# Patient Record
Sex: FEMALE | Race: WHITE | Employment: OTHER | URBAN - METROPOLITAN AREA
[De-identification: names, ages, dates, MRNs, and addresses within clinical notes are randomized per-mention and may not be internally consistent; named-entity substitution may affect disease eponyms.]

---

## 2017-02-20 ENCOUNTER — TELEPHONE (OUTPATIENT)
Dept: FAMILY MEDICINE | Facility: CLINIC | Age: 62
End: 2017-02-20

## 2017-02-20 DIAGNOSIS — E10.65 TYPE 1 DIABETES MELLITUS WITH HYPERGLYCEMIA (H): Primary | ICD-10-CM

## 2017-02-20 NOTE — TELEPHONE ENCOUNTER
Walter oTrres is no longer on Niharika's insurance formulary, they prefer to pay for Levemir Flexpen, if you are ok with that switch, please send new prescription for the Levemir to [facility], thanks.  If you prefer her to stay on the Lantus it will require a prior authorization.    Insurance info -- [employer] (BIN [id number])  ID -- [id number]   Group -- [id number]  [facility] -- [id number]  Phone # -- [phone]    Kathryn Vincent-Technician  [facility]  [phone]

## 2017-02-21 NOTE — TELEPHONE ENCOUNTER
I am ok with her trying a switch to Levimir flex pen. See orders.  Equal unit switch compared to what she is using now.    I will want follow up with diabetes ed very soon after switching to make sure her dose is correct.  Please schedule follow up within 2 weeks and update us with blood sugars up until then.   Also she is due for a diabetes follow up with me, please schedule.   Leona Farris MD

## 2017-04-21 ENCOUNTER — TELEPHONE (OUTPATIENT)
Dept: FAMILY MEDICINE | Facility: CLINIC | Age: 62
End: 2017-04-21

## 2017-04-22 NOTE — TELEPHONE ENCOUNTER
Call Regarding Preventive Health Screening Mammogram    Attempt 1    Message on voicemail     Comments:       Outreach   Nicki Bejarano

## 2017-05-12 DIAGNOSIS — F41.1 GENERALIZED ANXIETY DISORDER: ICD-10-CM

## 2017-05-12 DIAGNOSIS — E78.5 HYPERLIPIDEMIA LDL GOAL <100: ICD-10-CM

## 2017-05-12 DIAGNOSIS — E10.65 TYPE 1 DIABETES MELLITUS WITH HYPERGLYCEMIA (H): ICD-10-CM

## 2017-05-12 RX ORDER — SIMVASTATIN 10 MG
TABLET ORAL
Qty: 90 TABLET | Refills: 0 | Status: SHIPPED | OUTPATIENT
Start: 2017-05-12 | End: 2017-08-16

## 2017-05-12 RX ORDER — PAROXETINE 20 MG/1
TABLET, FILM COATED ORAL
Qty: 90 TABLET | Refills: 0 | Status: SHIPPED | OUTPATIENT
Start: 2017-05-12 | End: 2017-08-16

## 2017-05-12 NOTE — TELEPHONE ENCOUNTER
Please schedule appt for med follow up in next 3 months, I refilled until then.   Leona Farris MD

## 2017-05-12 NOTE — TELEPHONE ENCOUNTER
simvastatin (ZOCOR) 10 MG tablet   Last Written Prescription Date: 6/17/16  Last Fill Quantity: 90, # refills: 1  [place] [facility] [facility] or [facility] prescribing provider: 6/17/16       Lab Results   Component Value Date    CHOL 129 11/07/2015     Lab Results   Component Value Date    HDL 57 11/07/2015     Lab Results   Component Value Date    LDL 61 11/07/2015     Lab Results   Component Value Date    TRIG 53 11/07/2015     Lab Results   Component Value Date    CHOLHDLRATIO 2.3 11/07/2015      PARoxetine (PAXIL) 20 MG tablet    Last Written Prescription Date: 6/17/16  Last Fill Quantity: 90, # refills: 1  [place] [facility] [place] provider:  6/17/16        Last PHQ-9 score on record=   PHQ-9 SCORE 10/31/2011   Total Score 5

## 2017-05-15 NOTE — TELEPHONE ENCOUNTER
Left message for patient to call back, and speak with any .     Thank you,   Fozia Katz   for [facility]

## 2017-06-05 ENCOUNTER — TELEPHONE (OUTPATIENT)
Dept: FAMILY MEDICINE | Facility: CLINIC | Age: 62
End: 2017-06-05

## 2017-06-05 NOTE — TELEPHONE ENCOUNTER
Panel Management Review      Patient has the following on her problem list:     Diabetes    ASA: Passed    Last A1C  Lab Results   Component Value Date    A1C 7.6 06/17/2016    A1C 8.6 11/07/2015    A1C 8.1 04/21/2015    A1C 8.4 09/19/2014    A1C 7.4 08/09/2013     A1C tested: FAILED-overdue    Last LDL:    Lab Results   Component Value Date    CHOL 129 11/07/2015     Lab Results   Component Value Date    HDL 57 11/07/2015     Lab Results   Component Value Date    LDL 61 11/07/2015     Lab Results   Component Value Date    TRIG 53 11/07/2015     Lab Results   Component Value Date    CHOLHDLRATIO 2.3 11/07/2015     No results found for: NHDL    Is the patient on a Statin? YES             Is the patient on Aspirin? YES    Medications     HMG CoA Reductase Inhibitors    simvastatin (ZOCOR) 10 MG tablet    Salicylates    ASPIRIN 81 MG OR TABS          Last three blood pressure readings:  BP Readings from Last 3 Encounters:   06/17/16 96/58   11/04/15 126/62   04/21/15 108/62       Date of last diabetes office visit: 6/17/2016     Tobacco History:     History   Smoking Status     Current Some Day Smoker   Smokeless Tobacco     Never Used         Hypertension   Last three blood pressure readings:  BP Readings from Last 3 Encounters:   06/17/16 96/58   11/04/15 126/62   04/21/15 108/62     Blood pressure: Passed    HTN Guidelines:  Age 18-59 BP range:  Less than 140/90  Age 60-85 with Diabetes:  Less than 140/90  Age 60-85 without Diabetes:  less than 150/90      Composite cancer screening  Chart review shows that this patient is due/due soon for the following Mammogram and Colonoscopy  Summary:    Patient is due/failing the following:   A1C, COLONOSCOPY, FOLLOW UP, MAMMOGRAM and PHYSICAL    Action needed:   Patient needs office visit for physical, diabetes, mammogram, colonoscopy.    Type of outreach:    Phone, left message for patient to call back. [phone]    Questions for provider review:    None                                                                                                                                     Chasity Limon, [facility]      Chart routed to Care Team .

## 2017-06-14 ENCOUNTER — TELEPHONE (OUTPATIENT)
Dept: FAMILY MEDICINE | Facility: CLINIC | Age: 62
End: 2017-06-14

## 2017-06-14 NOTE — TELEPHONE ENCOUNTER
Reason for Call:  Form, our goal is to have forms completed with 72 hours, however, some forms may require a visit or additional information.    Type of letter, form or note:  medical    Who is the form from?: [place] [employer] (if other please explain)    Where did the form come from: Patient or family brought in       What clinic location was the form placed at?: [facility]    Where the form was placed: [facility]    What number is listed as a contact on the form?: patient, [phone]       Additional comments: please renny when ready to ,  Patient needs this by this Friday 16th.     Call taken on 6/14/2017 at 12:18 PM by China Baker

## 2017-06-16 NOTE — TELEPHONE ENCOUNTER
6/16/2017    Call Regarding Preventive Health Screening Colonoscopy    Attempt 3    Message on voicemail     Comments: Clinic made the 2nd attempt. Patient is scheduled for Mammo after Outreach attempt.       Outreach   CC

## 2017-06-16 NOTE — TELEPHONE ENCOUNTER
Patient called to fu on forms that were brought in on Wed, patient is requesting to  today, please advise.  [phone]  Thank you,  Jennifer Peter  Patient Representative

## 2017-06-16 NOTE — TELEPHONE ENCOUNTER
LM on home# advising patient form is complete and at  to . Gave [phone] for questions  Chasity Limon CMA

## 2017-06-24 DIAGNOSIS — E10.65 TYPE 1 DIABETES MELLITUS WITH HYPERGLYCEMIA (H): ICD-10-CM

## 2017-06-26 RX ORDER — INSULIN DETEMIR 100 [IU]/ML
INJECTION, SOLUTION SUBCUTANEOUS
Qty: 60 ML | Refills: 0 | Status: SHIPPED | OUTPATIENT
Start: 2017-06-26 | End: 2017-08-25

## 2017-06-26 NOTE — TELEPHONE ENCOUNTER
Routing refill request to provider for review/approval because:  Labs not current:  Microalbumin, LDL.   Patient needs to be seen because:  Per protocol, patient needs to be seen every 6 months for refills, LOV 06/17/2016.    Patient has upcoming appointment with PCP on 08/25/2017.    PCP is currently out of office, will route to covering provider.     Joi Sheriff RN

## 2017-06-26 NOTE — TELEPHONE ENCOUNTER
Humalog         Last Written Prescription Date: 6/17/16  Last Fill Quantity: 20ml, # refills: 11  Last Office Visit with [facility], [facility] or  Health prescribing provider:  6/17/16   Next 5 appointments (look out 90 days)     Aug 25, 2017  7:45 AM CDT   [facility] Visit with Leona Farris MD   [facility]    [address] [place] [id number] [phone]                   BP Readings from Last 3 Encounters:   06/17/16 96/58   11/04/15 126/62   04/21/15 108/62     Lab Results   Component Value Date    MICROL <5 11/04/2015     Lab Results   Component Value Date    UMALCR Unable to calculate due to low value 11/04/2015     Creatinine   Date Value Ref Range Status   11/07/2015 0.54 0.52 - 1.04 mg/dL Final   ]  GFR Estimate   Date Value Ref Range Status   11/07/2015 >90  Non  GFR Calc   >60 mL/min/1.7m2 Final   09/19/2014 85 >60 mL/min/1.7m2 Final     Comment:     Non  GFR Calc   08/09/2013 >90 >60 mL/min/1.7m2 Final     GFR Estimate If Black   Date Value Ref Range Status   11/07/2015 >90   GFR Calc   >60 mL/min/1.7m2 Final   09/19/2014 >90   GFR Calc   >60 mL/min/1.7m2 Final   08/09/2013 >90 >60 mL/min/1.7m2 Final     Lab Results   Component Value Date    CHOL 129 11/07/2015     Lab Results   Component Value Date    HDL 57 11/07/2015     Lab Results   Component Value Date    LDL 61 11/07/2015     Lab Results   Component Value Date    TRIG 53 11/07/2015     Lab Results   Component Value Date    CHOLHDLRATIO 2.3 11/07/2015     Lab Results   Component Value Date    AST 19 05/13/2011     Lab Results   Component Value Date    ALT 22 11/07/2015     Lab Results   Component Value Date    A1C 7.6 06/17/2016    A1C 8.6 11/07/2015    A1C 8.1 04/21/2015    A1C 8.4 09/19/2014    A1C 7.4 08/09/2013     Potassium   Date Value Ref Range Status   11/07/2015 4.0 3.4 - 5.3 mmol/L Final     Kathryn  Melisa-Technician  [facility]  [phone]

## 2017-06-26 NOTE — TELEPHONE ENCOUNTER
insulin detemir (LEVEMIR) 100 UNIT/ML injection         Last Written Prescription Date: 2/20/17  Last Fill Quantity: 50, # refills: 0  Last Office Visit with [facility] or [facility] prescribing provider:  6/17/16   Next 5 appointments (look out 90 days)     Aug 25, 2017  7:45 AM CDT   [facility]   [facility]    [address] [place] [id number] [phone]                   BP Readings from Last 3 Encounters:   06/17/16 96/58   11/04/15 126/62   04/21/15 108/62     Lab Results   Component Value Date    MICROL <5 11/04/2015     Lab Results   Component Value Date    UMALCR Unable to calculate due to low value 11/04/2015     Creatinine   Date Value Ref Range Status   11/07/2015 0.54 0.52 - 1.04 mg/dL Final   ]  GFR Estimate   Date Value Ref Range Status   11/07/2015 >90  Non  GFR Calc   >60 mL/min/1.7m2 Final   09/19/2014 85 >60 mL/min/1.7m2 Final     Comment:     Non  GFR Calc   08/09/2013 >90 >60 mL/min/1.7m2 Final     GFR Estimate If Black   Date Value Ref Range Status   11/07/2015 >90   GFR Calc   >60 mL/min/1.7m2 Final   09/19/2014 >90   GFR Calc   >60 mL/min/1.7m2 Final   08/09/2013 >90 >60 mL/min/1.7m2 Final     Lab Results   Component Value Date    CHOL 129 11/07/2015     Lab Results   Component Value Date    HDL 57 11/07/2015     Lab Results   Component Value Date    LDL 61 11/07/2015     Lab Results   Component Value Date    TRIG 53 11/07/2015     Lab Results   Component Value Date    CHOLHDLRATIO 2.3 11/07/2015     Lab Results   Component Value Date    AST 19 05/13/2011     Lab Results   Component Value Date    ALT 22 11/07/2015     Lab Results   Component Value Date    A1C 7.6 06/17/2016    A1C 8.6 11/07/2015    A1C 8.1 04/21/2015    A1C 8.4 09/19/2014    A1C 7.4 08/09/2013     Potassium   Date Value Ref Range Status   11/07/2015 4.0 3.4 - 5.3 mmol/L  Final

## 2017-08-16 DIAGNOSIS — E10.65 TYPE 1 DIABETES MELLITUS WITH HYPERGLYCEMIA (H): ICD-10-CM

## 2017-08-16 DIAGNOSIS — I10 ESSENTIAL HYPERTENSION WITH GOAL BLOOD PRESSURE LESS THAN 130/80: ICD-10-CM

## 2017-08-16 DIAGNOSIS — E78.5 HYPERLIPIDEMIA LDL GOAL <100: ICD-10-CM

## 2017-08-16 DIAGNOSIS — F41.1 GENERALIZED ANXIETY DISORDER: ICD-10-CM

## 2017-08-16 RX ORDER — LISINOPRIL 40 MG/1
TABLET ORAL
Qty: 90 TABLET | Refills: 0 | Status: SHIPPED | OUTPATIENT
Start: 2017-08-16 | End: 2017-11-14

## 2017-08-16 RX ORDER — SIMVASTATIN 10 MG
TABLET ORAL
Qty: 90 TABLET | Refills: 0 | Status: SHIPPED | OUTPATIENT
Start: 2017-08-16 | End: 2017-11-14

## 2017-08-16 RX ORDER — PAROXETINE 20 MG/1
TABLET, FILM COATED ORAL
Qty: 90 TABLET | Refills: 0 | Status: SHIPPED | OUTPATIENT
Start: 2017-08-16 | End: 2017-08-25

## 2017-08-16 RX ORDER — HYDROCHLOROTHIAZIDE 12.5 MG/1
CAPSULE ORAL
Qty: 90 CAPSULE | Refills: 0 | Status: SHIPPED | OUTPATIENT
Start: 2017-08-16 | End: 2017-11-14

## 2017-08-16 RX ORDER — ATENOLOL 25 MG/1
TABLET ORAL
Qty: 90 TABLET | Refills: 0 | Status: SHIPPED | OUTPATIENT
Start: 2017-08-16 | End: 2017-11-14

## 2017-08-16 NOTE — TELEPHONE ENCOUNTER
Paxil 20mg  Last Written Prescription Date: 5/12/17  Last Fill Quantity: 90, # refills: 0  Last Office Visit with [facility] primary care provider:  6/17/16   Next 5 appointments (look out 90 days)     Aug 25, 2017  7:45 AM CDT   Office Visit with Leona Farris MD   [facility]    [address] [place] [id number] [phone]                   Last PHQ-9 score on record=   PHQ-9 SCORE 10/31/2011   Total Score 5     Zocor 10mg     Last Written Prescription Date: 5/12/17  Last Fill Quantity: 90, # refills: 0  Last Office Visit with [facility], [facility] or  Health prescribing provider: 6/17/16  Next 5 appointments (look out 90 days)     Aug 25, 2017  7:45 AM CDT   Office Visit with Leona Farris MD   [facility] ([facility])    [address] [place] [id number] [phone]                   Lab Results   Component Value Date    CHOL 129 11/07/2015     Lab Results   Component Value Date    HDL 57 11/07/2015     Lab Results   Component Value Date    LDL 61 11/07/2015     Lab Results   Component Value Date    TRIG 53 11/07/2015     Lab Results   Component Value Date    CHOLHDLRATIO 2.3 11/07/2015       Atenolol 25mg; Hctz 12.5mg; Lisinopril 40mg      Last Written Prescription Date: 6/17/16  Last Fill Quantity: 90, # refills: 3  Last Office Visit with [facility], [facility] or  Health prescribing provider: 6/17/16  Next 5 appointments (look out 90 days)     Aug 25, 2017  7:45 AM CDT   Office Visit with Leona Farris MD   [facility] ([facility])    [address] [place] [id number] [phone]                   Potassium   Date Value Ref Range Status   11/07/2015 4.0 3.4 - 5.3 mmol/L Final     Creatinine   Date Value Ref Range Status   11/07/2015 0.54 0.52 - 1.04 mg/dL Final     BP Readings from Last 3 Encounters:   06/17/16 96/58   11/04/15 126/62   04/21/15  108/62     Kathryn Vincent-Technician  [facility]  [phone]

## 2017-08-25 ENCOUNTER — OFFICE VISIT (OUTPATIENT)
Dept: FAMILY MEDICINE | Facility: CLINIC | Age: 62
End: 2017-08-25
Payer: COMMERCIAL

## 2017-08-25 ENCOUNTER — HOSPITAL ENCOUNTER (OUTPATIENT)
Dept: MAMMOGRAPHY | Facility: CLINIC | Age: 62
Discharge: HOME | End: 2017-08-25
Attending: FAMILY MEDICINE | Admitting: FAMILY MEDICINE
Payer: COMMERCIAL

## 2017-08-25 VITALS
WEIGHT: 176.12 LBS | HEIGHT: 63 IN | TEMPERATURE: 98.1 F | BODY MASS INDEX: 31.21 KG/M2 | SYSTOLIC BLOOD PRESSURE: 114 MMHG | DIASTOLIC BLOOD PRESSURE: 66 MMHG | OXYGEN SATURATION: 97 % | HEART RATE: 77 BPM

## 2017-08-25 DIAGNOSIS — F41.1 GENERALIZED ANXIETY DISORDER: ICD-10-CM

## 2017-08-25 DIAGNOSIS — E66.09 NON MORBID OBESITY DUE TO EXCESS CALORIES: ICD-10-CM

## 2017-08-25 DIAGNOSIS — F17.200 TOBACCO USE DISORDER: ICD-10-CM

## 2017-08-25 DIAGNOSIS — I10 HYPERTENSION GOAL BP (BLOOD PRESSURE) < 130/80: ICD-10-CM

## 2017-08-25 DIAGNOSIS — E78.5 HYPERLIPIDEMIA LDL GOAL <100: ICD-10-CM

## 2017-08-25 DIAGNOSIS — E10.65 TYPE 1 DIABETES MELLITUS WITH HYPERGLYCEMIA (H): Primary | ICD-10-CM

## 2017-08-25 DIAGNOSIS — Z12.31 VISIT FOR SCREENING MAMMOGRAM: ICD-10-CM

## 2017-08-25 LAB
ALT SERPL W P-5'-P-CCNC: 20 U/L (ref 0–50)
ANION GAP SERPL CALCULATED.3IONS-SCNC: 8 MMOL/L (ref 3–14)
BUN SERPL-MCNC: 18 MG/DL (ref 7–30)
CALCIUM SERPL-MCNC: 9.5 MG/DL (ref 8.5–10.1)
CHLORIDE SERPL-SCNC: 104 MMOL/L (ref 94–109)
CHOLEST SERPL-MCNC: 144 MG/DL
CO2 SERPL-SCNC: 27 MMOL/L (ref 20–32)
CREAT SERPL-MCNC: 0.79 MG/DL (ref 0.52–1.04)
CREAT UR-MCNC: 137 MG/DL
GFR SERPL CREATININE-BSD FRML MDRD: 74 ML/MIN/1.7M2
GLUCOSE SERPL-MCNC: 119 MG/DL (ref 70–99)
HBA1C MFR BLD: 7.7 % (ref 4.3–6)
HDLC SERPL-MCNC: 55 MG/DL
LDLC SERPL CALC-MCNC: 76 MG/DL
MICROALBUMIN UR-MCNC: 23 MG/L
MICROALBUMIN/CREAT UR: 17.08 MG/G CR (ref 0–25)
NONHDLC SERPL-MCNC: 89 MG/DL
POTASSIUM SERPL-SCNC: 4.5 MMOL/L (ref 3.4–5.3)
SODIUM SERPL-SCNC: 139 MMOL/L (ref 133–144)
TRIGL SERPL-MCNC: 66 MG/DL
TSH SERPL DL<=0.005 MIU/L-ACNC: 1.18 MU/L (ref 0.4–4)

## 2017-08-25 PROCEDURE — 99207 C FOOT EXAM  NO CHARGE: CPT | Performed by: FAMILY MEDICINE

## 2017-08-25 PROCEDURE — 99214 OFFICE O/P EST MOD 30 MIN: CPT | Performed by: FAMILY MEDICINE

## 2017-08-25 PROCEDURE — 36415 COLL VENOUS BLD VENIPUNCTURE: CPT | Performed by: FAMILY MEDICINE

## 2017-08-25 PROCEDURE — 84460 ALANINE AMINO (ALT) (SGPT): CPT | Performed by: FAMILY MEDICINE

## 2017-08-25 PROCEDURE — 80048 BASIC METABOLIC PNL TOTAL CA: CPT | Performed by: FAMILY MEDICINE

## 2017-08-25 PROCEDURE — 84443 ASSAY THYROID STIM HORMONE: CPT | Performed by: FAMILY MEDICINE

## 2017-08-25 PROCEDURE — 80061 LIPID PANEL: CPT | Performed by: FAMILY MEDICINE

## 2017-08-25 PROCEDURE — 83036 HEMOGLOBIN GLYCOSYLATED A1C: CPT | Performed by: FAMILY MEDICINE

## 2017-08-25 PROCEDURE — G0202 SCR MAMMO BI INCL CAD: HCPCS

## 2017-08-25 PROCEDURE — 82043 UR ALBUMIN QUANTITATIVE: CPT | Performed by: FAMILY MEDICINE

## 2017-08-25 RX ORDER — PAROXETINE 30 MG/1
30 TABLET, FILM COATED ORAL EVERY MORNING
Qty: 90 TABLET | Refills: 1 | Status: SHIPPED | OUTPATIENT
Start: 2017-08-25 | End: 2018-04-17

## 2017-08-25 ASSESSMENT — PAIN SCALES - GENERAL: PAINLEVEL: NO PAIN (0)

## 2017-08-25 NOTE — PROGRESS NOTES
SUBJECTIVE:   Niharika Cason is a 61 year old female who presents to clinic today for the following health issues:  Her last visit was June of 2016, she is here for yearly recheck of her diabetes and other chronic health conditions:      Type 1 diabetes mellitus with hyperglycemia (H)  Generalized anxiety disorder  Hypertension goal BP (blood pressure) < 130/80  Hyperlipidemia LDL goal <100  Non morbid obesity due to excess calories  Tobacco use disorder    Overall she is not feeling well, having significant anxiety and depression symptoms. She is worried about many things. Please see ROS below for details.      Medical, social, surgical, and family histories reviewed and updated as needed.      Problem list and histories reviewed & adjusted, as indicated.  Additional history: as documented    Past Medical History:   Diagnosis Date     Essential hypertension, benign      Generalized anxiety disorder      Type I (juvenile type) diabetes mellitus without mention of complication, not stated as uncontrolled     diagnosed at age 35     Ulcerative colitis, unspecified     in the 70s.          Patient Active Problem List   Diagnosis     Female stress incontinence     Generalized anxiety disorder     Constipation     Type 1 diabetes, HbA1c goal < 8% (H)     Hypertension goal BP (blood pressure) < 130/80     Hyperlipidemia LDL goal <100     Tobacco abuse     Tobacco use disorder     Type 1 diabetes mellitus with hyperglycemia (HCC)     Non morbid obesity due to excess calories     Past Surgical History:   Procedure Laterality Date     HC COLONOSCOPY THRU STOMA, DIAGNOSTIC  1998    negative     HC CURETTAGE, POSTPARTUM  '91, '93    following miscarriages     HC REMOVAL OF TONSILS,<13 Y/O      Tonsils <14y.o.       Social History   Substance Use Topics     Smoking status: Current Some Day Smoker     Smokeless tobacco: Never Used     Alcohol use 0.0 oz/week     0 Standard drinks or equivalent per week      Comment: 3-4 wine  per wk     Family History   Problem Relation Age of Onset     HEART DISEASE Maternal Grandfather      MI at 52 yrs     EYE* Paternal Grandfather      cataracts     Arthritis Mother      Gynecology Mother      hysterectomy for fibroids     Hypertension Mother      Lipids Mother      GASTROINTESTINAL DISEASE Father      ulcers     HEART DISEASE Father      intermittent rapid heartbeat         Current Outpatient Prescriptions   Medication Sig Dispense Refill     PARoxetine (PAXIL) 30 MG tablet Take 1 tablet (30 mg) by mouth every morning 90 tablet 1     insulin detemir (LEVEMIR FLEXTOUCH) 100 UNIT/ML injection Inject 50 Units Subcutaneous At Bedtime 45 mL 3     insulin lispro (HUMALOG KWIKPEN) 100 UNIT/ML injection Use 3 units before breakfast, 6 units before lunch, and 16 units before dinner, plus correction of 1 unit per every 50 glucose over 125, averaging 3 for breakfast, 6 for lunch, 16 for dinner. 30 mL 3     simvastatin (ZOCOR) 10 MG tablet TAKE ONE TABLET BY MOUTH EVERY NIGHT AT BEDTIME 90 tablet 0     atenolol (TENORMIN) 25 MG tablet TAKE ONE TABLET BY MOUTH EVERY DAY 90 tablet 0     hydrochlorothiazide (MICROZIDE) 12.5 MG capsule TAKE ONE CAPSULE BY MOUTH EVERY DAY 90 capsule 0     lisinopril (PRINIVIL/ZESTRIL) 40 MG tablet TAKE ONE TABLET BY MOUTH EVERY DAY 90 tablet 0     BD ULTRA FINE PEN NEEDLES 1 each by Percutaneous route 4 times daily 150 Device 11     blood glucose monitoring (SURESTEP TEST STRIPS) test strip 1 strip by In Vitro route 3 times daily 200 each 3     ASPIRIN 81 MG OR TABS 1 tab po QD (Once per day) 0 0     [DISCONTINUED] PARoxetine (PAXIL) 20 MG tablet TAKE ONE TABLET BY MOUTH EVERY MORNING 90 tablet 0     [DISCONTINUED] LEVEMIR FLEXTOUCH 100 UNIT/ML injection INJECT 55 UNITS UNDER THE SKIN AT BEDTIME 60 mL 0     [DISCONTINUED] insulin lispro (HUMALOG KWIKPEN) 100 UNIT/ML injection 4 units per carb for each meal plus correction of 1 unit per every 50 glucose over 125, averaging 6 for  "breakfast, 8 for lunch, 16 for dinner. 20 mL 11     [DISCONTINUED] BD ULTRA FINE PEN NEEDLES 1 each by Percutaneous route 4 times daily 150 Device 11     Allergies   Allergen Reactions     No Known Drug Allergies          Reviewed and updated as needed this visit by clinical staffTobacco  Allergies  Meds  Med Hx  Surg Hx  Fam Hx  Soc Hx      Reviewed and updated as needed this visit by Provider         ROS:  C: NEGATIVE for fever, chills  Endocrine:  She is checking her blood sugar 5-6 times per day.  She is using her levimir 55 units daily at bedtime, and using her humalog 6 units before breakfast, 6 units before lunch, and 16 units before dinner.  She is getting low blood sugars often, at times down to 48, 55, 60s.  This is concerning to her.  However, she also knows she is not eating as healthy as she should, this causes her anxiety.  She still gets some high numbers as well, up in the range of 180s.   E: NEGATIVE for vision changes   R: NEGATIVE for significant cough or SOB  CV: NEGATIVE for chest pain, palpitations   GI: NEGATIVE for nausea, abdominal pain, heartburn, or change in bowel habits  : NEGATIVE for frequency, dysuria, or hematuria  M: NEGATIVE for significant arthralgias or myalgia  N: NEGATIVE for weakness, dizziness or paresthesias or headache  Psych: Positive for depression and anxiety. She is just worried, doesn't like being 62.  She currently helps take care of her grandchildren, and worries because they are growing up too fast.  She knows she needs to spend time with them, and enjoys her time with them a lot, but still is sad about her future. Worries about her health, her diabetes, her eating, having a stroke, etc.  GAD7 = 8 today, PHQ9=8 today.  See scans in EPIC.   All other ROS reviewed and are negative or non-contributory except as stated in HPI.      OBJECTIVE:     /66  Pulse 77  Temp 98.1  F (36.7  C) (Temporal)  Ht 1.6 m (5' 3\")  Wt 79.9 kg (176 lb 1.9 oz)  LMP " 10/15/2008  SpO2 97%  BMI 31.2 kg/m2  Body mass index is 31.2 kg/(m^2).       Vitals noted.    GENERAL APPEARANCE: healthy, alert and no distress  EYES: EOMI,  PERRL  Neck:  Supple without lymphadenopathy, JVD or masses. No bruits.   CV:  RRR without murmur.   RESP: lungs clear to auscultation - no rales, rhonchi or wheezes  Extremities warm and dry without cyanosis, clubbing or edema.   Diabetic foot exam is normal to monofilament testing, no calluses or ulcers, just dry skin.     Orders Placed This Encounter   Procedures     FOOT EXAM     Hemoglobin A1c     Lipid panel reflex to direct LDL     ALT     Basic metabolic panel  (Ca, Cl, CO2, Creat, Gluc, K, Na, BUN)     TSH with free T4 reflex     Albumin Random Urine Quantitative with Creat Ratio        ASSESSMENT:       ICD-10-CM    1. Type 1 diabetes mellitus with hyperglycemia (HCC) E10.65 insulin detemir (LEVEMIR FLEXTOUCH) 100 UNIT/ML injection     insulin lispro (HUMALOG KWIKPEN) 100 UNIT/ML injection     Hemoglobin A1c     Lipid panel reflex to direct LDL     ALT     Basic metabolic panel  (Ca, Cl, CO2, Creat, Gluc, K, Na, BUN)     TSH with free T4 reflex     Albumin Random Urine Quantitative with Creat Ratio     FOOT EXAM   2. GENERALIZED ANXIETY DIS F41.1 PARoxetine (PAXIL) 30 MG tablet   3. Hypertension goal BP (blood pressure) < 130/80 I10 Basic metabolic panel  (Ca, Cl, CO2, Creat, Gluc, K, Na, BUN)     TSH with free T4 reflex   4. Hyperlipidemia LDL goal <100 E78.5 Lipid panel reflex to direct LDL     ALT   5. Non morbid obesity due to excess calories E66.09    6. Tobacco use disorder F17.200           PLAN:       (E10.65) Type 1 diabetes mellitus with hyperglycemia (HCC)   Comment:   Physically she is feeling good, but I'm concerned about her fluctuating glucoses, especially the lows. I am going to decrease her levimir to 50 unts to reduce her lows, and decrease her breakfast humalog to 3 units, continue lunch and dinner the same (6 and 16 units,  respectively). Will see what her A1C shows and may need to adjust further. She doesn't have a good idea which glucoses are high and which are low, so I asked her to do a better job keeping track so we can adjust accordingly. Encouraged healthy diabetic diet and increased exercise.     Plan: insulin detemir (LEVEMIR FLEXTOUCH) 100 UNIT/ML      (F41.1) GENERALIZED ANXIETY DIS  Plan: PARoxetine (PAXIL) 30 MG tablet  Patient states that she is smoking more and not taking care of herself.  Patient will watch diet, and get more exercise. I encouraged her to do something proactive about her anxiety, develop a plan for the things she is anxious about. For example, spending time with her grandchildren and taking an interest in their activities as they grow so she doesn't lose contact.  Discussed that growing up and growing old is unavoidable, but that she can adapt with the times and still enjoy life, not stress so much about getting old.  I encouraged her to find emotional support, She will talk with her daughter about her issues to a small degree, so she has some support.  We also discussed getting better control of her diabetes so her fear of complications such as stroke is less.  Worry is non-productive, action is more helpful and can alleviate some anxiety.  We talked about raising her paxil dose to 30 mg, she wants to do this rather than use something short term such as xanax.  Will notify me if not helping.  Again, encouraged more exercise as well.  She declined counseling due to financial reasons. Patient says she wants to change, and wants to get better.  She was happier with our discussion and motivated to work some of these issues out.     (I10) Hypertension goal BP (blood pressure) < 130/80  Plan: Basic metabolic panel  (Ca, Cl, CO2, Creat,         Gluc, K, Na, BUN), TSH with free T4 reflex        Continue with medications. BP is at goal, no change.  Will notify with labs.     (E78.5) Hyperlipidemia LDL goal  <100  Plan: Lipid panel reflex to direct LDL, ALT        Continue with medications as directed. Await labs. Refills given.       Call in one month to discuss how the medication change is working. Follow up sooner with any worsening.     This document serves as a record of services personally performed by Leona Farris MD. It was created on their behalf by Janeth Domínguez, a trained medical scribe. The creation of this record is based on the provider's personal observations and the statements of the patient. This document has been checked and approved by the attending provider.    Leona Farris MD  [facility]

## 2017-08-25 NOTE — MR AVS SNAPSHOT
"              After Visit Summary   2017    Niharika Cason    MRN: [id number]           Patient Information     Date Of Birth          1955        Visit Information        Provider Department      2017 7:45 AM Leona Farris MD [facility]        Today's Diagnoses     Type 1 diabetes mellitus with hyperglycemia (HCC)    -  1    GENERALIZED ANXIETY DIS        Hypertension goal BP (blood pressure) < 130/80        Hyperlipidemia LDL goal <100           Follow-ups after your visit        Who to contact     If you have questions or need follow up information about today's clinic visit or your schedule please contact [facility] directly at [phone].  Normal or non-critical lab and imaging results will be communicated to you by MyChart, letter or phone within 4 business days after the clinic has received the results. If you do not hear from us within 7 days, please contact the clinic through [employer] or phone. If you have a critical or abnormal lab result, we will notify you by phone as soon as possible.  Submit refill requests through [employer] or call your pharmacy and they will forward the refill request to us. Please allow 3 business days for your refill to be completed.          Additional Information About Your Visit        MyChart Information     [employer] lets you send messages to your doctor, view your test results, renew your prescriptions, schedule appointments and more. To sign up, go to [email or web address] . Click on \"Log in\" on the left side of the screen, which will take you to the Welcome page. Then click on \"Sign up Now\" on the right side of the page.     You will be asked to enter the access code listed below, as well as some personal information. Please follow the directions to create your username and password.     Your access code is: [email or web address]  Expires: 2017 10:26 AM     Your access code will  in 90 days. If you need help " "or a new code, please call your [facility] or [phone].        Care EveryWhere ID     This is your Care EveryWhere ID. This could be used by other organizations to access your [facility] medical records  [id number]        Your Vitals Were     Pulse Temperature Height Last Period Pulse Oximetry BMI (Body Mass Index)    77 98.1  F (36.7  C) (Temporal) 5' 3\" (1.6 m) 10/15/2008 97% 31.2 kg/m2       Blood Pressure from Last 3 Encounters:   08/25/17 114/66   06/17/16 96/58   11/04/15 126/62    Weight from Last 3 Encounters:   08/25/17 176 lb 1.9 oz (79.9 kg)   06/17/16 181 lb (82.1 kg)   11/04/15 175 lb 12.8 oz (79.7 kg)              We Performed the Following     Albumin Random Urine Quantitative with Creat Ratio     ALT     Basic metabolic panel  (Ca, Cl, CO2, Creat, Gluc, K, Na, BUN)     Hemoglobin A1c     Lipid panel reflex to direct LDL     TSH with free T4 reflex          Today's Medication Changes          These changes are accurate as of: 8/25/17 10:26 AM.  If you have any questions, ask your nurse or doctor.               These medicines have changed or have updated prescriptions.        Dose/Directions    insulin detemir 100 UNIT/ML injection   Commonly known as:  LEVEMIR FLEXTOUCH   This may have changed:  See the new instructions.   Used for:  Type 1 diabetes mellitus with hyperglycemia (H)   Changed by:  eLona Farris MD        Dose:  50 Units   Inject 50 Units Subcutaneous At Bedtime   Quantity:  45 mL   Refills:  3       insulin lispro 100 UNIT/ML injection   Commonly known as:  HumaLOG KWIKpen   This may have changed:  additional instructions   Used for:  Type 1 diabetes mellitus with hyperglycemia (H)   Changed by:  Leona Farris MD        Use 3 units before breakfast, 6 units before lunch, and 16 units before dinner, plus correction of 1 unit per every 50 glucose over 125, averaging 3 for breakfast, 6 for lunch, 16 for dinner.   Quantity:  30 mL   Refills:  3    "    PARoxetine 30 MG tablet   Commonly known as:  PAXIL   This may have changed:  See the new instructions.   Used for:  Generalized anxiety disorder   Changed by:  Leona Farris MD        Dose:  30 mg   Take 1 tablet (30 mg) by mouth every morning   Quantity:  90 tablet   Refills:  1            Where to get your medicines      These medications were sent to [facility] [place], [place] - [address]  [address] , [facility] [address]     Phone:  [phone]     insulin detemir 100 UNIT/ML injection    PARoxetine 30 MG tablet         Some of these will need a paper prescription and others can be bought over the counter.  Ask your nurse if you have questions.     Bring a paper prescription for each of these medications     insulin lispro 100 UNIT/ML injection                Primary Care Provider Office Phone # Fax #    Leona Farris [phone] [address] [place] [address]        Equal Access to Services     [facility] Hospital[facility]ERT[facility] AH: Hadii aad ku hadasho Soomaali, waaxda luqadaha, qaybta kaalmada adeegyada, waxay surendrain pedro galdamez . So [facility] [phone].    ATENCIÓN: Si habla español, tiene a stokes disposición servicios gratuitos de asistencia lingüística. Llame al [phone].    We comply with applicable federal civil rights laws and [place] laws. We do not discriminate on the basis of race, color, national origin, age, disability sex, sexual orientation or gender identity.            Thank you!     Thank you for choosing [facility]  for your care. Our goal is always to provide you with excellent care. Hearing back from our patients is one way we can continue to improve our services. Please take a few minutes to complete the written survey that you may receive in the mail after your visit with us. Thank you!             Your Updated Medication List - Protect others around you: Learn how to safely use, store and throw away  your medicines at www.disposemymeds.org.          This list is accurate as of: 8/25/17 10:26 AM.  Always use your most recent med list.                   Brand Name Dispense Instructions for use Diagnosis    aspirin 81 MG tablet     0    1 tab po QD (Once per day)    Type I (juvenile type) diabetes mellitus without mention of complication, not stated as uncontrolled, Essential hypertension, benign       atenolol 25 MG tablet    TENORMIN    90 tablet    TAKE ONE TABLET BY MOUTH EVERY DAY    Essential hypertension with goal blood pressure less than 130/80       BD ULTRA FINE PEN NEEDLES     150 Device    1 each by Percutaneous route 4 times daily    Type 1 diabetes mellitus with hyperglycemia (H)       blood glucose monitoring test strip    SURESTEP TEST STRIPS    200 each    1 strip by In Vitro route 3 times daily    Type 1 diabetes mellitus with hyperglycemia (H)       hydrochlorothiazide 12.5 MG capsule    MICROZIDE    90 capsule    TAKE ONE CAPSULE BY MOUTH EVERY DAY    Essential hypertension with goal blood pressure less than 130/80       insulin detemir 100 UNIT/ML injection    LEVEMIR FLEXTOUCH    45 mL    Inject 50 Units Subcutaneous At Bedtime    Type 1 diabetes mellitus with hyperglycemia (H)       insulin lispro 100 UNIT/ML injection    HumaLOG KWIKpen    30 mL    Use 3 units before breakfast, 6 units before lunch, and 16 units before dinner, plus correction of 1 unit per every 50 glucose over 125, averaging 3 for breakfast, 6 for lunch, 16 for dinner.    Type 1 diabetes mellitus with hyperglycemia (H)       lisinopril 40 MG tablet    PRINIVIL/ZESTRIL    90 tablet    TAKE ONE TABLET BY MOUTH EVERY DAY    Essential hypertension with goal blood pressure less than 130/80, Type 1 diabetes mellitus with hyperglycemia (H)       PARoxetine 30 MG tablet    PAXIL    90 tablet    Take 1 tablet (30 mg) by mouth every morning    Generalized anxiety disorder       simvastatin 10 MG tablet    ZOCOR    90 tablet    TAKE  ONE TABLET BY MOUTH EVERY NIGHT AT BEDTIME    Hyperlipidemia LDL goal <100, Type 1 diabetes mellitus with hyperglycemia (H)

## 2017-08-25 NOTE — PROGRESS NOTES
Please notify Niharika that all her labs look good, except her diabetes control is still just a little high. I am afraid if we lower her insulin, it will get worse. What I would like is for her to come back and see our diabetes educator after 1-2 weeks on the new doses, and bring her glucose numbers with her.  We can then come up with the proper insulin doses for her to keep things under good control and prevent her low blood sugars.  Also with better eating and exercise daily, she will likely see this improve even  More.   Leona Farris MD

## 2017-08-28 ENCOUNTER — TELEPHONE (OUTPATIENT)
Dept: FAMILY MEDICINE | Facility: CLINIC | Age: 62
End: 2017-08-28

## 2017-08-28 DIAGNOSIS — E10.65 TYPE 1 DIABETES MELLITUS WITH HYPERGLYCEMIA (H): Primary | ICD-10-CM

## 2017-08-28 ASSESSMENT — PATIENT HEALTH QUESTIONNAIRE - PHQ9
5. POOR APPETITE OR OVEREATING: SEVERAL DAYS
SUM OF ALL RESPONSES TO PHQ QUESTIONS 1-9: 8

## 2017-08-28 ASSESSMENT — ANXIETY QUESTIONNAIRES
6. BECOMING EASILY ANNOYED OR IRRITABLE: SEVERAL DAYS
IF YOU CHECKED OFF ANY PROBLEMS ON THIS QUESTIONNAIRE, HOW DIFFICULT HAVE THESE PROBLEMS MADE IT FOR YOU TO DO YOUR WORK, TAKE CARE OF THINGS AT HOME, OR GET ALONG WITH OTHER PEOPLE: SOMEWHAT DIFFICULT
7. FEELING AFRAID AS IF SOMETHING AWFUL MIGHT HAPPEN: SEVERAL DAYS
5. BEING SO RESTLESS THAT IT IS HARD TO SIT STILL: NOT AT ALL
3. WORRYING TOO MUCH ABOUT DIFFERENT THINGS: MORE THAN HALF THE DAYS
2. NOT BEING ABLE TO STOP OR CONTROL WORRYING: MORE THAN HALF THE DAYS
GAD7 TOTAL SCORE: 8
1. FEELING NERVOUS, ANXIOUS, OR ON EDGE: SEVERAL DAYS

## 2017-08-28 NOTE — TELEPHONE ENCOUNTER
----- Message from Leona Farris MD sent at 8/25/2017  5:38 PM CDT -----  Please notify Niharika that all her labs look good, except her diabetes control is still just a little high. I am afraid if we lower her insulin, it will get worse. What I would like is for her to come back and see our diabetes educator after 1-2 weeks on the new doses, and bring her glucose numbers with her.  We can then come up with the proper insulin doses for her to keep things under good control and prevent her low blood sugars.  Also with better eating and exercise daily, she will likely see this improve even  More.   Leona Farris MD

## 2017-08-29 ASSESSMENT — ANXIETY QUESTIONNAIRES: GAD7 TOTAL SCORE: 8

## 2017-11-14 DIAGNOSIS — I10 ESSENTIAL HYPERTENSION WITH GOAL BLOOD PRESSURE LESS THAN 130/80: ICD-10-CM

## 2017-11-14 DIAGNOSIS — E10.65 TYPE 1 DIABETES MELLITUS WITH HYPERGLYCEMIA (H): ICD-10-CM

## 2017-11-14 DIAGNOSIS — E78.5 HYPERLIPIDEMIA LDL GOAL <100: ICD-10-CM

## 2017-11-17 RX ORDER — SIMVASTATIN 10 MG
TABLET ORAL
Qty: 90 TABLET | Refills: 3 | Status: SHIPPED | OUTPATIENT
Start: 2017-11-17 | End: 2018-07-20

## 2017-11-17 RX ORDER — HYDROCHLOROTHIAZIDE 12.5 MG/1
CAPSULE ORAL
Qty: 90 CAPSULE | Refills: 3 | Status: SHIPPED | OUTPATIENT
Start: 2017-11-17 | End: 2018-07-20

## 2017-11-17 RX ORDER — ATENOLOL 25 MG/1
TABLET ORAL
Qty: 90 TABLET | Refills: 3 | Status: SHIPPED | OUTPATIENT
Start: 2017-11-17 | End: 2018-07-20

## 2017-11-17 RX ORDER — LISINOPRIL 40 MG/1
TABLET ORAL
Qty: 90 TABLET | Refills: 3 | Status: SHIPPED | OUTPATIENT
Start: 2017-11-17 | End: 2018-07-20

## 2017-12-06 ENCOUNTER — TELEPHONE (OUTPATIENT)
Dept: FAMILY MEDICINE | Facility: CLINIC | Age: 62
End: 2017-12-06

## 2017-12-06 NOTE — TELEPHONE ENCOUNTER
Panel Management Review      Patient has the following on her problem list:     Diabetes    ASA: Passed    Last A1C  Lab Results   Component Value Date    A1C 7.7 08/25/2017    A1C 7.6 06/17/2016    A1C 8.6 11/07/2015    A1C 8.1 04/21/2015    A1C 8.4 09/19/2014     A1C tested: Passed    Last LDL:    Lab Results   Component Value Date    CHOL 144 08/25/2017     Lab Results   Component Value Date    HDL 55 08/25/2017     Lab Results   Component Value Date    LDL 76 08/25/2017     Lab Results   Component Value Date    TRIG 66 08/25/2017     Lab Results   Component Value Date    CHOLHDLRATIO 2.3 11/07/2015     Lab Results   Component Value Date    NHDL 89 08/25/2017       Is the patient on a Statin? YES             Is the patient on Aspirin? YES    Medications     HMG CoA Reductase Inhibitors    simvastatin (ZOCOR) 10 MG tablet    Salicylates    ASPIRIN 81 MG OR TABS          Last three blood pressure readings:  BP Readings from Last 3 Encounters:   08/25/17 114/66   06/17/16 96/58   11/04/15 126/62       Date of last diabetes office visit: 8/25/2017     Tobacco History:     History   Smoking Status     Current Some Day Smoker   Smokeless Tobacco     Never Used         Hypertension   Last three blood pressure readings:  BP Readings from Last 3 Encounters:   08/25/17 114/66   06/17/16 96/58   11/04/15 126/62     Blood pressure: Passed    HTN Guidelines:  Age 18-59 BP range:  Less than 140/90  Age 60-85 with Diabetes:  Less than 140/90  Age 60-85 without Diabetes:  less than 150/90          Composite cancer screening  Chart review shows that this patient is due/due soon for the following None  Summary:    Patient is due/failing the following:   Tobacco abuse    Action needed:   Patient needs referral/order: tobacco cessation    Type of outreach:    Sent Payb[employer] message.    Questions for provider review:    None                                                                                                                                     Chasity Limon, [facility]      Chart routed to Care Team .

## 2017-12-20 DIAGNOSIS — E10.65 TYPE 1 DIABETES MELLITUS WITH HYPERGLYCEMIA (H): ICD-10-CM

## 2017-12-20 NOTE — TELEPHONE ENCOUNTER
Requested Prescriptions   Pending Prescriptions Disp Refills     BD ULTRA FINE PEN NEEDLES 150 Device 11     Si each by Percutaneous route 4 times daily    There is no refill protocol information for this order        Last Written Prescription Date:  2016  Last Fill Quantity: 150,  # refills: 11   Last Office Visit with [facility], JUDIEP or [facility] prescribing provider:   2017  Future Office Visit:

## 2017-12-21 NOTE — TELEPHONE ENCOUNTER
Prescription approved per [facility] Refill Protocol.    Joi Sheriff RN

## 2018-04-17 ENCOUNTER — TELEPHONE (OUTPATIENT)
Dept: FAMILY MEDICINE | Facility: CLINIC | Age: 63
End: 2018-04-17

## 2018-04-17 DIAGNOSIS — F41.1 GENERALIZED ANXIETY DISORDER: ICD-10-CM

## 2018-04-17 NOTE — LETTER
[address]   [address]  Tel. [phone] / Fax [phone]    April 19, 2018        Niharika Cason  [address] [address] [place] [id number]          Dear Niharika,    We have been trying to reach you by telephone. You are due for an office visit for a follow up on your medication. A 30 day supply/refill has been sent to your pharmacy to allow for you to make an appointment for follow up. Please call the clinic at [phone] to schedule an appointment.    Sincerely,        Leona Farris M.D./tf

## 2018-04-18 RX ORDER — PAROXETINE 30 MG/1
30 TABLET, FILM COATED ORAL EVERY MORNING
Qty: 30 TABLET | Refills: 0 | Status: SHIPPED | OUTPATIENT
Start: 2018-04-18 | End: 2018-07-20

## 2018-04-18 NOTE — TELEPHONE ENCOUNTER
PHQ-9 SCORE 5/13/2011 10/31/2011 8/28/2017   Total Score 4 5 -   Total Score - - 8     Routing refill request to provider for review/approval because:  Labs out of range:  PHQ-9  Patient needs to be seen because:  > 6 months from LOV   T'd up 1 month for provider review.    Will forward to schedulers to schedule patient for OV.  Zaria Layton RN

## 2018-04-18 NOTE — TELEPHONE ENCOUNTER
"Requested Prescriptions   Pending Prescriptions Disp Refills     PARoxetine (PAXIL) 30 MG tablet [Pharmacy Med Name: PAROXETINE HCL 30MG TABS] 90 tablet 1     Sig: TAKE ONE TABLET BY MOUTH EVERY MORNING    SSRIs Protocol Passed    4/17/2018  6:57 PM       Passed - Recent (12 mo) or future (30 days) visit within the authorizing provider's specialty    Patient had office visit in the last 12 months or has a visit in the next 30 days with authorizing provider or within the authorizing provider's specialty.  See \"Patient Info\" tab in inbasket, or \"Choose Columns\" in Meds & Orders section of the refill encounter.           Passed - Patient is age 18 or older       Passed - No active pregnancy on record       Passed - No positive pregnancy test in last 12 months          Last Written Prescription Date:  8/25/17  Last Fill Quantity: 90,  # refills: 1   Last Office Visit with [facility], P or [facility] prescribing provider:  8/25/17   Future Office Visit:       "

## 2018-04-19 NOTE — TELEPHONE ENCOUNTER
Called patient and left message to return call and speak with any . 2nd attempt to reach patient unsuccessful, routing back to team to send letter.     Thank you  Jesu Jasso  Patient Representative

## 2018-05-17 DIAGNOSIS — F41.1 GENERALIZED ANXIETY DISORDER: ICD-10-CM

## 2018-05-17 NOTE — TELEPHONE ENCOUNTER
"Requested Prescriptions   Pending Prescriptions Disp Refills     PARoxetine (PAXIL) 30 MG tablet [Pharmacy Med Name: PAROXETINE HCL 30MG TABS] 30 tablet 0     Sig: TAKE ONE TABLET BY MOUTH EVERY MORNING. APPOINTMENT NEEDED FOR ADDITIONAL REFILLS    SSRIs Protocol Passed    5/17/2018  9:45 AM       Passed - Recent (12 mo) or future (30 days) visit within the authorizing provider's specialty    Patient had office visit in the last 12 months or has a visit in the next 30 days with authorizing provider or within the authorizing provider's specialty.  See \"Patient Info\" tab in inbasket, or \"Choose Columns\" in Meds & Orders section of the refill encounter.           Passed - Patient is age 18 or older       Passed - No active pregnancy on record       Passed - No positive pregnancy test in last 12 months          Last Written Prescription Date:  4/18/18  Last Fill Quantity: 30,  # refills: 0   Last Office Visit with [facility], [facility] or [facility] prescribing provider:  8/25/17   Future Office Visit:    Next 5 appointments (look out 90 days)     Jul 20, 2018  8:15 AM CDT   [facility]    [address] [place] [id number] [phone]                   "

## 2018-05-18 NOTE — TELEPHONE ENCOUNTER
PHQ-9 SCORE 5/13/2011 10/31/2011 8/28/2017   Total Score 4 5 -   Total Score - - 8     Routing refill request to provider for review/approval because:  Labs out of range:  PHQ-9  SACHI MtzN, RN

## 2018-05-22 RX ORDER — PAROXETINE 30 MG/1
30 TABLET, FILM COATED ORAL EVERY MORNING
Qty: 90 TABLET | Refills: 0 | Status: SHIPPED | OUTPATIENT
Start: 2018-05-22 | End: 2018-07-20

## 2018-07-20 ENCOUNTER — OFFICE VISIT (OUTPATIENT)
Dept: FAMILY MEDICINE | Facility: CLINIC | Age: 63
End: 2018-07-20
Payer: COMMERCIAL

## 2018-07-20 VITALS
OXYGEN SATURATION: 96 % | TEMPERATURE: 97.9 F | SYSTOLIC BLOOD PRESSURE: 112 MMHG | BODY MASS INDEX: 31.93 KG/M2 | HEIGHT: 63 IN | HEART RATE: 80 BPM | WEIGHT: 180.2 LBS | DIASTOLIC BLOOD PRESSURE: 60 MMHG

## 2018-07-20 DIAGNOSIS — Z12.4 SCREENING FOR MALIGNANT NEOPLASM OF CERVIX: ICD-10-CM

## 2018-07-20 DIAGNOSIS — Z12.11 ENCOUNTER FOR SCREENING FOR MALIGNANT NEOPLASM OF COLON: ICD-10-CM

## 2018-07-20 DIAGNOSIS — Z12.11 SPECIAL SCREENING FOR MALIGNANT NEOPLASMS, COLON: ICD-10-CM

## 2018-07-20 DIAGNOSIS — E10.65 TYPE 1 DIABETES MELLITUS WITH HYPERGLYCEMIA (H): ICD-10-CM

## 2018-07-20 DIAGNOSIS — F41.1 GENERALIZED ANXIETY DISORDER: ICD-10-CM

## 2018-07-20 DIAGNOSIS — E78.5 HYPERLIPIDEMIA LDL GOAL <100: ICD-10-CM

## 2018-07-20 DIAGNOSIS — I10 ESSENTIAL HYPERTENSION WITH GOAL BLOOD PRESSURE LESS THAN 130/80: ICD-10-CM

## 2018-07-20 DIAGNOSIS — Z13.820 SCREENING FOR OSTEOPOROSIS: ICD-10-CM

## 2018-07-20 DIAGNOSIS — Z00.00 ENCOUNTER FOR ROUTINE ADULT HEALTH EXAMINATION WITHOUT ABNORMAL FINDINGS: Primary | ICD-10-CM

## 2018-07-20 DIAGNOSIS — Z12.39 SCREENING FOR MALIGNANT NEOPLASM OF BREAST: ICD-10-CM

## 2018-07-20 LAB
ALT SERPL W P-5'-P-CCNC: 26 U/L (ref 0–50)
ANION GAP SERPL CALCULATED.3IONS-SCNC: 7 MMOL/L (ref 3–14)
BUN SERPL-MCNC: 16 MG/DL (ref 7–30)
CALCIUM SERPL-MCNC: 9.2 MG/DL (ref 8.5–10.1)
CHLORIDE SERPL-SCNC: 107 MMOL/L (ref 94–109)
CHOLEST SERPL-MCNC: 130 MG/DL
CO2 SERPL-SCNC: 27 MMOL/L (ref 20–32)
CREAT SERPL-MCNC: 0.68 MG/DL (ref 0.52–1.04)
CREAT UR-MCNC: 93 MG/DL
DEPRECATED CALCIDIOL+CALCIFEROL SERPL-MC: 51 UG/L (ref 20–75)
GFR SERPL CREATININE-BSD FRML MDRD: 87 ML/MIN/1.7M2
GLUCOSE SERPL-MCNC: 106 MG/DL (ref 70–99)
HBA1C MFR BLD: 8.3 % (ref 0–5.6)
HDLC SERPL-MCNC: 56 MG/DL
LDLC SERPL CALC-MCNC: 63 MG/DL
MICROALBUMIN UR-MCNC: 20 MG/L
MICROALBUMIN/CREAT UR: 21.1 MG/G CR (ref 0–25)
NONHDLC SERPL-MCNC: 74 MG/DL
POTASSIUM SERPL-SCNC: 4.2 MMOL/L (ref 3.4–5.3)
SODIUM SERPL-SCNC: 141 MMOL/L (ref 133–144)
TRIGL SERPL-MCNC: 53 MG/DL
TSH SERPL DL<=0.005 MIU/L-ACNC: 0.76 MU/L (ref 0.4–4)

## 2018-07-20 PROCEDURE — 80061 LIPID PANEL: CPT | Performed by: FAMILY MEDICINE

## 2018-07-20 PROCEDURE — 82043 UR ALBUMIN QUANTITATIVE: CPT | Performed by: FAMILY MEDICINE

## 2018-07-20 PROCEDURE — 84443 ASSAY THYROID STIM HORMONE: CPT | Performed by: FAMILY MEDICINE

## 2018-07-20 PROCEDURE — 87624 HPV HI-RISK TYP POOLED RSLT: CPT | Performed by: FAMILY MEDICINE

## 2018-07-20 PROCEDURE — G0145 SCR C/V CYTO,THINLAYER,RESCR: HCPCS | Performed by: FAMILY MEDICINE

## 2018-07-20 PROCEDURE — 36415 COLL VENOUS BLD VENIPUNCTURE: CPT | Performed by: FAMILY MEDICINE

## 2018-07-20 PROCEDURE — 82306 VITAMIN D 25 HYDROXY: CPT | Performed by: FAMILY MEDICINE

## 2018-07-20 PROCEDURE — 80048 BASIC METABOLIC PNL TOTAL CA: CPT | Performed by: FAMILY MEDICINE

## 2018-07-20 PROCEDURE — 84460 ALANINE AMINO (ALT) (SGPT): CPT | Performed by: FAMILY MEDICINE

## 2018-07-20 PROCEDURE — 99396 PREV VISIT EST AGE 40-64: CPT | Performed by: FAMILY MEDICINE

## 2018-07-20 PROCEDURE — 83036 HEMOGLOBIN GLYCOSYLATED A1C: CPT | Performed by: FAMILY MEDICINE

## 2018-07-20 RX ORDER — HYDROCHLOROTHIAZIDE 12.5 MG/1
1 CAPSULE ORAL DAILY
Qty: 93 CAPSULE | Refills: 3 | Status: SHIPPED | OUTPATIENT
Start: 2018-07-20 | End: 2019-07-09

## 2018-07-20 RX ORDER — SIMVASTATIN 10 MG
TABLET ORAL
Qty: 93 TABLET | Refills: 3 | Status: SHIPPED | OUTPATIENT
Start: 2018-07-20 | End: 2019-07-09

## 2018-07-20 RX ORDER — LISINOPRIL 40 MG/1
40 TABLET ORAL DAILY
Qty: 93 TABLET | Refills: 3 | Status: SHIPPED | OUTPATIENT
Start: 2018-07-20 | End: 2019-07-09

## 2018-07-20 RX ORDER — PAROXETINE 30 MG/1
30 TABLET, FILM COATED ORAL EVERY MORNING
Qty: 93 TABLET | Refills: 3 | Status: SHIPPED | OUTPATIENT
Start: 2018-07-20 | End: 2019-07-09

## 2018-07-20 RX ORDER — ATENOLOL 25 MG/1
25 TABLET ORAL DAILY
Qty: 93 TABLET | Refills: 3 | Status: SHIPPED | OUTPATIENT
Start: 2018-07-20 | End: 2019-07-09

## 2018-07-20 ASSESSMENT — PAIN SCALES - GENERAL: PAINLEVEL: NO PAIN (0)

## 2018-07-20 NOTE — LETTER
July 31, 2018    Niharika Cason  [address] [address] [place] [id number]    Dear Niharika,  We are happy to inform you that your PAP smear result from 7/20/18 is normal.  We are now able to do a follow up test on PAP smears. The DNA test is for HPV (Human Papilloma Virus). Cervical cancer is closely linked with certain types of HPV. Your results showed no evidence of high risk HPV.  Therefore we recommend you return in 3 years for your next pap smear.  You will still need to return to the clinic every year for an annual exam and other preventive tests.  Please contact the clinic at [phone] with any questions.  Sincerely,    Leona Farris MD/ranjit

## 2018-07-20 NOTE — PROGRESS NOTES
SUBJECTIVE:   CC: Niharika Cason is an 62 year old woman who presents for preventive health visit.     Physical   Annual:     Getting at least 3 servings of Calcium per day:  Yes    Bi-annual eye exam:  NO    Dental care twice a year:  Yes    Sleep apnea or symptoms of sleep apnea:  None    Diet:  Diabetic    Frequency of exercise:  None (physically active)    Taking medications regularly:  Yes    Medication side effects:  None    Additional concerns today:  YES (lump in vaginal area)        Today's PHQ-2 Score:   PHQ-2 ( 1999 [employer]) 7/20/2018   Q1: Little interest or pleasure in doing things 1   Q2: Feeling down, depressed or hopeless 1   PHQ-2 Score 2   Q1: Little interest or pleasure in doing things Several days   Q2: Feeling down, depressed or hopeless Several days   PHQ-2 Score 2     Answers for HPI/ROS submitted by the patient on 7/20/2018   PHQ-2 Score: 2    Abuse: Current or Past(Physical, Sexual or Emotional)- No  Do you feel safe in your environment - Yes    Social History   Substance Use Topics     Smoking status: Current Some Day Smoker     Smokeless tobacco: Never Used     Alcohol use 0.0 oz/week     0 Standard drinks or equivalent per week      Comment: 3-4 wine per wk     Alcohol Use 7/20/2018   If you drink alcohol do you typically have greater than 3 drinks per day OR greater than 7 drinks per week? No   No flowsheet data found.    Reviewed orders with patient.  Reviewed health maintenance and updated orders accordingly - Yes      Patient over age 50, mutual decision to screen reflected in health maintenance.    Pertinent mammograms are reviewed under the imaging tab.  History of abnormal Pap smear: NO - age 30- 65 PAP every 3 years recommended  PAP / HPV 8/9/2013 12/21/2009 8/30/2006   PAP NIL NIL NIL     Reviewed and updated as needed this visit by clinical staff  Tobacco  Allergies  Meds  Med Hx  Surg Hx  Fam Hx  Soc Hx        Reviewed and updated as needed this visit by  Provider  Tobacco  Med Hx  Surg Hx  Fam Hx  Soc Hx         Past Medical History:   Diagnosis Date     Essential hypertension, benign      Generalized anxiety disorder      Type I (juvenile type) diabetes mellitus without mention of complication, not stated as uncontrolled     diagnosed at age 35     Ulcerative colitis, unspecified     in the 70s.        Past Surgical History:   Procedure Laterality Date     HC COLONOSCOPY THRU STOMA, DIAGNOSTIC  1998    negative     HC CURETTAGE, POSTPARTUM  '91, '93    following miscarriages     HC REMOVAL OF TONSILS,<11 Y/O      Tonsils <14y.o.     Diabetes follow up:   She is checking her glucose between 2-4 times per day, always in the am and then another time or two as needed. Her numbers are running high.  In the am, she ranges from 80s to near 200s.  She is taking her medication as prescribed.  50 units Levimir at bedtime, Humalog at meals as written in med list. She is not usually increasing her insulin for correction factor, more based on the carbs she is eating. She is due for labs today and will need refills on her medications.   She usually eats late at night and thinks this is why her am sugars are running high.  Has not been to diabetes educator in a while.   Last A1C was 7.7 in August 2017.      Review of Systems  CONSTITUTIONAL: NEGATIVE for fever, chills, change in weight  INTEGUMENTARY/SKIN: NEGATIVE for worrisome rashes, moles or lesions  EYES: NEGATIVE for vision changes or irritation  ENT: NEGATIVE for ear, mouth and throat problems  RESP: NEGATIVE for significant cough or SOB  BREAST: NEGATIVE for masses, tenderness or discharge  CV: NEGATIVE for chest pain, palpitations or peripheral edema  GI: NEGATIVE for nausea, abdominal pain, heartburn, or change in bowel habits  : NEGATIVE for unusual urinary or vaginal symptoms but she recently felt a lump (few months ago) in the vaginal area while washing. Just feels like something that hasn't been there before,  "inside the opening.  No pain. No vaginal bleeding. It hasn't grown or gone away, feels the same as when she first noticed it.   MUSCULOSKELETAL: NEGATIVE for significant arthralgias or myalgia  NEURO: NEGATIVE for weakness, dizziness or paresthesias  PSYCHIATRIC: NEGATIVE for changes in mood or affect      OBJECTIVE:   /60  Pulse 80  Temp 97.9  F (36.6  C) (Temporal)  Ht 5' 2.95\" (1.599 m)  Wt 180 lb 3.2 oz (81.7 kg)  LMP 10/15/2008  SpO2 96%  BMI 31.97 kg/m2  Physical Exam  GENERAL: healthy, alert and no distress  EYES: Eyes grossly normal to inspection, PERRL and conjunctivae and sclerae normal  HENT: ear canals and TM's normal, nose and mouth without ulcers or lesions  NECK: no adenopathy, no asymmetry, masses, or scars and thyroid normal to palpation, no bruits.   RESP: lungs clear to auscultation - no rales, rhonchi or wheezes  BREAST: normal without masses, tenderness or nipple discharge and no palpable axillary masses or adenopathy  CV: regular rate and rhythm, normal S1 S2, no S3 or S4, no murmur, click or rub, no peripheral edema and peripheral pulses strong  ABDOMEN: soft, nontender, no hepatosplenomegaly, no masses and bowel sounds normal  MS: no gross musculoskeletal defects noted, no edema  Vaginal exam reveals normal external and internal genitalia. No lump appreciated, just normal vaginal mucosa. Cervix is closed, long and thick.  No lesions or abnormalities seen.  Pap collected.  Bimanual exam reveals a nontender, nongravid uterus with no CMT.  No adnexal masses or tenderness.     SKIN: no suspicious lesions or rashes.  Diabetic foot exam is normal to monofilament testing.   NEURO: Normal strength and tone, mentation intact and speech normal  PSYCH: mentation appears normal, affect normal/bright    Diagnostic Test Results:  Results for orders placed or performed in visit on 07/20/18   HEMOGLOBIN A1C   Result Value Ref Range    Hemoglobin A1C 8.3 (H) 0 - 5.6 %   TSH with free T4 reflex "   Result Value Ref Range    TSH 0.76 0.40 - 4.00 mU/L   Lipid panel reflex to direct LDL Fasting   Result Value Ref Range    Cholesterol 130 <200 mg/dL    Triglycerides 53 <150 mg/dL    HDL Cholesterol 56 >49 mg/dL    LDL Cholesterol Calculated 63 <100 mg/dL    Non HDL Cholesterol 74 <130 mg/dL   ALT   Result Value Ref Range    ALT 26 0 - 50 U/L   Albumin Random Urine Quantitative with Creat Ratio   Result Value Ref Range    Creatinine Urine 93 mg/dL    Albumin Urine mg/L 20 mg/L    Albumin Urine mg/g Cr 21.10 0 - 25 mg/g Cr   Basic metabolic panel   Result Value Ref Range    Sodium 141 133 - 144 mmol/L    Potassium 4.2 3.4 - 5.3 mmol/L    Chloride 107 94 - 109 mmol/L    Carbon Dioxide 27 20 - 32 mmol/L    Anion Gap 7 3 - 14 mmol/L    Glucose 106 (H) 70 - 99 mg/dL    Urea Nitrogen 16 7 - 30 mg/dL    Creatinine 0.68 0.52 - 1.04 mg/dL    GFR Estimate 87 >60 mL/min/1.7m2    GFR Estimate If Black >90 >60 mL/min/1.7m2    Calcium 9.2 8.5 - 10.1 mg/dL   Vitamin D Deficiency   Result Value Ref Range    Vitamin D Deficiency screening 51 20 - 75 ug/L       ASSESSMENT/PLAN:       ICD-10-CM    1. Encounter for routine adult health examination without abnormal findings Z00.00    2. Type 1 diabetes mellitus with hyperglycemia (H) E10.65 HEMOGLOBIN A1C     DIABETES EDUCATOR REFERRAL     TSH with free T4 reflex     Lipid panel reflex to direct LDL Fasting     ALT     Albumin Random Urine Quantitative with Creat Ratio     Basic metabolic panel     insulin detemir (LEVEMIR FLEXTOUCH) 100 UNIT/ML injection     insulin lispro (HUMALOG KWIKPEN) 100 UNIT/ML injection     lisinopril (PRINIVIL/ZESTRIL) 40 MG tablet   3. Essential hypertension with goal blood pressure less than 130/80 I10 TSH with free T4 reflex     Albumin Random Urine Quantitative with Creat Ratio     Basic metabolic panel     atenolol (TENORMIN) 25 MG tablet     hydrochlorothiazide (MICROZIDE) 12.5 MG capsule     lisinopril (PRINIVIL/ZESTRIL) 40 MG tablet   4. Screening  "for malignant neoplasm of cervix Z12.4 Pap imaged thin layer screen with HPV - recommended age 30 - 65 years (select HPV order below)     HPV High Risk Types DNA Cervical   5. Hyperlipidemia LDL goal <100 E78.5 Lipid panel reflex to direct LDL Fasting     ALT     simvastatin (ZOCOR) 10 MG tablet   6. Screening for osteoporosis Z13.820 DX Hip/Pelvis/Spine     Vitamin D Deficiency   7. Screening for malignant neoplasm of breast Z12.31 MA Screen Bilateral w/Zafar   8. Special screening for malignant neoplasms, colon Z12.11    9. GENERALIZED ANXIETY DIS F41.1 PARoxetine (PAXIL) 30 MG tablet       COUNSELING:  Reviewed preventive health counseling, as reflected in patient instructions       Regular exercise       Healthy diet/nutrition       Vision screening       Immunizations    Up to date. Discussed Shingrix, check insurance coverage         Osteoporosis Prevention/Bone Health       Colon cancer screening strongly recommended colonoscopy.  She is willing, see orders.    Eye exam recommended, given info for [facility] physicians to call to schedule.     BP Readings from Last 1 Encounters:   07/20/18 112/60     Estimated body mass index is 31.97 kg/(m^2) as calculated from the following:    Height as of this encounter: 5' 2.95\" (1.599 m).    Weight as of this encounter: 180 lb 3.2 oz (81.7 kg).      Weight management plan: Discussed healthy diet and exercise guidelines and patient will follow up in 6 months in clinic to re-evaluate.     reports that she has been smoking.  She has never used smokeless tobacco.  Tobacco Cessation Action Plan: Information offered: Patient not interested at this time    Counseling Resources:  ATP IV Guidelines  Pooled Cohorts Equation Calculator  Breast Cancer Risk Calculator  FRAX Risk Assessment  ICSI Preventive Guidelines  Dietary Guidelines for Americans, 2010  USDA's MyPlate  ASA Prophylaxis  Lung CA Screening    Leona Farris MD  [facility]  "

## 2018-07-20 NOTE — PATIENT INSTRUCTIONS
I encourage you to schedule a mammogram and a bone density test (DEXA scan) at your convenience.   There is a new shingles vaccine called Shingrix available. I do recommend this but please check with your insurance company about coverage.     I will notify you with your lab results and make sure you have refills on all your prescriptions.     Please work on quitting smoking completely, and increasing your exercise to get more cardiovascular exercise for 30 minutes 3-4 times weekly.    These things will help you delay the onset of any complications from your diabetes, such as heart attacks, stroke, kidney disease and circulation problems.     Please make an appointment with the eye doctor.     Leona Farris MD       Preventive Health Recommendations  Female Ages 50 - 64    Yearly exam: See your health care provider every year in order to  o Review health changes.   o Discuss preventive care.    o Review your medicines if your doctor has prescribed any.      Get a Pap test every three years (unless you have an abnormal result and your provider advises testing more often).    If you get Pap tests with HPV test, you only need to test every 5 years, unless you have an abnormal result.     You do not need a Pap test if your uterus was removed (hysterectomy) and you have not had cancer.    You should be tested each year for STDs (sexually transmitted diseases) if you're at risk.     Have a mammogram every 1 to 2 years.    Have a colonoscopy at age 50, or have a yearly FIT test (stool test). These exams screen for colon cancer.      Have a cholesterol test every 5 years, or more often if advised.    Have a diabetes test (fasting glucose) every three years. If you are at risk for diabetes, you should have this test more often.     If you are at risk for osteoporosis (brittle bone disease), think about having a bone density scan (DEXA).    Shots: Get a flu shot each year. Get a tetanus shot every 10  years.    Nutrition:     Eat at least 5 servings of fruits and vegetables each day.    Eat whole-grain bread, whole-wheat pasta and brown rice instead of white grains and rice.    Get adequate Calcium and Vitamin D.     Lifestyle    Exercise at least 150 minutes a week (30 minutes a day, 5 days a week). This will help you control your weight and prevent disease.    Limit alcohol to one drink per day.    No smoking.     Wear sunscreen to prevent skin cancer.     See your dentist every six months for an exam and cleaning.    See your eye doctor every 1 to 2 years.

## 2018-07-24 ENCOUNTER — TELEPHONE (OUTPATIENT)
Dept: EDUCATION SERVICES | Facility: CLINIC | Age: 63
End: 2018-07-24

## 2018-07-24 LAB
COPATH REPORT: NORMAL
PAP: NORMAL

## 2018-07-24 NOTE — TELEPHONE ENCOUNTER
Diabetes Education Scheduling Outreach #1:    Call to patient to schedule. Left message with phone number to call to schedule.    Plan for 2nd outreach attempt within 1 week.    Christy Longo  [facility]  Diabetes and Nutrition Scheduling

## 2018-07-25 ENCOUNTER — TELEPHONE (OUTPATIENT)
Dept: FAMILY MEDICINE | Facility: CLINIC | Age: 63
End: 2018-07-25

## 2018-07-25 LAB
FINAL DIAGNOSIS: NORMAL
HPV HR 12 DNA CVX QL NAA+PROBE: NEGATIVE
HPV16 DNA SPEC QL NAA+PROBE: NEGATIVE
HPV18 DNA SPEC QL NAA+PROBE: NEGATIVE
SPECIMEN DESCRIPTION: NORMAL
SPECIMEN SOURCE CVX/VAG CYTO: NORMAL

## 2018-07-25 NOTE — LETTER
[address] [address]  [phone]        July 27, 2018    Niharika Cason  [id number] [address] [place] [id number]

## 2018-07-25 NOTE — PROGRESS NOTES
See message to patient. Please and inquire about the metformin, if she's willing I'll order it. Set up diabetes ed visit  Leona Farris MD

## 2018-07-25 NOTE — LETTER
Dear Niharika,    At [facility] we care about your health and are committed to providing quality patient care, which includes staying current on preventive cancer screenings.  You can increase your chances of finding and treating cancers through regular screenings.     Our records indicate you may be due for the following preventive screening(s):    Colonoscopy    Colonoscopy is recommended every ten years for everyone age 50 and older. We strongly urge our patient's to consider having a colonoscopy done, which is the best screening test available and only needs to be done every 10 years if results are normal. If you are unwilling or unable to have a colonoscopy then we recommend the annual stool testing for blood. This test is called a FIT test and it looks for blood in the stool.       To schedule your colonoscopy, you may contact us by phone at [phone]    If you have had any of the screenings listed above at another facility, please call us so that we may update your chart.          Your [facility] Care Team

## 2018-07-25 NOTE — TELEPHONE ENCOUNTER
Left message for patient to return call. Please give message below. Diabetic education will be calling patient. Please find out if patient has ever been on metformin, and if they are interested in taking it. Please verify pharmacy, so we know where to send Rx.     Niharika, everything looks really good except your diabetes. Your A1C is way up again. It was much better last time. We need to change something.  First and most importantly, you need to focus on proper eating and exercise.  Second I think if we add metformin, it should help get you under better control. I don't recall that you've ever been on this, but wanted to check with you first.  I'll prescribe it and then recheck you in 3 months if you're willing. Also, I really think it's important to get you back in to the diabetes educator for a refresher on diet and sugar control like we discussed.  Leona Farris MD

## 2018-07-25 NOTE — TELEPHONE ENCOUNTER
Left message for patient to return call to schedule EGD/colonoscopy. If Sylvia or Judi are not available, please transfer to same day surgery

## 2018-07-25 NOTE — TELEPHONE ENCOUNTER
Patient calling and results read to her. Patient states she does not want to go on Metformin and is going to work on better eating choices and exercise. Patient is going to check with insurance company first before going to diabetic ed. No further questions.

## 2018-07-26 NOTE — TELEPHONE ENCOUNTER
Left message for patient to return call to schedule colonoscopy or EGD. If Judi or Sylvia are unavailable, please transfer to the surgery center.

## 2018-07-27 NOTE — TELEPHONE ENCOUNTER
Left message for patient to return call to schedule EGD/colonoscopy. If Sylvia or Judi are not available, please transfer to same day surgery        X3, letter mailed

## 2018-08-10 NOTE — TELEPHONE ENCOUNTER
Diabetes Education Scheduling Outreach #2:    Call to patient to schedule. Left message with phone number to call to schedule.    Letter sent to patient requesting to call to schedule.    [facility]  Diabetes and Nutrition Scheduling

## 2018-08-24 ENCOUNTER — TRANSFERRED RECORDS (OUTPATIENT)
Dept: HEALTH INFORMATION MANAGEMENT | Facility: CLINIC | Age: 63
End: 2018-08-24

## 2018-09-07 DIAGNOSIS — E10.65 TYPE 1 DIABETES MELLITUS WITH HYPERGLYCEMIA (H): ICD-10-CM

## 2018-09-11 ENCOUNTER — TELEPHONE (OUTPATIENT)
Dept: FAMILY MEDICINE | Facility: CLINIC | Age: 63
End: 2018-09-11

## 2018-09-11 NOTE — TELEPHONE ENCOUNTER
Date of colonoscopy/EGD: 9/17  Surgeon: Dr. Moore  Prep:Miralax  Packet:Colonoscopy/EGD instructions mailed to patient's home address.   Date: 9/11/2018      Surgery Scheduler

## 2018-09-11 NOTE — TELEPHONE ENCOUNTER
Humalog:  Sent 7/20/18 with 8 month supply. Refill not appropriate at this time.     Sabi Boykin, RN, BSN

## 2018-09-12 ENCOUNTER — TELEPHONE (OUTPATIENT)
Dept: FAMILY MEDICINE | Facility: CLINIC | Age: 63
End: 2018-09-12

## 2018-09-12 NOTE — TELEPHONE ENCOUNTER
Per Leona Farris MD patient should take 1/2 of usual evening dose of Levemir and no insulin the morning of procedure. Patient can resume normal dose of insulin when she is eating again.   LM for patient to call [phone]   Chasity Limon CMA

## 2018-09-12 NOTE — TELEPHONE ENCOUNTER
Reason for Call:  Other call back    Detailed comments: Patient calling and states she is having a colonoscopy on Monday 9.17 and was told to check with Dr Farris about taking her insulin the day of prep, please advise.     Phone Number Patient can be reached at: Home number on file [phone] (home) or Cell number on file:    Telephone Information:   Mobile [phone]       Best Time: any    Can we leave a detailed message on this number? YES    Call taken on 9/12/2018 at 9:36 AM by Janeth Song

## 2018-09-17 ENCOUNTER — APPOINTMENT (OUTPATIENT)
Dept: CT IMAGING | Facility: CLINIC | Age: 63
End: 2018-09-17
Attending: FAMILY MEDICINE
Payer: COMMERCIAL

## 2018-09-17 ENCOUNTER — ANESTHESIA (OUTPATIENT)
Dept: GASTROENTEROLOGY | Facility: CLINIC | Age: 63
End: 2018-09-17
Payer: COMMERCIAL

## 2018-09-17 ENCOUNTER — ANESTHESIA EVENT (OUTPATIENT)
Dept: GASTROENTEROLOGY | Facility: CLINIC | Age: 63
End: 2018-09-17
Payer: COMMERCIAL

## 2018-09-17 ENCOUNTER — HOSPITAL ENCOUNTER (OUTPATIENT)
Facility: CLINIC | Age: 63
Discharge: HOME | End: 2018-09-17
Attending: FAMILY MEDICINE | Admitting: FAMILY MEDICINE
Payer: COMMERCIAL

## 2018-09-17 ENCOUNTER — SURGERY (OUTPATIENT)
Age: 63
End: 2018-09-17

## 2018-09-17 VITALS
HEART RATE: 73 BPM | DIASTOLIC BLOOD PRESSURE: 67 MMHG | TEMPERATURE: 98.2 F | RESPIRATION RATE: 16 BRPM | SYSTOLIC BLOOD PRESSURE: 127 MMHG | OXYGEN SATURATION: 98 %

## 2018-09-17 DIAGNOSIS — E10.9 TYPE 1 DIABETES MELLITUS WITHOUT COMPLICATION (H): Primary | ICD-10-CM

## 2018-09-17 DIAGNOSIS — K62.89 RECTAL MASS: ICD-10-CM

## 2018-09-17 LAB
CEA SERPL-MCNC: 10.5 UG/L (ref 0–2.5)
COLONOSCOPY: NORMAL
CREAT BLD-MCNC: 0.6 MG/DL (ref 0.52–1.04)
ERYTHROCYTE [DISTWIDTH] IN BLOOD BY AUTOMATED COUNT: 12.1 % (ref 10–15)
GFR SERPL CREATININE-BSD FRML MDRD: >90 ML/MIN/1.7M2
HCT VFR BLD AUTO: 41.3 % (ref 35–47)
HGB BLD-MCNC: 13.7 G/DL (ref 11.7–15.7)
MCH RBC QN AUTO: 29.5 PG (ref 26.5–33)
MCHC RBC AUTO-ENTMCNC: 33.2 G/DL (ref 31.5–36.5)
MCV RBC AUTO: 89 FL (ref 78–100)
PLATELET # BLD AUTO: 268 10E9/L (ref 150–450)
RBC # BLD AUTO: 4.65 10E12/L (ref 3.8–5.2)
WBC # BLD AUTO: 8.1 10E9/L (ref 4–11)

## 2018-09-17 PROCEDURE — 45380 COLONOSCOPY AND BIOPSY: CPT | Performed by: FAMILY MEDICINE

## 2018-09-17 PROCEDURE — 88341 IMHCHEM/IMCYTCHM EA ADD ANTB: CPT | Mod: 26 | Performed by: FAMILY MEDICINE

## 2018-09-17 PROCEDURE — 74177 CT ABD & PELVIS W/CONTRAST: CPT

## 2018-09-17 PROCEDURE — 45384 COLONOSCOPY W/LESION REMOVAL: CPT | Performed by: FAMILY MEDICINE

## 2018-09-17 PROCEDURE — 45380 COLONOSCOPY AND BIOPSY: CPT | Mod: PT | Performed by: FAMILY MEDICINE

## 2018-09-17 PROCEDURE — 82565 ASSAY OF CREATININE: CPT

## 2018-09-17 PROCEDURE — 25000125 ZZHC RX 250: Performed by: NURSE ANESTHETIST, CERTIFIED REGISTERED

## 2018-09-17 PROCEDURE — 88342 IMHCHEM/IMCYTCHM 1ST ANTB: CPT | Mod: 26 | Performed by: FAMILY MEDICINE

## 2018-09-17 PROCEDURE — 45381 COLONOSCOPY SUBMUCOUS NJX: CPT | Performed by: FAMILY MEDICINE

## 2018-09-17 PROCEDURE — 85027 COMPLETE CBC AUTOMATED: CPT | Performed by: FAMILY MEDICINE

## 2018-09-17 PROCEDURE — 25000128 H RX IP 250 OP 636: Performed by: NURSE ANESTHETIST, CERTIFIED REGISTERED

## 2018-09-17 PROCEDURE — 25000128 H RX IP 250 OP 636: Performed by: RADIOLOGY

## 2018-09-17 PROCEDURE — 88341 IMHCHEM/IMCYTCHM EA ADD ANTB: CPT | Performed by: FAMILY MEDICINE

## 2018-09-17 PROCEDURE — 45385 COLONOSCOPY W/LESION REMOVAL: CPT | Mod: PT | Performed by: FAMILY MEDICINE

## 2018-09-17 PROCEDURE — 88342 IMHCHEM/IMCYTCHM 1ST ANTB: CPT | Performed by: FAMILY MEDICINE

## 2018-09-17 PROCEDURE — 88305 TISSUE EXAM BY PATHOLOGIST: CPT | Mod: 26,59 | Performed by: FAMILY MEDICINE

## 2018-09-17 PROCEDURE — 88305 TISSUE EXAM BY PATHOLOGIST: CPT | Performed by: FAMILY MEDICINE

## 2018-09-17 PROCEDURE — 36415 COLL VENOUS BLD VENIPUNCTURE: CPT | Performed by: FAMILY MEDICINE

## 2018-09-17 PROCEDURE — 82378 CARCINOEMBRYONIC ANTIGEN: CPT | Performed by: FAMILY MEDICINE

## 2018-09-17 PROCEDURE — 74178 CT ABD&PLV WO CNTR FLWD CNTR: CPT

## 2018-09-17 PROCEDURE — 25000125 ZZHC RX 250: Performed by: RADIOLOGY

## 2018-09-17 RX ORDER — ONDANSETRON 4 MG/1
4 TABLET, ORALLY DISINTEGRATING ORAL EVERY 30 MIN PRN
Status: DISCONTINUED | OUTPATIENT
Start: 2018-09-17 | End: 2018-09-17 | Stop reason: HOSPADM

## 2018-09-17 RX ORDER — NALOXONE HYDROCHLORIDE 0.4 MG/ML
.1-.4 INJECTION, SOLUTION INTRAMUSCULAR; INTRAVENOUS; SUBCUTANEOUS
Status: DISCONTINUED | OUTPATIENT
Start: 2018-09-17 | End: 2018-09-17 | Stop reason: HOSPADM

## 2018-09-17 RX ORDER — SODIUM CHLORIDE, SODIUM LACTATE, POTASSIUM CHLORIDE, CALCIUM CHLORIDE 600; 310; 30; 20 MG/100ML; MG/100ML; MG/100ML; MG/100ML
INJECTION, SOLUTION INTRAVENOUS CONTINUOUS
Status: DISCONTINUED | OUTPATIENT
Start: 2018-09-17 | End: 2018-09-17 | Stop reason: HOSPADM

## 2018-09-17 RX ORDER — DIMENHYDRINATE 50 MG/ML
25 INJECTION, SOLUTION INTRAMUSCULAR; INTRAVENOUS
Status: DISCONTINUED | OUTPATIENT
Start: 2018-09-17 | End: 2018-09-17 | Stop reason: HOSPADM

## 2018-09-17 RX ORDER — ONDANSETRON 2 MG/ML
4 INJECTION INTRAMUSCULAR; INTRAVENOUS EVERY 30 MIN PRN
Status: DISCONTINUED | OUTPATIENT
Start: 2018-09-17 | End: 2018-09-17 | Stop reason: HOSPADM

## 2018-09-17 RX ORDER — ONDANSETRON 2 MG/ML
4 INJECTION INTRAMUSCULAR; INTRAVENOUS
Status: DISCONTINUED | OUTPATIENT
Start: 2018-09-17 | End: 2018-09-17 | Stop reason: HOSPADM

## 2018-09-17 RX ORDER — LABETALOL HYDROCHLORIDE 5 MG/ML
10 INJECTION, SOLUTION INTRAVENOUS
Status: DISCONTINUED | OUTPATIENT
Start: 2018-09-17 | End: 2018-09-17 | Stop reason: HOSPADM

## 2018-09-17 RX ORDER — LIDOCAINE HYDROCHLORIDE 20 MG/ML
INJECTION, SOLUTION INFILTRATION; PERINEURAL PRN
Status: DISCONTINUED | OUTPATIENT
Start: 2018-09-17 | End: 2018-09-17

## 2018-09-17 RX ORDER — PROPOFOL 10 MG/ML
INJECTION, EMULSION INTRAVENOUS CONTINUOUS PRN
Status: DISCONTINUED | OUTPATIENT
Start: 2018-09-17 | End: 2018-09-17

## 2018-09-17 RX ORDER — IOPAMIDOL 755 MG/ML
500 INJECTION, SOLUTION INTRAVASCULAR ONCE
Status: COMPLETED | OUTPATIENT
Start: 2018-09-17 | End: 2018-09-17

## 2018-09-17 RX ORDER — LIDOCAINE 40 MG/G
CREAM TOPICAL
Status: DISCONTINUED | OUTPATIENT
Start: 2018-09-17 | End: 2018-09-17 | Stop reason: HOSPADM

## 2018-09-17 RX ORDER — PROPOFOL 10 MG/ML
INJECTION, EMULSION INTRAVENOUS PRN
Status: DISCONTINUED | OUTPATIENT
Start: 2018-09-17 | End: 2018-09-17

## 2018-09-17 RX ORDER — FENTANYL CITRATE 50 UG/ML
25-50 INJECTION, SOLUTION INTRAMUSCULAR; INTRAVENOUS
Status: DISCONTINUED | OUTPATIENT
Start: 2018-09-17 | End: 2018-09-17 | Stop reason: HOSPADM

## 2018-09-17 RX ORDER — EPHEDRINE SULFATE 50 MG/ML
INJECTION, SOLUTION INTRAMUSCULAR; INTRAVENOUS; SUBCUTANEOUS PRN
Status: DISCONTINUED | OUTPATIENT
Start: 2018-09-17 | End: 2018-09-17

## 2018-09-17 RX ORDER — SODIUM CHLORIDE, SODIUM LACTATE, POTASSIUM CHLORIDE, CALCIUM CHLORIDE 600; 310; 30; 20 MG/100ML; MG/100ML; MG/100ML; MG/100ML
INJECTION, SOLUTION INTRAVENOUS CONTINUOUS
Status: DISCONTINUED | OUTPATIENT
Start: 2018-09-17 | End: 2018-09-17

## 2018-09-17 RX ORDER — ALBUTEROL SULFATE 0.83 MG/ML
2.5 SOLUTION RESPIRATORY (INHALATION) EVERY 4 HOURS PRN
Status: DISCONTINUED | OUTPATIENT
Start: 2018-09-17 | End: 2018-09-17 | Stop reason: HOSPADM

## 2018-09-17 RX ADMIN — Medication 10 MG: at 09:43

## 2018-09-17 RX ADMIN — SODIUM CHLORIDE 60 ML: 9 INJECTION, SOLUTION INTRAVENOUS at 12:51

## 2018-09-17 RX ADMIN — PROPOFOL 20 MG: 10 INJECTION, EMULSION INTRAVENOUS at 09:13

## 2018-09-17 RX ADMIN — PROPOFOL 150 MCG/KG/MIN: 10 INJECTION, EMULSION INTRAVENOUS at 09:07

## 2018-09-17 RX ADMIN — LIDOCAINE HYDROCHLORIDE 40 MG: 20 INJECTION, SOLUTION INFILTRATION; PERINEURAL at 09:07

## 2018-09-17 RX ADMIN — PROPOFOL 20 MG: 10 INJECTION, EMULSION INTRAVENOUS at 09:09

## 2018-09-17 RX ADMIN — SODIUM CHLORIDE, POTASSIUM CHLORIDE, SODIUM LACTATE AND CALCIUM CHLORIDE: 600; 310; 30; 20 INJECTION, SOLUTION INTRAVENOUS at 08:17

## 2018-09-17 RX ADMIN — SODIUM CHLORIDE, POTASSIUM CHLORIDE, SODIUM LACTATE AND CALCIUM CHLORIDE: 600; 310; 30; 20 INJECTION, SOLUTION INTRAVENOUS at 08:55

## 2018-09-17 RX ADMIN — LIDOCAINE HYDROCHLORIDE 1 ML: 10 INJECTION, SOLUTION EPIDURAL; INFILTRATION; INTRACAUDAL; PERINEURAL at 08:18

## 2018-09-17 RX ADMIN — PROPOFOL 60 MG: 10 INJECTION, EMULSION INTRAVENOUS at 09:07

## 2018-09-17 RX ADMIN — IOPAMIDOL 86 ML: 755 INJECTION, SOLUTION INTRAVENOUS at 12:52

## 2018-09-17 ASSESSMENT — LIFESTYLE VARIABLES: TOBACCO_USE: 1

## 2018-09-17 NOTE — IP AVS SNAPSHOT
[facility]    [address] [address]    Phone:  [phone]                                       After Visit Summary   9/17/2018    Niharika Cason    MRN: [id number]           After Visit Summary Signature Page     I have received my discharge instructions, and my questions have been answered. I have discussed any challenges I see with this plan with the nurse or doctor.    ..........................................................................................................................................  Patient/Patient Representative Signature      ..........................................................................................................................................  Patient Representative Print Name and Relationship to Patient    ..................................................               ................................................  Date                                   Time    ..........................................................................................................................................  Reviewed by Signature/Title    ...................................................              ..............................................  Date                                               Time          22EPIC Rev 08/18

## 2018-09-17 NOTE — ANESTHESIA CARE TRANSFER NOTE
Patient: Niharika Cason    Procedure(s):  colonoscopy with polypectomies by biopsy forceps and hot snare and submucosal injection with tattoo - Wound Class: II-Clean Contaminated   - Wound Class: II-Clean Contaminated   - Wound Class: II-Clean Contaminated    Diagnosis: screening for malignant neoplasm of colon   Diagnosis Additional Information: No value filed.    Anesthesia Type:   MAC     Note:  Airway :Room Air  Patient transferred to:Phase II  Handoff Report: Identifed the Patient, Identified the Reponsible Provider, Reviewed the pertinent medical history, Discussed the surgical course, Reviewed Intra-OP anesthesia mangement and issues during anesthesia, Set expectations for post-procedure period and Allowed opportunity for questions and acknowledgement of understanding      Vitals: (Last set prior to Anesthesia Care Transfer)    CRNA VITALS  9/17/2018 0942 - 9/17/2018 1016      9/17/2018             Pulse: 82    SpO2: 98 %    Resp Rate (observed): 24                Electronically Signed By: JAIRON Knowles CRNA  September 17, 2018  10:16 AM

## 2018-09-17 NOTE — IP AVS SNAPSHOT
MRN:[id number]                      After Visit Summary   9/17/2018    Niharika Cason    MRN: [id number]           Thank you!     Thank you for choosing [facility] for your care. Our goal is always to provide you with excellent care. Hearing back from our patients is one way we can continue to improve our services. Please take a few minutes to complete the written survey that you may receive in the mail after you visit with us. Thank you!        Patient Information     Date Of Birth          1955        About your hospital stay     You were admitted on:  September 17, 2018 You last received care in the:  [facility]    You were discharged on:  September 17, 2018       Who to Call     For medical emergencies, please call 911.  For non-urgent questions about your medical care, please call your primary care provider or clinic, [phone]  For questions related to your surgery, please call your surgery clinic        Attending Provider     Provider Richard Calhoun MD Family Practice       Primary Care Provider Office Phone # Fax #    Leona Cherri Farris [phone]      Further instructions from your care team         [facility]    Home Care Following Endoscopy          Activity:    You have just undergone an endoscopic procedure usually performed with conscious sedation.  Do not work or operate machinery (including a car) for at least 12 hours.      I encourage you to walk and attempt to pass this air as soon as possible.    Diet:    Return to the diet you were on before your procedure but eat lightly for the first 12-24 hours.    Drink plenty of water.    Resume any regular medications unless otherwise advised by your physician.  Please begin any new medication prescribed as a result of your procedure as directed by your physician.     If you had any biopsy or polyp removed please refrain from aspirin or aspirin products  for 2 days.  If on Coumadin please restart as instructed by your physician.   Pain:    You may take Tylenol as needed for pain.  Expected Recovery:  You can expect some mild abdominal fullness and/or discomfort due to the air used to inflate your intestinal tract.  Call Your Physician if You Have:    After Colonoscopy:  o Worsening persisting abdominal pain which is worse with activity.  o Fevers (>101 degrees F), chills or shakes.  o Passage of continued blood with bowel movements.   Any questions or concerns about your recovery, please call [phone] or after hours [phone] Nurse Advice Line.    Follow-up Care:    You should receive a call or letter with your results within 1 week. Please call if you have not received a notification of your results.    If asked to return to clinic please make an appointment 1 week after your procedure.  Call [phone].           Pending Results     No orders found from 9/15/2018 to 9/18/2018.            Admission Information     Date & Time Provider Department Dept. Phone    9/17/2018 Richard Moore MD [facility] Endoscopy [phone]      Your Vitals Were     Blood Pressure Pulse Temperature Respirations Last Period Pulse Oximetry    95/48 80 98.2  F (36.8  C) (Oral) 16 10/15/2008 98%      MyChart Information     [facility] gives you secure access to your electronic health record. If you see a primary care provider, you can also send messages to your care team and make appointments. If you have questions, please call your primary care clinic.  If you do not have a primary care provider, please call [phone] and they will assist you.        Care EveryWhere ID     This is your Care EveryWhere ID. This could be used by other organizations to access your [facility] medical records  [id number]        Equal Access to Services     RHONDA BARBA AH: Yaneli Silva, yanet quiroz, hever langford  ah. So [facility] [phone].    ATENCIÓN: Si habla enrique, tiene a stokes disposición servicios gratuitos de asistencia lingüística. Spencer al [phone].    We comply with applicable federal civil rights laws and [place] laws. We do not discriminate on the basis of race, color, national origin, age, disability, sex, sexual orientation, or gender identity.               Review of your medicines      UNREVIEWED medicines. Ask your doctor about these medicines        Dose / Directions    aspirin 81 MG tablet   Used for:  Type I (juvenile type) diabetes mellitus without mention of complication, not stated as uncontrolled, Essential hypertension, benign        1 tab po QD (Once per day)   Quantity:  0   Refills:  0       atenolol 25 MG tablet   Commonly known as:  TENORMIN   Used for:  Essential hypertension with goal blood pressure less than 130/80        Dose:  25 mg   Take 1 tablet (25 mg) by mouth daily   Quantity:  93 tablet   Refills:  3       hydrochlorothiazide 12.5 MG capsule   Commonly known as:  MICROZIDE   Used for:  Essential hypertension with goal blood pressure less than 130/80        Dose:  1 capsule   Take 1 capsule (12.5 mg) by mouth daily   Quantity:  93 capsule   Refills:  3       insulin detemir 100 UNIT/ML injection   Commonly known as:  LEVEMIR FLEXTOUCH   Used for:  Type 1 diabetes mellitus with hyperglycemia (H)        Dose:  50 Units   Inject 50 Units Subcutaneous At Bedtime   Quantity:  45 mL   Refills:  3       insulin lispro 100 UNIT/ML injection   Commonly known as:  HumaLOG KWIKpen   Used for:  Type 1 diabetes mellitus with hyperglycemia (H)        Use 3 units before breakfast, 6 units before lunch, and 16 units before dinner, plus correction of 1 unit per every 50 glucose over 125, averaging 3 for breakfast, 6 for lunch, 16 for dinner.   Quantity:  30 mL   Refills:  3       lisinopril 40 MG tablet   Commonly known as:  PRINIVIL/ZESTRIL   Used for:  Essential hypertension with goal blood  pressure less than 130/80, Type 1 diabetes mellitus with hyperglycemia (H)        Dose:  40 mg   Take 1 tablet (40 mg) by mouth daily   Quantity:  93 tablet   Refills:  3       PARoxetine 30 MG tablet   Commonly known as:  PAXIL   Used for:  Generalized anxiety disorder        Dose:  30 mg   Take 1 tablet (30 mg) by mouth every morning   Quantity:  93 tablet   Refills:  3       simvastatin 10 MG tablet   Commonly known as:  ZOCOR   Used for:  Hyperlipidemia LDL goal <100        TAKE ONE TABLET BY MOUTH EVERY NIGHT AT BEDTIME   Quantity:  93 tablet   Refills:  3         CONTINUE these medicines which have NOT CHANGED        Dose / Directions    BD ULTRA FINE PEN NEEDLES   Used for:  Type 1 diabetes mellitus with hyperglycemia (H)        Dose:  1 each   1 each by Percutaneous route 4 times daily   Quantity:  150 Device   Refills:  5       blood glucose monitoring test strip   Commonly known as:  SURESTEP TEST STRIPS   Used for:  Type 1 diabetes mellitus with hyperglycemia (H)        Dose:  1 strip   1 strip by In Vitro route 3 times daily   Quantity:  200 each   Refills:  3                Protect others around you: Learn how to safely use, store and throw away your medicines at www.disposemymeds.org.             Medication List: This is a list of all your medications and when to take them. Check marks below indicate your daily home schedule. Keep this list as a reference.      Medications           Morning Afternoon Evening Bedtime As Needed    aspirin 81 MG tablet   1 tab po QD (Once per day)                                atenolol 25 MG tablet   Commonly known as:  TENORMIN   Take 1 tablet (25 mg) by mouth daily                                BD ULTRA FINE PEN NEEDLES   1 each by Percutaneous route 4 times daily                                blood glucose monitoring test strip   Commonly known as:  SURESTEP TEST STRIPS   1 strip by In Vitro route 3 times daily                                hydrochlorothiazide 12.5  MG capsule   Commonly known as:  MICROZIDE   Take 1 capsule (12.5 mg) by mouth daily                                insulin detemir 100 UNIT/ML injection   Commonly known as:  LEVEMIR FLEXTOUCH   Inject 50 Units Subcutaneous At Bedtime                                insulin lispro 100 UNIT/ML injection   Commonly known as:  HumaLOG KWIKpen   Use 3 units before breakfast, 6 units before lunch, and 16 units before dinner, plus correction of 1 unit per every 50 glucose over 125, averaging 3 for breakfast, 6 for lunch, 16 for dinner.                                lisinopril 40 MG tablet   Commonly known as:  PRINIVIL/ZESTRIL   Take 1 tablet (40 mg) by mouth daily                                PARoxetine 30 MG tablet   Commonly known as:  PAXIL   Take 1 tablet (30 mg) by mouth every morning                                simvastatin 10 MG tablet   Commonly known as:  ZOCOR   TAKE ONE TABLET BY MOUTH EVERY NIGHT AT BEDTIME

## 2018-09-17 NOTE — DISCHARGE INSTRUCTIONS
.  [facility]    Home Care Following Endoscopy          Activity:    You have just undergone an endoscopic procedure usually performed with conscious sedation.  Do not work or operate machinery (including a car) for at least 12 hours.      I encourage you to walk and attempt to pass this air as soon as possible.    Diet:    Return to the diet you were on before your procedure but eat lightly for the first 12-24 hours.    Drink plenty of water.    Resume any regular medications unless otherwise advised by your physician.  Please begin any new medication prescribed as a result of your procedure as directed by your physician.     If you had any biopsy or polyp removed please refrain from aspirin or aspirin products for 2 days.  If on Coumadin please restart as instructed by your physician.   Pain:    You may take Tylenol as needed for pain.  Expected Recovery:  You can expect some mild abdominal fullness and/or discomfort due to the air used to inflate your intestinal tract.  Call Your Physician if You Have:    After Colonoscopy:  o Worsening persisting abdominal pain which is worse with activity.  o Fevers (>101 degrees F), chills or shakes.  o Passage of continued blood with bowel movements.   Any questions or concerns about your recovery, please call [phone] or after hours [phone] Nurse Advice Line.    Follow-up Care:    You should receive a call or letter with your results within 1 week. Please call if you have not received a notification of your results.    If asked to return to clinic please make an appointment 1 week after your procedure.  Call [phone].

## 2018-09-17 NOTE — PROVIDER NOTIFICATION
Dr. Moore spoke with patient regarding colonoscopy results and further testing needed.  Patient had many questions.  Appointments made at lab and radiology for patient.  Patient walked to lab and shown where radiology is located.  Patient and  stated questions were answered at this time.

## 2018-09-17 NOTE — ANESTHESIA PREPROCEDURE EVALUATION
Anesthesia Evaluation     . Pt has had prior anesthetic. Type: General           ROS/MED HX    ENT/Pulmonary:     (+)tobacco use, Current use , . .    Neurologic:  - neg neurologic ROS     Cardiovascular:     (+) Dyslipidemia, hypertension----. : . . . :. . Previous cardiac testing Echodate:11/2/11results:EF 55-60%date: results:ECG reviewed date:10/31/11 results:NSR date: results:          METS/Exercise Tolerance:     Hematologic:  - neg hematologic  ROS       Musculoskeletal:  - neg musculoskeletal ROS       GI/Hepatic:  - neg GI/hepatic ROS       Renal/Genitourinary:  - ROS Renal section negative       Endo:     (+) type I DM, Last HgA1c: 8.3 date: 7/20/18 Using insulin Obesity, .      Psychiatric:     (+) psychiatric history anxiety and depression      Infectious Disease:  - neg infectious disease ROS       Malignancy:      - no malignancy   Other:    - neg other ROS                 Physical Exam  Normal systems: cardiovascular, pulmonary and dental    Airway   Mallampati: II  TM distance: >3 FB  Neck ROM: full    Dental     Cardiovascular   Rhythm and rate: regular and normal      Pulmonary    breath sounds clear to auscultation                    Anesthesia Plan      History & Physical Review  History and physical reviewed and following examination; no interval change.    ASA Status:  3 .    NPO Status:  > 6 hours    Plan for MAC with Propofol induction. Maintenance will be TIVA.  Reason for MAC:  Deep or markedly invasive procedure (G8)         Postoperative Care      Consents  Anesthetic plan, risks, benefits and alternatives discussed with:  Patient.  Use of blood products discussed: No .   .                          .

## 2018-09-17 NOTE — ANESTHESIA POSTPROCEDURE EVALUATION
Patient: Niharika Cason    Procedure(s):  colonoscopy with polypectomies by biopsy forceps and hot snare and submucosal injection with tattoo - Wound Class: II-Clean Contaminated   - Wound Class: II-Clean Contaminated   - Wound Class: II-Clean Contaminated    Diagnosis:screening for malignant neoplasm of colon   Diagnosis Additional Information: No value filed.    Anesthesia Type:  MAC    Note:  Anesthesia Post Evaluation    Patient location during evaluation: Phase 2  Patient participation: Able to fully participate in evaluation  Level of consciousness: awake  Pain management: adequate  Airway patency: patent  Cardiovascular status: acceptable and hemodynamically stable  Respiratory status: acceptable, room air and nonlabored ventilation  Hydration status: stable  PONV: none     Anesthetic complications: None    Comments: Patient was happy with the anesthesia care received and no anesthesia related complications were noted.  I will follow up with the patient again if it is needed.        Last vitals:  Vitals:    09/17/18 0810 09/17/18 1015 09/17/18 1030   BP: 140/71 95/48 101/52   Pulse: 83 80    Resp: 16 16 16   Temp: 98.2  F (36.8  C)     SpO2: 94% 98% 98%         Electronically Signed By: JAIRON Knowles CRNA  September 17, 2018  10:48 AM

## 2018-09-17 NOTE — H&P
Pre-Endoscopy History and Physical     Niharika Cason MRN# [id number]   YOB: 1955 Age: 62 year old     Date of Procedure: 9/17/2018  Primary care provider: Leona Farris  Type of Endoscopy: colonoscopy  Type of Anesthesia Anticipated: MAC    HPI:    Niharika is a 62 year old female who will be undergoing the above procedure.      Niharika is feeling well today. Can get up a single flight of stairs without dyspnea. Estimated METS > 4.     Patient Active Problem List   Diagnosis     Female stress incontinence     Generalized anxiety disorder     Constipation     Type 1 diabetes, HbA1c goal < 8% (H)     Hypertension goal BP (blood pressure) < 130/80     Hyperlipidemia LDL goal <100     Tobacco abuse     Tobacco use disorder     Type 1 diabetes mellitus with hyperglycemia (HCC)     Non morbid obesity due to excess calories        Prescriptions Prior to Admission   Medication Sig Dispense Refill Last Dose     ASPIRIN 81 MG OR TABS 1 tab po QD (Once per day) 0 0 9/16/2018     atenolol (TENORMIN) 25 MG tablet Take 1 tablet (25 mg) by mouth daily 93 tablet 3 9/16/2018     BD ULTRA FINE PEN NEEDLES 1 each by Percutaneous route 4 times daily 150 Device 5 9/17/2018     blood glucose monitoring (SURESTEP TEST STRIPS) test strip 1 strip by In Vitro route 3 times daily 200 each 3 9/17/2018     hydrochlorothiazide (MICROZIDE) 12.5 MG capsule Take 1 capsule (12.5 mg) by mouth daily 93 capsule 3 9/16/2018     insulin detemir (LEVEMIR FLEXTOUCH) 100 UNIT/ML injection Inject 50 Units Subcutaneous At Bedtime 45 mL 3 9/16/2018     insulin lispro (HUMALOG KWIKPEN) 100 UNIT/ML injection Use 3 units before breakfast, 6 units before lunch, and 16 units before dinner, plus correction of 1 unit per every 50 glucose over 125, averaging 3 for breakfast, 6 for lunch, 16 for dinner. 30 mL 3 9/16/2018     lisinopril (PRINIVIL/ZESTRIL) 40 MG tablet Take 1 tablet (40 mg) by mouth daily 93 tablet 3 9/16/2018     PARoxetine  "(PAXIL) 30 MG tablet Take 1 tablet (30 mg) by mouth every morning 93 tablet 3 9/16/2018     simvastatin (ZOCOR) 10 MG tablet TAKE ONE TABLET BY MOUTH EVERY NIGHT AT BEDTIME 93 tablet 3 9/16/2018        REVIEW OF SYSTEMS:     5 point ROS negative except as noted above in HPI, including Gen., Resp., CV, GI &  system review.      PHYSICAL EXAM:   /52  Pulse 80  Temp 98.2  F (36.8  C) (Oral)  Resp 16  LMP 10/15/2008  SpO2 98%   Estimated body mass index is 31.97 kg/(m^2) as calculated from the following:    Height as of 7/20/18: 5' 2.95\" (1.599 m).    Weight as of 7/20/18: 180 lb 3.2 oz (81.7 kg).    GENERAL APPEARANCE: alert and no distress  RESP: lungs clear and unlabored  CV: regular  ABD: soft, nt/nd    DIAGNOSTICS:    Not indicated      IMPRESSION   ASA Class 3 - Severe systemic disease, but not incapacitating        PLAN:     Plan for colonoscopy. No medical contraindications to proceed, or further work up needed. The risks and benefits of the procedure and the sedation options and risks were discussed with the patient. These include infection, bleeding, and small risk of colon perforation (1/1000 to 1/65699 depending on patient characteristics and type of procedure). Niharika was also explained to alternatives for colo-rectal screening. All questions were answered and informed consent was obtained.      The above has been forwarded to the consulting provider.      Signed Electronically by: Richard Moore  September 17, 2018     "

## 2018-09-18 ENCOUNTER — TELEPHONE (OUTPATIENT)
Dept: FAMILY MEDICINE | Facility: CLINIC | Age: 63
End: 2018-09-18

## 2018-09-18 NOTE — TELEPHONE ENCOUNTER
Reason for Call:  Request for results:    Name of test or procedure: Colonoscopy and CT Scan    Date of test of procedure: 09/17    Location of the test or procedure: [facility] to leave the result message on voice mail or with a family member? YES    Phone number Patient can be reached at:  Home number on file [phone] (home)    Additional comments: NA    Call taken on 9/18/2018 at 11:02 AM by Jazmin Bee

## 2018-09-18 NOTE — TELEPHONE ENCOUNTER
is calling back. Would like a call back with results asap. Was told something was found during the procedure and they are very concerned.  Thank you,  Jud Arndt   for [facility]

## 2018-09-20 ENCOUNTER — OFFICE VISIT (OUTPATIENT)
Dept: FAMILY MEDICINE | Facility: CLINIC | Age: 63
End: 2018-09-20
Payer: COMMERCIAL

## 2018-09-20 VITALS
BODY MASS INDEX: 31.33 KG/M2 | HEART RATE: 76 BPM | WEIGHT: 176.6 LBS | SYSTOLIC BLOOD PRESSURE: 139 MMHG | OXYGEN SATURATION: 96 % | TEMPERATURE: 97.4 F | DIASTOLIC BLOOD PRESSURE: 82 MMHG

## 2018-09-20 DIAGNOSIS — K62.89 RECTAL MASS: Primary | ICD-10-CM

## 2018-09-20 PROCEDURE — 99213 OFFICE O/P EST LOW 20 MIN: CPT | Performed by: FAMILY MEDICINE

## 2018-09-20 ASSESSMENT — PAIN SCALES - GENERAL: PAINLEVEL: NO PAIN (0)

## 2018-09-20 NOTE — TELEPHONE ENCOUNTER
Dr. Moore did talk to patient.  No further action is needed as of right now.     Christy Claudio, CMA

## 2018-09-20 NOTE — PROGRESS NOTES
SUBJECTIVE:                                                      Chief Complaint   Patient presents with     RECHECK     results            Niharika is a 62 year old returns clinic to discuss her colonoscopy results.  I found a large rectal mass.  Her subsequent CT scan found a possible mass on her liver as well.  We reviewed those images together.  We reviewed her follow-up which is planned to be of colorectal surgery in the very near future.    ROS:  Constitutional, HEENT, cardiovascular, pulmonary, gi and gu systems are negative, except as otherwise noted.    OBJECTIVE:                                                    /82 (BP Location: Right arm, Patient Position: Chair, Cuff Size: Adult Regular)  Pulse 76  Temp 97.4  F (36.3  C) (Temporal)  Wt 176 lb 9.6 oz (80.1 kg)  LMP 10/15/2008  SpO2 96%  BMI 31.33 kg/m2  Body mass index is 31.33 kg/(m^2).        No exam     ASSESSMENT/PLAN:                                                        ICD-10-CM    1. Rectal mass K62.9        Long discussion about the very real possibility of cancer.  Awaiting pathology results next week.  Follow-up R to set up with colorectal surgery.  Her questions answered and addressed as appropriate.  Reviewed her imaging and lab results today.  See her back as needed    Richard Moore MD  [facility]

## 2018-09-20 NOTE — NURSING NOTE
Health Maintenance Due   Topic Date Due     HIV SCREEN (SYSTEM ASSIGNED)  09/21/1975     ADVANCE DIRECTIVE PLANNING Q5 YRS  09/21/2012     FOOT EXAM Q1 YEAR  08/28/2018     INFLUENZA VACCINE (1) 09/01/2018       Health Maintenance reviewed at today's visit patient asked to schedule/complete:   Patient is aware.    Christy Caludio, CMA

## 2018-09-20 NOTE — MR AVS SNAPSHOT
After Visit Summary   9/20/2018    Niharika Cason    MRN: [id number]           Patient Information     Date Of Birth          1955        Visit Information        Provider Department      9/20/2018 1:00 PM Richard Moore MD [facility]        Today's Diagnoses     Rectal mass    -  1       Follow-ups after your visit        Your next 10 appointments already scheduled     Oct 12, 2018  2:00 PM CDT   CONSULT with Ynes Jimenez MD   Radiation Oncology Clinic ([facility])    [facility]  1st Floor  [address] [id number] [phone]            Oct 15, 2018  3:00 PM CDT   (Arrive by 2:45 PM)   New Patient Visit with Liliana Urbina MD   [facility] ([facility])    [address]  [address]  [place] [address] [phone]              Who to contact     If you have questions or need follow up information about today's clinic visit or your schedule please contact [facility] directly at [phone].  Normal or non-critical lab and imaging results will be communicated to you by [employer], letter or phone within 4 business days after the clinic has received the results. If you do not hear from us within 7 days, please contact the clinic through [employer] or phone. If you have a critical or abnormal lab result, we will notify you by phone as soon as possible.  Submit refill requests through [employer] or call your pharmacy and they will forward the refill request to us. Please allow 3 business days for your refill to be completed.          Additional Information About Your Visit        MyChart Information     [employer] gives you secure access to your electronic health record. If you see a primary care provider, you can also send messages to your care team and make appointments. If you have questions, please call your primary care clinic.  If you do not have  a primary care provider, please call [phone] and they will assist you.        Care EveryWhere ID     This is your Care EveryWhere ID. This could be used by other organizations to access your [place] medical records  [id number]        Your Vitals Were     Pulse Temperature Last Period Pulse Oximetry BMI (Body Mass Index)       76 97.4  F (36.3  C) (Temporal) 10/15/2008 96% 31.33 kg/m2        Blood Pressure from Last 3 Encounters:   10/01/18 133/70   10/01/18 133/70   09/20/18 139/82    Weight from Last 3 Encounters:   10/01/18 179 lb 8 oz (81.4 kg)   10/01/18 179 lb 8 oz (81.4 kg)   09/20/18 176 lb 9.6 oz (80.1 kg)              Today, you had the following     No orders found for display       Primary Care Provider Office Phone # Fax #    Leona Cherri Farris [phone]       [address] [place] [address]        Equal Access to Services     [facility] Hospital[facility]ERT[facility] : Hadii aad ku hadasho Soomaali, waaxda luqadaha, qaybta kaalmada adeegyada, hever galdamez . So [facility] [phone].    ATENCIÓN: Si habla español, tiene a stokes disposición servicios gratuitos de asistencia lingüística. Llame al [phone].    We comply with applicable federal civil rights laws and [place] laws. We do not discriminate on the basis of race, color, national origin, age, disability, sex, sexual orientation, or gender identity.            Thank you!     Thank you for choosing [facility]  for your care. Our goal is always to provide you with excellent care. Hearing back from our patients is one way we can continue to improve our services. Please take a few minutes to complete the written survey that you may receive in the mail after your visit with us. Thank you!             Your Updated Medication List - Protect others around you: Learn how to safely use, store and throw away your medicines at www.disposemymeds.org.          This list is accurate as of 9/20/18 11:59 PM.   Always use your most recent med list.                   Brand Name Dispense Instructions for use Diagnosis    aspirin 81 MG tablet     0    1 tab po QD (Once per day)    Type I (juvenile type) diabetes mellitus without mention of complication, not stated as uncontrolled, Essential hypertension, benign       atenolol 25 MG tablet    TENORMIN    93 tablet    Take 1 tablet (25 mg) by mouth daily    Essential hypertension with goal blood pressure less than 130/80       BD ULTRA FINE PEN NEEDLES     150 Device    1 each by Percutaneous route 4 times daily    Type 1 diabetes mellitus with hyperglycemia (H)       blood glucose monitoring test strip    SURESTEP TEST STRIPS    200 each    1 strip by In Vitro route 3 times daily    Type 1 diabetes mellitus with hyperglycemia (H)       hydrochlorothiazide 12.5 MG capsule    MICROZIDE    93 capsule    Take 1 capsule (12.5 mg) by mouth daily    Essential hypertension with goal blood pressure less than 130/80       insulin detemir 100 UNIT/ML injection    LEVEMIR FLEXTOUCH    45 mL    Inject 50 Units Subcutaneous At Bedtime    Type 1 diabetes mellitus with hyperglycemia (H)       insulin lispro 100 UNIT/ML injection    HumaLOG KWIKpen    30 mL    Use 3 units before breakfast, 6 units before lunch, and 16 units before dinner, plus correction of 1 unit per every 50 glucose over 125, averaging 3 for breakfast, 6 for lunch, 16 for dinner.    Type 1 diabetes mellitus with hyperglycemia (H)       lisinopril 40 MG tablet    PRINIVIL/ZESTRIL    93 tablet    Take 1 tablet (40 mg) by mouth daily    Essential hypertension with goal blood pressure less than 130/80, Type 1 diabetes mellitus with hyperglycemia (H)       PARoxetine 30 MG tablet    PAXIL    93 tablet    Take 1 tablet (30 mg) by mouth every morning    Generalized anxiety disorder       simvastatin 10 MG tablet    ZOCOR    93 tablet    TAKE ONE TABLET BY MOUTH EVERY NIGHT AT BEDTIME    Hyperlipidemia LDL goal <100

## 2018-09-21 ENCOUNTER — TELEPHONE (OUTPATIENT)
Dept: SURGERY | Facility: CLINIC | Age: 63
End: 2018-09-21

## 2018-09-21 NOTE — TELEPHONE ENCOUNTER
Per the request of Dr. Le, patient is scheduled for the following:     MRI abdomen (liver protocol)- 9/24/18 at 11:00 am    MRI pelvis 3T - 9/28/18 at 1:00 pm    Dr. White- 10/1/18 at 2:00 pm    Dr. Le- 10/1/18 at 2:30 pm    Called and spoke with patient.  Patient confirms all upcoming appointments and NPO restrictions.  Appointment letter will be emailed to patient via address listed in oNoise, per patient's request.  Provided my direct number for additional questions or concerns.

## 2018-09-24 ENCOUNTER — HOSPITAL ENCOUNTER (OUTPATIENT)
Dept: MRI IMAGING | Facility: CLINIC | Age: 63
Discharge: HOME | End: 2018-09-24
Attending: COLON & RECTAL SURGERY | Admitting: COLON & RECTAL SURGERY
Payer: COMMERCIAL

## 2018-09-24 DIAGNOSIS — K62.89 RECTAL MASS: ICD-10-CM

## 2018-09-24 LAB — COPATH REPORT: NORMAL

## 2018-09-24 PROCEDURE — 25500064 ZZH RX 255 OP 636: Performed by: STUDENT IN AN ORGANIZED HEALTH CARE EDUCATION/TRAINING PROGRAM

## 2018-09-24 PROCEDURE — 74183 MRI ABD W/O CNTR FLWD CNTR: CPT

## 2018-09-24 PROCEDURE — A9581 GADOXETATE DISODIUM INJ: HCPCS | Performed by: STUDENT IN AN ORGANIZED HEALTH CARE EDUCATION/TRAINING PROGRAM

## 2018-09-24 RX ADMIN — GADOXETATE DISODIUM 8 ML: 181.43 INJECTION, SOLUTION INTRAVENOUS at 11:38

## 2018-09-28 ENCOUNTER — HOSPITAL ENCOUNTER (OUTPATIENT)
Dept: MRI IMAGING | Facility: CLINIC | Age: 63
Discharge: HOME | End: 2018-09-28
Attending: COLON & RECTAL SURGERY | Admitting: COLON & RECTAL SURGERY
Payer: COMMERCIAL

## 2018-09-28 DIAGNOSIS — K62.89 RECTAL MASS: ICD-10-CM

## 2018-09-28 PROCEDURE — A9585 GADOBUTROL INJECTION: HCPCS | Performed by: COLON & RECTAL SURGERY

## 2018-09-28 PROCEDURE — 72197 MRI PELVIS W/O & W/DYE: CPT

## 2018-09-28 PROCEDURE — 25500064 ZZH RX 255 OP 636: Performed by: COLON & RECTAL SURGERY

## 2018-09-28 PROCEDURE — 25000128 H RX IP 250 OP 636: Performed by: COLON & RECTAL SURGERY

## 2018-09-28 RX ORDER — GADOBUTROL 604.72 MG/ML
10 INJECTION INTRAVENOUS ONCE
Status: COMPLETED | OUTPATIENT
Start: 2018-09-28 | End: 2018-09-28

## 2018-09-28 RX ADMIN — GLUCAGON HYDROCHLORIDE 1 MG: KIT at 13:11

## 2018-09-28 RX ADMIN — GADOBUTROL 8 ML: 604.72 INJECTION INTRAVENOUS at 12:54

## 2018-10-01 ENCOUNTER — OFFICE VISIT (OUTPATIENT)
Dept: SURGERY | Facility: CLINIC | Age: 63
End: 2018-10-01
Payer: COMMERCIAL

## 2018-10-01 ENCOUNTER — OFFICE VISIT (OUTPATIENT)
Dept: SURGERY | Facility: CLINIC | Age: 63
End: 2018-10-01
Attending: SURGERY
Payer: COMMERCIAL

## 2018-10-01 ENCOUNTER — PRE VISIT (OUTPATIENT)
Dept: RADIATION ONCOLOGY | Facility: CLINIC | Age: 63
End: 2018-10-01

## 2018-10-01 VITALS
SYSTOLIC BLOOD PRESSURE: 133 MMHG | BODY MASS INDEX: 31.8 KG/M2 | HEIGHT: 63 IN | OXYGEN SATURATION: 95 % | DIASTOLIC BLOOD PRESSURE: 70 MMHG | TEMPERATURE: 98.8 F | WEIGHT: 179.5 LBS | HEART RATE: 75 BPM

## 2018-10-01 VITALS
SYSTOLIC BLOOD PRESSURE: 133 MMHG | TEMPERATURE: 98.8 F | HEART RATE: 75 BPM | OXYGEN SATURATION: 95 % | HEIGHT: 63 IN | DIASTOLIC BLOOD PRESSURE: 70 MMHG | WEIGHT: 179.5 LBS | BODY MASS INDEX: 31.8 KG/M2

## 2018-10-01 DIAGNOSIS — K62.89 RECTAL MASS: ICD-10-CM

## 2018-10-01 DIAGNOSIS — C20 MALIGNANT NEOPLASM OF RECTUM (H): ICD-10-CM

## 2018-10-01 DIAGNOSIS — C20 MALIGNANT NEOPLASM OF RECTUM (H): Primary | ICD-10-CM

## 2018-10-01 DIAGNOSIS — K76.89 FOCAL NODULAR HYPERPLASIA OF LIVER: Primary | ICD-10-CM

## 2018-10-01 LAB
ABO + RH BLD: NORMAL
ABO + RH BLD: NORMAL
ALBUMIN SERPL-MCNC: 4.4 G/DL (ref 3.4–5)
ALP SERPL-CCNC: 79 U/L (ref 40–150)
ALT SERPL W P-5'-P-CCNC: 25 U/L (ref 0–50)
ANION GAP SERPL CALCULATED.3IONS-SCNC: 6 MMOL/L (ref 3–14)
AST SERPL W P-5'-P-CCNC: 16 U/L (ref 0–45)
BASOPHILS # BLD AUTO: 0.1 10E9/L (ref 0–0.2)
BASOPHILS NFR BLD AUTO: 0.6 %
BILIRUB SERPL-MCNC: 0.4 MG/DL (ref 0.2–1.3)
BLD GP AB SCN SERPL QL: NORMAL
BLOOD BANK CMNT PATIENT-IMP: NORMAL
BUN SERPL-MCNC: 16 MG/DL (ref 7–30)
CALCIUM SERPL-MCNC: 9.5 MG/DL (ref 8.5–10.1)
CEA SERPL-MCNC: 8.9 UG/L (ref 0–2.5)
CHLORIDE SERPL-SCNC: 102 MMOL/L (ref 94–109)
CO2 SERPL-SCNC: 26 MMOL/L (ref 20–32)
CREAT SERPL-MCNC: 0.76 MG/DL (ref 0.52–1.04)
DIFFERENTIAL METHOD BLD: NORMAL
EOSINOPHIL # BLD AUTO: 0.1 10E9/L (ref 0–0.7)
EOSINOPHIL NFR BLD AUTO: 0.8 %
ERYTHROCYTE [DISTWIDTH] IN BLOOD BY AUTOMATED COUNT: 11.8 % (ref 10–15)
GFR SERPL CREATININE-BSD FRML MDRD: 77 ML/MIN/1.7M2
GLUCOSE SERPL-MCNC: 240 MG/DL (ref 70–99)
HCT VFR BLD AUTO: 42.1 % (ref 35–47)
HGB BLD-MCNC: 14 G/DL (ref 11.7–15.7)
IMM GRANULOCYTES # BLD: 0 10E9/L (ref 0–0.4)
IMM GRANULOCYTES NFR BLD: 0.2 %
LYMPHOCYTES # BLD AUTO: 2 10E9/L (ref 0.8–5.3)
LYMPHOCYTES NFR BLD AUTO: 23.5 %
MCH RBC QN AUTO: 29.5 PG (ref 26.5–33)
MCHC RBC AUTO-ENTMCNC: 33.3 G/DL (ref 31.5–36.5)
MCV RBC AUTO: 89 FL (ref 78–100)
MONOCYTES # BLD AUTO: 0.6 10E9/L (ref 0–1.3)
MONOCYTES NFR BLD AUTO: 7.4 %
NEUTROPHILS # BLD AUTO: 5.9 10E9/L (ref 1.6–8.3)
NEUTROPHILS NFR BLD AUTO: 67.5 %
NRBC # BLD AUTO: 0 10*3/UL
NRBC BLD AUTO-RTO: 0 /100
PLATELET # BLD AUTO: 261 10E9/L (ref 150–450)
POTASSIUM SERPL-SCNC: 4.6 MMOL/L (ref 3.4–5.3)
PREALB SERPL IA-MCNC: 28 MG/DL (ref 15–45)
PROT SERPL-MCNC: 7.5 G/DL (ref 6.8–8.8)
RBC # BLD AUTO: 4.74 10E12/L (ref 3.8–5.2)
SODIUM SERPL-SCNC: 134 MMOL/L (ref 133–144)
SPECIMEN EXP DATE BLD: NORMAL
WBC # BLD AUTO: 8.7 10E9/L (ref 4–11)

## 2018-10-01 PROCEDURE — G0463 HOSPITAL OUTPT CLINIC VISIT: HCPCS | Mod: ZF

## 2018-10-01 PROCEDURE — 40000114 ZZH STATISTIC NO CHARGE CLINIC VISIT

## 2018-10-01 ASSESSMENT — PAIN SCALES - GENERAL: PAINLEVEL: NO PAIN (0)

## 2018-10-01 NOTE — NURSING NOTE
"No chief complaint on file.      Vitals:    10/01/18 1419   BP: 133/70   Pulse: 75   Temp: 98.8  F (37.1  C)   TempSrc: Tympanic   SpO2: 95%   Weight: 179 lb 8 oz   Height: 5' 2.95\"       Body mass index is 31.85 kg/(m^2).     Vitals pulled from today's previous appointment.      Anitra Kendrick, EMT                      "

## 2018-10-01 NOTE — MR AVS SNAPSHOT
After Visit Summary   10/1/2018    Niharika Cason    MRN: [id number]           Patient Information     Date Of Birth          1955        Visit Information        Provider Department      10/1/2018 2:30 PM Alli Le MD [facility] Rect[facility]        Today's Diagnoses     Malignant neoplasm of rectum (H)    -  1      Care Instructions    Follow up:    Please call with any questions or concerns regarding your clinic visit today.    It is a pleasure being involved in your health care.    Contacts post-consultation depending on your need:    Radiology Appointments                [phone]    Schedule Clinic Appointments       [phone] # 1   M-F 7:30 - 5 pm    Lianet YIP RN Coordinator           [phone]    Clinic Fax Number          [phone]    Janeth           [phone]    My Chart is available 24 hours a day and is a secure way to access your records and communicate with your care team.  I strongly recommend signing up if you haven't already done so, if you are comfortable with computers.  If you would like to inquire about this or are having problems with My Chart access, you may call [phone] or go online at [email or web address].  Please allow at least 24 hours for a response and extra time on weekends and Holidays.          Follow-ups after your visit        Additional Services     ONC/HEME ADULT REFERRAL       Your provider has referred you to: [facility]: Cancer Care/Hematology (All Cancer Related Services) - [place] [phone]   [email or web address]    Please be aware that coverage of these services is subject to the terms and limitations of your health insurance plan.  Call member services at your health plan with any benefit or coverage questions.      Please bring the following with you to your appointment:    (1) Any X-Rays, CTs or MRIs which have been performed.  Contact the  facility where they were done to arrange for  prior to your scheduled appointment.   (2) List of current medications  (3) This referral request   (4) Any documents/labs given to you for this referral            RAD ONCOLOGY REFERRAL       Your provider has referred you to: Medical Oncology    Please be aware that coverage of these services is subject to the terms and limitations of your health insurance plan.  Call member services at your health plan with any benefit or coverage questions.      Please bring the following with you to your appointment:    (1) Any X-Rays, CTs or MRIs which have been performed.  Contact the facility where they were done to arrange for  prior to your scheduled appointment.    (2) List of current medications   (3) This referral request   (4) Any documents/labs given to you for this referral                  Your next 10 appointments already scheduled     Oct 12, 2018  2:00 PM CDT   CONSULT with Ynes Jimenez MD   Radiation Oncology Clinic ([facility])    [facility]  1st Floor  [address]  [place] [id number] [phone]            Oct 15, 2018  3:00 PM CDT   (Arrive by 2:45 PM)   New Patient Visit with Liliana Urbina MD   [facility] ([facility])    [address]  [address]  [place] [address] [phone]              Future tests that were ordered for you today     Open Future Orders        Priority Expected Expires Ordered    Comprehensive metabolic panel Routine  12/30/2018 10/1/2018    Prealbumin Routine  12/30/2018 10/1/2018            Who to contact     Please call your clinic at [phone] to:    Ask questions about your health    Make or cancel appointments    Discuss your medicines    Learn about your test results    Speak to your doctor            Additional Information About Your Visit        Yohart Information     [employer] gives you secure access  "to your electronic health record. If you see a primary care provider, you can also send messages to your care team and make appointments. If you have questions, please call your primary care clinic.  If you do not have a primary care provider, please call [phone] and they will assist you.      [employer] is an electronic gateway that provides easy, online access to your medical records. With [employer], you can request a clinic appointment, read your test results, renew a prescription or communicate with your care team.     To access your existing account, please contact your [facility] or call [phone] for assistance.        Care EveryWhere ID     This is your Care EveryWhere ID. This could be used by other organizations to access your [place] medical records  [id number]        Your Vitals Were     Pulse Temperature Height Last Period Pulse Oximetry BMI (Body Mass Index)    75 98.8  F (37.1  C) (Tympanic) 5' 2.95\" 10/15/2008 95% 31.85 kg/m2       Blood Pressure from Last 3 Encounters:   10/01/18 133/70   10/01/18 133/70   09/20/18 139/82    Weight from Last 3 Encounters:   10/01/18 179 lb 8 oz   10/01/18 179 lb 8 oz   09/20/18 176 lb 9.6 oz              We Performed the Following     ABO/Rh type and screen     FLEX SIGMOIDOSCOPY W/WO ONEIDA SPEC BY BRUSH/WASH     ONC/HEME ADULT REFERRAL     RAD ONCOLOGY REFERRAL        Primary Care Provider Office Phone # Fax #    Leona Cherri Farris [phone]       [address] [place] [address]        Equal Access to Services     RHONDA BARBA AH: Hadii clifford walker hadasho Somarcia, waaxda luqadaha, qaybta kaalmada hever tee. So [facility] [phone].    ATENCIÓN: Si habla español, tiene a stokes disposición servicios gratuitos de asistencia lingüística. Llame al [phone].    We comply with applicable federal civil rights laws and [place] laws. We do not discriminate on the basis " of race, color, national origin, age, disability, sex, sexual orientation, or gender identity.            Thank you!     Thank you for choosing [facility]  for your care. Our goal is always to provide you with excellent care. Hearing back from our patients is one way we can continue to improve our services. Please take a few minutes to complete the written survey that you may receive in the mail after your visit with us. Thank you!             Your Updated Medication List - Protect others around you: Learn how to safely use, store and throw away your medicines at www.disposemymeds.org.          This list is accurate as of 10/1/18  3:57 PM.  Always use your most recent med list.                   Brand Name Dispense Instructions for use Diagnosis    aspirin 81 MG tablet     0    1 tab po QD (Once per day)    Type I (juvenile type) diabetes mellitus without mention of complication, not stated as uncontrolled, Essential hypertension, benign       atenolol 25 MG tablet    TENORMIN    93 tablet    Take 1 tablet (25 mg) by mouth daily    Essential hypertension with goal blood pressure less than 130/80       BD ULTRA FINE PEN NEEDLES     150 Device    1 each by Percutaneous route 4 times daily    Type 1 diabetes mellitus with hyperglycemia (H)       blood glucose monitoring test strip    SURESTEP TEST STRIPS    200 each    1 strip by In Vitro route 3 times daily    Type 1 diabetes mellitus with hyperglycemia (H)       hydrochlorothiazide 12.5 MG capsule    MICROZIDE    93 capsule    Take 1 capsule (12.5 mg) by mouth daily    Essential hypertension with goal blood pressure less than 130/80       insulin detemir 100 UNIT/ML injection    LEVEMIR FLEXTOUCH    45 mL    Inject 50 Units Subcutaneous At Bedtime    Type 1 diabetes mellitus with hyperglycemia (H)       insulin lispro 100 UNIT/ML injection    HumaLOG KWIKpen    30 mL    Use 3 units before breakfast, 6 units before lunch, and 16 units before  dinner, plus correction of 1 unit per every 50 glucose over 125, averaging 3 for breakfast, 6 for lunch, 16 for dinner.    Type 1 diabetes mellitus with hyperglycemia (H)       lisinopril 40 MG tablet    PRINIVIL/ZESTRIL    93 tablet    Take 1 tablet (40 mg) by mouth daily    Essential hypertension with goal blood pressure less than 130/80, Type 1 diabetes mellitus with hyperglycemia (H)       PARoxetine 30 MG tablet    PAXIL    93 tablet    Take 1 tablet (30 mg) by mouth every morning    Generalized anxiety disorder       simvastatin 10 MG tablet    ZOCOR    93 tablet    TAKE ONE TABLET BY MOUTH EVERY NIGHT AT BEDTIME    Hyperlipidemia LDL goal <100

## 2018-10-01 NOTE — LETTER
10/1/2018       RE: Niharika Cason  [address] [facility] [address]     Dear Colleague,    Thank you for referring your patient, Niharika Cason, to the [facility] COLON AND RECTAL SURGERY at [facility]. Please see a copy of my visit note below.    Colon and Rectal Surgery Clinic Note    RE: Niharika Cason.  : 1955.  MARILEE: 10/1/2018.    Reason for visit: Rectal adenocarcinoma.    HPI: 64 y/o F who underwent 1st screening colonoscopy by Dr. Moore on 18 that showed:    Impression:          - Rectal mass 9 to 10 cm from the anal verge.                        - One 4 mm polyp at the hepatic flexure, removed with a                        cold biopsy forceps. Resected and retrieved.                        - Three 3 to 6 mm polyps in the sigmoid colon and in the                        descending colon, removed with a cold biopsy forceps.                        Resected and retrieved.                        - Likely malignant partially obstructing tumor in the                        sigmoid colon. Biopsied. Injected.                        - Diverticulosis in the sigmoid colon.      Pathology showed:    FINAL DIAGNOSIS:   A.  Colon, hepatic flexure, mucosal biopsies:   - Tubular adenoma.   - Negative for high-grade dysplasia and malignancy.     B.  Left colon, mucosal biopsy:   - Tubular adenoma.   - Negative for high-grade dysplasia and malignancy.     C.  Sigmoid colon, mucosal biopsies:   - Tubular adenoma and normal colonic mucosa.   - Negative for high-grade dysplasia and malignancy.     D.  Sigmoid colon, mucosal biopsy:   - Tubular adenoma and normal colonic mucosa.   - Negative for high-grade dysplasia and malignancy.     E. Sigmoid colon mass, biopsies:   - Invasive moderately differentiated adenocarcinoma.   - No angiolymphatic invasion identified.   MLH1               Intact nuclear expression   MSH2               Intact nuclear expression   MSH6                Intact nuclear expression   PMS2               Intact nuclear expression     Pt subsequently underwent CT c/a/p, MR liver and 3T MR pelvis that showed:    IMPRESSION:  1. Patient's reported rectal mass is not definitely seen on this  study.  2. Peripherally enhancing lesion in the superior right lobe of the  liver (segment VII) measures up to 3.0 cm and could represent  metastasis but could also represent a benign lesion such as a  hemangioma. I recommend dynamic MRI of the liver.  3. Multiple tiny nodules in the lungs likely represent granulomata.  These should be followed in one year with CT to ensure stability or  resolution.  4. Calcified uterine fibroids as described above. Largest measures up  to 5.9 cm.  5. Atherosclerosis including coronary arteries and aorta. Degenerative  changes of the spine.    IMPRESSION:  1. Focal nodular hyperplasia in segment 7 measuring 3.8 x 3.4 cm,  corresponding to the abnormality seen on CT 9/17/2018.  2. No suspected metastatic disease in the abdomen.    IMPRESSION:   1. Ulcerated mid to high rectal mass with extension beyond the  muscularis propria, concern for extramural venous invasion, and  involvement of the mesorectal fascia as above. Suspicious presacral  node along the area of mesorectal fascia involvement with additional  smaller indeterminate lymph nodes. Tumor comes in close proximity to,  but does not definitely involve, an exophytic uterine fibroid. Stage  T3cN1.   2.  Additionally, approximately 2 cm superior to the above described  rectal mass there is an additional short segment of intermediate T2  signal intensity, enhancing circumferential wall thickening with  luminal narrowing measuring approximately 1.5 cm in length concerning  for a synchronous tumor.    Current symptoms include mild changes in bowel habits for the past 2 years. These were attributed to her previous IBS. Denies fevers, chills, abdominal pain, diarrhea, constipation, urinary symptoms or  "fecal incontinence. No previous anorectal operations. Denies FH of CRC, polyps, or IBD. CEA was 10.5. Was diagnosed with \"colitis\" 40 years ago and received a 5-ASA derivative for 1 year. Previous colonoscopy in 1997 showed IBS per pt.    Medical history:  Past Medical History:   Diagnosis Date     Essential hypertension, benign      Generalized anxiety disorder      Type I (juvenile type) diabetes mellitus without mention of complication, not stated as uncontrolled     diagnosed at age 35     Ulcerative colitis, unspecified     in the 70s.         Surgical history:  Past Surgical History:   Procedure Laterality Date     COLONOSCOPY N/A 9/17/2018    Procedure: COMBINED COLONOSCOPY, SINGLE OR MULTIPLE BIOPSY/POLYPECTOMY BY BIOPSY;  colonoscopy with polypectomies by biopsy forceps and hot snare and submucosal injection with tattoo;  Surgeon: Richard Moore MD;  Location: [facility]     HC COLONOSCOPY THRU STOMA, DIAGNOSTIC  1998    negative     HC CURETTAGE, POSTPARTUM  '91, '93    following miscarriages     HC REMOVAL OF TONSILS,<11 Y/O      Tonsils <14y.o.       Family history:  Family History   Problem Relation Age of Onset     HEART DISEASE Maternal Grandfather      MI at 52 yrs     EYE* Paternal Grandfather      cataracts     Arthritis Mother      Gynecology Mother      hysterectomy for fibroids     Hypertension Mother      Lipids Mother      GASTROINTESTINAL DISEASE Father      ulcers     HEART DISEASE Father      intermittent rapid heartbeat       Medications:  Current Outpatient Prescriptions   Medication Sig Dispense Refill     ASPIRIN 81 MG OR TABS 1 tab po QD (Once per day) 0 0     atenolol (TENORMIN) 25 MG tablet Take 1 tablet (25 mg) by mouth daily 93 tablet 3     BD ULTRA FINE PEN NEEDLES 1 each by Percutaneous route 4 times daily 150 Device 5     blood glucose monitoring (SURESTEP TEST STRIPS) test strip 1 strip by In Vitro route 3 times daily 200 each 3     hydrochlorothiazide (MICROZIDE) 12.5 MG " "capsule Take 1 capsule (12.5 mg) by mouth daily 93 capsule 3     insulin detemir (LEVEMIR FLEXTOUCH) 100 UNIT/ML injection Inject 50 Units Subcutaneous At Bedtime 45 mL 3     insulin lispro (HUMALOG KWIKPEN) 100 UNIT/ML injection Use 3 units before breakfast, 6 units before lunch, and 16 units before dinner, plus correction of 1 unit per every 50 glucose over 125, averaging 3 for breakfast, 6 for lunch, 16 for dinner. 30 mL 3     lisinopril (PRINIVIL/ZESTRIL) 40 MG tablet Take 1 tablet (40 mg) by mouth daily 93 tablet 3     PARoxetine (PAXIL) 30 MG tablet Take 1 tablet (30 mg) by mouth every morning 93 tablet 3     simvastatin (ZOCOR) 10 MG tablet TAKE ONE TABLET BY MOUTH EVERY NIGHT AT BEDTIME 93 tablet 3     [DISCONTINUED] BD ULTRA FINE PEN NEEDLES 1 each by Percutaneous route 4 times daily 150 Device 11       Allergies:  Allergies   Allergen Reactions     No Known Drug Allergies        Social history:   Social History   Substance Use Topics     Smoking status: Current Some Day Smoker     Smokeless tobacco: Never Used     Alcohol use 0.0 oz/week     0 Standard drinks or equivalent per week      Comment: 3-4 wine per wk     Marital status: .    Physical Examination:  /70  Pulse 75  Temp 98.8  F (37.1  C) (Tympanic)  Ht 5' 2.95\"  Wt 179 lb 8 oz  LMP 10/15/2008  SpO2 95%  BMI 31.85 kg/m2  General: Well hydrated. No acute distress.  Abdomen: Soft, NT. No inguinal adenopathy palpated.  Perianal external examination:  Perianal skin: intact.  Lesions: No.  Eversion of buttocks: There was not evidence of an anal fissure. Details: N/A.  Skin tags or external hemorrhoids: No.  Digital rectal examination: Was performed.   Sphincter tone: Good.  Palpable lesions: Yes - Location: left posterior mobile mass at approx 8-9 cm from the anal verge. The lesion prolapses down with Valsalva.  Other: A small  rectocele was appreciated on bearing down.  Bimanual examination: was not " "performed.    Investigations:  None.    Procedures:  Flexible sigmoidoscopy: after obtaining informed consent and performing a \"time out\", an adult flexible sigmoidoscope was introduced through the anus and passed up to the proximal sigmoid colon. The quality of the prep was fair. Findings: 4x4-cm ulcerated and friable mass located at the left posterior aspect of the mid rectum, covering approx 60-70% of the lumen circumference. Distal edge of the tumor is at the mid rectal valve. Tattoo was identified. No additional abnormalities were seen. Total scope time: 12 minutes. The patient tolerated the procedure well.    ASSESSMENT  64 y/o F with mT3cN1 proximal rectal adenocarcinoma with no evidence of M1 disease. Risks, benefits, and alternatives of operative treatment were thoroughly discussed with the patient, he/she understands these well and agrees to proceed.    PLAN  1. Will discuss at multidisciplinary CRC conference.  2. Refer to Med and Rad Onc. Would recommend RUDDY per protocol below:     Total Neoadjuvant Therapy (RUDDY) protocol for rectal cancer:    - Inclusion criteria: locally advanced, threatened margin, middle and distal third tumor, mesorectal venous invasion, non-regional lymphadenopathy.    1. 4 months of FOLFOX, then:  2. 2 months of CXRT.  3. Surgery after 6-8 weeks.    - Surveillance for response to therapy [id number] with CT c/a/p and Flex Sig [id number].    Time spent: 60 minutes.  >50% spent in discussing, counseling and coordinating care.      Referring Provider:  Manjeet Moore MD  [address] [place] [id number]     Primary Care Provider:  Leona Farris      Again, thank you for allowing me to participate in the care of your patient.      Sincerely,    Alli Le MD      "

## 2018-10-01 NOTE — MR AVS SNAPSHOT
After Visit Summary   10/1/2018    Niharika Cason    MRN: [id number]           Patient Information     Date Of Birth          1955        Visit Information        Provider Department      10/1/2018 2:00 PM Yossi White MD [facility]        Today's Diagnoses     Focal nodular hyperplasia of liver    -  1       Follow-ups after your visit        Your next 10 appointments already scheduled     Oct 12, 2018  2:00 PM CDT   CONSULT with Ynes Jimenez MD   Radiation Oncology Clinic ([facility])    [facility]  1st Floor  [address] [id number] [phone]            Oct 15, 2018  3:00 PM CDT   (Arrive by 2:45 PM)   New Patient Visit with Liliana Urbina MD   [facility])    [address]  [address]  [facility] [address] [phone]              Who to contact     If you have questions or need follow up information about today's clinic visit or your schedule please contact [facility] directly at [phone].  Normal or non-critical lab and imaging results will be communicated to you by [employer], letter or phone within 4 business days after the clinic has received the results. If you do not hear from us within 7 days, please contact the clinic through [employer] or phone. If you have a critical or abnormal lab result, we will notify you by phone as soon as possible.  Submit refill requests through [employer] or call your pharmacy and they will forward the refill request to us. Please allow 3 business days for your refill to be completed.          Additional Information About Your Visit        [employer] Information     [employer] gives you secure access to your electronic health record. If you see a primary care provider, you can also send messages to your care team and make appointments. If you have questions, please call your primary care  "clinic.  If you do not have a primary care provider, please call [phone] and they will assist you.        Care EveryWhere ID     This is your Care EveryWhere ID. This could be used by other organizations to access your [place] medical records  [id number]        Your Vitals Were     Pulse Temperature Height Last Period Pulse Oximetry BMI (Body Mass Index)    75 98.8  F (37.1  C) (Tympanic) 1.599 m (5' 2.95\") 10/15/2008 95% 31.84 kg/m2       Blood Pressure from Last 3 Encounters:   No data found for BP    Weight from Last 3 Encounters:   No data found for Wt              Today, you had the following     No orders found for display       Primary Care Provider Office Phone # Fax #    Leona Cherri Farris [phone]       [address] [place] [address]        Equal Access to Services     [facility]: Hadii aad ku hadasho Soomaali, waaxda luqadaha, qaybta kaalmada adeegyada, waxay surendrain hayaan sanna galdamez . So [facility] [phone].    ATENCIÓN: Si habla español, tiene a stokes disposición servicios gratuitos de asistencia lingüística. Llame al [phone].    We comply with applicable federal civil rights laws and [place] laws. We do not discriminate on the basis of race, color, national origin, age, disability, sex, sexual orientation, or gender identity.            Thank you!     Thank you for choosing [facility]  for your care. Our goal is always to provide you with excellent care. Hearing back from our patients is one way we can continue to improve our services. Please take a few minutes to complete the written survey that you may receive in the mail after your visit with us. Thank you!             Your Updated Medication List - Protect others around you: Learn how to safely use, store and throw away your medicines at www.disposemymeds.org.          This list is accurate as of 10/1/18 11:59 PM.  Always use your most recent med list.                   " Brand Name Dispense Instructions for use Diagnosis    aspirin 81 MG tablet     0    1 tab po QD (Once per day)    Type I (juvenile type) diabetes mellitus without mention of complication, not stated as uncontrolled, Essential hypertension, benign       atenolol 25 MG tablet    TENORMIN    93 tablet    Take 1 tablet (25 mg) by mouth daily    Essential hypertension with goal blood pressure less than 130/80       BD ULTRA FINE PEN NEEDLES     150 Device    1 each by Percutaneous route 4 times daily    Type 1 diabetes mellitus with hyperglycemia (H)       blood glucose monitoring test strip    SURESTEP TEST STRIPS    200 each    1 strip by In Vitro route 3 times daily    Type 1 diabetes mellitus with hyperglycemia (H)       hydrochlorothiazide 12.5 MG capsule    MICROZIDE    93 capsule    Take 1 capsule (12.5 mg) by mouth daily    Essential hypertension with goal blood pressure less than 130/80       insulin detemir 100 UNIT/ML injection    LEVEMIR FLEXTOUCH    45 mL    Inject 50 Units Subcutaneous At Bedtime    Type 1 diabetes mellitus with hyperglycemia (H)       insulin lispro 100 UNIT/ML injection    HumaLOG KWIKpen    30 mL    Use 3 units before breakfast, 6 units before lunch, and 16 units before dinner, plus correction of 1 unit per every 50 glucose over 125, averaging 3 for breakfast, 6 for lunch, 16 for dinner.    Type 1 diabetes mellitus with hyperglycemia (H)       lisinopril 40 MG tablet    PRINIVIL/ZESTRIL    93 tablet    Take 1 tablet (40 mg) by mouth daily    Essential hypertension with goal blood pressure less than 130/80, Type 1 diabetes mellitus with hyperglycemia (H)       PARoxetine 30 MG tablet    PAXIL    93 tablet    Take 1 tablet (30 mg) by mouth every morning    Generalized anxiety disorder       simvastatin 10 MG tablet    ZOCOR    93 tablet    TAKE ONE TABLET BY MOUTH EVERY NIGHT AT BEDTIME    Hyperlipidemia LDL goal <100

## 2018-10-01 NOTE — NURSING NOTE
"Oncology Rooming Note    October 1, 2018 1:48 PM   Niharika Cason is a 63 year old female who presents for:    Chief Complaint   Patient presents with     Oncology Clinic Visit     Initial Vitals: /70 (BP Location: Right arm, Patient Position: Sitting, Cuff Size: Adult Regular)  Pulse 75  Temp 98.8  F (37.1  C) (Tympanic)  Ht 1.599 m (5' 2.95\")  Wt 81.4 kg (179 lb 8 oz)  LMP 10/15/2008  SpO2 95%  BMI 31.84 kg/m2 Estimated body mass index is 31.84 kg/(m^2) as calculated from the following:    Height as of this encounter: 1.599 m (5' 2.95\").    Weight as of this encounter: 81.4 kg (179 lb 8 oz). Body surface area is 1.9 meters squared.  No Pain (0) Comment: Data Unavailable   Patient's last menstrual period was 10/15/2008.  Allergies reviewed: Yes  Medications reviewed: Yes    Medications: Medication refills not needed today.  Pharmacy name entered into [employer]: [facility] [place], [place] - [address]     Clinical concerns: No new concerns. Provider was notified.    10 minutes for nursing intake (face to face time)     Mireya Allan LPN            "

## 2018-10-01 NOTE — TELEPHONE ENCOUNTER
Date of appointment:10/12/18    Diagnosis/reason for appointment:Rectal Cancer  Referring provider/facility: Dr. Le  Who called: Clinic    Recent Studies  Imaging:  Pathology:  Labs:  Previous radiation (if known): No    Records in EPIC    Additional information:

## 2018-10-01 NOTE — PROGRESS NOTES
Colon and Rectal Surgery Clinic Note    RE: Niharika Cason.  : 1955.  MARILEE: 10/1/2018.    Reason for visit: Rectal adenocarcinoma.    HPI: 62 y/o F who underwent 1st screening colonoscopy by Dr. Moore on 18 that showed:    Impression:          - Rectal mass 9 to 10 cm from the anal verge.                        - One 4 mm polyp at the hepatic flexure, removed with a                        cold biopsy forceps. Resected and retrieved.                        - Three 3 to 6 mm polyps in the sigmoid colon and in the                        descending colon, removed with a cold biopsy forceps.                        Resected and retrieved.                        - Likely malignant partially obstructing tumor in the                        sigmoid colon. Biopsied. Injected.                        - Diverticulosis in the sigmoid colon.      Pathology showed:    FINAL DIAGNOSIS:   A.  Colon, hepatic flexure, mucosal biopsies:   - Tubular adenoma.   - Negative for high-grade dysplasia and malignancy.     B.  Left colon, mucosal biopsy:   - Tubular adenoma.   - Negative for high-grade dysplasia and malignancy.     C.  Sigmoid colon, mucosal biopsies:   - Tubular adenoma and normal colonic mucosa.   - Negative for high-grade dysplasia and malignancy.     D.  Sigmoid colon, mucosal biopsy:   - Tubular adenoma and normal colonic mucosa.   - Negative for high-grade dysplasia and malignancy.     E. Sigmoid colon mass, biopsies:   - Invasive moderately differentiated adenocarcinoma.   - No angiolymphatic invasion identified.   MLH1               Intact nuclear expression   MSH2               Intact nuclear expression   MSH6               Intact nuclear expression   PMS2               Intact nuclear expression     Pt subsequently underwent CT c/a/p, MR liver and 3T MR pelvis that showed:    IMPRESSION:  1. Patient's reported rectal mass is not definitely seen on this  study.  2. Peripherally enhancing lesion in the  "superior right lobe of the  liver (segment VII) measures up to 3.0 cm and could represent  metastasis but could also represent a benign lesion such as a  hemangioma. I recommend dynamic MRI of the liver.  3. Multiple tiny nodules in the lungs likely represent granulomata.  These should be followed in one year with CT to ensure stability or  resolution.  4. Calcified uterine fibroids as described above. Largest measures up  to 5.9 cm.  5. Atherosclerosis including coronary arteries and aorta. Degenerative  changes of the spine.    IMPRESSION:  1. Focal nodular hyperplasia in segment 7 measuring 3.8 x 3.4 cm,  corresponding to the abnormality seen on CT 9/17/2018.  2. No suspected metastatic disease in the abdomen.    IMPRESSION:   1. Ulcerated mid to high rectal mass with extension beyond the  muscularis propria, concern for extramural venous invasion, and  involvement of the mesorectal fascia as above. Suspicious presacral  node along the area of mesorectal fascia involvement with additional  smaller indeterminate lymph nodes. Tumor comes in close proximity to,  but does not definitely involve, an exophytic uterine fibroid. Stage  T3cN1.   2.  Additionally, approximately 2 cm superior to the above described  rectal mass there is an additional short segment of intermediate T2  signal intensity, enhancing circumferential wall thickening with  luminal narrowing measuring approximately 1.5 cm in length concerning  for a synchronous tumor.    Current symptoms include mild changes in bowel habits for the past 2 years. These were attributed to her previous IBS. Denies fevers, chills, abdominal pain, diarrhea, constipation, urinary symptoms or fecal incontinence. No previous anorectal operations. Denies FH of CRC, polyps, or IBD. CEA was 10.5. Was diagnosed with \"colitis\" 40 years ago and received a 5-ASA derivative for 1 year. Previous colonoscopy in 1997 showed IBS per pt.    Medical history:  Past Medical History: "   Diagnosis Date     Essential hypertension, benign      Generalized anxiety disorder      Type I (juvenile type) diabetes mellitus without mention of complication, not stated as uncontrolled     diagnosed at age 35     Ulcerative colitis, unspecified     in the 70s.         Surgical history:  Past Surgical History:   Procedure Laterality Date     COLONOSCOPY N/A 9/17/2018    Procedure: COMBINED COLONOSCOPY, SINGLE OR MULTIPLE BIOPSY/POLYPECTOMY BY BIOPSY;  colonoscopy with polypectomies by biopsy forceps and hot snare and submucosal injection with tattoo;  Surgeon: Richard Moore MD;  Location: [facility]     HC COLONOSCOPY THRU STOMA, DIAGNOSTIC  1998    negative     HC CURETTAGE, POSTPARTUM  '91, '93    following miscarriages     HC REMOVAL OF TONSILS,<13 Y/O      Tonsils <14y.o.       Family history:  Family History   Problem Relation Age of Onset     HEART DISEASE Maternal Grandfather      MI at 52 yrs     EYE* Paternal Grandfather      cataracts     Arthritis Mother      Gynecology Mother      hysterectomy for fibroids     Hypertension Mother      Lipids Mother      GASTROINTESTINAL DISEASE Father      ulcers     HEART DISEASE Father      intermittent rapid heartbeat       Medications:  Current Outpatient Prescriptions   Medication Sig Dispense Refill     ASPIRIN 81 MG OR TABS 1 tab po QD (Once per day) 0 0     atenolol (TENORMIN) 25 MG tablet Take 1 tablet (25 mg) by mouth daily 93 tablet 3     BD ULTRA FINE PEN NEEDLES 1 each by Percutaneous route 4 times daily 150 Device 5     blood glucose monitoring (SURESTEP TEST STRIPS) test strip 1 strip by In Vitro route 3 times daily 200 each 3     hydrochlorothiazide (MICROZIDE) 12.5 MG capsule Take 1 capsule (12.5 mg) by mouth daily 93 capsule 3     insulin detemir (LEVEMIR FLEXTOUCH) 100 UNIT/ML injection Inject 50 Units Subcutaneous At Bedtime 45 mL 3     insulin lispro (HUMALOG KWIKPEN) 100 UNIT/ML injection Use 3 units before breakfast, 6 units before lunch,  "and 16 units before dinner, plus correction of 1 unit per every 50 glucose over 125, averaging 3 for breakfast, 6 for lunch, 16 for dinner. 30 mL 3     lisinopril (PRINIVIL/ZESTRIL) 40 MG tablet Take 1 tablet (40 mg) by mouth daily 93 tablet 3     PARoxetine (PAXIL) 30 MG tablet Take 1 tablet (30 mg) by mouth every morning 93 tablet 3     simvastatin (ZOCOR) 10 MG tablet TAKE ONE TABLET BY MOUTH EVERY NIGHT AT BEDTIME 93 tablet 3     [DISCONTINUED] BD ULTRA FINE PEN NEEDLES 1 each by Percutaneous route 4 times daily 150 Device 11       Allergies:  Allergies   Allergen Reactions     No Known Drug Allergies        Social history:   Social History   Substance Use Topics     Smoking status: Current Some Day Smoker     Smokeless tobacco: Never Used     Alcohol use 0.0 oz/week     0 Standard drinks or equivalent per week      Comment: 3-4 wine per wk     Marital status: .    Physical Examination:  /70  Pulse 75  Temp 98.8  F (37.1  C) (Tympanic)  Ht 5' 2.95\"  Wt 179 lb 8 oz  LMP 10/15/2008  SpO2 95%  BMI 31.85 kg/m2  General: Well hydrated. No acute distress.  Abdomen: Soft, NT. No inguinal adenopathy palpated.  Perianal external examination:  Perianal skin: intact.  Lesions: No.  Eversion of buttocks: There was not evidence of an anal fissure. Details: N/A.  Skin tags or external hemorrhoids: No.  Digital rectal examination: Was performed.   Sphincter tone: Good.  Palpable lesions: Yes - Location: left posterior mobile mass at approx 8-9 cm from the anal verge. The lesion prolapses down with Valsalva.  Other: A small  rectocele was appreciated on bearing down.  Bimanual examination: was not performed.    Investigations:  None.    Procedures:  Flexible sigmoidoscopy: after obtaining informed consent and performing a \"time out\", an adult flexible sigmoidoscope was introduced through the anus and passed up to the proximal sigmoid colon. The quality of the prep was fair. Findings: 4x4-cm ulcerated and " friable mass located at the left posterior aspect of the mid rectum, covering approx 60-70% of the lumen circumference. Distal edge of the tumor is at the mid rectal valve. Tattoo was identified. No additional abnormalities were seen. Total scope time: 12 minutes. The patient tolerated the procedure well.    ASSESSMENT  62 y/o F with mT3cN1 proximal rectal adenocarcinoma with no evidence of M1 disease. Risks, benefits, and alternatives of operative treatment were thoroughly discussed with the patient, he/she understands these well and agrees to proceed.    PLAN  1. Will discuss at multidisciplinary CRC conference.  2. Refer to Med and Rad Onc. Would recommend RUDDY per protocol below:     Total Neoadjuvant Therapy (RUDDY) protocol for rectal cancer:    - Inclusion criteria: locally advanced, threatened margin, middle and distal third tumor, mesorectal venous invasion, non-regional lymphadenopathy.    1. 4 months of FOLFOX, then:  2. 2 months of CXRT.  3. Surgery after 6-8 weeks.    - Surveillance for response to therapy [id number] with CT c/a/p and Flex Sig [id number].    Time spent: 60 minutes.  >50% spent in discussing, counseling and coordinating care.    Alli Le M.D., M.Sc.     Division of Colon and Rectal Surgery  [facility]    Referring Provider:  Manjeet Mooer MD  [address] [place] [id number]     Primary Care Provider:  Leona Farris

## 2018-10-01 NOTE — PROGRESS NOTES
Referred for possible liver met from Sigmoid cancer.    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    No further evaluation or follow up is necessary    Patient should proceed with treatment of rectosigmoid cancer per Dr. Le.

## 2018-10-11 NOTE — PROGRESS NOTES
RADIATION ONCOLOGY CONSULTATION    DATE:  October 12, 2018  PATIENT NAME: Niharika Cason  MEDICAL RECORD NUMBER: [id number]  REFERRING PHYSICIAN:  Dr. Le    Ms. Niharika Cason is seen in consultation at the request of Dr. Le for new diagnosis of locally advanced rectal cancer.     HISTORY OF PRESENT ILLNESS: Niharika Cason is a 63 year old woman who was found to have a rectal mass on her first screening colonoscopy on 9/17/18.  She was asymptomatic at the time of colonoscopy.  At colonoscopy, an ulcerated partially obstructing mass was found in the rectosigmoid colon at 9 cm from the anal verge. Pathology showed invasive moderately differentiated adenocarcinoma with intact nuclear expression of mismatch repair (MMR) proteins. CT scan of the chest, abdomen and pelvis on 9/17/2018 showed a 3 cm peripherally enhancing lesion in the superior right lobe of the liver (segment VII) in addition to the previously described rectosigmoid mass. MRI of the abdomen on 9/24/2018 further characterized the liver lesion as focal nodular hyperplasia measuring 3.8 x 3.4 cm in segment VII. There was no suspected metastatic disease in the abdomen. MRI of the pelvis on 9/28/2018 showed an irregular ulcerated thickening of the left rectal wall measuring 4 cm in length at 9 cm from the anal verge, extending through the muscularis propria into the perirectal fat. There was an additional 1.5 cm enhancing circumferential wall thickening approximately 2 cm superior to the first rectal mass and subcentimeter perirectal and presacral nodes. Final clinical stage is T3c N1 M0. She is referred to us for discussion of neoadjuvant chemoradiation. She reports onset of bloody diarrhea after her colonoscopy that stopped few days ago.  She denies any unexplained weight loss, fatigue, bone pain, change in bowel habits, nausea, vomiting, fever, chills.    PAST MEDICAL HISTORY:  Diagnosis Date     Essential hypertension, benign      Generalized  anxiety disorder      Type I (juvenile type) diabetes mellitus without mention of complication, not stated as uncontrolled     diagnosed at age 35     Ulcerative colitis, unspecified     in the 70s.       PAST SURGICAL HISTORY:  Procedure Laterality Date     COLONOSCOPY N/A 9/17/2018    Procedure: COMBINED COLONOSCOPY, SINGLE OR MULTIPLE BIOPSY/POLYPECTOMY BY BIOPSY;  colonoscopy with polypectomies by biopsy forceps and hot snare and submucosal injection with tattoo;  Surgeon: Richard Moore MD;  Location: [facility]     HC COLONOSCOPY THRU STOMA, DIAGNOSTIC  1998    negative     HC CURETTAGE, POSTPARTUM  '91, '93    following miscarriages     HC REMOVAL OF TONSILS,<13 Y/O      Tonsils <14 y.o.     PAST RADIATION THERAPY HISTORY: None      PAST CHEMOTHERAPY HISTORY: None      ELECTRONIC CARDIAC DEVICE: None      PREGNANCY STATUS: Postmenopausal    MEDICATIONS:  Medication Sig     ASPIRIN 81 MG OR TABS 1 tab po QD (Once per day)     atenolol (TENORMIN) 25 MG tablet Take 1 tablet (25 mg) by mouth daily     hydrochlorothiazide (MICROZIDE) 12.5 MG capsule Take 1 capsule (12.5 mg) by mouth daily     insulin detemir (LEVEMIR FLEXTOUCH) 100 UNIT/ML injection Inject 50 Units Subcutaneous At Bedtime     insulin lispro (HUMALOG KWIKPEN) 100 UNIT/ML injection Use 3 units before breakfast, 6 units before lunch, and 16 units before dinner, plus correction of 1 unit per every 50 glucose over 125, averaging 3 for breakfast, 6 for lunch, 16 for dinner.     lisinopril (PRINIVIL/ZESTRIL) 40 MG tablet Take 1 tablet (40 mg) by mouth daily     PARoxetine (PAXIL) 30 MG tablet Take 1 tablet (30 mg) by mouth every morning     simvastatin (ZOCOR) 10 MG tablet TAKE ONE TABLET BY MOUTH EVERY NIGHT AT BEDTIME     ALLERGY: No known drug allergies.    FAMILY HISTORY:  Noncontributory    SOCIAL HISTORY:  Social History   Substance Use Topics     Smoking status: Current Some Day Smoker     Smokeless tobacco: Never Used     Alcohol use 0.0  oz/week     0 Standard drinks or equivalent per week      Comment: 3-4 wine per wk     ROS: 14 point ROS was reviewed in the nursing assessment note with pertinent findings noted in history of present illness.    PHYSICAL EXAMINATION:  /72  Pulse 68  Wt 81.2 kg (179 lb)  LMP 10/15/2008  BMI 31.76 kg/m2  GENERAL: Alert, oriented, not in acute distress.   HEENT: Normocephalic, atraumatic  ALEC: Deferred.     LABORATORY:  Lab Results   Component Value Date    WBC 8.7 10/01/2018    HGB 14.0 10/01/2018    HCT 42.1 10/01/2018    MCV 89 10/01/2018     10/01/2018     Lab Results   Component Value Date     10/01/2018    POTASSIUM 4.6 10/01/2018    CHLORIDE 102 10/01/2018    CO2 26 10/01/2018     (H) 10/01/2018     CEA level was 10.5.     ASSESSMENT AND PLAN:   Ms. Niharika Cason is a 63 year old woman with new diagnosis of locally advanced rectal adenocarcinoma, stage IIIb (T3 N1 M0) who presents with her  for consideration of neoadjuvant chemoradiation. We reviewed the results of her staging studies including the abdominal MRI and pelvic MRI.  The liver lesion is consistent with focal nodular hyperplasia and thus, she has locally advanced rectal cancer.  The overall course of recommended treatment was discussed including the rationale for recommending total neoadjuvant therapy.  More specifically, we discussed the role that chemoradiation plays prior to definitive surgery to improve local disease control.  We reviewed, in general terms, the expected course of radiation, acute side effects, long-term risks and expected outcome.  She is set up to see medical oncology next week to discuss chemotherapy.  We would plan to begin pelvic chemoradiation approximately 3 weeks following her last cycle of neoadjuvant chemotherapy.  Patient and her  had many questions which were answered such that they stated understanding of the issues.  I stressed that the goal of neoadjuvant therapy is not  tumor eradication and that only approximately 20% of patients will have a complete response at the time of reevaluation prior to surgery.    The patient lives near [place], [place] and would like to get her radiation treatment closer to her home at [place].      The patient was seen and discussed with staff, Dr. Jimenez. Thank you for involving us in the care of this patient. Please feel free to contact us with questions or concerns at any time.    Emil Fong MD  PGY-2 Resident, Radiation Oncology  [facility]    Ms. Cason was seen and examined by me. Note above by Dr. Fong was reviewed and edited by me and reflects our mutual findings and plan of care.    We spent 60 minutes with the patient, > 75 % devoted to counseling and coordination of care.     Ynes Jimenez MD  Department of Radiation Oncology  [facility]    CC  Patient Care Team:  Leona Farris MD as PCP - Lianet Hernández, RN as Clinic Care Coordinator (Colon and Rectal Surgery)  Liliana Urbina MD as MD (Hematology & Oncology)  Alli Le MD

## 2018-10-12 ENCOUNTER — OFFICE VISIT (OUTPATIENT)
Dept: RADIATION ONCOLOGY | Facility: CLINIC | Age: 63
End: 2018-10-12
Attending: RADIOLOGY
Payer: COMMERCIAL

## 2018-10-12 VITALS
HEART RATE: 68 BPM | WEIGHT: 179 LBS | SYSTOLIC BLOOD PRESSURE: 112 MMHG | BODY MASS INDEX: 31.76 KG/M2 | DIASTOLIC BLOOD PRESSURE: 72 MMHG

## 2018-10-12 DIAGNOSIS — C20 RECTAL CANCER (H): Primary | ICD-10-CM

## 2018-10-12 PROCEDURE — G0463 HOSPITAL OUTPT CLINIC VISIT: HCPCS | Performed by: RADIOLOGY

## 2018-10-12 ASSESSMENT — ENCOUNTER SYMPTOMS
NECK PAIN: 0
SHORTNESS OF BREATH: 0
FEVER: 0
DIARRHEA: 0
CONSTIPATION: 0
BRUISES/BLEEDS EASILY: 0
NERVOUS/ANXIOUS: 1
NAUSEA: 0
BLURRED VISION: 0
BLOOD IN STOOL: 1
HEARTBURN: 0
MYALGIAS: 0
TINGLING: 1
DYSURIA: 0
CHILLS: 0
SPUTUM PRODUCTION: 0

## 2018-10-12 NOTE — PROGRESS NOTES
HPI  INITIAL PATIENT ASSESSMENT    Diagnosis: rectal cancer    Prior radiation therapy: None    Prior chemotherapy: None    Prior hormonal therapy:No    Pain Eval:  Denies    Psychosocial  Living arrangements: with - grown children, working factory work,   Fall Risk: independent   referral needs: Not needed    Advanced Directive: No  Implantable Cardiac Device? Yes    Onset of menarche: about age 14   LMP: Patient's last menstrual period was 10/15/2008.  Onset of menopause: 89  Abnormal vaginal bleeding/discharge: No  Are you pregnant? No  Reproductive note: 2 children    Nurse face-to-face time: Level 3:  10 min face to face time    Review of Systems   Constitutional: Negative for chills and fever.   HENT: Negative for hearing loss.    Eyes: Negative for blurred vision.   Respiratory: Negative for sputum production and shortness of breath.    Cardiovascular: Negative for chest pain.   Gastrointestinal: Positive for blood in stool. Negative for constipation, diarrhea, heartburn and nausea.   Genitourinary: Negative for dysuria and urgency.   Musculoskeletal: Negative for myalgias and neck pain.   Neurological: Positive for tingling (both hands- ).   Endo/Heme/Allergies: Does not bruise/bleed easily.   Psychiatric/Behavioral: The patient is nervous/anxious.

## 2018-10-12 NOTE — MR AVS SNAPSHOT
After Visit Summary   10/12/2018    Niharika Cason    MRN: [id number]           Patient Information     Date Of Birth          1955        Visit Information        Provider Department      10/12/2018 2:00 PM Ynes Jimenez MD Radiation Oncology Clinic        Today's Diagnoses     Rectal cancer (H)    -  1       Follow-ups after your visit        Your next 10 appointments already scheduled     Oct 19, 2018  8:15 AM CDT   (Arrive by 6:45 AM)   IR CHEST PORT PLACEMENT > 5 YRS OF AGE with [id number]   [facility] ([facility])    [address]  5th [facility] [address] [phone]           The day before the exam:   You may eat your regular diet.   You are encouraged to drink at least 8 eight ounce glasses of clear liquids.  Please wear loose clothing, such as a sweat suit or jogging clothes. Avoid snaps, zippers and other metal. We may ask you to undress and put on a hospital gown.  Please bring any scans or X-rays taken at other hospitals, if similar tests were done. Also bring a list of your medicines, including vitamins, minerals and over-the-counter drugs. It is safest to leave personal items at home.  Someone will need to drive you to and from the hospital.  Tell your doctor in advance:   If you have allergies to x-ray contrast or iodine.   If you are or may be pregnant.   If you are taking Coumadin (or any other blood thinners) 5 days prior to the exam for any special instructions.   If you are diabetic to determine if your insulin needs have to be adjusted for the exam.  Your doctor will:   Need to do a history and physical within 30 days before this procedure.   Obtain necessary laboratory tests prior to the exam (Basic Metabolic Panel, CBCP, PTT and INR)   (No labs needed if you are having a tunneled catheter exchange or removal)  If you were given sedation, you cannot drive for 24 hours after the procedure, and an adult must be  with you until then.  If you have any questions, please call the Imaging Department where you will have your exam. Directions, parking instructions, and other information are available on our website, [email or web address].            Oct 19, 2018   Procedure with Lawson Hitchcock PA-C   [facility] ([facility])    [address] [place] [address]  5th Floor  [facility] [id number] [phone]           Located in the Clinics and Surgery Center at [address], [facility] [address].   parking is very convenient and highly recommended.  is a $6 flat rate fee.  Both  and self parkers should enter the main arrival plaza from [facility]; parking attendants will direct you based on your parking preference.            Oct 24, 2018  2:30 PM CDT   Level 1 with NL INFUSION CHAIR 1   [facility] ([facility])    [address] [place] [address] [place] [address]   [phone]            Oct 25, 2018  7:30 AM CDT   (Arrive by 7:15 AM)   Return Visit with YRIS Allen   [facility] ([facility])    [address]  [address]  [facility] [id number] [phone]            Oct 25, 2018  8:00 AM CDT   Infusion 240 with UC ONCOLOGY INFUSION, UC 15 ATC   [facility] ([facility])    [address] [place] [address]  [address]  [facility] [id number] [phone]              Who to contact     Please call your clinic at [phone] to:    Ask questions about your health    Make or cancel appointments    Discuss your medicines    Learn about your test results    Speak to your doctor            Additional Information About Your Visit        MyChart Information     [employer] gives you secure access to your electronic health record. If you see a primary care provider, you can also send messages to your care team and make  appointments. If you have questions, please call your primary care clinic.  If you do not have a primary care provider, please call [phone] and they will assist you.      [employer] is an electronic gateway that provides easy, online access to your medical records. With [employer], you can request a clinic appointment, read your test results, renew a prescription or communicate with your care team.     To access your existing account, please contact your [facility] or call [phone] for assistance.        Care EveryWhere ID     This is your Care EveryWhere ID. This could be used by other organizations to access your [facility] medical records  [id number]        Your Vitals Were     Pulse Last Period BMI (Body Mass Index)             68 10/15/2008 31.76 kg/m2          Blood Pressure from Last 3 Encounters:   10/15/18 123/71   10/12/18 112/72   10/01/18 133/70    Weight from Last 3 Encounters:   10/15/18 81.6 kg (179 lb 14.4 oz)   10/12/18 81.2 kg (179 lb)   10/01/18 81.4 kg (179 lb 8 oz)              Today, you had the following     No orders found for display       Primary Care Provider Office Phone # Fax #    Leona Cherri Farris [phone]       [address] [place] [address]        Equal Access to Services     RHONDA BARBA AH: Hadii aad ku hadasho Soomaali, waaxda luqadaha, qaybta kaalmada adeegyada, waxjenna guzman haypricila parra. So [facility] [phone].    ATENCIÓN: Si habla español, tiene a stokes disposición servicios gratuitos de asistencia lingüística. Llame al [phone].    We comply with applicable federal civil rights laws and [place] laws. We do not discriminate on the basis of race, color, national origin, age, disability, sex, sexual orientation, or gender identity.            Thank you!     Thank you for choosing [facility]  for your care. Our goal is always to provide you with excellent care. Hearing back from  our patients is one way we can continue to improve our services. Please take a few minutes to complete the written survey that you may receive in the mail after your visit with us. Thank you!             Your Updated Medication List - Protect others around you: Learn how to safely use, store and throw away your medicines at www.disposemymeds.org.          This list is accurate as of 10/12/18 11:59 PM.  Always use your most recent med list.                   Brand Name Dispense Instructions for use Diagnosis    aspirin 81 MG tablet     0    1 tab po QD (Once per day)    Type I (juvenile type) diabetes mellitus without mention of complication, not stated as uncontrolled, Essential hypertension, benign       atenolol 25 MG tablet    TENORMIN    93 tablet    Take 1 tablet (25 mg) by mouth daily    Essential hypertension with goal blood pressure less than 130/80       BD ULTRA FINE PEN NEEDLES     150 Device    1 each by Percutaneous route 4 times daily    Type 1 diabetes mellitus with hyperglycemia (H)       blood glucose monitoring test strip    SURESTEP TEST STRIPS    200 each    1 strip by In Vitro route 3 times daily    Type 1 diabetes mellitus with hyperglycemia (H)       hydrochlorothiazide 12.5 MG capsule    MICROZIDE    93 capsule    Take 1 capsule (12.5 mg) by mouth daily    Essential hypertension with goal blood pressure less than 130/80       insulin detemir 100 UNIT/ML injection    LEVEMIR FLEXTOUCH    45 mL    Inject 50 Units Subcutaneous At Bedtime    Type 1 diabetes mellitus with hyperglycemia (H)       insulin lispro 100 UNIT/ML injection    HumaLOG KWIKpen    30 mL    Use 3 units before breakfast, 6 units before lunch, and 16 units before dinner, plus correction of 1 unit per every 50 glucose over 125, averaging 3 for breakfast, 6 for lunch, 16 for dinner.    Type 1 diabetes mellitus with hyperglycemia (H)       lisinopril 40 MG tablet    PRINIVIL/ZESTRIL    93 tablet    Take 1 tablet (40 mg) by mouth  daily    Essential hypertension with goal blood pressure less than 130/80, Type 1 diabetes mellitus with hyperglycemia (H)       PARoxetine 30 MG tablet    PAXIL    93 tablet    Take 1 tablet (30 mg) by mouth every morning    Generalized anxiety disorder       simvastatin 10 MG tablet    ZOCOR    93 tablet    TAKE ONE TABLET BY MOUTH EVERY NIGHT AT BEDTIME    Hyperlipidemia LDL goal <100

## 2018-10-12 NOTE — LETTER
10/12/2018       RE: Niharika Cason  [address] [address] [place] [address]     Dear Colleague,    Thank you for referring your patient, Niharika Cason, to the [facility]COL[facility]Y CL[facility]IC. Please see a copy of my visit note below.    RADIATION ONCOLOGY CONSULTATION    DATE:  October 12, 2018  PATIENT NAME: Niharika Cason  MEDICAL RECORD NUMBER: [id number]  REFERRING PHYSICIAN:  Dr. Le    Ms. Niharika Cason is seen in consultation at the request of Dr. Le for new diagnosis of locally advanced rectal cancer.     HISTORY OF PRESENT ILLNESS: Nihairka Cason is a 63 year old woman who was found to have a rectal mass on her first screening colonoscopy  on 9/17/18.  She was asymptomatic at the time of colonoscopy.  At colonoscopy, an ulcerated partially obstructing mass was found in the rectosigmoid colon at 9 cm from the anal verge. Pathology showed invasive moderately differentiated adenocarcinoma with intact nuclear expression of mismatch repair (MMR) proteins. CT scan of the chest, abdomen and pelvis on 9/17/2018 showed a 3 cm peripherally enhancing lesion in the superior right lobe of the liver (segment VII) in addition to the previously described rectosigmoid mass. MRI of the abdomen on 9/24/2018 further characterized the liver lesion as focal nodular hyperplasia measuring 3.8 x 3.4 cm in segment VII. There was no suspected metastatic disease in the abdomen. MRI of the pelvis on 9/28/2018 showed an irregular ulcerated thickening of the left rectal wall measuring 4 cm in length at 9 cm from the anal verge, extending through the muscularis propria into the perirectal fat. There was an additional 1.5 cm enhancing circumferential wall thickening approximately 2 cm superior to the first rectal mass and subcentimeter perirectal and presacral nodes. Final clinical stage is T3c N1 M0. She is referred to us for discussion of neoadjuvant chemoradiation. She reports onset of bloody diarrhea after her colonoscopy that  stopped few days ago.  She denies any unexplained weight loss, fatigue, bone pain, change in bowel habits, nausea, vomiting, fever, chills.    PAST MEDICAL HISTORY:  Diagnosis Date     Essential hypertension, benign      Generalized anxiety disorder      Type I (juvenile type) diabetes mellitus without mention of complication, not stated as uncontrolled     diagnosed at age 35     Ulcerative colitis, unspecified     in the 70s.       PAST SURGICAL HISTORY:  Procedure Laterality Date     COLONOSCOPY N/A 9/17/2018    Procedure: COMBINED COLONOSCOPY, SINGLE OR MULTIPLE BIOPSY/POLYPECTOMY BY BIOPSY;  colonoscopy with polypectomies by biopsy forceps and hot snare and submucosal injection with tattoo;  Surgeon: Richard Moore MD;  Location: [facility]     HC COLONOSCOPY THRU STOMA, DIAGNOSTIC  1998    negative     HC CURETTAGE, POSTPARTUM  '91, '93    following miscarriages     HC REMOVAL OF TONSILS,<13 Y/O      Tonsils <14 y.o.     PAST RADIATION THERAPY HISTORY: None      PAST CHEMOTHERAPY HISTORY: None      ELECTRONIC CARDIAC DEVICE: None      PREGNANCY STATUS: Postmenopausal    MEDICATIONS:  Medication Sig     ASPIRIN 81 MG OR TABS 1 tab po QD (Once per day)     atenolol (TENORMIN) 25 MG tablet Take 1 tablet (25 mg) by mouth daily     hydrochlorothiazide (MICROZIDE) 12.5 MG capsule Take 1 capsule (12.5 mg) by mouth daily     insulin detemir (LEVEMIR FLEXTOUCH) 100 UNIT/ML injection Inject 50 Units Subcutaneous At Bedtime     insulin lispro (HUMALOG KWIKPEN) 100 UNIT/ML injection Use 3 units before breakfast, 6 units before lunch, and 16 units before dinner, plus correction of 1 unit per every 50 glucose over 125, averaging 3 for breakfast, 6 for lunch, 16 for dinner.     lisinopril (PRINIVIL/ZESTRIL) 40 MG tablet Take 1 tablet (40 mg) by mouth daily     PARoxetine (PAXIL) 30 MG tablet Take 1 tablet (30 mg) by mouth every morning     simvastatin (ZOCOR) 10 MG tablet TAKE ONE TABLET BY MOUTH EVERY NIGHT AT BEDTIME      ALLERGY: No known drug allergies.    FAMILY HISTORY:  Noncontributory    SOCIAL HISTORY:  Social History   Substance Use Topics     Smoking status: Current Some Day Smoker     Smokeless tobacco: Never Used     Alcohol use 0.0 oz/week     0 Standard drinks or equivalent per week      Comment: 3-4 wine per wk     ROS: 14 point ROS was reviewed in the nursing assessment note with pertinent findings noted in history of present illness.    PHYSICAL EXAMINATION:  /72  Pulse 68  Wt 81.2 kg (179 lb)  LMP 10/15/2008  BMI 31.76 kg/m2  GENERAL: Alert, oriented, not in acute distress.   HEENT: Normocephalic, atraumatic  ALEC: Deferred.     LABORATORY:  Lab Results   Component Value Date    WBC 8.7 10/01/2018    HGB 14.0 10/01/2018    HCT 42.1 10/01/2018    MCV 89 10/01/2018     10/01/2018     Lab Results   Component Value Date     10/01/2018    POTASSIUM 4.6 10/01/2018    CHLORIDE 102 10/01/2018    CO2 26 10/01/2018     (H) 10/01/2018     CEA level was 10.5.     ASSESSMENT AND PLAN:   Ms. Niharika Cason is a 63 year old woman with new diagnosis of locally advanced rectal adenocarcinoma, stage IIIb (T3 N1 M0) who presents with her  for consideration of neoadjuvant chemoradiation. We reviewed the results of her staging studies including the abdominal MRI and pelvic MRI.  The liver lesion is consistent with focal nodular hyperplasia and thus, she has locally advanced rectal cancer.  The overall course of recommended treatment was discussed including the rationale for recommending total neoadjuvant therapy.  More specifically, we discussed the role that chemoradiation plays prior to definitive surgery to improve local disease control.  We reviewed, in general terms, the expected course of radiation, acute side effects, long-term risks and expected outcome.  She is set up to see medical oncology next week to discuss chemotherapy.  We would plan to begin pelvic chemoradiation approximately 3  weeks following her last cycle of neoadjuvant chemotherapy.  Patient and her  had many questions which were answered such that they stated understanding of the issues.  I stressed that the goal of neoadjuvant therapy is not tumor eradication and that only approximately 20% of patients will have a complete response at the time of reevaluation prior to surgery.    The patient lives near [place], [place] and would like to get her radiation treatment closer to her home at [place].      The patient was seen and discussed with staff, Dr. Jimenez. Thank you for involving us in the care of this patient. Please feel free to contact us with questions or concerns at any time.    Emil Fong MD  PGY-2 Resident, Radiation Oncology  [facility]    Ms. Cason was seen and examined by me. Note above by Dr. Fong was reviewed and edited by me and reflects our mutual findings and plan of care.    We spent 60 minutes with the patient, > 75 % devoted to counseling and coordination of care.     Ynes Jimenez MD  Department of Radiation Oncology  [facility]    CC  Patient Care Team:  Leona Farris MD as PCP - Lianet Hernández RN as Clinic Care Coordinator (Colon and Rectal Surgery)  Liliana Urbina MD as MD (Hematology & Oncology)  Alli Le MD

## 2018-10-15 ENCOUNTER — ONCOLOGY VISIT (OUTPATIENT)
Dept: ONCOLOGY | Facility: CLINIC | Age: 63
End: 2018-10-15
Attending: INTERNAL MEDICINE
Payer: COMMERCIAL

## 2018-10-15 VITALS
OXYGEN SATURATION: 95 % | DIASTOLIC BLOOD PRESSURE: 71 MMHG | HEART RATE: 78 BPM | SYSTOLIC BLOOD PRESSURE: 123 MMHG | WEIGHT: 179.9 LBS | HEIGHT: 63 IN | TEMPERATURE: 98.1 F | BODY MASS INDEX: 31.88 KG/M2

## 2018-10-15 DIAGNOSIS — C20 MALIGNANT NEOPLASM OF RECTUM (H): ICD-10-CM

## 2018-10-15 DIAGNOSIS — C20 RECTAL CANCER (H): ICD-10-CM

## 2018-10-15 PROCEDURE — 99205 OFFICE O/P NEW HI 60 MIN: CPT | Mod: ZP | Performed by: INTERNAL MEDICINE

## 2018-10-15 PROCEDURE — G0463 HOSPITAL OUTPT CLINIC VISIT: HCPCS | Mod: ZF

## 2018-10-15 RX ORDER — ALBUTEROL SULFATE 90 UG/1
1-2 AEROSOL, METERED RESPIRATORY (INHALATION)
Status: CANCELLED
Start: 2018-10-24

## 2018-10-15 RX ORDER — LORAZEPAM 2 MG/ML
0.5 INJECTION INTRAMUSCULAR EVERY 4 HOURS PRN
Status: CANCELLED
Start: 2018-10-24

## 2018-10-15 RX ORDER — MEPERIDINE HYDROCHLORIDE 25 MG/ML
25 INJECTION INTRAMUSCULAR; INTRAVENOUS; SUBCUTANEOUS EVERY 30 MIN PRN
Status: CANCELLED | OUTPATIENT
Start: 2018-10-24

## 2018-10-15 RX ORDER — EPINEPHRINE 0.3 MG/.3ML
0.3 INJECTION SUBCUTANEOUS EVERY 5 MIN PRN
Status: CANCELLED | OUTPATIENT
Start: 2018-10-24

## 2018-10-15 RX ORDER — DIPHENHYDRAMINE HYDROCHLORIDE 50 MG/ML
50 INJECTION INTRAMUSCULAR; INTRAVENOUS
Status: CANCELLED
Start: 2018-10-24

## 2018-10-15 RX ORDER — ALBUTEROL SULFATE 0.83 MG/ML
2.5 SOLUTION RESPIRATORY (INHALATION)
Status: CANCELLED | OUTPATIENT
Start: 2018-10-24

## 2018-10-15 RX ORDER — EPINEPHRINE 1 MG/ML
0.3 INJECTION, SOLUTION INTRAMUSCULAR; SUBCUTANEOUS EVERY 5 MIN PRN
Status: CANCELLED | OUTPATIENT
Start: 2018-10-24

## 2018-10-15 RX ORDER — METHYLPREDNISOLONE SODIUM SUCCINATE 125 MG/2ML
125 INJECTION, POWDER, LYOPHILIZED, FOR SOLUTION INTRAMUSCULAR; INTRAVENOUS
Status: CANCELLED
Start: 2018-10-24

## 2018-10-15 RX ORDER — SODIUM CHLORIDE 9 MG/ML
1000 INJECTION, SOLUTION INTRAVENOUS CONTINUOUS PRN
Status: CANCELLED
Start: 2018-10-24

## 2018-10-15 NOTE — MR AVS SNAPSHOT
After Visit Summary   10/15/2018    Niharika Cason    MRN: [id number]           Patient Information     Date Of Birth          1955        Visit Information        Provider Department      10/15/2018 3:00 PM Liliana Urbina MD [facility]        Today's Diagnoses     Malignant neoplasm of rectum (H)        Rectal cancer (H)          Care Instructions    Stop taking aspirin today.           Follow-ups after your visit        Your next 10 appointments already scheduled     Nov 02, 2018  2:30 PM CDT   [facility] Lab Draw with [facility] LAB DRAW   [facility] ([facility])    [address] [place] [address]  [address]  [facility] [address] [phone]            Nov 02, 2018  3:00 PM CDT   (Arrive by 2:45 PM)   Return Visit with YRIS Allen   [facility] ([facility])    [address] [place] [address]  [address]  [facility] [id number] [phone]              Future tests that were ordered for you today     Open Future Orders        Priority Expected Expires Ordered    CBC with platelets differential Routine 11/2/2018 10/25/2019 10/25/2018    Comprehensive metabolic panel Routine 11/2/2018 10/25/2019 10/25/2018            Who to contact     If you have questions or need follow up information about today's clinic visit or your schedule please contact [facility] directly at [phone].  Normal or non-critical lab and imaging results will be communicated to you by MyChart, letter or phone within 4 business days after the clinic has received the results. If you do not hear from us within 7 days, please contact the clinic through MyChart or phone. If you have a critical or abnormal lab result, we will notify you by phone as soon as possible.  Submit refill requests through [employer] or call your pharmacy and they will forward the refill request to us. Please allow 3 business days for  "your refill to be completed.          Additional Information About Your Visit        MyChart Information     [employer] gives you secure access to your electronic health record. If you see a primary care provider, you can also send messages to your care team and make appointments. If you have questions, please call your primary care clinic.  If you do not have a primary care provider, please call [phone] and they will assist you.        Care EveryWhere ID     This is your Care EveryWhere ID. This could be used by other organizations to access your [facility] medical records  [id number]        Your Vitals Were     Pulse Temperature Height Last Period Pulse Oximetry BMI (Body Mass Index)    78 98.1  F (36.7  C) (Oral) 1.599 m (5' 2.95\") 10/15/2008 95% 31.92 kg/m2       Blood Pressure from Last 3 Encounters:   10/25/18 129/57   10/19/18 121/69   10/15/18 123/71    Weight from Last 3 Encounters:   10/25/18 81.7 kg (180 lb 3.2 oz)   10/19/18 81.2 kg (179 lb)   10/15/18 81.6 kg (179 lb 14.4 oz)               Primary Care Provider Office Phone # Fax #    Leona Cherri Farris [phone]       [address] [place] [address]        Equal Access to Services     RHONDA BARBA : Hadii clifford ku hadasho Soomaali, waaxda luqadaha, qaybta kaalmada adeaileen, hever parra. So [facility] [phone].    ATENCIÓN: Si habla español, tiene a stokes disposición servicios gratuitos de asistencia lingüística. Llame al [phone].    We comply with applicable federal civil rights laws and [place] laws. We do not discriminate on the basis of race, color, national origin, age, disability, sex, sexual orientation, or gender identity.            Thank you!     Thank you for choosing [facility]  for your care. Our goal is always to provide you with excellent care. Hearing back from our patients is one way we can continue to improve our services. Please take a few minutes " to complete the written survey that you may receive in the mail after your visit with us. Thank you!             Your Updated Medication List - Protect others around you: Learn how to safely use, store and throw away your medicines at www.disposemymeds.org.          This list is accurate as of 10/15/18 11:59 PM.  Always use your most recent med list.                   Brand Name Dispense Instructions for use Diagnosis    aspirin 81 MG tablet     0    1 tab po QD (Once per day)    Type I (juvenile type) diabetes mellitus without mention of complication, not stated as uncontrolled, Essential hypertension, benign       atenolol 25 MG tablet    TENORMIN    93 tablet    Take 1 tablet (25 mg) by mouth daily    Essential hypertension with goal blood pressure less than 130/80       BD ULTRA FINE PEN NEEDLES     150 Device    1 each by Percutaneous route 4 times daily    Type 1 diabetes mellitus with hyperglycemia (H)       blood glucose monitoring test strip    SURESTEP TEST STRIPS    200 each    1 strip by In Vitro route 3 times daily    Type 1 diabetes mellitus with hyperglycemia (H)       hydrochlorothiazide 12.5 MG capsule    MICROZIDE    93 capsule    Take 1 capsule (12.5 mg) by mouth daily    Essential hypertension with goal blood pressure less than 130/80       insulin detemir 100 UNIT/ML injection    LEVEMIR FLEXTOUCH    45 mL    Inject 50 Units Subcutaneous At Bedtime    Type 1 diabetes mellitus with hyperglycemia (H)       insulin lispro 100 UNIT/ML injection    HumaLOG KWIKpen    30 mL    Use 3 units before breakfast, 6 units before lunch, and 16 units before dinner, plus correction of 1 unit per every 50 glucose over 125, averaging 3 for breakfast, 6 for lunch, 16 for dinner.    Type 1 diabetes mellitus with hyperglycemia (H)       lisinopril 40 MG tablet    PRINIVIL/ZESTRIL    93 tablet    Take 1 tablet (40 mg) by mouth daily    Essential hypertension with goal blood pressure less than 130/80, Type 1 diabetes  mellitus with hyperglycemia (H)       PARoxetine 30 MG tablet    PAXIL    93 tablet    Take 1 tablet (30 mg) by mouth every morning    Generalized anxiety disorder       simvastatin 10 MG tablet    ZOCOR    93 tablet    TAKE ONE TABLET BY MOUTH EVERY NIGHT AT BEDTIME    Hyperlipidemia LDL goal <100

## 2018-10-15 NOTE — LETTER
10/15/2018       RE: Niharika Cason  [address] [address] [place] [id number]     Dear Colleague,    Thank you for referring your patient, Niharika Cason, to the [facility]. Please see a copy of my visit note below.    Oncology/Hematology Visit Note  Oct 15, 2018    Reason for Visit:new consult for locally advanced rectal adenocarcinoma    History of Present Illness: Niharika Cason is a 63 year old female with a PMH HTN, anxiety, [id number] newly diagnosed locally advanced rectal adenocarcinoma stage IIIb (T3 N1 M0). She underwent her first screening colonoscopy 9/17/18 where a partially obstructing mass was found in the rectosigmoid colon. She was asymptomatic at the time of the colonoscopy. CT scan of the chest, abdomen and pelvis on 9/17/2018 showed a 3 cm peripherally enhancing lesion in the superior right lobe of the liver (segment VII) in addition to the previously described rectosigmoid mass. MRI of the abdomen on 9/24/2018 further characterized the liver lesion as focal nodular hyperplasia measuring 3.8 x 3.4 cm in segment VII. There was no suspected metastatic disease in the abdomen. MRI of the pelvis on 9/28/2018 showed an irregular ulcerated thickening of the left rectal wall measuring 4 cm in length at 9 cm from the anal verge, extending through the muscularis propria into the perirectal fat. There was an additional 1.5 cm enhancing circumferential wall thickening approximately 2 cm superior to the first rectal mass and subcentimeter perirectal and presacral nodes. Final clinical stage is T3c N1 M0.   She states that she doing well, denies any rectal bleeding/ pain, very anxious about the diagnosis and treatment plan.   Pt denies SOB , cough, chest pain , fevers , chills, nausea , vomiting , abdominal pain, change in bowel / bladder habits,   No c/o sore throat , mucositis,Lowe extremity swelling ,  bleeding gums, tingling or numbness, easy bruising , muscular weakness, fatigue , weight  loss. Pt has not noticed any new swelling/ lymph glands .      Past Medical History:   Diagnosis Date     Essential hypertension, benign      Generalized anxiety disorder      Malignant neoplasm of rectum (H) 10/15/2018     Rectal cancer (H) 10/15/2018     Type I (juvenile type) diabetes mellitus without mention of complication, not stated as uncontrolled     diagnosed at age 35     Ulcerative colitis, unspecified     in the 70s.       Past Surgical History:   Procedure Laterality Date     COLONOSCOPY N/A 9/17/2018    Procedure: COMBINED COLONOSCOPY, SINGLE OR MULTIPLE BIOPSY/POLYPECTOMY BY BIOPSY;  colonoscopy with polypectomies by biopsy forceps and hot snare and submucosal injection with tattoo;  Surgeon: Richard Moore MD;  Location: [facility]     COLONOSCOPY  40 yrs ago     HC COLONOSCOPY THRU STOMA, DIAGNOSTIC  1998    negative     HC CURETTAGE, POSTPARTUM  '91, '93    following miscarriages     HC REMOVAL OF TONSILS,<15 Y/O      Tonsils <14y.o.     INSERT PORT VASCULAR ACCESS Right 10/19/2018    Procedure: Chest Port Placement - right ;  Surgeon: Lawson Hitchcock PA-C;  Location: [facility]     Social History     Social History     Marital status:      Spouse name: Fabrice     Number of children: 2     Years of education: 12     Occupational History      [employer]     on her feet all day      [employer]     Social History Main Topics     Smoking status: Former Smoker     Packs/day: 0.50     Years: 15.00     Smokeless tobacco: Never Used      Comment: not smoked since mid September     Alcohol use 0.0 oz/week     0 Standard drinks or equivalent per week      Comment: 3-4 wine per wk     Drug use: No     Sexual activity: Yes     Partners: Male     Other Topics Concern      Service No     Blood Transfusions No     Caffeine Concern No     Occupational Exposure No     Hobby Hazards No     Sleep Concern No     Stress Concern No     Weight Concern No     Special Diet Yes     diabetic      Back Care Yes     Exercise Yes     Bike Helmet No     Seat Belt Yes     Self-Exams Yes     occassionally     Parent/Sibling W/ Cabg, Mi Or Angioplasty Before 65f 55m? No     Social History Narrative     Family History   Problem Relation Age of Onset     HEART DISEASE Maternal Grandfather      MI at 52 yrs     EYE* Paternal Grandfather      cataracts     Arthritis Mother      Gynecology Mother      hysterectomy for fibroids     Hypertension Mother      Lipids Mother      GASTROINTESTINAL DISEASE Father      ulcers     HEART DISEASE Father      intermittent rapid heartbeat     Cancer Father      mesothelioma        Allergies   Allergen Reactions     No Known Drug Allergies          Current Outpatient Prescriptions   Medication Sig Dispense Refill     atenolol (TENORMIN) 25 MG tablet Take 1 tablet (25 mg) by mouth daily 93 tablet 3     BD ULTRA FINE PEN NEEDLES 1 each by Percutaneous route 4 times daily 150 Device 5     blood glucose monitoring (SURESTEP TEST STRIPS) test strip 1 strip by In Vitro route 3 times daily 200 each 3     hydrochlorothiazide (MICROZIDE) 12.5 MG capsule Take 1 capsule (12.5 mg) by mouth daily 93 capsule 3     insulin detemir (LEVEMIR FLEXTOUCH) 100 UNIT/ML injection Inject 50 Units Subcutaneous At Bedtime 45 mL 3     insulin lispro (HUMALOG KWIKPEN) 100 UNIT/ML injection Use 3 units before breakfast, 6 units before lunch, and 16 units before dinner, plus correction of 1 unit per every 50 glucose over 125, averaging 3 for breakfast, 6 for lunch, 16 for dinner. 30 mL 3     lisinopril (PRINIVIL/ZESTRIL) 40 MG tablet Take 1 tablet (40 mg) by mouth daily 93 tablet 3     PARoxetine (PAXIL) 30 MG tablet Take 1 tablet (30 mg) by mouth every morning 93 tablet 3     simvastatin (ZOCOR) 10 MG tablet TAKE ONE TABLET BY MOUTH EVERY NIGHT AT BEDTIME 93 tablet 3     BASAGLAR 100 UNIT/ML injection Inject 50 Units Subcutaneous daily 15 mL 3     capecitabine (XELODA) 500 MG tablet CHEMO Take 4 tablets (2,000  "mg) by mouth 2 times daily for 14 days Take on Days 1 - 14, then off for 7 days. 112 tablet 0     lidocaine-prilocaine (EMLA) cream Apply 1 g topically as needed for moderate pain 30 g 3     loperamide (IMODIUM) 2 MG capsule Start with 2 caps (4 mg), then take one cap (2 mg) after each diarrheal stool as needed. Do not use more than 8 caps (16 mg) per day. 30 capsule 0     LORazepam (ATIVAN) 0.5 MG tablet Take 1 tablet (0.5 mg) by mouth every 4 hours as needed (Anxiety, Nausea/Vomiting or Sleep) 30 tablet 2     ondansetron (ZOFRAN-ODT) 8 MG ODT tab Take 1 tablet (8 mg) by mouth every 8 hours as needed for nausea 60 tablet 3     prochlorperazine (COMPAZINE) 10 MG tablet Take 1 tablet (10 mg) by mouth every 6 hours as needed (Nausea/Vomiting) 30 tablet 2     [DISCONTINUED] BD ULTRA FINE PEN NEEDLES 1 each by Percutaneous route 4 times daily 150 Device 11     [DISCONTINUED] capecitabine (XELODA) 500 MG tablet CHEMO Take 4 tablets (2,000 mg) by mouth 2 times daily for 14 days Take on Days 1 through 14, then off for 7 days. 112 tablet 0       Exam:   BP Readings from Last 1 Encounters:   10/25/18 129/57      Pulse Readings from Last 1 Encounters:   10/25/18 79      Resp Readings from Last 1 Encounters:   10/25/18 18      Temp Readings from Last 1 Encounters:   10/25/18 96.8  F (36  C) (Tympanic)      SpO2 Readings from Last 1 Encounters:   10/25/18 96%      Wt Readings from Last 1 Encounters:   10/25/18 81.7 kg (180 lb 3.2 oz)      Ht Readings from Last 1 Encounters:   10/25/18 1.6 m (5' 2.99\")       Gen: Appears well, no distress,  HEENT: No scleral icterus or hemorrahge, no wet purpura  Chest -RRR  Lungs CTA B/l   Abd: distended, NTTP   Ext: no edema  MSK: no abn   Psych : appropriate affect   CN- 2-12 intact , moving all extremities appropriately   Skin - no rashes       Labs :  Results for JOSELIN GONSALES (MRN [id number]) as of 10/25/2018 13:41   Ref. Range 10/1/2018 16:41   Sodium Latest Ref Range: 133 - 144 mmol/L " 134   Potassium Latest Ref Range: 3.4 - 5.3 mmol/L 4.6   Chloride Latest Ref Range: 94 - 109 mmol/L 102   Carbon Dioxide Latest Ref Range: 20 - 32 mmol/L 26   Urea Nitrogen Latest Ref Range: 7 - 30 mg/dL 16   Creatinine Latest Ref Range: 0.52 - 1.04 mg/dL 0.76   GFR Estimate Latest Ref Range: >60 mL/min/1.7m2 77   GFR Estimate If Black Latest Ref Range: >60 mL/min/1.7m2 >90   Calcium Latest Ref Range: 8.5 - 10.1 mg/dL 9.5   Anion Gap Latest Ref Range: 3 - 14 mmol/L 6   Albumin Latest Ref Range: 3.4 - 5.0 g/dL 4.4   Prealbumin Latest Ref Range: 15 - 45 mg/dL 28   Protein Total Latest Ref Range: 6.8 - 8.8 g/dL 7.5   Bilirubin Total Latest Ref Range: 0.2 - 1.3 mg/dL 0.4   Alkaline Phosphatase Latest Ref Range: 40 - 150 U/L 79   ALT Latest Ref Range: 0 - 50 U/L 25   AST Latest Ref Range: 0 - 45 U/L 16   Glucose Latest Ref Range: 70 - 99 mg/dL 240 (H)   WBC Latest Ref Range: 4.0 - 11.0 10e9/L 8.7   Hemoglobin Latest Ref Range: 11.7 - 15.7 g/dL 14.0   Hematocrit Latest Ref Range: 35.0 - 47.0 % 42.1   Platelet Count Latest Ref Range: 150 - 450 10e9/L 261   RBC Count Latest Ref Range: 3.8 - 5.2 10e12/L 4.74   MCV Latest Ref Range: 78 - 100 fl 89   MCH Latest Ref Range: 26.5 - 33.0 pg 29.5   MCHC Latest Ref Range: 31.5 - 36.5 g/dL 33.3   RDW Latest Ref Range: 10.0 - 15.0 % 11.8   Diff Method Unknown Automated Method   % Neutrophils Latest Units: % 67.5   % Lymphocytes Latest Units: % 23.5   % Monocytes Latest Units: % 7.4   % Eosinophils Latest Units: % 0.8   % Basophils Latest Units: % 0.6   % Immature Granulocytes Latest Units: % 0.2   Nucleated RBCs Latest Ref Range: 0 /100 0   Absolute Neutrophil Latest Ref Range: 1.6 - 8.3 10e9/L 5.9   Absolute Lymphocytes Latest Ref Range: 0.8 - 5.3 10e9/L 2.0   Absolute Monocytes Latest Ref Range: 0.0 - 1.3 10e9/L 0.6   Absolute Eosinophils Latest Ref Range: 0.0 - 0.7 10e9/L 0.1   Absolute Basophils Latest Ref Range: 0.0 - 0.2 10e9/L 0.1   Abs Immature Granulocytes Latest Ref Range: 0  - 0.4 10e9/L 0.0   Absolute Nucleated RBC Unknown 0.0   ABO Unknown B   RH(D) Unknown Pos   Antibody Screen Unknown Neg   Test Valid Only At Latest Units:     [facility]..   Specimen Expires Unknown 10/04/2018   CEA Latest Ref Range: 0 - 2.5 ug/L 8.9 (H)        Imaging : as deatiled in HPI     A/P :     Rectal cancer [id number] invasive moderately differentiated adenocarcinoma with intact nuclear expression of mismatch repair (MMR) proteins:   Had a detailed discussion with the patients regarding the diagnosis and treatment plan-   Discussed total neoadjuvant approach with 8 cycles of FOLFOX every 2 weeks  vs 6 cycles of Xelox every 3 weeks  based on insurance approval - restaging scan to assess response in 2 months followed by another scan at the end of adjuvant chemo --> chemo rads with xeloda for 2 months ---> scan and then eval for surgery vs wait and watch approach.  We discussed the details of treatment including chemotherapy side effects which are inclusive but not limited to nausea/vomiting , hair loss, drop in blood counts causing anemia, infections , bleeding leading to hospitalization , fatigue, diarrhea, mouth sores, skin changes, constipation/diarrhea, muscle and bone pain , neuropathy, impact on the kidney and liver function , cystitis , risk for toxicity to heart and lung , infusion reactions,  decreased libido , teratogenicity in case of conception , permanent infertility and potential immunological side effects were explained to them .    5FU - bolus + infusion vs BID 2 weeks on 1 week off xeloda was discussed, Hypersensitivity and neuropathy with Oxaliplatin was especially discussed.   She will get a port.     They asked several intelligent questions all of which were answered to their satisfaction and after processing  all the information they decided to proceed with the proposed treatment albiet they do understand that at any point in time they can decide to stop the current  treatment and  consider other options.     She will see me or ANA LUISA prior to initiation of chemo.   I spent 55 minutes in the care of this patient >50% of which was spent in coordinating and counseling.      Liliana Urbina MD

## 2018-10-15 NOTE — NURSING NOTE
"Oncology Rooming Note    October 15, 2018 2:39 PM   Niharika Cason is a 63 year old female who presents for:    No chief complaint on file.    Initial Vitals: /71  Pulse 78  Temp 98.1  F (36.7  C) (Oral)  Ht 1.599 m (5' 2.95\")  Wt 81.6 kg (179 lb 14.4 oz)  LMP 10/15/2008  SpO2 95%  BMI 31.92 kg/m2 Estimated body mass index is 31.92 kg/(m^2) as calculated from the following:    Height as of this encounter: 1.599 m (5' 2.95\").    Weight as of this encounter: 81.6 kg (179 lb 14.4 oz). Body surface area is 1.9 meters squared.  Data Unavailable Comment: Data Unavailable   Patient's last menstrual period was 10/15/2008.  Allergies reviewed: Yes  Medications reviewed: Yes    Medications: Medication refills not needed today.  Pharmacy name entered into [employer]: [facility] [place], [place] - [address]    Clinical concerns:      8 minutes for nursing intake (face to face time)     Honey Cyr CMA                "

## 2018-10-15 NOTE — LETTER
[facility]  [address]  [address]  [facility] [address]  [phone]    October 25, 2018    Niharika Cason  [address] [address] [place] [id number]      Dear Niharika Cason:    Your health is important to us and we missed you at your recent appointment. Please call us if you need to reschedule your appointment for another date and time.   Any time you are unable to keep your appointment we kindly ask that you call us at least two hours in advance to let us know. This will allow us to offer the time to another patient.  If you did not attend your appointment because of concerns over your ability to pay for care, please contact me so we can discuss payment options.   If you have any questions regarding this notice, please contact me at  ***.   Sincerely,        ***   [facility] ([facility] - (***) Clinic Administrator/Manager

## 2018-10-16 ENCOUNTER — CARE COORDINATION (OUTPATIENT)
Dept: ONCOLOGY | Facility: CLINIC | Age: 63
End: 2018-10-16

## 2018-10-16 NOTE — PROGRESS NOTES
Chemo Teach  re: Xelox, FOLFOX treatment plan for diagnosis: rectal Cancer  -See Pt Education documentation - Chemo Effects (Adult)  -Reviewed treatment schedule including cycle length, lab monitoring and take home medications.    Verbal and written instruction provided using:     [employer] Drug Information   -Reviewed What xelox, and folfox Is Used For, How it is Given, Side Effects, When to Contact Your Doctor of Health Care Provider and Self Care Tips sections.      [facility] literature:   -Reviewed Getting Ready for Chemotherapy: What to Expect Before, During and After Your Treatment   -Reviewed Tips for Increasing Protein and Calories  -Reviewed Vascular Access Port Implantation with Power Port teaching book/model   Patient and family verbalized understanding of the plan of care.  All of their questions were answered and thy will call with others.

## 2018-10-18 ENCOUNTER — ANESTHESIA EVENT (OUTPATIENT)
Dept: SURGERY | Facility: AMBULATORY SURGERY CENTER | Age: 63
End: 2018-10-18

## 2018-10-19 ENCOUNTER — RADIANT APPOINTMENT (OUTPATIENT)
Dept: RADIOLOGY | Facility: AMBULATORY SURGERY CENTER | Age: 63
End: 2018-10-19
Attending: INTERNAL MEDICINE
Payer: COMMERCIAL

## 2018-10-19 ENCOUNTER — ANESTHESIA (OUTPATIENT)
Dept: SURGERY | Facility: AMBULATORY SURGERY CENTER | Age: 63
End: 2018-10-19

## 2018-10-19 ENCOUNTER — HOSPITAL ENCOUNTER (OUTPATIENT)
Facility: AMBULATORY SURGERY CENTER | Age: 63
End: 2018-10-19
Attending: PHYSICIAN ASSISTANT
Payer: COMMERCIAL

## 2018-10-19 ENCOUNTER — SURGERY (OUTPATIENT)
Age: 63
End: 2018-10-19

## 2018-10-19 ENCOUNTER — TELEPHONE (OUTPATIENT)
Dept: FAMILY MEDICINE | Facility: CLINIC | Age: 63
End: 2018-10-19

## 2018-10-19 VITALS
RESPIRATION RATE: 16 BRPM | WEIGHT: 179 LBS | DIASTOLIC BLOOD PRESSURE: 69 MMHG | SYSTOLIC BLOOD PRESSURE: 121 MMHG | BODY MASS INDEX: 31.71 KG/M2 | TEMPERATURE: 98.2 F | OXYGEN SATURATION: 97 % | HEART RATE: 72 BPM | HEIGHT: 63 IN

## 2018-10-19 DIAGNOSIS — E10.65 TYPE 1 DIABETES MELLITUS WITH HYPERGLYCEMIA (H): ICD-10-CM

## 2018-10-19 DIAGNOSIS — C20 RECTAL CANCER (H): ICD-10-CM

## 2018-10-19 LAB
ERYTHROCYTE [DISTWIDTH] IN BLOOD BY AUTOMATED COUNT: 11.7 % (ref 10–15)
GLUCOSE BLDC GLUCOMTR-MCNC: 106 MG/DL (ref 70–99)
GLUCOSE BLDC GLUCOMTR-MCNC: 82 MG/DL (ref 70–99)
HCT VFR BLD AUTO: 39.1 % (ref 35–47)
HGB BLD-MCNC: 13.1 G/DL (ref 11.7–15.7)
INR PPP: 0.87 (ref 0.86–1.14)
MCH RBC QN AUTO: 29.4 PG (ref 26.5–33)
MCHC RBC AUTO-ENTMCNC: 33.5 G/DL (ref 31.5–36.5)
MCV RBC AUTO: 88 FL (ref 78–100)
PLATELET # BLD AUTO: 234 10E9/L (ref 150–450)
RBC # BLD AUTO: 4.45 10E12/L (ref 3.8–5.2)
WBC # BLD AUTO: 5.9 10E9/L (ref 4–11)

## 2018-10-19 DEVICE — CATH PORT POWERPORT CLEARVUE SLIM 6FR 5616000
Type: IMPLANTABLE DEVICE | Site: CHEST  WALL | Status: FUNCTIONAL
Removed: 2020-01-31

## 2018-10-19 RX ORDER — LIDOCAINE HYDROCHLORIDE 20 MG/ML
INJECTION, SOLUTION INFILTRATION; PERINEURAL PRN
Status: DISCONTINUED | OUTPATIENT
Start: 2018-10-19 | End: 2018-10-19

## 2018-10-19 RX ORDER — FENTANYL CITRATE 50 UG/ML
25-50 INJECTION, SOLUTION INTRAMUSCULAR; INTRAVENOUS
Status: DISCONTINUED | OUTPATIENT
Start: 2018-10-19 | End: 2018-10-20 | Stop reason: HOSPADM

## 2018-10-19 RX ORDER — SODIUM CHLORIDE, SODIUM LACTATE, POTASSIUM CHLORIDE, CALCIUM CHLORIDE 600; 310; 30; 20 MG/100ML; MG/100ML; MG/100ML; MG/100ML
INJECTION, SOLUTION INTRAVENOUS CONTINUOUS
Status: DISCONTINUED | OUTPATIENT
Start: 2018-10-19 | End: 2018-10-20 | Stop reason: HOSPADM

## 2018-10-19 RX ORDER — ACETAMINOPHEN 325 MG/1
975 TABLET ORAL ONCE
Status: DISCONTINUED | OUTPATIENT
Start: 2018-10-19 | End: 2018-10-20 | Stop reason: HOSPADM

## 2018-10-19 RX ORDER — HEPARIN SODIUM (PORCINE) LOCK FLUSH IV SOLN 100 UNIT/ML 100 UNIT/ML
5 SOLUTION INTRAVENOUS
Status: DISCONTINUED | OUTPATIENT
Start: 2018-10-19 | End: 2018-10-20 | Stop reason: HOSPADM

## 2018-10-19 RX ORDER — PROPOFOL 10 MG/ML
INJECTION, EMULSION INTRAVENOUS CONTINUOUS PRN
Status: DISCONTINUED | OUTPATIENT
Start: 2018-10-19 | End: 2018-10-19

## 2018-10-19 RX ORDER — LIDOCAINE 40 MG/G
CREAM TOPICAL
Status: DISCONTINUED | OUTPATIENT
Start: 2018-10-19 | End: 2018-10-20 | Stop reason: HOSPADM

## 2018-10-19 RX ORDER — SODIUM CHLORIDE 9 MG/ML
INJECTION, SOLUTION INTRAVENOUS CONTINUOUS
Status: DISCONTINUED | OUTPATIENT
Start: 2018-10-19 | End: 2018-10-20 | Stop reason: HOSPADM

## 2018-10-19 RX ORDER — NALOXONE HYDROCHLORIDE 0.4 MG/ML
.1-.4 INJECTION, SOLUTION INTRAMUSCULAR; INTRAVENOUS; SUBCUTANEOUS
Status: DISCONTINUED | OUTPATIENT
Start: 2018-10-19 | End: 2018-10-20 | Stop reason: HOSPADM

## 2018-10-19 RX ORDER — MEPERIDINE HYDROCHLORIDE 25 MG/ML
12.5 INJECTION INTRAMUSCULAR; INTRAVENOUS; SUBCUTANEOUS
Status: DISCONTINUED | OUTPATIENT
Start: 2018-10-19 | End: 2018-10-20 | Stop reason: HOSPADM

## 2018-10-19 RX ORDER — ONDANSETRON 4 MG/1
4 TABLET, ORALLY DISINTEGRATING ORAL EVERY 30 MIN PRN
Status: DISCONTINUED | OUTPATIENT
Start: 2018-10-19 | End: 2018-10-20 | Stop reason: HOSPADM

## 2018-10-19 RX ORDER — HEPARIN SODIUM,PORCINE 10 UNIT/ML
5 VIAL (ML) INTRAVENOUS EVERY 24 HOURS
Status: DISCONTINUED | OUTPATIENT
Start: 2018-10-19 | End: 2018-10-20 | Stop reason: HOSPADM

## 2018-10-19 RX ORDER — OXYCODONE HYDROCHLORIDE 5 MG/1
5 TABLET ORAL EVERY 4 HOURS PRN
Status: DISCONTINUED | OUTPATIENT
Start: 2018-10-19 | End: 2018-10-20 | Stop reason: HOSPADM

## 2018-10-19 RX ORDER — INSULIN GLARGINE 100 [IU]/ML
50 INJECTION, SOLUTION SUBCUTANEOUS DAILY
Qty: 15 ML | Refills: 3 | Status: SHIPPED | OUTPATIENT
Start: 2018-10-19 | End: 2019-03-04

## 2018-10-19 RX ORDER — ONDANSETRON 2 MG/ML
4 INJECTION INTRAMUSCULAR; INTRAVENOUS EVERY 30 MIN PRN
Status: DISCONTINUED | OUTPATIENT
Start: 2018-10-19 | End: 2018-10-20 | Stop reason: HOSPADM

## 2018-10-19 RX ORDER — CEFAZOLIN SODIUM 2 G/50ML
2 SOLUTION INTRAVENOUS
Status: COMPLETED | OUTPATIENT
Start: 2018-10-19 | End: 2018-10-19

## 2018-10-19 RX ORDER — ONDANSETRON 2 MG/ML
INJECTION INTRAMUSCULAR; INTRAVENOUS PRN
Status: DISCONTINUED | OUTPATIENT
Start: 2018-10-19 | End: 2018-10-19

## 2018-10-19 RX ADMIN — ONDANSETRON 4 MG: 2 INJECTION INTRAMUSCULAR; INTRAVENOUS at 08:20

## 2018-10-19 RX ADMIN — Medication 18 ML: at 08:28

## 2018-10-19 RX ADMIN — CEFAZOLIN SODIUM 2 G: 2 SOLUTION INTRAVENOUS at 08:21

## 2018-10-19 RX ADMIN — LIDOCAINE HYDROCHLORIDE 80 MG: 20 INJECTION, SOLUTION INFILTRATION; PERINEURAL at 08:12

## 2018-10-19 RX ADMIN — PROPOFOL 150 MCG/KG/MIN: 10 INJECTION, EMULSION INTRAVENOUS at 08:13

## 2018-10-19 RX ADMIN — SODIUM CHLORIDE, SODIUM LACTATE, POTASSIUM CHLORIDE, CALCIUM CHLORIDE: 600; 310; 30; 20 INJECTION, SOLUTION INTRAVENOUS at 08:10

## 2018-10-19 NOTE — PROCEDURES
Interventional Radiology Brief Post Procedure Note    Procedure:  IR CHEST PORT PLACEMENT > 5 YRS OF AGE [[id number]]    Proceduralist: CHARLENE Salomon PA-C    Assistant: None    Time Out: Prior to the start of the procedure and with procedural staff participation, I verbally confirmed the patient s identity using two indicators, relevant allergies, that the procedure was appropriate and matched the consent or emergent situation, and that the correct equipment/implants were available. Immediately prior to starting the procedure I conducted the Time Out with the procedural staff and re-confirmed the patient s name, procedure, and site/side. (The Joint Commission universal protocol was followed.)  Yes        Sedation: Monitored Anesthesia Care (MAC) administered and documented by Anesthesia Care Provider    Findings: Completed placement of a 6 English, 25 cm, Bard ClearVUE Slim brand, single lumen venous chest  power-injectable port via RIJV.  Tip lying in the right atrium. PORT is ready for immediate use.  Dx:  Rectal cancer.  Naldo.  MAC  <5    Estimated Blood Loss: Less than 10 ml    Fluoroscopy Time:  minute(s)    SPECIMENS: None    Complications: 1. None     Condition: Stable    Plan:     Comments: See dictated procedure note for full details.    Lawson Hitchcock PA-C

## 2018-10-19 NOTE — ANESTHESIA POSTPROCEDURE EVALUATION
Patient: Niharika Cason    Procedure(s):  Chest Port Placement - right     Diagnosis:Rectal Cancer  Diagnosis Additional Information: No value filed.    Anesthesia Type:  MAC    Note:  Anesthesia Post Evaluation    Patient location during evaluation: bedside  Patient participation: Able to fully participate in evaluation  Level of consciousness: awake  Pain management: adequate  Airway patency: patent  Cardiovascular status: acceptable  Respiratory status: acceptable  Hydration status: acceptable  PONV: controlled     Anesthetic complications: None          Last vitals:  Vitals:    10/19/18 0639 10/19/18 0902   BP: 125/67 109/52   Pulse: 72    Resp: 16 14   Temp: 36.5  C (97.7  F) 36.7  C (98  F)   SpO2: 96% 98%         Electronically Signed By: Zeb Michel MD, MD  October 19, 2018  9:46 AM

## 2018-10-19 NOTE — IP AVS SNAPSHOT
[facility]    [address] [place] [address]    Phone:  [phone]    Fax:  [phone]                                       After Visit Summary   10/19/2018    Niharika Cason    MRN: [id number]           After Visit Summary Signature Page     I have received my discharge instructions, and my questions have been answered. I have discussed any challenges I see with this plan with the nurse or doctor.    ..........................................................................................................................................  Patient/Patient Representative Signature      ..........................................................................................................................................  Patient Representative Print Name and Relationship to Patient    ..................................................               ................................................  Date                                   Time    ..........................................................................................................................................  Reviewed by Signature/Title    ...................................................              ..............................................  Date                                               Time          22EPIC Rev 08/18

## 2018-10-19 NOTE — ANESTHESIA PREPROCEDURE EVALUATION
Anesthesia Evaluation     . Pt has had prior anesthetic. Type: General    No history of anesthetic complications          ROS/MED HX    ENT/Pulmonary:  - neg pulmonary ROS     Neurologic:  - neg neurologic ROS     Cardiovascular:     (+) hypertension----. : . . . :. .       METS/Exercise Tolerance:  4 - Raking leaves, gardening   Hematologic:  - neg hematologic  ROS       Musculoskeletal:  - neg musculoskeletal ROS       GI/Hepatic:  - neg GI/hepatic ROS       Renal/Genitourinary:  - ROS Renal section negative       Endo:     (+) type I DM, .      Psychiatric:     (+) psychiatric history anxiety      Infectious Disease:         Malignancy:   (+) Malignancy           Other:                     Physical Exam  Normal systems: dental    Airway   Mallampati: II  TM distance: >3 FB  Neck ROM: full    Dental     Cardiovascular   Rhythm and rate: regular and normal      Pulmonary    breath sounds clear to auscultation                    Anesthesia Plan      History & Physical Review  History and physical reviewed and following examination; no interval change.    ASA Status:  3 .    NPO Status:  > 6 hours    Plan for MAC with Intravenous induction. Maintenance will be TIVA.  Reason for MAC:  Deep or markedly invasive procedure (G8)  PONV prophylaxis:  Ondansetron (or other 5HT-3) and Dexamethasone or Solumedrol       Postoperative Care  Postoperative pain management:  Oral pain medications and Multi-modal analgesia.      Consents  Anesthetic plan, risks, benefits and alternatives discussed with:  Patient..                          .

## 2018-10-19 NOTE — IP AVS SNAPSHOT
MRN:[id number]                      After Visit Summary   10/19/2018    Niharika Cason    MRN: [id number]           Thank you!     Thank you for choosing [facility] for your care. Our goal is always to provide you with excellent care. Hearing back from our patients is one way we can continue to improve our services. Please take a few minutes to complete the written survey that you may receive in the mail after you visit with us. Thank you!        Patient Information     Date Of Birth          1955        About your hospital stay     You were admitted on:  October 19, 2018 You last received care in the:  [facility]    You were discharged on:  October 19, 2018       Who to Call     For medical emergencies, please call 911.  For non-urgent questions about your medical care, please call your primary care provider or clinic, [phone]  For questions related to your surgery, please call your surgery clinic        Attending Provider     Provider Specialty    Lawson Hitchcock PA-C Radiology       Primary Care Provider Office Phone # Fax #    Leona Cherri Farris [phone]      Your next 10 appointments already scheduled     Oct 24, 2018  2:30 PM CDT   Level 1 with NL INFUSION CHAIR 1   [facility] ([facility])    [address] [place] [address]   [phone]            Oct 25, 2018  7:30 AM CDT   (Arrive by 7:15 AM)   Return Visit with YRIS Allen   [facility] ([facility])    [address] [place] [address] Se  [address]  [facility] [id number] [phone]            Oct 25, 2018  8:00 AM CDT   Infusion 240 with UC ONCOLOGY INFUSION, UC 15 ATC   [facility] ([facility])    [address]  [address]  [facility] [id number] [phone]              Further instructions from your care  team         A collaboration between [facility] and [facility]  Experts in minimally invasive, targeted treatments performed using imaging guidance    Venous Access Device,  Port Catheter or Tunneled or Non-Tunneled Central Line Placement    Today you had a procedure today to install a venous access device; either a tunneled central vein catheter or a subcutaneous port catheter.    One of our Radiology PAs performed this procedure for you today:  ? Jose Lundy PA-C  ? CHARLENE Salomon PA-C  ? Jude Jay PA-C  ? Minnie Hawk PA-C   ? Delfino Chu PA-C    After you go home:  - Drink plenty of fluids.  Generally 6-8 (8 ounce) glasses a day is recommended.  - Resume your regular diet unless otherwise ordered by a medical provider.  - Keep any applied tape/gauze dressings clean and dry.  Change tape/gauze dressings if they get wet or soiled.  - You may shower the following day after procedure, however cover and protect from moisture any tape/gauze dressings.  You may let water hit and run over dried skin glue, but do not scrub.  Pat the area dry after showering.  - Port placement incisions are closed with absorbable suture, meaning they do not need to be removed at a later date, and a topical skin adhesive (skin glue).  This glue will wear off in 7-14 days.  Do not remove before this time.  If 14 days have passed and residual glue is present, you may gently remove it.  - Do not apply gels, lotions, or ointments to the glue site for the first 10 days as this may cause the glue to prematurely soften and fail.  - Do not perform strenuous activities or lift greater than 10 pounds for the next three days.  - If there is bleeding or oozing from the procedure site, apply firm pressure to the area for 5-10 minutes.  If the bleeding continues seek medical advice at the numbers below.  - Mild procedure site discomfort can be treated with an ice pack and  "over-the-counter pain relievers.        For 24 hours after any sedation used:  - Relax and take it easy.  No strenuous activities.  - Do not drive or operate machines at home or at work.  - No alcohol consumption.  - Do not make any important or legal decisions.    Call our Interventional Radiology (IR) service if:  - If you start bleeding from the procedure site.  If you do start to bleed from the site, lie down and hold some pressure on the site.  Our radiology provider can help you decide if you need to return to the hospital.  - If you have new or worsening pain related to the procedure.  - If you have concerning swelling at the procedure site.  - If you develop persistent nausea or vomiting.  - If you develop hives or a rash or any unexplained itching.  - If you have a fever of greater than 100.5  F and chills in the first 5 days after procedure.  - Any other concerns related to your procedure.      [facility]  Interventional Radiology (IR)  [address] [place] [address]    Contact Number:  [phone]  (IR control desk)  - Monday - Friday 8:00 am - 4:30 pm    After hours for urgent concerns:  [phone]  - After 4:30 pm Monday - Friday, Weekends and Holidays.   - Ask for Interventional Radiology on-call.  Someone is available 24 hours a day.  - [facility] toll free number:  [phone]        Pending Results     Date and Time Order Name Status Description    10/19/2018 0743 IR CHEST PORT PLACEMENT > 5 YRS OF AGE In process             Admission Information     Date & Time Provider Department Dept. Phone    10/19/2018 Lawson Hitchcock PA-C [facility] [phone]      Your Vitals Were     Blood Pressure Pulse Temperature Respirations Height Weight    125/67 72 97.7  F (36.5  C) (Oral) 16 1.6 m (5' 3\") 81.2 kg (179 lb)    Last Period Pulse Oximetry BMI (Body Mass Index)             10/15/2008 96% 31.71 kg/m2 "         [facility] Information     [employer] gives you secure access to your electronic health record. If you see a primary care provider, you can also send messages to your care team and make appointments. If you have questions, please call your primary care clinic.  If you do not have a primary care provider, please call [phone] and they will assist you.      [employer] is an electronic gateway that provides easy, online access to your medical records. With [employer], you can request a clinic appointment, read your test results, renew a prescription or communicate with your care team.     To access your existing account, please contact your [facility] or call [phone] for assistance.        Care EveryWhere ID     This is your Care EveryWhere ID. This could be used by other organizations to access your [facility] medical records  [id number]        Equal Access to Services     RHONDA BARBA : Yaneli Silva, yanet quiroz, javier tee, hever parra. So [facility] [phone].    ATENCIÓN: Si habla español, tiene a stokes disposición servicios gratuitos de asistencia lingüística. Llame al [phone].    We comply with applicable federal civil rights laws and [place] laws. We do not discriminate on the basis of race, color, national origin, age, disability, sex, sexual orientation, or gender identity.               Review of your medicines      UNREVIEWED medicines. Ask your doctor about these medicines        Dose / Directions    aspirin 81 MG tablet   Used for:  Type I (juvenile type) diabetes mellitus without mention of complication, not stated as uncontrolled, Essential hypertension, benign        1 tab po QD (Once per day)   Quantity:  0   Refills:  0       atenolol 25 MG tablet   Commonly known as:  TENORMIN   Used for:  Essential hypertension with goal blood pressure less than 130/80        Dose:  25 mg   Take 1 tablet (25 mg)  by mouth daily   Quantity:  93 tablet   Refills:  3       hydrochlorothiazide 12.5 MG capsule   Commonly known as:  MICROZIDE   Used for:  Essential hypertension with goal blood pressure less than 130/80        Dose:  1 capsule   Take 1 capsule (12.5 mg) by mouth daily   Quantity:  93 capsule   Refills:  3       insulin detemir 100 UNIT/ML injection   Commonly known as:  LEVEMIR FLEXTOUCH   Used for:  Type 1 diabetes mellitus with hyperglycemia (H)        Dose:  50 Units   Inject 50 Units Subcutaneous At Bedtime   Quantity:  45 mL   Refills:  3       insulin lispro 100 UNIT/ML injection   Commonly known as:  HumaLOG KWIKpen   Used for:  Type 1 diabetes mellitus with hyperglycemia (H)        Use 3 units before breakfast, 6 units before lunch, and 16 units before dinner, plus correction of 1 unit per every 50 glucose over 125, averaging 3 for breakfast, 6 for lunch, 16 for dinner.   Quantity:  30 mL   Refills:  3       lisinopril 40 MG tablet   Commonly known as:  PRINIVIL/ZESTRIL   Used for:  Essential hypertension with goal blood pressure less than 130/80, Type 1 diabetes mellitus with hyperglycemia (H)        Dose:  40 mg   Take 1 tablet (40 mg) by mouth daily   Quantity:  93 tablet   Refills:  3       PARoxetine 30 MG tablet   Commonly known as:  PAXIL   Used for:  Generalized anxiety disorder        Dose:  30 mg   Take 1 tablet (30 mg) by mouth every morning   Quantity:  93 tablet   Refills:  3       simvastatin 10 MG tablet   Commonly known as:  ZOCOR   Used for:  Hyperlipidemia LDL goal <100        TAKE ONE TABLET BY MOUTH EVERY NIGHT AT BEDTIME   Quantity:  93 tablet   Refills:  3         CONTINUE these medicines which have NOT CHANGED        Dose / Directions    BD ULTRA FINE PEN NEEDLES   Used for:  Type 1 diabetes mellitus with hyperglycemia (H)        Dose:  1 each   1 each by Percutaneous route 4 times daily   Quantity:  150 Device   Refills:  5       blood glucose monitoring test strip   Commonly known  as:  SURESTEP TEST STRIPS   Used for:  Type 1 diabetes mellitus with hyperglycemia (H)        Dose:  1 strip   1 strip by In Vitro route 3 times daily   Quantity:  200 each   Refills:  3                Protect others around you: Learn how to safely use, store and throw away your medicines at www.disposemymeds.org.             Medication List: This is a list of all your medications and when to take them. Check marks below indicate your daily home schedule. Keep this list as a reference.      Medications           Morning Afternoon Evening Bedtime As Needed    aspirin 81 MG tablet   1 tab po QD (Once per day)                                atenolol 25 MG tablet   Commonly known as:  TENORMIN   Take 1 tablet (25 mg) by mouth daily                                BD ULTRA FINE PEN NEEDLES   1 each by Percutaneous route 4 times daily                                blood glucose monitoring test strip   Commonly known as:  SURESTEP TEST STRIPS   1 strip by In Vitro route 3 times daily                                hydrochlorothiazide 12.5 MG capsule   Commonly known as:  MICROZIDE   Take 1 capsule (12.5 mg) by mouth daily                                insulin detemir 100 UNIT/ML injection   Commonly known as:  LEVEMIR FLEXTOUCH   Inject 50 Units Subcutaneous At Bedtime                                insulin lispro 100 UNIT/ML injection   Commonly known as:  HumaLOG KWIKpen   Use 3 units before breakfast, 6 units before lunch, and 16 units before dinner, plus correction of 1 unit per every 50 glucose over 125, averaging 3 for breakfast, 6 for lunch, 16 for dinner.                                lisinopril 40 MG tablet   Commonly known as:  PRINIVIL/ZESTRIL   Take 1 tablet (40 mg) by mouth daily                                PARoxetine 30 MG tablet   Commonly known as:  PAXIL   Take 1 tablet (30 mg) by mouth every morning                                simvastatin 10 MG tablet   Commonly known as:  ZOCOR   TAKE ONE TABLET  BY MOUTH EVERY NIGHT AT BEDTIME

## 2018-10-19 NOTE — TELEPHONE ENCOUNTER
I called pt and informed her of the below msg and she will call back next week with the readings.  Zaria Contreras MA

## 2018-10-19 NOTE — TELEPHONE ENCOUNTER
Niharika's formulary for insurance has changed and they no longer cover Lantus.. Basaglar is the new formulary insulin. Please send a new Rx for Basaglar or let pharmacy know to start a prior authorization.    Thanks,   July Briceño [facility] Float Tech.  [facility]

## 2018-10-19 NOTE — ANESTHESIA CARE TRANSFER NOTE
Patient: Niharika Cason    Procedure(s):  Chest Port Placement - right     Diagnosis: Rectal Cancer  Diagnosis Additional Information: No value filed.    Anesthesia Type:   MAC     Note:  Airway :Room Air  Patient transferred to:Phase II  Comments: VSS and WNL, comfortable, no PONV, report to Breanne RNHandoff Report: Identifed the Patient, Identified the Reponsible Provider, Reviewed the pertinent medical history, Discussed the surgical course, Reviewed Intra-OP anesthesia mangement and issues during anesthesia, Set expectations for post-procedure period and Allowed opportunity for questions and acknowledgement of understanding      Vitals: (Last set prior to Anesthesia Care Transfer)    CRNA VITALS  10/19/2018 0828 - 10/19/2018 0904      10/19/2018             Pulse: 62    SpO2: 98 %    Resp Rate (set): 10                Electronically Signed By: JAIRON Armstrong CRNA  October 19, 2018  9:04 AM

## 2018-10-19 NOTE — TELEPHONE ENCOUNTER
We verified with the pharmacy, Levemir is not covered. I am ordering the Basaglar.  She will use the same dose as she has been for the Levemir, and update with her blood sugar readings in the next week to make sure it is working the same.  Please send glucose log to diabetes ed in the next week after switching.   Leona Farris MD

## 2018-10-19 NOTE — DISCHARGE INSTRUCTIONS
A collaboration between [facility] and [facility]  Experts in minimally invasive, targeted treatments performed using imaging guidance    Venous Access Device,  Port Catheter or Tunneled or Non-Tunneled Central Line Placement    Today you had a procedure today to install a venous access device; either a tunneled central vein catheter or a subcutaneous port catheter.    One of our Radiology PAs performed this procedure for you today:  ? Jose Lundy PA-C  ? CHARLENE Salomon PA-C  ? Jude Jay PA-C  ? Minnie Hawk PA-C   ? Delfino Chu PA-C    After you go home:  - Drink plenty of fluids.  Generally 6-8 (8 ounce) glasses a day is recommended.  - Resume your regular diet unless otherwise ordered by a medical provider.  - Keep any applied tape/gauze dressings clean and dry.  Change tape/gauze dressings if they get wet or soiled.  - You may shower the following day after procedure, however cover and protect from moisture any tape/gauze dressings.  You may let water hit and run over dried skin glue, but do not scrub.  Pat the area dry after showering.  - Port placement incisions are closed with absorbable suture, meaning they do not need to be removed at a later date, and a topical skin adhesive (skin glue).  This glue will wear off in 7-14 days.  Do not remove before this time.  If 14 days have passed and residual glue is present, you may gently remove it.  - Do not apply gels, lotions, or ointments to the glue site for the first 10 days as this may cause the glue to prematurely soften and fail.  - Do not perform strenuous activities or lift greater than 10 pounds for the next three days.  - If there is bleeding or oozing from the procedure site, apply firm pressure to the area for 5-10 minutes.  If the bleeding continues seek medical advice at the numbers below.  - Mild procedure site discomfort can be treated with an ice pack and over-the-counter pain  relievers.        For 24 hours after any sedation used:  - Relax and take it easy.  No strenuous activities.  - Do not drive or operate machines at home or at work.  - No alcohol consumption.  - Do not make any important or legal decisions.    Call our Interventional Radiology (IR) service if:  - If you start bleeding from the procedure site.  If you do start to bleed from the site, lie down and hold some pressure on the site.  Our radiology provider can help you decide if you need to return to the hospital.  - If you have new or worsening pain related to the procedure.  - If you have concerning swelling at the procedure site.  - If you develop persistent nausea or vomiting.  - If you develop hives or a rash or any unexplained itching.  - If you have a fever of greater than 100.5  F and chills in the first 5 days after procedure.  - Any other concerns related to your procedure.      [facility]  Interventional Radiology (IR)  [address] [place] [address]    Contact Number:  [phone]  (IR control desk)  - Monday - Friday 8:00 am - 4:30 pm    After hours for urgent concerns:  [phone]  - After 4:30 pm Monday - Friday, Weekends and Holidays.   - Ask for Interventional Radiology on-call.  Someone is available 24 hours a day.  - [facility] toll free number:  [phone]

## 2018-10-23 ENCOUNTER — CARE COORDINATION (OUTPATIENT)
Dept: ONCOLOGY | Facility: CLINIC | Age: 63
End: 2018-10-23

## 2018-10-23 NOTE — PROGRESS NOTES
Spoke to Fabrice to let him know that the xeloda is approved by the insurance company and that it can be picked up here at the [facility] pharmacy.  The test claim came back with a $0 copay.  He verbalized understanding of the plan of care.

## 2018-10-24 ENCOUNTER — INFUSION THERAPY VISIT (OUTPATIENT)
Dept: INFUSION THERAPY | Facility: CLINIC | Age: 63
End: 2018-10-24
Attending: INTERNAL MEDICINE
Payer: COMMERCIAL

## 2018-10-24 DIAGNOSIS — C20 RECTAL CANCER (H): Primary | ICD-10-CM

## 2018-10-24 DIAGNOSIS — C20 MALIGNANT NEOPLASM OF RECTUM (H): ICD-10-CM

## 2018-10-24 LAB
ALBUMIN SERPL-MCNC: 4 G/DL (ref 3.4–5)
ALP SERPL-CCNC: 80 U/L (ref 40–150)
ALT SERPL W P-5'-P-CCNC: 26 U/L (ref 0–50)
ANION GAP SERPL CALCULATED.3IONS-SCNC: 6 MMOL/L (ref 3–14)
AST SERPL W P-5'-P-CCNC: 17 U/L (ref 0–45)
BASOPHILS # BLD AUTO: 0.1 10E9/L (ref 0–0.2)
BASOPHILS NFR BLD AUTO: 0.8 %
BILIRUB SERPL-MCNC: 0.3 MG/DL (ref 0.2–1.3)
BUN SERPL-MCNC: 22 MG/DL (ref 7–30)
CALCIUM SERPL-MCNC: 9.4 MG/DL (ref 8.5–10.1)
CHLORIDE SERPL-SCNC: 104 MMOL/L (ref 94–109)
CO2 SERPL-SCNC: 31 MMOL/L (ref 20–32)
CREAT SERPL-MCNC: 0.76 MG/DL (ref 0.52–1.04)
DIFFERENTIAL METHOD BLD: NORMAL
EOSINOPHIL NFR BLD AUTO: 1.5 %
ERYTHROCYTE [DISTWIDTH] IN BLOOD BY AUTOMATED COUNT: 11.9 % (ref 10–15)
GFR SERPL CREATININE-BSD FRML MDRD: 77 ML/MIN/1.7M2
GLUCOSE SERPL-MCNC: 114 MG/DL (ref 70–99)
HCT VFR BLD AUTO: 38.7 % (ref 35–47)
HGB BLD-MCNC: 13 G/DL (ref 11.7–15.7)
IMM GRANULOCYTES # BLD: 0 10E9/L (ref 0–0.4)
IMM GRANULOCYTES NFR BLD: 0.4 %
LYMPHOCYTES # BLD AUTO: 2.2 10E9/L (ref 0.8–5.3)
LYMPHOCYTES NFR BLD AUTO: 27.4 %
MCH RBC QN AUTO: 29.8 PG (ref 26.5–33)
MCHC RBC AUTO-ENTMCNC: 33.6 G/DL (ref 31.5–36.5)
MCV RBC AUTO: 89 FL (ref 78–100)
MONOCYTES # BLD AUTO: 0.6 10E9/L (ref 0–1.3)
MONOCYTES NFR BLD AUTO: 8 %
NEUTROPHILS # BLD AUTO: 5 10E9/L (ref 1.6–8.3)
NEUTROPHILS NFR BLD AUTO: 61.9 %
NRBC # BLD AUTO: 0 10*3/UL
NRBC BLD AUTO-RTO: 0 /100
PLATELET # BLD AUTO: 285 10E9/L (ref 150–450)
POTASSIUM SERPL-SCNC: 4.3 MMOL/L (ref 3.4–5.3)
PROT SERPL-MCNC: 7 G/DL (ref 6.8–8.8)
RBC # BLD AUTO: 4.36 10E12/L (ref 3.8–5.2)
SODIUM SERPL-SCNC: 141 MMOL/L (ref 133–144)
WBC # BLD AUTO: 8 10E9/L (ref 4–11)

## 2018-10-24 PROCEDURE — 85025 COMPLETE CBC W/AUTO DIFF WBC: CPT | Performed by: INTERNAL MEDICINE

## 2018-10-24 PROCEDURE — 36591 DRAW BLOOD OFF VENOUS DEVICE: CPT

## 2018-10-24 PROCEDURE — 80053 COMPREHEN METABOLIC PANEL: CPT | Performed by: INTERNAL MEDICINE

## 2018-10-24 PROCEDURE — 25000128 H RX IP 250 OP 636: Performed by: INTERNAL MEDICINE

## 2018-10-24 RX ORDER — PROCHLORPERAZINE MALEATE 10 MG
10 TABLET ORAL EVERY 6 HOURS PRN
Qty: 30 TABLET | Refills: 2 | Status: SHIPPED | OUTPATIENT
Start: 2018-10-24 | End: 2019-02-26

## 2018-10-24 RX ORDER — LORAZEPAM 0.5 MG/1
0.5 TABLET ORAL EVERY 4 HOURS PRN
Qty: 30 TABLET | Refills: 2 | Status: SHIPPED | OUTPATIENT
Start: 2018-10-24 | End: 2019-02-26

## 2018-10-24 RX ORDER — HEPARIN SODIUM (PORCINE) LOCK FLUSH IV SOLN 100 UNIT/ML 100 UNIT/ML
500 SOLUTION INTRAVENOUS EVERY 8 HOURS
Status: DISCONTINUED | OUTPATIENT
Start: 2018-10-24 | End: 2018-10-24 | Stop reason: HOSPADM

## 2018-10-24 RX ORDER — LOPERAMIDE HCL 2 MG
CAPSULE ORAL
Qty: 30 CAPSULE | Refills: 0 | Status: SHIPPED | OUTPATIENT
Start: 2018-10-24 | End: 2018-12-21

## 2018-10-24 RX ADMIN — SODIUM CHLORIDE, PRESERVATIVE FREE 500 UNITS: 5 INJECTION INTRAVENOUS at 15:01

## 2018-10-24 NOTE — PROGRESS NOTES
Infusion Nursing Note:  Niharika Flanaganson presents today for Port labs.    Patient seen by provider today: No   present during visit today: Not Applicable.    Note: Take home nausea meds ordered and labs drawn today  for C1D1 tomorrow.Patient   Anxious for chemo tomorrow. Reassurance given and reviewed process with her.    Intravenous Access:  Implanted Port.    Treatment Conditions:  Lab Results   Component Value Date    HGB 13.0 10/24/2018     Lab Results   Component Value Date    WBC 8.0 10/24/2018      Lab Results   Component Value Date    ANEU 5.0 10/24/2018     Lab Results   Component Value Date     10/24/2018      Lab Results   Component Value Date     10/24/2018                   Lab Results   Component Value Date    POTASSIUM 4.3 10/24/2018           Lab Results   Component Value Date    MAG 1.9 02/27/2004            Lab Results   Component Value Date    CR 0.76 10/24/2018                   Lab Results   Component Value Date    JUNG 9.4 10/24/2018                Lab Results   Component Value Date    BILITOTAL 0.3 10/24/2018           Lab Results   Component Value Date    ALBUMIN 4.0 10/24/2018                    Lab Results   Component Value Date    ALT 26 10/24/2018           Lab Results   Component Value Date    AST 17 10/24/2018       Not Applicable.      Post Infusion Assessment:  Blood return noted pre and post infusion.  Access discontinued per protocol.  Patient did not want to remain accessed for tomorrow.    Discharge Plan:   Discharge instructions reviewed with: Patient.  Patient and/or family verbalized understanding of discharge instructions and all questions answered.  Copy of AVS reviewed with patient and/or family.  Patient will return 10/25 to [facility]  for next appointment.  Patient discharged in stable condition accompanied by: self.  Departure Mode: Ambulatory.  Patient will  take home meds today at her pharmacy.    Marizol Maradiaga RN

## 2018-10-24 NOTE — MR AVS SNAPSHOT
After Visit Summary   10/24/2018    Niharika Cason    MRN: [id number]           Patient Information     Date Of Birth          1955        Visit Information        Provider Department      10/24/2018 2:30 PM NL INFUSION CHAIR 1 [facility]        Today's Diagnoses     Rectal cancer (H)    -  1    Malignant neoplasm of rectum (H)           Follow-ups after your visit        Follow-up notes from your care team     Return in about 1 day (around 10/25/2018).      Your next 10 appointments already scheduled     Oct 25, 2018  7:30 AM CDT   (Arrive by 7:15 AM)   Return Visit with YRIS Allen   [facility] ([facility])    [address] [place] [address]  [address]  [facility] [address] [phone]            Oct 25, 2018  8:00 AM CDT   Infusion 240 with UC ONCOLOGY INFUSION, UC 15 ATC   [facility] ([facility])    [address] [place] [address]  [address]  [facility] [id number] [phone]              Who to contact     If you have questions or need follow up information about today's clinic visit or your schedule please contact [facility] directly at [phone].  Normal or non-critical lab and imaging results will be communicated to you by [employer]hart, letter or phone within 4 business days after the clinic has received the results. If you do not hear from us within 7 days, please contact the clinic through [employer] or phone. If you have a critical or abnormal lab result, we will notify you by phone as soon as possible.  Submit refill requests through [employer] or call your pharmacy and they will forward the refill request to us. Please allow 3 business days for your refill to be completed.          Additional Information About Your Visit        MyChart Information     [employer] gives you secure access to your electronic health record. If you see a primary care  provider, you can also send messages to your care team and make appointments. If you have questions, please call your primary care clinic.  If you do not have a primary care provider, please call [phone] and they will assist you.        Care EveryWhere ID     This is your Care EveryWhere ID. This could be used by other organizations to access your [facility] medical records  [id number]        Your Vitals Were     Last Period                   10/15/2008            Blood Pressure from Last 3 Encounters:   10/19/18 121/69   10/15/18 123/71   10/12/18 112/72    Weight from Last 3 Encounters:   10/19/18 81.2 kg (179 lb)   10/15/18 81.6 kg (179 lb 14.4 oz)   10/12/18 81.2 kg (179 lb)              We Performed the Following     CBC with platelets differential     Comprehensive metabolic panel          Today's Medication Changes          These changes are accurate as of 10/24/18  3:59 PM.  If you have any questions, ask your nurse or doctor.               Start taking these medicines.        Dose/Directions    loperamide 2 MG capsule   Commonly known as:  IMODIUM   Used for:  Rectal cancer (H), Malignant neoplasm of rectum (H)        Start with 2 caps (4 mg), then take one cap (2 mg) after each diarrheal stool as needed. Do not use more than 8 caps (16 mg) per day.   Quantity:  30 capsule   Refills:  0       LORazepam 0.5 MG tablet   Commonly known as:  ATIVAN   Used for:  Rectal cancer (H), Malignant neoplasm of rectum (H)        Dose:  0.5 mg   Take 1 tablet (0.5 mg) by mouth every 4 hours as needed (Anxiety, Nausea/Vomiting or Sleep)   Quantity:  30 tablet   Refills:  2       prochlorperazine 10 MG tablet   Commonly known as:  COMPAZINE   Used for:  Rectal cancer (H), Malignant neoplasm of rectum (H)        Dose:  10 mg   Take 1 tablet (10 mg) by mouth every 6 hours as needed (Nausea/Vomiting)   Quantity:  30 tablet   Refills:  2            Where to get your medicines      These medications were sent to [facility] [place], [place] - [address]   [address] , [facility] [address]     Phone:  [phone]     loperamide 2 MG capsule    prochlorperazine 10 MG tablet         Some of these will need a paper prescription and others can be bought over the counter.  Ask your nurse if you have questions.     Bring a paper prescription for each of these medications     LORazepam 0.5 MG tablet                Primary Care Provider Office Phone # Fax #    Leona Cherri Farris [phone] [address] [facility] [address] [place] [address]        Equal Access to Services     [facility]: Hadii aad ku hadasho Soomaali, waaxda luqadaha, qaybta kaalmada adeegyada, hever galdamez . So [facility] [phone].    ATENCIÓN: Si habla español, tiene a stokes disposición servicios gratuitos de asistencia lingüística. [facility] [phone].    We comply with applicable federal civil rights laws and [place] laws. We do not discriminate on the basis of race, color, national origin, age, disability, sex, sexual orientation, or gender identity.            Thank you!     Thank you for choosing [facility] SERVICES  for your care. Our goal is always to provide you with excellent care. Hearing back from our patients is one way we can continue to improve our services. Please take a few minutes to complete the written survey that you may receive in the mail after your visit with us. Thank you!             Your Updated Medication List - Protect others around you: Learn how to safely use, store and throw away your medicines at www.disposemymeds.org.          This list is accurate as of 10/24/18  3:59 PM.  Always use your most recent med list.                   Brand Name Dispense Instructions for use Diagnosis    aspirin 81 MG tablet     0    1 tab po QD (Once per day)    Type I (juvenile type) diabetes mellitus without mention of complication, not stated as uncontrolled, Essential hypertension,  benign       atenolol 25 MG tablet    TENORMIN    93 tablet    Take 1 tablet (25 mg) by mouth daily    Essential hypertension with goal blood pressure less than 130/80       BASAGLAR 100 UNIT/ML injection     15 mL    Inject 50 Units Subcutaneous daily    Type 1 diabetes mellitus with hyperglycemia (H)       BD ULTRA FINE PEN NEEDLES     150 Device    1 each by Percutaneous route 4 times daily    Type 1 diabetes mellitus with hyperglycemia (H)       blood glucose monitoring test strip    SURESTEP TEST STRIPS    200 each    1 strip by In Vitro route 3 times daily    Type 1 diabetes mellitus with hyperglycemia (H)       hydrochlorothiazide 12.5 MG capsule    MICROZIDE    93 capsule    Take 1 capsule (12.5 mg) by mouth daily    Essential hypertension with goal blood pressure less than 130/80       insulin detemir 100 UNIT/ML injection    LEVEMIR FLEXTOUCH    45 mL    Inject 50 Units Subcutaneous At Bedtime    Type 1 diabetes mellitus with hyperglycemia (H)       insulin lispro 100 UNIT/ML injection    HumaLOG KWIKpen    30 mL    Use 3 units before breakfast, 6 units before lunch, and 16 units before dinner, plus correction of 1 unit per every 50 glucose over 125, averaging 3 for breakfast, 6 for lunch, 16 for dinner.    Type 1 diabetes mellitus with hyperglycemia (H)       lisinopril 40 MG tablet    PRINIVIL/ZESTRIL    93 tablet    Take 1 tablet (40 mg) by mouth daily    Essential hypertension with goal blood pressure less than 130/80, Type 1 diabetes mellitus with hyperglycemia (H)       loperamide 2 MG capsule    IMODIUM    30 capsule    Start with 2 caps (4 mg), then take one cap (2 mg) after each diarrheal stool as needed. Do not use more than 8 caps (16 mg) per day.    Rectal cancer (H), Malignant neoplasm of rectum (H)       LORazepam 0.5 MG tablet    ATIVAN    30 tablet    Take 1 tablet (0.5 mg) by mouth every 4 hours as needed (Anxiety, Nausea/Vomiting or Sleep)    Rectal cancer (H), Malignant neoplasm of  rectum (H)       PARoxetine 30 MG tablet    PAXIL    93 tablet    Take 1 tablet (30 mg) by mouth every morning    Generalized anxiety disorder       prochlorperazine 10 MG tablet    COMPAZINE    30 tablet    Take 1 tablet (10 mg) by mouth every 6 hours as needed (Nausea/Vomiting)    Rectal cancer (H), Malignant neoplasm of rectum (H)       simvastatin 10 MG tablet    ZOCOR    93 tablet    TAKE ONE TABLET BY MOUTH EVERY NIGHT AT BEDTIME    Hyperlipidemia LDL goal <100

## 2018-10-24 NOTE — PROGRESS NOTES
Oncology/Hematology Visit Note  Oct 25, 2018    Reason for Visit: Follow up of locally advanced rectal adenocarcinoma    History of Present Illness: Niharika Cason is a 63 year old female with a PMH HTN, anxiety, AV7hysa newly diagnosed locally advanced rectal adenocarcinoma stage IIIb (T3 N1 M0). She underwent her first screening colonoscopy 9/17/18 where a partially obstructing mass was found in the rectosigmoid colon. She was asymptomatic at the time of the colonoscopy. CT scan of the chest, abdomen and pelvis on 9/17/2018 showed a 3 cm peripherally enhancing lesion in the superior right lobe of the liver (segment VII) in addition to the previously described rectosigmoid mass. MRI of the abdomen on 9/24/2018 further characterized the liver lesion as focal nodular hyperplasia measuring 3.8 x 3.4 cm in segment VII. There was no suspected metastatic disease in the abdomen. MRI of the pelvis on 9/28/2018 showed an irregular ulcerated thickening of the left rectal wall measuring 4 cm in length at 9 cm from the anal verge, extending through the muscularis propria into the perirectal fat. There was an additional 1.5 cm enhancing circumferential wall thickening approximately 2 cm superior to the first rectal mass and subcentimeter perirectal and presacral nodes. Final clinical stage is T3c N1 M0.     She met with Dr. Urbina who recommended adjuvant FOLFOX vs XELOX. She returns today for XELOX treatment.    Interval History:  Ms. Cason returns to clinic today with her . She overall is feeling well and has minimal complaints other than being anxious about starting treatment. She denies fevers, chills, headaches, dizziness, chest pain, SOB, cough, N/V, abdominal pain. Her bowels tend to fluctuate but are regular at the moment. No urinary concerns. No rashes, bleeding issues, or edema. She has carpal tunnel with intermittent neuropathy in her finger tips but no other neuropathy issues. Eating and drinking well. Does  admit that her port is somewhat sore.    Current Outpatient Prescriptions   Medication Sig Dispense Refill     ASPIRIN 81 MG OR TABS 1 tab po QD (Once per day) 0 0     atenolol (TENORMIN) 25 MG tablet Take 1 tablet (25 mg) by mouth daily 93 tablet 3     BASAGLAR 100 UNIT/ML injection Inject 50 Units Subcutaneous daily 15 mL 3     BD ULTRA FINE PEN NEEDLES 1 each by Percutaneous route 4 times daily 150 Device 5     blood glucose monitoring (SURESTEP TEST STRIPS) test strip 1 strip by In Vitro route 3 times daily 200 each 3     hydrochlorothiazide (MICROZIDE) 12.5 MG capsule Take 1 capsule (12.5 mg) by mouth daily 93 capsule 3     insulin detemir (LEVEMIR FLEXTOUCH) 100 UNIT/ML injection Inject 50 Units Subcutaneous At Bedtime 45 mL 3     insulin lispro (HUMALOG KWIKPEN) 100 UNIT/ML injection Use 3 units before breakfast, 6 units before lunch, and 16 units before dinner, plus correction of 1 unit per every 50 glucose over 125, averaging 3 for breakfast, 6 for lunch, 16 for dinner. 30 mL 3     lisinopril (PRINIVIL/ZESTRIL) 40 MG tablet Take 1 tablet (40 mg) by mouth daily 93 tablet 3     loperamide (IMODIUM) 2 MG capsule Start with 2 caps (4 mg), then take one cap (2 mg) after each diarrheal stool as needed. Do not use more than 8 caps (16 mg) per day. 30 capsule 0     LORazepam (ATIVAN) 0.5 MG tablet Take 1 tablet (0.5 mg) by mouth every 4 hours as needed (Anxiety, Nausea/Vomiting or Sleep) 30 tablet 2     PARoxetine (PAXIL) 30 MG tablet Take 1 tablet (30 mg) by mouth every morning 93 tablet 3     prochlorperazine (COMPAZINE) 10 MG tablet Take 1 tablet (10 mg) by mouth every 6 hours as needed (Nausea/Vomiting) 30 tablet 2     simvastatin (ZOCOR) 10 MG tablet TAKE ONE TABLET BY MOUTH EVERY NIGHT AT BEDTIME 93 tablet 3     [DISCONTINUED] BD ULTRA FINE PEN NEEDLES 1 each by Percutaneous route 4 times daily 150 Device 11       Physical Examination:  /57 (BP Location: Left arm, Patient Position: Sitting, Cuff Size:  "Adult Large)  Pulse 79  Temp 96.8  F (36  C) (Tympanic)  Resp 18  Ht 1.6 m (5' 2.99\")  Wt 81.7 kg (180 lb 3.2 oz)  LMP 10/15/2008  SpO2 96%  BMI 31.93 kg/m2  Wt Readings from Last 10 Encounters:   10/19/18 81.2 kg (179 lb)   10/15/18 81.6 kg (179 lb 14.4 oz)   10/12/18 81.2 kg (179 lb)   10/01/18 81.4 kg (179 lb 8 oz)   10/01/18 81.4 kg (179 lb 8 oz)   09/20/18 80.1 kg (176 lb 9.6 oz)   07/20/18 81.7 kg (180 lb 3.2 oz)   08/25/17 79.9 kg (176 lb 1.9 oz)   06/17/16 82.1 kg (181 lb)   11/04/15 79.7 kg (175 lb 12.8 oz)     Constitutional: Well-appearing female in no acute distress.  Eyes: EOMI, PERRL. No scleral icterus.  ENT: Oral mucosa is moist without lesions or thrush.   Lymphatic: Neck is supple without cervical or supraclavicular lymphadenopathy.   Cardiovascular: Regular rate and rhythm. No murmurs, gallops, or rubs. No peripheral edema.  Respiratory: Clear to auscultation bilaterally. No wheezes or crackles.  Gastrointestinal: Bowel sounds present. Abdomen soft, non-tender. No palpable hepatosplenomegaly or masses.   Neurologic: Cranial nerves II through XII are grossly intact.  Skin: No rashes, petechiae, or bruising noted on exposed skin.    Laboratory Data:  Results for JOSELIN GONSALES (MRN [id number]) as of 10/25/2018 07:34   10/24/2018 14:55   Sodium 141   Potassium 4.3   Chloride 104   Carbon Dioxide 31   Urea Nitrogen 22   Creatinine 0.76   GFR Estimate 77   GFR Estimate If Black >90   Calcium 9.4   Anion Gap 6   Albumin 4.0   Protein Total 7.0   Bilirubin Total 0.3   Alkaline Phosphatase 80   ALT 26   AST 17   Glucose 114 (H)   WBC 8.0   Hemoglobin 13.0   Hematocrit 38.7   Platelet Count 285   RBC Count 4.36   MCV 89   MCH 29.8   MCHC 33.6   RDW 11.9   Diff Method Automated Method   % Neutrophils 61.9   % Lymphocytes 27.4   % Monocytes 8.0   % Eosinophils 1.5   % Basophils 0.8   % Immature Granulocytes 0.4   Nucleated RBCs 0   Absolute Neutrophil 5.0   Absolute Lymphocytes 2.2   Absolute " Monocytes 0.6   Absolute Basophils 0.1   Abs Immature Granulocytes 0.0   Absolute Nucleated RBC 0.0       Assessment and Plan:  1. Onc  Stage C0qR4T2 rectal cancer-moderately differentiated adenocarcinoam with intact nuclear expression of mismatch repair proteins. Met with surgery, radiation oncology, and Dr. Urbina in medical oncology. Final plan is as follows:  -6 cycles (appx 4 months) of XELOX (Capcetiabine 1000mg/m2 BID day 1-14 and Oxaliplatin 130mg/m2 day 1 every 21 days) followed by chemorads and then consider surgical resection. Dr. Urbina does want repeat imaging (CT CAP and MRI Pelvis) residential through treatment  -Discussed toxicities of treatment including myelosuppression, neuropathy/cold sensitivity, N/V, diarrhea and sent home with Zofran, Compazine, Ativan, and Imodium  -Port placed 10/19 and is sore with access-sent home with Emla Cream  -Reviewed labs and all within parameters to move forward with treatment today  -Will see her back in one week for toxicity assessment and then she would like to do her care locally ([place]) and she is talking to care coordinator about this    2. DM1  Type 1 DM on Insulin. States her blood sugars are overall well controlled  -Did replace dex with Emend in treatment plan to avoid additional blood sugar issues  -Discussed continuing her routine sugar and insulin monitoring while on treatment and calling her PCP if needed for adjustments.    3. Cards  History of HTN and hyperlipidemia. BP stable today.   -Continue home meds-atenolol, hydrochlorothiazide, and lisinopril. Monitor and adjust as needed during treatment.   -Continue Zocor  -Hold ASA during treatment    4. Psych  History of anxiety/depression on Paxil  -Continue Paxil  -Discussed using Ativan PRN anxiety/sleep    Greater than 25 min was spent with the patient with >50% of the time spent in counseling and coordinating care.     Johnie Menendez PA-C  [facility]  [address] [place]  [address]  [phone]

## 2018-10-25 ENCOUNTER — INFUSION THERAPY VISIT (OUTPATIENT)
Dept: ONCOLOGY | Facility: CLINIC | Age: 63
End: 2018-10-25
Attending: INTERNAL MEDICINE
Payer: COMMERCIAL

## 2018-10-25 ENCOUNTER — DOCUMENTATION ONLY (OUTPATIENT)
Dept: PHARMACY | Facility: CLINIC | Age: 63
End: 2018-10-25

## 2018-10-25 ENCOUNTER — ONCOLOGY VISIT (OUTPATIENT)
Dept: ONCOLOGY | Facility: CLINIC | Age: 63
End: 2018-10-25
Attending: PHYSICIAN ASSISTANT
Payer: COMMERCIAL

## 2018-10-25 VITALS
BODY MASS INDEX: 31.93 KG/M2 | OXYGEN SATURATION: 96 % | DIASTOLIC BLOOD PRESSURE: 57 MMHG | HEIGHT: 63 IN | SYSTOLIC BLOOD PRESSURE: 129 MMHG | RESPIRATION RATE: 18 BRPM | HEART RATE: 79 BPM | TEMPERATURE: 96.8 F | WEIGHT: 180.2 LBS

## 2018-10-25 DIAGNOSIS — C20 MALIGNANT NEOPLASM OF RECTUM (H): ICD-10-CM

## 2018-10-25 DIAGNOSIS — C20 RECTAL CANCER (H): Primary | ICD-10-CM

## 2018-10-25 PROCEDURE — 96413 CHEMO IV INFUSION 1 HR: CPT

## 2018-10-25 PROCEDURE — 25000128 H RX IP 250 OP 636: Mod: ZF | Performed by: INTERNAL MEDICINE

## 2018-10-25 PROCEDURE — 25000128 H RX IP 250 OP 636: Mod: ZF | Performed by: PHYSICIAN ASSISTANT

## 2018-10-25 PROCEDURE — 96415 CHEMO IV INFUSION ADDL HR: CPT

## 2018-10-25 PROCEDURE — 96375 TX/PRO/DX INJ NEW DRUG ADDON: CPT

## 2018-10-25 PROCEDURE — G0463 HOSPITAL OUTPT CLINIC VISIT: HCPCS | Mod: ZF

## 2018-10-25 PROCEDURE — 96367 TX/PROPH/DG ADDL SEQ IV INF: CPT

## 2018-10-25 PROCEDURE — 99214 OFFICE O/P EST MOD 30 MIN: CPT | Mod: ZP | Performed by: PHYSICIAN ASSISTANT

## 2018-10-25 RX ORDER — ONDANSETRON 8 MG/1
8 TABLET, ORALLY DISINTEGRATING ORAL EVERY 8 HOURS PRN
Qty: 60 TABLET | Refills: 3 | Status: SHIPPED | OUTPATIENT
Start: 2018-10-25 | End: 2019-07-09

## 2018-10-25 RX ORDER — CAPECITABINE 500 MG/1
TABLET, FILM COATED ORAL
Qty: 112 TABLET | Refills: 0 | Status: SHIPPED | OUTPATIENT
Start: 2018-10-25 | End: 2018-11-15

## 2018-10-25 RX ORDER — ONDANSETRON 2 MG/ML
8 INJECTION INTRAMUSCULAR; INTRAVENOUS ONCE
Status: COMPLETED | OUTPATIENT
Start: 2018-10-25 | End: 2018-10-25

## 2018-10-25 RX ORDER — CAPECITABINE 500 MG/1
1000 TABLET, FILM COATED ORAL 2 TIMES DAILY
Qty: 112 TABLET | Refills: 0 | Status: SHIPPED | OUTPATIENT
Start: 2018-10-25 | End: 2018-10-25

## 2018-10-25 RX ORDER — ONDANSETRON 2 MG/ML
8 INJECTION INTRAMUSCULAR; INTRAVENOUS ONCE
Status: CANCELLED
Start: 2018-10-25 | End: 2018-10-25

## 2018-10-25 RX ORDER — LIDOCAINE/PRILOCAINE 2.5 %-2.5%
1 CREAM (GRAM) TOPICAL PRN
Qty: 30 G | Refills: 3 | Status: SHIPPED | OUTPATIENT
Start: 2018-10-25 | End: 2019-11-18

## 2018-10-25 RX ORDER — HEPARIN SODIUM (PORCINE) LOCK FLUSH IV SOLN 100 UNIT/ML 100 UNIT/ML
5 SOLUTION INTRAVENOUS ONCE
Status: COMPLETED | OUTPATIENT
Start: 2018-10-25 | End: 2018-10-25

## 2018-10-25 RX ADMIN — DEXTROSE MONOHYDRATE 250 ML: 5 INJECTION, SOLUTION INTRAVENOUS at 09:10

## 2018-10-25 RX ADMIN — OXALIPLATIN 250 MG: 5 INJECTION, SOLUTION, CONCENTRATE INTRAVENOUS at 09:46

## 2018-10-25 RX ADMIN — SODIUM CHLORIDE 150 MG: 9 INJECTION, SOLUTION INTRAVENOUS at 09:14

## 2018-10-25 RX ADMIN — HEPARIN 5 ML: 100 SYRINGE at 11:58

## 2018-10-25 RX ADMIN — ONDANSETRON 8 MG: 2 INJECTION, SOLUTION INTRAMUSCULAR; INTRAVENOUS at 09:10

## 2018-10-25 ASSESSMENT — PAIN SCALES - GENERAL: PAINLEVEL: NO PAIN (0)

## 2018-10-25 NOTE — PATIENT INSTRUCTIONS
Nausea  Take Zofran 8mg every 8 hours  Take Compazine 10mg every 6 hours  Take Ativan 0.5mg before bed if needed for nausea-this medication will make you tired and can help with anxiety as well    Diarrhea  -Take 2 tabs of Imodium with your first loose stool of the day  -Take 1 tab of Imodium with each subsequent loose stool  -You can take a maximum of 8 tabs per day  -Stay hydrated-try to drink 64oz of non-caffinated fluid a day  -Add Propel or Gatorade if you are having frequent diarrhea to help with electrolytes  -If you have >4 episodes of large, watery, diarrhea per day or any blood in the stool, please call the clinic [phone]    Rash  -Avoid laundry detergents or soaps that have any fragrances or dye  -Keep skin well moisturized with hypoallergenic lotion such as Eucerin, VaniCream, or Cerave, Udderly Smooth Cream  -If there are areas that are painful/inflammed, try hydrocortisone cream  -Let us know if the above isn't helpful as we have stronger creams that we can give you    Neuropathy  -The chemotherapy you are receiving can cause numbness/tingling in the hands and feet that is worse when exposed to cold  -Drink room temperature water for the few days following infusion, wear mittens when getting things out of the freezer  -If you start to notice trouble with function (buttoning shirts, zippers, writing, picking up small objects, walking) please let your provider or nurse know    Neutropenia  -When your white count gets too low from chemo, you are at greater risk of having an infection and having more serious issues with the infection  -Wash your hands well and avoid sick people. If you have to be around someone who is sick, they should wear a mask  -Avoid larges crowds of people. If you do have to go into a large crowd, wear a mask  -If you have a fever over 100.4, you need to call the clinic at [phone] or go to the ER as this is a medical emergency

## 2018-10-25 NOTE — MR AVS SNAPSHOT
After Visit Summary   10/25/2018    Niharika Cason    MRN: [id number]           Patient Information     Date Of Birth          1955        Visit Information        Provider Department      10/25/2018 8:00 AM  15 ATC;  ONCOLOGY INFUSION [facility]        Today's Diagnoses     Rectal cancer (H)    -  1    Malignant neoplasm of rectum (H)          Care Instructions    Contact Numbers    [facility] Main Line: [phone]  [facility] Triage and After Hours Nurse Line:  [phone]    Please call the [facility] nurse triage or the after hours nurse line if you experience a temperature greater than or equal to 100.5, shaking chills, have uncontrolled nausea, vomiting and/or diarrhea, dizziness, lightheadedness, shortness of breath, chest pain, bleeding, unexplained bruising, or if you have any other new/concerning symptoms, questions or concerns.     If you are having any concerning symptoms or wish to speak to a provider before your next infusion visit, please call your care coordinator or triage to notify them so we can adequately serve you.     If you need a refill on a narcotic prescription or other medication, please call triage before your infusion appointment.           October 2018 Sunday Monday Tuesday Wednesday Thursday Friday Saturday 1     [facility] NEW    1:45 PM   (60 min.)   Yossi White MD   [facility] NEW CANCER    2:15 PM   (60 min.)   Alli Le MD   [facility] Colon and Rectal Surgery     LAB    4:45 PM   (15 min.)    LAB   [facility] Lab 2     3     4     5     6       7     8     9     10     11     12     [facility] CONSULT    2:00 PM   (90 min.)   Ynes Jimenez MD   [facility] 13       14     15     [facility] NEW    2:45 PM   (60 min.)   Liliana Urbina MD   [facility] 16     17     18     19     Outpatient Visit    6:16 AM   [facility]     IR CHEST PORT PLACEMENT >5  YRS    6:45 AM   (75 min.)   [id number]   [facility] Imaging     INSERT PORT VASCULAR ACCESS    8:15 AM   Lawson Hitchcock PA-C   [facility] OR 20       21     22     23     24     LEVEL 1    2:30 PM   (60 min.)   NL INFUSION CHAIR 1   [facility] 25     [facility] RETURN    7:15 AM   (50 min.)   Johnie Menendez PA   [facility]     [facility] ONC INFUSION 240    8:00 AM   (240 min.)    ONCOLOGY INFUSION   [facility] 26     27       28     29     30     31 November 2018 Sunday Monday Tuesday Wednesday Thursday Friday Saturday                       1     2     [facility] DRAW    2:30 PM   (15 min.)    [facility] DRAW   [facility] Lab Draw     [facility] RETURN    2:45 PM   (50 min.)   Johnie Menendez PA   [facility] 3       4     5     6     7     8     9     10       11     12     13     14     15     16     17       18     19     20     21     22     23     24       25     26     27     28     29     30                       Lab Results:  No results found for this or any previous visit (from the past 12 hour(s)).            Follow-ups after your visit        Your next 10 appointments already scheduled     Nov 02, 2018  2:30 PM CDT   [facility] Draw with  [facility] DRAW   [facility] Lab Draw ([facility])    [address] [place] [address]  [address] [place] [id number] [phone]            Nov 02, 2018  3:00 PM CDT   (Arrive by 2:45 PM)   Return Visit with YRIS Allen   [facility] ([facility]    [address] [place] [address]  [address] [place] [id number] [phone]              Who to contact     If you have questions or need follow up information about today's clinic visit or your schedule please contact [facility] directly at [phone].  Normal or non-critical lab and  imaging results will be communicated to you by SlideMailhart, letter or phone within 4 business days after the clinic has received the results. If you do not hear from us within 7 days, please contact the clinic through [facility] [employer] or phone. If you have a critical or abnormal lab result, we will notify you by phone as soon as possible.  Submit refill requests through [employer] or call your pharmacy and they will forward the refill request to us. Please allow 3 business days for your refill to be completed.          Additional Information About Your Visit        [employer] Information     [employer] gives you secure access to your electronic health record. If you see a primary care provider, you can also send messages to your care team and make appointments. If you have questions, please call your primary care clinic.  If you do not have a primary care provider, please call [phone] and they will assist you.        Care EveryWhere ID     This is your Care EveryWhere ID. This could be used by other organizations to access your [facility] medical records  [id number]        Your Vitals Were     Last Period                   10/15/2008            Blood Pressure from Last 3 Encounters:   10/25/18 129/57   10/19/18 121/69   10/15/18 123/71    Weight from Last 3 Encounters:   10/25/18 81.7 kg (180 lb 3.2 oz)   10/19/18 81.2 kg (179 lb)   10/15/18 81.6 kg (179 lb 14.4 oz)              Today, you had the following     No orders found for display         Today's Medication Changes          These changes are accurate as of 10/25/18 11:46 AM.  If you have any questions, ask your nurse or doctor.               Start taking these medicines.        Dose/Directions    capecitabine 500 MG tablet CHEMO   Commonly known as:  XELODA   Used for:  Rectal cancer (H), Malignant neoplasm of rectum (H)        Take 4 tablets (2,000 mg) by mouth 2 times daily for 14 days Take on Days 1 - 14, then off for 7 days.   Quantity:  112 tablet   Refills:  0        ondansetron 8 MG ODT tab   Commonly known as:  ZOFRAN-ODT   Used for:  Rectal cancer (H)   Started by:  Johnie Menendez PA        Dose:  8 mg   Take 1 tablet (8 mg) by mouth every 8 hours as needed for nausea   Quantity:  60 tablet   Refills:  3         Stop taking these medicines if you haven't already. Please contact your care team if you have questions.     aspirin 81 MG tablet   Stopped by:  Johnie Menendez PA                Where to get your medicines      These medications were sent to [place], [place] - [address]  [address], [facility] [address]    Hours:  TRANSPLANT PHONE NUMBER [phone] Phone:  [phone]     capecitabine 500 MG tablet CHEMO    ondansetron 8 MG ODT tab                Primary Care Provider Office Phone # Fax #    Leona Cherri Farris [phone]       [address] [facility] [address]        Equal Access to Services     [facility] AH: Hadii clifford ku hadasho Soomaali, waaxda luqadaha, qaybta kaalmada adeegyada, hever guzman haypricila galdamez . So [facility] [phone].    ATENCIÓN: Si habla español, tiene a stokes disposición servicios gratuitos de asistencia lingüística. [facility] [phone].    We comply with applicable federal civil rights laws and [place] laws. We do not discriminate on the basis of race, color, national origin, age, disability, sex, sexual orientation, or gender identity.            Thank you!     Thank you for choosing [facility]  for your care. Our goal is always to provide you with excellent care. Hearing back from our patients is one way we can continue to improve our services. Please take a few minutes to complete the written survey that you may receive in the mail after your visit with us. Thank you!             Your Updated Medication List - Protect others around you: Learn how to safely use, store and throw away your medicines  at www.disposemymeds.org.          This list is accurate as of 10/25/18 11:46 AM.  Always use your most recent med list.                   Brand Name Dispense Instructions for use Diagnosis    atenolol 25 MG tablet    TENORMIN    93 tablet    Take 1 tablet (25 mg) by mouth daily    Essential hypertension with goal blood pressure less than 130/80       BASAGLAR 100 UNIT/ML injection     15 mL    Inject 50 Units Subcutaneous daily    Type 1 diabetes mellitus with hyperglycemia (H)       BD ULTRA FINE PEN NEEDLES     150 Device    1 each by Percutaneous route 4 times daily    Type 1 diabetes mellitus with hyperglycemia (H)       blood glucose monitoring test strip    SURESTEP TEST STRIPS    200 each    1 strip by In Vitro route 3 times daily    Type 1 diabetes mellitus with hyperglycemia (H)       capecitabine 500 MG tablet CHEMO    XELODA    112 tablet    Take 4 tablets (2,000 mg) by mouth 2 times daily for 14 days Take on Days 1 - 14, then off for 7 days.    Rectal cancer (H), Malignant neoplasm of rectum (H)       hydrochlorothiazide 12.5 MG capsule    MICROZIDE    93 capsule    Take 1 capsule (12.5 mg) by mouth daily    Essential hypertension with goal blood pressure less than 130/80       insulin detemir 100 UNIT/ML injection    LEVEMIR FLEXTOUCH    45 mL    Inject 50 Units Subcutaneous At Bedtime    Type 1 diabetes mellitus with hyperglycemia (H)       insulin lispro 100 UNIT/ML injection    HumaLOG KWIKpen    30 mL    Use 3 units before breakfast, 6 units before lunch, and 16 units before dinner, plus correction of 1 unit per every 50 glucose over 125, averaging 3 for breakfast, 6 for lunch, 16 for dinner.    Type 1 diabetes mellitus with hyperglycemia (H)       lisinopril 40 MG tablet    PRINIVIL/ZESTRIL    93 tablet    Take 1 tablet (40 mg) by mouth daily    Essential hypertension with goal blood pressure less than 130/80, Type 1 diabetes mellitus with hyperglycemia (H)       loperamide 2 MG capsule    IMODIUM     30 capsule    Start with 2 caps (4 mg), then take one cap (2 mg) after each diarrheal stool as needed. Do not use more than 8 caps (16 mg) per day.    Rectal cancer (H), Malignant neoplasm of rectum (H)       LORazepam 0.5 MG tablet    ATIVAN    30 tablet    Take 1 tablet (0.5 mg) by mouth every 4 hours as needed (Anxiety, Nausea/Vomiting or Sleep)    Rectal cancer (H), Malignant neoplasm of rectum (H)       ondansetron 8 MG ODT tab    ZOFRAN-ODT    60 tablet    Take 1 tablet (8 mg) by mouth every 8 hours as needed for nausea    Rectal cancer (H)       PARoxetine 30 MG tablet    PAXIL    93 tablet    Take 1 tablet (30 mg) by mouth every morning    Generalized anxiety disorder       prochlorperazine 10 MG tablet    COMPAZINE    30 tablet    Take 1 tablet (10 mg) by mouth every 6 hours as needed (Nausea/Vomiting)    Rectal cancer (H), Malignant neoplasm of rectum (H)       simvastatin 10 MG tablet    ZOCOR    93 tablet    TAKE ONE TABLET BY MOUTH EVERY NIGHT AT BEDTIME    Hyperlipidemia LDL goal <100

## 2018-10-25 NOTE — PATIENT INSTRUCTIONS
Contact Numbers    [facility] Main Line: [phone]  [facility] Triage and After Hours Nurse Line:  [phone]    Please call the [facility] nurse triage or the after hours nurse line if you experience a temperature greater than or equal to 100.5, shaking chills, have uncontrolled nausea, vomiting and/or diarrhea, dizziness, lightheadedness, shortness of breath, chest pain, bleeding, unexplained bruising, or if you have any other new/concerning symptoms, questions or concerns.     If you are having any concerning symptoms or wish to speak to a provider before your next infusion visit, please call your care coordinator or triage to notify them so we can adequately serve you.     If you need a refill on a narcotic prescription or other medication, please call triage before your infusion appointment.           October 2018 Sunday Monday Tuesday Wednesday Thursday Friday Saturday 1     [facility] NEW    1:45 PM   (60 min.)   Yossi White MD   [facility] NEW CANCER    2:15 PM   (60 min.)   Alli Le MD   [facility] Colon and Rectal Surgery     LAB    4:45 PM   (15 min.)    LAB   [facility] Lab 2     3     4     5     6       7     8     9     10     11     12     P CONSULT    2:00 PM   (90 min.)   Ynes Jimenez MD   [facility] 13       14     15     [facility] NEW    2:45 PM   (60 min.)   Liliana Urbina MD   [facility] 16     17     18     19     Outpatient Visit    6:16 AM   [facility]     IR CHEST PORT PLACEMENT >5 YRS    6:45 AM   (75 min.)   [email or web address]   [facility] ASC Imaging     INSERT PORT VASCULAR ACCESS    8:15 AM   Lawson Hitchcock PA-C    OR 20       21     22     23     24     LEVEL 1    2:30 PM   (60 min.)   NL INFUSION CHAIR 1   [facility]fusion [facility] 25     [facility] RETURN    7:15 AM   (50 min.)   Johnie Menendez PA   [facility] ONC INFUSION 240    8:00  AM   (240 min.)    ONCOLOGY INFUSION   [facility] 26     27       28     29     30     31 November 2018 Sunday Monday Tuesday Wednesday Thursday Friday Saturday                       1     2     [facility] DRAW    2:30 PM   (15 min.)    [facility] DRAW   [facility] Lab Draw     [facility] RETURN    2:45 PM   (50 min.)   Johnie Menendez PA   [facility] 3       4     5     6     7     8     9     10       11     12     13     14     15     16     17       18     19     20     21     22     23     24       25     26     27     28     29     30                       Lab Results:  No results found for this or any previous visit (from the past 12 hour(s)).

## 2018-10-25 NOTE — PROGRESS NOTES
Oncology/Hematology Visit Note  Oct 15, 2018    Reason for Visit:new consult for locally advanced rectal adenocarcinoma    History of Present Illness: Niharika Cason is a 63 year old female with a PMH HTN, anxiety, [id number] newly diagnosed locally advanced rectal adenocarcinoma stage IIIb (T3 N1 M0). She underwent her first screening colonoscopy 9/17/18 where a partially obstructing mass was found in the rectosigmoid colon. She was asymptomatic at the time of the colonoscopy. CT scan of the chest, abdomen and pelvis on 9/17/2018 showed a 3 cm peripherally enhancing lesion in the superior right lobe of the liver (segment VII) in addition to the previously described rectosigmoid mass. MRI of the abdomen on 9/24/2018 further characterized the liver lesion as focal nodular hyperplasia measuring 3.8 x 3.4 cm in segment VII. There was no suspected metastatic disease in the abdomen. MRI of the pelvis on 9/28/2018 showed an irregular ulcerated thickening of the left rectal wall measuring 4 cm in length at 9 cm from the anal verge, extending through the muscularis propria into the perirectal fat. There was an additional 1.5 cm enhancing circumferential wall thickening approximately 2 cm superior to the first rectal mass and subcentimeter perirectal and presacral nodes. Final clinical stage is T3c N1 M0.   She states that she doing well, denies any rectal bleeding/ pain, very anxious about the diagnosis and treatment plan.   Pt denies SOB , cough, chest pain , fevers , chills, nausea , vomiting , abdominal pain, change in bowel / bladder habits,   No c/o sore throat , mucositis,Lowe extremity swelling ,  bleeding gums, tingling or numbness, easy bruising , muscular weakness, fatigue , weight loss. Pt has not noticed any new swelling/ lymph glands .      Past Medical History:   Diagnosis Date     Essential hypertension, benign      Generalized anxiety disorder      Malignant neoplasm of rectum (H) 10/15/2018     Rectal cancer  (H) 10/15/2018     Type I (juvenile type) diabetes mellitus without mention of complication, not stated as uncontrolled     diagnosed at age 35     Ulcerative colitis, unspecified     in the 70s.       Past Surgical History:   Procedure Laterality Date     COLONOSCOPY N/A 9/17/2018    Procedure: COMBINED COLONOSCOPY, SINGLE OR MULTIPLE BIOPSY/POLYPECTOMY BY BIOPSY;  colonoscopy with polypectomies by biopsy forceps and hot snare and submucosal injection with tattoo;  Surgeon: Richard Moore MD;  Location: [facility]     COLONOSCOPY  40 yrs ago     HC COLONOSCOPY THRU STOMA, DIAGNOSTIC  1998    negative     HC CURETTAGE, POSTPARTUM  '91, '93    following miscarriages     HC REMOVAL OF TONSILS,<13 Y/O      Tonsils <14y.o.     INSERT PORT VASCULAR ACCESS Right 10/19/2018    Procedure: Chest Port Placement - right ;  Surgeon: Lawson Hitchcock PA-C;  Location: [facility]     Social History     Social History     Marital status:      Spouse name: Fabrice     Number of children: 2     Years of education: 12     Occupational History      [employer]     on her feet all day      [employer]     Social History Main Topics     Smoking status: Former Smoker     Packs/day: 0.50     Years: 15.00     Smokeless tobacco: Never Used      Comment: not smoked since mid September     Alcohol use 0.0 oz/week     0 Standard drinks or equivalent per week      Comment: 3-4 wine per wk     Drug use: No     Sexual activity: Yes     Partners: Male     Other Topics Concern      Service No     Blood Transfusions No     Caffeine Concern No     Occupational Exposure No     Hobby Hazards No     Sleep Concern No     Stress Concern No     Weight Concern No     Special Diet Yes     diabetic     Back Care Yes     Exercise Yes     Bike Helmet No     Seat Belt Yes     Self-Exams Yes     occassionally     Parent/Sibling W/ Cabg, Mi Or Angioplasty Before 65f 55m? No     Social History Narrative     Family History   Problem Relation  Age of Onset     HEART DISEASE Maternal Grandfather      MI at 52 yrs     EYE* Paternal Grandfather      cataracts     Arthritis Mother      Gynecology Mother      hysterectomy for fibroids     Hypertension Mother      Lipids Mother      GASTROINTESTINAL DISEASE Father      ulcers     HEART DISEASE Father      intermittent rapid heartbeat     Cancer Father      mesothelioma        Allergies   Allergen Reactions     No Known Drug Allergies          Current Outpatient Prescriptions   Medication Sig Dispense Refill     atenolol (TENORMIN) 25 MG tablet Take 1 tablet (25 mg) by mouth daily 93 tablet 3     BD ULTRA FINE PEN NEEDLES 1 each by Percutaneous route 4 times daily 150 Device 5     blood glucose monitoring (SURESTEP TEST STRIPS) test strip 1 strip by In Vitro route 3 times daily 200 each 3     hydrochlorothiazide (MICROZIDE) 12.5 MG capsule Take 1 capsule (12.5 mg) by mouth daily 93 capsule 3     insulin detemir (LEVEMIR FLEXTOUCH) 100 UNIT/ML injection Inject 50 Units Subcutaneous At Bedtime 45 mL 3     insulin lispro (HUMALOG KWIKPEN) 100 UNIT/ML injection Use 3 units before breakfast, 6 units before lunch, and 16 units before dinner, plus correction of 1 unit per every 50 glucose over 125, averaging 3 for breakfast, 6 for lunch, 16 for dinner. 30 mL 3     lisinopril (PRINIVIL/ZESTRIL) 40 MG tablet Take 1 tablet (40 mg) by mouth daily 93 tablet 3     PARoxetine (PAXIL) 30 MG tablet Take 1 tablet (30 mg) by mouth every morning 93 tablet 3     simvastatin (ZOCOR) 10 MG tablet TAKE ONE TABLET BY MOUTH EVERY NIGHT AT BEDTIME 93 tablet 3     BASAGLAR 100 UNIT/ML injection Inject 50 Units Subcutaneous daily 15 mL 3     capecitabine (XELODA) 500 MG tablet CHEMO Take 4 tablets (2,000 mg) by mouth 2 times daily for 14 days Take on Days 1 - 14, then off for 7 days. 112 tablet 0     lidocaine-prilocaine (EMLA) cream Apply 1 g topically as needed for moderate pain 30 g 3     loperamide (IMODIUM) 2 MG capsule Start with 2  "caps (4 mg), then take one cap (2 mg) after each diarrheal stool as needed. Do not use more than 8 caps (16 mg) per day. 30 capsule 0     LORazepam (ATIVAN) 0.5 MG tablet Take 1 tablet (0.5 mg) by mouth every 4 hours as needed (Anxiety, Nausea/Vomiting or Sleep) 30 tablet 2     ondansetron (ZOFRAN-ODT) 8 MG ODT tab Take 1 tablet (8 mg) by mouth every 8 hours as needed for nausea 60 tablet 3     prochlorperazine (COMPAZINE) 10 MG tablet Take 1 tablet (10 mg) by mouth every 6 hours as needed (Nausea/Vomiting) 30 tablet 2     [DISCONTINUED] BD ULTRA FINE PEN NEEDLES 1 each by Percutaneous route 4 times daily 150 Device 11     [DISCONTINUED] capecitabine (XELODA) 500 MG tablet CHEMO Take 4 tablets (2,000 mg) by mouth 2 times daily for 14 days Take on Days 1 through 14, then off for 7 days. 112 tablet 0       Exam:   BP Readings from Last 1 Encounters:   10/25/18 129/57      Pulse Readings from Last 1 Encounters:   10/25/18 79      Resp Readings from Last 1 Encounters:   10/25/18 18      Temp Readings from Last 1 Encounters:   10/25/18 96.8  F (36  C) (Tympanic)      SpO2 Readings from Last 1 Encounters:   10/25/18 96%      Wt Readings from Last 1 Encounters:   10/25/18 81.7 kg (180 lb 3.2 oz)      Ht Readings from Last 1 Encounters:   10/25/18 1.6 m (5' 2.99\")       Gen: Appears well, no distress,  HEENT: No scleral icterus or hemorrahge, no wet purpura  Chest -RRR  Lungs CTA B/l   Abd: distended, NTTP   Ext: no edema  MSK: no abn   Psych : appropriate affect   CN- 2-12 intact , moving all extremities appropriately   Skin - no rashes       Labs :  Results for JOSELIN GONSALES (MRN [id number]) as of 10/25/2018 13:41   Ref. Range 10/1/2018 16:41   Sodium Latest Ref Range: 133 - 144 mmol/L 134   Potassium Latest Ref Range: 3.4 - 5.3 mmol/L 4.6   Chloride Latest Ref Range: 94 - 109 mmol/L 102   Carbon Dioxide Latest Ref Range: 20 - 32 mmol/L 26   Urea Nitrogen Latest Ref Range: 7 - 30 mg/dL 16   Creatinine Latest Ref Range: " 0.52 - 1.04 mg/dL 0.76   GFR Estimate Latest Ref Range: >60 mL/min/1.7m2 77   GFR Estimate If Black Latest Ref Range: >60 mL/min/1.7m2 >90   Calcium Latest Ref Range: 8.5 - 10.1 mg/dL 9.5   Anion Gap Latest Ref Range: 3 - 14 mmol/L 6   Albumin Latest Ref Range: 3.4 - 5.0 g/dL 4.4   Prealbumin Latest Ref Range: 15 - 45 mg/dL 28   Protein Total Latest Ref Range: 6.8 - 8.8 g/dL 7.5   Bilirubin Total Latest Ref Range: 0.2 - 1.3 mg/dL 0.4   Alkaline Phosphatase Latest Ref Range: 40 - 150 U/L 79   ALT Latest Ref Range: 0 - 50 U/L 25   AST Latest Ref Range: 0 - 45 U/L 16   Glucose Latest Ref Range: 70 - 99 mg/dL 240 (H)   WBC Latest Ref Range: 4.0 - 11.0 10e9/L 8.7   Hemoglobin Latest Ref Range: 11.7 - 15.7 g/dL 14.0   Hematocrit Latest Ref Range: 35.0 - 47.0 % 42.1   Platelet Count Latest Ref Range: 150 - 450 10e9/L 261   RBC Count Latest Ref Range: 3.8 - 5.2 10e12/L 4.74   MCV Latest Ref Range: 78 - 100 fl 89   MCH Latest Ref Range: 26.5 - 33.0 pg 29.5   MCHC Latest Ref Range: 31.5 - 36.5 g/dL 33.3   RDW Latest Ref Range: 10.0 - 15.0 % 11.8   Diff Method Unknown Automated Method   % Neutrophils Latest Units: % 67.5   % Lymphocytes Latest Units: % 23.5   % Monocytes Latest Units: % 7.4   % Eosinophils Latest Units: % 0.8   % Basophils Latest Units: % 0.6   % Immature Granulocytes Latest Units: % 0.2   Nucleated RBCs Latest Ref Range: 0 /100 0   Absolute Neutrophil Latest Ref Range: 1.6 - 8.3 10e9/L 5.9   Absolute Lymphocytes Latest Ref Range: 0.8 - 5.3 10e9/L 2.0   Absolute Monocytes Latest Ref Range: 0.0 - 1.3 10e9/L 0.6   Absolute Eosinophils Latest Ref Range: 0.0 - 0.7 10e9/L 0.1   Absolute Basophils Latest Ref Range: 0.0 - 0.2 10e9/L 0.1   Abs Immature Granulocytes Latest Ref Range: 0 - 0.4 10e9/L 0.0   Absolute Nucleated RBC Unknown 0.0   ABO Unknown B   RH(D) Unknown Pos   Antibody Screen Unknown Neg   Test Valid Only At Latest Units:     [facility]   Specimen Expires Unknown 10/04/2018   CEA Latest Ref  Range: 0 - 2.5 ug/L 8.9 (H)        Imaging : as deatiled in HPI     A/P :     Rectal cancer [id number] invasive moderately differentiated adenocarcinoma with intact nuclear expression of mismatch repair (MMR) proteins:   Had a detailed discussion with the patients regarding the diagnosis and treatment plan-   Discussed total neoadjuvant approach with 8 cycles of FOLFOX every 2 weeks  vs 6 cycles of Xelox every 3 weeks  based on insurance approval - restaging scan to assess response in 2 months followed by another scan at the end of adjuvant chemo --> chemo rads with xeloda for 2 months ---> scan and then eval for surgery vs wait and watch approach.  We discussed the details of treatment including chemotherapy side effects which are inclusive but not limited to nausea/vomiting , hair loss, drop in blood counts causing anemia, infections , bleeding leading to hospitalization , fatigue, diarrhea, mouth sores, skin changes, constipation/diarrhea, muscle and bone pain , neuropathy, impact on the kidney and liver function , cystitis , risk for toxicity to heart and lung , infusion reactions,  decreased libido , teratogenicity in case of conception , permanent infertility and potential immunological side effects were explained to them .    5FU - bolus + infusion vs BID 2 weeks on 1 week off xeloda was discussed, Hypersensitivity and neuropathy with Oxaliplatin was especially discussed.   She will get a port.    They asked several intelligent questions all of which were answered to their satisfaction and after processing  all the information they decided to proceed with the proposed treatment albiet they do understand that at any point in time they can decide to stop the current  treatment and consider other options .     She will see me or ANA LUISA prior to initiation of chemo.   I spent 55 minutes in the care of this patient >50% of which was spent in coordinating and counseling.    Liliana Urbina   of Medicine    Hematology and medical Oncology   [facility]

## 2018-10-25 NOTE — Clinical Note
10/25/2018       RE: Niharika Cason  [id number] [address] [place] [id number]     Dear Colleague,    Thank you for referring your patient, Niharika Cason, to the [facility]. Please see a copy of my visit note below.    Oncology/Hematology Visit Note  Oct 25, 2018    Reason for Visit: Follow up of locally advanced rectal adenocarcinoma    History of Present Illness: Niharika Cason is a 63 year old female with a PMH HTN, anxiety, [id number] newly diagnosed locally advanced rectal adenocarcinoma stage IIIb (T3 N1 M0). She underwent her first screening colonoscopy 9/17/18 where a partially obstructing mass was found in the rectosigmoid colon. She was asymptomatic at the time of the colonoscopy. CT scan of the chest, abdomen and pelvis on 9/17/2018 showed a 3 cm peripherally enhancing lesion in the superior right lobe of the liver (segment VII) in addition to the previously described rectosigmoid mass. MRI of the abdomen on 9/24/2018 further characterized the liver lesion as focal nodular hyperplasia measuring 3.8 x 3.4 cm in segment VII. There was no suspected metastatic disease in the abdomen. MRI of the pelvis on 9/28/2018 showed an irregular ulcerated thickening of the left rectal wall measuring 4 cm in length at 9 cm from the anal verge, extending through the muscularis propria into the perirectal fat. There was an additional 1.5 cm enhancing circumferential wall thickening approximately 2 cm superior to the first rectal mass and subcentimeter perirectal and presacral nodes. Final clinical stage is T3c N1 M0.     She met with Dr. Urbina who recommended adjuvant FOLFOX vs XELOX. She returns today for XELOX treatment.    Interval History:  Ms. Cason returns to clinic today with her . She overall is feeling well and has minimal complaints other than being anxious about starting treatment. She denies fevers, chills, headaches, dizziness, chest pain, SOB, cough, N/V, abdominal pain. Her bowels tend to  fluctuate but are regular at the moment. No urinary concerns. No rashes, bleeding issues, or edema. She has carpal tunnel with intermittent neuropathy in her finger tips but no other neuropathy issues. Eating and drinking well. Does admit that her port is somewhat sore.    Current Outpatient Prescriptions   Medication Sig Dispense Refill     ASPIRIN 81 MG OR TABS 1 tab po QD (Once per day) 0 0     atenolol (TENORMIN) 25 MG tablet Take 1 tablet (25 mg) by mouth daily 93 tablet 3     BASAGLAR 100 UNIT/ML injection Inject 50 Units Subcutaneous daily 15 mL 3     BD ULTRA FINE PEN NEEDLES 1 each by Percutaneous route 4 times daily 150 Device 5     blood glucose monitoring (SURESTEP TEST STRIPS) test strip 1 strip by In Vitro route 3 times daily 200 each 3     hydrochlorothiazide (MICROZIDE) 12.5 MG capsule Take 1 capsule (12.5 mg) by mouth daily 93 capsule 3     insulin detemir (LEVEMIR FLEXTOUCH) 100 UNIT/ML injection Inject 50 Units Subcutaneous At Bedtime 45 mL 3     insulin lispro (HUMALOG KWIKPEN) 100 UNIT/ML injection Use 3 units before breakfast, 6 units before lunch, and 16 units before dinner, plus correction of 1 unit per every 50 glucose over 125, averaging 3 for breakfast, 6 for lunch, 16 for dinner. 30 mL 3     lisinopril (PRINIVIL/ZESTRIL) 40 MG tablet Take 1 tablet (40 mg) by mouth daily 93 tablet 3     loperamide (IMODIUM) 2 MG capsule Start with 2 caps (4 mg), then take one cap (2 mg) after each diarrheal stool as needed. Do not use more than 8 caps (16 mg) per day. 30 capsule 0     LORazepam (ATIVAN) 0.5 MG tablet Take 1 tablet (0.5 mg) by mouth every 4 hours as needed (Anxiety, Nausea/Vomiting or Sleep) 30 tablet 2     PARoxetine (PAXIL) 30 MG tablet Take 1 tablet (30 mg) by mouth every morning 93 tablet 3     prochlorperazine (COMPAZINE) 10 MG tablet Take 1 tablet (10 mg) by mouth every 6 hours as needed (Nausea/Vomiting) 30 tablet 2     simvastatin (ZOCOR) 10 MG tablet TAKE ONE TABLET BY MOUTH EVERY  "NIGHT AT BEDTIME 93 tablet 3     [DISCONTINUED] BD ULTRA FINE PEN NEEDLES 1 each by Percutaneous route 4 times daily 150 Device 11       Physical Examination:  /57 (BP Location: Left arm, Patient Position: Sitting, Cuff Size: Adult Large)  Pulse 79  Temp 96.8  F (36  C) (Tympanic)  Resp 18  Ht 1.6 m (5' 2.99\")  Wt 81.7 kg (180 lb 3.2 oz)  LMP 10/15/2008  SpO2 96%  BMI 31.93 kg/m2  Wt Readings from Last 10 Encounters:   10/19/18 81.2 kg (179 lb)   10/15/18 81.6 kg (179 lb 14.4 oz)   10/12/18 81.2 kg (179 lb)   10/01/18 81.4 kg (179 lb 8 oz)   10/01/18 81.4 kg (179 lb 8 oz)   09/20/18 80.1 kg (176 lb 9.6 oz)   07/20/18 81.7 kg (180 lb 3.2 oz)   08/25/17 79.9 kg (176 lb 1.9 oz)   06/17/16 82.1 kg (181 lb)   11/04/15 79.7 kg (175 lb 12.8 oz)     Constitutional: Well-appearing female in no acute distress.  Eyes: EOMI, PERRL. No scleral icterus.  ENT: Oral mucosa is moist without lesions or thrush.   Lymphatic: Neck is supple without cervical or supraclavicular lymphadenopathy.   Cardiovascular: Regular rate and rhythm. No murmurs, gallops, or rubs. No peripheral edema.  Respiratory: Clear to auscultation bilaterally. No wheezes or crackles.  Gastrointestinal: Bowel sounds present. Abdomen soft, non-tender. No palpable hepatosplenomegaly or masses.   Neurologic: Cranial nerves II through XII are grossly intact.  Skin: No rashes, petechiae, or bruising noted on exposed skin.    Laboratory Data:  Results for JOSELIN GONSALES (MRN [id number]) as of 10/25/2018 07:34   10/24/2018 14:55   Sodium 141   Potassium 4.3   Chloride 104   Carbon Dioxide 31   Urea Nitrogen 22   Creatinine 0.76   GFR Estimate 77   GFR Estimate If Black >90   Calcium 9.4   Anion Gap 6   Albumin 4.0   Protein Total 7.0   Bilirubin Total 0.3   Alkaline Phosphatase 80   ALT 26   AST 17   Glucose 114 (H)   WBC 8.0   Hemoglobin 13.0   Hematocrit 38.7   Platelet Count 285   RBC Count 4.36   MCV 89   MCH 29.8   MCHC 33.6   RDW 11.9   Diff Method " Automated Method   % Neutrophils 61.9   % Lymphocytes 27.4   % Monocytes 8.0   % Eosinophils 1.5   % Basophils 0.8   % Immature Granulocytes 0.4   Nucleated RBCs 0   Absolute Neutrophil 5.0   Absolute Lymphocytes 2.2   Absolute Monocytes 0.6   Absolute Basophils 0.1   Abs Immature Granulocytes 0.0   Absolute Nucleated RBC 0.0       Assessment and Plan:  IN PROGRESS        Johnie Menendez PA-C  [address] [id number] [phone]      Again, thank you for allowing me to participate in the care of your patient.      Sincerely,    YRIS Allen

## 2018-10-25 NOTE — PROGRESS NOTES
Infusion Nursing Note:  Niharika Cason presents today for Cycle 1 Day 1 Xelox.    Patient seen by provider today: Yes: Johnie Menendez, ANA LUISA   present during visit today: Not Applicable.    Note: Asked patient if she received education on her treatment - patient confirmed. Educated patient on cold sensitivity side effect of Oxaliplatin. Educated patient on high risk of reacting to Emend for first-time recipients. Patient understood S/Sx to look for and to use her call light.  E-kit placed at bedside along with NS bag.    Pt tolerated Emend without any complaints/concerns.  Talked in detail with patient about cold sensitivity of Oxaliplatin, and to be aware of cold sensation in throat and to let us know if that happens.    Intravenous Access:  Implanted Port.    Treatment Conditions:  Lab Results   Component Value Date    HGB 13.0 10/24/2018     Lab Results   Component Value Date    WBC 8.0 10/24/2018      Lab Results   Component Value Date    ANEU 5.0 10/24/2018     Lab Results   Component Value Date     10/24/2018      Lab Results   Component Value Date     10/24/2018                   Lab Results   Component Value Date    POTASSIUM 4.3 10/24/2018           Lab Results   Component Value Date    MAG 1.9 02/27/2004            Lab Results   Component Value Date    CR 0.76 10/24/2018                   Lab Results   Component Value Date    JUNG 9.4 10/24/2018                Lab Results   Component Value Date    BILITOTAL 0.3 10/24/2018           Lab Results   Component Value Date    ALBUMIN 4.0 10/24/2018                    Lab Results   Component Value Date    ALT 26 10/24/2018           Lab Results   Component Value Date    AST 17 10/24/2018       Results reviewed, labs MET treatment parameters, ok to proceed with treatment.      Post Infusion Assessment:  Patient tolerated infusion without incident.  Blood return noted pre and post infusion.  Newly placed port - site patent and intact,  slightly ecchymotic, and tender. Instructed patient to let Dermabond come off on its own.  No evidence of extravasations.  Access discontinued per protocol.    Discharge Plan:   Prescription refills given for Zofran and Xeloda. Confirmed that Xeloda was approved and pharmacy will bring up to patient.  Discharge instructions reviewed with: Patient and .  Patient and/or family verbalized understanding of discharge instructions and all questions answered.  Copy of AVS reviewed with patient and/or family.  Patient will return Friday 11/2 for labs and visit with Johnie APP. Johnie wants chemo appts to be set up at [facility] - scheduling will be notified per Johnie.  Patient discharged in stable condition accompanied by: .  Departure Mode: Ambulatory.  Face to Face time: 20 minutes.    Ban Busby RN

## 2018-10-25 NOTE — NURSING NOTE
"Oncology Rooming Note    October 25, 2018 7:36 AM   Niharika Cason is a 63 year old female who presents for:    Chief Complaint   Patient presents with     Oncology Clinic Visit     Return visit related to Malignant Neoplasm of Rectum.     Initial Vitals: /57 (BP Location: Left arm, Patient Position: Sitting, Cuff Size: Adult Large)  Pulse 79  Temp 96.8  F (36  C) (Tympanic)  Resp 18  Ht 1.6 m (5' 2.99\")  Wt 81.7 kg (180 lb 3.2 oz)  LMP 10/15/2008  SpO2 96%  BMI 31.93 kg/m2 Estimated body mass index is 31.93 kg/(m^2) as calculated from the following:    Height as of this encounter: 1.6 m (5' 2.99\").    Weight as of this encounter: 81.7 kg (180 lb 3.2 oz). Body surface area is 1.91 meters squared.  No Pain (0) Comment: Data Unavailable   Patient's last menstrual period was 10/15/2008.  Allergies reviewed: Yes  Medications reviewed: Yes    Medications: Medication refills not needed today.  Pharmacy name entered into [employer]: [facility] [place], [place] - [address]     Clinical concerns: No new concerns. Provider was notified.    10 minutes for nursing intake (face to face time)     Mireya Allan LPN            "

## 2018-10-25 NOTE — MR AVS SNAPSHOT
After Visit Summary   10/25/2018    Niharika Cason    MRN: [id number]           Patient Information     Date Of Birth          1955        Visit Information        Provider Department      10/25/2018 7:30 AM Johnie Menendez PA [facility]        Today's Diagnoses     Rectal cancer (H)    -  1    Malignant neoplasm of rectum (H)          Care Instructions    Nausea  Take Zofran 8mg every 8 hours  Take Compazine 10mg every 6 hours  Take Ativan 0.5mg before bed if needed for nausea-this medication will make you tired and can help with anxiety as well    Diarrhea  -Take 2 tabs of Imodium with your first loose stool of the day  -Take 1 tab of Imodium with each subsequent loose stool  -You can take a maximum of 8 tabs per day  -Stay hydrated-try to drink 64oz of non-caffinated fluid a day  -Add Propel or Gatorade if you are having frequent diarrhea to help with electrolytes  -If you have >4 episodes of large, watery, diarrhea per day or any blood in the stool, please call the clinic [phone]    Rash  -Avoid laundry detergents or soaps that have any fragrances or dye  -Keep skin well moisturized with hypoallergenic lotion such as Eucerin, VaniCream, or Cerave, Udderly Smooth Cream  -If there are areas that are painful/inflammed, try hydrocortisone cream  -Let us know if the above isn't helpful as we have stronger creams that we can give you    Neuropathy  -The chemotherapy you are receiving can cause numbness/tingling in the hands and feet that is worse when exposed to cold  -Drink room temperature water for the few days following infusion, wear mittens when getting things out of the freezer  -If you start to notice trouble with function (buttoning shirts, zippers, writing, picking up small objects, walking) please let your provider or nurse know    Neutropenia  -When your white count gets too low from chemo, you are at greater risk of having an infection and having more  serious issues with the infection  -Wash your hands well and avoid sick people. If you have to be around someone who is sick, they should wear a mask  -Avoid larges crowds of people. If you do have to go into a large crowd, wear a mask  -If you have a fever over 100.4, you need to call the clinic at [phone] or go to the ER as this is a medical emergency                    Follow-ups after your visit        Your next 10 appointments already scheduled     Nov 02, 2018  2:30 PM CDT   [facility] Lab Draw with  [facility] LAB DRAW   [facility] ([facility])    [address] [place] [address]  [address]  [facility] [address] [phone]            Nov 02, 2018  3:00 PM CDT   (Arrive by 2:45 PM)   Return Visit with YRIS Allen   [facility] ([facility])    [address] [place] [address]  [address]  [facility] [address] [phone]              Future tests that were ordered for you today     Open Future Orders        Priority Expected Expires Ordered    CBC with platelets differential Routine 11/2/2018 10/25/2019 10/25/2018    Comprehensive metabolic panel Routine 11/2/2018 10/25/2019 10/25/2018            Who to contact     If you have questions or need follow up information about today's clinic visit or your schedule please contact [facility] directly at [phone].  Normal or non-critical lab and imaging results will be communicated to you by MyChart, letter or phone within 4 business days after the clinic has received the results. If you do not hear from us within 7 days, please contact the clinic through MyChart or phone. If you have a critical or abnormal lab result, we will notify you by phone as soon as possible.  Submit refill requests through [employer] or call your pharmacy and they will forward the refill request to us. Please allow 3 business days for your refill to be completed.          Additional  "Information About Your Visit        MyChart Information     [employer] gives you secure access to your electronic health record. If you see a primary care provider, you can also send messages to your care team and make appointments. If you have questions, please call your primary care clinic.  If you do not have a primary care provider, please call [phone] and they will assist you.        Care EveryWhere ID     This is your Care EveryWhere ID. This could be used by other organizations to access your [facility] medical records  [id number]        Your Vitals Were     Pulse Temperature Respirations Height Last Period Pulse Oximetry    79 96.8  F (36  C) (Tympanic) 18 1.6 m (5' 2.99\") 10/15/2008 96%    BMI (Body Mass Index)                   31.93 kg/m2            Blood Pressure from Last 3 Encounters:   10/25/18 129/57   10/19/18 121/69   10/15/18 123/71    Weight from Last 3 Encounters:   10/25/18 81.7 kg (180 lb 3.2 oz)   10/19/18 81.2 kg (179 lb)   10/15/18 81.6 kg (179 lb 14.4 oz)                 Today's Medication Changes          These changes are accurate as of 10/25/18 11:59 PM.  If you have any questions, ask your nurse or doctor.               Start taking these medicines.        Dose/Directions    capecitabine 500 MG tablet CHEMO   Commonly known as:  XELODA   Used for:  Rectal cancer (H), Malignant neoplasm of rectum (H)        Take 4 tablets (2,000 mg) by mouth 2 times daily for 14 days Take on Days 1 - 14, then off for 7 days.   Quantity:  112 tablet   Refills:  0       lidocaine-prilocaine cream   Commonly known as:  EMLA   Used for:  Rectal cancer (H)   Started by:  Johnie Menendez PA        Dose:  1 g   Apply 1 g topically as needed for moderate pain   Quantity:  30 g   Refills:  3       ondansetron 8 MG ODT tab   Commonly known as:  ZOFRAN-ODT   Used for:  Rectal cancer (H)   Started by:  Johnie Menendez PA        Dose:  8 mg   Take 1 tablet (8 mg) by mouth every 8 hours as needed for " nausea   Quantity:  60 tablet   Refills:  3         Stop taking these medicines if you haven't already. Please contact your care team if you have questions.     aspirin 81 MG tablet   Stopped by:  Johnie Menendez PA                Where to get your medicines      These medications were sent to [place], [place] - [address]  [address], [facility] [address]    Hours:  TRANSPLANT PHONE NUMBER [phone] Phone:  [phone]     capecitabine 500 MG tablet CHEMO    lidocaine-prilocaine cream    ondansetron 8 MG ODT tab                Primary Care Provider Office Phone # Fax #    Leona Cherri Farris [phone]       [address] [place] [address]        Equal Access to Services     RHONDA BARBA : Hadii clifford walker hadasho Soomaali, waaxda luqadaha, qaybta kaalmada adeegyada, hever guzman haypricila galdamez . So [facility] [phone].    ATENCIÓN: Si habla español, tiene a stokes disposición servicios gratuitos de asistencia lingüística. Spencer al [phone].    We comply with applicable federal civil rights laws and [place] laws. We do not discriminate on the basis of race, color, national origin, age, disability, sex, sexual orientation, or gender identity.            Thank you!     Thank you for choosing [facility]  for your care. Our goal is always to provide you with excellent care. Hearing back from our patients is one way we can continue to improve our services. Please take a few minutes to complete the written survey that you may receive in the mail after your visit with us. Thank you!             Your Updated Medication List - Protect others around you: Learn how to safely use, store and throw away your medicines at www.disposemymeds.org.          This list is accurate as of 10/25/18 11:59 PM.  Always use your most recent med list.                   Brand Name Dispense Instructions for  use Diagnosis    atenolol 25 MG tablet    TENORMIN    93 tablet    Take 1 tablet (25 mg) by mouth daily    Essential hypertension with goal blood pressure less than 130/80       BASAGLAR 100 UNIT/ML injection     15 mL    Inject 50 Units Subcutaneous daily    Type 1 diabetes mellitus with hyperglycemia (H)       BD ULTRA FINE PEN NEEDLES     150 Device    1 each by Percutaneous route 4 times daily    Type 1 diabetes mellitus with hyperglycemia (H)       blood glucose monitoring test strip    SURESTEP TEST STRIPS    200 each    1 strip by In Vitro route 3 times daily    Type 1 diabetes mellitus with hyperglycemia (H)       capecitabine 500 MG tablet CHEMO    XELODA    112 tablet    Take 4 tablets (2,000 mg) by mouth 2 times daily for 14 days Take on Days 1 - 14, then off for 7 days.    Rectal cancer (H), Malignant neoplasm of rectum (H)       hydrochlorothiazide 12.5 MG capsule    MICROZIDE    93 capsule    Take 1 capsule (12.5 mg) by mouth daily    Essential hypertension with goal blood pressure less than 130/80       insulin detemir 100 UNIT/ML injection    LEVEMIR FLEXTOUCH    45 mL    Inject 50 Units Subcutaneous At Bedtime    Type 1 diabetes mellitus with hyperglycemia (H)       insulin lispro 100 UNIT/ML injection    HumaLOG KWIKpen    30 mL    Use 3 units before breakfast, 6 units before lunch, and 16 units before dinner, plus correction of 1 unit per every 50 glucose over 125, averaging 3 for breakfast, 6 for lunch, 16 for dinner.    Type 1 diabetes mellitus with hyperglycemia (H)       lidocaine-prilocaine cream    EMLA    30 g    Apply 1 g topically as needed for moderate pain    Rectal cancer (H)       lisinopril 40 MG tablet    PRINIVIL/ZESTRIL    93 tablet    Take 1 tablet (40 mg) by mouth daily    Essential hypertension with goal blood pressure less than 130/80, Type 1 diabetes mellitus with hyperglycemia (H)       loperamide 2 MG capsule    IMODIUM    30 capsule    Start with 2 caps (4 mg), then take  one cap (2 mg) after each diarrheal stool as needed. Do not use more than 8 caps (16 mg) per day.    Rectal cancer (H), Malignant neoplasm of rectum (H)       LORazepam 0.5 MG tablet    ATIVAN    30 tablet    Take 1 tablet (0.5 mg) by mouth every 4 hours as needed (Anxiety, Nausea/Vomiting or Sleep)    Rectal cancer (H), Malignant neoplasm of rectum (H)       ondansetron 8 MG ODT tab    ZOFRAN-ODT    60 tablet    Take 1 tablet (8 mg) by mouth every 8 hours as needed for nausea    Rectal cancer (H)       PARoxetine 30 MG tablet    PAXIL    93 tablet    Take 1 tablet (30 mg) by mouth every morning    Generalized anxiety disorder       prochlorperazine 10 MG tablet    COMPAZINE    30 tablet    Take 1 tablet (10 mg) by mouth every 6 hours as needed (Nausea/Vomiting)    Rectal cancer (H), Malignant neoplasm of rectum (H)       simvastatin 10 MG tablet    ZOCOR    93 tablet    TAKE ONE TABLET BY MOUTH EVERY NIGHT AT BEDTIME    Hyperlipidemia LDL goal <100

## 2018-10-25 NOTE — LETTER
10/25/2018      RE: Niharika Cason  [address] [facility] [address]       Oncology/Hematology Visit Note  Oct 25, 2018    Reason for Visit: Follow up of locally advanced rectal adenocarcinoma    History of Present Illness: Niharika Cason is a 63 year old female with a PMH HTN, anxiety, QN4nhpi newly diagnosed locally advanced rectal adenocarcinoma stage IIIb (T3 N1 M0). She underwent her first screening colonoscopy 9/17/18 where a partially obstructing mass was found in the rectosigmoid colon. She was asymptomatic at the time of the colonoscopy. CT scan of the chest, abdomen and pelvis on 9/17/2018 showed a 3 cm peripherally enhancing lesion in the superior right lobe of the liver (segment VII) in addition to the previously described rectosigmoid mass. MRI of the abdomen on 9/24/2018 further characterized the liver lesion as focal nodular hyperplasia measuring 3.8 x 3.4 cm in segment VII. There was no suspected metastatic disease in the abdomen. MRI of the pelvis on 9/28/2018 showed an irregular ulcerated thickening of the left rectal wall measuring 4 cm in length at 9 cm from the anal verge, extending through the muscularis propria into the perirectal fat. There was an additional 1.5 cm enhancing circumferential wall thickening approximately 2 cm superior to the first rectal mass and subcentimeter perirectal and presacral nodes. Final clinical stage is T3c N1 M0.     She met with Dr. Urbina who recommended adjuvant FOLFOX vs XELOX. She returns today for XELOX treatment.    Interval History:  Ms. Cason returns to clinic today with her . She overall is feeling well and has minimal complaints other than being anxious about starting treatment. She denies fevers, chills, headaches, dizziness, chest pain, SOB, cough, N/V, abdominal pain. Her bowels tend to fluctuate but are regular at the moment. No urinary concerns. No rashes, bleeding issues, or edema. She has carpal tunnel with intermittent neuropathy in  her finger tips but no other neuropathy issues. Eating and drinking well. Does admit that her port is somewhat sore.    Current Outpatient Prescriptions   Medication Sig Dispense Refill     ASPIRIN 81 MG OR TABS 1 tab po QD (Once per day) 0 0     atenolol (TENORMIN) 25 MG tablet Take 1 tablet (25 mg) by mouth daily 93 tablet 3     BASAGLAR 100 UNIT/ML injection Inject 50 Units Subcutaneous daily 15 mL 3     BD ULTRA FINE PEN NEEDLES 1 each by Percutaneous route 4 times daily 150 Device 5     blood glucose monitoring (SURESTEP TEST STRIPS) test strip 1 strip by In Vitro route 3 times daily 200 each 3     hydrochlorothiazide (MICROZIDE) 12.5 MG capsule Take 1 capsule (12.5 mg) by mouth daily 93 capsule 3     insulin detemir (LEVEMIR FLEXTOUCH) 100 UNIT/ML injection Inject 50 Units Subcutaneous At Bedtime 45 mL 3     insulin lispro (HUMALOG KWIKPEN) 100 UNIT/ML injection Use 3 units before breakfast, 6 units before lunch, and 16 units before dinner, plus correction of 1 unit per every 50 glucose over 125, averaging 3 for breakfast, 6 for lunch, 16 for dinner. 30 mL 3     lisinopril (PRINIVIL/ZESTRIL) 40 MG tablet Take 1 tablet (40 mg) by mouth daily 93 tablet 3     loperamide (IMODIUM) 2 MG capsule Start with 2 caps (4 mg), then take one cap (2 mg) after each diarrheal stool as needed. Do not use more than 8 caps (16 mg) per day. 30 capsule 0     LORazepam (ATIVAN) 0.5 MG tablet Take 1 tablet (0.5 mg) by mouth every 4 hours as needed (Anxiety, Nausea/Vomiting or Sleep) 30 tablet 2     PARoxetine (PAXIL) 30 MG tablet Take 1 tablet (30 mg) by mouth every morning 93 tablet 3     prochlorperazine (COMPAZINE) 10 MG tablet Take 1 tablet (10 mg) by mouth every 6 hours as needed (Nausea/Vomiting) 30 tablet 2     simvastatin (ZOCOR) 10 MG tablet TAKE ONE TABLET BY MOUTH EVERY NIGHT AT BEDTIME 93 tablet 3     [DISCONTINUED] BD ULTRA FINE PEN NEEDLES 1 each by Percutaneous route 4 times daily 150 Device 11       Physical  "Examination:  /57 (BP Location: Left arm, Patient Position: Sitting, Cuff Size: Adult Large)  Pulse 79  Temp 96.8  F (36  C) (Tympanic)  Resp 18  Ht 1.6 m (5' 2.99\")  Wt 81.7 kg (180 lb 3.2 oz)  LMP 10/15/2008  SpO2 96%  BMI 31.93 kg/m2  Wt Readings from Last 10 Encounters:   10/19/18 81.2 kg (179 lb)   10/15/18 81.6 kg (179 lb 14.4 oz)   10/12/18 81.2 kg (179 lb)   10/01/18 81.4 kg (179 lb 8 oz)   10/01/18 81.4 kg (179 lb 8 oz)   09/20/18 80.1 kg (176 lb 9.6 oz)   07/20/18 81.7 kg (180 lb 3.2 oz)   08/25/17 79.9 kg (176 lb 1.9 oz)   06/17/16 82.1 kg (181 lb)   11/04/15 79.7 kg (175 lb 12.8 oz)     Constitutional: Well-appearing female in no acute distress.  Eyes: EOMI, PERRL. No scleral icterus.  ENT: Oral mucosa is moist without lesions or thrush.   Lymphatic: Neck is supple without cervical or supraclavicular lymphadenopathy.   Cardiovascular: Regular rate and rhythm. No murmurs, gallops, or rubs. No peripheral edema.  Respiratory: Clear to auscultation bilaterally. No wheezes or crackles.  Gastrointestinal: Bowel sounds present. Abdomen soft, non-tender. No palpable hepatosplenomegaly or masses.   Neurologic: Cranial nerves II through XII are grossly intact.  Skin: No rashes, petechiae, or bruising noted on exposed skin.    Laboratory Data:  Results for JOSELIN GONSALES (MRN [id number]) as of 10/25/2018 07:34   10/24/2018 14:55   Sodium 141   Potassium 4.3   Chloride 104   Carbon Dioxide 31   Urea Nitrogen 22   Creatinine 0.76   GFR Estimate 77   GFR Estimate If Black >90   Calcium 9.4   Anion Gap 6   Albumin 4.0   Protein Total 7.0   Bilirubin Total 0.3   Alkaline Phosphatase 80   ALT 26   AST 17   Glucose 114 (H)   WBC 8.0   Hemoglobin 13.0   Hematocrit 38.7   Platelet Count 285   RBC Count 4.36   MCV 89   MCH 29.8   MCHC 33.6   RDW 11.9   Diff Method Automated Method   % Neutrophils 61.9   % Lymphocytes 27.4   % Monocytes 8.0   % Eosinophils 1.5   % Basophils 0.8   % Immature Granulocytes 0.4 "   Nucleated RBCs 0   Absolute Neutrophil 5.0   Absolute Lymphocytes 2.2   Absolute Monocytes 0.6   Absolute Basophils 0.1   Abs Immature Granulocytes 0.0   Absolute Nucleated RBC 0.0       Assessment and Plan:  1. Onc  Stage [id number] rectal cancer-moderately differentiated adenocarcinoam with intact nuclear expression of mismatch repair proteins. Met with surgery, radiation oncology, and Dr. Urbina in medical oncology. Final plan is as follows:  -6 cycles (appx 4 months) of XELOX (Capcetiabine 1000mg/m2 BID day 1-14 and Oxaliplatin 130mg/m2 day 1 every 21 days) followed by chemorads and then consider surgical resection. Dr. Urbina does want repeat imaging (CT CAP and MRI Pelvis) USP through treatment  -Discussed toxicities of treatment including myelosuppression, neuropathy/cold sensitivity, N/V, diarrhea and sent home with Zofran, Compazine, Ativan, and Imodium  -Port placed 10/19 and is sore with access-sent home with Emla Cream  -Reviewed labs and all within parameters to move forward with treatment today  -Will see her back in one week for toxicity assessment and then she would like to do her care locally ([place]) and she is talking to care coordinator about this    2. DM1  Type 1 DM on Insulin. States her blood sugars are overall well controlled  -Did replace dex with Emend in treatment plan to avoid additional blood sugar issues  -Discussed continuing her routine sugar and insulin monitoring while on treatment and calling her PCP if needed for adjustments.    3. Cards  History of HTN and hyperlipidemia. BP stable today.   -Continue home meds-atenolol, hydrochlorothiazide, and lisinopril. Monitor and adjust as needed during treatment.   -Continue Zocor  -Hold ASA during treatment    4. Psych  History of anxiety/depression on Paxil  -Continue Paxil  -Discussed using Ativan PRN anxiety/sleep    Greater than 25 min was spent with the patient with >50% of the time spent in counseling and coordinating care.      Johnie Menendez PA-C  [facility]  [address] [place] [address] [phone]

## 2018-10-26 ENCOUNTER — TELEPHONE (OUTPATIENT)
Dept: ONCOLOGY | Facility: CLINIC | Age: 63
End: 2018-10-26

## 2018-10-26 NOTE — TELEPHONE ENCOUNTER
Dr. Urbina called back. Saw Epic message but did not ever receive the text page.     Advised to continue to monitor over the weekend, good to update us on Monday 10/29. Thought to be side effect of Oxalipatin infusion.

## 2018-10-26 NOTE — TELEPHONE ENCOUNTER
Called Niharika marquez. Did not hear back from Dr. Urbina. Monitor symptom over the weekend and call back on Monday 10/29. If tingling becomes severely painful or: numbness spreads, weakness develops, difficulty with movement, abnormal muscle movements, or skin changes are noted to affected area should be seen in ED.     She verbalized understanding

## 2018-10-26 NOTE — TELEPHONE ENCOUNTER
[facility] Telephone Triage Note    Assessment: Patient called in to triage reporting the following symptoms: tingling and feeling of sandpaper to skin on buttocks and posterior upper thighs. This began yesterday after Xelox infusion. She denies swelling to affected areas or sores, changes in skin texture or warmth. She denies any weakness to legs, difficulty walking, muscle twitching. Denies using any new personal hygiene products, detergent or new unwashed pants. She does not have any new rectal bleeding, rectal pain or changes in bowel movement. She says she otherwise feels well and is at work today. Denies fever/chills. She also reports increased salivary production with eating. Does not notice increased saliva in the absence of food. Does not have oral pain. . Also started oral Xeloda 2000mg yesterday.     Recommendations: .  Continue to monitor this. Will notify ANGELICA LLANES via page.    Follow-Up: Patient voiced understanding of advice and/or instructions given.

## 2018-10-29 NOTE — TELEPHONE ENCOUNTER
"Pt called back to update on symptoms reported Friday. She state the tingling of buttocks and thighs is minimal now. The only symptoms she is still dealing with is a sensation of \"sharp pains\" to jaws along with grimacing, with the first couple of bites of food she eats. She equates this to the feeling one has when eating \"sour foods\" but denied any additional salivating, swallowing problems or ongoing symptoms after she's had a few bites. She is scheduled to see Johnie ARNDT on Friday but wants to know if she should just monitor or expect.  "

## 2018-10-29 NOTE — PROGRESS NOTES
"Oral Chemotherapy Monitoring Program    Primary Oncologist: Dr. Urbina  Primary Oncology Clinic: [facility]  Cancer Diagnosis: Rectal Cancer    Drug: Capecitabine 2000 mg PO BID, 2 weeks on, 1 week off  Start Date: 10/25/18   Dose is appropriate for patients: BSA and Renal Function   Expected duration of therapy: neoadjuvant 6 cycles of Xelox    Drug Interaction Assessment: no drug-drug interactions    Lab Monitoring Plan: Labs will be drawn on Day 1 of each cycle (CMP and CBC)    Subjective/Objective:  Niharika Cason is a 63 year old female seen in clinic(seen in infusion on 10/25/18) for an initial visit for oral chemotherapy education.     ORAL CHEMOTHERAPY 10/29/2018   Drug Name Xeloda (Capecitabine)   Current Dosage 2000mg   Current Schedule BID   Cycle Details 2 weeks on 1 week off   Start Date of Last Cycle 10/25/2018       Last PHQ-2 Score on record:   PHQ-2 ( 1999 [facility]) 7/20/2018 8/28/2017   Q1: Little interest or pleasure in doing things 1 1   Q2: Feeling down, depressed or hopeless 1 1   PHQ-2 Score 2 2   Q1: Little interest or pleasure in doing things Several days -   Q2: Feeling down, depressed or hopeless Several days -   PHQ-2 Score 2 -       Patient does not report depression symptoms.      Vitals:  BP:   BP Readings from Last 1 Encounters:   10/25/18 129/57     Wt Readings from Last 1 Encounters:   10/25/18 81.7 kg (180 lb 3.2 oz)     Estimated body surface area is 1.91 meters squared as calculated from the following:    Height as of an earlier encounter on 10/25/18: 1.6 m (5' 2.99\").    Weight as of an earlier encounter on 10/25/18: 81.7 kg (180 lb 3.2 oz).      Labs:  _  Result Component Current Result Ref Range   Sodium 141 (10/24/2018) 133 - 144 mmol/L     _  Result Component Current Result Ref Range   Potassium 4.3 (10/24/2018) 3.4 - 5.3 mmol/L     _  Result Component Current Result Ref Range   Calcium 9.4 (10/24/2018) 8.5 - 10.1 mg/dL     No results found for Mag within last 30 days.     No " results found for Phos within last 30 days.     _  Result Component Current Result Ref Range   Albumin 4.0 (10/24/2018) 3.4 - 5.0 g/dL     _  Result Component Current Result Ref Range   Urea Nitrogen 22 (10/24/2018) 7 - 30 mg/dL     _  Result Component Current Result Ref Range   Creatinine 0.76 (10/24/2018) 0.52 - 1.04 mg/dL       _  Result Component Current Result Ref Range   AST 17 (10/24/2018) 0 - 45 U/L     _  Result Component Current Result Ref Range   ALT 26 (10/24/2018) 0 - 50 U/L     _  Result Component Current Result Ref Range   Bilirubin Total 0.3 (10/24/2018) 0.2 - 1.3 mg/dL       _  Result Component Current Result Ref Range   WBC 8.0 (10/24/2018) 4.0 - 11.0 10e9/L     _  Result Component Current Result Ref Range   Hemoglobin 13.0 (10/24/2018) 11.7 - 15.7 g/dL     _  Result Component Current Result Ref Range   Platelet Count 285 (10/24/2018) 150 - 450 10e9/L     _  Result Component Current Result Ref Range   Absolute Neutrophil 5.0 (10/24/2018) 1.6 - 8.3 10e9/L       No concerning lab abnormalities     Assessment:  Patient is appropriate to start therapy.    Plan:  Basic chemotherapy teaching was reviewed with the patient and the patient's family including indication, start date of therapy, dose, administration, adverse effects, missed doses, food and drug interactions, monitoring, side effect management, office contact information, and safe handling. Written materials were provided and all questions answered. Patient does not wish to enroll in [employer] Anywhere at this time but may consider it for future cycles    Follow-Up:  Will review provider appointment 11/2/18     Jose Barlow, PharmD, BCOP

## 2018-10-30 ENCOUNTER — TELEPHONE (OUTPATIENT)
Dept: ONCOLOGY | Facility: CLINIC | Age: 63
End: 2018-10-30

## 2018-10-30 NOTE — TELEPHONE ENCOUNTER
[facility] Telephone Triage Note    Assessment: Patient called in to triage reporting the following symptoms: rectal bleeding noticed today with BM. BM is bright red, noticed when wiping with toilet paper. Also reports stomacheache on/off starting today. No nausea medication needed so far today. Took a zofran on Sunday and another on Monday. Didn't feel like vomiting but stomach felt a little unsettled per patient. She is able to drink fluids and eat food. She has no pain with BMs. Unsure if she has hemorrhoids. .    Recommendations: .  Continue to monitor, if bleeding continues with subsequent BMs call back. If new or worsen symptoms develop call back. Sees Johnie on 11/2.    Follow-Up: Patient voiced understanding of advice and/or instructions given.     Routed to Johnie and Dr. Urbina

## 2018-11-01 NOTE — PROGRESS NOTES
Oncology/Hematology Visit Note  Nov 2, 2018    Reason for Visit: Follow up of locally advanced rectal adenocarcinoma     History of Present Illness: Niharika Cason is a 63 year old female with a PMH HTN, anxiety, [id number] newly diagnosed locally advanced rectal adenocarcinoma stage IIIb (T3 N1 M0). She underwent her first screening colonoscopy 9/17/18 where a partially obstructing mass was found in the rectosigmoid colon. She was asymptomatic at the time of the colonoscopy. CT scan of the chest, abdomen and pelvis on 9/17/2018 showed a 3 cm peripherally enhancing lesion in the superior right lobe of the liver (segment VII) in addition to the previously described rectosigmoid mass. MRI of the abdomen on 9/24/2018 further characterized the liver lesion as focal nodular hyperplasia measuring 3.8 x 3.4 cm in segment VII. There was no suspected metastatic disease in the abdomen. MRI of the pelvis on 9/28/2018 showed an irregular ulcerated thickening of the left rectal wall measuring 4 cm in length at 9 cm from the anal verge, extending through the muscularis propria into the perirectal fat. There was an additional 1.5 cm enhancing circumferential wall thickening approximately 2 cm superior to the first rectal mass and subcentimeter perirectal and presacral nodes. Final clinical stage is T3c N1 M0.      She met with Dr. Urbina who recommended adjuvant XELOX, day 1 10/25/18. She returns today for toxicity assessment.    Interval History:  Ms. Cason returns to clinic today with her . She overall feels like the first infusion went well. She did have nausea days 3-5 after treatment for which she took Zofran daily with good relief and no vomiting. The nausea is now resolved. She admits she wasn't eating or drinking as much the days she felt nauseous but now this is improved. Her blood sugars were running low those days so she has been decreasing her insulin. She has not had any issues with the Xeloda thus far.    She does  "admit to constipation that started after taking Zofran. The bowels are now moving but are hard and she has noted intermittent scant rectal bleeding. No rectal pain or abdominal pain.    She did have cold sensitivity starting the first day of treatment lasting a few days that is now resolved along with sensitivity with first bite that she describes as the sensation when you eat something sour. This did not interfere with eating and is now resolved. She also noted \"tingling\" on her buttock when sitting on the toilet that also resolved. Has noted mild numbness/tingling in the fingertips as well that is resolved. None of the above neuropathy symptoms were too bothersome or interfered with function.    She otherwise feels well. No fevers, chills, headaches, dizziness, chest pain, SOB, cough, abdominal pain, bladder concerns, edema, rashes. Port is less bothersome as well and she has EMLA cream if needed. She is wanting to switch her infusions to [place] to be closer to home.    Current Outpatient Prescriptions   Medication Sig Dispense Refill     atenolol (TENORMIN) 25 MG tablet Take 1 tablet (25 mg) by mouth daily 93 tablet 3     BASAGLAR 100 UNIT/ML injection Inject 50 Units Subcutaneous daily 15 mL 3     BD ULTRA FINE PEN NEEDLES 1 each by Percutaneous route 4 times daily 150 Device 5     blood glucose monitoring (SURESTEP TEST STRIPS) test strip 1 strip by In Vitro route 3 times daily 200 each 3     capecitabine (XELODA) 500 MG tablet CHEMO Take 4 tablets (2,000 mg) by mouth 2 times daily for 14 days Take on Days 1 - 14, then off for 7 days. 112 tablet 0     hydrochlorothiazide (MICROZIDE) 12.5 MG capsule Take 1 capsule (12.5 mg) by mouth daily 93 capsule 3     insulin detemir (LEVEMIR FLEXTOUCH) 100 UNIT/ML injection Inject 50 Units Subcutaneous At Bedtime 45 mL 3     insulin lispro (HUMALOG KWIKPEN) 100 UNIT/ML injection Use 3 units before breakfast, 6 units before lunch, and 16 units before dinner, plus " correction of 1 unit per every 50 glucose over 125, averaging 3 for breakfast, 6 for lunch, 16 for dinner. 30 mL 3     lidocaine-prilocaine (EMLA) cream Apply 1 g topically as needed for moderate pain 30 g 3     lisinopril (PRINIVIL/ZESTRIL) 40 MG tablet Take 1 tablet (40 mg) by mouth daily 93 tablet 3     loperamide (IMODIUM) 2 MG capsule Start with 2 caps (4 mg), then take one cap (2 mg) after each diarrheal stool as needed. Do not use more than 8 caps (16 mg) per day. 30 capsule 0     LORazepam (ATIVAN) 0.5 MG tablet Take 1 tablet (0.5 mg) by mouth every 4 hours as needed (Anxiety, Nausea/Vomiting or Sleep) 30 tablet 2     ondansetron (ZOFRAN-ODT) 8 MG ODT tab Take 1 tablet (8 mg) by mouth every 8 hours as needed for nausea 60 tablet 3     PARoxetine (PAXIL) 30 MG tablet Take 1 tablet (30 mg) by mouth every morning 93 tablet 3     prochlorperazine (COMPAZINE) 10 MG tablet Take 1 tablet (10 mg) by mouth every 6 hours as needed (Nausea/Vomiting) 30 tablet 2     simvastatin (ZOCOR) 10 MG tablet TAKE ONE TABLET BY MOUTH EVERY NIGHT AT BEDTIME 93 tablet 3     [DISCONTINUED] BD ULTRA FINE PEN NEEDLES 1 each by Percutaneous route 4 times daily 150 Device 11       Physical Examination:  /58 (BP Location: Right arm, Patient Position: Sitting, Cuff Size: Adult Large)  Pulse 84  Temp 99.1  F (37.3  C) (Oral)  Resp 16  Wt 81.2 kg (179 lb)  LMP 10/15/2008  SpO2 96%  BMI 31.72 kg/m2  Wt Readings from Last 10 Encounters:   10/25/18 81.7 kg (180 lb 3.2 oz)   10/19/18 81.2 kg (179 lb)   10/15/18 81.6 kg (179 lb 14.4 oz)   10/12/18 81.2 kg (179 lb)   10/01/18 81.4 kg (179 lb 8 oz)   10/01/18 81.4 kg (179 lb 8 oz)   09/20/18 80.1 kg (176 lb 9.6 oz)   07/20/18 81.7 kg (180 lb 3.2 oz)   08/25/17 79.9 kg (176 lb 1.9 oz)   06/17/16 82.1 kg (181 lb)     Constitutional: Well-appearing female in no acute distress.  Eyes: EOMI, PERRL. No scleral icterus.  ENT: Oral mucosa is moist without lesions or thrush. Faint erythema on  neck stable per patient.  Lymphatic: Neck is supple without cervical or supraclavicular lymphadenopathy.   Cardiovascular: Regular rate and rhythm. No murmurs, gallops, or rubs. No peripheral edema.  Respiratory: Clear to auscultation bilaterally. No wheezes or crackles.  Gastrointestinal: Bowel sounds present. Abdomen soft, non-tender. No palpable hepatosplenomegaly or masses.   Neurologic: Cranial nerves II through XII are grossly intact.  Skin: No rashes, petechiae, or bruising noted on exposed skin.    Laboratory Data:  Results for JOSELIN GONSALES (MRN [id number]) as of 11/3/2018 13:38   11/2/2018 14:34   Sodium 136   Potassium 3.5   Chloride 101   Carbon Dioxide 26   Urea Nitrogen 17   Creatinine 0.75   GFR Estimate 79   GFR Estimate If Black >90   Calcium 8.7   Anion Gap 9   Albumin 3.9   Protein Total 7.2   Bilirubin Total 0.3   Alkaline Phosphatase 78   ALT 28   AST 22   Glucose 82   WBC 8.0   Hemoglobin 13.3   Hematocrit 38.4   Platelet Count 270   RBC Count 4.46   MCV 86   MCH 29.8   MCHC 34.6   RDW 11.4   Diff Method Automated Method   % Neutrophils 61.8   % Lymphocytes 28.7   % Monocytes 7.5   % Eosinophils 1.3   % Basophils 0.4   % Immature Granulocytes 0.3   Nucleated RBCs 0   Absolute Neutrophil 4.9   Absolute Lymphocytes 2.3   Absolute Monocytes 0.6   Absolute Eosinophils 0.1   Absolute Basophils 0.0   Abs Immature Granulocytes 0.0   Absolute Nucleated RBC 0.0       Assessment and Plan:  1. Onc  Stage [id number] rectal cancer-moderately differentiated adenocarcinoam with intact nuclear expression of mismatch repair proteins. Met with surgery, radiation oncology, and Dr. Urbina in medical oncology. Final plan is as follows:  -6 cycles (appx 4 months) of XELOX (Capcetiabine 1000mg/m2 BID day 1-14 and Oxaliplatin 130mg/m2 day 1 every 21 days) followed by chemorads and then consider surgical resection. Dr. Urbina does want repeat imaging (CT CAP and MRI Pelvis) FDC through treatment  -Day 1 10/25. Overall  tolerated first Oxaliplatin well with mild nausea, constipation, and neuropathy that is now resolved. Tolerating Xeloda well with no issues.  -Port placed 10/19 and discomfort improving. Continue PRN Emla Cream  -She will be due for cycle 2 11/15. She is interested in switching her care to [place] to be closer to home. Spoke with Dr. Lawton and he is willing to assume care of the patient. Will work on getting this scheduled    2. Neuropathy  -Grade 1 neuropathy including cold sensitivity, first-bite symptoms, and fingertip neuropathy that resolved with first week of treatment. Continue to monitor.     3. GI  -Mild nausea with treatment without vomiting. Try Compazine instead of Zofran in the future to see if less constipation  -For constipation, start Colace or Miralax daily until one soft stool a day. She has scant rectal bleeding, likely from constipation/rectal tumor as she had this previously. Hgb stable. Asked her to monitor for improvement in bleeding with improvement in constipation     4. DM1  -Type 1 DM on Insulin. States her blood sugars are overall well controlled, though have been lower with less intake this last week.  -Did replace dex with Emend in treatment plan to avoid additional blood sugar issues  -With slightly lower blood sugars asked her to continue to monitor closely and adjust her insulin as needed with her PCP.     5. Cards  -History of HTN and hyperlipidemia. BP stable today.   -Continue home meds-atenolol, hydrochlorothiazide, and lisinopril. Monitor and adjust as needed during treatment.   -Continue Zocor  -Hold ASA during treatment     6. Psych  History of anxiety/depression on Paxil  -Continue Paxil  -Discussed using Ativan PRN anxiety/sleep    Greater than 25 min was spent with the patient with >50% of the time spent in counseling and coordinating care.     Johnie Menendez PA-C  [facility]  [address], [place] [address] [phone]

## 2018-11-02 ENCOUNTER — APPOINTMENT (OUTPATIENT)
Dept: LAB | Facility: CLINIC | Age: 63
End: 2018-11-02
Attending: PHYSICIAN ASSISTANT
Payer: COMMERCIAL

## 2018-11-02 ENCOUNTER — ONCOLOGY VISIT (OUTPATIENT)
Dept: ONCOLOGY | Facility: CLINIC | Age: 63
End: 2018-11-02
Attending: PHYSICIAN ASSISTANT
Payer: COMMERCIAL

## 2018-11-02 VITALS
WEIGHT: 179 LBS | RESPIRATION RATE: 16 BRPM | SYSTOLIC BLOOD PRESSURE: 114 MMHG | OXYGEN SATURATION: 96 % | TEMPERATURE: 99.1 F | HEART RATE: 84 BPM | DIASTOLIC BLOOD PRESSURE: 58 MMHG | BODY MASS INDEX: 31.72 KG/M2

## 2018-11-02 DIAGNOSIS — C20 RECTAL CANCER (H): ICD-10-CM

## 2018-11-02 LAB
ALBUMIN SERPL-MCNC: 3.9 G/DL (ref 3.4–5)
ALP SERPL-CCNC: 78 U/L (ref 40–150)
ALT SERPL W P-5'-P-CCNC: 28 U/L (ref 0–50)
ANION GAP SERPL CALCULATED.3IONS-SCNC: 9 MMOL/L (ref 3–14)
AST SERPL W P-5'-P-CCNC: 22 U/L (ref 0–45)
BASOPHILS # BLD AUTO: 0 10E9/L (ref 0–0.2)
BASOPHILS NFR BLD AUTO: 0.4 %
BILIRUB SERPL-MCNC: 0.3 MG/DL (ref 0.2–1.3)
BUN SERPL-MCNC: 17 MG/DL (ref 7–30)
CALCIUM SERPL-MCNC: 8.7 MG/DL (ref 8.5–10.1)
CHLORIDE SERPL-SCNC: 101 MMOL/L (ref 94–109)
CO2 SERPL-SCNC: 26 MMOL/L (ref 20–32)
CREAT SERPL-MCNC: 0.75 MG/DL (ref 0.52–1.04)
DIFFERENTIAL METHOD BLD: NORMAL
EOSINOPHIL # BLD AUTO: 0.1 10E9/L (ref 0–0.7)
EOSINOPHIL NFR BLD AUTO: 1.3 %
ERYTHROCYTE [DISTWIDTH] IN BLOOD BY AUTOMATED COUNT: 11.4 % (ref 10–15)
GFR SERPL CREATININE-BSD FRML MDRD: 79 ML/MIN/1.7M2
GLUCOSE SERPL-MCNC: 82 MG/DL (ref 70–99)
HCT VFR BLD AUTO: 38.4 % (ref 35–47)
HGB BLD-MCNC: 13.3 G/DL (ref 11.7–15.7)
IMM GRANULOCYTES # BLD: 0 10E9/L (ref 0–0.4)
IMM GRANULOCYTES NFR BLD: 0.3 %
LYMPHOCYTES # BLD AUTO: 2.3 10E9/L (ref 0.8–5.3)
LYMPHOCYTES NFR BLD AUTO: 28.7 %
MCH RBC QN AUTO: 29.8 PG (ref 26.5–33)
MCHC RBC AUTO-ENTMCNC: 34.6 G/DL (ref 31.5–36.5)
MCV RBC AUTO: 86 FL (ref 78–100)
MONOCYTES # BLD AUTO: 0.6 10E9/L (ref 0–1.3)
MONOCYTES NFR BLD AUTO: 7.5 %
NEUTROPHILS # BLD AUTO: 4.9 10E9/L (ref 1.6–8.3)
NEUTROPHILS NFR BLD AUTO: 61.8 %
NRBC # BLD AUTO: 0 10*3/UL
NRBC BLD AUTO-RTO: 0 /100
PLATELET # BLD AUTO: 270 10E9/L (ref 150–450)
POTASSIUM SERPL-SCNC: 3.5 MMOL/L (ref 3.4–5.3)
PROT SERPL-MCNC: 7.2 G/DL (ref 6.8–8.8)
RBC # BLD AUTO: 4.46 10E12/L (ref 3.8–5.2)
SODIUM SERPL-SCNC: 136 MMOL/L (ref 133–144)
WBC # BLD AUTO: 8 10E9/L (ref 4–11)

## 2018-11-02 PROCEDURE — 36591 DRAW BLOOD OFF VENOUS DEVICE: CPT

## 2018-11-02 PROCEDURE — 80053 COMPREHEN METABOLIC PANEL: CPT | Performed by: PHYSICIAN ASSISTANT

## 2018-11-02 PROCEDURE — 85025 COMPLETE CBC W/AUTO DIFF WBC: CPT | Performed by: PHYSICIAN ASSISTANT

## 2018-11-02 PROCEDURE — 99214 OFFICE O/P EST MOD 30 MIN: CPT | Mod: ZP | Performed by: PHYSICIAN ASSISTANT

## 2018-11-02 PROCEDURE — 25000128 H RX IP 250 OP 636: Mod: ZF | Performed by: PHYSICIAN ASSISTANT

## 2018-11-02 PROCEDURE — G0463 HOSPITAL OUTPT CLINIC VISIT: HCPCS

## 2018-11-02 RX ORDER — HEPARIN SODIUM (PORCINE) LOCK FLUSH IV SOLN 100 UNIT/ML 100 UNIT/ML
5 SOLUTION INTRAVENOUS
Status: COMPLETED | OUTPATIENT
Start: 2018-11-02 | End: 2018-11-02

## 2018-11-02 RX ADMIN — HEPARIN 5 ML: 100 SYRINGE at 14:27

## 2018-11-02 ASSESSMENT — PAIN SCALES - GENERAL: PAINLEVEL: NO PAIN (0)

## 2018-11-02 NOTE — Clinical Note
11/2/2018       RE: Niharika Cason  [id number] [facility] [id number]     Dear Colleague,    Thank you for referring your patient, Niharika Cason, to the [facility]. Please see a copy of my visit note below.    Oncology/Hematology Visit Note  Nov 2, 2018    Reason for Visit: Follow up of locally advanced rectal adenocarcinoma     History of Present Illness: Niharika Cason is a 63 year old female with a PMH HTN, anxiety, [id number] newly diagnosed locally advanced rectal adenocarcinoma stage IIIb (T3 N1 M0). She underwent her first screening colonoscopy 9/17/18 where a partially obstructing mass was found in the rectosigmoid colon. She was asymptomatic at the time of the colonoscopy. CT scan of the chest, abdomen and pelvis on 9/17/2018 showed a 3 cm peripherally enhancing lesion in the superior right lobe of the liver (segment VII) in addition to the previously described rectosigmoid mass. MRI of the abdomen on 9/24/2018 further characterized the liver lesion as focal nodular hyperplasia measuring 3.8 x 3.4 cm in segment VII. There was no suspected metastatic disease in the abdomen. MRI of the pelvis on 9/28/2018 showed an irregular ulcerated thickening of the left rectal wall measuring 4 cm in length at 9 cm from the anal verge, extending through the muscularis propria into the perirectal fat. There was an additional 1.5 cm enhancing circumferential wall thickening approximately 2 cm superior to the first rectal mass and subcentimeter perirectal and presacral nodes. Final clinical stage is T3c N1 M0.      She met with Dr. Urbina who recommended adjuvant XELOX, day 1 10/25/18. She returns today for toxicity assessment.    Interval History:      Review of Systems:  Patient denies fevers, chills, night sweats, unexplained weight changes, headaches, dizziness, vision or hearing changes, new lumps or bumps, chest pain, shortness of breath, cough, abdominal pain, nausea, vomiting, changes to bowel or  bladder, swelling of extremities, bleeding issues, or rash.    Current Outpatient Prescriptions   Medication Sig Dispense Refill     atenolol (TENORMIN) 25 MG tablet Take 1 tablet (25 mg) by mouth daily 93 tablet 3     BASAGLAR 100 UNIT/ML injection Inject 50 Units Subcutaneous daily 15 mL 3     BD ULTRA FINE PEN NEEDLES 1 each by Percutaneous route 4 times daily 150 Device 5     blood glucose monitoring (SURESTEP TEST STRIPS) test strip 1 strip by In Vitro route 3 times daily 200 each 3     capecitabine (XELODA) 500 MG tablet CHEMO Take 4 tablets (2,000 mg) by mouth 2 times daily for 14 days Take on Days 1 - 14, then off for 7 days. 112 tablet 0     hydrochlorothiazide (MICROZIDE) 12.5 MG capsule Take 1 capsule (12.5 mg) by mouth daily 93 capsule 3     insulin detemir (LEVEMIR FLEXTOUCH) 100 UNIT/ML injection Inject 50 Units Subcutaneous At Bedtime 45 mL 3     insulin lispro (HUMALOG KWIKPEN) 100 UNIT/ML injection Use 3 units before breakfast, 6 units before lunch, and 16 units before dinner, plus correction of 1 unit per every 50 glucose over 125, averaging 3 for breakfast, 6 for lunch, 16 for dinner. 30 mL 3     lidocaine-prilocaine (EMLA) cream Apply 1 g topically as needed for moderate pain 30 g 3     lisinopril (PRINIVIL/ZESTRIL) 40 MG tablet Take 1 tablet (40 mg) by mouth daily 93 tablet 3     loperamide (IMODIUM) 2 MG capsule Start with 2 caps (4 mg), then take one cap (2 mg) after each diarrheal stool as needed. Do not use more than 8 caps (16 mg) per day. 30 capsule 0     LORazepam (ATIVAN) 0.5 MG tablet Take 1 tablet (0.5 mg) by mouth every 4 hours as needed (Anxiety, Nausea/Vomiting or Sleep) 30 tablet 2     ondansetron (ZOFRAN-ODT) 8 MG ODT tab Take 1 tablet (8 mg) by mouth every 8 hours as needed for nausea 60 tablet 3     PARoxetine (PAXIL) 30 MG tablet Take 1 tablet (30 mg) by mouth every morning 93 tablet 3     prochlorperazine (COMPAZINE) 10 MG tablet Take 1 tablet (10 mg) by mouth every 6 hours as  needed (Nausea/Vomiting) 30 tablet 2     simvastatin (ZOCOR) 10 MG tablet TAKE ONE TABLET BY MOUTH EVERY NIGHT AT BEDTIME 93 tablet 3     [DISCONTINUED] BD ULTRA FINE PEN NEEDLES 1 each by Percutaneous route 4 times daily 150 Device 11       Physical Examination:  LMP 10/15/2008  Wt Readings from Last 10 Encounters:   10/25/18 81.7 kg (180 lb 3.2 oz)   10/19/18 81.2 kg (179 lb)   10/15/18 81.6 kg (179 lb 14.4 oz)   10/12/18 81.2 kg (179 lb)   10/01/18 81.4 kg (179 lb 8 oz)   10/01/18 81.4 kg (179 lb 8 oz)   09/20/18 80.1 kg (176 lb 9.6 oz)   07/20/18 81.7 kg (180 lb 3.2 oz)   08/25/17 79.9 kg (176 lb 1.9 oz)   06/17/16 82.1 kg (181 lb)     Constitutional: Well-appearing female in no acute distress.  Eyes: EOMI, PERRL. No scleral icterus.  ENT: Oral mucosa is moist without lesions or thrush.   Lymphatic: Neck is supple without cervical or supraclavicular lymphadenopathy. No axillary lymphadenopathy.  Cardiovascular: Regular rate and rhythm. No murmurs, gallops, or rubs. No peripheral edema.  Respiratory: Clear to auscultation bilaterally. No wheezes or crackles.  Gastrointestinal: Bowel sounds present. Abdomen soft, non-tender. No palpable hepatosplenomegaly or masses.   Neurologic: Cranial nerves II through XII are grossly intact.  Skin: No rashes, petechiae, or bruising noted on exposed skin.    Laboratory Data:        Assessment and Plan:          Johnie Menendez PA-C  [facility] [place] [id number] [place] [address]  [phone]      Again, thank you for allowing me to participate in the care of your patient.      Sincerely,    YRIS Allen

## 2018-11-02 NOTE — PATIENT INSTRUCTIONS
Bowels  Miralax-start with half a capful as needed for constipation  OR   Colace-take one pill daily as needed for constipation. Can take 2 if still having hard stools  Monitor the bleeding- it should improve with the bowels being softer

## 2018-11-02 NOTE — NURSING NOTE
"Oncology Rooming Note    November 2, 2018 3:03 PM   Niharika Cason is a 63 year old female who presents for:    Chief Complaint   Patient presents with     Port Draw     labs drawn from port by rn.  vs taken     Oncology Clinic Visit     Neoplasm of Rectum      Initial Vitals: /58 (BP Location: Right arm, Patient Position: Sitting, Cuff Size: Adult Large)  Pulse 84  Temp 99.1  F (37.3  C) (Oral)  Resp 16  Wt 81.2 kg (179 lb)  LMP 10/15/2008  SpO2 96%  BMI 31.72 kg/m2 Estimated body mass index is 31.72 kg/(m^2) as calculated from the following:    Height as of 10/25/18: 1.6 m (5' 2.99\").    Weight as of this encounter: 81.2 kg (179 lb). Body surface area is 1.9 meters squared.  No Pain (0) Comment: Data Unavailable   Patient's last menstrual period was 10/15/2008.  Allergies reviewed: Yes  Medications reviewed: Yes    Medications: Medication refills not needed today.  Pharmacy name entered into [facility]: [facility] [place], [place] - [address]     Clinical concerns: labs  Johnie  was notified.    5  minutes for nursing intake (face to face time)     Lashell Briones MA              "

## 2018-11-02 NOTE — NURSING NOTE
"Chief Complaint   Patient presents with     Port Draw     labs drawn from port by rn.  vs taken     Port accessed with 20 gauge 3/4\" gripper needle and labs drawn by rn.  Port flushed with NS and heparin then de-accessed.  Pt tolerated well.  VS taken.  Pt checked in for next appt.      "

## 2018-11-02 NOTE — LETTER
11/2/2018      RE: Niharika Cason  [address] [facility] [address]       Oncology/Hematology Visit Note  Nov 2, 2018    Reason for Visit: Follow up of locally advanced rectal adenocarcinoma     History of Present Illness: Niharika Cason is a 63 year old female with a PMH HTN, anxiety, [id number] newly diagnosed locally advanced rectal adenocarcinoma stage IIIb (T3 N1 M0). She underwent her first screening colonoscopy 9/17/18 where a partially obstructing mass was found in the rectosigmoid colon. She was asymptomatic at the time of the colonoscopy. CT scan of the chest, abdomen and pelvis on 9/17/2018 showed a 3 cm peripherally enhancing lesion in the superior right lobe of the liver (segment VII) in addition to the previously described rectosigmoid mass. MRI of the abdomen on 9/24/2018 further characterized the liver lesion as focal nodular hyperplasia measuring 3.8 x 3.4 cm in segment VII. There was no suspected metastatic disease in the abdomen. MRI of the pelvis on 9/28/2018 showed an irregular ulcerated thickening of the left rectal wall measuring 4 cm in length at 9 cm from the anal verge, extending through the muscularis propria into the perirectal fat. There was an additional 1.5 cm enhancing circumferential wall thickening approximately 2 cm superior to the first rectal mass and subcentimeter perirectal and presacral nodes. Final clinical stage is T3c N1 M0.      She met with Dr. Urbina who recommended adjuvant XELOX, day 1 10/25/18. She returns today for toxicity assessment.    Interval History:  Ms. Cason returns to clinic today with her . She overall feels like the first infusion went well. She did have nausea days 3-5 after treatment for which she took Zofran daily with good relief and no vomiting. The nausea is now resolved. She admits she wasn't eating or drinking as much the days she felt nauseous but now this is improved. Her blood sugars were running low those days so she has been  "decreasing her insulin. She has not had any issues with the Xeloda thus far.    She does admit to constipation that started after taking Zofran. The bowels are now moving but are hard and she has noted intermittent scant rectal bleeding. No rectal pain or abdominal pain.    She did have cold sensitivity starting the first day of treatment lasting a few days that is now resolved along with sensitivity with first bite that she describes as the sensation when you eat something sour. This did not interfere with eating and is now resolved. She also noted \"tingling\" on her buttock when sitting on the toilet that also resolved. Has noted mild numbness/tingling in the fingertips as well that is resolved. None of the above neuropathy symptoms were too bothersome or interfered with function.    She otherwise feels well. No fevers, chills, headaches, dizziness, chest pain, SOB, cough, abdominal pain, bladder concerns, edema, rashes. Port is less bothersome as well and she has EMLA cream if needed. She is wanting to switch her infusions to [place] to be closer to home.    Current Outpatient Prescriptions   Medication Sig Dispense Refill     atenolol (TENORMIN) 25 MG tablet Take 1 tablet (25 mg) by mouth daily 93 tablet 3     BASAGLAR 100 UNIT/ML injection Inject 50 Units Subcutaneous daily 15 mL 3     BD ULTRA FINE PEN NEEDLES 1 each by Percutaneous route 4 times daily 150 Device 5     blood glucose monitoring (SURESTEP TEST STRIPS) test strip 1 strip by In Vitro route 3 times daily 200 each 3     capecitabine (XELODA) 500 MG tablet CHEMO Take 4 tablets (2,000 mg) by mouth 2 times daily for 14 days Take on Days 1 - 14, then off for 7 days. 112 tablet 0     hydrochlorothiazide (MICROZIDE) 12.5 MG capsule Take 1 capsule (12.5 mg) by mouth daily 93 capsule 3     insulin detemir (LEVEMIR FLEXTOUCH) 100 UNIT/ML injection Inject 50 Units Subcutaneous At Bedtime 45 mL 3     insulin lispro (HUMALOG KWIKPEN) 100 UNIT/ML injection Use " 3 units before breakfast, 6 units before lunch, and 16 units before dinner, plus correction of 1 unit per every 50 glucose over 125, averaging 3 for breakfast, 6 for lunch, 16 for dinner. 30 mL 3     lidocaine-prilocaine (EMLA) cream Apply 1 g topically as needed for moderate pain 30 g 3     lisinopril (PRINIVIL/ZESTRIL) 40 MG tablet Take 1 tablet (40 mg) by mouth daily 93 tablet 3     loperamide (IMODIUM) 2 MG capsule Start with 2 caps (4 mg), then take one cap (2 mg) after each diarrheal stool as needed. Do not use more than 8 caps (16 mg) per day. 30 capsule 0     LORazepam (ATIVAN) 0.5 MG tablet Take 1 tablet (0.5 mg) by mouth every 4 hours as needed (Anxiety, Nausea/Vomiting or Sleep) 30 tablet 2     ondansetron (ZOFRAN-ODT) 8 MG ODT tab Take 1 tablet (8 mg) by mouth every 8 hours as needed for nausea 60 tablet 3     PARoxetine (PAXIL) 30 MG tablet Take 1 tablet (30 mg) by mouth every morning 93 tablet 3     prochlorperazine (COMPAZINE) 10 MG tablet Take 1 tablet (10 mg) by mouth every 6 hours as needed (Nausea/Vomiting) 30 tablet 2     simvastatin (ZOCOR) 10 MG tablet TAKE ONE TABLET BY MOUTH EVERY NIGHT AT BEDTIME 93 tablet 3     [DISCONTINUED] BD ULTRA FINE PEN NEEDLES 1 each by Percutaneous route 4 times daily 150 Device 11       Physical Examination:  /58 (BP Location: Right arm, Patient Position: Sitting, Cuff Size: Adult Large)  Pulse 84  Temp 99.1  F (37.3  C) (Oral)  Resp 16  Wt 81.2 kg (179 lb)  LMP 10/15/2008  SpO2 96%  BMI 31.72 kg/m2  Wt Readings from Last 10 Encounters:   10/25/18 81.7 kg (180 lb 3.2 oz)   10/19/18 81.2 kg (179 lb)   10/15/18 81.6 kg (179 lb 14.4 oz)   10/12/18 81.2 kg (179 lb)   10/01/18 81.4 kg (179 lb 8 oz)   10/01/18 81.4 kg (179 lb 8 oz)   09/20/18 80.1 kg (176 lb 9.6 oz)   07/20/18 81.7 kg (180 lb 3.2 oz)   08/25/17 79.9 kg (176 lb 1.9 oz)   06/17/16 82.1 kg (181 lb)     Constitutional: Well-appearing female in no acute distress.  Eyes: EOMI, PERRL. No scleral  icterus.  ENT: Oral mucosa is moist without lesions or thrush. Faint erythema on neck stable per patient.  Lymphatic: Neck is supple without cervical or supraclavicular lymphadenopathy.   Cardiovascular: Regular rate and rhythm. No murmurs, gallops, or rubs. No peripheral edema.  Respiratory: Clear to auscultation bilaterally. No wheezes or crackles.  Gastrointestinal: Bowel sounds present. Abdomen soft, non-tender. No palpable hepatosplenomegaly or masses.   Neurologic: Cranial nerves II through XII are grossly intact.  Skin: No rashes, petechiae, or bruising noted on exposed skin.    Laboratory Data:  Results for JOSELIN GONSALES (MRN [id number]) as of 11/3/2018 13:38   11/2/2018 14:34   Sodium 136   Potassium 3.5   Chloride 101   Carbon Dioxide 26   Urea Nitrogen 17   Creatinine 0.75   GFR Estimate 79   GFR Estimate If Black >90   Calcium 8.7   Anion Gap 9   Albumin 3.9   Protein Total 7.2   Bilirubin Total 0.3   Alkaline Phosphatase 78   ALT 28   AST 22   Glucose 82   WBC 8.0   Hemoglobin 13.3   Hematocrit 38.4   Platelet Count 270   RBC Count 4.46   MCV 86   MCH 29.8   MCHC 34.6   RDW 11.4   Diff Method Automated Method   % Neutrophils 61.8   % Lymphocytes 28.7   % Monocytes 7.5   % Eosinophils 1.3   % Basophils 0.4   % Immature Granulocytes 0.3   Nucleated RBCs 0   Absolute Neutrophil 4.9   Absolute Lymphocytes 2.3   Absolute Monocytes 0.6   Absolute Eosinophils 0.1   Absolute Basophils 0.0   Abs Immature Granulocytes 0.0   Absolute Nucleated RBC 0.0       Assessment and Plan:  1. Onc  Stage [id number] rectal cancer-moderately differentiated adenocarcinoam with intact nuclear expression of mismatch repair proteins. Met with surgery, radiation oncology, and Dr. Urbina in medical oncology. Final plan is as follows:  -6 cycles (appx 4 months) of XELOX (Capcetiabine 1000mg/m2 BID day 1-14 and Oxaliplatin 130mg/m2 day 1 every 21 days) followed by chemorads and then consider surgical resection. Dr. Urbina does want repeat  imaging (CT CAP and MRI Pelvis) long-term through treatment  -Day 1 10/25. Overall tolerated first Oxaliplatin well with mild nausea, constipation, and neuropathy that is now resolved. Tolerating Xeloda well with no issues.  -Port placed 10/19 and discomfort improving. Continue PRN Emla Cream  -She will be due for cycle 2 11/15. She is interested in switching her care to [place] to be closer to home. Spoke with Dr. Lawton and he is willing to assume care of the patient. Will work on getting this scheduled    2. Neuropathy  -Grade 1 neuropathy including cold sensitivity, first-bite symptoms, and fingertip neuropathy that resolved with first week of treatment. Continue to monitor.     3. GI  -Mild nausea with treatment without vomiting. Try Compazine instead of Zofran in the future to see if less constipation  -For constipation, start Colace or Miralax daily until one soft stool a day. She has scant rectal bleeding, likely from constipation/rectal tumor as she had this previously. Hgb stable. Asked her to monitor for improvement in bleeding with improvement in constipation     4. DM1  -Type 1 DM on Insulin. States her blood sugars are overall well controlled, though have been lower with less intake this last week.  -Did replace dex with Emend in treatment plan to avoid additional blood sugar issues  -With slightly lower blood sugars asked her to continue to monitor closely and adjust her insulin as needed with her PCP.     5. Cards  -History of HTN and hyperlipidemia. BP stable today.   -Continue home meds-atenolol, hydrochlorothiazide, and lisinopril. Monitor and adjust as needed during treatment.   -Continue Zocor  -Hold ASA during treatment     6. Psych  History of anxiety/depression on Paxil  -Continue Paxil  -Discussed using Ativan PRN anxiety/sleep    Greater than 25 min was spent with the patient with >50% of the time spent in counseling and coordinating care.     Johnie Menendez PA-C  [facility]  [address] [address]  [phone]      YRIS Allen

## 2018-11-02 NOTE — MR AVS SNAPSHOT
After Visit Summary   11/2/2018    Niharika Cason    MRN: [id number]           Patient Information     Date Of Birth          1955        Visit Information        Provider Department      11/2/2018 3:00 PM Johnie Menendez PA [facility]        Today's Diagnoses     Rectal cancer (H)          Care Instructions    Bowels  Miralax-start with half a capful as needed for constipation  OR   Colace-take one pill daily as needed for constipation. Can take 2 if still having hard stools  Monitor the bleeding- it should improve with the bowels being softer              Follow-ups after your visit        Your next 10 appointments already scheduled     Nov 12, 2018 10:00 AM CST   Diabetes Education with NL DIABETIC ED RESOURCE   [facility])    [address] [place] [address] [address] [place] [address]   [phone]            Nov 15, 2018 11:00 AM CST   [facility] Visit with [facility]   [facility]    [address] [place] [address]   [phone]            Nov 15, 2018 11:30 AM CST   Level 4 with NL INFUSION CHAIR 4   [facility] ([facility])    [address] [place] [address] [address] [place] [address]   [phone]            Dec 06, 2018 11:00 AM CST   Return Visit with Dayron Lawton MD   [facility]    [address] [place] [address]   [phone]            Dec 06, 2018 11:30 AM CST   Level 4 with NL INFUSION CHAIR 4   [facility] ([facility])    [address] [place] [address] [address] [place] [address]   [phone]              Who to contact     If you have questions or need follow up information about today's clinic visit or your schedule please contact [facility] directly at [phone].  Normal or non-critical lab and imaging results will be  communicated to you by [employer], letter or phone within 4 business days after the clinic has received the results. If you do not hear from us within 7 days, please contact the clinic through [employer] or phone. If you have a critical or abnormal lab result, we will notify you by phone as soon as possible.  Submit refill requests through [employer] or call your pharmacy and they will forward the refill request to us. Please allow 3 business days for your refill to be completed.          Additional Information About Your Visit        Albumatic Information     [employer] gives you secure access to your electronic health record. If you see a primary care provider, you can also send messages to your care team and make appointments. If you have questions, please call your primary care clinic.  If you do not have a primary care provider, please call [phone] and they will assist you.        Care EveryWhere ID     This is your Care EveryWhere ID. This could be used by other organizations to access your [facility] medical records  [id number]        Your Vitals Were     Pulse Temperature Respirations Last Period Pulse Oximetry BMI (Body Mass Index)    84 99.1  F (37.3  C) (Oral) 16 10/15/2008 96% 31.72 kg/m2       Blood Pressure from Last 3 Encounters:   11/02/18 114/58   10/25/18 129/57   10/19/18 121/69    Weight from Last 3 Encounters:   11/02/18 81.2 kg (179 lb)   10/25/18 81.7 kg (180 lb 3.2 oz)   10/19/18 81.2 kg (179 lb)              We Performed the Following     CBC with platelets differential     Comprehensive metabolic panel        Primary Care Provider Office Phone # Fax #    Leona Cherri Farris [phone] [address] [place] [address]        Equal Access to Services     [facility] : Hadmehreen Silva, yanet quiroz, hever langford. So [facility] [phone].    ATENCIÓN: Si habla español, tiene a stokes disposición  servicios gratuitos de asistencia lingüística. Spencer alejandro [phone].    We comply with applicable federal civil rights laws and [place] laws. We do not discriminate on the basis of race, color, national origin, age, disability, sex, sexual orientation, or gender identity.            Thank you!     Thank you for choosing [facility]  for your care. Our goal is always to provide you with excellent care. Hearing back from our patients is one way we can continue to improve our services. Please take a few minutes to complete the written survey that you may receive in the mail after your visit with us. Thank you!             Your Updated Medication List - Protect others around you: Learn how to safely use, store and throw away your medicines at www.disposemymeds.org.          This list is accurate as of 11/2/18 11:59 PM.  Always use your most recent med list.                   Brand Name Dispense Instructions for use Diagnosis    atenolol 25 MG tablet    TENORMIN    93 tablet    Take 1 tablet (25 mg) by mouth daily    Essential hypertension with goal blood pressure less than 130/80       BASAGLAR 100 UNIT/ML injection     15 mL    Inject 50 Units Subcutaneous daily    Type 1 diabetes mellitus with hyperglycemia (H)       BD ULTRA FINE PEN NEEDLES     150 Device    1 each by Percutaneous route 4 times daily    Type 1 diabetes mellitus with hyperglycemia (H)       blood glucose monitoring test strip    SURESTEP TEST STRIPS    200 each    1 strip by In Vitro route 3 times daily    Type 1 diabetes mellitus with hyperglycemia (H)       capecitabine 500 MG tablet CHEMO    XELODA    112 tablet    Take 4 tablets (2,000 mg) by mouth 2 times daily for 14 days Take on Days 1 - 14, then off for 7 days.    Rectal cancer (H), Malignant neoplasm of rectum (H)       hydrochlorothiazide 12.5 MG capsule    MICROZIDE    93 capsule    Take 1 capsule (12.5 mg) by mouth daily    Essential hypertension with goal blood  pressure less than 130/80       insulin detemir 100 UNIT/ML injection    LEVEMIR FLEXTOUCH    45 mL    Inject 50 Units Subcutaneous At Bedtime    Type 1 diabetes mellitus with hyperglycemia (H)       insulin lispro 100 UNIT/ML injection    HumaLOG KWIKpen    30 mL    Use 3 units before breakfast, 6 units before lunch, and 16 units before dinner, plus correction of 1 unit per every 50 glucose over 125, averaging 3 for breakfast, 6 for lunch, 16 for dinner.    Type 1 diabetes mellitus with hyperglycemia (H)       lidocaine-prilocaine cream    EMLA    30 g    Apply 1 g topically as needed for moderate pain    Rectal cancer (H)       lisinopril 40 MG tablet    PRINIVIL/ZESTRIL    93 tablet    Take 1 tablet (40 mg) by mouth daily    Essential hypertension with goal blood pressure less than 130/80, Type 1 diabetes mellitus with hyperglycemia (H)       loperamide 2 MG capsule    IMODIUM    30 capsule    Start with 2 caps (4 mg), then take one cap (2 mg) after each diarrheal stool as needed. Do not use more than 8 caps (16 mg) per day.    Rectal cancer (H), Malignant neoplasm of rectum (H)       LORazepam 0.5 MG tablet    ATIVAN    30 tablet    Take 1 tablet (0.5 mg) by mouth every 4 hours as needed (Anxiety, Nausea/Vomiting or Sleep)    Rectal cancer (H), Malignant neoplasm of rectum (H)       ondansetron 8 MG ODT tab    ZOFRAN-ODT    60 tablet    Take 1 tablet (8 mg) by mouth every 8 hours as needed for nausea    Rectal cancer (H)       PARoxetine 30 MG tablet    PAXIL    93 tablet    Take 1 tablet (30 mg) by mouth every morning    Generalized anxiety disorder       prochlorperazine 10 MG tablet    COMPAZINE    30 tablet    Take 1 tablet (10 mg) by mouth every 6 hours as needed (Nausea/Vomiting)    Rectal cancer (H), Malignant neoplasm of rectum (H)       simvastatin 10 MG tablet    ZOCOR    93 tablet    TAKE ONE TABLET BY MOUTH EVERY NIGHT AT BEDTIME    Hyperlipidemia LDL goal <100

## 2018-11-09 ENCOUNTER — TELEPHONE (OUTPATIENT)
Dept: PHARMACY | Facility: CLINIC | Age: 63
End: 2018-11-09

## 2018-11-12 ENCOUNTER — ALLIED HEALTH/NURSE VISIT (OUTPATIENT)
Dept: EDUCATION SERVICES | Facility: CLINIC | Age: 63
End: 2018-11-12
Payer: COMMERCIAL

## 2018-11-12 DIAGNOSIS — E10.65 TYPE 1 DIABETES MELLITUS WITH HYPERGLYCEMIA (H): Primary | ICD-10-CM

## 2018-11-12 PROCEDURE — G0108 DIAB MANAGE TRN  PER INDIV: HCPCS

## 2018-11-12 NOTE — PROGRESS NOTES
"Diabetes Self-Management Education & Support    Diabetes Education Self Management & Training    SUBJECTIVE/OBJECTIVE:  Presents for: Individual review  Accompanied by: Self  Focus of Visit: Monitoring, Taking Medication, Problem Solving  Diabetes type: Type 1  Date of diagnosis: 1991  Disease course: Getting harder to manage  Cultural Influences/Ethnic Background:  American  PATIENT IS HERE FOR DIABETES REVIEW, AND HAVING TROUBLE WITH SOME LOWS AFTER STARTING COLON CANCER TREATMENT AND APPETITE NOT GREAT    Diabetes Symptoms & Complications      Patient Problem List and Family Medical History reviewed for relevant medical history, current medical status, and diabetes risk factors.    Vitals:  [facility] 10/15/2008  Estimated body mass index is 31.72 kg/(m^2) as calculated from the following:    Height as of 10/25/18: 1.6 m (5' 2.99\").    Weight as of 11/2/18: 81.2 kg (179 lb).   Last 3 BP:   BP Readings from Last 3 Encounters:   11/02/18 114/58   10/25/18 129/57   10/19/18 121/69       History   Smoking Status     Former Smoker     Packs/day: 0.50     Years: 15.00   Smokeless Tobacco     Never Used     Comment: not smoked since mid September       Labs:  Lab Results   Component Value Date    A1C 8.3 07/20/2018     Lab Results   Component Value Date    GLC 82 11/02/2018     Lab Results   Component Value Date    LDL 63 07/20/2018     HDL Cholesterol   Date Value Ref Range Status   07/20/2018 56 >49 mg/dL Final   ]  GFR Estimate   Date Value Ref Range Status   11/02/2018 79 >60 mL/min/1.7m2 Final     Comment:     Non  GFR Calc     GFR Estimate If Black   Date Value Ref Range Status   11/02/2018 >90 >60 mL/min/1.7m2 Final     Comment:      GFR Calc     Lab Results   Component Value Date    CR 0.75 11/02/2018     No results found for: MICROALBUMIN    Healthy Eating  Healthy Eating Assessed Today: Yes  Patient on a regular basis: Counts carbohydrates  Meal planning: Carbohydrate counting  Meals " include: Breakfast, Lunch, Dinner, Snacks  Beverages: Water, Coffee, Milk, Diet soda    Being Active  Being Active Assessed Today: Yes  Exercise:: Yes  Days per week of moderate to strenuous exercise (like a brisk walk): 5  On average, minutes per day of exercise at this level: 150 or greater  How intense was your typical exercise? : Light (like stretching or slow walking)  Exercise Minutes per Week: 750    Monitoring  Monitoring Assessed Today: Yes  Did patient bring glucose meter to appointment? : Yes  Blood Glucose Meter: Reli-On  Home Glucose (Sugar) Monitoring: 3-4 times per day  Blood glucose trend: Decreasing steadily  Low Glucose Range (mg/dL): <70  High Glucose Range (mg/dL): >200  Overall Range (mg/dL):     Has had some lows several times a week during night. Last night ate supper 7pm. , took 16 units. BG at  no snack. AT 4am BG 54.       Taking Medications  Diabetes Medication(s)     Insulin Sig    BASAGLAR 100 UNIT/ML injection Inject 50 Units Subcutaneous daily    insulin detemir (LEVEMIR FLEXTOUCH) 100 UNIT/ML injection Inject 50 Units Subcutaneous At Bedtime    insulin lispro (HUMALOG KWIKPEN) 100 UNIT/ML injection Use 3 units before breakfast, 6 units before lunch, and 16 units before dinner, plus correction of 1 unit per every 50 glucose over 125, averaging 3 for breakfast, 6 for lunch, 16 for dinner.          Taking Medication Assessed Today: Yes  Current Treatments: Insulin Injections  Dose schedule: pre-breakfast, pre-lunch, pre-dinner, at bedtime  Given by: Patient  Injection/Infusion sites: Abdomen  Problems taking diabetes medications regularly?: No  Diabetes medication side effects?: Yes  Treatment Compliance: All of the time    Problem Solving  Problem Solving Assessed Today: Yes  Hypoglycemia Frequency: Weekly  Hypoglycemia Treatment: Juice, Glucose (tablets or gel)  Patient carries a carbohydrate source: Yes  Medical alert: No    Hypoglycemia symptoms  Confusion:  Yes  Sweats: Yes         Reducing Risks  Reducing Risks Assessed Today: No    Healthy Coping  Healthy Coping Assessed Today: Yes  Informal Support system:: Spouse  Stage of change: PREPARATION (Decided to change - considering how)  Patient Activation Measure Survey Score:  BELLA Score (Last Two) 5/13/2011   BELLA Raw Score 46   Activation Score 75.3   BELLA Level 4       ASSESSMENT:  Needs to decrease Basaglar dose to reduce hypoglycemia during night. May need to reduce Humalog as well. Needs shorter pen needles and recommend she change needle with each injection.   Discussed option of personal CGM but she would like to stick with her meter for now.       Patient's most recent   Lab Results   Component Value Date    A1C 8.3 07/20/2018    is not meeting goal of <7.0    INTERVENTION:   Diabetes knowledge and skills assessment:     Patient is knowledgeable in diabetes management concepts related to: Healthy Eating, Being Active, Monitoring, Taking Medication and Healthy Coping    Patient needs further education on the following diabetes management concepts: Taking Medication and Problem Solving    Based on learning assessment above, most appropriate setting for further diabetes education would be: Individual setting.    Education provided today on:  AADE Self-Care Behaviors:  Taking Medication: drawing up, administering and storing injectable diabetes medications and dose change  Problem Solving: high blood glucose - causes, signs/symptoms, treatment and prevention, low blood glucose - causes, signs/symptoms, treatment and prevention and when to call health care provider    Opportunities for ongoing education and support in diabetes-self management were discussed.    Pt verbalized understanding of concepts discussed and recommendations provided today.       Education Materials Provided:  Carbohydrate Counting and Planning Healthy Meals    PLAN:  See Patient Instructions for co-developed, patient-stated behavior change  goals.  See Follow-Up section for recommended follow-up.    Gifty Miranda RDN, SANTANA, CDE    Time Spent: 60 minutes  Encounter Type: Individual    Any diabetes medication dose changes were made via the CDE Protocol and Collaborative Practice Agreement with the patient's primary care provider. A copy of this encounter was shared with the provider.

## 2018-11-12 NOTE — MR AVS SNAPSHOT
After Visit Summary   11/12/2018    Niharika Cason    MRN: [id number]           Patient Information     Date Of Birth          1955        Visit Information        Provider Department      11/12/2018 10:00 AM NL DIABETIC ED RESOURCE [facility]        Today's Diagnoses     Type 1 diabetes mellitus with hyperglycemia (HCC)    -  1      Care Instructions    Decrease Basaglar to 43 units.   If needed decrease Humalog to 2 or 2.5 units per carb choice.             Follow-ups after your visit        Follow-up notes from your care team     Return in about 2 weeks (around 11/26/2018) for Mychart follow up.      Your next 10 appointments already scheduled     Nov 15, 2018 11:00 AM CST   New Visit with Dayron Lawton MD   [facility]    [address] [place] [id number] [phone]            Nov 15, 2018 11:30 AM CST   Level 4 with NL INFUSION CHAIR 4   [facility] ([facility])    [address] [place] [address] [address] [place] [address] [phone]            Dec 06, 2018 11:00 AM CST   Return Visit with Dayron Lawton MD   [facility]    [address] [place] [id number] [phone]            Dec 06, 2018 11:30 AM CST   Level 4 with NL INFUSION CHAIR 4   [facility] ([facility])    [address] [place] [address] [address] [place] [address]   [phone]              Future tests that were ordered for you today     Open Future Orders        Priority Expected Expires Ordered    **A1C FUTURE anytime Routine 11/15/2018 11/12/2019 11/12/2018            Who to contact     If you have questions or need follow up information about today's clinic visit or your schedule please contact [facility] directly at [phone].  Normal or non-critical lab and imaging results will be communicated to  you by MyChart, letter or phone within 4 business days after the clinic has received the results. If you do not hear from us within 7 days, please contact the clinic through youcalc or phone. If you have a critical or abnormal lab result, we will notify you by phone as soon as possible.  Submit refill requests through youcalc or call your pharmacy and they will forward the refill request to us. Please allow 3 business days for your refill to be completed.          Additional Information About Your Visit        [employer] Information     youcalc gives you secure access to your electronic health record. If you see a primary care provider, you can also send messages to your care team and make appointments. If you have questions, please call your primary care clinic.  If you do not have a primary care provider, please call [phone] and they will assist you.        Care EveryWhere ID     This is your Care EveryWhere ID. This could be used by other organizations to access your [facility] medical records  [id number]        Your Vitals Were     Last Period                   10/15/2008            Blood Pressure from Last 3 Encounters:   11/02/18 114/58   10/25/18 129/57   10/19/18 121/69    Weight from Last 3 Encounters:   11/02/18 81.2 kg (179 lb)   10/25/18 81.7 kg (180 lb 3.2 oz)   10/19/18 81.2 kg (179 lb)              We Performed the Following     DIABETES EDUCATION - Individual  []          Today's Medication Changes          These changes are accurate as of 11/12/18  1:33 PM.  If you have any questions, ask your nurse or doctor.               Start taking these medicines.        Dose/Directions    Insulin Pen Needle 33G X 4 MM Misc   Used for:  Type 1 diabetes mellitus with hyperglycemia (H)        Dose:  1 each   1 each daily Use 4 needles per day   Quantity:  200 each   Refills:  prn         Stop taking these medicines if you haven't already. Please contact your care team if you have questions.     BD ULTRA  FINE PEN NEEDLES                Where to get your medicines      These medications were sent to [facility], [place] - [address]  [address] , [facility] [address]     Phone:  [phone]     Insulin Pen Needle 33G X 4 MM Misc                Primary Care Provider Office Phone # Fax #    Leona Cherri Farris [phone] [address] [place] [address]        Equal Access to Services     EDEN BARBA AH: Hadii aad ku hadasho Soomaali, waaxda luqadaha, qaybta kaalmada adeegyada, waxay idiin hayaan adeeg kharash la'aan ah. So [facility] [phone].    ATENCIÓN: Si habla español, tiene a stokes disposición servicios gratuitos de asistencia lingüística. [facility] [phone].    We comply with applicable federal civil rights laws and [place] laws. We do not discriminate on the basis of race, color, national origin, age, disability, sex, sexual orientation, or gender identity.            Thank you!     Thank you for choosing [facility]  for your care. Our goal is always to provide you with excellent care. Hearing back from our patients is one way we can continue to improve our services. Please take a few minutes to complete the written survey that you may receive in the mail after your visit with us. Thank you!             Your Updated Medication List - Protect others around you: Learn how to safely use, store and throw away your medicines at www.disposemymeds.org.          This list is accurate as of 11/12/18  1:33 PM.  Always use your most recent med list.                   Brand Name Dispense Instructions for use Diagnosis    atenolol 25 MG tablet    TENORMIN    93 tablet    Take 1 tablet (25 mg) by mouth daily    Essential hypertension with goal blood pressure less than 130/80       BASAGLAR 100 UNIT/ML injection     15 mL    Inject 50 Units Subcutaneous daily    Type 1 diabetes mellitus with hyperglycemia (H)       blood glucose monitoring test  strip    SURESTEP TEST STRIPS    200 each    1 strip by In Vitro route 3 times daily    Type 1 diabetes mellitus with hyperglycemia (H)       capecitabine 500 MG tablet CHEMO    XELODA    112 tablet    Take 4 tablets (2,000 mg) by mouth 2 times daily for 14 days Take on Days 1 - 14, then off for 7 days.    Rectal cancer (H), Malignant neoplasm of rectum (H)       hydrochlorothiazide 12.5 MG capsule    MICROZIDE    93 capsule    Take 1 capsule (12.5 mg) by mouth daily    Essential hypertension with goal blood pressure less than 130/80       insulin detemir 100 UNIT/ML injection    LEVEMIR FLEXTOUCH    45 mL    Inject 50 Units Subcutaneous At Bedtime    Type 1 diabetes mellitus with hyperglycemia (H)       insulin lispro 100 UNIT/ML injection    HumaLOG KWIKpen    30 mL    Use 3 units before breakfast, 6 units before lunch, and 16 units before dinner, plus correction of 1 unit per every 50 glucose over 125, averaging 3 for breakfast, 6 for lunch, 16 for dinner.    Type 1 diabetes mellitus with hyperglycemia (H)       Insulin Pen Needle 33G X 4 MM Misc     200 each    1 each daily Use 4 needles per day    Type 1 diabetes mellitus with hyperglycemia (H)       lidocaine-prilocaine cream    EMLA    30 g    Apply 1 g topically as needed for moderate pain    Rectal cancer (H)       lisinopril 40 MG tablet    PRINIVIL/ZESTRIL    93 tablet    Take 1 tablet (40 mg) by mouth daily    Essential hypertension with goal blood pressure less than 130/80, Type 1 diabetes mellitus with hyperglycemia (H)       loperamide 2 MG capsule    IMODIUM    30 capsule    Start with 2 caps (4 mg), then take one cap (2 mg) after each diarrheal stool as needed. Do not use more than 8 caps (16 mg) per day.    Rectal cancer (H), Malignant neoplasm of rectum (H)       LORazepam 0.5 MG tablet    ATIVAN    30 tablet    Take 1 tablet (0.5 mg) by mouth every 4 hours as needed (Anxiety, Nausea/Vomiting or Sleep)    Rectal cancer (H), Malignant neoplasm of  rectum (H)       ondansetron 8 MG ODT tab    ZOFRAN-ODT    60 tablet    Take 1 tablet (8 mg) by mouth every 8 hours as needed for nausea    Rectal cancer (H)       PARoxetine 30 MG tablet    PAXIL    93 tablet    Take 1 tablet (30 mg) by mouth every morning    Generalized anxiety disorder       prochlorperazine 10 MG tablet    COMPAZINE    30 tablet    Take 1 tablet (10 mg) by mouth every 6 hours as needed (Nausea/Vomiting)    Rectal cancer (H), Malignant neoplasm of rectum (H)       simvastatin 10 MG tablet    ZOCOR    93 tablet    TAKE ONE TABLET BY MOUTH EVERY NIGHT AT BEDTIME    Hyperlipidemia LDL goal <100

## 2018-11-14 DIAGNOSIS — C20 RECTAL CANCER (H): Primary | ICD-10-CM

## 2018-11-14 DIAGNOSIS — C20 MALIGNANT NEOPLASM OF RECTUM (H): ICD-10-CM

## 2018-11-14 RX ORDER — CAPECITABINE 500 MG/1
1000 TABLET, FILM COATED ORAL 2 TIMES DAILY
Qty: 112 TABLET | Refills: 0 | Status: SHIPPED | OUTPATIENT
Start: 2018-11-14 | End: 2018-12-27

## 2018-11-14 NOTE — ORAL ONC MGMT
Oral Chemotherapy Monitoring Program     Placed call to patient in follow up of capecitabine therapy.     Left message to please call back otherwise we will attempt a call in the next few days. No patient or drug names were mentioned.     Panchito Mandel, PharmD.  Oral Chemotherapy Monitoring Program  [phone]      
Thank you for the opportunity to be a part in the care of this patient's oral chemotherapy. The [facility] oral chemotherapy monitoring program will no longer be following this patient for oral chemotherapy as she is transferring care to [facility]. If there are any questions or the plan changes, feel free to contact us.    Have notified pharmacy liaison and dispensing pharmacy and pharmacist of this change.     Maru Levy      
normal...

## 2018-11-15 ENCOUNTER — ONCOLOGY VISIT (OUTPATIENT)
Dept: ONCOLOGY | Facility: CLINIC | Age: 63
End: 2018-11-15
Payer: COMMERCIAL

## 2018-11-15 ENCOUNTER — INFUSION THERAPY VISIT (OUTPATIENT)
Dept: INFUSION THERAPY | Facility: CLINIC | Age: 63
End: 2018-11-15
Attending: INTERNAL MEDICINE
Payer: COMMERCIAL

## 2018-11-15 VITALS
WEIGHT: 179.2 LBS | RESPIRATION RATE: 16 BRPM | TEMPERATURE: 97.5 F | HEIGHT: 64 IN | HEART RATE: 64 BPM | BODY MASS INDEX: 30.59 KG/M2 | SYSTOLIC BLOOD PRESSURE: 113 MMHG | OXYGEN SATURATION: 95 % | DIASTOLIC BLOOD PRESSURE: 53 MMHG

## 2018-11-15 VITALS
DIASTOLIC BLOOD PRESSURE: 62 MMHG | BODY MASS INDEX: 31.75 KG/M2 | TEMPERATURE: 97.4 F | WEIGHT: 179.2 LBS | OXYGEN SATURATION: 96 % | HEIGHT: 63 IN | SYSTOLIC BLOOD PRESSURE: 108 MMHG | HEART RATE: 64 BPM | RESPIRATION RATE: 16 BRPM

## 2018-11-15 DIAGNOSIS — C20 RECTAL ADENOCARCINOMA (H): ICD-10-CM

## 2018-11-15 DIAGNOSIS — C20 RECTAL ADENOCARCINOMA (H): Primary | ICD-10-CM

## 2018-11-15 DIAGNOSIS — C20 RECTAL CANCER (H): Primary | ICD-10-CM

## 2018-11-15 DIAGNOSIS — E10.65 TYPE 1 DIABETES MELLITUS WITH HYPERGLYCEMIA (H): ICD-10-CM

## 2018-11-15 DIAGNOSIS — C20 MALIGNANT NEOPLASM OF RECTUM (H): ICD-10-CM

## 2018-11-15 LAB
ALBUMIN SERPL-MCNC: 3.9 G/DL (ref 3.4–5)
ALP SERPL-CCNC: 72 U/L (ref 40–150)
ALT SERPL W P-5'-P-CCNC: 25 U/L (ref 0–50)
ANION GAP SERPL CALCULATED.3IONS-SCNC: 8 MMOL/L (ref 3–14)
AST SERPL W P-5'-P-CCNC: 19 U/L (ref 0–45)
BASOPHILS # BLD AUTO: 0 10E9/L (ref 0–0.2)
BASOPHILS NFR BLD AUTO: 0.7 %
BILIRUB SERPL-MCNC: 0.5 MG/DL (ref 0.2–1.3)
BUN SERPL-MCNC: 16 MG/DL (ref 7–30)
CALCIUM SERPL-MCNC: 8.5 MG/DL (ref 8.5–10.1)
CEA SERPL-MCNC: 5 UG/L (ref 0–2.5)
CHLORIDE SERPL-SCNC: 103 MMOL/L (ref 94–109)
CO2 SERPL-SCNC: 28 MMOL/L (ref 20–32)
CREAT SERPL-MCNC: 0.68 MG/DL (ref 0.52–1.04)
DIFFERENTIAL METHOD BLD: NORMAL
EOSINOPHIL NFR BLD AUTO: 2 %
ERYTHROCYTE [DISTWIDTH] IN BLOOD BY AUTOMATED COUNT: 13.1 % (ref 10–15)
GFR SERPL CREATININE-BSD FRML MDRD: 87 ML/MIN/1.7M2
GLUCOSE SERPL-MCNC: 74 MG/DL (ref 70–99)
HBA1C MFR BLD: 7.1 % (ref 0–5.6)
HCT VFR BLD AUTO: 35.4 % (ref 35–47)
HGB BLD-MCNC: 12.1 G/DL (ref 11.7–15.7)
IMM GRANULOCYTES # BLD: 0 10E9/L (ref 0–0.4)
IMM GRANULOCYTES NFR BLD: 0.2 %
LYMPHOCYTES # BLD AUTO: 2 10E9/L (ref 0.8–5.3)
LYMPHOCYTES NFR BLD AUTO: 32.8 %
MCH RBC QN AUTO: 30.2 PG (ref 26.5–33)
MCHC RBC AUTO-ENTMCNC: 34.2 G/DL (ref 31.5–36.5)
MCV RBC AUTO: 88 FL (ref 78–100)
MONOCYTES # BLD AUTO: 0.7 10E9/L (ref 0–1.3)
MONOCYTES NFR BLD AUTO: 11.9 %
NEUTROPHILS # BLD AUTO: 3.2 10E9/L (ref 1.6–8.3)
NEUTROPHILS NFR BLD AUTO: 52.4 %
NRBC # BLD AUTO: 0 10*3/UL
NRBC BLD AUTO-RTO: 0 /100
PLATELET # BLD AUTO: 257 10E9/L (ref 150–450)
POTASSIUM SERPL-SCNC: 4 MMOL/L (ref 3.4–5.3)
PROT SERPL-MCNC: 6.7 G/DL (ref 6.8–8.8)
RBC # BLD AUTO: 4.01 10E12/L (ref 3.8–5.2)
SODIUM SERPL-SCNC: 139 MMOL/L (ref 133–144)
WBC # BLD AUTO: 6 10E9/L (ref 4–11)

## 2018-11-15 PROCEDURE — 80053 COMPREHEN METABOLIC PANEL: CPT | Performed by: PHYSICIAN ASSISTANT

## 2018-11-15 PROCEDURE — 99215 OFFICE O/P EST HI 40 MIN: CPT | Performed by: INTERNAL MEDICINE

## 2018-11-15 PROCEDURE — 96413 CHEMO IV INFUSION 1 HR: CPT

## 2018-11-15 PROCEDURE — 96367 TX/PROPH/DG ADDL SEQ IV INF: CPT

## 2018-11-15 PROCEDURE — 96415 CHEMO IV INFUSION ADDL HR: CPT

## 2018-11-15 PROCEDURE — 25000128 H RX IP 250 OP 636: Performed by: INTERNAL MEDICINE

## 2018-11-15 PROCEDURE — 82378 CARCINOEMBRYONIC ANTIGEN: CPT | Performed by: PHYSICIAN ASSISTANT

## 2018-11-15 PROCEDURE — 96375 TX/PRO/DX INJ NEW DRUG ADDON: CPT

## 2018-11-15 PROCEDURE — 83036 HEMOGLOBIN GLYCOSYLATED A1C: CPT | Performed by: FAMILY MEDICINE

## 2018-11-15 PROCEDURE — 85025 COMPLETE CBC W/AUTO DIFF WBC: CPT | Performed by: PHYSICIAN ASSISTANT

## 2018-11-15 RX ORDER — EPINEPHRINE 1 MG/ML
0.3 INJECTION, SOLUTION, CONCENTRATE INTRAVENOUS EVERY 5 MIN PRN
Status: CANCELLED | OUTPATIENT
Start: 2018-11-15

## 2018-11-15 RX ORDER — HEPARIN SODIUM (PORCINE) LOCK FLUSH IV SOLN 100 UNIT/ML 100 UNIT/ML
500 SOLUTION INTRAVENOUS EVERY 8 HOURS
Status: DISCONTINUED | OUTPATIENT
Start: 2018-11-15 | End: 2018-11-15 | Stop reason: HOSPADM

## 2018-11-15 RX ORDER — MEPERIDINE HYDROCHLORIDE 25 MG/ML
25 INJECTION INTRAMUSCULAR; INTRAVENOUS; SUBCUTANEOUS EVERY 30 MIN PRN
Status: CANCELLED | OUTPATIENT
Start: 2018-11-15

## 2018-11-15 RX ORDER — ONDANSETRON 2 MG/ML
8 INJECTION INTRAMUSCULAR; INTRAVENOUS ONCE
Status: CANCELLED
Start: 2018-11-15 | End: 2018-11-15

## 2018-11-15 RX ORDER — ONDANSETRON 2 MG/ML
8 INJECTION INTRAMUSCULAR; INTRAVENOUS ONCE
Status: COMPLETED | OUTPATIENT
Start: 2018-11-15 | End: 2018-11-15

## 2018-11-15 RX ORDER — DIPHENHYDRAMINE HYDROCHLORIDE 50 MG/ML
50 INJECTION INTRAMUSCULAR; INTRAVENOUS
Status: CANCELLED
Start: 2018-11-15

## 2018-11-15 RX ORDER — HEPARIN SODIUM (PORCINE) LOCK FLUSH IV SOLN 100 UNIT/ML 100 UNIT/ML
500 SOLUTION INTRAVENOUS EVERY 8 HOURS
Status: CANCELLED
Start: 2018-11-15

## 2018-11-15 RX ORDER — LORAZEPAM 2 MG/ML
0.5 INJECTION INTRAMUSCULAR EVERY 4 HOURS PRN
Status: CANCELLED
Start: 2018-11-15

## 2018-11-15 RX ORDER — EPINEPHRINE 0.3 MG/.3ML
0.3 INJECTION SUBCUTANEOUS EVERY 5 MIN PRN
Status: CANCELLED | OUTPATIENT
Start: 2018-11-15

## 2018-11-15 RX ORDER — ALBUTEROL SULFATE 0.83 MG/ML
2.5 SOLUTION RESPIRATORY (INHALATION)
Status: CANCELLED | OUTPATIENT
Start: 2018-11-15

## 2018-11-15 RX ORDER — METHYLPREDNISOLONE SODIUM SUCCINATE 125 MG/2ML
125 INJECTION, POWDER, LYOPHILIZED, FOR SOLUTION INTRAMUSCULAR; INTRAVENOUS
Status: CANCELLED
Start: 2018-11-15

## 2018-11-15 RX ORDER — SODIUM CHLORIDE 9 MG/ML
1000 INJECTION, SOLUTION INTRAVENOUS CONTINUOUS PRN
Status: CANCELLED
Start: 2018-11-15

## 2018-11-15 RX ORDER — ALBUTEROL SULFATE 90 UG/1
1-2 AEROSOL, METERED RESPIRATORY (INHALATION)
Status: CANCELLED
Start: 2018-11-15

## 2018-11-15 RX ADMIN — OXALIPLATIN 250 MG: 5 INJECTION, SOLUTION, CONCENTRATE INTRAVENOUS at 13:35

## 2018-11-15 RX ADMIN — DEXTROSE MONOHYDRATE 250 ML: 50 INJECTION, SOLUTION INTRAVENOUS at 12:33

## 2018-11-15 RX ADMIN — SODIUM CHLORIDE, PRESERVATIVE FREE 500 UNITS: 5 INJECTION INTRAVENOUS at 15:47

## 2018-11-15 RX ADMIN — SODIUM CHLORIDE 150 MG: 9 INJECTION, SOLUTION INTRAVENOUS at 12:47

## 2018-11-15 RX ADMIN — ONDANSETRON HYDROCHLORIDE 8 MG: 2 INJECTION, SOLUTION INTRAMUSCULAR; INTRAVENOUS at 12:35

## 2018-11-15 ASSESSMENT — PAIN SCALES - GENERAL
PAINLEVEL: NO PAIN (0)
PAINLEVEL: NO PAIN (0)

## 2018-11-15 NOTE — Clinical Note
11/15/2018         RE: Niharika Cason  [address] [facility] [address]        Dear Colleague,    Thank you for referring your patient, Niharika Cason, to the [facility]. Please see a copy of my visit note below.    DATE OF VISIT: Nov 15, 2018    REASON FOR REFERRAL:   Rectal adenocarcinoma    HISTORY OF PRESENT ILLNESS:     Niharika Cason is a 63-year-old female with past medical history significant for hypertension, diabetes mellitus type 1, anxiety underwent her first screening colonoscopy on 09/17/18 which revealed a partially obstructing mass in the rectosigmoid colon  Which was biopsied and came back as invasive moderately differentiated adenocarcinoma but intact mismatch repair proteins. Staging CT scans showed a 3 cm enhancing lesion in the right lobe of liver he subsequently had an MRI done which characterized a liver lesion as well as VOCAL lobular hyperplasia with no suspected metastatic disease. MRI of pelvis showed an irregular ulcerated left rectal wall mass at 9 cm from the anal verge extending to the muscularis propria into the perirectal fat. Also noted were suspicious perirectal and presacral lymph nodes -clinical stage T3cN1 M0.   She met with medical and surgical oncology at [facility] and the recommendation was to proceed with total neoadjuvant therapy.     10/25/18  received cycle 1 XELOX chemotherapy    Presents to the medical oncology clinic at [place] today for transfer of care and proceed with next cycle of chemotherapy. Tolerated last chemotherapy well with no active complaints. Denies mucositis, diarrhea, hand-foot syndrome, nausea/vomiting, fevers chills, neuropathy, blood in the stools. Did develop symptoms of cold sensitivity which lasted for a few days. Stable appetite and energy levels      REVIEW OF SYSTEMS:      ROS: 14 point ROS neg other than the symptoms noted above in the HPI.    PAST MEDICAL HISTORY:   Past Medical History:   Diagnosis Date      Essential hypertension, benign      Generalized anxiety disorder      Malignant neoplasm of rectum (H) 10/15/2018     Rectal cancer (H) 10/15/2018     Type I (juvenile type) diabetes mellitus without mention of complication, not stated as uncontrolled     diagnosed at age 35     Ulcerative colitis, unspecified     in the 70s.         PAST SURGICAL HISTORY:   Past Surgical History:   Procedure Laterality Date     COLONOSCOPY N/A 9/17/2018    Procedure: COMBINED COLONOSCOPY, SINGLE OR MULTIPLE BIOPSY/POLYPECTOMY BY BIOPSY;  colonoscopy with polypectomies by biopsy forceps and hot snare and submucosal injection with tattoo;  Surgeon: Richard Moore MD;  Location: [facility]     COLONOSCOPY  40 yrs ago     HC COLONOSCOPY THRU STOMA, DIAGNOSTIC  1998    negative     HC CURETTAGE, POSTPARTUM  '91, '93    following miscarriages     HC REMOVAL OF TONSILS,<15 Y/O      Tonsils <14y.o.     INSERT PORT VASCULAR ACCESS Right 10/19/2018    Procedure: Chest Port Placement - right ;  Surgeon: Lawson Hitchcock PA-C;  Location: [facility]       ALLERGIES:   Allergies as of 11/15/2018 - Joe as Reviewed 11/15/2018   Allergen Reaction Noted     No known drug allergies  08/27/2001       MEDICATIONS:   Current Outpatient Prescriptions   Medication Sig Dispense Refill     atenolol (TENORMIN) 25 MG tablet Take 1 tablet (25 mg) by mouth daily 93 tablet 3     BASAGLAR 100 UNIT/ML injection Inject 50 Units Subcutaneous daily 15 mL 3     blood glucose monitoring (SURESTEP TEST STRIPS) test strip 1 strip by In Vitro route 3 times daily 200 each 3     capecitabine (XELODA) 500 MG tablet CHEMO Take 4 tablets (2,000 mg) by mouth 2 times daily for 14 days Take on Days 1 through 14, then off for 7 days. 112 tablet 0     hydrochlorothiazide (MICROZIDE) 12.5 MG capsule Take 1 capsule (12.5 mg) by mouth daily 93 capsule 3     insulin lispro (HUMALOG KWIKPEN) 100 UNIT/ML injection Use 3 units before breakfast, 6 units before lunch, and 16  units before dinner, plus correction of 1 unit per every 50 glucose over 125, averaging 3 for breakfast, 6 for lunch, 16 for dinner. 30 mL 3     Insulin Pen Needle 33G X 4 MM MISC 1 each daily Use 4 needles per day 200 each prn     lidocaine-prilocaine (EMLA) cream Apply 1 g topically as needed for moderate pain 30 g 3     lisinopril (PRINIVIL/ZESTRIL) 40 MG tablet Take 1 tablet (40 mg) by mouth daily 93 tablet 3     loperamide (IMODIUM) 2 MG capsule Start with 2 caps (4 mg), then take one cap (2 mg) after each diarrheal stool as needed. Do not use more than 8 caps (16 mg) per day. 30 capsule 0     LORazepam (ATIVAN) 0.5 MG tablet Take 1 tablet (0.5 mg) by mouth every 4 hours as needed (Anxiety, Nausea/Vomiting or Sleep) 30 tablet 2     ondansetron (ZOFRAN-ODT) 8 MG ODT tab Take 1 tablet (8 mg) by mouth every 8 hours as needed for nausea 60 tablet 3     PARoxetine (PAXIL) 30 MG tablet Take 1 tablet (30 mg) by mouth every morning 93 tablet 3     prochlorperazine (COMPAZINE) 10 MG tablet Take 1 tablet (10 mg) by mouth every 6 hours as needed (Nausea/Vomiting) 30 tablet 2     simvastatin (ZOCOR) 10 MG tablet TAKE ONE TABLET BY MOUTH EVERY NIGHT AT BEDTIME 93 tablet 3     [DISCONTINUED] BD ULTRA FINE PEN NEEDLES 1 each by Percutaneous route 4 times daily 150 Device 11     [DISCONTINUED] capecitabine (XELODA) 500 MG tablet CHEMO Take 4 tablets (2,000 mg) by mouth 2 times daily for 14 days Take on Days 1 - 14, then off for 7 days. 112 tablet 0        FAMILY HISTORY:   Family History   Problem Relation Age of Onset     HEART DISEASE Maternal Grandfather      MI at 52 yrs     EYE* Paternal Grandfather      cataracts     Arthritis Mother      Gynecology Mother      hysterectomy for fibroids     Hypertension Mother      Lipids Mother      GASTROINTESTINAL DISEASE Father      ulcers     HEART DISEASE Father      intermittent rapid heartbeat     Cancer Father      mesothelioma       SOCIAL HISTORY:   Social History     Social  "History     Marital status:      Spouse name: Fabrice     Number of children: 2     Years of education: 12     Occupational History      [employer]     on her feet all day      [employer]     Social History Main Topics     Smoking status: Former Smoker     Packs/day: 0.50     Years: 15.00     Smokeless tobacco: Never Used      Comment: not smoked since mid September     Alcohol use 0.0 oz/week     0 Standard drinks or equivalent per week      Comment: 3-4 wine per wk     Drug use: No     Sexual activity: Yes     Partners: Male     Other Topics Concern      Service No     Blood Transfusions No     Caffeine Concern No     Occupational Exposure No     Hobby Hazards No     Sleep Concern No     Stress Concern No     Weight Concern No     Special Diet Yes     diabetic     Back Care Yes     Exercise Yes     Bike Helmet No     Seat Belt Yes     Self-Exams Yes     occassionally     Parent/Sibling W/ Cabg, Mi Or Angioplasty Before 65f 55m? No     Social History Narrative       PHYSICAL EXAMINATION:   /62 (BP Location: Right arm, Patient Position: Chair, Cuff Size: Adult Regular)  Pulse 64  Temp 97.4  F (36.3  C) (Temporal)  Resp 16  Ht 1.6 m (5' 2.99\")  Wt 81.3 kg (179 lb 3.2 oz)  LMP 10/15/2008  SpO2 96%  BMI 31.75 kg/m2  Wt Readings from Last 10 Encounters:   11/15/18 81.3 kg (179 lb 3.2 oz)   11/02/18 81.2 kg (179 lb)   10/25/18 81.7 kg (180 lb 3.2 oz)   10/19/18 81.2 kg (179 lb)   10/15/18 81.6 kg (179 lb 14.4 oz)   10/12/18 81.2 kg (179 lb)   10/01/18 81.4 kg (179 lb 8 oz)   10/01/18 81.4 kg (179 lb 8 oz)   09/20/18 80.1 kg (176 lb 9.6 oz)   07/20/18 81.7 kg (180 lb 3.2 oz)      ECOG performance status: 0  Exam:  Constitutional: healthy, alert and no distress  Head: Normocephalic.   Neck: Neck supple. No adenopathy.  ENT: ENT exam normal, no neck nodes or sinus tenderness  Cardiovascular: RRR. No murmurs  Respiratory: Lungs clear  Gastrointestinal: Abdomen soft, non-tender. BS normal. "   Musculoskeletal: extremities normal  Skin: no suspicious lesions or rashes  Neurologic: Gait normal. Sensation grossly WNL.  Psychiatric: mentation appears normal and affect normal/bright  Hematologic/Lymphatic/Immunologic: Normal cervical lymph nodes      LABORATORY RESULTS:    Recent Labs   Lab Test  11/15/18   1035  11/02/18   1434   NA  139  136   POTASSIUM  4.0  3.5   CHLORIDE  103  101   BUN  16  17   CR  0.68  0.75   GLC  74  82   JUNG  8.5  8.7     Recent Labs   Lab Test  11/15/18   1035  11/02/18   1434  10/24/18   1455   WBC  6.0  8.0  8.0   HGB  12.1  13.3  13.0   PLT  257  270  285   MCV  88  86  89   NEUTROPHIL  52.4  61.8  61.9     Recent Labs   Lab Test  11/15/18   1035  11/02/18   1434  10/24/18   1455   BILITOTAL  0.5  0.3  0.3   ALKPHOS  72  78  80   ALT  25  28  26   AST  19  22  17   ALBUMIN  3.9  3.9  4.0     TSH   Date Value Ref Range Status   07/20/2018 0.76 0.40 - 4.00 mU/L Final   ]    IMAGING RESULTS:     09/17/18  CT C/A/P    IMPRESSION:  1. Patient's reported rectal mass is not definitely seen on this  study.  2. Peripherally enhancing lesion in the superior right lobe of the  liver (segment VII) measures up to 3.0 cm and could represent  metastasis but could also represent a benign lesion such as a  hemangioma. I recommend dynamic MRI of the liver.  3. Multiple tiny nodules in the lungs likely represent granulomata.  These should be followed in one year with CT to ensure stability or  resolution.  4. Calcified uterine fibroids as described above. Largest measures up  to 5.9 cm.  5. Atherosclerosis including coronary arteries and aorta. Degenerative  changes of the spine.     09/24/18 MRI abdomen   IMPRESSION:  1. Focal nodular hyperplasia in segment 7 measuring 3.8 x 3.4 cm,  corresponding to the abnormality seen on CT 9/17/2018.  2. No suspected metastatic disease in the abdomen.     09/24/18 MRI pelvis    IMPRESSION:   1. Ulcerated mid to high rectal mass with extension beyond  the  muscularis propria, concern for extramural venous invasion, and  involvement of the mesorectal fascia as above. Suspicious presacral  node along the area of mesorectal fascia involvement with additional  smaller indeterminate lymph nodes. Tumor comes in close proximity to,  but does not definitely involve, an exophytic uterine fibroid. Stage  T3cN1.   2.  Additionally, approximately 2 cm superior to the above described  rectal mass there is an additional short segment of intermediate T2  signal intensity, enhancing circumferential wall thickening with  luminal narrowing measuring approximately 1.5 cm in length concerning  for a synchronous tumor.      PATHOLOGY 09/17/18     SPECIMEN(S):   A: Hepatic flexure polyp   B: Colon polyp, descending   C: Sigmoid polyp biopsy   D: Sigmoid colon polyp   E: Sigmoid mass biopsy     FINAL DIAGNOSIS:   A.  Colon, hepatic flexure, mucosal biopsies:   - Tubular adenoma.   - Negative for high-grade dysplasia and malignancy.     B.  Left colon, mucosal biopsy:   - Tubular adenoma.   - Negative for high-grade dysplasia and malignancy.     C.  Sigmoid colon, mucosal biopsies:   - Tubular adenoma and normal colonic mucosa.   - Negative for high-grade dysplasia and malignancy.     D.  Sigmoid colon, mucosal biopsy:   - Tubular adenoma and normal colonic mucosa.   - Negative for high-grade dysplasia and malignancy.     E. Sigmoid colon mass, biopsies:   - Invasive moderately differentiated adenocarcinoma.   - No angiolymphatic invasion identified.   - Immunohistochemical antibody assays for mismatch repair proteins   pending.     FINAL INTERPRETATION:   - No loss of nuclear expression of mismatch repair (MMR) proteins:  low probability of microsatelliteinstability-high. (MSI-H).       ASSESSMENT AND PLAN:    Niharika Cason is a 63-year-old female with past medical history significant for hypertension, diabetes mellitus type 1, anxiety on a screening colonoscopy was diagnosed with  rectal adenocarcinoma    - Rectal adenocarcinoma- T3cN1 M0 intact mismatch repair protein  She met with medical and surgical oncology at [facility] and the recommendation was to proceed with total neoadjuvant therapy.  1. 6 cycles of XELOX, then:   2. 2 months of CXRT.   3. Surgery after 6-8 weeks.     - Surveillance for response to therapy [id number] CT c/a/p and Flex Sig at completion of chemotherapy and then prior to the surgery..     10/25/18 Received cycle 1 of chemotherapy-Tolerated it well with no active complaints currently.    Discussed in detail about the prognosis associated with locally recurrent rectal adenocarcinoma wasn't the standard of care involving neoadjuvant therapy in an attempt to downstage the tumor and address concerns about systemic dissemination upfront. Will continue the plan of total neoadjuvant therapy and proceed with cycle 2 of XELOX today      - Cold sensitivity  Secondary to oxaliplatin use    - Diabetes mellitus  On an insulin regimen and managed by primary care provider    - Hypertension  Continue with home dose atenolol, hydrochlorothiazide and lisinopril    - Anxiety  Continue with paxil  Ativan prn       RTC in  3 weeks for office visit, labs,next course of chemotherapy    The patient is ready to learn, no apparent learning barriers were identified, Diagnosis and treatment plans were explained to the patient. The patient expressed understanding of the content. The patient questions were answered to her satisfaction.    Chart documentation with Dragon Voice recognition Software. Although reviewed after completion, some words and grammatical errors may remain.    Dayron Latwon MD  Attending Physician   Hematology/Medical Oncology        Again, thank you for allowing me to participate in the care of your patient.        Sincerely,        Dayron Lawton MD

## 2018-11-15 NOTE — MR AVS SNAPSHOT
After Visit Summary   11/15/2018    Niharika Cason    MRN: [id number]           Patient Information     Date Of Birth          1955        Visit Information        Provider Department      11/15/2018 11:30 AM NL INFUSION CHAIR 3 [facility]        Today's Diagnoses     Rectal cancer (H)    -  1    Malignant neoplasm of rectum (H)        Type 1 diabetes mellitus with hyperglycemia (HCC)        Rectal adenocarcinoma (H)          Care Instructions    Pt to return on 12/06/18 for C3 D1 Oxaliplatin. Copies of medication list and upcoming appointments given prior to discharge.             Follow-ups after your visit        Your next 10 appointments already scheduled     Dec 06, 2018 11:00 AM CST   Return Visit with Dayron Lawton MD   [facility] ([facility])    [address] [place] [id number] [phone]            Dec 06, 2018 11:30 AM CST   Level 4 with NL INFUSION CHAIR 4   [facility] ([facility])    [address] [place] [address] [phone]              Who to contact     If you have questions or need follow up information about today's clinic visit or your schedule please contact [facility] directly at [phone].  Normal or non-critical lab and imaging results will be communicated to you by Fairphonehart, letter or phone within 4 business days after the clinic has received the results. If you do not hear from us within 7 days, please contact the clinic through Fairphonehart or phone. If you have a critical or abnormal lab result, we will notify you by phone as soon as possible.  Submit refill requests through Oxtox or call your pharmacy and they will forward the refill request to us. Please allow 3 business days for your refill to be completed.          Additional Information About Your Visit        [email or web address] Information     Oxtox gives you secure  "access to your electronic health record. If you see a primary care provider, you can also send messages to your care team and make appointments. If you have questions, please call your primary care clinic.  If you do not have a primary care provider, please call [phone] and they will assist you.        Care EveryWhere ID     This is your Care EveryWhere ID. This could be used by other organizations to access your [place] medical records  [id number]        Your Vitals Were     Pulse Temperature Respirations Height Last Period Pulse Oximetry    64 97.5  F (36.4  C) (Temporal) 16 1.626 m (5' 4\") 10/15/2008 95%    BMI (Body Mass Index)                   30.76 kg/m2            Blood Pressure from Last 3 Encounters:   11/15/18 113/53   11/15/18 108/62   11/02/18 114/58    Weight from Last 3 Encounters:   11/15/18 81.3 kg (179 lb 3.2 oz)   11/15/18 81.3 kg (179 lb 3.2 oz)   11/02/18 81.2 kg (179 lb)              We Performed the Following     **A1C FUTURE anytime     CBC with platelets differential     CEA     Comprehensive metabolic panel          Today's Medication Changes          These changes are accurate as of 11/15/18  4:41 PM.  If you have any questions, ask your nurse or doctor.               These medicines have changed or have updated prescriptions.        Dose/Directions    capecitabine 500 MG tablet CHEMO   Commonly known as:  XELODA   This may have changed:  Another medication with the same name was removed. Continue taking this medication, and follow the directions you see here.   Used for:  Rectal cancer (H), Malignant neoplasm of rectum (H)   Changed by:  Lashawn Rivera RP        Dose:  1000 mg/m2   Take 4 tablets (2,000 mg) by mouth 2 times daily for 14 days Take on Days 1 through 14, then off for 7 days.   Quantity:  112 tablet   Refills:  0         Stop taking these medicines if you haven't already. Please contact your care team if you have questions.     insulin detemir 100 UNIT/ML injection "   Commonly known as:  LEVEMIR FLEXTOUCH   Stopped by:  Lashawn Rivera, [facility]                    Primary Care Provider Office Phone # Fax #    Leona Cherri Farris [phone] [address] [place] [address]        Equal Access to Services     EDEN FLETCHERNOLBERTO : Hadii aad ku hadasho Soomaali, waaxda luqadaha, qaybta kaalmada adeegyada, waxay idiin hayaan adeeg kharash la'aan ah. So [facility] [phone].    ATENCIÓN: Si habla español, tiene a stokes disposición servicios gratuitos de asistencia lingüística. Llame al [phone].    We comply with applicable federal civil rights laws and [place] laws. We do not discriminate on the basis of race, color, national origin, age, disability, sex, sexual orientation, or gender identity.            Thank you!     Thank you for choosing [facility]  for your care. Our goal is always to provide you with excellent care. Hearing back from our patients is one way we can continue to improve our services. Please take a few minutes to complete the written survey that you may receive in the mail after your visit with us. Thank you!             Your Updated Medication List - Protect others around you: Learn how to safely use, store and throw away your medicines at www.disposemymeds.org.          This list is accurate as of 11/15/18  4:41 PM.  Always use your most recent med list.                   Brand Name Dispense Instructions for use Diagnosis    atenolol 25 MG tablet    TENORMIN    93 tablet    Take 1 tablet (25 mg) by mouth daily    Essential hypertension with goal blood pressure less than 130/80       BASAGLAR 100 UNIT/ML injection     15 mL    Inject 50 Units Subcutaneous daily    Type 1 diabetes mellitus with hyperglycemia (H)       blood glucose monitoring test strip    SURESTEP TEST STRIPS    200 each    1 strip by In Vitro route 3 times daily    Type 1 diabetes mellitus with hyperglycemia (H)       capecitabine 500 MG tablet  CHEMO    XELODA    112 tablet    Take 4 tablets (2,000 mg) by mouth 2 times daily for 14 days Take on Days 1 through 14, then off for 7 days.    Rectal cancer (H), Malignant neoplasm of rectum (H)       hydrochlorothiazide 12.5 MG capsule    MICROZIDE    93 capsule    Take 1 capsule (12.5 mg) by mouth daily    Essential hypertension with goal blood pressure less than 130/80       insulin lispro 100 UNIT/ML injection    HumaLOG KWIKpen    30 mL    Use 3 units before breakfast, 6 units before lunch, and 16 units before dinner, plus correction of 1 unit per every 50 glucose over 125, averaging 3 for breakfast, 6 for lunch, 16 for dinner.    Type 1 diabetes mellitus with hyperglycemia (H)       Insulin Pen Needle 33G X 4 MM Misc     200 each    1 each daily Use 4 needles per day    Type 1 diabetes mellitus with hyperglycemia (H)       lidocaine-prilocaine cream    EMLA    30 g    Apply 1 g topically as needed for moderate pain    Rectal cancer (H)       lisinopril 40 MG tablet    PRINIVIL/ZESTRIL    93 tablet    Take 1 tablet (40 mg) by mouth daily    Essential hypertension with goal blood pressure less than 130/80, Type 1 diabetes mellitus with hyperglycemia (H)       loperamide 2 MG capsule    IMODIUM    30 capsule    Start with 2 caps (4 mg), then take one cap (2 mg) after each diarrheal stool as needed. Do not use more than 8 caps (16 mg) per day.    Rectal cancer (H), Malignant neoplasm of rectum (H)       LORazepam 0.5 MG tablet    ATIVAN    30 tablet    Take 1 tablet (0.5 mg) by mouth every 4 hours as needed (Anxiety, Nausea/Vomiting or Sleep)    Rectal cancer (H), Malignant neoplasm of rectum (H)       ondansetron 8 MG ODT tab    ZOFRAN-ODT    60 tablet    Take 1 tablet (8 mg) by mouth every 8 hours as needed for nausea    Rectal cancer (H)       PARoxetine 30 MG tablet    PAXIL    93 tablet    Take 1 tablet (30 mg) by mouth every morning    Generalized anxiety disorder       prochlorperazine 10 MG tablet     COMPAZINE    30 tablet    Take 1 tablet (10 mg) by mouth every 6 hours as needed (Nausea/Vomiting)    Rectal cancer (H), Malignant neoplasm of rectum (H)       simvastatin 10 MG tablet    ZOCOR    93 tablet    TAKE ONE TABLET BY MOUTH EVERY NIGHT AT BEDTIME    Hyperlipidemia LDL goal <100

## 2018-11-15 NOTE — NURSING NOTE
"Oncology Rooming Note    November 15, 2018 11:04 AM   Niharika Cason is a 63 year old female who presents for:    Chief Complaint   Patient presents with     Oncology Clinic Visit     2 week follow up-Stage [id number] rectal cancer     Consult     Transfer of care from: Johnie Menendez PA-C; [facility]     Chemotherapy     D1C2 Eloxatin     Oral Chemotherapy Management     capecitabine (XELODA) 500 MG tablet      Results     labs completed today     Initial Vitals: /62 (BP Location: Right arm, Patient Position: Chair, Cuff Size: Adult Regular)  Pulse 64  Temp 97.4  F (36.3  C) (Temporal)  Resp 16  Ht 1.6 m (5' 2.99\")  Wt 81.3 kg (179 lb 3.2 oz)  LMP 10/15/2008  SpO2 96%  BMI 31.75 kg/m2 Estimated body mass index is 31.75 kg/(m^2) as calculated from the following:    Height as of this encounter: 1.6 m (5' 2.99\").    Weight as of this encounter: 81.3 kg (179 lb 3.2 oz). Body surface area is 1.9 meters squared.  No Pain (0) Comment: Data Unavailable   Patient's last menstrual period was 10/15/2008.  Allergies reviewed: Yes  Medications reviewed: Yes    Medications: Medication refills not needed today.  Pharmacy name entered into [facility]:    [facility] [place], [place] - [address] [place] [address] [facility] [place], [place] - [address]    Oncology Symptom Checklist    Unusual Bruising/Bleeding: No  Skin issues or swelling: No Concerns  Fever/Chills:  No  Nausea/Vomiting: Yes (Please explain): after treatment  Hard or Loose stools: No  SOB/Respiratory(Cough): No Concerns  Mouth Sores: No  Able to drink 6 to 8 glasses of fluid in a day: No Concerns  Weight loss:  No Concerns  Weakness: No  Fatigue: No  Light Headed or Dizzy: No  Cognitive Disturbance-Memory: No  Neuropathy/Numbness&Tingling-Hands/Feet: Yes (Please explain):   Night Sweats:  No  Sleep Concerns: No Concerns    Clinical concerns: See above. Concerns reviewed with Dr. Lawton     5 minutes for nursing " intake (face to face time)     Roxie FLOOD CMA

## 2018-11-15 NOTE — PROGRESS NOTES
Infusion Nursing Note:  Niharika Cason presents today for C2 D1 Oxaliplatin and Xeloda.    Patient seen by provider today: Yes: Dr. Lawton   present during visit today: Not Applicable.    Note: Patient arrived early for Port Labs.    Intravenous Access:  Labs drawn without difficulty.  Implanted Port.    Treatment Conditions:  Lab Results   Component Value Date    HGB 12.1 11/15/2018     Lab Results   Component Value Date    WBC 6.0 11/15/2018      Lab Results   Component Value Date    ANEU 3.2 11/15/2018     Lab Results   Component Value Date     11/15/2018      Lab Results   Component Value Date     11/15/2018                   Lab Results   Component Value Date    POTASSIUM 4.0 11/15/2018           Lab Results   Component Value Date    MAG 1.9 02/27/2004            Lab Results   Component Value Date    CR 0.68 11/15/2018                   Lab Results   Component Value Date    JUNG 8.5 11/15/2018                Lab Results   Component Value Date    BILITOTAL 0.5 11/15/2018           Lab Results   Component Value Date    ALBUMIN 3.9 11/15/2018                    Lab Results   Component Value Date    ALT 25 11/15/2018           Lab Results   Component Value Date    AST 19 11/15/2018       Results reviewed, labs MET treatment parameters, ok to proceed with treatment.      Post Infusion Assessment:  Patient tolerated infusion without incident.  Patient observed for 15 minutes post infusion per protocol.  Blood return noted pre and post infusion.  Site patent and intact, free from redness, edema or discomfort.  No evidence of extravasations.  Access discontinued per protocol.    Discharge Plan:   Discharge instructions reviewed with: Patient and Family.  Patient and/or family verbalized understanding of discharge instructions and all questions answered.  Copy of AVS reviewed with patient and/or family.  Patient will return 12/06/18 for next appointment.  Patient discharged in stable condition  accompanied by: self and .  Departure Mode: Ambulatory.    Lynnette Tejeda RN

## 2018-11-15 NOTE — PATIENT INSTRUCTIONS
Today:  Treatment today.  Start Xeloda as soon as you get it.    Please follow up with Dr. Lawton in 3 weeks with port labs and treatment.      Port Lab Date/Time:    Office visit follow up with Dr. Lawton Date/Time:     Infusion to follow at:    If you have any questions or concerns please feel free to call.    If you need to reschedule please call:  Clinic or Lab Appointment - [phone]  Infusion - [phone]  Imaging - [phone]    Twin Ch, RN, BSN, OCN  [facility]   Oncology/Hematology Care Coordinator RN  [facility]  [phone]

## 2018-11-15 NOTE — MR AVS SNAPSHOT
After Visit Summary   11/15/2018    Niharika Cason    MRN: [id number]           Patient Information     Date Of Birth          1955        Visit Information        Provider Department      11/15/2018 11:00 AM Dayron Lawton MD [facility]        Care Instructions      Today:  Treatment today.  Start Xeloda as soon as you get it.    Please follow up with Dr. Lawton in 3 weeks with port labs and treatment.      Port Lab Date/Time:    Office visit follow up with Dr. Lawton Date/Time:     Infusion to follow at:    If you have any questions or concerns please feel free to call.    If you need to reschedule please call:  Clinic or Lab Appointment - [phone]  Infusion - [phone]  Imaging - [phone]    Twin Ch RN, BSN, N  [facility]   Oncology/Hematology Care Coordinator RN  [facility]  [phone]                    Follow-ups after your visit        Your next 10 appointments already scheduled     Nov 15, 2018  1:00 PM CST   TELEMEDICINE with Lashawn Rivera [facility] Medication Therapy Management ([facility])    [address] [place] [address]  [address] [place] [address] [phone]           Note: this is not an onsite visit; there is no need to come to the facility.            Dec 06, 2018 11:00 AM CST   Return Visit with Dayron Lawton MD   [facility] ([address] [id number] [phone]            Dec 06, 2018 11:30 AM CST   Level 4 with NL INFUSION CHAIR 4   [facility] ([facility])    [address] [place], [place] [address] [place] [id number] [phone]              Future tests that were ordered for you today     Open Future Orders        Priority Expected Expires Ordered    CEA Routine  11/15/2019 11/15/2018            Who to contact     If you have questions or need follow up information about today's clinic visit  "or your schedule please contact [facility] directly at [phone].  Normal or non-critical lab and imaging results will be communicated to you by MyChart, letter or phone within 4 business days after the clinic has received the results. If you do not hear from us within 7 days, please contact the clinic through Sol[employer] Medicalhart or phone. If you have a critical or abnormal lab result, we will notify you by phone as soon as possible.  Submit refill requests through [employer] or call your pharmacy and they will forward the refill request to us. Please allow 3 business days for your refill to be completed.          Additional Information About Your Visit        [employer] Information     [employer] gives you secure access to your electronic health record. If you see a primary care provider, you can also send messages to your care team and make appointments. If you have questions, please call your primary care clinic.  If you do not have a primary care provider, please call [phone] and they will assist you.        Care EveryWhere ID     This is your Care EveryWhere ID. This could be used by other organizations to access your [place] medical records  [id number]        Your Vitals Were     Pulse Temperature Respirations Height Last Period Pulse Oximetry    64 97.4  F (36.3  C) (Temporal) 16 1.6 m (5' 2.99\") 10/15/2008 96%    BMI (Body Mass Index)                   31.75 kg/m2            Blood Pressure from Last 3 Encounters:   11/15/18 108/62   11/02/18 114/58   10/25/18 129/57    Weight from Last 3 Encounters:   11/15/18 81.3 kg (179 lb 3.2 oz)   11/02/18 81.2 kg (179 lb)   10/25/18 81.7 kg (180 lb 3.2 oz)              Today, you had the following     No orders found for display         Today's Medication Changes          These changes are accurate as of 11/15/18 11:32 AM.  If you have any questions, ask your nurse or doctor.               These medicines have changed or have updated prescriptions.        " Dose/Directions    capecitabine 500 MG tablet CHEMO   Commonly known as:  XELODA   This may have changed:  Another medication with the same name was removed. Continue taking this medication, and follow the directions you see here.   Used for:  Rectal cancer (H), Malignant neoplasm of rectum (H)   Changed by:  Lashawn Rivera RPH        Dose:  1000 mg/m2   Take 4 tablets (2,000 mg) by mouth 2 times daily for 14 days Take on Days 1 through 14, then off for 7 days.   Quantity:  112 tablet   Refills:  0         Stop taking these medicines if you haven't already. Please contact your care team if you have questions.     insulin detemir 100 UNIT/ML injection   Commonly known as:  LEVEMIR FLEXTOUCH   Stopped by:  Lashawn Rivera RPH                    Primary Care Provider Office Phone # Fax #    Leona Cherri Farris [phone] [address] [place] [address]        Equal Access to Services     [facility] HospitalNOLBERTO AH: Hadii clifford irizarryo Somarcia, waaxda luqadaha, qaybta kaalmada adeegyada, hever galdamez . So [facility] [phone].    ATENCIÓN: Si habla español, tiene a stokes disposición servicios gratuitos de asistencia lingüística. Llame al [phone].    We comply with applicable federal civil rights laws and [place] laws. We do not discriminate on the basis of race, color, national origin, age, disability, sex, sexual orientation, or gender identity.            Thank you!     Thank you for choosing [facility]  for your care. Our goal is always to provide you with excellent care. Hearing back from our patients is one way we can continue to improve our services. Please take a few minutes to complete the written survey that you may receive in the mail after your visit with us. Thank you!             Your Updated Medication List - Protect others around you: Learn how to safely use, store and throw away your medicines at www.disposemymeds.org.          This  list is accurate as of 11/15/18 11:32 AM.  Always use your most recent med list.                   Brand Name Dispense Instructions for use Diagnosis    atenolol 25 MG tablet    TENORMIN    93 tablet    Take 1 tablet (25 mg) by mouth daily    Essential hypertension with goal blood pressure less than 130/80       BASAGLAR 100 UNIT/ML injection     15 mL    Inject 50 Units Subcutaneous daily    Type 1 diabetes mellitus with hyperglycemia (H)       blood glucose monitoring test strip    SURESTEP TEST STRIPS    200 each    1 strip by In Vitro route 3 times daily    Type 1 diabetes mellitus with hyperglycemia (H)       capecitabine 500 MG tablet CHEMO    XELODA    112 tablet    Take 4 tablets (2,000 mg) by mouth 2 times daily for 14 days Take on Days 1 through 14, then off for 7 days.    Rectal cancer (H), Malignant neoplasm of rectum (H)       hydrochlorothiazide 12.5 MG capsule    MICROZIDE    93 capsule    Take 1 capsule (12.5 mg) by mouth daily    Essential hypertension with goal blood pressure less than 130/80       insulin lispro 100 UNIT/ML injection    HumaLOG KWIKpen    30 mL    Use 3 units before breakfast, 6 units before lunch, and 16 units before dinner, plus correction of 1 unit per every 50 glucose over 125, averaging 3 for breakfast, 6 for lunch, 16 for dinner.    Type 1 diabetes mellitus with hyperglycemia (H)       Insulin Pen Needle 33G X 4 MM Misc     200 each    1 each daily Use 4 needles per day    Type 1 diabetes mellitus with hyperglycemia (H)       lidocaine-prilocaine cream    EMLA    30 g    Apply 1 g topically as needed for moderate pain    Rectal cancer (H)       lisinopril 40 MG tablet    PRINIVIL/ZESTRIL    93 tablet    Take 1 tablet (40 mg) by mouth daily    Essential hypertension with goal blood pressure less than 130/80, Type 1 diabetes mellitus with hyperglycemia (H)       loperamide 2 MG capsule    IMODIUM    30 capsule    Start with 2 caps (4 mg), then take one cap (2 mg) after each  diarrheal stool as needed. Do not use more than 8 caps (16 mg) per day.    Rectal cancer (H), Malignant neoplasm of rectum (H)       LORazepam 0.5 MG tablet    ATIVAN    30 tablet    Take 1 tablet (0.5 mg) by mouth every 4 hours as needed (Anxiety, Nausea/Vomiting or Sleep)    Rectal cancer (H), Malignant neoplasm of rectum (H)       ondansetron 8 MG ODT tab    ZOFRAN-ODT    60 tablet    Take 1 tablet (8 mg) by mouth every 8 hours as needed for nausea    Rectal cancer (H)       PARoxetine 30 MG tablet    PAXIL    93 tablet    Take 1 tablet (30 mg) by mouth every morning    Generalized anxiety disorder       prochlorperazine 10 MG tablet    COMPAZINE    30 tablet    Take 1 tablet (10 mg) by mouth every 6 hours as needed (Nausea/Vomiting)    Rectal cancer (H), Malignant neoplasm of rectum (H)       simvastatin 10 MG tablet    ZOCOR    93 tablet    TAKE ONE TABLET BY MOUTH EVERY NIGHT AT BEDTIME    Hyperlipidemia LDL goal <100

## 2018-11-15 NOTE — PROGRESS NOTES
DATE OF VISIT: Nov 15, 2018    REASON FOR REFERRAL:   Rectal adenocarcinoma    HISTORY OF PRESENT ILLNESS:     Niharika Cason is a 63-year-old female with past medical history significant for hypertension, diabetes mellitus type 1, anxiety underwent her first screening colonoscopy on 09/17/18 which revealed a partially obstructing mass in the rectosigmoid colon  Which was biopsied and came back as invasive moderately differentiated adenocarcinoma but intact mismatch repair proteins. Staging CT scans showed a 3 cm enhancing lesion in the right lobe of liver he subsequently had an MRI done which characterized a liver lesion as well as VOCAL lobular hyperplasia with no suspected metastatic disease. MRI of pelvis showed an irregular ulcerated left rectal wall mass at 9 cm from the anal verge extending to the muscularis propria into the perirectal fat. Also noted were suspicious perirectal and presacral lymph nodes -clinical stage T3cN1 M0.   She met with medical and surgical oncology at [facility] and the recommendation was to proceed with total neoadjuvant therapy.     10/25/18  received cycle 1 XELOX chemotherapy    Presents to the medical oncology clinic at [facility] for transfer of care and proceed with next cycle of chemotherapy. Tolerated last chemotherapy well with no active complaints. Denies mucositis, diarrhea, hand-foot syndrome, nausea/vomiting, fevers chills, neuropathy, blood in the stools. Did develop symptoms of cold sensitivity which lasted for a few days. Stable appetite and energy levels      REVIEW OF SYSTEMS:      ROS: 14 point ROS neg other than the symptoms noted above in the HPI.    PAST MEDICAL HISTORY:   Past Medical History:   Diagnosis Date     Essential hypertension, benign      Generalized anxiety disorder      Malignant neoplasm of rectum (H) 10/15/2018     Rectal cancer (H) 10/15/2018     Type I (juvenile type) diabetes mellitus without mention of complication, not stated  as uncontrolled     diagnosed at age 35     Ulcerative colitis, unspecified     in the 70s.         PAST SURGICAL HISTORY:   Past Surgical History:   Procedure Laterality Date     COLONOSCOPY N/A 9/17/2018    Procedure: COMBINED COLONOSCOPY, SINGLE OR MULTIPLE BIOPSY/POLYPECTOMY BY BIOPSY;  colonoscopy with polypectomies by biopsy forceps and hot snare and submucosal injection with tattoo;  Surgeon: Richard Moore MD;  Location: [facility]     COLONOSCOPY  40 yrs ago     HC COLONOSCOPY THRU STOMA, DIAGNOSTIC  1998    negative     HC CURETTAGE, POSTPARTUM  '91, '93    following miscarriages     HC REMOVAL OF TONSILS,<13 Y/O      Tonsils <14y.o.     INSERT PORT VASCULAR ACCESS Right 10/19/2018    Procedure: Chest Port Placement - right ;  Surgeon: Lawson Hitchcock PA-C;  Location: [facility]       ALLERGIES:   Allergies as of 11/15/2018 - Joe as Reviewed 11/15/2018   Allergen Reaction Noted     No known drug allergies  08/27/2001       MEDICATIONS:   Current Outpatient Prescriptions   Medication Sig Dispense Refill     atenolol (TENORMIN) 25 MG tablet Take 1 tablet (25 mg) by mouth daily 93 tablet 3     BASAGLAR 100 UNIT/ML injection Inject 50 Units Subcutaneous daily 15 mL 3     blood glucose monitoring (SURESTEP TEST STRIPS) test strip 1 strip by In Vitro route 3 times daily 200 each 3     capecitabine (XELODA) 500 MG tablet CHEMO Take 4 tablets (2,000 mg) by mouth 2 times daily for 14 days Take on Days 1 through 14, then off for 7 days. 112 tablet 0     hydrochlorothiazide (MICROZIDE) 12.5 MG capsule Take 1 capsule (12.5 mg) by mouth daily 93 capsule 3     insulin lispro (HUMALOG KWIKPEN) 100 UNIT/ML injection Use 3 units before breakfast, 6 units before lunch, and 16 units before dinner, plus correction of 1 unit per every 50 glucose over 125, averaging 3 for breakfast, 6 for lunch, 16 for dinner. 30 mL 3     Insulin Pen Needle 33G X 4 MM MISC 1 each daily Use 4 needles per day 200 each prn      lidocaine-prilocaine (EMLA) cream Apply 1 g topically as needed for moderate pain 30 g 3     lisinopril (PRINIVIL/ZESTRIL) 40 MG tablet Take 1 tablet (40 mg) by mouth daily 93 tablet 3     loperamide (IMODIUM) 2 MG capsule Start with 2 caps (4 mg), then take one cap (2 mg) after each diarrheal stool as needed. Do not use more than 8 caps (16 mg) per day. 30 capsule 0     LORazepam (ATIVAN) 0.5 MG tablet Take 1 tablet (0.5 mg) by mouth every 4 hours as needed (Anxiety, Nausea/Vomiting or Sleep) 30 tablet 2     ondansetron (ZOFRAN-ODT) 8 MG ODT tab Take 1 tablet (8 mg) by mouth every 8 hours as needed for nausea 60 tablet 3     PARoxetine (PAXIL) 30 MG tablet Take 1 tablet (30 mg) by mouth every morning 93 tablet 3     prochlorperazine (COMPAZINE) 10 MG tablet Take 1 tablet (10 mg) by mouth every 6 hours as needed (Nausea/Vomiting) 30 tablet 2     simvastatin (ZOCOR) 10 MG tablet TAKE ONE TABLET BY MOUTH EVERY NIGHT AT BEDTIME 93 tablet 3     [DISCONTINUED] BD ULTRA FINE PEN NEEDLES 1 each by Percutaneous route 4 times daily 150 Device 11     [DISCONTINUED] capecitabine (XELODA) 500 MG tablet CHEMO Take 4 tablets (2,000 mg) by mouth 2 times daily for 14 days Take on Days 1 - 14, then off for 7 days. 112 tablet 0        FAMILY HISTORY:   Family History   Problem Relation Age of Onset     HEART DISEASE Maternal Grandfather      MI at 52 yrs     EYE* Paternal Grandfather      cataracts     Arthritis Mother      Gynecology Mother      hysterectomy for fibroids     Hypertension Mother      Lipids Mother      GASTROINTESTINAL DISEASE Father      ulcers     HEART DISEASE Father      intermittent rapid heartbeat     Cancer Father      mesothelioma       SOCIAL HISTORY:   Social History     Social History     Marital status:      Spouse name: Fabrice     Number of children: 2     Years of education: 12     Occupational History      [employer]     on her feet all day      [employer]     Social History Main Topics      "Smoking status: Former Smoker     Packs/day: 0.50     Years: 15.00     Smokeless tobacco: Never Used      Comment: not smoked since mid September     Alcohol use 0.0 oz/week     0 Standard drinks or equivalent per week      Comment: 3-4 wine per wk     Drug use: No     Sexual activity: Yes     Partners: Male     Other Topics Concern      Service No     Blood Transfusions No     Caffeine Concern No     Occupational Exposure No     Hobby Hazards No     Sleep Concern No     Stress Concern No     Weight Concern No     Special Diet Yes     diabetic     Back Care Yes     Exercise Yes     Bike Helmet No     Seat Belt Yes     Self-Exams Yes     occassionally     Parent/Sibling W/ Cabg, Mi Or Angioplasty Before 65f 55m? No     Social History Narrative       PHYSICAL EXAMINATION:   /62 (BP Location: Right arm, Patient Position: Chair, Cuff Size: Adult Regular)  Pulse 64  Temp 97.4  F (36.3  C) (Temporal)  Resp 16  Ht 1.6 m (5' 2.99\")  Wt 81.3 kg (179 lb 3.2 oz)  LMP 10/15/2008  SpO2 96%  BMI 31.75 kg/m2  Wt Readings from Last 10 Encounters:   11/15/18 81.3 kg (179 lb 3.2 oz)   11/02/18 81.2 kg (179 lb)   10/25/18 81.7 kg (180 lb 3.2 oz)   10/19/18 81.2 kg (179 lb)   10/15/18 81.6 kg (179 lb 14.4 oz)   10/12/18 81.2 kg (179 lb)   10/01/18 81.4 kg (179 lb 8 oz)   10/01/18 81.4 kg (179 lb 8 oz)   09/20/18 80.1 kg (176 lb 9.6 oz)   07/20/18 81.7 kg (180 lb 3.2 oz)      ECOG performance status: 0  Exam:  Constitutional: healthy, alert and no distress  Head: Normocephalic.   Neck: Neck supple. No adenopathy.  ENT: ENT exam normal, no neck nodes or sinus tenderness  Cardiovascular: RRR. No murmurs  Respiratory: Lungs clear  Gastrointestinal: Abdomen soft, non-tender. BS normal.   Musculoskeletal: extremities normal  Skin: no suspicious lesions or rashes  Neurologic: Gait normal. Sensation grossly WNL.  Psychiatric: mentation appears normal and affect normal/bright  Hematologic/Lymphatic/Immunologic: Normal " cervical lymph nodes      LABORATORY RESULTS:    Recent Labs   Lab Test  11/15/18   1035  11/02/18   1434   NA  139  136   POTASSIUM  4.0  3.5   CHLORIDE  103  101   BUN  16  17   CR  0.68  0.75   GLC  74  82   JUNG  8.5  8.7     Recent Labs   Lab Test  11/15/18   1035  11/02/18   1434  10/24/18   1455   WBC  6.0  8.0  8.0   HGB  12.1  13.3  13.0   PLT  257  270  285   MCV  88  86  89   NEUTROPHIL  52.4  61.8  61.9     Recent Labs   Lab Test  11/15/18   1035  11/02/18   1434  10/24/18   1455   BILITOTAL  0.5  0.3  0.3   ALKPHOS  72  78  80   ALT  25  28  26   AST  19  22  17   ALBUMIN  3.9  3.9  4.0     TSH   Date Value Ref Range Status   07/20/2018 0.76 0.40 - 4.00 mU/L Final   ]    IMAGING RESULTS:     09/17/18  CT C/A/P    IMPRESSION:  1. Patient's reported rectal mass is not definitely seen on this  study.  2. Peripherally enhancing lesion in the superior right lobe of the  liver (segment VII) measures up to 3.0 cm and could represent  metastasis but could also represent a benign lesion such as a  hemangioma. I recommend dynamic MRI of the liver.  3. Multiple tiny nodules in the lungs likely represent granulomata.  These should be followed in one year with CT to ensure stability or  resolution.  4. Calcified uterine fibroids as described above. Largest measures up  to 5.9 cm.  5. Atherosclerosis including coronary arteries and aorta. Degenerative  changes of the spine.     09/24/18 MRI abdomen   IMPRESSION:  1. Focal nodular hyperplasia in segment 7 measuring 3.8 x 3.4 cm,  corresponding to the abnormality seen on CT 9/17/2018.  2. No suspected metastatic disease in the abdomen.     09/24/18 MRI pelvis    IMPRESSION:   1. Ulcerated mid to high rectal mass with extension beyond the  muscularis propria, concern for extramural venous invasion, and  involvement of the mesorectal fascia as above. Suspicious presacral  node along the area of mesorectal fascia involvement with additional  smaller indeterminate lymph  nodes. Tumor comes in close proximity to,  but does not definitely involve, an exophytic uterine fibroid. Stage  T3cN1.   2.  Additionally, approximately 2 cm superior to the above described  rectal mass there is an additional short segment of intermediate T2  signal intensity, enhancing circumferential wall thickening with  luminal narrowing measuring approximately 1.5 cm in length concerning  for a synchronous tumor.      PATHOLOGY 09/17/18     SPECIMEN(S):   A: Hepatic flexure polyp   B: Colon polyp, descending   C: Sigmoid polyp biopsy   D: Sigmoid colon polyp   E: Sigmoid mass biopsy     FINAL DIAGNOSIS:   A.  Colon, hepatic flexure, mucosal biopsies:   - Tubular adenoma.   - Negative for high-grade dysplasia and malignancy.     B.  Left colon, mucosal biopsy:   - Tubular adenoma.   - Negative for high-grade dysplasia and malignancy.     C.  Sigmoid colon, mucosal biopsies:   - Tubular adenoma and normal colonic mucosa.   - Negative for high-grade dysplasia and malignancy.     D.  Sigmoid colon, mucosal biopsy:   - Tubular adenoma and normal colonic mucosa.   - Negative for high-grade dysplasia and malignancy.     E. Sigmoid colon mass, biopsies:   - Invasive moderately differentiated adenocarcinoma.   - No angiolymphatic invasion identified.   - Immunohistochemical antibody assays for mismatch repair proteins   pending.     FINAL INTERPRETATION:   - No loss of nuclear expression of mismatch repair (MMR) proteins:  low probability of microsatelliteinstability-high. (MSI-H).       ASSESSMENT AND PLAN:    Niharika Cason is a 63-year-old female with past medical history significant for hypertension, diabetes mellitus type 1, anxiety on a screening colonoscopy was diagnosed with rectal adenocarcinoma    - Rectal adenocarcinoma- T3cN1 M0 intact mismatch repair protein  She met with medical and surgical oncology at [facility] and the recommendation was to proceed with total neoadjuvant therapy.  1. 6 cycles  of XELOX, then:   2. 2 months of CXRT.   3. Surgery after 6-8 weeks.     - Surveillance for response to therapy [id number] CT c/a/p and Flex Sig at completion of chemotherapy and then prior to the surgery..     10/25/18 Received cycle 1 of chemotherapy-Tolerated it well with no active complaints currently.    Discussed in detail about the prognosis associated with locally recurrent rectal adenocarcinoma wasn't the standard of care involving neoadjuvant therapy in an attempt to downstage the tumor and address concerns about systemic dissemination upfront. Will continue the plan of total neoadjuvant therapy and proceed with cycle 2 of XELOX today      - Cold sensitivity  Secondary to oxaliplatin use    - Diabetes mellitus  On an insulin regimen and managed by primary care provider    - Hypertension  Continue with home dose atenolol, hydrochlorothiazide and lisinopril    - Anxiety  Continue with paxil  Ativan prn       RTC in  3 weeks for office visit, labs,next course of chemotherapy    The patient is ready to learn, no apparent learning barriers were identified, Diagnosis and treatment plans were explained to the patient. The patient expressed understanding of the content. The patient questions were answered to her satisfaction.    Chart documentation with Dragon Voice recognition Software. Although reviewed after completion, some words and grammatical errors may remain.    Dayron Lawton MD  Attending Physician   Hematology/Medical Oncology

## 2018-11-15 NOTE — PATIENT INSTRUCTIONS
Pt to return on 12/06/18 for C3 D1 Oxaliplatin. Copies of medication list and upcoming appointments given prior to discharge.

## 2018-11-16 NOTE — PROGRESS NOTES
Niharika, your diabetes control is much better.  For now I don't recommend any changes to your diabetes routine. Please call me if you need anything. I don't need to see you right now as I know you are going through a lot of visits with your oncologist. I'd like to see you in the spring.   Leona Farris MD

## 2018-11-28 ENCOUNTER — PATIENT OUTREACH (OUTPATIENT)
Dept: EDUCATION SERVICES | Facility: CLINIC | Age: 63
End: 2018-11-28

## 2018-11-29 ENCOUNTER — TELEPHONE (OUTPATIENT)
Dept: ONCOLOGY | Facility: CLINIC | Age: 63
End: 2018-11-29

## 2018-11-29 DIAGNOSIS — C20 RECTAL ADENOCARCINOMA (H): ICD-10-CM

## 2018-11-29 DIAGNOSIS — C20 RECTAL CANCER (H): Primary | ICD-10-CM

## 2018-11-29 RX ORDER — CAPECITABINE 500 MG/1
1000 TABLET, FILM COATED ORAL 2 TIMES DAILY
Qty: 112 TABLET | Refills: 0 | Status: SHIPPED | OUTPATIENT
Start: 2018-11-29 | End: 2018-12-27

## 2018-11-29 RX ORDER — CAPECITABINE 500 MG/1
1000 TABLET, FILM COATED ORAL 2 TIMES DAILY
Qty: 112 TABLET | Refills: 0 | Status: CANCELLED | OUTPATIENT
Start: 2018-12-05 | End: 2018-12-19

## 2018-12-04 ENCOUNTER — TELEPHONE (OUTPATIENT)
Dept: ONCOLOGY | Facility: CLINIC | Age: 63
End: 2018-12-04

## 2018-12-04 DIAGNOSIS — C20 RECTAL ADENOCARCINOMA (H): Primary | ICD-10-CM

## 2018-12-04 PROCEDURE — 87493 C DIFF AMPLIFIED PROBE: CPT | Performed by: INTERNAL MEDICINE

## 2018-12-04 NOTE — TELEPHONE ENCOUNTER
Patient calling to report that she has been having diarrhea for the past 3 days.  Some are watery and the odor is stronger.  She has been taking the Imodium as prescribed and that provides some relief but doesn't last and she has been taking them frequently.  Patient on Xeloda but is on her off week.  She is due to restart this Thursday with Oxaliplatin.  Will route to Dr. Lawton.    Twin Ch RN, BSN, OCN  12/4/2018, 10:51 AM

## 2018-12-04 NOTE — TELEPHONE ENCOUNTER
Patient called and spoke with infusion and they let her know that the stool for C-diff was ordered and to stop by lab to  collection supplies and instructions.  Patient still wanted to speak with writing nurse so call was transferred.  Patient reported having a lot of gas and the last stool was a yellow grey color.  Again encouraged patient to get stool sample done.  She is going to try OTC gas relief and heating pad on abdomen.  Let patient know that if it is C-diff that the treatment would be an ABX and they sooner she gets started the better so getting sample in sooner would be better.  Patient agrees and will stop at [place] lab.  No other questions or concerns.    Twin Ch, RN, BSN, OCN  12/4/2018, 1:18 PM

## 2018-12-05 ENCOUNTER — TELEPHONE (OUTPATIENT)
Dept: ONCOLOGY | Facility: CLINIC | Age: 63
End: 2018-12-05

## 2018-12-05 DIAGNOSIS — C20 RECTAL ADENOCARCINOMA (H): ICD-10-CM

## 2018-12-05 LAB
C DIFF TOX B STL QL: NEGATIVE
SPECIMEN SOURCE: NORMAL

## 2018-12-05 NOTE — TELEPHONE ENCOUNTER
Patient calling for results of C-diff.  Results were negative.  Reviewed with patient with recommendations to take imodium PRN.  Patient will continue with follow up and treatment tomorrow.  She will wait to start Xeloda until after labs and seeing the Provider.  No questions or concerns.    Twin Ch RN, BSN, OCN  12/5/2018, 4:28 PM

## 2018-12-06 ENCOUNTER — INFUSION THERAPY VISIT (OUTPATIENT)
Dept: INFUSION THERAPY | Facility: CLINIC | Age: 63
End: 2018-12-06
Attending: INTERNAL MEDICINE
Payer: COMMERCIAL

## 2018-12-06 ENCOUNTER — ONCOLOGY VISIT (OUTPATIENT)
Dept: ONCOLOGY | Facility: CLINIC | Age: 63
End: 2018-12-06
Payer: COMMERCIAL

## 2018-12-06 VITALS
HEIGHT: 64 IN | BODY MASS INDEX: 29.81 KG/M2 | DIASTOLIC BLOOD PRESSURE: 60 MMHG | WEIGHT: 174.6 LBS | SYSTOLIC BLOOD PRESSURE: 104 MMHG | TEMPERATURE: 97.5 F | HEART RATE: 71 BPM | OXYGEN SATURATION: 96 % | RESPIRATION RATE: 18 BRPM

## 2018-12-06 VITALS — HEART RATE: 84 BPM | SYSTOLIC BLOOD PRESSURE: 109 MMHG | OXYGEN SATURATION: 96 % | DIASTOLIC BLOOD PRESSURE: 38 MMHG

## 2018-12-06 DIAGNOSIS — C20 RECTAL CANCER (H): Primary | ICD-10-CM

## 2018-12-06 DIAGNOSIS — C20 RECTAL ADENOCARCINOMA (H): Primary | ICD-10-CM

## 2018-12-06 DIAGNOSIS — C20 RECTAL ADENOCARCINOMA (H): ICD-10-CM

## 2018-12-06 LAB
ALBUMIN SERPL-MCNC: 3.1 G/DL (ref 3.4–5)
ALP SERPL-CCNC: 62 U/L (ref 40–150)
ALT SERPL W P-5'-P-CCNC: 47 U/L (ref 0–50)
ANION GAP SERPL CALCULATED.3IONS-SCNC: 7 MMOL/L (ref 3–14)
AST SERPL W P-5'-P-CCNC: 29 U/L (ref 0–45)
BASOPHILS # BLD AUTO: 0 10E9/L (ref 0–0.2)
BASOPHILS NFR BLD AUTO: 0.6 %
BILIRUB SERPL-MCNC: 0.5 MG/DL (ref 0.2–1.3)
BUN SERPL-MCNC: 14 MG/DL (ref 7–30)
CALCIUM SERPL-MCNC: 7.9 MG/DL (ref 8.5–10.1)
CHLORIDE SERPL-SCNC: 99 MMOL/L (ref 94–109)
CO2 SERPL-SCNC: 31 MMOL/L (ref 20–32)
CREAT SERPL-MCNC: 0.6 MG/DL (ref 0.52–1.04)
DIFFERENTIAL METHOD BLD: ABNORMAL
EOSINOPHIL NFR BLD AUTO: 1 %
ERYTHROCYTE [DISTWIDTH] IN BLOOD BY AUTOMATED COUNT: 15.8 % (ref 10–15)
GFR SERPL CREATININE-BSD FRML MDRD: >90 ML/MIN/1.7M2
GLUCOSE SERPL-MCNC: 143 MG/DL (ref 70–99)
HCT VFR BLD AUTO: 31.6 % (ref 35–47)
HGB BLD-MCNC: 11 G/DL (ref 11.7–15.7)
IMM GRANULOCYTES # BLD: 0 10E9/L (ref 0–0.4)
IMM GRANULOCYTES NFR BLD: 0.2 %
LYMPHOCYTES # BLD AUTO: 1.6 10E9/L (ref 0.8–5.3)
LYMPHOCYTES NFR BLD AUTO: 31.4 %
MCH RBC QN AUTO: 30.6 PG (ref 26.5–33)
MCHC RBC AUTO-ENTMCNC: 34.8 G/DL (ref 31.5–36.5)
MCV RBC AUTO: 88 FL (ref 78–100)
MONOCYTES # BLD AUTO: 0.9 10E9/L (ref 0–1.3)
MONOCYTES NFR BLD AUTO: 18 %
NEUTROPHILS # BLD AUTO: 2.5 10E9/L (ref 1.6–8.3)
NEUTROPHILS NFR BLD AUTO: 48.8 %
NRBC # BLD AUTO: 0 10*3/UL
NRBC BLD AUTO-RTO: 0 /100
PLATELET # BLD AUTO: 200 10E9/L (ref 150–450)
POTASSIUM SERPL-SCNC: 2.9 MMOL/L (ref 3.4–5.3)
PROT SERPL-MCNC: 5.6 G/DL (ref 6.8–8.8)
RBC # BLD AUTO: 3.59 10E12/L (ref 3.8–5.2)
SODIUM SERPL-SCNC: 137 MMOL/L (ref 133–144)
WBC # BLD AUTO: 5.2 10E9/L (ref 4–11)

## 2018-12-06 PROCEDURE — 96367 TX/PROPH/DG ADDL SEQ IV INF: CPT

## 2018-12-06 PROCEDURE — 96366 THER/PROPH/DIAG IV INF ADDON: CPT

## 2018-12-06 PROCEDURE — 80053 COMPREHEN METABOLIC PANEL: CPT | Performed by: INTERNAL MEDICINE

## 2018-12-06 PROCEDURE — 99215 OFFICE O/P EST HI 40 MIN: CPT | Performed by: INTERNAL MEDICINE

## 2018-12-06 PROCEDURE — 96413 CHEMO IV INFUSION 1 HR: CPT

## 2018-12-06 PROCEDURE — 96415 CHEMO IV INFUSION ADDL HR: CPT

## 2018-12-06 PROCEDURE — 85025 COMPLETE CBC W/AUTO DIFF WBC: CPT | Performed by: INTERNAL MEDICINE

## 2018-12-06 PROCEDURE — 25000128 H RX IP 250 OP 636: Performed by: INTERNAL MEDICINE

## 2018-12-06 PROCEDURE — 96375 TX/PRO/DX INJ NEW DRUG ADDON: CPT

## 2018-12-06 PROCEDURE — 25000132 ZZH RX MED GY IP 250 OP 250 PS 637: Performed by: INTERNAL MEDICINE

## 2018-12-06 RX ORDER — EPINEPHRINE 0.3 MG/.3ML
0.3 INJECTION SUBCUTANEOUS EVERY 5 MIN PRN
Status: CANCELLED | OUTPATIENT
Start: 2018-12-06

## 2018-12-06 RX ORDER — LORAZEPAM 2 MG/ML
0.5 INJECTION INTRAMUSCULAR EVERY 4 HOURS PRN
Status: CANCELLED
Start: 2018-12-06

## 2018-12-06 RX ORDER — METHYLPREDNISOLONE SODIUM SUCCINATE 125 MG/2ML
125 INJECTION, POWDER, LYOPHILIZED, FOR SOLUTION INTRAMUSCULAR; INTRAVENOUS
Status: DISCONTINUED | OUTPATIENT
Start: 2018-12-06 | End: 2018-12-06 | Stop reason: HOSPADM

## 2018-12-06 RX ORDER — DIPHENHYDRAMINE HYDROCHLORIDE 50 MG/ML
50 INJECTION INTRAMUSCULAR; INTRAVENOUS
Status: CANCELLED
Start: 2018-12-06

## 2018-12-06 RX ORDER — POTASSIUM CHLORIDE 29.8 MG/ML
20 INJECTION INTRAVENOUS ONCE
Status: COMPLETED | OUTPATIENT
Start: 2018-12-06 | End: 2018-12-06

## 2018-12-06 RX ORDER — ONDANSETRON 2 MG/ML
8 INJECTION INTRAMUSCULAR; INTRAVENOUS ONCE
Status: CANCELLED
Start: 2018-12-06 | End: 2018-12-06

## 2018-12-06 RX ORDER — EPINEPHRINE 1 MG/ML
0.3 INJECTION, SOLUTION, CONCENTRATE INTRAVENOUS EVERY 5 MIN PRN
Status: CANCELLED | OUTPATIENT
Start: 2018-12-06

## 2018-12-06 RX ORDER — POTASSIUM CHLORIDE 7.45 MG/ML
10 INJECTION INTRAVENOUS
Status: DISCONTINUED | OUTPATIENT
Start: 2018-12-06 | End: 2018-12-06

## 2018-12-06 RX ORDER — METHYLPREDNISOLONE SODIUM SUCCINATE 125 MG/2ML
125 INJECTION, POWDER, LYOPHILIZED, FOR SOLUTION INTRAMUSCULAR; INTRAVENOUS
Status: CANCELLED
Start: 2018-12-06

## 2018-12-06 RX ORDER — SODIUM CHLORIDE 9 MG/ML
1000 INJECTION, SOLUTION INTRAVENOUS CONTINUOUS PRN
Status: CANCELLED
Start: 2018-12-06

## 2018-12-06 RX ORDER — HEPARIN SODIUM (PORCINE) LOCK FLUSH IV SOLN 100 UNIT/ML 100 UNIT/ML
500 SOLUTION INTRAVENOUS EVERY 8 HOURS
Status: CANCELLED
Start: 2018-12-06

## 2018-12-06 RX ORDER — ALBUTEROL SULFATE 90 UG/1
1-2 AEROSOL, METERED RESPIRATORY (INHALATION)
Status: CANCELLED
Start: 2018-12-06

## 2018-12-06 RX ORDER — HEPARIN SODIUM (PORCINE) LOCK FLUSH IV SOLN 100 UNIT/ML 100 UNIT/ML
500 SOLUTION INTRAVENOUS EVERY 8 HOURS
Status: DISCONTINUED | OUTPATIENT
Start: 2018-12-06 | End: 2018-12-06 | Stop reason: HOSPADM

## 2018-12-06 RX ORDER — ONDANSETRON 2 MG/ML
8 INJECTION INTRAMUSCULAR; INTRAVENOUS ONCE
Status: COMPLETED | OUTPATIENT
Start: 2018-12-06 | End: 2018-12-06

## 2018-12-06 RX ORDER — MEPERIDINE HYDROCHLORIDE 25 MG/ML
25 INJECTION INTRAMUSCULAR; INTRAVENOUS; SUBCUTANEOUS EVERY 30 MIN PRN
Status: CANCELLED | OUTPATIENT
Start: 2018-12-06

## 2018-12-06 RX ORDER — POTASSIUM CHLORIDE 1500 MG/1
60 TABLET, EXTENDED RELEASE ORAL
Status: DISCONTINUED | OUTPATIENT
Start: 2018-12-06 | End: 2018-12-06 | Stop reason: HOSPADM

## 2018-12-06 RX ORDER — ALBUTEROL SULFATE 0.83 MG/ML
2.5 SOLUTION RESPIRATORY (INHALATION)
Status: CANCELLED | OUTPATIENT
Start: 2018-12-06

## 2018-12-06 RX ADMIN — ONDANSETRON HYDROCHLORIDE 8 MG: 2 INJECTION, SOLUTION INTRAMUSCULAR; INTRAVENOUS at 12:29

## 2018-12-06 RX ADMIN — POTASSIUM CHLORIDE 20 MEQ: 400 INJECTION, SOLUTION INTRAVENOUS at 16:06

## 2018-12-06 RX ADMIN — DEXTROSE MONOHYDRATE 250 ML: 50 INJECTION, SOLUTION INTRAVENOUS at 12:20

## 2018-12-06 RX ADMIN — POTASSIUM CHLORIDE 20 MEQ: 1500 TABLET, EXTENDED RELEASE ORAL at 14:35

## 2018-12-06 RX ADMIN — POTASSIUM CHLORIDE 40 MEQ: 1500 TABLET, EXTENDED RELEASE ORAL at 12:28

## 2018-12-06 RX ADMIN — OXALIPLATIN 250 MG: 5 INJECTION, SOLUTION, CONCENTRATE INTRAVENOUS at 13:37

## 2018-12-06 RX ADMIN — SODIUM CHLORIDE, PRESERVATIVE FREE 500 UNITS: 5 INJECTION INTRAVENOUS at 18:07

## 2018-12-06 RX ADMIN — METHYLPREDNISOLONE SODIUM SUCCINATE 125 MG: 125 INJECTION, POWDER, FOR SOLUTION INTRAMUSCULAR; INTRAVENOUS at 16:02

## 2018-12-06 RX ADMIN — SODIUM CHLORIDE 150 MG: 9 INJECTION, SOLUTION INTRAVENOUS at 12:53

## 2018-12-06 ASSESSMENT — PAIN SCALES - GENERAL: PAINLEVEL: NO PAIN (0)

## 2018-12-06 NOTE — NURSING NOTE
"Oncology Rooming Note    December 6, 2018 11:36 AM   Niharika Cason is a 63 year old female who presents for:    Chief Complaint   Patient presents with     Oncology Clinic Visit     3 week follow up- Rectal adenocarcinoma- T3cN1 M0 intact mismatch repair protein     Chemotherapy     D1C3 Eloxatin     Oral Chemotherapy Management     capecitabine (XELODA) 500 MG tablet CHEMO     Results     Initial Vitals: /60 (BP Location: Right arm, Patient Position: Chair, Cuff Size: Adult Regular)  Pulse 71  Temp 97.5  F (36.4  C) (Temporal)  Resp 18  Ht 1.626 m (5' 4\")  Wt 79.2 kg (174 lb 9.6 oz)  LMP 10/15/2008  SpO2 96%  BMI 29.97 kg/m2 Estimated body mass index is 29.97 kg/(m^2) as calculated from the following:    Height as of this encounter: 1.626 m (5' 4\").    Weight as of this encounter: 79.2 kg (174 lb 9.6 oz). Body surface area is 1.89 meters squared.  No Pain (0) Comment: Data Unavailable   Patient's last menstrual period was 10/15/2008.  Allergies reviewed: Yes  Medications reviewed: Yes    Medications: Medication refills not needed today.  Pharmacy name entered into SHIMAU[employer] Print System:    [facility] - [place], [place] - [address] [facility] - [place], [place] - [address]      6 minutes for nursing intake (face to face time)     Roxie FLOOD CMA              "

## 2018-12-06 NOTE — NURSING NOTE
DISCHARGE PLAN:  Next appointments: See patient instruction section  Departure Mode: Ambulatory  Accompanied by: daughter  3 minutes for nursing discharge (face to face time)     Niharika MAX FlanaganCason is here today for 3 week follow up.  Writing nurse seen patient after Medical Oncology appointment to address questions/concerns/coordinate care. Patient to infusion to schedule port labs, follow up, and infusion.  Imaging scheduled if ordered. Patient to have IV fluids and port labs in 1 week. RTC in 3 weeks with port labs, ov and treatment and with CT scan prior.  See patient instructions and Oncologist's Progress note for further details. Questions and concerns addressed to patient's satisfaction. Patient verbalized and demonstrated understanding of plan.  Contact information provided and patient is encouraged to call with any that arise in the interim of care.    Roxie FLOOD [facility]    Oncology/Hematology at [facility]  [phone]  12/6/2018, 3:09 PM

## 2018-12-06 NOTE — PROGRESS NOTES
FOLLOW-UP VISIT NOTE    PATIENT NAME: Niharika Cason MRN # [id number]  DATE OF VISIT: Dec 6, 2018 YOB: 1955    REFERRING PROVIDER: Liliana Urbina MD  [address] [place] [id number]    CANCER TYPE:Recatl adenocarcinoma  STAGE: cT3cN1 M0  ECOG PS: 1    ONCOLOGY HISTORY:    Niharika Cason is a 63-year-old female with past medical history significant for hypertension, diabetes mellitus type 1, anxiety underwent her first screening colonoscopy on 09/17/18 which revealed a partially obstructing mass in the rectosigmoid colon  Which was biopsied and came back as invasive moderately differentiated adenocarcinoma but intact mismatch repair proteins. Staging CT scans showed a 3 cm enhancing lesion in the right lobe of liver he subsequently had an MRI done which characterized a liver lesion as well as VOCAL lobular hyperplasia with no suspected metastatic disease. MRI of pelvis showed an irregular ulcerated left rectal wall mass at 9 cm from the anal verge extending to the muscularis propria into the perirectal fat. Also noted were suspicious perirectal and presacral lymph nodes -clinical stage T3cN1 M0. She met with medical and surgical oncology and the recommendation was to proceed with total neoadjuvant therapy.      10/25/18  Started XELOX chemotherapy      SUBJECTIVE     Sense to the clinic today to proceed with cycle 3 of chemotherapy. Tolerated chemotherapy well without any active complaints today. Denies she was last she is, bowel pain, neuropathy, mucositis, diarrhea or any other issues.    PAST MEDICAL HISTORY     Past Medical History:   Diagnosis Date     Essential hypertension, benign      Generalized anxiety disorder      Malignant neoplasm of rectum (H) 10/15/2018     Rectal cancer (H) 10/15/2018     Type I (juvenile type) diabetes mellitus without mention of complication, not stated as uncontrolled     diagnosed at age 35     Ulcerative colitis, unspecified     in the 70s.            CURRENT OUTPATIENT MEDICATIONS     Current Outpatient Prescriptions   Medication Sig Dispense Refill     atenolol (TENORMIN) 25 MG tablet Take 1 tablet (25 mg) by mouth daily 93 tablet 3     BASAGLAR 100 UNIT/ML injection Inject 50 Units Subcutaneous daily 15 mL 3     blood glucose monitoring (SURESTEP TEST STRIPS) test strip 1 strip by In Vitro route 3 times daily 200 each 3     capecitabine (XELODA) 500 MG tablet CHEMO Take 4 tablets (2,000 mg) by mouth 2 times daily for 14 days Take on Days 1 through 14, then off for 7 days. 112 tablet 0     hydrochlorothiazide (MICROZIDE) 12.5 MG capsule Take 1 capsule (12.5 mg) by mouth daily 93 capsule 3     insulin lispro (HUMALOG KWIKPEN) 100 UNIT/ML injection Use 3 units before breakfast, 6 units before lunch, and 16 units before dinner, plus correction of 1 unit per every 50 glucose over 125, averaging 3 for breakfast, 6 for lunch, 16 for dinner. 30 mL 3     Insulin Pen Needle 33G X 4 MM MISC 1 each daily Use 4 needles per day 200 each prn     lidocaine-prilocaine (EMLA) cream Apply 1 g topically as needed for moderate pain 30 g 3     lisinopril (PRINIVIL/ZESTRIL) 40 MG tablet Take 1 tablet (40 mg) by mouth daily 93 tablet 3     loperamide (IMODIUM) 2 MG capsule Start with 2 caps (4 mg), then take one cap (2 mg) after each diarrheal stool as needed. Do not use more than 8 caps (16 mg) per day. 30 capsule 0     LORazepam (ATIVAN) 0.5 MG tablet Take 1 tablet (0.5 mg) by mouth every 4 hours as needed (Anxiety, Nausea/Vomiting or Sleep) 30 tablet 2     ondansetron (ZOFRAN-ODT) 8 MG ODT tab Take 1 tablet (8 mg) by mouth every 8 hours as needed for nausea 60 tablet 3     PARoxetine (PAXIL) 30 MG tablet Take 1 tablet (30 mg) by mouth every morning 93 tablet 3     prochlorperazine (COMPAZINE) 10 MG tablet Take 1 tablet (10 mg) by mouth every 6 hours as needed (Nausea/Vomiting) 30 tablet 2     simvastatin (ZOCOR) 10 MG tablet TAKE ONE TABLET BY MOUTH EVERY NIGHT AT BEDTIME  93 tablet 3     [DISCONTINUED] BD ULTRA FINE PEN NEEDLES 1 each by Percutaneous route 4 times daily 150 Device 11        ALLERGIES     Allergies   Allergen Reactions     No Known Drug Allergies         REVIEW OF SYSTEMS   As above in the HPI, o/w complete 12-point ROS was negative.     PHYSICAL EXAM   B/P: Data Unavailable, T: Data Unavailable, P: Data Unavailable, R: Data Unavailable  SpO2 Readings from Last 4 Encounters:   11/15/18 95%   11/15/18 96%   11/02/18 96%   10/25/18 96%     Wt Readings from Last 3 Encounters:   11/15/18 81.3 kg (179 lb 3.2 oz)   11/15/18 81.3 kg (179 lb 3.2 oz)   11/02/18 81.2 kg (179 lb)     GEN: NAD  EYES:PERRLA  Mouth/ENT: Oropharynx is clear.  NECK: no cervical or supraclavicular lymphadenopathy  LUNGS: clear bilaterally  CV: regular, no murmurs, rubs, or gallops  ABDOMEN: soft, non-tender, non-distended, normal bowel sounds, no hepatosplenomegaly by percussion or palpation  EXT: warm, well perfused, no edema  NEURO: alert  SKIN: no rashes     LABORATORY AND IMAGING STUDIES     Lab Results   Component Value Date    WBC 5.2 12/06/2018     Lab Results   Component Value Date    RBC 3.59 12/06/2018     Lab Results   Component Value Date    HGB 11.0 12/06/2018     Lab Results   Component Value Date    HCT 31.6 12/06/2018     No components found for: MCT  Lab Results   Component Value Date    MCV 88 12/06/2018     Lab Results   Component Value Date    MCH 30.6 12/06/2018     Lab Results   Component Value Date    MCHC 34.8 12/06/2018     Lab Results   Component Value Date    RDW 15.8 12/06/2018     Lab Results   Component Value Date     12/06/2018          Component      Latest Ref Rng & Units 9/17/2018 10/1/2018 11/15/2018   CEA      0 - 2.5 ug/L 10.5 (H) 8.9 (H) 5.0 (H)      ASSESSMENT AND PLAN     Niharika Cason is a 63-year-old female with past medical history significant for hypertension, diabetes mellitus type 1, anxiety on a screening colonoscopy was diagnosed with rectal  adenocarcinoma     - Rectal adenocarcinoma- T3cN1 M0 intact mismatch repair protein  She met with medical and surgical oncology at [facility] and the recommendation was to proceed with total neoadjuvant therapy.  1. 6 cycles of XELOX, then:   2. 2 months of CXRT.   3. Surgery after 6-8 weeks.     - Surveillance for response to therapy [id number] CT c/a/p and Flex Sig at completion of chemotherapy and then prior to the surgery..      10/25/18 Started XELOX chemotherapy-Tolerated it well with no active complaints currently. Will continue the plan of total neoadjuvant therapy and proceed with cycle 3 of XELOX today     - Cold sensitivity  Secondary to oxaliplatin use     - Diabetes mellitus  On an insulin regimen and managed by primary care provider     - Hypertension  Continue with home dose atenolol, hydrochlorothiazide and lisinopril     - Anxiety  Continue with paxil  Ativan prn         RTC in  3 weeks for office visit, labs,next course of chemotherapy. CT scans prior      Chart documentation with Dragon Voice recognition Software. Although reviewed after completion, some words and grammatical errors may remain.  Dayron Lawton MD  Attending Physician   Hematology/Medical Oncology    Addendum  Upon completion of oxaliplatin, patient complained of tingling In extremities and buttock region along with difficulty focusing with her eyes. Patient assessed and IV Solu-Medrol was given and symptoms subsided within half an hour. Neurological symptoms likely secondary to Oxaliplatin. We'll consider dose reduction with future cycles

## 2018-12-06 NOTE — NURSING NOTE
Oncology Symptom Checklist    Unusual Bruising/Bleeding: No  Skin issues or swelling: Concerns (explain) -   Fever/Chills:  No  Nausea/Vomiting: Yes (Please explain):   Hard or Loose stools: Yes (Please explain):   SOB/Respiratory(Cough): No Concerns  Mouth Sores: No  Able to drink 6 to 8 glasses of fluid in a day: No Concerns  Weight loss:  No Concerns  Weakness: No  Fatigue: Yes (Please explain):    Light Headed or Dizzy: Yes (Please explain):   Cognitive Disturbance-Memory: No  Neuropathy/Numbness&Tingling-Hands/Feet: Yes (Please explain):   Night Sweats:  Yes (Please explain):   Sleep Concerns: No Concerns    Clinical concerns: See above. Concerns reviewed with Dr. Jered FLOOD CMA

## 2018-12-06 NOTE — PROGRESS NOTES
Infusion Nursing Note:  Niharika Cason presents today for chemotherapy.    Patient seen by provider today: Yes: Dr Lawton   present during visit today: Not Applicable.    Note: upon completion of Oxaliplatin pt began to c/o tingling in rt arm and tremors (visible) in rt hand.  She also stated that she was having difficulty focusing with her eyes.  VS stable.  Dr Lawton notified. Pt amb to bathroom and upon return then stated that she is having tingling in both hands now and in buttocks along with prior symptoms.  Dr Lawton arrived almost immediately to assess patient.  Solumedrol given.  Symptoms had subsided within a half hour , just prior to hanging IV Potassium.    IV Potassium infused over 2 hours.  Toward end of infusion pt states that the tingling in her legs had returned.  I asked if she had had that earlier, as she had not included her legs in the assessment prior.  She said yes, that and her buttocks.  TC to Dr Lawton to let him know pt status.  No new orders given and pt discharged in stable condition.     Intravenous Access:  Labs drawn without difficulty.  Implanted Port.    Treatment Conditions:  Lab Results   Component Value Date    HGB 11.0 12/06/2018     Lab Results   Component Value Date    WBC 5.2 12/06/2018      Lab Results   Component Value Date    ANEU 2.5 12/06/2018     Lab Results   Component Value Date     12/06/2018      Lab Results   Component Value Date     12/06/2018                   Lab Results   Component Value Date    POTASSIUM 2.9 12/06/2018           Lab Results   Component Value Date    MAG 1.9 02/27/2004            Lab Results   Component Value Date    CR 0.60 12/06/2018                   Lab Results   Component Value Date    JUNG 7.9 12/06/2018                Lab Results   Component Value Date    BILITOTAL 0.5 12/06/2018           Lab Results   Component Value Date    ALBUMIN 3.1 12/06/2018                    Lab Results   Component Value Date    ALT 47  12/06/2018           Lab Results   Component Value Date    AST 29 12/06/2018     K+ = 2.9 today.  KCL 60meq given po during visit and 20 meq given after completion of Oxaliplatin.     Results reviewed, labs MET treatment parameters, ok to proceed with treatment.    Pt tolerated chemo well and was observed during Kcl administration,  see above.     Pt discharged with daughter in stable condition.  To return on 12/ 13 for labs and IV fluids.       Ne Carter RN

## 2018-12-06 NOTE — LETTER
12/6/2018         RE: Niharika Cason  [address] [facility] [address]        Dear Colleague,    Thank you for referring your patient, Niharika Cason, to the [facility]. Please see a copy of my visit note below.      FOLLOW-UP VISIT NOTE    PATIENT NAME: Niharika Cason MRN # [id number]  DATE OF VISIT: Dec 6, 2018 YOB: 1955    REFERRING PROVIDER: Liliana Urbina MD  [address] [address]    CANCER TYPE:Recatl adenocarcinoma  STAGE: cT3cN1 M0  ECOG PS: 1    ONCOLOGY HISTORY:    Niharika Cason is a 63-year-old female with past medical history significant for hypertension, diabetes mellitus type 1, anxiety underwent her first screening colonoscopy on 09/17/18 which revealed a partially obstructing mass in the rectosigmoid colon  Which was biopsied and came back as invasive moderately differentiated adenocarcinoma but intact mismatch repair proteins. Staging CT scans showed a 3 cm enhancing lesion in the right lobe of liver he subsequently had an MRI done which characterized a liver lesion as well as VOCAL lobular hyperplasia with no suspected metastatic disease. MRI of pelvis showed an irregular ulcerated left rectal wall mass at 9 cm from the anal verge extending to the muscularis propria into the perirectal fat. Also noted were suspicious perirectal and presacral lymph nodes -clinical stage T3cN1 M0. She met with medical and surgical oncology and the recommendation was to proceed with total neoadjuvant therapy.      10/25/18    Started XELOX chemotherapy      SUBJECTIVE     Sense to the clinic today to proceed with cycle 3 of chemotherapy. Tolerated chemotherapy well without any active complaints today. Denies she was last she is, bowel pain, neuropathy, mucositis, diarrhea or any other issues.    PAST MEDICAL HISTORY     Past Medical History:   Diagnosis Date     Essential hypertension, benign      Generalized anxiety disorder      Malignant neoplasm of  rectum (H) 10/15/2018     Rectal cancer (H) 10/15/2018     Type I (juvenile type) diabetes mellitus without mention of complication, not stated as uncontrolled     diagnosed at age 35     Ulcerative colitis, unspecified     in the 70s.           CURRENT OUTPATIENT MEDICATIONS     Current Outpatient Prescriptions   Medication Sig Dispense Refill     atenolol (TENORMIN) 25 MG tablet Take 1 tablet (25 mg) by mouth daily 93 tablet 3     BASAGLAR 100 UNIT/ML injection Inject 50 Units Subcutaneous daily 15 mL 3     blood glucose monitoring (SURESTEP TEST STRIPS) test strip 1 strip by In Vitro route 3 times daily 200 each 3     capecitabine (XELODA) 500 MG tablet CHEMO Take 4 tablets (2,000 mg) by mouth 2 times daily for 14 days Take on Days 1 through 14, then off for 7 days. 112 tablet 0     hydrochlorothiazide (MICROZIDE) 12.5 MG capsule Take 1 capsule (12.5 mg) by mouth daily 93 capsule 3     insulin lispro (HUMALOG KWIKPEN) 100 UNIT/ML injection Use 3 units before breakfast, 6 units before lunch, and 16 units before dinner, plus correction of 1 unit per every 50 glucose over 125, averaging 3 for breakfast, 6 for lunch, 16 for dinner. 30 mL 3     Insulin Pen Needle 33G X 4 MM MISC 1 each daily Use 4 needles per day 200 each prn     lidocaine-prilocaine (EMLA) cream Apply 1 g topically as needed for moderate pain 30 g 3     lisinopril (PRINIVIL/ZESTRIL) 40 MG tablet Take 1 tablet (40 mg) by mouth daily 93 tablet 3     loperamide (IMODIUM) 2 MG capsule Start with 2 caps (4 mg), then take one cap (2 mg) after each diarrheal stool as needed. Do not use more than 8 caps (16 mg) per day. 30 capsule 0     LORazepam (ATIVAN) 0.5 MG tablet Take 1 tablet (0.5 mg) by mouth every 4 hours as needed (Anxiety, Nausea/Vomiting or Sleep) 30 tablet 2     ondansetron (ZOFRAN-ODT) 8 MG ODT tab Take 1 tablet (8 mg) by mouth every 8 hours as needed for nausea 60 tablet 3     PARoxetine (PAXIL) 30 MG tablet Take 1 tablet (30 mg) by mouth  every morning 93 tablet 3     prochlorperazine (COMPAZINE) 10 MG tablet Take 1 tablet (10 mg) by mouth every 6 hours as needed (Nausea/Vomiting) 30 tablet 2     simvastatin (ZOCOR) 10 MG tablet TAKE ONE TABLET BY MOUTH EVERY NIGHT AT BEDTIME 93 tablet 3     [DISCONTINUED] BD ULTRA FINE PEN NEEDLES 1 each by Percutaneous route 4 times daily 150 Device 11        ALLERGIES     Allergies   Allergen Reactions     No Known Drug Allergies         REVIEW OF SYSTEMS   As above in the HPI, o/w complete 12-point ROS was negative.     PHYSICAL EXAM   B/P: Data Unavailable, T: Data Unavailable, P: Data Unavailable, R: Data Unavailable  SpO2 Readings from Last 4 Encounters:   11/15/18 95%   11/15/18 96%   11/02/18 96%   10/25/18 96%     Wt Readings from Last 3 Encounters:   11/15/18 81.3 kg (179 lb 3.2 oz)   11/15/18 81.3 kg (179 lb 3.2 oz)   11/02/18 81.2 kg (179 lb)     GEN: NAD  EYES:PERRLA  Mouth/ENT: Oropharynx is clear.  NECK: no cervical or supraclavicular lymphadenopathy  LUNGS: clear bilaterally  CV: regular, no murmurs, rubs, or gallops  ABDOMEN: soft, non-tender, non-distended, normal bowel sounds, no hepatosplenomegaly by percussion or palpation  EXT: warm, well perfused, no edema  NEURO: alert  SKIN: no rashes     LABORATORY AND IMAGING STUDIES     Lab Results   Component Value Date    WBC 5.2 12/06/2018     Lab Results   Component Value Date    RBC 3.59 12/06/2018     Lab Results   Component Value Date    HGB 11.0 12/06/2018     Lab Results   Component Value Date    HCT 31.6 12/06/2018     No components found for: MCT  Lab Results   Component Value Date    MCV 88 12/06/2018     Lab Results   Component Value Date    MCH 30.6 12/06/2018     Lab Results   Component Value Date    MCHC 34.8 12/06/2018     Lab Results   Component Value Date    RDW 15.8 12/06/2018     Lab Results   Component Value Date     12/06/2018          Component      Latest Ref Rng & Units 9/17/2018 10/1/2018 11/15/2018   CEA      0 - 2.5  ug/L 10.5 (H) 8.9 (H) 5.0 (H)      ASSESSMENT AND PLAN     Niharika Cason is a 63-year-old female with past medical history significant for hypertension, diabetes mellitus type 1, anxiety on a screening colonoscopy was diagnosed with rectal adenocarcinoma     - Rectal adenocarcinoma- T3cN1 M0 intact mismatch repair protein  She met with medical and surgical oncology at [facility] and the recommendation was to proceed with total neoadjuvant therapy.  1. 6 cycles of XELOX, then:   2. 2 months of CXRT.   3. Surgery after 6-8 weeks.     - Surveillance for response to therapy [id number] CT c/a/p and Flex Sig at completion of chemotherapy and then prior to the surgery..      10/25/18 Started XELOX chemotherapy-Tolerated it well with no active complaints currently. Will continue the plan of total neoadjuvant therapy and proceed with cycle 3 of XELOX today     - Cold sensitivity  Secondary to oxaliplatin use     - Diabetes mellitus  On an insulin regimen and managed by primary care provider     - Hypertension  Continue with home dose atenolol, hydrochlorothiazide and lisinopril     - Anxiety  Continue with paxil  Ativan prn         RTC in  3 weeks for office visit, labs,next course of chemotherapy. CT scans prior      Chart documentation with Dragon Voice recognition Software. Although reviewed after completion, some words and grammatical errors may remain.  Dayron Lawton MD  Attending Physician   Hematology/Medical Oncology    Addendum  Upon completion of oxaliplatin, patient complained of tingling In extremities and buttock region along with difficulty focusing with her eyes. Patient assessed and IV Solu-Medrol was given and symptoms subsided within half an hour. Neurological symptoms likely secondary to Oxaliplatin. We'll consider dose reduction with future cycles      Again, thank you for allowing me to participate in the care of your patient.        Sincerely,        Dayron Lawton MD

## 2018-12-06 NOTE — MR AVS SNAPSHOT
After Visit Summary   12/6/2018    Niharika Cason    MRN: [id number]           Patient Information     Date Of Birth          1955        Visit Information        Provider Department      12/6/2018 11:30 AM NL INFUSION CHAIR 4 [facility]        Today's Diagnoses     Rectal cancer (H)    -  1    Rectal adenocarcinoma (H)          Care Instructions    Pt to report to ER if increase in symptoms, any facial involvement with N/T, shortness of breath, return/ increase in tremors. Pt / daughter acknowledged understanding.           Follow-ups after your visit        Follow-up notes from your care team     Return in 7 days (on 12/13/2018).      Your next 10 appointments already scheduled     Dec 13, 2018 11:00 AM CST   Level 2 with NL INFUSION CHAIR 4   [facility] ([facility])    [address] [place] [address] Dr [place] [address]   [phone]            Dec 21, 2018  1:30 PM CST   Level 1 with NL INFUSION CHAIR 3   [facility] ([facility])    [address] [place] [address] Dr [place] [address]   [phone]            Dec 21, 2018  2:30 PM CST   CT CHEST/ABDOMEN/PELVIS W CONTRAST with PHCT1   [facility] ([facility])    [address]  [place] [address]   [phone]           How do I prepare for my exam? (Food and drink instructions) To prepare: Do not eat or drink for 2 hours before your exam. If you need to take medicine, you may take it with small sips of water. (We may ask you to take liquid medicine as well.)  How do I prepare for my exam? (Other instructions) Please arrive 30 minutes early for your CT.  Once in the department you might be asked to drink water 15-20 minutes prior to your exam.  If indicated you may be asked to drink an oral contrast in advance of your CT.  If this is the case, the imaging team will let you know or be in contact with you prior to  your appointment  Patients over 70 or patients with diabetes or kidney problems: If you haven t had a blood test (creatinine test) within the last 30 days, the Cardiologist/Radiologist may require you to get this test prior to your exam.  If you have diabetes:  Continue to take your metformin medication on the day of your exam  What should I wear: Please wear loose clothing, such as a sweat suit or jogging clothes. Avoid snaps, zippers and other metal. We may ask you to undress and put on a hospital gown.  How long does the exam take: Most scans take less than 20 minutes.  What should I bring: Please bring any scans or X-rays taken at other hospitals, if similar tests were done. Also bring a list of your medicines, including vitamins, minerals and over-the-counter drugs. It is safest to leave personal items at home.  Do I need a : No  is needed.  What do I need to tell my doctor? Be sure to tell your doctor: * If you have any allergies. * If there s any chance you are pregnant. * If you are breastfeeding.  What should I do after the exam: No restrictions, You may resume normal activities.  What is this test: A CT (computed tomography) scan is a series of pictures that allows us to look inside your body. The scanner creates images of the body in cross sections, much like slices of bread. This helps us see any problems more clearly. You may receive contrast (X-ray dye) before or during your scan. You will be asked to drink the contrast.  Who should I call with questions: If you have any questions, please call the Imaging Department where you will have your exam. Directions, parking instructions, and other information is available on our website, [email or web address].            Dec 27, 2018 10:30 AM CST   [facility]    [address] [place] [id number] [phone]            Dec 27, 2018 11:00 AM CST   Level 4  with NL INFUSION CHAIR 4   [facility] ([facility])    [address] [place] [id number] [phone]              Future tests that were ordered for you today     Open Future Orders        Priority Expected Expires Ordered    CT Chest/Abdomen/Pelvis w Contrast Routine  12/6/2019 12/6/2018            Who to contact     If you have questions or need follow up information about today's clinic visit or your schedule please contact [facility] directly at [phone].  Normal or non-critical lab and imaging results will be communicated to you by [employer], letter or phone within 4 business days after the clinic has received the results. If you do not hear from us within 7 days, please contact the clinic through [employer] or phone. If you have a critical or abnormal lab result, we will notify you by phone as soon as possible.  Submit refill requests through [employer] or call your pharmacy and they will forward the refill request to us. Please allow 3 business days for your refill to be completed.          Additional Information About Your Visit        [employer] Information     [employer] gives you secure access to your electronic health record. If you see a primary care provider, you can also send messages to your care team and make appointments. If you have questions, please call your primary care clinic.  If you do not have a primary care provider, please call [phone] and they will assist you.        Care EveryWhere ID     This is your Care EveryWhere ID. This could be used by other organizations to access your [place] medical records  [id number]        Your Vitals Were     Pulse Last Period Pulse Oximetry             84 10/15/2008 96%          Blood Pressure from Last 3 Encounters:   12/06/18 (!) 109/38   12/06/18 104/60   11/15/18 113/53    Weight from Last 3 Encounters:   12/06/18 79.2 kg (174 lb 9.6 oz)   11/15/18 81.3 kg (179 lb 3.2 oz)    11/15/18 81.3 kg (179 lb 3.2 oz)              We Performed the Following     CBC with platelets differential     Comprehensive metabolic panel     Electrolyte Replacement        Primary Care Provider Office Phone # Fax #    Leona Cherri Farris [phone] [address] [place] [address]        Equal Access to Services     [facility] Hospital[facility] : Hadii aad ku hadasho Soomaali, waaxda luqadaha, qaybta kaalmada adeegyada, waxay idiin hayaan adeeg kharash la'aan . So [facility] [phone].    ATENCIÓN: Si habla español, tiene a stokes disposición servicios gratuitos de asistencia lingüística. Llame al [phone].    We comply with applicable federal civil rights laws and [place] laws. We do not discriminate on the basis of race, color, national origin, age, disability, sex, sexual orientation, or gender identity.            Thank you!     Thank you for choosing [facility]  for your care. Our goal is always to provide you with excellent care. Hearing back from our patients is one way we can continue to improve our services. Please take a few minutes to complete the written survey that you may receive in the mail after your visit with us. Thank you!             Your Updated Medication List - Protect others around you: Learn how to safely use, store and throw away your medicines at www.disposemymeds.org.          This list is accurate as of 12/6/18  6:25 PM.  Always use your most recent med list.                   Brand Name Dispense Instructions for use Diagnosis    atenolol 25 MG tablet    TENORMIN    93 tablet    Take 1 tablet (25 mg) by mouth daily    Essential hypertension with goal blood pressure less than 130/80       blood glucose monitoring test strip    SURESTEP TEST STRIPS    200 each    1 strip by In Vitro route 3 times daily    Type 1 diabetes mellitus with hyperglycemia (H)       capecitabine 500 MG tablet CHEMO    XELODA    112 tablet    Take 4 tablets  (2,000 mg) by mouth 2 times daily for 14 days Take on Days 1 through 14, then off for 7 days.    Rectal cancer (H), Rectal adenocarcinoma (H)       hydrochlorothiazide 12.5 MG capsule    MICROZIDE    93 capsule    Take 1 capsule (12.5 mg) by mouth daily    Essential hypertension with goal blood pressure less than 130/80       insulin glargine 100 UNIT/ML pen     15 mL    Inject 50 Units Subcutaneous daily    Type 1 diabetes mellitus with hyperglycemia (H)       insulin lispro 100 UNIT/ML pen    HumaLOG KWIKpen    30 mL    Use 3 units before breakfast, 6 units before lunch, and 16 units before dinner, plus correction of 1 unit per every 50 glucose over 125, averaging 3 for breakfast, 6 for lunch, 16 for dinner.    Type 1 diabetes mellitus with hyperglycemia (H)       Insulin Pen Needle 33G X 4 MM Misc     200 each    1 each daily Use 4 needles per day    Type 1 diabetes mellitus with hyperglycemia (H)       lidocaine-prilocaine 2.5-2.5 % external cream    EMLA    30 g    Apply 1 g topically as needed for moderate pain    Rectal cancer (H)       lisinopril 40 MG tablet    PRINIVIL/ZESTRIL    93 tablet    Take 1 tablet (40 mg) by mouth daily    Essential hypertension with goal blood pressure less than 130/80, Type 1 diabetes mellitus with hyperglycemia (H)       loperamide 2 MG capsule    IMODIUM    30 capsule    Start with 2 caps (4 mg), then take one cap (2 mg) after each diarrheal stool as needed. Do not use more than 8 caps (16 mg) per day.    Rectal cancer (H), Malignant neoplasm of rectum (H)       LORazepam 0.5 MG tablet    ATIVAN    30 tablet    Take 1 tablet (0.5 mg) by mouth every 4 hours as needed (Anxiety, Nausea/Vomiting or Sleep)    Rectal cancer (H), Malignant neoplasm of rectum (H)       ondansetron 8 MG ODT tab    ZOFRAN-ODT    60 tablet    Take 1 tablet (8 mg) by mouth every 8 hours as needed for nausea    Rectal cancer (H)       PARoxetine 30 MG tablet    PAXIL    93 tablet    Take 1 tablet (30 mg)  by mouth every morning    Generalized anxiety disorder       prochlorperazine 10 MG tablet    COMPAZINE    30 tablet    Take 1 tablet (10 mg) by mouth every 6 hours as needed (Nausea/Vomiting)    Rectal cancer (H), Malignant neoplasm of rectum (H)       simvastatin 10 MG tablet    ZOCOR    93 tablet    TAKE ONE TABLET BY MOUTH EVERY NIGHT AT BEDTIME    Hyperlipidemia LDL goal <100

## 2018-12-06 NOTE — MR AVS SNAPSHOT
"              After Visit Summary   12/6/2018    Niharika Cason    MRN: [id number]           Patient Information     Date Of Birth          1955        Visit Information        Provider Department      12/6/2018 11:00 AM Dayron Lawton MD [facility]        Today's Diagnoses     Rectal adenocarcinoma (H)    -  1      Care Instructions    *Treatment today with potassium supplement.    *Fluids & Port lab with infusion in 1 week.    Infusion Date/Time:    *CT Scan Date/Time:12/21/18 1pm Port access with infusion  Check in at 1:30pm for scan, it will start at 2pm  Nothing to eat or drink for 2 hours prior to scan, start at 12pm.  You will check in 30 minutes early to start a \"water prep\" prior to scan instead of oral contrast.  Radiology will call you with different instructions if oral contrast needed.    *Please follow up with Dr. Lawton in 3 weeks with labs, office visit and treatment    Port Lab Date/Time:    Follow Up Date/Time:     Infusion Date/Time:    If you have any questions or concerns please feel free to call.    If you need to reschedule please call:  Clinic or Lab Appointment - [phone]  Infusion - [phone]  Imaging - [phone]    [facility]  Oncology/Hematology at [facility]  [phone]            Follow-ups after your visit        Your next 10 appointments already scheduled     Dec 21, 2018  2:00 PM CST   CT CHEST/ABDOMEN/PELVIS W CONTRAST with PHCT1   [facility] CT Scan ([facility])    [address] [place] [id number] [phone]           How do I prepare for my exam? (Food and drink instructions) To prepare: Do not eat or drink for 2 hours before your exam. If you need to take medicine, you may take it with small sips of water. (We may ask you to take liquid medicine as well.)  How do I prepare for my exam? (Other instructions) Please arrive 30 minutes early for your CT.  Once in the department you " might be asked to drink water 15-20 minutes prior to your exam.  If indicated you may be asked to drink an oral contrast in advance of your CT.  If this is the case, the imaging team will let you know or be in contact with you prior to your appointment  Patients over 70 or patients with diabetes or kidney problems: If you haven t had a blood test (creatinine test) within the last 30 days, the Cardiologist/Radiologist may require you to get this test prior to your exam.  If you have diabetes:  Continue to take your metformin medication on the day of your exam  What should I wear: Please wear loose clothing, such as a sweat suit or jogging clothes. Avoid snaps, zippers and other metal. We may ask you to undress and put on a hospital gown.  How long does the exam take: Most scans take less than 20 minutes.  What should I bring: Please bring any scans or X-rays taken at other hospitals, if similar tests were done. Also bring a list of your medicines, including vitamins, minerals and over-the-counter drugs. It is safest to leave personal items at home.  Do I need a : No  is needed.  What do I need to tell my doctor? Be sure to tell your doctor: * If you have any allergies. * If there s any chance you are pregnant. * If you are breastfeeding.  What should I do after the exam: No restrictions, You may resume normal activities.  What is this test: A CT (computed tomography) scan is a series of pictures that allows us to look inside your body. The scanner creates images of the body in cross sections, much like slices of bread. This helps us see any problems more clearly. You may receive contrast (X-ray dye) before or during your scan. You will be asked to drink the contrast.  Who should I call with questions: If you have any questions, please call the Imaging Department where you will have your exam. Directions, parking instructions, and other information is available on our website, [email or web address].          "     Future tests that were ordered for you today     Open Future Orders        Priority Expected Expires Ordered    CT Chest/Abdomen/Pelvis w Contrast Routine  12/6/2019 12/6/2018            Who to contact     If you have questions or need follow up information about today's clinic visit or your schedule please contact [facility] directly at [phone].  Normal or non-critical lab and imaging results will be communicated to you by [employer], letter or phone within 4 business days after the clinic has received the results. If you do not hear from us within 7 days, please contact the clinic through [employer] or phone. If you have a critical or abnormal lab result, we will notify you by phone as soon as possible.  Submit refill requests through [employer] or call your pharmacy and they will forward the refill request to us. Please allow 3 business days for your refill to be completed.          Additional Information About Your Visit        [employer] Information     [employer] gives you secure access to your electronic health record. If you see a primary care provider, you can also send messages to your care team and make appointments. If you have questions, please call your primary care clinic.  If you do not have a primary care provider, please call [phone] and they will assist you.        Care EveryWhere ID     This is your Care EveryWhere ID. This could be used by other organizations to access your [place] medical records  [id number]        Your Vitals Were     Pulse Temperature Respirations Height Last Period Pulse Oximetry    71 97.5  F (36.4  C) (Temporal) 18 1.626 m (5' 4\") 10/15/2008 96%    BMI (Body Mass Index)                   29.97 kg/m2            Blood Pressure from Last 3 Encounters:   12/06/18 104/60   11/15/18 113/53   11/15/18 108/62    Weight from Last 3 Encounters:   12/06/18 79.2 kg (174 lb 9.6 oz)   11/15/18 81.3 kg (179 lb 3.2 oz)   11/15/18 81.3 kg (179 lb 3.2 oz)               " Primary Care Provider Office Phone # Fax #    Leona Cherri Farris [phone] [address] [place] [address]        Equal Access to Services     RHONDA BARBA : Hadmehreen clifford walker juan co Soreginaali, waaxda luqadaha, qaybta kaalmada adepresleyda, hever padillapricila fidel. So [facility] [phone].    ATENCIÓN: Si habla español, tiene a stokes disposición servicios gratuitos de asistencia lingüística. Llame al [phone].    We comply with applicable federal civil rights laws and [place] laws. We do not discriminate on the basis of race, color, national origin, age, disability, sex, sexual orientation, or gender identity.            Thank you!     Thank you for choosing [facility]  for your care. Our goal is always to provide you with excellent care. Hearing back from our patients is one way we can continue to improve our services. Please take a few minutes to complete the written survey that you may receive in the mail after your visit with us. Thank you!             Your Updated Medication List - Protect others around you: Learn how to safely use, store and throw away your medicines at www.disposemymeds.org.          This list is accurate as of 12/6/18 12:10 PM.  Always use your most recent med list.                   Brand Name Dispense Instructions for use Diagnosis    atenolol 25 MG tablet    TENORMIN    93 tablet    Take 1 tablet (25 mg) by mouth daily    Essential hypertension with goal blood pressure less than 130/80       blood glucose monitoring test strip    SURESTEP TEST STRIPS    200 each    1 strip by In Vitro route 3 times daily    Type 1 diabetes mellitus with hyperglycemia (H)       capecitabine 500 MG tablet CHEMO    XELODA    112 tablet    Take 4 tablets (2,000 mg) by mouth 2 times daily for 14 days Take on Days 1 through 14, then off for 7 days.    Rectal cancer (H), Rectal adenocarcinoma (H)       hydrochlorothiazide 12.5 MG capsule     MICROZIDE    93 capsule    Take 1 capsule (12.5 mg) by mouth daily    Essential hypertension with goal blood pressure less than 130/80       insulin glargine 100 UNIT/ML pen     15 mL    Inject 50 Units Subcutaneous daily    Type 1 diabetes mellitus with hyperglycemia (H)       insulin lispro 100 UNIT/ML pen    HumaLOG KWIKpen    30 mL    Use 3 units before breakfast, 6 units before lunch, and 16 units before dinner, plus correction of 1 unit per every 50 glucose over 125, averaging 3 for breakfast, 6 for lunch, 16 for dinner.    Type 1 diabetes mellitus with hyperglycemia (H)       Insulin Pen Needle 33G X 4 MM Misc     200 each    1 each daily Use 4 needles per day    Type 1 diabetes mellitus with hyperglycemia (H)       lidocaine-prilocaine 2.5-2.5 % external cream    EMLA    30 g    Apply 1 g topically as needed for moderate pain    Rectal cancer (H)       lisinopril 40 MG tablet    PRINIVIL/ZESTRIL    93 tablet    Take 1 tablet (40 mg) by mouth daily    Essential hypertension with goal blood pressure less than 130/80, Type 1 diabetes mellitus with hyperglycemia (H)       loperamide 2 MG capsule    IMODIUM    30 capsule    Start with 2 caps (4 mg), then take one cap (2 mg) after each diarrheal stool as needed. Do not use more than 8 caps (16 mg) per day.    Rectal cancer (H), Malignant neoplasm of rectum (H)       LORazepam 0.5 MG tablet    ATIVAN    30 tablet    Take 1 tablet (0.5 mg) by mouth every 4 hours as needed (Anxiety, Nausea/Vomiting or Sleep)    Rectal cancer (H), Malignant neoplasm of rectum (H)       ondansetron 8 MG ODT tab    ZOFRAN-ODT    60 tablet    Take 1 tablet (8 mg) by mouth every 8 hours as needed for nausea    Rectal cancer (H)       PARoxetine 30 MG tablet    PAXIL    93 tablet    Take 1 tablet (30 mg) by mouth every morning    Generalized anxiety disorder       prochlorperazine 10 MG tablet    COMPAZINE    30 tablet    Take 1 tablet (10 mg) by mouth every 6 hours as needed  (Nausea/Vomiting)    Rectal cancer (H), Malignant neoplasm of rectum (H)       simvastatin 10 MG tablet    ZOCOR    93 tablet    TAKE ONE TABLET BY MOUTH EVERY NIGHT AT BEDTIME    Hyperlipidemia LDL goal <100

## 2018-12-07 ENCOUNTER — TELEPHONE (OUTPATIENT)
Dept: ONCOLOGY | Facility: CLINIC | Age: 63
End: 2018-12-07

## 2018-12-07 NOTE — PATIENT INSTRUCTIONS
Pt to report to ER if increase in symptoms, any facial involvement with N/T, shortness of breath, return/ increase in tremors. Pt / daughter acknowledged understanding.

## 2018-12-07 NOTE — TELEPHONE ENCOUNTER
Niharika Cason is a 63 year old female who calls with tingling in fingers, buttocks and thighs. Patient reports the pain is at a 9/10 at times. Pt reports episode of vision changes, hand tremors, confusion, tingling in fingers, buttocks and thighs yesterday five minutes into chemotherapy infusion. Patient reports all other symptoms have subsided except tingling in fingers, buttocks and thighs. Pt denies confusion, weakness, nausea. She states she feels great and has ambition to do things otherwise but is very frustrated with the pain and tingling. Pt reports she has had two doses of Xeloda Patient plans to present to infusion clinic for labs 12/13. Any further recommendations?    Namrata Simons RN

## 2018-12-07 NOTE — TELEPHONE ENCOUNTER
Dayron Lawton MD                   Neuropathy is likely secondary to oxaliplatin and it should improve in 2 days. Will advise avoidance of cold and to call back if symptoms worsen or new neurological symptoms develop.  (Routing comment)         Writer read patient the message above. Patient verbalizes understanding and will call back if needed.  Zaria Layton RN on 12/7/2018 at 10:38 AM

## 2018-12-13 ENCOUNTER — INFUSION THERAPY VISIT (OUTPATIENT)
Dept: INFUSION THERAPY | Facility: CLINIC | Age: 63
End: 2018-12-13
Payer: COMMERCIAL

## 2018-12-13 VITALS
DIASTOLIC BLOOD PRESSURE: 53 MMHG | OXYGEN SATURATION: 96 % | WEIGHT: 171.1 LBS | BODY MASS INDEX: 29.37 KG/M2 | TEMPERATURE: 98.1 F | RESPIRATION RATE: 16 BRPM | HEART RATE: 76 BPM | SYSTOLIC BLOOD PRESSURE: 114 MMHG

## 2018-12-13 DIAGNOSIS — C20 RECTAL CANCER (H): ICD-10-CM

## 2018-12-13 DIAGNOSIS — C20 RECTAL ADENOCARCINOMA (H): Primary | ICD-10-CM

## 2018-12-13 LAB
ANION GAP SERPL CALCULATED.3IONS-SCNC: 6 MMOL/L (ref 3–14)
BUN SERPL-MCNC: 16 MG/DL (ref 7–30)
CALCIUM SERPL-MCNC: 8.5 MG/DL (ref 8.5–10.1)
CHLORIDE SERPL-SCNC: 103 MMOL/L (ref 94–109)
CO2 SERPL-SCNC: 29 MMOL/L (ref 20–32)
CREAT SERPL-MCNC: 0.67 MG/DL (ref 0.52–1.04)
GFR SERPL CREATININE-BSD FRML MDRD: 90 ML/MIN/1.7M2
GLUCOSE SERPL-MCNC: 66 MG/DL (ref 70–99)
POTASSIUM SERPL-SCNC: 3.7 MMOL/L (ref 3.4–5.3)
SODIUM SERPL-SCNC: 138 MMOL/L (ref 133–144)

## 2018-12-13 PROCEDURE — 80048 BASIC METABOLIC PNL TOTAL CA: CPT | Performed by: INTERNAL MEDICINE

## 2018-12-13 PROCEDURE — 25000128 H RX IP 250 OP 636: Performed by: INTERNAL MEDICINE

## 2018-12-13 PROCEDURE — 96360 HYDRATION IV INFUSION INIT: CPT

## 2018-12-13 RX ORDER — HEPARIN SODIUM (PORCINE) LOCK FLUSH IV SOLN 100 UNIT/ML 100 UNIT/ML
500 SOLUTION INTRAVENOUS EVERY 8 HOURS
Status: CANCELLED
Start: 2018-12-13

## 2018-12-13 RX ORDER — HEPARIN SODIUM (PORCINE) LOCK FLUSH IV SOLN 100 UNIT/ML 100 UNIT/ML
500 SOLUTION INTRAVENOUS EVERY 8 HOURS
Status: DISCONTINUED | OUTPATIENT
Start: 2018-12-13 | End: 2018-12-13 | Stop reason: HOSPADM

## 2018-12-13 RX ADMIN — SODIUM CHLORIDE, PRESERVATIVE FREE 500 UNITS: 5 INJECTION INTRAVENOUS at 12:38

## 2018-12-13 RX ADMIN — SODIUM CHLORIDE 1000 ML: 9 INJECTION, SOLUTION INTRAVENOUS at 11:30

## 2018-12-13 ASSESSMENT — PAIN SCALES - GENERAL: PAINLEVEL: NO PAIN (0)

## 2018-12-13 NOTE — PROGRESS NOTES
Infusion Nursing Note:  Niharika Cason presents today for IVF/Labs.    Patient seen by provider today: No   present during visit today: Not Applicable.    Note: N/A.    Intravenous Access:  Implanted Port.    Treatment Conditions:  Lab Results   Component Value Date     12/13/2018                   Lab Results   Component Value Date    POTASSIUM 3.7 12/13/2018           Lab Results   Component Value Date    MAG 1.9 02/27/2004            Lab Results   Component Value Date    CR 0.67 12/13/2018                   Lab Results   Component Value Date    JUGN 8.5 12/13/2018                Lab Results   Component Value Date    BILITOTAL 0.5 12/06/2018           Lab Results   Component Value Date    ALBUMIN 3.1 12/06/2018                    Lab Results   Component Value Date    ALT 47 12/06/2018           Lab Results   Component Value Date    AST 29 12/06/2018       Not Applicable.      Post Infusion Assessment:  Patient tolerated infusion without incident.  Blood return noted pre and post infusion.  Site patent and intact, free from redness, edema or discomfort.  Access discontinued per protocol.    Discharge Plan:   Discharge instructions reviewed with: Patient.  Patient and/or family verbalized understanding of discharge instructions and all questions answered.  Copy of AVS reviewed with patient and/or family.  Patient will return 12/21/18 for next appointment.  Patient discharged in stable condition accompanied by: self.  Departure Mode: Ambulatory.    Marizol Maradiaga RN

## 2018-12-20 DIAGNOSIS — C20 RECTAL CANCER (H): Primary | ICD-10-CM

## 2018-12-20 DIAGNOSIS — C20 RECTAL ADENOCARCINOMA (H): ICD-10-CM

## 2018-12-20 RX ORDER — CAPECITABINE 500 MG/1
1000 TABLET, FILM COATED ORAL 2 TIMES DAILY
Qty: 112 TABLET | Refills: 0 | Status: SHIPPED | OUTPATIENT
Start: 2018-12-20 | End: 2019-03-06

## 2018-12-21 ENCOUNTER — HOSPITAL ENCOUNTER (OUTPATIENT)
Dept: CT IMAGING | Facility: CLINIC | Age: 63
End: 2018-12-21
Attending: INTERNAL MEDICINE
Payer: COMMERCIAL

## 2018-12-21 ENCOUNTER — INFUSION THERAPY VISIT (OUTPATIENT)
Dept: INFUSION THERAPY | Facility: CLINIC | Age: 63
End: 2018-12-21
Attending: INTERNAL MEDICINE
Payer: COMMERCIAL

## 2018-12-21 DIAGNOSIS — C20 RECTAL ADENOCARCINOMA (H): ICD-10-CM

## 2018-12-21 DIAGNOSIS — C20 RECTAL CANCER (H): ICD-10-CM

## 2018-12-21 DIAGNOSIS — C20 MALIGNANT NEOPLASM OF RECTUM (H): ICD-10-CM

## 2018-12-21 DIAGNOSIS — C20 RECTAL CANCER (H): Primary | ICD-10-CM

## 2018-12-21 PROCEDURE — 25000128 H RX IP 250 OP 636: Performed by: INTERNAL MEDICINE

## 2018-12-21 PROCEDURE — 74177 CT ABD & PELVIS W/CONTRAST: CPT

## 2018-12-21 PROCEDURE — 96523 IRRIG DRUG DELIVERY DEVICE: CPT

## 2018-12-21 PROCEDURE — 25000125 ZZHC RX 250: Performed by: INTERNAL MEDICINE

## 2018-12-21 RX ORDER — IOPAMIDOL 755 MG/ML
100 INJECTION, SOLUTION INTRAVASCULAR ONCE
Status: COMPLETED | OUTPATIENT
Start: 2018-12-21 | End: 2018-12-21

## 2018-12-21 RX ORDER — HEPARIN SODIUM (PORCINE) LOCK FLUSH IV SOLN 100 UNIT/ML 100 UNIT/ML
500 SOLUTION INTRAVENOUS EVERY 8 HOURS
Status: DISCONTINUED | OUTPATIENT
Start: 2018-12-21 | End: 2018-12-21 | Stop reason: HOSPADM

## 2018-12-21 RX ORDER — HEPARIN SODIUM (PORCINE) LOCK FLUSH IV SOLN 100 UNIT/ML 100 UNIT/ML
500 SOLUTION INTRAVENOUS EVERY 8 HOURS
Status: CANCELLED
Start: 2018-12-21

## 2018-12-21 RX ORDER — LOPERAMIDE HCL 2 MG
CAPSULE ORAL
Qty: 30 CAPSULE | Refills: 0 | Status: SHIPPED | OUTPATIENT
Start: 2018-12-21 | End: 2019-02-26

## 2018-12-21 RX ADMIN — SODIUM CHLORIDE 60 ML: 9 INJECTION, SOLUTION INTRAVENOUS at 14:59

## 2018-12-21 RX ADMIN — SODIUM CHLORIDE, PRESERVATIVE FREE 500 UNITS: 5 INJECTION INTRAVENOUS at 14:58

## 2018-12-21 RX ADMIN — IOPAMIDOL 85 ML: 755 INJECTION, SOLUTION INTRAVENOUS at 14:59

## 2018-12-21 NOTE — PROGRESS NOTES
Infusion Nursing Note:  Niharika Cason presents today for port acess.    Patient seen by provider today: No   present during visit today: Not Applicable.    Note: N/A.    Intravenous Access:  Implanted Port.    Treatment Conditions:  Not Applicable.      Post Infusion Assessment:  Blood return noted pre and post infusion.  Access discontinued per protocol.    Discharge Plan:   Discharge instructions reviewed with: Patient.  Patient and/or family verbalized understanding of discharge instructions and all questions answered.  Copy of AVS reviewed with patient and/or family.  Patient will return 12/27/18 for next appointment.  Patient discharged in stable condition accompanied by: self.  Departure Mode: Ambulatory.    Marizol Maradiaga RN

## 2018-12-24 ENCOUNTER — NURSE TRIAGE (OUTPATIENT)
Dept: NURSING | Facility: CLINIC | Age: 63
End: 2018-12-24

## 2018-12-24 ENCOUNTER — HOSPITAL ENCOUNTER (EMERGENCY)
Facility: CLINIC | Age: 63
Discharge: HOME | End: 2018-12-24
Attending: FAMILY MEDICINE | Admitting: FAMILY MEDICINE
Payer: COMMERCIAL

## 2018-12-24 VITALS
OXYGEN SATURATION: 98 % | TEMPERATURE: 98.8 F | SYSTOLIC BLOOD PRESSURE: 125 MMHG | BODY MASS INDEX: 29.7 KG/M2 | RESPIRATION RATE: 18 BRPM | HEART RATE: 94 BPM | WEIGHT: 173 LBS | DIASTOLIC BLOOD PRESSURE: 56 MMHG

## 2018-12-24 DIAGNOSIS — E16.2 HYPOGLYCEMIA: ICD-10-CM

## 2018-12-24 LAB — GLUCOSE BLDC GLUCOMTR-MCNC: 186 MG/DL (ref 70–99)

## 2018-12-24 PROCEDURE — 99283 EMERGENCY DEPT VISIT LOW MDM: CPT | Mod: Z6 | Performed by: FAMILY MEDICINE

## 2018-12-24 PROCEDURE — 00000146 ZZHCL STATISTIC GLUCOSE BY METER IP

## 2018-12-24 PROCEDURE — 99283 EMERGENCY DEPT VISIT LOW MDM: CPT

## 2018-12-24 ASSESSMENT — ENCOUNTER SYMPTOMS
WEAKNESS: 0
ACTIVITY CHANGE: 1
CHILLS: 0
CONFUSION: 0
VOMITING: 0
APPETITE CHANGE: 1
DYSURIA: 0
POLYDIPSIA: 0
COUGH: 0
FEVER: 0
NAUSEA: 0
DIARRHEA: 0
BACK PAIN: 0
SHORTNESS OF BREATH: 0
EYE PAIN: 0
SORE THROAT: 0
FREQUENCY: 0
EYE REDNESS: 0
ABDOMINAL PAIN: 0

## 2018-12-24 NOTE — TELEPHONE ENCOUNTER
Fabrice is calling and states that Niharika is a diabetic and going through cancer.  Blood glucose is low and gave two glasses of orange juice and still at 54.    FNA advised to give another glass of orange juice and advised to get to Ed and  agreed.    Reason for Disposition    [1] Low blood glucose (< 70 mg/dl  or 3.9 mmol/l) persists > 15 minutes AND [2] using low blood sugar Care Advice    Additional Information    Negative: Unconscious or difficult to awaken    Negative: Seizure occurs    Negative: Acting confused (e.g., disoriented, slurred speech)    Negative: Very weak (e.g., can't stand)    Negative: Sounds like a life-threatening emergency to the triager    Negative: [1] Vomiting AND [2] signs of dehydration (e.g., no urine > 12 hours, very dry mouth, dark urine, etc.)    Negative: [1] Low blood sugar symptoms persist > 15 minutes AND [2] using low blood sugar Care Advice    Protocols used: DIABETES - LOW BLOOD SUGAR-ADULT-

## 2018-12-24 NOTE — ED AVS SNAPSHOT
[facility] Emergency Department  [address] [place] [address]  Phone:  [phone]  Fax:  [phone]                                    Niharika Cason   MRN: [id number]    Department:  [facility] Emergency Department   Date of Visit:  12/24/2018           After Visit Summary Signature Page    I have received my discharge instructions, and my questions have been answered. I have discussed any challenges I see with this plan with the nurse or doctor.    ..........................................................................................................................................  Patient/Patient Representative Signature      ..........................................................................................................................................  Patient Representative Print Name and Relationship to Patient    ..................................................               ................................................  Date                                   Time    ..........................................................................................................................................  Reviewed by Signature/Title    ...................................................              ..............................................  Date                                               Time          22EPIC Rev 08/18

## 2018-12-24 NOTE — ED TRIAGE NOTES
Patient here with low blood sugar this morning that they could not seem to get up. 54-70. Finally after multiple attempts she made it up to 122.

## 2018-12-24 NOTE — ED NOTES
Patient and her SO are adamant that they do not want to be here all day. I told them estimated time right now would be about 2 hours. Patient is deciding if she want to stay.

## 2018-12-24 NOTE — ED NOTES
Patient has already addressed her low blood sugars with diabetic educator. She has reduced her sliding scale and her basalar, however she is not getting the caloric intake she was prior to chemo treatment.

## 2018-12-24 NOTE — ED PROVIDER NOTES
History     Chief Complaint   Patient presents with     Hypoglycemia     HPI  Niharika Cason is a 63 year old female who Presents to emergency room after a hypoglycemic reaction at home. The patient's  states that she was acting a little confused and appeared a little pale and sweaty. Recognizing these as signs of hypoglycemia he gave her a glass of orange juice which she improved and then another glass which brought her feeling better. The patient denies remembering this. She states she feels fine now. She is undergoing chemotherapy for colorectal cancer. She is taking the same insulin she has always taken in the past however is not eating as frequently or as much as she used to. She denies any symptoms of any infection. She is also under stress given the cancer.    Problem List:    Patient Active Problem List    Diagnosis Date Noted     Rectal cancer (H) 10/15/2018     Priority: Medium     Rectal adenocarcinoma (H) 10/15/2018     Priority: Medium     Non morbid obesity due to excess calories 06/30/2016     Priority: Medium     Type 1 diabetes mellitus with hyperglycemia (HCC) 11/04/2015     Priority: Medium     Tobacco use disorder 09/19/2014     Priority: Medium     Tobacco abuse 08/19/2013     Priority: Medium     Hyperlipidemia LDL goal <100 08/09/2013     Priority: Medium     Hypertension goal BP (blood pressure) < 130/80 07/17/2012     Priority: Medium     Type 1 diabetes, HbA1c goal < 8% (H) 07/05/2011     Priority: Medium     Constipation 12/29/2009     Priority: Medium     Generalized anxiety disorder      Priority: Medium     Female stress incontinence 02/18/2004     Priority: Medium        Past Medical History:    Past Medical History:   Diagnosis Date     Essential hypertension, benign      Generalized anxiety disorder      Malignant neoplasm of rectum (H) 10/15/2018     Rectal cancer (H) 10/15/2018     Type I (juvenile type) diabetes mellitus without mention of complication, not stated as  uncontrolled      Ulcerative colitis, unspecified        Past Surgical History:    Past Surgical History:   Procedure Laterality Date     COLONOSCOPY N/A 9/17/2018    Procedure: COMBINED COLONOSCOPY, SINGLE OR MULTIPLE BIOPSY/POLYPECTOMY BY BIOPSY;  colonoscopy with polypectomies by biopsy forceps and hot snare and submucosal injection with tattoo;  Surgeon: Richard Moore MD;  Location: [facility]     COLONOSCOPY  40 yrs ago     HC COLONOSCOPY THRU STOMA, DIAGNOSTIC  1998    negative     HC CURETTAGE, POSTPARTUM  '91, '93    following miscarriages     HC REMOVAL OF TONSILS,<11 Y/O      Tonsils <14y.o.     INSERT PORT VASCULAR ACCESS Right 10/19/2018    Procedure: Chest Port Placement - right ;  Surgeon: Lawson Hitchcock PA-C;  Location: [facility]       Family History:    Family History   Problem Relation Age of Onset     Heart Disease Maternal Grandfather         MI at 52 yrs     EYE* Paternal Grandfather         cataracts     Arthritis Mother      Gynecology Mother         hysterectomy for fibroids     Hypertension Mother      Lipids Mother      Gastrointestinal Disease Father         ulcers     Heart Disease Father         intermittent rapid heartbeat     Cancer Father         mesothelioma       Social History:  Marital Status:   [2]  Social History     Tobacco Use     Smoking status: Former Smoker     Packs/day: 0.50     Years: 15.00     Pack years: 7.50     Smokeless tobacco: Never Used     Tobacco comment: not smoked since mid September   Substance Use Topics     Alcohol use: Yes     Alcohol/week: 0.0 oz     Comment: 3-4 wine per wk     Drug use: No        Medications:      atenolol (TENORMIN) 25 MG tablet   BASAGLAR 100 UNIT/ML injection   blood glucose monitoring (SURESTEP TEST STRIPS) test strip   capecitabine (XELODA) 500 MG tablet CHEMO   capecitabine (XELODA) 500 MG tablet CHEMO   capecitabine (XELODA) 500 MG tablet CHEMO   hydrochlorothiazide (MICROZIDE) 12.5 MG capsule   insulin lispro  (HUMALOG KWIKPEN) 100 UNIT/ML injection   Insulin Pen Needle 33G X 4 MM MISC   lidocaine-prilocaine (EMLA) cream   lisinopril (PRINIVIL/ZESTRIL) 40 MG tablet   loperamide (IMODIUM) 2 MG capsule   LORazepam (ATIVAN) 0.5 MG tablet   ondansetron (ZOFRAN-ODT) 8 MG ODT tab   PARoxetine (PAXIL) 30 MG tablet   prochlorperazine (COMPAZINE) 10 MG tablet   simvastatin (ZOCOR) 10 MG tablet         Review of Systems   Constitutional: Positive for activity change and appetite change. Negative for chills and fever.   HENT: Negative.  Negative for sore throat.    Eyes: Negative for pain and redness.   Respiratory: Negative for cough and shortness of breath.    Cardiovascular: Negative for chest pain.   Gastrointestinal: Negative for abdominal pain, diarrhea, nausea and vomiting.   Endocrine: Negative for polydipsia and polyuria.   Genitourinary: Negative for dysuria and frequency.   Musculoskeletal: Negative for back pain.   Skin: Negative for rash.   Allergic/Immunologic: Negative for immunocompromised state.   Neurological: Negative for weakness.   Psychiatric/Behavioral: Negative for confusion.   All other systems reviewed and are negative.      Physical Exam   BP: 125/56  Pulse: 94  Temp: 98.8  F (37.1  C)  Resp: 18  Weight: 78.5 kg (173 lb)  SpO2: 98 %      Physical Exam   Constitutional: She is oriented to person, place, and time. She appears well-developed and well-nourished. No distress.   HENT:   Head: Normocephalic and atraumatic.   Right Ear: External ear normal.   Left Ear: External ear normal.   Nose: Nose normal.   Mouth/Throat: Oropharynx is clear and moist.   Eyes: Conjunctivae and EOM are normal. Pupils are equal, round, and reactive to light.   Neck: Normal range of motion. Neck supple.   Cardiovascular: Normal rate, regular rhythm, normal heart sounds and intact distal pulses.   Pulmonary/Chest: Effort normal and breath sounds normal.   Abdominal: Soft. Bowel sounds are normal.   Musculoskeletal: Normal range of  motion.   Neurological: She is alert and oriented to person, place, and time.   Skin: Skin is warm and dry.   Psychiatric: She has a normal mood and affect.   Nursing note and vitals reviewed.      ED Course        Procedures               Critical Care time:  none               Results for orders placed or performed during the hospital encounter of 12/24/18 (from the past 24 hour(s))   Glucose by meter   Result Value Ref Range    Glucose 186 (H) 70 - 99 mg/dL       Medications - No data to display    Assessments & Plan (with Medical Decision Making)   MDM---63-year-old female with a long history of diabetes. She had a hypoglycemic reaction at home with a blood sugar of 50. She was appropriately treated and recognized by her . I discussed hypoglycemic reactions and her insulin with she and her . She will either have to reduce her insulin specifically her NPH in the evening or increase her intake. She should do this in concert with her diabetic nurse and primary care physician. She is in agreement with this and the patient was discharged in good condition. I discussed further workup but did not feel it was necessary and the patient and her  wanted to get going. She is asymptomatic feels well and her blood sugar now is 186.      I have reviewed the nursing notes.    I have reviewed the findings, diagnosis, plan and need for follow up with the patient.          Medication List      There are no discharge medications for this visit.         Final diagnoses:   Hypoglycemia       12/24/2018   [facility] EMERGENCY DEPARTMENT     Mateusz, Joe PAULA MD  12/24/18 [id number]

## 2018-12-27 ENCOUNTER — ONCOLOGY VISIT (OUTPATIENT)
Dept: ONCOLOGY | Facility: CLINIC | Age: 63
End: 2018-12-27
Payer: COMMERCIAL

## 2018-12-27 ENCOUNTER — INFUSION THERAPY VISIT (OUTPATIENT)
Dept: INFUSION THERAPY | Facility: CLINIC | Age: 63
End: 2018-12-27
Attending: INTERNAL MEDICINE
Payer: COMMERCIAL

## 2018-12-27 VITALS
RESPIRATION RATE: 16 BRPM | TEMPERATURE: 98.1 F | HEART RATE: 68 BPM | SYSTOLIC BLOOD PRESSURE: 93 MMHG | DIASTOLIC BLOOD PRESSURE: 46 MMHG | OXYGEN SATURATION: 94 %

## 2018-12-27 VITALS
OXYGEN SATURATION: 96 % | TEMPERATURE: 97.7 F | RESPIRATION RATE: 18 BRPM | BODY MASS INDEX: 29.65 KG/M2 | SYSTOLIC BLOOD PRESSURE: 94 MMHG | HEIGHT: 64 IN | WEIGHT: 173.7 LBS | DIASTOLIC BLOOD PRESSURE: 50 MMHG | HEART RATE: 68 BPM

## 2018-12-27 DIAGNOSIS — E10.9 TYPE 1 DIABETES MELLITUS WITHOUT COMPLICATION (H): Primary | ICD-10-CM

## 2018-12-27 DIAGNOSIS — C20 RECTAL ADENOCARCINOMA (H): Primary | ICD-10-CM

## 2018-12-27 DIAGNOSIS — C20 RECTAL ADENOCARCINOMA (H): ICD-10-CM

## 2018-12-27 DIAGNOSIS — C20 RECTAL CANCER (H): Primary | ICD-10-CM

## 2018-12-27 LAB
ALBUMIN SERPL-MCNC: 3 G/DL (ref 3.4–5)
ALP SERPL-CCNC: 78 U/L (ref 40–150)
ALT SERPL W P-5'-P-CCNC: 39 U/L (ref 0–50)
ANION GAP SERPL CALCULATED.3IONS-SCNC: 6 MMOL/L (ref 3–14)
AST SERPL W P-5'-P-CCNC: 43 U/L (ref 0–45)
BASOPHILS # BLD AUTO: 0 10E9/L (ref 0–0.2)
BASOPHILS NFR BLD AUTO: 0.4 %
BILIRUB SERPL-MCNC: 0.4 MG/DL (ref 0.2–1.3)
BUN SERPL-MCNC: 15 MG/DL (ref 7–30)
CALCIUM SERPL-MCNC: 8.2 MG/DL (ref 8.5–10.1)
CEA SERPL-MCNC: 4.2 UG/L (ref 0–2.5)
CHLORIDE SERPL-SCNC: 105 MMOL/L (ref 94–109)
CO2 SERPL-SCNC: 31 MMOL/L (ref 20–32)
CREAT SERPL-MCNC: 0.62 MG/DL (ref 0.52–1.04)
DIFFERENTIAL METHOD BLD: ABNORMAL
EOSINOPHIL NFR BLD AUTO: 1.3 %
ERYTHROCYTE [DISTWIDTH] IN BLOOD BY AUTOMATED COUNT: 19.7 % (ref 10–15)
GFR SERPL CREATININE-BSD FRML MDRD: >90 ML/MIN/{1.73_M2}
GLUCOSE SERPL-MCNC: 56 MG/DL (ref 70–99)
HCT VFR BLD AUTO: 30.5 % (ref 35–47)
HGB BLD-MCNC: 10.1 G/DL (ref 11.7–15.7)
IMM GRANULOCYTES # BLD: 0 10E9/L (ref 0–0.4)
IMM GRANULOCYTES NFR BLD: 0.2 %
LYMPHOCYTES # BLD AUTO: 1.7 10E9/L (ref 0.8–5.3)
LYMPHOCYTES NFR BLD AUTO: 35.5 %
MCH RBC QN AUTO: 31.2 PG (ref 26.5–33)
MCHC RBC AUTO-ENTMCNC: 33.1 G/DL (ref 31.5–36.5)
MCV RBC AUTO: 94 FL (ref 78–100)
MONOCYTES # BLD AUTO: 0.7 10E9/L (ref 0–1.3)
MONOCYTES NFR BLD AUTO: 15.3 %
NEUTROPHILS # BLD AUTO: 2.2 10E9/L (ref 1.6–8.3)
NEUTROPHILS NFR BLD AUTO: 47.3 %
NRBC # BLD AUTO: 0 10*3/UL
NRBC BLD AUTO-RTO: 0 /100
PLATELET # BLD AUTO: 186 10E9/L (ref 150–450)
POTASSIUM SERPL-SCNC: 3.3 MMOL/L (ref 3.4–5.3)
PROT SERPL-MCNC: 5.3 G/DL (ref 6.8–8.8)
RBC # BLD AUTO: 3.24 10E12/L (ref 3.8–5.2)
SODIUM SERPL-SCNC: 142 MMOL/L (ref 133–144)
WBC # BLD AUTO: 4.7 10E9/L (ref 4–11)

## 2018-12-27 PROCEDURE — 96361 HYDRATE IV INFUSION ADD-ON: CPT

## 2018-12-27 PROCEDURE — 99215 OFFICE O/P EST HI 40 MIN: CPT | Performed by: INTERNAL MEDICINE

## 2018-12-27 PROCEDURE — 96367 TX/PROPH/DG ADDL SEQ IV INF: CPT

## 2018-12-27 PROCEDURE — 25000128 H RX IP 250 OP 636: Performed by: INTERNAL MEDICINE

## 2018-12-27 PROCEDURE — 80053 COMPREHEN METABOLIC PANEL: CPT | Performed by: INTERNAL MEDICINE

## 2018-12-27 PROCEDURE — 96413 CHEMO IV INFUSION 1 HR: CPT

## 2018-12-27 PROCEDURE — 85025 COMPLETE CBC W/AUTO DIFF WBC: CPT | Performed by: INTERNAL MEDICINE

## 2018-12-27 PROCEDURE — 96415 CHEMO IV INFUSION ADDL HR: CPT

## 2018-12-27 PROCEDURE — 96375 TX/PRO/DX INJ NEW DRUG ADDON: CPT

## 2018-12-27 PROCEDURE — 25000132 ZZH RX MED GY IP 250 OP 250 PS 637: Performed by: INTERNAL MEDICINE

## 2018-12-27 PROCEDURE — 82378 CARCINOEMBRYONIC ANTIGEN: CPT | Performed by: INTERNAL MEDICINE

## 2018-12-27 RX ORDER — HEPARIN SODIUM (PORCINE) LOCK FLUSH IV SOLN 100 UNIT/ML 100 UNIT/ML
500 SOLUTION INTRAVENOUS EVERY 8 HOURS
Status: DISCONTINUED | OUTPATIENT
Start: 2018-12-27 | End: 2018-12-27 | Stop reason: HOSPADM

## 2018-12-27 RX ORDER — MEPERIDINE HYDROCHLORIDE 25 MG/ML
25 INJECTION INTRAMUSCULAR; INTRAVENOUS; SUBCUTANEOUS EVERY 30 MIN PRN
Status: CANCELLED | OUTPATIENT
Start: 2018-12-27

## 2018-12-27 RX ORDER — HEPARIN SODIUM (PORCINE) LOCK FLUSH IV SOLN 100 UNIT/ML 100 UNIT/ML
500 SOLUTION INTRAVENOUS EVERY 8 HOURS
Status: CANCELLED
Start: 2018-12-27

## 2018-12-27 RX ORDER — EPINEPHRINE 0.3 MG/.3ML
0.3 INJECTION SUBCUTANEOUS EVERY 5 MIN PRN
Status: CANCELLED | OUTPATIENT
Start: 2018-12-27

## 2018-12-27 RX ORDER — POTASSIUM CHLORIDE 1500 MG/1
20-40 TABLET, EXTENDED RELEASE ORAL
Status: DISCONTINUED | OUTPATIENT
Start: 2018-12-27 | End: 2018-12-27 | Stop reason: HOSPADM

## 2018-12-27 RX ORDER — METHYLPREDNISOLONE SODIUM SUCCINATE 125 MG/2ML
125 INJECTION, POWDER, LYOPHILIZED, FOR SOLUTION INTRAMUSCULAR; INTRAVENOUS
Status: CANCELLED
Start: 2018-12-27

## 2018-12-27 RX ORDER — ONDANSETRON 2 MG/ML
8 INJECTION INTRAMUSCULAR; INTRAVENOUS ONCE
Status: COMPLETED | OUTPATIENT
Start: 2018-12-27 | End: 2018-12-27

## 2018-12-27 RX ORDER — ALBUTEROL SULFATE 90 UG/1
1-2 AEROSOL, METERED RESPIRATORY (INHALATION)
Status: CANCELLED
Start: 2018-12-27

## 2018-12-27 RX ORDER — ONDANSETRON 2 MG/ML
8 INJECTION INTRAMUSCULAR; INTRAVENOUS ONCE
Status: CANCELLED
Start: 2018-12-27 | End: 2018-12-27

## 2018-12-27 RX ORDER — SODIUM CHLORIDE 9 MG/ML
1000 INJECTION, SOLUTION INTRAVENOUS CONTINUOUS PRN
Status: CANCELLED
Start: 2018-12-27

## 2018-12-27 RX ORDER — EPINEPHRINE 1 MG/ML
0.3 INJECTION, SOLUTION, CONCENTRATE INTRAVENOUS EVERY 5 MIN PRN
Status: CANCELLED | OUTPATIENT
Start: 2018-12-27

## 2018-12-27 RX ORDER — ALBUTEROL SULFATE 0.83 MG/ML
2.5 SOLUTION RESPIRATORY (INHALATION)
Status: CANCELLED | OUTPATIENT
Start: 2018-12-27

## 2018-12-27 RX ORDER — LORAZEPAM 2 MG/ML
0.5 INJECTION INTRAMUSCULAR EVERY 4 HOURS PRN
Status: CANCELLED
Start: 2018-12-27

## 2018-12-27 RX ORDER — DIPHENHYDRAMINE HYDROCHLORIDE 50 MG/ML
50 INJECTION INTRAMUSCULAR; INTRAVENOUS
Status: CANCELLED
Start: 2018-12-27

## 2018-12-27 RX ADMIN — SODIUM CHLORIDE 1000 ML: 9 INJECTION, SOLUTION INTRAVENOUS at 15:14

## 2018-12-27 RX ADMIN — POTASSIUM CHLORIDE 40 MEQ: 1500 TABLET, EXTENDED RELEASE ORAL at 11:49

## 2018-12-27 RX ADMIN — OXALIPLATIN 200 MG: 5 INJECTION, SOLUTION, CONCENTRATE INTRAVENOUS at 12:53

## 2018-12-27 RX ADMIN — ONDANSETRON 8 MG: 2 INJECTION INTRAMUSCULAR; INTRAVENOUS at 11:50

## 2018-12-27 RX ADMIN — POTASSIUM CHLORIDE 20 MEQ: 1500 TABLET, EXTENDED RELEASE ORAL at 15:13

## 2018-12-27 RX ADMIN — SODIUM CHLORIDE 150 MG: 9 INJECTION, SOLUTION INTRAVENOUS at 12:06

## 2018-12-27 RX ADMIN — DEXTROSE MONOHYDRATE 250 ML: 50 INJECTION, SOLUTION INTRAVENOUS at 11:43

## 2018-12-27 RX ADMIN — Medication 500 UNITS: at 16:09

## 2018-12-27 ASSESSMENT — PAIN SCALES - GENERAL: PAINLEVEL: NO PAIN (0)

## 2018-12-27 ASSESSMENT — MIFFLIN-ST. JEOR: SCORE: 1337.9

## 2018-12-27 NOTE — TELEPHONE ENCOUNTER
Patient requesting new blood glucose testing supplies.    Entered orders for your approval.    Angel Luis Varner [facility], [facility] [phone]

## 2018-12-27 NOTE — Clinical Note
12/27/2018         RE: Niharika Cason  [address] [place] [address]        Dear Colleague,    Thank you for referring your patient, Niharika Cason, to the [facility]. Please see a copy of my visit note below.      FOLLOW-UP VISIT NOTE    PATIENT NAME: Niharika Cason MRN # [id number]  DATE OF VISIT: Dec 27, 2018 YOB: 1955    REFERRING PROVIDER: Liliana Urbina MD  [address] [address]    CANCER TYPE:Recatl adenocarcinoma  STAGE: cT3cN1 M0  ECOG PS: 1    ONCOLOGY HISTORY:    Niharika Cason is a 63-year-old female with past medical history significant for hypertension, diabetes mellitus type 1, anxiety underwent her first screening colonoscopy on 09/17/18 which revealed a partially obstructing mass in the rectosigmoid colon  Which was biopsied and came back as invasive moderately differentiated adenocarcinoma but intact mismatch repair proteins. Staging CT scans showed a 3 cm enhancing lesion in the right lobe of liver he subsequently had an MRI done which characterized a liver lesion as well as VOCAL lobular hyperplasia with no suspected metastatic disease. MRI of pelvis showed an irregular ulcerated left rectal wall mass at 9 cm from the anal verge extending to the muscularis propria into the perirectal fat. Also noted were suspicious perirectal and presacral lymph nodes -clinical stage T3cN1 M0. She met with medical and surgical oncology and the recommendation was to proceed with total neoadjuvant therapy.      10/25/18  Started XELOX chemotherapy    12/06//18 Upon completion of oxaliplatin dose, patient complained of tingling in extremities and buttock region along with Blurred vision. IV Solu-Medrol was given and symptoms improved within half an hour.       SUBJECTIVE     Sense to the clinic today to proceed with cycle 4 of chemotherapy.  State that neurological symptoms gradually improved over the first 2 weeks after her last chemotherapy.  Currently has mild tingling/numbness in extremities - worse on exposure to cold.    PAST MEDICAL HISTORY     Past Medical History:   Diagnosis Date     Essential hypertension, benign      Generalized anxiety disorder      Malignant neoplasm of rectum (H) 10/15/2018     Rectal cancer (H) 10/15/2018     Type I (juvenile type) diabetes mellitus without mention of complication, not stated as uncontrolled     diagnosed at age 35     Ulcerative colitis, unspecified     in the 70s.           CURRENT OUTPATIENT MEDICATIONS     Current Outpatient Medications   Medication Sig Dispense Refill     atenolol (TENORMIN) 25 MG tablet Take 1 tablet (25 mg) by mouth daily 93 tablet 3     BASAGLAR 100 UNIT/ML injection Inject 50 Units Subcutaneous daily 15 mL 3     blood glucose monitoring (SURESTEP TEST STRIPS) test strip 1 strip by In Vitro route 3 times daily 200 each 3     capecitabine (XELODA) 500 MG tablet CHEMO Take 4 tablets (2,000 mg) by mouth 2 times daily for 14 days Take on Days 1 through 14, then off for 7 days. 112 tablet 0     hydrochlorothiazide (MICROZIDE) 12.5 MG capsule Take 1 capsule (12.5 mg) by mouth daily 93 capsule 3     insulin lispro (HUMALOG KWIKPEN) 100 UNIT/ML injection Use 3 units before breakfast, 6 units before lunch, and 16 units before dinner, plus correction of 1 unit per every 50 glucose over 125, averaging 3 for breakfast, 6 for lunch, 16 for dinner. 30 mL 3     Insulin Pen Needle 33G X 4 MM MISC 1 each daily Use 4 needles per day 200 each prn     lidocaine-prilocaine (EMLA) cream Apply 1 g topically as needed for moderate pain 30 g 3     lisinopril (PRINIVIL/ZESTRIL) 40 MG tablet Take 1 tablet (40 mg) by mouth daily 93 tablet 3     loperamide (IMODIUM) 2 MG capsule Start with 2 caps (4 mg), then take one cap (2 mg) after each diarrheal stool as needed. Do not use more than 8 caps (16 mg) per day. 30 capsule 0     LORazepam (ATIVAN) 0.5 MG tablet Take 1 tablet (0.5 mg) by mouth every 4 hours as  needed (Anxiety, Nausea/Vomiting or Sleep) 30 tablet 2     ondansetron (ZOFRAN-ODT) 8 MG ODT tab Take 1 tablet (8 mg) by mouth every 8 hours as needed for nausea 60 tablet 3     PARoxetine (PAXIL) 30 MG tablet Take 1 tablet (30 mg) by mouth every morning 93 tablet 3     prochlorperazine (COMPAZINE) 10 MG tablet Take 1 tablet (10 mg) by mouth every 6 hours as needed (Nausea/Vomiting) 30 tablet 2     simvastatin (ZOCOR) 10 MG tablet TAKE ONE TABLET BY MOUTH EVERY NIGHT AT BEDTIME 93 tablet 3        ALLERGIES     Allergies   Allergen Reactions     No Known Drug Allergies         REVIEW OF SYSTEMS   As above in the HPI, o/w complete 12-point ROS was negative.     PHYSICAL EXAM   B/P: Data Unavailable, T: Data Unavailable, P: Data Unavailable, R: Data Unavailable  SpO2 Readings from Last 4 Encounters:   11/15/18 95%   11/15/18 96%   11/02/18 96%   10/25/18 96%     Wt Readings from Last 3 Encounters:   12/27/18 78.8 kg (173 lb 11.2 oz)   12/24/18 78.5 kg (173 lb)   12/13/18 77.6 kg (171 lb 1.6 oz)     GEN: NAD  Mouth/ENT: Oropharynx is clear.  NECK: no  lymphadenopathy  LUNGS: clear bilaterally  CV: regular, no murmurs, rubs, or gallops  ABDOMEN: soft, non-tender, non-distended,  EXT: warm, well perfused, no edema  NEURO: alert  SKIN: no rashes     LABORATORY AND IMAGING STUDIES     Lab Results   Component Value Date    WBC 5.2 12/06/2018     Lab Results   Component Value Date    RBC 3.59 12/06/2018     Lab Results   Component Value Date    HGB 11.0 12/06/2018     Lab Results   Component Value Date    HCT 31.6 12/06/2018     No components found for: MCT  Lab Results   Component Value Date    MCV 88 12/06/2018     Lab Results   Component Value Date    MCH 30.6 12/06/2018     Lab Results   Component Value Date    MCHC 34.8 12/06/2018     Lab Results   Component Value Date    RDW 15.8 12/06/2018     Lab Results   Component Value Date     12/06/2018          Component      Latest Ref Rng & Units 9/17/2018 10/1/2018  11/15/2018   CEA      0 - 2.5 ug/L 10.5 (H) 8.9 (H) 5.0 (H)      ASSESSMENT AND PLAN     63-year-old female with past medical history significant for hypertension, diabetes mellitus type 1, anxiety on a screening colonoscopy was diagnosed with rectal adenocarcinoma     - Rectal adenocarcinoma- T3cN1 M0 intact mismatch repair protein  She met with medical and surgical oncology at [facility] and the recommendation was to proceed with total neoadjuvant therapy.  1. 6 cycles of XELOX, then:   2. 2 months of CXRT.   3. Surgery after 6-8 weeks.     - Surveillance for response to therapy [id number] CT c/a/p and Flex Sig at completion of chemotherapy and then prior to the surgery.      10/25/18 Started XELOX chemotherapy. Upon completion of last oxaliplatin dose, patient complained of tingling in extremities and buttock region along with difficulty focusing with her eyes. IV Solu-Medrol was given and symptoms subsided within half an hour. Neurological symptoms likely secondary to Oxaliplatin.   CT scans were reviewed with patient and  presented in the clinic today and shows stable findings with no concerns for progression/new metastasis  Will continue the plan of total neoadjuvant therapy and proceed with cycle 4 of XELOX today. Given significant neurological symptoms, will  dose reduce oxaliplatin to 100 mg/m2 with future cycles.      - Cold sensitivity  Secondary to oxaliplatin use     - Diabetes mellitus  On an insulin regimen and managed by primary care provider     - Hypotension  Possibly due to increased fluid loss from loose stools.  IV fluids today  Currently atenolol, hydrochlorothiazide and lisinopril per PCP. If numbers stay low, advised to contact PCP to consider further dose reduction of medications.      - Anxiety  Continue with paxil  Ativan prn         RTC in  3 weeks for office visit, labs,next course of chemotherapy.       Chart documentation with Dragon Voice recognition Software. Although  reviewed after completion, some words and grammatical errors may remain.  Dayron Lawton MD  Attending Physician   Hematology/Medical Oncology          Again, thank you for allowing me to participate in the care of your patient.        Sincerely,        Dayrno Lawton MD

## 2018-12-27 NOTE — PATIENT INSTRUCTIONS
Please schedule fluids in 1 week.  Infusion Date/Time:    Please follow up with Dr. Lawton in 3 weeks for port labs, office visit and treatment.      Port Lab Date/Time:    Follow Up (15min) Date/Time:     Infusion Date/Time:    If you have any questions or concerns please feel free to call.    If you need to reschedule please call:  Clinic or Lab Appointment - [phone]  Infusion - [phone]  Imaging - [phone]    Roxie FLOOD CMA  [facility]  Oncology/Hematology at [facility]  [phone]

## 2018-12-27 NOTE — NURSING NOTE
"Oncology Rooming Note    December 27, 2018 10:37 AM   Niharika Cason is a 63 year old female who presents for:    Chief Complaint   Patient presents with     Oncology Clinic Visit     3 week follow up- Rectal adenocarcinoma- T3cN1 M0 intact mismatch repair protein     Chemotherapy     D1C4 Eloxatin     Oral Chemotherapy Management     capecitabine (XELODA) 500 MG tablet CHEMO     Results     Labs today and CT scan completed on 12/21/18     Initial Vitals: BP 94/50 (BP Location: Right arm, Patient Position: Chair, Cuff Size: Adult Regular)   Pulse 68   Temp 97.7  F (36.5  C) (Temporal)   Resp 18   Ht 1.626 m (5' 4\")   Wt 78.8 kg (173 lb 11.2 oz)   LMP 10/15/2008   SpO2 96%   BMI 29.82 kg/m   Estimated body mass index is 29.82 kg/m  as calculated from the following:    Height as of this encounter: 1.626 m (5' 4\").    Weight as of this encounter: 78.8 kg (173 lb 11.2 oz). Body surface area is 1.89 meters squared.  Data Unavailable Comment: Data Unavailable   Patient's last menstrual period was 10/15/2008.  Allergies reviewed: Yes  Medications reviewed: Yes    Medications: Medication refills not needed today.  Pharmacy name entered into [facility]:    [facility] - [place], [place] - [address]  [facility] - [place], [place] - [address]      6 minutes for nursing intake (face to face time)     Roxie FLOOD CMA              "

## 2018-12-27 NOTE — PROGRESS NOTES
Infusion Nursing Note:  Niharika Cason presents today for C4 D1 Oxilaplatin/Xeloda.    Patient seen by provider today: Yes: Dr. Lawton   present during visit today: Not Applicable.    Note: Patients BP low while up in infusion, denies any symptoms of dizziness or lightheadedness. 1L NS given after chemo and informed patient to speak to primary MD regarding BP meds and possibly adjusting dose per Dr. Lawton. Patient and  verbalized understanding.    Intravenous Access:  Labs drawn without difficulty.  Implanted Port.    Treatment Conditions:  Lab Results   Component Value Date    HGB 10.1 12/27/2018     Lab Results   Component Value Date    WBC 4.7 12/27/2018      Lab Results   Component Value Date    ANEU 2.2 12/27/2018     Lab Results   Component Value Date     12/27/2018      Lab Results   Component Value Date     12/27/2018                   Lab Results   Component Value Date    POTASSIUM 3.3 12/27/2018           Lab Results   Component Value Date    MAG 1.9 02/27/2004            Lab Results   Component Value Date    CR 0.62 12/27/2018                   Lab Results   Component Value Date    JUNG 8.2 12/27/2018                Lab Results   Component Value Date    BILITOTAL 0.4 12/27/2018           Lab Results   Component Value Date    ALBUMIN 3.0 12/27/2018                    Lab Results   Component Value Date    ALT 39 12/27/2018           Lab Results   Component Value Date    AST 43 12/27/2018       Results reviewed, labs MET treatment parameters, ok to proceed with treatment.      Post Infusion Assessment:  Patient tolerated infusion without incident.  Patient observed for 15 minutes post infusion per protocol.  Blood return noted pre and post infusion.  Site patent and intact, free from redness, edema or discomfort.  No evidence of extravasations.  Access discontinued per protocol.    Discharge Plan:   Discharge instructions reviewed with: Patient and Family.  Patient and/or family  verbalized understanding of discharge instructions and all questions answered.  Patient discharged in stable condition accompanied by: self and .  Departure Mode: Ambulatory.    Lynnette Tejeda RN

## 2018-12-27 NOTE — PATIENT INSTRUCTIONS
Pt to return on 01/04/19 for IVF. Copies of medication list and upcoming appointments given prior to discharge.

## 2018-12-27 NOTE — NURSING NOTE
DISCHARGE PLAN:  Next appointments: See patient instruction section  Departure Mode: Ambulatory  Accompanied by:   4 minutes for nursing discharge (face to face time)     Niharika Cason is here today for 3 week follow up.  Writing nurse seen patient after Medical Oncology appointment to address questions/concerns/coordinate care.Fluids in 1 week per Jered if patient needs. Patient to infusion to schedule port labs, follow up, and infusion in 3 weeks. See patient instructions and Oncologist's Progress note for further details. Questions and concerns addressed to patient's satisfaction. Patient verbalized and demonstrated understanding of plan.  Contact information provided and patient is encouraged to call with any that arise in the interim of care.    Roxie FLOOD [facility]    Oncology/Hematology at [facility]  [phone]  12/27/2018, 11:12 AM

## 2018-12-27 NOTE — PROGRESS NOTES
FOLLOW-UP VISIT NOTE    PATIENT NAME: Niharika Cason MRN # [id number]  DATE OF VISIT: Dec 27, 2018 YOB: 1955    REFERRING PROVIDER: iLliana Urbina MD  [address] [place] [id number]    CANCER TYPE:Recatl adenocarcinoma  STAGE: cT3cN1 M0  ECOG PS: 1    ONCOLOGY HISTORY:    Niharika Cason is a 63-year-old female with past medical history significant for hypertension, diabetes mellitus type 1, anxiety underwent her first screening colonoscopy on 09/17/18 which revealed a partially obstructing mass in the rectosigmoid colon  Which was biopsied and came back as invasive moderately differentiated adenocarcinoma but intact mismatch repair proteins. Staging CT scans showed a 3 cm enhancing lesion in the right lobe of liver he subsequently had an MRI done which characterized a liver lesion as well as VOCAL lobular hyperplasia with no suspected metastatic disease. MRI of pelvis showed an irregular ulcerated left rectal wall mass at 9 cm from the anal verge extending to the muscularis propria into the perirectal fat. Also noted were suspicious perirectal and presacral lymph nodes -clinical stage T3cN1 M0. She met with medical and surgical oncology and the recommendation was to proceed with total neoadjuvant therapy.      10/25/18  Started XELOX chemotherapy    12/06//18 Upon completion of oxaliplatin dose, patient complained of tingling in extremities and buttock region along with Blurred vision. IV Solu-Medrol was given and symptoms improved within half an hour.       SUBJECTIVE     Sense to the clinic today to proceed with cycle 4 of chemotherapy.  State that neurological symptoms gradually improved over the first 2 weeks after her last chemotherapy. Currently has mild tingling/numbness in extremities - worse on exposure to cold.    PAST MEDICAL HISTORY     Past Medical History:   Diagnosis Date     Essential hypertension, benign      Generalized anxiety disorder      Malignant neoplasm of  rectum (H) 10/15/2018     Rectal cancer (H) 10/15/2018     Type I (juvenile type) diabetes mellitus without mention of complication, not stated as uncontrolled     diagnosed at age 35     Ulcerative colitis, unspecified     in the 70s.           CURRENT OUTPATIENT MEDICATIONS     Current Outpatient Medications   Medication Sig Dispense Refill     atenolol (TENORMIN) 25 MG tablet Take 1 tablet (25 mg) by mouth daily 93 tablet 3     BASAGLAR 100 UNIT/ML injection Inject 50 Units Subcutaneous daily 15 mL 3     blood glucose monitoring (SURESTEP TEST STRIPS) test strip 1 strip by In Vitro route 3 times daily 200 each 3     capecitabine (XELODA) 500 MG tablet CHEMO Take 4 tablets (2,000 mg) by mouth 2 times daily for 14 days Take on Days 1 through 14, then off for 7 days. 112 tablet 0     hydrochlorothiazide (MICROZIDE) 12.5 MG capsule Take 1 capsule (12.5 mg) by mouth daily 93 capsule 3     insulin lispro (HUMALOG KWIKPEN) 100 UNIT/ML injection Use 3 units before breakfast, 6 units before lunch, and 16 units before dinner, plus correction of 1 unit per every 50 glucose over 125, averaging 3 for breakfast, 6 for lunch, 16 for dinner. 30 mL 3     Insulin Pen Needle 33G X 4 MM MISC 1 each daily Use 4 needles per day 200 each prn     lidocaine-prilocaine (EMLA) cream Apply 1 g topically as needed for moderate pain 30 g 3     lisinopril (PRINIVIL/ZESTRIL) 40 MG tablet Take 1 tablet (40 mg) by mouth daily 93 tablet 3     loperamide (IMODIUM) 2 MG capsule Start with 2 caps (4 mg), then take one cap (2 mg) after each diarrheal stool as needed. Do not use more than 8 caps (16 mg) per day. 30 capsule 0     LORazepam (ATIVAN) 0.5 MG tablet Take 1 tablet (0.5 mg) by mouth every 4 hours as needed (Anxiety, Nausea/Vomiting or Sleep) 30 tablet 2     ondansetron (ZOFRAN-ODT) 8 MG ODT tab Take 1 tablet (8 mg) by mouth every 8 hours as needed for nausea 60 tablet 3     PARoxetine (PAXIL) 30 MG tablet Take 1 tablet (30 mg) by mouth every  morning 93 tablet 3     prochlorperazine (COMPAZINE) 10 MG tablet Take 1 tablet (10 mg) by mouth every 6 hours as needed (Nausea/Vomiting) 30 tablet 2     simvastatin (ZOCOR) 10 MG tablet TAKE ONE TABLET BY MOUTH EVERY NIGHT AT BEDTIME 93 tablet 3        ALLERGIES     Allergies   Allergen Reactions     No Known Drug Allergies         REVIEW OF SYSTEMS   As above in the HPI, o/w complete 12-point ROS was negative.     PHYSICAL EXAM   B/P: Data Unavailable, T: Data Unavailable, P: Data Unavailable, R: Data Unavailable  SpO2 Readings from Last 4 Encounters:   11/15/18 95%   11/15/18 96%   11/02/18 96%   10/25/18 96%     Wt Readings from Last 3 Encounters:   12/27/18 78.8 kg (173 lb 11.2 oz)   12/24/18 78.5 kg (173 lb)   12/13/18 77.6 kg (171 lb 1.6 oz)     GEN: NAD  Mouth/ENT: Oropharynx is clear.  NECK: no  lymphadenopathy  LUNGS: clear bilaterally  CV: regular, no murmurs, rubs, or gallops  ABDOMEN: soft, non-tender, non-distended,  EXT: warm, well perfused, no edema  NEURO: alert  SKIN: no rashes     LABORATORY AND IMAGING STUDIES     Lab Results   Component Value Date    WBC 5.2 12/06/2018     Lab Results   Component Value Date    RBC 3.59 12/06/2018     Lab Results   Component Value Date    HGB 11.0 12/06/2018     Lab Results   Component Value Date    HCT 31.6 12/06/2018     No components found for: MCT  Lab Results   Component Value Date    MCV 88 12/06/2018     Lab Results   Component Value Date    MCH 30.6 12/06/2018     Lab Results   Component Value Date    MCHC 34.8 12/06/2018     Lab Results   Component Value Date    RDW 15.8 12/06/2018     Lab Results   Component Value Date     12/06/2018          Component      Latest Ref Rng & Units 9/17/2018 10/1/2018 11/15/2018   CEA      0 - 2.5 ug/L 10.5 (H) 8.9 (H) 5.0 (H)      ASSESSMENT AND PLAN     63-year-old female with past medical history significant for hypertension, diabetes mellitus type 1, anxiety on a screening colonoscopy was diagnosed with rectal  adenocarcinoma     - Rectal adenocarcinoma- T3cN1 M0 intact mismatch repair protein  She met with medical and surgical oncology at [facility] and the recommendation was to proceed with total neoadjuvant therapy.  1. 6 cycles of XELOX, then:   2. 2 months of CXRT.   3. Surgery after 6-8 weeks.     - Surveillance for response to therapy [id number] CT c/a/p and Flex Sig at completion of chemotherapy and then prior to the surgery.      10/25/18 Started XELOX chemotherapy. Upon completion of last oxaliplatin dose, patient complained of tingling in extremities and buttock region along with difficulty focusing with her eyes. IV Solu-Medrol was given and symptoms subsided within half an hour. Neurological symptoms likely secondary to Oxaliplatin.   CT scans were reviewed with patient and  presented in the clinic today and shows stable findings with no concerns for progression/new metastasis  Will continue the plan of total neoadjuvant therapy and proceed with cycle 4 of XELOX today. Given significant neurological symptoms, will  dose reduce oxaliplatin to 100 mg/m2 with future cycles.      - Cold sensitivity  Secondary to oxaliplatin use     - Diabetes mellitus  On an insulin regimen and managed by primary care provider     - Hypotension  Possibly due to increased fluid loss from loose stools.  IV fluids today  Currently atenolol, hydrochlorothiazide and lisinopril per PCP. If numbers stay low, advised to contact PCP to consider further dose reduction of medications.      - Anxiety  Continue with paxil  Ativan prn         RTC in  3 weeks for office visit, labs,next course of chemotherapy.       Chart documentation with Dragon Voice recognition Software. Although reviewed after completion, some words and grammatical errors may remain.  Dayron Lawton MD  Attending Physician   Hematology/Medical Oncology

## 2018-12-31 RX ORDER — BLOOD-GLUCOSE CONTROL, NORMAL
EACH MISCELLANEOUS
Qty: 1 EACH | Refills: 11 | Status: SHIPPED | OUTPATIENT
Start: 2018-12-31 | End: 2019-06-06

## 2018-12-31 RX ORDER — BLOOD-GLUCOSE METER
EACH MISCELLANEOUS
Qty: 1 KIT | Refills: 0 | Status: SHIPPED | OUTPATIENT
Start: 2018-12-31 | End: 2019-06-06

## 2019-01-02 ENCOUNTER — TELEPHONE (OUTPATIENT)
Dept: FAMILY MEDICINE | Facility: CLINIC | Age: 64
End: 2019-01-02

## 2019-01-02 NOTE — TELEPHONE ENCOUNTER
Reason for Call:  Other medication question    Detailed comments: Niharika is wondering if you could lower her blood pressure medication, her blood pressure has been running low lately, she didn't have any readings available, she is a cancer patient having infusion. Niharika said she has stopped smoking and drinking caffeine. Please advise.    Phone Number Patient can be reached at: Home number on file [phone] (home)    Best Time:     Can we leave a detailed message on this number? YES    Call taken on 1/2/2019 at 9:35 AM by Marisol Latham

## 2019-01-04 ENCOUNTER — INFUSION THERAPY VISIT (OUTPATIENT)
Dept: INFUSION THERAPY | Facility: CLINIC | Age: 64
End: 2019-01-04
Attending: INTERNAL MEDICINE
Payer: COMMERCIAL

## 2019-01-04 VITALS — HEART RATE: 63 BPM | SYSTOLIC BLOOD PRESSURE: 110 MMHG | DIASTOLIC BLOOD PRESSURE: 56 MMHG

## 2019-01-04 DIAGNOSIS — C20 RECTAL CANCER (H): Primary | ICD-10-CM

## 2019-01-04 DIAGNOSIS — C20 RECTAL ADENOCARCINOMA (H): ICD-10-CM

## 2019-01-04 PROCEDURE — 96360 HYDRATION IV INFUSION INIT: CPT

## 2019-01-04 PROCEDURE — 25000128 H RX IP 250 OP 636: Performed by: INTERNAL MEDICINE

## 2019-01-04 RX ORDER — HEPARIN SODIUM (PORCINE) LOCK FLUSH IV SOLN 100 UNIT/ML 100 UNIT/ML
500 SOLUTION INTRAVENOUS EVERY 8 HOURS
Status: DISCONTINUED | OUTPATIENT
Start: 2019-01-04 | End: 2019-01-04 | Stop reason: HOSPADM

## 2019-01-04 RX ORDER — HEPARIN SODIUM (PORCINE) LOCK FLUSH IV SOLN 100 UNIT/ML 100 UNIT/ML
500 SOLUTION INTRAVENOUS EVERY 8 HOURS
Status: CANCELLED
Start: 2019-01-04

## 2019-01-04 RX ADMIN — Medication 500 UNITS: at 12:10

## 2019-01-04 RX ADMIN — SODIUM CHLORIDE 1000 ML: 9 INJECTION, SOLUTION INTRAVENOUS at 10:53

## 2019-01-04 ASSESSMENT — PAIN SCALES - GENERAL: PAINLEVEL: NO PAIN (0)

## 2019-01-04 NOTE — PROGRESS NOTES
Infusion Nursing Note:  Niharika Cason presents today for IVF.    Patient seen by provider today: No   present during visit today: Not Applicable.    Note: Patient will start vitamin B6 100mg daily for her neuropathy in hands.     Intravenous Access:  Implanted Port.    Treatment Conditions:  Not Applicable.      Post Infusion Assessment:  Patient tolerated infusion without incident.  Blood return noted pre and post infusion.  Site patent and intact, free from redness, edema or discomfort.  Access discontinued per protocol.    Discharge Plan:   Discharge instructions reviewed with: Patient.  Patient and/or family verbalized understanding of discharge instructions and all questions answered.  Copy of AVS reviewed with patient and/or family.  Patient will return 1/17/19 for next appointment.  Patient discharged in stable condition accompanied by: self.  Departure Mode: Ambulatory.    Marizol Maradiaga RN

## 2019-01-07 DIAGNOSIS — C20 RECTAL CANCER (H): Primary | ICD-10-CM

## 2019-01-07 DIAGNOSIS — C20 RECTAL ADENOCARCINOMA (H): ICD-10-CM

## 2019-01-08 RX ORDER — CAPECITABINE 500 MG/1
1000 TABLET, FILM COATED ORAL 2 TIMES DAILY
Qty: 112 TABLET | Refills: 0 | Status: SHIPPED | OUTPATIENT
Start: 2019-01-08 | End: 2019-03-06

## 2019-01-15 ENCOUNTER — TELEPHONE (OUTPATIENT)
Dept: FAMILY MEDICINE | Facility: CLINIC | Age: 64
End: 2019-01-15

## 2019-01-17 ENCOUNTER — INFUSION THERAPY VISIT (OUTPATIENT)
Dept: INFUSION THERAPY | Facility: CLINIC | Age: 64
End: 2019-01-17
Attending: INTERNAL MEDICINE
Payer: COMMERCIAL

## 2019-01-17 ENCOUNTER — ONCOLOGY VISIT (OUTPATIENT)
Dept: ONCOLOGY | Facility: CLINIC | Age: 64
End: 2019-01-17
Payer: COMMERCIAL

## 2019-01-17 VITALS
TEMPERATURE: 97.3 F | WEIGHT: 171.5 LBS | HEART RATE: 77 BPM | DIASTOLIC BLOOD PRESSURE: 54 MMHG | SYSTOLIC BLOOD PRESSURE: 112 MMHG | RESPIRATION RATE: 16 BRPM | OXYGEN SATURATION: 96 % | BODY MASS INDEX: 29.44 KG/M2

## 2019-01-17 VITALS — SYSTOLIC BLOOD PRESSURE: 120 MMHG | DIASTOLIC BLOOD PRESSURE: 59 MMHG | HEART RATE: 70 BPM

## 2019-01-17 DIAGNOSIS — C20 RECTAL CANCER (H): Primary | ICD-10-CM

## 2019-01-17 DIAGNOSIS — C20 RECTAL ADENOCARCINOMA (H): Primary | ICD-10-CM

## 2019-01-17 DIAGNOSIS — C20 RECTAL CANCER (H): ICD-10-CM

## 2019-01-17 LAB
ALBUMIN SERPL-MCNC: 3.9 G/DL (ref 3.4–5)
ALP SERPL-CCNC: 84 U/L (ref 40–150)
ALT SERPL W P-5'-P-CCNC: 33 U/L (ref 0–50)
ANION GAP SERPL CALCULATED.3IONS-SCNC: 5 MMOL/L (ref 3–14)
AST SERPL W P-5'-P-CCNC: 34 U/L (ref 0–45)
BASOPHILS # BLD AUTO: 0 10E9/L (ref 0–0.2)
BASOPHILS NFR BLD AUTO: 0.7 %
BILIRUB SERPL-MCNC: 0.7 MG/DL (ref 0.2–1.3)
BUN SERPL-MCNC: 14 MG/DL (ref 7–30)
CALCIUM SERPL-MCNC: 8.5 MG/DL (ref 8.5–10.1)
CEA SERPL-MCNC: 4.8 UG/L (ref 0–2.5)
CHLORIDE SERPL-SCNC: 106 MMOL/L (ref 94–109)
CO2 SERPL-SCNC: 29 MMOL/L (ref 20–32)
CREAT SERPL-MCNC: 0.58 MG/DL (ref 0.52–1.04)
DIFFERENTIAL METHOD BLD: ABNORMAL
EOSINOPHIL NFR BLD AUTO: 0.9 %
ERYTHROCYTE [DISTWIDTH] IN BLOOD BY AUTOMATED COUNT: 20.3 % (ref 10–15)
GFR SERPL CREATININE-BSD FRML MDRD: >90 ML/MIN/{1.73_M2}
GLUCOSE SERPL-MCNC: 49 MG/DL (ref 70–99)
HCT VFR BLD AUTO: 32.4 % (ref 35–47)
HGB BLD-MCNC: 10.9 G/DL (ref 11.7–15.7)
IMM GRANULOCYTES # BLD: 0 10E9/L (ref 0–0.4)
IMM GRANULOCYTES NFR BLD: 0.2 %
LYMPHOCYTES # BLD AUTO: 2 10E9/L (ref 0.8–5.3)
LYMPHOCYTES NFR BLD AUTO: 44 %
MCH RBC QN AUTO: 33.1 PG (ref 26.5–33)
MCHC RBC AUTO-ENTMCNC: 33.6 G/DL (ref 31.5–36.5)
MCV RBC AUTO: 99 FL (ref 78–100)
MONOCYTES # BLD AUTO: 0.7 10E9/L (ref 0–1.3)
MONOCYTES NFR BLD AUTO: 14.3 %
NEUTROPHILS # BLD AUTO: 1.8 10E9/L (ref 1.6–8.3)
NEUTROPHILS NFR BLD AUTO: 39.9 %
NRBC # BLD AUTO: 0 10*3/UL
NRBC BLD AUTO-RTO: 0 /100
PLATELET # BLD AUTO: 192 10E9/L (ref 150–450)
POTASSIUM SERPL-SCNC: 3.8 MMOL/L (ref 3.4–5.3)
PROT SERPL-MCNC: 6.6 G/DL (ref 6.8–8.8)
RBC # BLD AUTO: 3.29 10E12/L (ref 3.8–5.2)
SODIUM SERPL-SCNC: 140 MMOL/L (ref 133–144)
WBC # BLD AUTO: 4.6 10E9/L (ref 4–11)

## 2019-01-17 PROCEDURE — 25000128 H RX IP 250 OP 636: Performed by: INTERNAL MEDICINE

## 2019-01-17 PROCEDURE — 96415 CHEMO IV INFUSION ADDL HR: CPT

## 2019-01-17 PROCEDURE — 96367 TX/PROPH/DG ADDL SEQ IV INF: CPT

## 2019-01-17 PROCEDURE — 80053 COMPREHEN METABOLIC PANEL: CPT | Performed by: INTERNAL MEDICINE

## 2019-01-17 PROCEDURE — 99214 OFFICE O/P EST MOD 30 MIN: CPT | Performed by: INTERNAL MEDICINE

## 2019-01-17 PROCEDURE — 85025 COMPLETE CBC W/AUTO DIFF WBC: CPT | Performed by: INTERNAL MEDICINE

## 2019-01-17 PROCEDURE — 82378 CARCINOEMBRYONIC ANTIGEN: CPT | Performed by: INTERNAL MEDICINE

## 2019-01-17 PROCEDURE — 96413 CHEMO IV INFUSION 1 HR: CPT

## 2019-01-17 PROCEDURE — 96375 TX/PRO/DX INJ NEW DRUG ADDON: CPT

## 2019-01-17 RX ORDER — ALBUTEROL SULFATE 0.83 MG/ML
2.5 SOLUTION RESPIRATORY (INHALATION)
Status: CANCELLED | OUTPATIENT
Start: 2019-01-17

## 2019-01-17 RX ORDER — HEPARIN SODIUM (PORCINE) LOCK FLUSH IV SOLN 100 UNIT/ML 100 UNIT/ML
500 SOLUTION INTRAVENOUS EVERY 8 HOURS
Status: CANCELLED
Start: 2019-01-17

## 2019-01-17 RX ORDER — SODIUM CHLORIDE 9 MG/ML
1000 INJECTION, SOLUTION INTRAVENOUS CONTINUOUS PRN
Status: CANCELLED
Start: 2019-01-17

## 2019-01-17 RX ORDER — EPINEPHRINE 1 MG/ML
0.3 INJECTION, SOLUTION, CONCENTRATE INTRAVENOUS EVERY 5 MIN PRN
Status: CANCELLED | OUTPATIENT
Start: 2019-01-17

## 2019-01-17 RX ORDER — METHYLPREDNISOLONE SODIUM SUCCINATE 125 MG/2ML
125 INJECTION, POWDER, LYOPHILIZED, FOR SOLUTION INTRAMUSCULAR; INTRAVENOUS
Status: CANCELLED
Start: 2019-01-17

## 2019-01-17 RX ORDER — HEPARIN SODIUM (PORCINE) LOCK FLUSH IV SOLN 100 UNIT/ML 100 UNIT/ML
500 SOLUTION INTRAVENOUS EVERY 8 HOURS
Status: DISCONTINUED | OUTPATIENT
Start: 2019-01-17 | End: 2019-01-17 | Stop reason: HOSPADM

## 2019-01-17 RX ORDER — DIPHENHYDRAMINE HYDROCHLORIDE 50 MG/ML
50 INJECTION INTRAMUSCULAR; INTRAVENOUS
Status: CANCELLED
Start: 2019-01-17

## 2019-01-17 RX ORDER — EPINEPHRINE 0.3 MG/.3ML
0.3 INJECTION SUBCUTANEOUS EVERY 5 MIN PRN
Status: CANCELLED | OUTPATIENT
Start: 2019-01-17

## 2019-01-17 RX ORDER — MEPERIDINE HYDROCHLORIDE 25 MG/ML
25 INJECTION INTRAMUSCULAR; INTRAVENOUS; SUBCUTANEOUS EVERY 30 MIN PRN
Status: CANCELLED | OUTPATIENT
Start: 2019-01-17

## 2019-01-17 RX ORDER — ONDANSETRON 2 MG/ML
8 INJECTION INTRAMUSCULAR; INTRAVENOUS ONCE
Status: CANCELLED
Start: 2019-01-17 | End: 2019-01-17

## 2019-01-17 RX ORDER — ONDANSETRON 2 MG/ML
8 INJECTION INTRAMUSCULAR; INTRAVENOUS ONCE
Status: COMPLETED | OUTPATIENT
Start: 2019-01-17 | End: 2019-01-17

## 2019-01-17 RX ORDER — LORAZEPAM 2 MG/ML
0.5 INJECTION INTRAMUSCULAR EVERY 4 HOURS PRN
Status: CANCELLED
Start: 2019-01-17

## 2019-01-17 RX ORDER — ALBUTEROL SULFATE 90 UG/1
1-2 AEROSOL, METERED RESPIRATORY (INHALATION)
Status: CANCELLED
Start: 2019-01-17

## 2019-01-17 RX ADMIN — Medication 500 UNITS: at 15:40

## 2019-01-17 RX ADMIN — ONDANSETRON 8 MG: 2 INJECTION INTRAMUSCULAR; INTRAVENOUS at 12:29

## 2019-01-17 RX ADMIN — OXALIPLATIN 200 MG: 5 INJECTION, SOLUTION, CONCENTRATE INTRAVENOUS at 12:52

## 2019-01-17 RX ADMIN — SODIUM CHLORIDE 150 MG: 9 INJECTION, SOLUTION INTRAVENOUS at 11:57

## 2019-01-17 RX ADMIN — DEXTROSE MONOHYDRATE 250 ML: 50 INJECTION, SOLUTION INTRAVENOUS at 11:53

## 2019-01-17 ASSESSMENT — PAIN SCALES - GENERAL: PAINLEVEL: NO PAIN (0)

## 2019-01-17 NOTE — PROGRESS NOTES
FOLLOW-UP VISIT NOTE    PATIENT NAME: Niharika Cason MRN # [id number]  DATE OF VISIT: Jan 17, 2019 YOB: 1955    REFERRING PROVIDER: Liliana Urbina MD  [address] [place] [id number]    CANCER TYPE:Recatl adenocarcinoma  STAGE: cT3cN1 M0  ECOG PS: 1    ONCOLOGY HISTORY:    Niharika Cason is a 63-year-old female with past medical history significant for hypertension, diabetes mellitus type 1, anxiety underwent her first screening colonoscopy on 09/17/18 which revealed a partially obstructing mass in the rectosigmoid colon  Which was biopsied and came back as invasive moderately differentiated adenocarcinoma but intact mismatch repair proteins. Staging CT scans showed a 3 cm enhancing lesion in the right lobe of liver he subsequently had an MRI done which characterized a liver lesion as well as VOCAL lobular hyperplasia with no suspected metastatic disease. MRI of pelvis showed an irregular ulcerated left rectal wall mass at 9 cm from the anal verge extending to the muscularis propria into the perirectal fat. Also noted were suspicious perirectal and presacral lymph nodes -clinical stage T3cN1 M0. She met with medical and surgical oncology and the recommendation was to proceed with total neoadjuvant therapy.      10/25/18  Started XELOX chemotherapy    12/06//18 Upon completion of oxaliplatin dose, patient complained of tingling in extremities and buttock region along with Blurred vision. IV Solu-Medrol was given and symptoms improved within half an hour. Oxaliplatin dsoe reduced with subsequent cycles    12/21/18 CT scans after 4 cycles shows stable findings with no concerns for progression/new metastasis      SUBJECTIVE     Sense to the clinic today to proceed with cycle 5 of chemotherapy. Tolerated the last chemotherapy well with no active complaints today. Denies visual symptoms, nausea/vomiting, diarrhea, mouth sores, fever/chills.. Cold Induced neuropathy resolves within few days     PAST MEDICAL HISTORY     Past Medical History:   Diagnosis Date     Essential hypertension, benign      Generalized anxiety disorder      Malignant neoplasm of rectum (H) 10/15/2018     Rectal cancer (H) 10/15/2018     Type I (juvenile type) diabetes mellitus without mention of complication, not stated as uncontrolled     diagnosed at age 35     Ulcerative colitis, unspecified     in the 70s.           CURRENT OUTPATIENT MEDICATIONS     Current Outpatient Medications   Medication Sig Dispense Refill     atenolol (TENORMIN) 25 MG tablet Take 1 tablet (25 mg) by mouth daily 93 tablet 3     BASAGLAR 100 UNIT/ML injection Inject 50 Units Subcutaneous daily 15 mL 3     blood glucose (CONTOUR NEXT TEST) test strip Use to test blood sugar 4 times daily or as directed. 400 each 3     blood glucose calibration (CONTOUR NEXT CONTROL NORMAL VI SOLN) Normal solution Use to calibrate blood glucose monitor as needed as directed. 1 each 11     blood glucose monitoring ([employer] MICROLET) lancets Use to test blood sugar 4 times daily or as directed. 400 each 3     blood glucose monitoring (CONTOUR NEXT MONITOR W/DEVICE KIT) meter device kit Use to test blood sugar 4 times daily or as directed. 1 kit 0     capecitabine (XELODA) 500 MG tablet CHEMO Take 4 tablets (2,000 mg) by mouth 2 times daily for 14 days Take on Days 1 through 14, then off for 7 days. 112 tablet 0     hydrochlorothiazide (MICROZIDE) 12.5 MG capsule Take 1 capsule (12.5 mg) by mouth daily 93 capsule 3     insulin lispro (HUMALOG KWIKPEN) 100 UNIT/ML injection Use 3 units before breakfast, 6 units before lunch, and 16 units before dinner, plus correction of 1 unit per every 50 glucose over 125, averaging 3 for breakfast, 6 for lunch, 16 for dinner. 30 mL 3     Insulin Pen Needle 33G X 4 MM MISC 1 each daily Use 4 needles per day 200 each prn     lidocaine-prilocaine (EMLA) cream Apply 1 g topically as needed for moderate pain 30 g 3     lisinopril (PRINIVIL/ZESTRIL)  40 MG tablet Take 1 tablet (40 mg) by mouth daily 93 tablet 3     loperamide (IMODIUM) 2 MG capsule Start with 2 caps (4 mg), then take one cap (2 mg) after each diarrheal stool as needed. Do not use more than 8 caps (16 mg) per day. 30 capsule 0     LORazepam (ATIVAN) 0.5 MG tablet Take 1 tablet (0.5 mg) by mouth every 4 hours as needed (Anxiety, Nausea/Vomiting or Sleep) 30 tablet 2     ondansetron (ZOFRAN-ODT) 8 MG ODT tab Take 1 tablet (8 mg) by mouth every 8 hours as needed for nausea 60 tablet 3     PARoxetine (PAXIL) 30 MG tablet Take 1 tablet (30 mg) by mouth every morning 93 tablet 3     prochlorperazine (COMPAZINE) 10 MG tablet Take 1 tablet (10 mg) by mouth every 6 hours as needed (Nausea/Vomiting) 30 tablet 2     simvastatin (ZOCOR) 10 MG tablet TAKE ONE TABLET BY MOUTH EVERY NIGHT AT BEDTIME 93 tablet 3     capecitabine (XELODA) 500 MG tablet CHEMO Take 4 tablets (2,000 mg) by mouth 2 times daily for 14 days Take on Days 1 through 14, then off for 7 days. 112 tablet 0        ALLERGIES     Allergies   Allergen Reactions     No Known Drug Allergies         REVIEW OF SYSTEMS   As above in the HPI, o/w complete 12-point ROS was negative.     PHYSICAL EXAM   B/P: Data Unavailable, T: Data Unavailable, P: Data Unavailable, R: Data Unavailable  SpO2 Readings from Last 4 Encounters:   11/15/18 95%   11/15/18 96%   11/02/18 96%   10/25/18 96%     Wt Readings from Last 3 Encounters:   01/17/19 77.8 kg (171 lb 8 oz)   12/27/18 78.8 kg (173 lb 11.2 oz)   12/24/18 78.5 kg (173 lb)     GEN: NAD  Mouth/ENT: Oropharynx is clear.  NECK: no  lymphadenopathy  LUNGS: clear bilaterally  CV: regular, no murmurs, rubs, or gallops  ABDOMEN: soft, non-tender, non-distended,  EXT: warm, well perfused, no edema  NEURO: alert  SKIN: no rashes     LABORATORY AND IMAGING STUDIES     Lab Results   Component Value Date    WBC 4.6 01/17/2019     Lab Results   Component Value Date    RBC 3.29 01/17/2019     Lab Results   Component Value  Date    HGB 10.9 01/17/2019     Lab Results   Component Value Date    HCT 32.4 01/17/2019     No components found for: MCT  Lab Results   Component Value Date    MCV 99 01/17/2019     Lab Results   Component Value Date    MCH 33.1 01/17/2019     Lab Results   Component Value Date    MCHC 33.6 01/17/2019     Lab Results   Component Value Date    RDW 20.3 01/17/2019     Lab Results   Component Value Date     01/17/2019     Recent Labs   Lab Test 01/17/19  1041 12/27/18  1015    142   POTASSIUM 3.8 3.3*   CHLORIDE 106 105   CO2 29 31   ANIONGAP 5 6   GLC 49* 56*   BUN 14 15   CR 0.58 0.62   JUNG 8.5 8.2*     Component      Latest Ref Rng & Units 12/27/2018   CEA      0 - 2.5 ug/L 4.2 (H)      ASSESSMENT AND PLAN     63-year-old female with past medical history significant for hypertension, diabetes mellitus type 1, anxiety on a screening colonoscopy was diagnosed with rectal adenocarcinoma     - Rectal adenocarcinoma- T3cN1 M0 intact mismatch repair protein  She met with medical and surgical oncology at [facility] and the recommendation was to proceed with total neoadjuvant therapy.  1. 6 cycles of XELOX, then:   2. 2 months of CXRT.   3. Surgery after 6-8 weeks.     - Surveillance for response to therapy [id number] CT c/a/p and Flex Sig at completion of chemotherapy and then prior to the surgery.      10/25/18 Started XELOX chemotherapy.    CT scans after 4 cycles shows stable findings with no concerns for progression/new metastasis  Will continue the plan of total neoadjuvant therapy and proceed with cycle 5 of XELOX today with dose reduced oxaliplatin at 100 mg/m2 given neurological toxicity with higher dose     - Cold sensitivity  Secondary to oxaliplatin use     - Diabetes mellitus  On an insulin regimen and managed by primary care provider      - Anxiety  Continue with paxil  Ativan prn         RTC in  3 weeks for office visit, labs,next course of chemotherapy.       Chart documentation with  [employer] Voice recognition Software. Although reviewed after completion, some words and grammatical errors may remain.  Dayron Lawton MD  Attending Physician   Hematology/Medical Oncology

## 2019-01-17 NOTE — PATIENT INSTRUCTIONS
Today:  Continue with treatment     Please follow up with Dr. Lawton in 3 weeks with port lab and treatment.      Port ab Date/Time:    Office visit follow up with Dr. Lawton Date/Time:     Infusion to follow at:    If you have any questions or concerns please feel free to call.    If you need to reschedule please call:  Clinic or Lab Appointment - [phone]  Infusion - [phone]  Imaging - [phone]    Twin Ch, RN, BSN, OCN  [facility]   Oncology/Hematology Care Coordinator RN  [facility]  [phone]

## 2019-01-17 NOTE — PROGRESS NOTES
Infusion Nursing Note:  Niharika Cason presents today for C5D1 Oxaliplatin.    Patient seen by provider today: Yes: DR. Lawton   present during visit today: Not Applicable.    Note: Arrived with daughter to IVO.    Intravenous Access:  Labs drawn without difficulty.  Implanted Port.    Treatment Conditions:  Lab Results   Component Value Date    HGB 10.9 01/17/2019     Lab Results   Component Value Date    WBC 4.6 01/17/2019      Lab Results   Component Value Date    ANEU 1.8 01/17/2019     Lab Results   Component Value Date     01/17/2019      Lab Results   Component Value Date     01/17/2019                   Lab Results   Component Value Date    POTASSIUM 3.8 01/17/2019           Lab Results   Component Value Date    MAG 1.9 02/27/2004            Lab Results   Component Value Date    CR 0.58 01/17/2019                   Lab Results   Component Value Date    JUNG 8.5 01/17/2019                Lab Results   Component Value Date    BILITOTAL 0.7 01/17/2019           Lab Results   Component Value Date    ALBUMIN 3.9 01/17/2019                    Lab Results   Component Value Date    ALT 33 01/17/2019           Lab Results   Component Value Date    AST 34 01/17/2019       Results reviewed, labs MET treatment parameters, ok to proceed with treatment.      Post Infusion Assessment:  Patient tolerated infusion without incident.  Patient observed for 15 minutes post CHEMO per protocol.  Blood return noted pre and post infusion.  Site patent and intact, free from redness, edema or discomfort.  No evidence of extravasations.  Access discontinued per protocol.    Discharge Plan:   Discharge instructions reviewed with: Patient and Family.  Patient and/or family verbalized understanding of discharge instructions and all questions answered.  Copy of AVS reviewed with patient and/or family.  Patient will return 1 week for IVF  for next appointment.  Patient discharged in stable condition accompanied by:  self and daughter.  Departure Mode: Ambulatory.    Marizol Maradiaga RN

## 2019-01-17 NOTE — NURSING NOTE
"Oncology Rooming Note    January 17, 2019 10:55 AM   Niharika Cason is a 63 year old female who presents for:    Chief Complaint   Patient presents with     Oncology Clinic Visit     3 week follow up- Rectal adenocarcinoma- T3cN1 M0 intact mismatch repair protein     Chemotherapy     D1C5 Eloxatin     Oral Chemotherapy Management     capecitabine (XELODA) 500 MG tablet CHEMO     Results     Lab work today     Initial Vitals: /54   Pulse 77   Temp 97.3  F (36.3  C) (Temporal)   Resp 16   Wt 77.8 kg (171 lb 8 oz)   LMP 10/15/2008   SpO2 96%   BMI 29.44 kg/m   Estimated body mass index is 29.44 kg/m  as calculated from the following:    Height as of 12/27/18: 1.626 m (5' 4\").    Weight as of this encounter: 77.8 kg (171 lb 8 oz). Body surface area is 1.87 meters squared.  No Pain (0) Comment: Data Unavailable   Patient's last menstrual period was 10/15/2008.  Allergies reviewed: Yes  Medications reviewed: Yes    Medications: Medication refills not needed today.  Pharmacy name entered into [facility]:    [facility] - [place], [place] - [address] [facility] - [place], [place] - [address]     No concerns.    4 minutes for nursing intake (face to face time)     Roxie FLOOD CMA              "

## 2019-01-17 NOTE — Clinical Note
1/17/2019         RE: Niharika Cason  [address] [facility] [address]        Dear Colleague,    Thank you for referring your patient, Niharika Cason, to the [facility]. Please see a copy of my visit note below.      FOLLOW-UP VISIT NOTE    PATIENT NAME: Niharika Cason MRN # [id number]  DATE OF VISIT: Jan 17, 2019 YOB: 1955    REFERRING PROVIDER: Liliana Urbina MD  [address] [facility] [address] [place] [id number]    CANCER TYPE:Recatl adenocarcinoma  STAGE: cT3cN1 M0  ECOG PS: 1    ONCOLOGY HISTORY:    Niharika Cason is a 63-year-old female with past medical history significant for hypertension, diabetes mellitus type 1, anxiety underwent her first screening colonoscopy on 09/17/18 which revealed a partially obstructing mass in the rectosigmoid colon  Which was biopsied and came back as invasive moderately differentiated adenocarcinoma but intact mismatch repair proteins. Staging CT scans showed a 3 cm enhancing lesion in the right lobe of liver he subsequently had an MRI done which characterized a liver lesion as well as VOCAL lobular hyperplasia with no suspected metastatic disease. MRI of pelvis showed an irregular ulcerated left rectal wall mass at 9 cm from the anal verge extending to the muscularis propria into the perirectal fat. Also noted were suspicious perirectal and presacral lymph nodes -clinical stage T3cN1 M0. She met with medical and surgical oncology and the recommendation was to proceed with total neoadjuvant therapy.      10/25/18  Started XELOX chemotherapy    12/06//18 Upon completion of oxaliplatin dose, patient complained of tingling in extremities and buttock region along with Blurred vision. IV Solu-Medrol was given and symptoms improved within half an hour. Oxaliplatin dsoe reduced with subsequent cycles    12/21/18 CT scans after 4 cycles shows stable findings with no concerns for progression/new metastasis      SUBJECTIVE     Sense to the clinic today  to proceed with cycle 5 of chemotherapy. Tolerated the last chemotherapy well with no active complaints today. Denies visual symptoms, nausea/vomiting, diarrhea, mouth sores, fever/chills.. Cold Induced neuropathy resolves within few days    PAST MEDICAL HISTORY     Past Medical History:   Diagnosis Date     Essential hypertension, benign      Generalized anxiety disorder      Malignant neoplasm of rectum (H) 10/15/2018     Rectal cancer (H) 10/15/2018     Type I (juvenile type) diabetes mellitus without mention of complication, not stated as uncontrolled     diagnosed at age 35     Ulcerative colitis, unspecified     in the 70s.           CURRENT OUTPATIENT MEDICATIONS     Current Outpatient Medications   Medication Sig Dispense Refill     atenolol (TENORMIN) 25 MG tablet Take 1 tablet (25 mg) by mouth daily 93 tablet 3     BASAGLAR 100 UNIT/ML injection Inject 50 Units Subcutaneous daily 15 mL 3     blood glucose (CONTOUR NEXT TEST) test strip Use to test blood sugar 4 times daily or as directed. 400 each 3     blood glucose calibration (CONTOUR NEXT CONTROL NORMAL VI SOLN) Normal solution Use to calibrate blood glucose monitor as needed as directed. 1 each 11     blood glucose monitoring (MARICEL MICROLET) lancets Use to test blood sugar 4 times daily or as directed. 400 each 3     blood glucose monitoring (CONTOUR NEXT MONITOR W/DEVICE KIT) meter device kit Use to test blood sugar 4 times daily or as directed. 1 kit 0     capecitabine (XELODA) 500 MG tablet CHEMO Take 4 tablets (2,000 mg) by mouth 2 times daily for 14 days Take on Days 1 through 14, then off for 7 days. 112 tablet 0     hydrochlorothiazide (MICROZIDE) 12.5 MG capsule Take 1 capsule (12.5 mg) by mouth daily 93 capsule 3     insulin lispro (HUMALOG KWIKPEN) 100 UNIT/ML injection Use 3 units before breakfast, 6 units before lunch, and 16 units before dinner, plus correction of 1 unit per every 50 glucose over 125, averaging 3 for breakfast, 6 for  lunch, 16 for dinner. 30 mL 3     Insulin Pen Needle 33G X 4 MM MISC 1 each daily Use 4 needles per day 200 each prn     lidocaine-prilocaine (EMLA) cream Apply 1 g topically as needed for moderate pain 30 g 3     lisinopril (PRINIVIL/ZESTRIL) 40 MG tablet Take 1 tablet (40 mg) by mouth daily 93 tablet 3     loperamide (IMODIUM) 2 MG capsule Start with 2 caps (4 mg), then take one cap (2 mg) after each diarrheal stool as needed. Do not use more than 8 caps (16 mg) per day. 30 capsule 0     LORazepam (ATIVAN) 0.5 MG tablet Take 1 tablet (0.5 mg) by mouth every 4 hours as needed (Anxiety, Nausea/Vomiting or Sleep) 30 tablet 2     ondansetron (ZOFRAN-ODT) 8 MG ODT tab Take 1 tablet (8 mg) by mouth every 8 hours as needed for nausea 60 tablet 3     PARoxetine (PAXIL) 30 MG tablet Take 1 tablet (30 mg) by mouth every morning 93 tablet 3     prochlorperazine (COMPAZINE) 10 MG tablet Take 1 tablet (10 mg) by mouth every 6 hours as needed (Nausea/Vomiting) 30 tablet 2     simvastatin (ZOCOR) 10 MG tablet TAKE ONE TABLET BY MOUTH EVERY NIGHT AT BEDTIME 93 tablet 3     capecitabine (XELODA) 500 MG tablet CHEMO Take 4 tablets (2,000 mg) by mouth 2 times daily for 14 days Take on Days 1 through 14, then off for 7 days. 112 tablet 0        ALLERGIES     Allergies   Allergen Reactions     No Known Drug Allergies         REVIEW OF SYSTEMS   As above in the HPI, o/w complete 12-point ROS was negative.     PHYSICAL EXAM   B/P: Data Unavailable, T: Data Unavailable, P: Data Unavailable, R: Data Unavailable  SpO2 Readings from Last 4 Encounters:   11/15/18 95%   11/15/18 96%   11/02/18 96%   10/25/18 96%     Wt Readings from Last 3 Encounters:   01/17/19 77.8 kg (171 lb 8 oz)   12/27/18 78.8 kg (173 lb 11.2 oz)   12/24/18 78.5 kg (173 lb)     GEN: NAD  Mouth/ENT: Oropharynx is clear.  NECK: no  lymphadenopathy  LUNGS: clear bilaterally  CV: regular, no murmurs, rubs, or gallops  ABDOMEN: soft, non-tender, non-distended,  EXT: warm,  well perfused, no edema  NEURO: alert  SKIN: no rashes     LABORATORY AND IMAGING STUDIES     Lab Results   Component Value Date    WBC 4.6 01/17/2019     Lab Results   Component Value Date    RBC 3.29 01/17/2019     Lab Results   Component Value Date    HGB 10.9 01/17/2019     Lab Results   Component Value Date    HCT 32.4 01/17/2019     No components found for: MCT  Lab Results   Component Value Date    MCV 99 01/17/2019     Lab Results   Component Value Date    MCH 33.1 01/17/2019     Lab Results   Component Value Date    MCHC 33.6 01/17/2019     Lab Results   Component Value Date    RDW 20.3 01/17/2019     Lab Results   Component Value Date     01/17/2019     Recent Labs   Lab Test 01/17/19  1041 12/27/18  1015    142   POTASSIUM 3.8 3.3*   CHLORIDE 106 105   CO2 29 31   ANIONGAP 5 6   GLC 49* 56*   BUN 14 15   CR 0.58 0.62   JUNG 8.5 8.2*     Component      Latest Ref Rng & Units 12/27/2018   CEA      0 - 2.5 ug/L 4.2 (H)      ASSESSMENT AND PLAN     63-year-old female with past medical history significant for hypertension, diabetes mellitus type 1, anxiety on a screening colonoscopy was diagnosed with rectal adenocarcinoma     - Rectal adenocarcinoma- T3cN1 M0 intact mismatch repair protein  She met with medical and surgical oncology at [facility] and the recommendation was to proceed with total neoadjuvant therapy.  1. 6 cycles of XELOX, then:   2. 2 months of CXRT.   3. Surgery after 6-8 weeks.     - Surveillance for response to therapy [id number] CT c/a/p and Flex Sig at completion of chemotherapy and then prior to the surgery.      10/25/18 Started XELOX chemotherapy.    CT scans after 4 cycles shows stable findings with no concerns for progression/new metastasis  Will continue the plan of total neoadjuvant therapy and proceed with cycle 5 of XELOX today with dose reduced oxaliplatin at 100 mg/m2 given neurological toxicity with higher dose     - Cold sensitivity  Secondary to  oxaliplatin use     - Diabetes mellitus  On an insulin regimen and managed by primary care provider      - Anxiety  Continue with paxil  Ativan prn         RTC in  3 weeks for office visit, labs,next course of chemotherapy.       Chart documentation with Dragon Voice recognition Software. Although reviewed after completion, some words and grammatical errors may remain.  Dayron Lawton MD  Attending Physician   Hematology/Medical Oncology          Again, thank you for allowing me to participate in the care of your patient.        Sincerely,        Dayron Lawton MD

## 2019-01-27 DIAGNOSIS — C20 RECTAL CANCER (H): Primary | ICD-10-CM

## 2019-01-27 DIAGNOSIS — C20 RECTAL ADENOCARCINOMA (H): ICD-10-CM

## 2019-01-28 RX ORDER — CAPECITABINE 500 MG/1
1000 TABLET, FILM COATED ORAL 2 TIMES DAILY
Qty: 112 TABLET | Refills: 0 | Status: SHIPPED | OUTPATIENT
Start: 2019-01-28 | End: 2019-03-06

## 2019-01-28 NOTE — TELEPHONE ENCOUNTER
Calling patient to see how she is doing and how her blood pressures have been. Does she still want to discuss lowering your dose? Can she schedule a follow up appointment with Leona Farris MD?  LM for patient to call [phone]   Chasity Limon CMA

## 2019-01-29 NOTE — TELEPHONE ENCOUNTER
Spoke with pt and she states she is feeling good and her BP has since returned to normal range. Pt states they were thinking it may have been from the infusions and not drinking enough. Pt will keep us updated and let us know if she has any more concern. Ninoska Alba, CMA

## 2019-02-07 ENCOUNTER — INFUSION THERAPY VISIT (OUTPATIENT)
Dept: INFUSION THERAPY | Facility: CLINIC | Age: 64
End: 2019-02-07
Attending: INTERNAL MEDICINE
Payer: COMMERCIAL

## 2019-02-07 ENCOUNTER — PRE VISIT (OUTPATIENT)
Dept: RADIATION ONCOLOGY | Facility: CLINIC | Age: 64
End: 2019-02-07

## 2019-02-07 ENCOUNTER — TELEPHONE (OUTPATIENT)
Dept: SURGERY | Facility: CLINIC | Age: 64
End: 2019-02-07

## 2019-02-07 ENCOUNTER — HOSPITAL ENCOUNTER (EMERGENCY)
Facility: CLINIC | Age: 64
Discharge: HOME | End: 2019-02-07
Attending: EMERGENCY MEDICINE | Admitting: EMERGENCY MEDICINE
Payer: COMMERCIAL

## 2019-02-07 ENCOUNTER — ONCOLOGY VISIT (OUTPATIENT)
Dept: ONCOLOGY | Facility: CLINIC | Age: 64
End: 2019-02-07
Payer: COMMERCIAL

## 2019-02-07 ENCOUNTER — TELEPHONE (OUTPATIENT)
Dept: ONCOLOGY | Facility: CLINIC | Age: 64
End: 2019-02-07

## 2019-02-07 VITALS
TEMPERATURE: 98.5 F | RESPIRATION RATE: 16 BRPM | HEART RATE: 70 BPM | OXYGEN SATURATION: 97 % | BODY MASS INDEX: 29.77 KG/M2 | HEIGHT: 63 IN | WEIGHT: 168 LBS | SYSTOLIC BLOOD PRESSURE: 123 MMHG | DIASTOLIC BLOOD PRESSURE: 56 MMHG

## 2019-02-07 VITALS — HEART RATE: 62 BPM | SYSTOLIC BLOOD PRESSURE: 108 MMHG | DIASTOLIC BLOOD PRESSURE: 42 MMHG

## 2019-02-07 VITALS
WEIGHT: 168.5 LBS | OXYGEN SATURATION: 96 % | DIASTOLIC BLOOD PRESSURE: 62 MMHG | RESPIRATION RATE: 18 BRPM | TEMPERATURE: 97.5 F | HEIGHT: 64 IN | BODY MASS INDEX: 28.77 KG/M2 | SYSTOLIC BLOOD PRESSURE: 108 MMHG | HEART RATE: 80 BPM

## 2019-02-07 DIAGNOSIS — C20 RECTAL ADENOCARCINOMA (H): ICD-10-CM

## 2019-02-07 DIAGNOSIS — C20 RECTAL CANCER (H): Primary | ICD-10-CM

## 2019-02-07 DIAGNOSIS — J38.5 LARYNGEAL SPASM: ICD-10-CM

## 2019-02-07 LAB
ALBUMIN SERPL-MCNC: 3.6 G/DL (ref 3.4–5)
ALP SERPL-CCNC: 93 U/L (ref 40–150)
ALT SERPL W P-5'-P-CCNC: 34 U/L (ref 0–50)
ANION GAP SERPL CALCULATED.3IONS-SCNC: 6 MMOL/L (ref 3–14)
AST SERPL W P-5'-P-CCNC: 31 U/L (ref 0–45)
BASOPHILS # BLD AUTO: 0 10E9/L (ref 0–0.2)
BASOPHILS NFR BLD AUTO: 0.4 %
BILIRUB SERPL-MCNC: 0.5 MG/DL (ref 0.2–1.3)
BUN SERPL-MCNC: 13 MG/DL (ref 7–30)
CALCIUM SERPL-MCNC: 8.5 MG/DL (ref 8.5–10.1)
CEA SERPL-MCNC: 3.7 UG/L (ref 0–2.5)
CHLORIDE SERPL-SCNC: 105 MMOL/L (ref 94–109)
CO2 SERPL-SCNC: 29 MMOL/L (ref 20–32)
CREAT SERPL-MCNC: 0.64 MG/DL (ref 0.52–1.04)
DIFFERENTIAL METHOD BLD: ABNORMAL
EOSINOPHIL NFR BLD AUTO: 2.4 %
ERYTHROCYTE [DISTWIDTH] IN BLOOD BY AUTOMATED COUNT: 18.8 % (ref 10–15)
GFR SERPL CREATININE-BSD FRML MDRD: >90 ML/MIN/{1.73_M2}
GLUCOSE BLDC GLUCOMTR-MCNC: 230 MG/DL (ref 70–99)
GLUCOSE BLDC GLUCOMTR-MCNC: 239 MG/DL (ref 70–99)
GLUCOSE SERPL-MCNC: 242 MG/DL (ref 70–99)
HCT VFR BLD AUTO: 31.6 % (ref 35–47)
HGB BLD-MCNC: 10.8 G/DL (ref 11.7–15.7)
IMM GRANULOCYTES # BLD: 0 10E9/L (ref 0–0.4)
IMM GRANULOCYTES NFR BLD: 0.4 %
LYMPHOCYTES # BLD AUTO: 1.5 10E9/L (ref 0.8–5.3)
LYMPHOCYTES NFR BLD AUTO: 32.8 %
MCH RBC QN AUTO: 34.3 PG (ref 26.5–33)
MCHC RBC AUTO-ENTMCNC: 34.2 G/DL (ref 31.5–36.5)
MCV RBC AUTO: 100 FL (ref 78–100)
MONOCYTES # BLD AUTO: 0.6 10E9/L (ref 0–1.3)
MONOCYTES NFR BLD AUTO: 12.2 %
NEUTROPHILS # BLD AUTO: 2.3 10E9/L (ref 1.6–8.3)
NEUTROPHILS NFR BLD AUTO: 51.8 %
NRBC # BLD AUTO: 0 10*3/UL
NRBC BLD AUTO-RTO: 0 /100
PLATELET # BLD AUTO: 158 10E9/L (ref 150–450)
POTASSIUM SERPL-SCNC: 3.9 MMOL/L (ref 3.4–5.3)
PROT SERPL-MCNC: 6.4 G/DL (ref 6.8–8.8)
RBC # BLD AUTO: 3.15 10E12/L (ref 3.8–5.2)
SODIUM SERPL-SCNC: 140 MMOL/L (ref 133–144)
WBC # BLD AUTO: 4.5 10E9/L (ref 4–11)

## 2019-02-07 PROCEDURE — 25000128 H RX IP 250 OP 636: Performed by: INTERNAL MEDICINE

## 2019-02-07 PROCEDURE — 25000128 H RX IP 250 OP 636

## 2019-02-07 PROCEDURE — 85025 COMPLETE CBC W/AUTO DIFF WBC: CPT | Performed by: INTERNAL MEDICINE

## 2019-02-07 PROCEDURE — 99283 EMERGENCY DEPT VISIT LOW MDM: CPT | Performed by: EMERGENCY MEDICINE

## 2019-02-07 PROCEDURE — 00000146 ZZHCL STATISTIC GLUCOSE BY METER IP

## 2019-02-07 PROCEDURE — 96375 TX/PRO/DX INJ NEW DRUG ADDON: CPT

## 2019-02-07 PROCEDURE — 96413 CHEMO IV INFUSION 1 HR: CPT

## 2019-02-07 PROCEDURE — 25000125 ZZHC RX 250

## 2019-02-07 PROCEDURE — 99283 EMERGENCY DEPT VISIT LOW MDM: CPT | Mod: Z6 | Performed by: EMERGENCY MEDICINE

## 2019-02-07 PROCEDURE — 96367 TX/PROPH/DG ADDL SEQ IV INF: CPT

## 2019-02-07 PROCEDURE — 80053 COMPREHEN METABOLIC PANEL: CPT | Performed by: INTERNAL MEDICINE

## 2019-02-07 PROCEDURE — 99215 OFFICE O/P EST HI 40 MIN: CPT | Performed by: INTERNAL MEDICINE

## 2019-02-07 PROCEDURE — 96415 CHEMO IV INFUSION ADDL HR: CPT

## 2019-02-07 PROCEDURE — 82378 CARCINOEMBRYONIC ANTIGEN: CPT | Performed by: INTERNAL MEDICINE

## 2019-02-07 RX ORDER — DIPHENHYDRAMINE HYDROCHLORIDE 50 MG/ML
50 INJECTION INTRAMUSCULAR; INTRAVENOUS
Status: CANCELLED
Start: 2019-02-07

## 2019-02-07 RX ORDER — EPINEPHRINE 1 MG/ML
0.3 INJECTION, SOLUTION, CONCENTRATE INTRAVENOUS EVERY 5 MIN PRN
Status: CANCELLED | OUTPATIENT
Start: 2019-02-07

## 2019-02-07 RX ORDER — HEPARIN SODIUM (PORCINE) LOCK FLUSH IV SOLN 100 UNIT/ML 100 UNIT/ML
500 SOLUTION INTRAVENOUS EVERY 8 HOURS
Status: CANCELLED
Start: 2019-02-07

## 2019-02-07 RX ORDER — ONDANSETRON 2 MG/ML
8 INJECTION INTRAMUSCULAR; INTRAVENOUS ONCE
Status: DISCONTINUED | OUTPATIENT
Start: 2019-02-07 | End: 2019-02-07 | Stop reason: CLARIF

## 2019-02-07 RX ORDER — HEPARIN SODIUM (PORCINE) LOCK FLUSH IV SOLN 100 UNIT/ML 100 UNIT/ML
500 SOLUTION INTRAVENOUS EVERY 8 HOURS
Status: DISCONTINUED | OUTPATIENT
Start: 2019-02-07 | End: 2019-02-07 | Stop reason: HOSPADM

## 2019-02-07 RX ORDER — MEPERIDINE HYDROCHLORIDE 25 MG/ML
25 INJECTION INTRAMUSCULAR; INTRAVENOUS; SUBCUTANEOUS EVERY 30 MIN PRN
Status: CANCELLED | OUTPATIENT
Start: 2019-02-07

## 2019-02-07 RX ORDER — ALBUTEROL SULFATE 90 UG/1
1-2 AEROSOL, METERED RESPIRATORY (INHALATION)
Status: CANCELLED
Start: 2019-02-07

## 2019-02-07 RX ORDER — EPINEPHRINE 0.3 MG/.3ML
0.3 INJECTION SUBCUTANEOUS EVERY 5 MIN PRN
Status: CANCELLED | OUTPATIENT
Start: 2019-02-07

## 2019-02-07 RX ORDER — ALBUTEROL SULFATE 0.83 MG/ML
2.5 SOLUTION RESPIRATORY (INHALATION)
Status: CANCELLED | OUTPATIENT
Start: 2019-02-07

## 2019-02-07 RX ORDER — LORAZEPAM 2 MG/ML
0.5 INJECTION INTRAMUSCULAR EVERY 4 HOURS PRN
Status: CANCELLED
Start: 2019-02-07

## 2019-02-07 RX ORDER — METHYLPREDNISOLONE SODIUM SUCCINATE 125 MG/2ML
125 INJECTION, POWDER, LYOPHILIZED, FOR SOLUTION INTRAMUSCULAR; INTRAVENOUS
Status: CANCELLED
Start: 2019-02-07

## 2019-02-07 RX ORDER — SODIUM CHLORIDE 9 MG/ML
1000 INJECTION, SOLUTION INTRAVENOUS CONTINUOUS PRN
Status: CANCELLED
Start: 2019-02-07

## 2019-02-07 RX ADMIN — FAMOTIDINE 20 MG: 10 INJECTION, SOLUTION INTRAVENOUS at 09:34

## 2019-02-07 RX ADMIN — ONDANSETRON 8 MG: 2 INJECTION INTRAMUSCULAR; INTRAVENOUS at 10:12

## 2019-02-07 RX ADMIN — DEXTROSE MONOHYDRATE 250 ML: 50 INJECTION, SOLUTION INTRAVENOUS at 09:21

## 2019-02-07 RX ADMIN — Medication 500 UNITS: at 13:10

## 2019-02-07 RX ADMIN — OXALIPLATIN 200 MG: 5 INJECTION, SOLUTION, CONCENTRATE INTRAVENOUS at 10:45

## 2019-02-07 RX ADMIN — SODIUM CHLORIDE 150 MG: 9 INJECTION, SOLUTION INTRAVENOUS at 09:44

## 2019-02-07 ASSESSMENT — ENCOUNTER SYMPTOMS
SHORTNESS OF BREATH: 1
APPETITE CHANGE: 0
STRIDOR: 1
ACTIVITY CHANGE: 1
FATIGUE: 1
CHOKING: 0
COUGH: 0
WHEEZING: 0
FEVER: 0

## 2019-02-07 ASSESSMENT — MIFFLIN-ST. JEOR
SCORE: 1314.31
SCORE: 1296.17

## 2019-02-07 ASSESSMENT — PAIN SCALES - GENERAL: PAINLEVEL: NO PAIN (0)

## 2019-02-07 NOTE — PATIENT INSTRUCTIONS
"Today:  Continue with last chemotherapy.  Plan to start Radiation and Xeloda in 3-4 weeks.  Will have consult with [facility].  Follow up with Surgery after scans.  Scans in a couple weeks.    CT Scan Date/Time:  2/21/19 at 11:30 - Check in for port access at 10:30  Please see attached instructions for details.  Nothing to eat or drink for 2 hours prior to scan.  You will check in 30 minutes early to start a \"water prep\" prior to scan instead of oral contrast.  Radiology will call you with different instructions if oral contrast needed.    Radiation Oncology Consult Date/Time:  2/22/19 at 9:00 in [place] with Dr. Morris    Surgical Follow up Date/Time:  Will call you with date and time    Please follow up with Dr. Lawton in [place] with start or Radiation and Xeloda.      Office visit follow up with Dr. Lawton Date/Time:  3/4/19 at 11:15 - Arrival for labs at 10:30     If you have any questions or concerns please feel free to call.    If you need to reschedule please call:  Clinic or Lab Appointment - [phone]  Infusion - [phone]  Imaging - [phone]    Twin Ch RN, BSN, OCN  [facility]   Oncology/Hematology Care Coordinator RN  [facility]  [phone]            "

## 2019-02-07 NOTE — NURSING NOTE
Received call back from [facility] Colorectal surgery and they can see the patient 2/11/19 at 3:00.  Patient and  agree with time.  Patient will have a sig flex scope that day and no need to have CT prior.  Patient and  aware of plan.      Writing nurse did see patient while in ER.  Patient went to ER after infusion with lightheadedness.  ER was working with her BS at this time.  Admit BS was 239.  Will continue to follow up.      No questions or concerns at this time.    Twin Ch RN, BSN, OCN  2/7/2019, 2:41 PM

## 2019-02-07 NOTE — PROGRESS NOTES
FOLLOW-UP VISIT NOTE    PATIENT NAME: Niharika Cason MRN # [id number]  DATE OF VISIT: Feb 7, 2019 YOB: 1955    REFERRING PROVIDER: Liliana Urbina MD  [address] [place] [id number]    CANCER TYPE:Recatl adenocarcinoma  STAGE: cT3cN1 M0  ECOG PS: 1    ONCOLOGY HISTORY:    Niharika Cason is a 63-year-old female with past medical history significant for hypertension, diabetes mellitus type 1, anxiety underwent her first screening colonoscopy on 09/17/18 which revealed a partially obstructing mass in the rectosigmoid colon  Which was biopsied and came back as invasive moderately differentiated adenocarcinoma but intact mismatch repair proteins. Staging CT scans showed a 3 cm enhancing lesion in the right lobe of liver he subsequently had an MRI done which characterized a liver lesion as well as VOCAL lobular hyperplasia with no suspected metastatic disease. MRI of pelvis showed an irregular ulcerated left rectal wall mass at 9 cm from the anal verge extending to the muscularis propria into the perirectal fat. Also noted were suspicious perirectal and presacral lymph nodes -clinical stage T3cN1 M0. She met with medical and surgical oncology and the recommendation was to proceed with total neoadjuvant therapy.      10/25/18  Started XELOX chemotherapy    12/06//18 Upon completion of oxaliplatin dose, patient complained of tingling in extremities and buttock region along with Blurred vision. IV Solu-Medrol was given and symptoms improved within half an hour. Oxaliplatin dsoe reduced with subsequent cycles    12/21/18 CT scans after 4 cycles shows stable findings with no concerns for progression/new metastasis      SUBJECTIVE     Sense to the clinic today to proceed with cycle 6 of chemotherapy. Tolerated the last chemotherapy well with no active complaints today.  Denies visual symptoms, nausea/vomiting, diarrhea, mouth sores, fever/chills.. Cold Induced neuropathy resolves within few  days.      PAST MEDICAL HISTORY     Past Medical History:   Diagnosis Date     Essential hypertension, benign      Generalized anxiety disorder      Malignant neoplasm of rectum (H) 10/15/2018     Rectal cancer (H) 10/15/2018     Type I (juvenile type) diabetes mellitus without mention of complication, not stated as uncontrolled     diagnosed at age 35     Ulcerative colitis, unspecified     in the 70s.           CURRENT OUTPATIENT MEDICATIONS     Current Outpatient Medications   Medication Sig Dispense Refill     atenolol (TENORMIN) 25 MG tablet Take 1 tablet (25 mg) by mouth daily 93 tablet 3     BASAGLAR 100 UNIT/ML injection Inject 50 Units Subcutaneous daily 15 mL 3     blood glucose (CONTOUR NEXT TEST) test strip Use to test blood sugar 4 times daily or as directed. 400 each 3     blood glucose calibration (CONTOUR NEXT CONTROL NORMAL VI SOLN) Normal solution Use to calibrate blood glucose monitor as needed as directed. 1 each 11     blood glucose monitoring (ItsPlatonic MICROLET) lancets Use to test blood sugar 4 times daily or as directed. 400 each 3     blood glucose monitoring (CONTOUR NEXT MONITOR W/DEVICE KIT) meter device kit Use to test blood sugar 4 times daily or as directed. 1 kit 0     capecitabine (XELODA) 500 MG tablet CHEMO Take 4 tablets (2,000 mg) by mouth 2 times daily for 14 days Take on Days 1 through 14, then off for 7 days. 112 tablet 0     hydrochlorothiazide (MICROZIDE) 12.5 MG capsule Take 1 capsule (12.5 mg) by mouth daily 93 capsule 3     insulin lispro (HUMALOG KWIKPEN) 100 UNIT/ML injection Use 3 units before breakfast, 6 units before lunch, and 16 units before dinner, plus correction of 1 unit per every 50 glucose over 125, averaging 3 for breakfast, 6 for lunch, 16 for dinner. 30 mL 3     Insulin Pen Needle 33G X 4 MM MISC 1 each daily Use 4 needles per day 200 each prn     lidocaine-prilocaine (EMLA) cream Apply 1 g topically as needed for moderate pain 30 g 3     lisinopril  (PRINIVIL/ZESTRIL) 40 MG tablet Take 1 tablet (40 mg) by mouth daily 93 tablet 3     loperamide (IMODIUM) 2 MG capsule Start with 2 caps (4 mg), then take one cap (2 mg) after each diarrheal stool as needed. Do not use more than 8 caps (16 mg) per day. 30 capsule 0     LORazepam (ATIVAN) 0.5 MG tablet Take 1 tablet (0.5 mg) by mouth every 4 hours as needed (Anxiety, Nausea/Vomiting or Sleep) 30 tablet 2     ondansetron (ZOFRAN-ODT) 8 MG ODT tab Take 1 tablet (8 mg) by mouth every 8 hours as needed for nausea 60 tablet 3     PARoxetine (PAXIL) 30 MG tablet Take 1 tablet (30 mg) by mouth every morning 93 tablet 3     prochlorperazine (COMPAZINE) 10 MG tablet Take 1 tablet (10 mg) by mouth every 6 hours as needed (Nausea/Vomiting) 30 tablet 2     simvastatin (ZOCOR) 10 MG tablet TAKE ONE TABLET BY MOUTH EVERY NIGHT AT BEDTIME 93 tablet 3     capecitabine (XELODA) 500 MG tablet CHEMO Take 4 tablets (2,000 mg) by mouth 2 times daily for 14 days Take on Days 1 through 14, then off for 7 days. 112 tablet 0     capecitabine (XELODA) 500 MG tablet CHEMO Take 4 tablets (2,000 mg) by mouth 2 times daily for 14 days Take on Days 1 through 14, then off for 7 days. 112 tablet 0        ALLERGIES     Allergies   Allergen Reactions     No Known Drug Allergies         REVIEW OF SYSTEMS   As above in the HPI, o/w complete 12-point ROS was negative.     PHYSICAL EXAM   B/P: Data Unavailable, T: Data Unavailable, P: Data Unavailable, R: Data Unavailable  SpO2 Readings from Last 4 Encounters:   11/15/18 95%   11/15/18 96%   11/02/18 96%   10/25/18 96%     Wt Readings from Last 3 Encounters:   02/07/19 76.4 kg (168 lb 8 oz)   01/17/19 77.8 kg (171 lb 8 oz)   12/27/18 78.8 kg (173 lb 11.2 oz)     GEN: NAD  Mouth/ENT: Oropharynx is clear.  NECK: no  lymphadenopathy  LUNGS: clear bilaterally  CV: regular, no murmurs, rubs, or gallops  ABDOMEN: soft, non-tender, non-distended,  EXT: warm, well perfused, no edema  NEURO: alert  SKIN: no  shun     LABORATORY AND IMAGING STUDIES     Lab Results   Component Value Date    WBC 4.5 02/07/2019     Lab Results   Component Value Date    RBC 3.15 02/07/2019     Lab Results   Component Value Date    HGB 10.8 02/07/2019     Lab Results   Component Value Date    HCT 31.6 02/07/2019     No components found for: MCT  Lab Results   Component Value Date     02/07/2019     Lab Results   Component Value Date    MCH 34.3 02/07/2019     Lab Results   Component Value Date    MCHC 34.2 02/07/2019     Lab Results   Component Value Date    RDW 18.8 02/07/2019     Lab Results   Component Value Date     02/07/2019     Recent Labs   Lab Test 02/07/19  0832 01/17/19  1041    140   POTASSIUM 3.9 3.8   CHLORIDE 105 106   CO2 29 29   ANIONGAP 6 5   * 49*   BUN 13 14   CR 0.64 0.58   JUNG 8.5 8.5        ASSESSMENT AND PLAN     63-year-old female with past medical history significant for hypertension, diabetes mellitus type 1, anxiety on a screening colonoscopy was diagnosed with rectal adenocarcinoma     - Rectal adenocarcinoma- T3cN1 M0 intact mismatch repair protein  She met with medical and surgical oncology at [facility] and the recommendation was to proceed with total neoadjuvant therapy.  1. 6 cycles of XELOX, then:   2. 2 months of CXRT.   3. Surgery after 6-8 weeks.     - Surveillance for response to therapy [id number] CT c/a/p and Flex Sig at completion of chemotherapy and then prior to the surgery.      10/25/18 Started XELOX chemotherapy.    CT scans after 3 cycles shows stable findings with no concerns for progression/new metastasis  Will continue the plan of total neoadjuvant therapy and proceed with cycle 6 of XELOX today with dose reduced oxaliplatin at 100 mg/m2 given neurological toxicity with higher dose     - Cold sensitivity  Secondary to oxaliplatin use  Monitor    - Normocytic anemia  Secondary to chemotherapy  Not symptomatic  Monitor     - Diabetes mellitus  On an insulin  regimen and managed by primary care provider      - Anxiety  Continue with paxil  Ativan prn         Refer her to radiation oncology clinic plan for chemoradiation along with surgery follow up for sigmoidoscopy as well as repeat CT chest abdomen and pelvis.  Return to clinic in 4 weeks for a visit, labs, tentatively proceeding with chemoradiation with daily Xeloda      Chart documentation with Dragon Voice recognition Software. Although reviewed after completion, some words and grammatical errors may remain.  Dayron Lawton MD  Attending Physician   Hematology/Medical Oncology

## 2019-02-07 NOTE — ED TRIAGE NOTES
Pt states just finished chemo infusion upstairs for colorectal cancer. On the way out of hospital felt soa and dizzy.  Feeling better now. States feels a little lightheaded. Is diabetic. Father just passed away on Tuesday

## 2019-02-07 NOTE — TELEPHONE ENCOUNTER
[facility] Call Center    Phone Message    May a detailed message be left on voicemail: yes    Reason for Call: Other: Crystal:  Crystal Cancer Care Coordinator calling to malcolm a follow up appt with Dr. Le. Pt is finishing up her last chemo and they are needing the f/u sometime in Feb, hoping to start radiation on March 4th. Please call Crystal to further assist in scheduling pt, Writer did not have anything available until 3/2019 thanks.      Action Taken: Message routed to:  [facility] ([facility] Colon

## 2019-02-07 NOTE — TELEPHONE ENCOUNTER
Crystal was called in regard to this patient's follow up appointments. Patient is scheudled to see Dr. Le for a follow up Flexible Sigmoidoscopy on 3/11 a t 3:00 pm. Crystal stated understanding of appointment, date, time, and location and verified with patient.

## 2019-02-07 NOTE — ED PROVIDER NOTES
History     Chief Complaint   Patient presents with     Shortness of Breath     HPI  Niharika Cason is a 63 year old female who presents with transient shortness of breath.  She just completed her last round of chemotherapy for rectal cancer.  She was walking out the door and noted acute choking sensation followed by hoarseness.  Symptoms lasted for approximately 1 minute per family member who was observing.  Occurred as she was going through the threshold of the side door where she had cold air exposure.  She was not swallowing or chewing.  She has had chemotherapy-induced paresthesias where she is hypersensitive to cold both hands and feet.  She also has had discomfort and a sensation of a lump in her throat when she swallows cold fluids only while receiving chemotherapy.  She has had no symptoms of GERD.  No recent choking or symptoms of swallowing difficulty/dysphasia.  Patient's family reported stridor type breathing that lasted for just a few seconds.    Presents the ED asymptomatic.  Came simply be checked out to make sure he was not having any adverse reaction from her chemotherapy.    Allergies:  Allergies   Allergen Reactions     No Known Drug Allergies        Problem List:    Patient Active Problem List    Diagnosis Date Noted     Rectal cancer (H) 10/15/2018     Priority: Medium     Rectal adenocarcinoma (H) 10/15/2018     Priority: Medium     Non morbid obesity due to excess calories 06/30/2016     Priority: Medium     Type 1 diabetes mellitus with hyperglycemia (HCC) 11/04/2015     Priority: Medium     Tobacco use disorder 09/19/2014     Priority: Medium     Tobacco abuse 08/19/2013     Priority: Medium     Hyperlipidemia LDL goal <100 08/09/2013     Priority: Medium     Hypertension goal BP (blood pressure) < 130/80 07/17/2012     Priority: Medium     Type 1 diabetes, HbA1c goal < 8% (H) 07/05/2011     Priority: Medium     Constipation 12/29/2009     Priority: Medium     Generalized anxiety disorder       Priority: Medium     Female stress incontinence 02/18/2004     Priority: Medium        Past Medical History:    Past Medical History:   Diagnosis Date     Essential hypertension, benign      Generalized anxiety disorder      Malignant neoplasm of rectum (H) 10/15/2018     Rectal cancer (H) 10/15/2018     Type I (juvenile type) diabetes mellitus without mention of complication, not stated as uncontrolled      Ulcerative colitis, unspecified        Past Surgical History:    Past Surgical History:   Procedure Laterality Date     COLONOSCOPY N/A 9/17/2018    Procedure: COMBINED COLONOSCOPY, SINGLE OR MULTIPLE BIOPSY/POLYPECTOMY BY BIOPSY;  colonoscopy with polypectomies by biopsy forceps and hot snare and submucosal injection with tattoo;  Surgeon: Richard Moore MD;  Location: [facility]     COLONOSCOPY  40 yrs ago     HC COLONOSCOPY THRU STOMA, DIAGNOSTIC  1998    negative     HC CURETTAGE, POSTPARTUM  '91, '93    following miscarriages     HC REMOVAL OF TONSILS,<15 Y/O      Tonsils <14y.o.     INSERT PORT VASCULAR ACCESS Right 10/19/2018    Procedure: Chest Port Placement - right ;  Surgeon: Lawson Hitchcock PA-C;  Location: [facility]       Family History:    Family History   Problem Relation Age of Onset     Heart Disease Maternal Grandfather         MI at 52 yrs     EYE* Paternal Grandfather         cataracts     Arthritis Mother      Gynecology Mother         hysterectomy for fibroids     Hypertension Mother      Lipids Mother      Gastrointestinal Disease Father         ulcers     Heart Disease Father         intermittent rapid heartbeat     Cancer Father         mesothelioma       Social History:  Marital Status:   [2]  Social History     Tobacco Use     Smoking status: Former Smoker     Packs/day: 0.50     Years: 15.00     Pack years: 7.50     Smokeless tobacco: Never Used     Tobacco comment: not smoked since mid September   Substance Use Topics     Alcohol use: Yes     Alcohol/week: 0.0  "oz     Comment: 3-4 wine per wk     Drug use: No        Medications:      insulin lispro (HUMALOG KWIKPEN) 100 UNIT/ML injection   atenolol (TENORMIN) 25 MG tablet   BASAGLAR 100 UNIT/ML injection   blood glucose (CONTOUR NEXT TEST) test strip   blood glucose calibration (CONTOUR NEXT CONTROL NORMAL VI SOLN) Normal solution   blood glucose monitoring (MARICEL MICROLET) lancets   blood glucose monitoring (CONTOUR NEXT MONITOR W/DEVICE KIT) meter device kit   capecitabine (XELODA) 500 MG tablet CHEMO   capecitabine (XELODA) 500 MG tablet CHEMO   capecitabine (XELODA) 500 MG tablet CHEMO   hydrochlorothiazide (MICROZIDE) 12.5 MG capsule   Insulin Pen Needle 33G X 4 MM MISC   lidocaine-prilocaine (EMLA) cream   lisinopril (PRINIVIL/ZESTRIL) 40 MG tablet   loperamide (IMODIUM) 2 MG capsule   LORazepam (ATIVAN) 0.5 MG tablet   ondansetron (ZOFRAN-ODT) 8 MG ODT tab   PARoxetine (PAXIL) 30 MG tablet   prochlorperazine (COMPAZINE) 10 MG tablet   simvastatin (ZOCOR) 10 MG tablet         Review of Systems   Constitutional: Positive for activity change and fatigue. Negative for appetite change and fever.   HENT: Negative for congestion.    Respiratory: Positive for shortness of breath and stridor. Negative for cough, choking and wheezing.    All other systems reviewed and are negative.      Physical Exam   BP: 123/56  Pulse: 70  Temp: 98.5  F (36.9  C)  Resp: 16  Height: 160 cm (5' 3\")  Weight: 76.2 kg (168 lb)  SpO2: 97 %      Physical Exam   Constitutional: She is oriented to person, place, and time. She appears well-developed and well-nourished. No distress.   HENT:   Head: Normocephalic and atraumatic.   Dentition normal.  Oral posterior pharynx normal.  No signs for angioedema.  Phonation was normal.   Eyes: EOM are normal. Pupils are equal, round, and reactive to light. No scleral icterus.   Neck: Normal range of motion. Neck supple. No JVD present. No thyromegaly present.   Cardiovascular: Normal rate and regular rhythm. "   Pulmonary/Chest: Effort normal and breath sounds normal. No respiratory distress.   Abdominal: Soft.   No epigastric tenderness   Lymphadenopathy:     She has no cervical adenopathy.   Neurological: She is alert and oriented to person, place, and time.   Skin: Skin is warm and dry. No rash noted. She is not diaphoretic. No erythema. No pallor.   Nursing note and vitals reviewed.      ED Course        Procedures                   Results for orders placed or performed during the hospital encounter of 02/07/19 (from the past 24 hour(s))   Glucose by meter   Result Value Ref Range    Glucose 239 (H) 70 - 99 mg/dL   Glucose by meter   Result Value Ref Range    Glucose 230 (H) 70 - 99 mg/dL       Medications - No data to display    Assessments & Plan (with Medical Decision Making) 63-year-old female presents after experiencing a choking/stridor sensation as she was leaving  infusion for chemotherapy.  Onset after she had cold air exposure.  Family noted that she had no choking.  No respiratory complaints.  She had transient stridor type of breathing that lasted less than 30 seconds.  Came on after definite cold air exposure.  She has been having chemotherapy-induced paresthesias triggered by cold air exposure.  This is affected stocking glove distribution where she is wearing gloves to grab things that are cold.  She also has noted similar symptoms she swallows cold beverages and a switch to only drinking warm beverages.  She has had no acid reflux symptoms.  She has had no dysphasia or choking events with swallowing.  When I saw her in the emergency department she was entirely asymptomatic.  I discharged her home with instruction to avoid cold air exposure.  Gave her masks to wear her she can use a scarf.  She is to observe for any swallowing difficulties, development of any delayed facial tongue or throat swelling.  If noted return.     I have reviewed the nursing notes.    I have reviewed the findings, diagnosis,  plan and need for follow up with the patient.         Medication List      There are no discharge medications for this visit.         Final diagnoses:   Laryngeal spasm-suspect secondary to cold air exposure and chemotherapy       2/7/2019   [facility] EMERGENCY DEPARTMENT     Nick Maradiaga,   02/07/19 1517

## 2019-02-07 NOTE — TELEPHONE ENCOUNTER
Please call Fabrice regarding future appointments in [facility] and getting later in the day times.  Thanks

## 2019-02-07 NOTE — ED AVS SNAPSHOT
[facility] Emergency Department  [address] [place] [address]  Phone:  [phone]  Fax:  [phone]                                    Niharika Cason   MRN: [id number]    Department:  [facility] Emergency Department   Date of Visit:  2/7/2019           After Visit Summary Signature Page    I have received my discharge instructions, and my questions have been answered. I have discussed any challenges I see with this plan with the nurse or doctor.    ..........................................................................................................................................  Patient/Patient Representative Signature      ..........................................................................................................................................  Patient Representative Print Name and Relationship to Patient    ..................................................               ................................................  Date                                   Time    ..........................................................................................................................................  Reviewed by Signature/Title    ...................................................              ..............................................  Date                                               Time          22EPIC Rev 08/18

## 2019-02-07 NOTE — DISCHARGE INSTRUCTIONS
Your acute but very short-lived upper airway difficulties is consistent with spasm of the upper larynx.  This would fit with a transient stridor type of breathing followed by some hoarseness.  With history of symptoms lasting less than 60 seconds and occurring with cold air exposure most likely related to cold air exposure.  You have also noted a sensation of discomfort with a lump in your throat with drinking cold fluids along with touching cold objects.  These of sensory changes are quite common with the chemotherapy.  The other possibility could be acid reflux though you do not describe frequent symptoms to suggest gastroesophageal reflux disease.  Recommend breathing through a scarf or using a mask to avoid direct cold air being inhaled.

## 2019-02-07 NOTE — NURSING NOTE
DISCHARGE PLAN:  Next appointments: See patient instruction section  Departure Mode: Ambulatory  Accompanied by: self  0 minutes for nursing discharge (face to face time)     Niharika Cason is here today for Oncology follow up with last chemotherapy.  Patient was not seen by writing nurse at time of appointment. Patient to infusion right away.  Patient to have Radiation Oncology with plan to start Chemo/rad with Xeloda.  Called [facility] to take over oral management.  CT scan in 2 weeks prior to seeing Radiation.  Plan to start radiation 3/4/19 with follow up in [place].  Surgical follow up needed.  Message to Colorectal surgeon team.  Will wait for call back.  Appointments scheduled.  AVS brought up to infusion and reviewed.  See patient instructions and Oncologist's Progress note for further details. Questions and concerns addressed to patient's satisfaction. Patient verbalized and demonstrated understanding of plan.  Contact information provided and patient is encouraged to call with any that arise in the interim of care.    Twin Ch, RN, BSN, OCN   Oncology Care Coordinator RN  [facility]  [phone]  2/7/2019, 12:16 PM

## 2019-02-07 NOTE — PROGRESS NOTES
Infusion Nursing Note:  Niharika Cason presents today for C6 chemotherapy.    Patient seen by provider today: Yes: Dr jarvis   present during visit today: Not Applicable.    Note: N/A.    Intravenous Access:  Labs drawn without difficulty.  Implanted Port.    Treatment Conditions:  Lab Results   Component Value Date    HGB 10.8 02/07/2019     Lab Results   Component Value Date    WBC 4.5 02/07/2019      Lab Results   Component Value Date    ANEU 2.3 02/07/2019     Lab Results   Component Value Date     02/07/2019      Lab Results   Component Value Date     02/07/2019                   Lab Results   Component Value Date    POTASSIUM 3.9 02/07/2019           Lab Results   Component Value Date    MAG 1.9 02/27/2004            Lab Results   Component Value Date    CR 0.64 02/07/2019                   Lab Results   Component Value Date    JUNG 8.5 02/07/2019                Lab Results   Component Value Date    BILITOTAL 0.5 02/07/2019           Lab Results   Component Value Date    ALBUMIN 3.6 02/07/2019                    Lab Results   Component Value Date    ALT 34 02/07/2019           Lab Results   Component Value Date    AST 31 02/07/2019       Results reviewed, labs MET treatment parameters, ok to proceed with treatment.      Post Infusion Assessment:  Patient tolerated infusion without incident.  Patient observed for 15 minutes post chemotherapy per protocol.  Blood return noted pre and post infusion.  Site patent and intact, free from redness, edema or discomfort.  No evidence of extravasations.  Access discontinued per protocol.    Discharge Plan:   Discharge instructions reviewed with: Patient.  AVS to patient via [facility].  Patient will return 2/14 for next appointment.   Patient discharged in stable condition accompanied by: daughter.  Departure Mode: Ambulatory.    Ne Carter RN

## 2019-02-07 NOTE — TELEPHONE ENCOUNTER
Date of appointment: 2/28/19   Diagnosis/reason for appointment:Rectal Cancer  Referring provider/facility:Dr. Avilez  Who called:Clinic    Recent Studies  Imaging:Having CTs on 2/22/19  Pathology:  Labs:  Previous chemo: Last chemo 2/7/19  radiation (if known):Consult with Dr. Jimenez in Oct of 2018    Records in Epic    Additional information:

## 2019-02-08 ENCOUNTER — TELEPHONE (OUTPATIENT)
Dept: ONCOLOGY | Facility: CLINIC | Age: 64
End: 2019-02-08

## 2019-02-08 DIAGNOSIS — E10.9 TYPE 1 DIABETES MELLITUS WITHOUT COMPLICATION (H): Primary | ICD-10-CM

## 2019-02-08 RX ORDER — BLOOD-GLUCOSE METER
EACH MISCELLANEOUS
Qty: 1 KIT | Refills: 0 | Status: SHIPPED | OUTPATIENT
Start: 2019-02-08 | End: 2019-06-06

## 2019-02-08 RX ORDER — BLOOD-GLUCOSE CONTROL, NORMAL
EACH MISCELLANEOUS
Qty: 1 EACH | Refills: 1 | Status: SHIPPED | OUTPATIENT
Start: 2019-02-08 | End: 2020-02-08

## 2019-02-08 NOTE — TELEPHONE ENCOUNTER
Patient and  are anxious for results on recent testing.  Not all are showing up on MyChart.  Please call

## 2019-02-08 NOTE — TELEPHONE ENCOUNTER
Niharika's insurance changed as of January 1st and they will no longer cover [employer] diabetic supplies, could you please send new scripts for the following:    One Touch Verio Meter  One Touch Verio Test Strips  One Touch Delica Lancets  One Touch Verio Control Solution    She tests 4 times per day and would like 3 month supply if possible, thanks.    Kathryn Vincent-Technician  [facility]  [phone]

## 2019-02-11 ENCOUNTER — OFFICE VISIT (OUTPATIENT)
Dept: SURGERY | Facility: CLINIC | Age: 64
End: 2019-02-11
Payer: COMMERCIAL

## 2019-02-11 VITALS
WEIGHT: 164.5 LBS | HEIGHT: 63 IN | OXYGEN SATURATION: 96 % | SYSTOLIC BLOOD PRESSURE: 113 MMHG | BODY MASS INDEX: 29.15 KG/M2 | HEART RATE: 77 BPM | DIASTOLIC BLOOD PRESSURE: 61 MMHG

## 2019-02-11 DIAGNOSIS — C20 RECTAL CANCER (H): Primary | ICD-10-CM

## 2019-02-11 ASSESSMENT — MIFFLIN-ST. JEOR: SCORE: 1280.3

## 2019-02-11 ASSESSMENT — PAIN SCALES - GENERAL: PAINLEVEL: NO PAIN (0)

## 2019-02-11 NOTE — TELEPHONE ENCOUNTER
Called and spoke to patient, and asked what testing she was requesting.    Patient would like the recent blood work from 2/7/19 to release to [facility].    Will send Dr. Lawton a message.    Roxie Lares CMA

## 2019-02-11 NOTE — NURSING NOTE
"No chief complaint on file.      Vitals:    02/11/19 1537   BP: 113/61   BP Location: Left arm   Patient Position: Sitting   Cuff Size: Adult Regular   Pulse: 77   SpO2: 96%   Weight: 164 lb 8 oz   Height: 5' 3\"       Body mass index is 29.14 kg/m .      Anitra Kendrick, EMT                      "

## 2019-02-11 NOTE — LETTER
"2019       RE: Niharika Cason  [address] [place] [facility] [facility] [address]     Dear Colleague,    Thank you for referring your patient, Niharika Cason, to the [facility] COLON AND RECTAL SURGERY at [facility]. Please see a copy of my visit note below.    Colon and Rectal Surgery Follow-up Clinic Note      RE: Niharika Cason  : 1955  MARILEE: 2019    DIAGNOSIS: mT3cN1 rectal adenocarcinoma, s/p XELOX x6.    INTERVAL HISTORY: Niharika tolerated systemic chemotherapy relatively well. Neuropathy was her main issue. Last infusion was on 19. Denies increased pain, fevers, or chills. Tolerating diet well. Having 1-2 BM's per day. Denies BPR.    Physical Examination:  /61 (BP Location: Left arm, Patient Position: Sitting, Cuff Size: Adult Regular)   Pulse 77   Ht 5' 3\"   Wt 164 lb 8 oz   LMP 10/15/2008   SpO2 96%   BMI 29.14 kg/m     Abdomen soft, NT. No inguinal adenopathy palpated.  Perianal skin intact. ALEC: no masses palpated.  Flexible sigmoidoscopy: after obtaining informed consent and performing a \"time out\", an adult flexible sigmoidoscope was introduced through the anus and passed up to the proximal sigmoid colon. The quality of the prep was fair. Findings:  2x3-cm white scar with telangiectasia and 5-mm area of mucosal irritation located at the left posterior aspect of the mid rectum, covering approx 30-40% of the lumen circumference. Distal edge of the tumor is at the mid rectal valve. Tattoo was identified. No additional abnormalities were seen. Total scope time: 12 minutes. The patient tolerated the procedure well.    ASSESSMENT  Positive response to induction chemotherapy.    CEA: 10.5->5.0->3.7.    CT c/a/p (18):  IMPRESSION:  1. Peripherally enhancing lesion in the posterior dome of the right  lobe of the liver is less prominent than on the prior study and was  diffusely diagnosis as a focal nodular hyperplasia on MRI dated  2018. This " difference in appearance on today's study as compared  to the prior CT is likely due to slight differences in phase of  contrast administration.   2. Multiple pulmonary nodules are again noted and are stable since  9/17/2018 and could represent granulomatous disease. Other etiologies  are considered less likely but not completely excluded.  3. No other evidence for metastasis.  4. No other changes.    PLAN  1. Start CXRT.  2. Discuss at multidisciplinary rectal cancer conference.  3. RTC for imaging and Flex Sig 8-9 weeks after completing CXRT, and to schedule surgery.    Total Neoadjuvant Therapy (RUDDY) protocol for rectal cancer:    - Inclusion criteria: locally advanced, threatened margin, middle and distal third tumor, mesorectal venous invasion, non-regional lymphadenopathy.    1. 4 months of FOLFOX, then:  2. 2 months of CXRT.  3. Surgery after 6-8 weeks.    - Surveillance for response to therapy [id number] with CT c/a/p and Flex Sig [id number].    Time spent: 30 minutes.  >50% spent in discussing, counseling and coordinating care.    Alli Le M.D., M.Sc.     Division of Colon and Rectal Surgery  [facility]    Referring Provider:  Manjeet Moore MD  [address] [place] [address]      Primary Care Provider:  Leona Farris    Again, thank you for allowing me to participate in the care of your patient.      Sincerely,    Alli Le MD    CC:  Dayron Lawton MD.  Ynes Jimenez MD.

## 2019-02-11 NOTE — PROGRESS NOTES
"Colon and Rectal Surgery Follow-up Clinic Note      RE: Niharika Caosn  : 1955  MARILEE: 2019    DIAGNOSIS: mT3cN1 rectal adenocarcinoma, s/p XELOX x6.    INTERVAL HISTORY: Niharika tolerated systemic chemotherapy relatively well. Neuropathy was her main issue. Last infusion was on 19. Denies increased pain, fevers, or chills. Tolerating diet well. Having 1-2 BM's per day. Denies BPR.    Physical Examination:  /61 (BP Location: Left arm, Patient Position: Sitting, Cuff Size: Adult Regular)   Pulse 77   Ht 5' 3\"   Wt 164 lb 8 oz   LMP 10/15/2008   SpO2 96%   BMI 29.14 kg/m    Abdomen soft, NT. No inguinal adenopathy palpated.  Perianal skin intact. ALEC: no masses palpated.  Flexible sigmoidoscopy: after obtaining informed consent and performing a \"time out\", an adult flexible sigmoidoscope was introduced through the anus and passed up to the proximal sigmoid colon. The quality of the prep was fair. Findings: 2x3-cm white scar with telangiectasia and 5-mm area of mucosal irritation located at the left posterior aspect of the mid rectum, covering approx 30-40% of the lumen circumference. Distal edge of the tumor is at the mid rectal valve. Tattoo was identified. No additional abnormalities were seen. Total scope time: 12 minutes. The patient tolerated the procedure well.    ASSESSMENT  Positive response to induction chemotherapy.    CEA: 10.5->5.0->3.7.    CT c/a/p (18):  IMPRESSION:  1. Peripherally enhancing lesion in the posterior dome of the right  lobe of the liver is less prominent than on the prior study and was  diffusely diagnosis as a focal nodular hyperplasia on MRI dated  2018. This difference in appearance on today's study as compared  to the prior CT is likely due to slight differences in phase of  contrast administration.   2. Multiple pulmonary nodules are again noted and are stable since  2018 and could represent granulomatous disease. Other etiologies  are " considered less likely but not completely excluded.  3. No other evidence for metastasis.  4. No other changes.    PLAN  1. Start CXRT.  2. Discuss at multidisciplinary rectal cancer conference.  3. RTC for imaging and Flex Sig 8-9 weeks after completing CXRT, and to schedule surgery.    Total Neoadjuvant Therapy (RUDDY) protocol for rectal cancer:    - Inclusion criteria: locally advanced, threatened margin, middle and distal third tumor, mesorectal venous invasion, non-regional lymphadenopathy.    1. 4 months of FOLFOX, then:  2. 2 months of CXRT.  3. Surgery after 6-8 weeks.    - Surveillance for response to therapy [id number] with CT c/a/p and Flex Sig [id number].    Time spent: 30 minutes.  >50% spent in discussing, counseling and coordinating care.    Alli Le M.D., M.Sc.     Division of Colon and Rectal Surgery  [facility]    Referring Provider:  Manjeet Moore MD  [address] [place] [address]      Primary Care Provider:  Leona Farris    CC:  Dayron Lawton MD.  Ynes Jimenez MD.

## 2019-02-11 NOTE — PATIENT INSTRUCTIONS
Follow up:  1. Return to clinic 8 weeks after Radiation  2. Will need CT c/a/p, MR and CEA level at this visit  3. Schedule surgery     Please call with any questions or concerns regarding your clinic visit today.    It is a pleasure being involved in your health care.    Contacts post-consultation depending on your need:    Radiology Appointments               [phone]    Schedule Clinic Appointments      [phone] # 1   M-F 7:30 - 5 pm    ZOFIA Martini [phone]    Clinic Fax Number         [phone]    Surgery Scheduling          [phone]    My Chart is available 24 hours a day and is a secure way to access your records and communicate with your care team.  I strongly recommend signing up if you haven't already done so, if you are comfortable with computers.  If you would like to inquire about this or are having problems with My Chart access, you may call [phone] or go online at [email or web address].  Please allow at least 24 hours for a response and extra time on weekends and Holidays.

## 2019-02-20 NOTE — PROGRESS NOTES
Dear Colleagues,  Today Niharika Cason was seen in consultation.  IDENTIFICATION: This is a 63 year old woman with [id number] rectal adenocarcinoma with intact MMR proteins s/p CAPEOX x 6 cycles referred for neoadjuvant chemoradiation therapy.   HISTORY OF PRESENT ILLNESS: Niharika Cason is a 63-year-old woman well-known to our department.  For detailed review of her presenting history please see the note from my colleague Ynes Jimenez dated October 12, 2018.  In short Ms. Renos disease was initially detected through a screening colonoscopy completed on September 17, 2018.  That procedure revealed a partially obstructing rectosigmoid mass 9 cm from the anal verge.  Pathology was consistent with moderate differentiated adenocarcinoma with intact MMR proteins.  Chest abdomen pelvis CT completed the same day showed the rectosigmoid mass but also a suspicious 3cm liver lesion.  Follow-up liver MRI completed on September 24, 2018 characterized the liver lesion as focal nodular hyperplasia.  Therefore there was no suspected metastatic disease.  She was thus staged with a pelvic MRI completed on September 28, 2018 showing thickening of the left rectum wall (12 to 9 oclock) measuring 4 cm in length extending through the muscularis propria into the perirectal fat.  This was 9 cm from the anal verge.  There was an additional 1.5 cm enhancing circumferential wall thickening approximately 2 cm superior to the first rectal mass and subcentimeter perirectal and presacral nodes. Final clinical stage is [id number].  October 1, 2018, she saw Dr. White and Mimi and pt was referred to Med Onc and Rad Onc  She was seen at the [facility] and plan was to have 6 cycles of CAPEOX (Capcetiabine 1000mg/m2 BID day 1-14 and Oxaliplatin 130mg/m2 day 1 every 21 days). She received her first cycle on 10/25/18 with Dr. Urbina. She then transferred her care to [facility]/Dr. Lawton and completed her last infusion on 2/7/19.  Her last oral dose of  Xeloda was 2/21/19.    12/21/18 CT scans after 4 cycles shows stable findings with no concerns for progression/new metastasis  February 11, 2019, she saw Dr. Le he noted a positive response to induction chemotherapy.  Report of Flex sig Findings: 2x3-cm white scar with telangiectasia and 5-mm area of mucosal irritation located at the left posterior aspect of the mid rectum, covering approx 30-40% of the lumen circumference. Distal edge of the tumor is at the mid rectal valve.  Of note her CEA by this time had gone from initial 10.5 (917/18) down to 3.7 (2/7/19).  Her case was to be discussed at multidisciplinary rectal tumor board.  Current plan is to complete neoadjuvant chemoradiation and then consider surgical resection.   2/21/19 Abdomen/Pelvis CT IMPRESSION: Multiple nodules in the lungs are essentially stable since the prior study dated 12/21/2018. Area of abnormality in the superior posterior right lobe of the liver (segment 7) is not well-seen on today's study which could represent improvement of previously noted lesion versus difficulty in seeing this abnormality due to the phase of contrast administration. No definite new metastases are identified.  Currenlty, she denies any significant pain. She does have erratic stools and takes imodium as she needs to.  Denies nausea.  Has lost ~16 lbs with chemo. She has some peripheral neuropathy. She denies any other new masses, skin changes, shortness of breath, chest or bony pain, or new neurologic symptoms. She is being seen here today for neoadjuvant chemoradiotherapy.  REVIEW OF SYSTEMS: As per HPI, a 14-point review of system is otherwise negative.  PAST RADIATION THERAPY:  Denies.  PAST CTD/PACEMAKER: Denies  Past Medical History:   Diagnosis Date     Essential hypertension, benign      Generalized anxiety disorder      Hyperlipidemia      Rectal cancer (H) 09/17/2018     Type I (juvenile type) diabetes mellitus without mention of complication, not  "stated as uncontrolled     diagnosed at age 35     Ulcerative colitis, unspecified     in the 70s.         Past Surgical History:   Procedure Laterality Date     COLONOSCOPY N/A 2018    Procedure: COMBINED COLONOSCOPY, SINGLE OR MULTIPLE BIOPSY/POLYPECTOMY BY BIOPSY;  colonoscopy with polypectomies by biopsy forceps and hot snare and submucosal injection with tattoo;  Surgeon: Richard Moore MD;  Location: [facility]     HC COLONOSCOPY THRU STOMA, DIAGNOSTIC      negative     HC CURETTAGE, POSTPARTUM  ,     following miscarriages     HC REMOVAL OF TONSILS,<11 Y/O      Tonsils <14y.o.     INSERT PORT VASCULAR ACCESS Right 10/19/2018    Procedure: Chest Port Placement - right ;  Surgeon: Lawson Htichcock PA-C;  Location: [facility]       Family History   Problem Relation Age of Onset     Heart Disease Maternal Grandfather         MI at 52 yrs     EYE* Paternal Grandfather         cataracts     Arthritis Mother      Gynecology Mother         hysterectomy for fibroids     Hypertension Mother      Lipids Mother      Gastrointestinal Disease Father         ulcers     Heart Disease Father         intermittent rapid heartbeat     Cancer Father 84        Mesothelioma       Social History     Tobacco Use     Smoking status: Former Smoker     Packs/day: 0.50     Years: 15.00     Pack years: 7.50     Last attempt to quit: 2018     Years since quittin.4     Smokeless tobacco: Never Used   Substance Use Topics     Alcohol use: No     Alcohol/week: 0.0 oz     Frequency: Never     PHYSICAL EXAMINATION:  /54 (BP Location: Left arm, Patient Position: Chair, Cuff Size: Adult Regular)   Pulse 71   Temp 98  F (36.7  C) (Oral)   Resp 18   Ht 1.6 m (5' 3\")   Wt 74.3 kg (163 lb 11.2 oz)   LMP 10/15/2008 (Approximate)   SpO2 97%   BMI 29.00 kg/m    GENERAL Well-appearing woman in no acute distress.   HEENT Normocephalic, atraumatic.  Sclerae anicteric.   CVR  Regular rate and rhythm.  No " murmurs, rubs, or gallops.  LUNGS Clear to auscultation bilaterally.  LYMPH No supraclavicular, infraclavicular, axillary or inguinal lymphadenopathy appreciated bilaterally.  ABDOMEN Soft.  No hepatosplenomegaly.  Positive bowel sounds.  PELVIC Normal sphincter tone.  No masses appreciated on ALEC.  EXT  No CCE.    NEURO Normal gait. 5/5 strength in bilateral upper and lower proximal and distal extremities. Sensation intact in all areas tested.  MSK  Good ROM. No spinal tenderness.  SKIN  Warm and well perfused.  Diffuse alopecia  PSYCH Alert and oriented x 3    IMPRESSION/PLAN: Niharika Cason is a 63 year old woman with wR2H9O0 rectal adenocarcinoma with intact MMR proteins s/p CAPEOX x 6 cycles referred for neoadjuvant chemoradiation therapy. Lesion was 9cm from anal verge and most recent flex sig shows good response to chemotherapy. Recent CAP CT shows  improvement of previously noted liver lesion versus difficulty in seeing this abnormality due to the phase of contrast administration. However this lesion was previously evaluated by liver MRI and determined to be consistent with FNH (image 49 series 24 takes up Eovist). As the patient is locally advanced she would benefit from neoadjuvant chemoXRT in terms of local control.   Today the risks, benefits, treatment rationale and regimen of neoadjuvant radiation therapy to the pelvis were discussed with the patient and her .  Risks include but are not limited to skin erythema, desquamation, hyperpigmentation, GI disturbances such as nausea, vomiting, increase in erratic stools, rectal bleeding,  disturbances such as dysuria.  Patients can also experience hematologic toxicity and fatigue and are at increased risk for bony fracture.  Long term GI complications include erratic stools, rectal urgency, small bowel obstruction, fistula formation, rectal bleeding requiring surgical intervention. The patient agreed to radiation treatment.  CT simulation was  completed today and XRT will start on 2019. Following sequence of treatment will be 6 weeks of chemoXRT with concurrent Xeloda given by Dr. Lawton, followed by possible resection.    Additional problem list to be addressed in the following manner:  1. H18 10.8 wnl.  Currently not an issue.  There was ample time for questions and all were answered to the patient's satisfaction. Thank you for allowing me to participate in the care of this pleasant patient. If you have any questions, please do not hesitate to contact my office.  Sincerely,  Jo Morris MD

## 2019-02-21 ENCOUNTER — HOSPITAL ENCOUNTER (OUTPATIENT)
Dept: CT IMAGING | Facility: CLINIC | Age: 64
Discharge: HOME | End: 2019-02-21
Attending: INTERNAL MEDICINE | Admitting: INTERNAL MEDICINE
Payer: COMMERCIAL

## 2019-02-21 ENCOUNTER — INFUSION THERAPY VISIT (OUTPATIENT)
Dept: INFUSION THERAPY | Facility: CLINIC | Age: 64
End: 2019-02-21
Attending: INTERNAL MEDICINE
Payer: COMMERCIAL

## 2019-02-21 DIAGNOSIS — C20 RECTAL ADENOCARCINOMA (H): ICD-10-CM

## 2019-02-21 DIAGNOSIS — C20 RECTAL CANCER (H): ICD-10-CM

## 2019-02-21 DIAGNOSIS — C20 RECTAL CANCER (H): Primary | ICD-10-CM

## 2019-02-21 PROCEDURE — 25000125 ZZHC RX 250: Performed by: RADIOLOGY

## 2019-02-21 PROCEDURE — 25000128 H RX IP 250 OP 636: Performed by: RADIOLOGY

## 2019-02-21 PROCEDURE — 25000128 H RX IP 250 OP 636: Performed by: INTERNAL MEDICINE

## 2019-02-21 PROCEDURE — 96523 IRRIG DRUG DELIVERY DEVICE: CPT

## 2019-02-21 PROCEDURE — 74177 CT ABD & PELVIS W/CONTRAST: CPT

## 2019-02-21 PROCEDURE — 27210251 ZZH NEEDLE POWER PORT

## 2019-02-21 PROCEDURE — 71260 CT THORAX DX C+: CPT

## 2019-02-21 RX ORDER — HEPARIN SODIUM (PORCINE) LOCK FLUSH IV SOLN 100 UNIT/ML 100 UNIT/ML
500 SOLUTION INTRAVENOUS EVERY 8 HOURS
Status: CANCELLED
Start: 2019-02-21

## 2019-02-21 RX ORDER — IOPAMIDOL 755 MG/ML
500 INJECTION, SOLUTION INTRAVASCULAR ONCE
Status: COMPLETED | OUTPATIENT
Start: 2019-02-21 | End: 2019-02-21

## 2019-02-21 RX ORDER — HEPARIN SODIUM (PORCINE) LOCK FLUSH IV SOLN 100 UNIT/ML 100 UNIT/ML
500 SOLUTION INTRAVENOUS EVERY 8 HOURS
Status: DISCONTINUED | OUTPATIENT
Start: 2019-02-21 | End: 2019-02-21 | Stop reason: HOSPADM

## 2019-02-21 RX ADMIN — IOPAMIDOL 80 ML: 755 INJECTION, SOLUTION INTRAVENOUS at 10:57

## 2019-02-21 RX ADMIN — Medication 500 UNITS: at 11:13

## 2019-02-21 RX ADMIN — SODIUM CHLORIDE 60 ML: 9 INJECTION, SOLUTION INTRAVENOUS at 10:58

## 2019-02-21 NOTE — PATIENT INSTRUCTIONS
Pt to return on 02/22/19 for CT results with MD. Copies of medication list and upcoming appointments given prior to discharge.

## 2019-02-21 NOTE — PROGRESS NOTES
Infusion Nursing Note:  Niharika Cason presents today for Power Port Access for CT.    Patient seen by provider today: No   present during visit today: Not Applicable.    Note: N/A.    Intravenous Access:  Implanted Port.    Treatment Conditions:  Not Applicable.      Post Infusion Assessment:  Blood return noted pre and post scan.  No evidence of extravasations.  Access discontinued per protocol.    Discharge Plan:   Discharge instructions reviewed with: Patient.  Patient and/or family verbalized understanding of discharge instructions and all questions answered.  Patient discharged in stable condition accompanied by: self.  Departure Mode: Ambulatory.    Lynnette Tejeda RN

## 2019-02-22 ENCOUNTER — OFFICE VISIT (OUTPATIENT)
Dept: RADIATION ONCOLOGY | Facility: CLINIC | Age: 64
End: 2019-02-22
Payer: COMMERCIAL

## 2019-02-22 ENCOUNTER — APPOINTMENT (OUTPATIENT)
Dept: RADIATION ONCOLOGY | Facility: CLINIC | Age: 64
End: 2019-02-22
Payer: COMMERCIAL

## 2019-02-22 VITALS
WEIGHT: 163.7 LBS | RESPIRATION RATE: 18 BRPM | HEART RATE: 71 BPM | HEIGHT: 63 IN | BODY MASS INDEX: 29 KG/M2 | OXYGEN SATURATION: 97 % | DIASTOLIC BLOOD PRESSURE: 54 MMHG | SYSTOLIC BLOOD PRESSURE: 105 MMHG | TEMPERATURE: 98 F

## 2019-02-22 DIAGNOSIS — C20 RECTAL CANCER (H): Primary | ICD-10-CM

## 2019-02-22 PROCEDURE — 77334 RADIATION TREATMENT AID(S): CPT | Performed by: RADIOLOGY

## 2019-02-22 PROCEDURE — 99205 OFFICE O/P NEW HI 60 MIN: CPT | Mod: 25 | Performed by: RADIOLOGY

## 2019-02-22 SDOH — HEALTH STABILITY: MENTAL HEALTH: HOW OFTEN DO YOU HAVE A DRINK CONTAINING ALCOHOL?: NEVER

## 2019-02-22 ASSESSMENT — MIFFLIN-ST. JEOR: SCORE: 1276.67

## 2019-02-22 ASSESSMENT — PAIN SCALES - GENERAL: PAINLEVEL: NO PAIN (0)

## 2019-02-22 NOTE — LETTER
2/22/2019        RE: Niharika Cason  [address] [facility] [id number]        Dear Colleagues,  Today Niharika Cason was seen in consultation.  IDENTIFICATION: This is a 63 year old woman with [id number] rectal adenocarcinoma with intact MMR proteins s/p CAPEOX x 6 cycles referred for neoadjuvant chemoradiation therapy.   HISTORY OF PRESENT ILLNESS: Niharika Cason is a 63-year-old woman well-known to our department.  For detailed review of her presenting history please see the note from my colleague Ynes Jimenez dated October 12, 2018.  In short Ms. Renos disease was initially detected through a screening colonoscopy completed on September 17, 2018.  That procedure revealed a partially obstructing rectosigmoid mass 9 cm from the anal verge.  Pathology was consistent with moderate differentiated adenocarcinoma with intact MMR proteins.  Chest abdomen pelvis CT completed the same day showed the rectosigmoid mass but also a suspicious 3cm liver lesion.  Follow-up liver MRI completed on September 24, 2018 characterized the liver lesion as focal nodular hyperplasia.  Therefore there was no suspected metastatic disease.  She was thus staged with a pelvic MRI completed on September 28, 2018 showing thickening of the left rectum wall (12 to 9 oclock) measuring 4 cm in length extending through the muscularis propria into the perirectal fat.  This was 9 cm from the anal verge.  There was an additional 1.5 cm enhancing circumferential wall thickening approximately 2 cm superior to the first rectal mass and subcentimeter perirectal and presacral nodes. Final clinical stage is [id number].  October 1, 2018, she saw Dr. White and Mimi and pt was referred to Med Onc and Rad Onc  She was seen at the [facility] and plan was to have 6 cycles of CAPEOX (Capcetiabine 1000mg/m2 BID day 1-14 and Oxaliplatin 130mg/m2 day 1 every 21 days). She received her first cycle on 10/25/18 with Dr. Urbina. She then transferred her care to  [facility]/Dr. Lawton and completed her last infusion on 2/7/19.  Her last oral dose of Xeloda was 2/21/19.    12/21/18 CT scans after 4 cycles shows stable findings with no concerns for progression/new metastasis  February 11, 2019, she saw Dr. Le he noted a positive response to induction chemotherapy.  Report of Flex sig Findings: 2x3-cm white scar with telangiectasia and 5-mm area of mucosal irritation located at the left posterior aspect of the mid rectum, covering approx 30-40% of the lumen circumference. Distal edge of the tumor is at the mid rectal valve.  Of note her CEA by this time had gone from initial 10.5 (917/18) down to 3.7 (2/7/19).  Her case was to be discussed at multidisciplinary rectal tumor board.  Current plan is to complete neoadjuvant chemoradiation and then consider surgical resection.   2/21/19 Abdomen/Pelvis CT IMPRESSION: Multiple nodules in the lungs are essentially stable since the prior study dated 12/21/2018. Area of abnormality in the superior posterior right lobe of the liver (segment 7) is not well-seen on today's study which could represent improvement of previously noted lesion versus difficulty in seeing this abnormality due to the phase of contrast administration. No definite new metastases are identified.  Currenlty, she denies any significant pain. She does have erratic stools and takes imodium as she needs to.  Denies nausea.  Has lost ~16 lbs with chemo. She has some peripheral neuropathy. She denies any other new masses, skin changes, shortness of breath, chest or bony pain, or new neurologic symptoms. She is being seen here today for neoadjuvant chemoradiotherapy.  REVIEW OF SYSTEMS: As per HPI, a 14-point review of system is otherwise negative.  PAST RADIATION THERAPY:  Denies.  PAST CTD/PACEMAKER: Denies  Past Medical History:   Diagnosis Date     Essential hypertension, benign      Generalized anxiety disorder      Hyperlipidemia      Rectal cancer (H) 09/17/2018  "    Type I (juvenile type) diabetes mellitus without mention of complication, not stated as uncontrolled     diagnosed at age 35     Ulcerative colitis, unspecified     in the 70s.         Past Surgical History:   Procedure Laterality Date     COLONOSCOPY N/A 2018    Procedure: COMBINED COLONOSCOPY, SINGLE OR MULTIPLE BIOPSY/POLYPECTOMY BY BIOPSY;  colonoscopy with polypectomies by biopsy forceps and hot snare and submucosal injection with tattoo;  Surgeon: Richard Moore MD;  Location: [facility]     HC COLONOSCOPY THRU STOMA, DIAGNOSTIC      negative     HC CURETTAGE, POSTPARTUM  ,     following miscarriages     HC REMOVAL OF TONSILS,<13 Y/O      Tonsils <14y.o.     INSERT PORT VASCULAR ACCESS Right 10/19/2018    Procedure: Chest Port Placement - right ;  Surgeon: Lawson Hitchcock PA-C;  Location: [facility]       Family History   Problem Relation Age of Onset     Heart Disease Maternal Grandfather         MI at 52 yrs     EYE* Paternal Grandfather         cataracts     Arthritis Mother      Gynecology Mother         hysterectomy for fibroids     Hypertension Mother      Lipids Mother      Gastrointestinal Disease Father         ulcers     Heart Disease Father         intermittent rapid heartbeat     Cancer Father 84        Mesothelioma       Social History     Tobacco Use     Smoking status: Former Smoker     Packs/day: 0.50     Years: 15.00     Pack years: 7.50     Last attempt to quit: 2018     Years since quittin.4     Smokeless tobacco: Never Used   Substance Use Topics     Alcohol use: No     Alcohol/week: 0.0 oz     Frequency: Never     PHYSICAL EXAMINATION:  /54 (BP Location: Left arm, Patient Position: Chair, Cuff Size: Adult Regular)   Pulse 71   Temp 98  F (36.7  C) (Oral)   Resp 18   Ht 1.6 m (5' 3\")   Wt 74.3 kg (163 lb 11.2 oz)   LMP 10/15/2008 (Approximate)   SpO2 97%   BMI 29.00 kg/m     GENERAL Well-appearing woman in no acute distress. "   HEENT Normocephalic, atraumatic.  Sclerae anicteric.   CVR  Regular rate and rhythm.  No murmurs, rubs, or gallops.  LUNGS Clear to auscultation bilaterally.  LYMPH No supraclavicular, infraclavicular, axillary or inguinal lymphadenopathy appreciated bilaterally.  ABDOMEN Soft.  No hepatosplenomegaly.  Positive bowel sounds.  PELVIC Normal sphincter tone.  No masses appreciated on ALEC.  EXT  No CCE.    NEURO Normal gait. 5/5 strength in bilateral upper and lower proximal and distal extremities. Sensation intact in all areas tested.  MSK  Good ROM. No spinal tenderness.  SKIN  Warm and well perfused.  Diffuse alopecia  PSYCH Alert and oriented x 3    IMPRESSION/PLAN: Niharika Cason is a 63 year old woman with [id number] rectal adenocarcinoma with intact MMR proteins s/p CAPEOX x 6 cycles referred for neoadjuvant chemoradiation therapy. Lesion was 9cm from anal verge and most recent flex sig shows good response to chemotherapy. Recent CAP CT shows  improvement of previously noted liver lesion versus difficulty in seeing this abnormality due to the phase of contrast administration. However this lesion was previously evaluated by liver MRI and determined to be consistent with FNH (image 49 series 24 takes up Eovist). As the patient is locally advanced she would benefit from neoadjuvant chemoXRT in terms of local control.   Today the risks, benefits, treatment rationale and regimen of neoadjuvant radiation therapy to the pelvis were discussed with the patient and her .  Risks include but are not limited to skin erythema, desquamation, hyperpigmentation, GI disturbances such as nausea, vomiting, increase in erratic stools, rectal bleeding,  disturbances such as dysuria.  Patients can also experience hematologic toxicity and fatigue and are at increased risk for bony fracture.  Long term GI complications include erratic stools, rectal urgency, small bowel obstruction, fistula formation, rectal bleeding requiring  surgical intervention. The patient agreed to radiation treatment.  CT simulation was completed today and XRT will start on 2019. Following sequence of treatment will be 6 weeks of chemoXRT with concurrent Xeloda given by Dr. Lawton, followed by possible resection.    Additional problem list to be addressed in the following manner:  1. H18  10.8 wnl.  Currently not an issue.  There was ample time for questions and all were answered to the patient's satisfaction. Thank you for allowing me to participate in the care of this pleasant patient. If you have any questions, please do not hesitate to contact my office.  Sincerely,  Jo Morris MD

## 2019-02-22 NOTE — NURSING NOTE
INITIAL PATIENT ASSESSMENT      Diagnosis: Rectal cancer    Prior radiation therapy: None    Prior chemotherapy:   Protocol: XELOX  Facility: [facility] - Started chemotherapy with Dr. Urbina, transferred care to Dr. Lawton  Dates: 1st Cycle: 10/25/2018 and 6th Cycle began with infusion on 2/7/2019 and completed oral chemotherapy on 2/21/2019.      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  Implantable Cardiac Device: No  Authorization To Share Protected Health Information: YES - Date: form signed and scanned into medical chart on 9/20/2018      Onset of menopause: Patient reports menopause at age 54.  Are you pregnant? No  Reproductive note: Patient reports she and her  have two children and four grandchildren.      Review of Systems     Constitutional: Positive for weight loss. Negative for chills, diaphoresis, fever and malaise/fatigue.        Patient reports approximately 16 pound weight loss with chemotherapy, decreased appetite and loss of taste.   HENT: Negative.    Eyes: Negative.    Respiratory: Negative.    Cardiovascular: Negative.    Gastrointestinal: Positive for abdominal pain, constipation and diarrhea. Negative for blood in stool, heartburn, melena, nausea and vomiting.        Patient reports intermittent diarrhea and constipation, taking Imodium as needed, reports intermittent abdominal pains.  Denies rectal pressure or bleeding.   Genitourinary: Negative.    Musculoskeletal: Negative.    Skin: Negative.    Neurological: Positive for tingling. Negative for dizziness, tremors, sensory change, speech change, focal weakness, seizures,  loss of consciousness, weakness and headaches.        Patient reports neuropathy of bilateral hands and feet related to chemotherapy.   Endo/Heme/Allergies: Negative.    Psychiatric/Behavioral: Positive for depression. Negative for hallucinations, memory loss, substance abuse and suicidal ideas. The patient is not nervous/anxious and does not have insomnia.         Patient reports intermittent moments of feeling down due to the recent death of her father.  She reports she has been in good spirits related to her cancer diagnosis and treatment.  Support offered, reviewed cancer center psychologist Dr. Buenrostro available here at [place], patient declines need at this time.     Nurse face-to-face time: Level 5:  over 15 min face to face time

## 2019-02-22 NOTE — PATIENT INSTRUCTIONS
What to expect at your Simulation visit:    You will meet with a Radiation Therapist and other team members who will be doing a planning session called a  simulation  with you. This process will determine your daily treatment.    ~ You will lie on a flat table and have a treatment planning CT scan.  It is important during the scan to hold very still and breathe normally.    ~ Your therapist may construct a body mold to help you hold still for your treatments.    ~ If you are having treatment to the head or neck area you will be fitted with a plastic mesh mask that fits very snugly over your face and neck.     ~ Your therapist will be taking some digital photos that will go in your treatment chart.      ~Your therapist will make marks on your skin and take measurements. Your therapist may ask you about making small tattoos (a permanent small dot) over these marks.  These marks are used to position you daily for your radiation therapy treatments. Please do not wash off any marks until all of your radiation therapy treatments are complete unless you are instructed to do so by your therapist.    ~ Once the simulation is completed it can take from 3 to 10 business days before you start radiation therapy treatments.    ~ You may meet with a nurse who will go over management of treatment side effects and self care during your treatments. The nurse will help to plan care with other departments and physicians if needed.    Additional information for simulation of the pelvis area   ~ It is important to have the same amount of urine in your bladder for each treatment.  In order to prepare for your simulation and for daily treatments, please:  Empty your bladder two hours prior to your scheduled appointment time.  Then drink about 2 to 4 cups of liquid to fill your bladder again.  It is important to drink the same amount of liquid each day.  Don t drink too much- you should be comfortable during your simulation and  treatment.  You should not empty your bladder again until the simulation or treatment appointment is complete.  ~You may have a small tube placed in your vagina for a few minutes during the CT scan to help your doctor see your internal organs.   ~Please speak with your physician, nurse or therapist if you have any questions regarding the above information.  If you feel you may have difficulties with this preparation, please let us know.  Please contact [facility] RN with questions or concerns following today's appointment: [phone].    Thank you!

## 2019-02-25 ENCOUNTER — TELEPHONE (OUTPATIENT)
Dept: RADIATION ONCOLOGY | Facility: CLINIC | Age: 64
End: 2019-02-25

## 2019-02-25 NOTE — TELEPHONE ENCOUNTER
Contacted patient's  to notify of scheduled daily radiation treatments.  Informed patient of radiation treatment start date of 03/06/2019 at 1450.   Reviewed with patient first day of treatment will be 30 minutes and all remaining daily radiation treatments with be 20 minutes.  Informed patient that weekly visits with Dr. Morris will be on Wednesdays during the course of radiation treatment.  Patient verbalized understanding of all information, confirmed appointment dates and times and appointment schedule mailed to patient s home.    Kelly Renteria MA

## 2019-02-26 ENCOUNTER — TELEPHONE (OUTPATIENT)
Dept: ONCOLOGY | Facility: CLINIC | Age: 64
End: 2019-02-26

## 2019-02-26 DIAGNOSIS — C20 RECTAL ADENOCARCINOMA (H): ICD-10-CM

## 2019-02-26 DIAGNOSIS — C20 RECTAL CANCER (H): Primary | ICD-10-CM

## 2019-02-26 NOTE — TELEPHONE ENCOUNTER
Please call pt's  Fabrice, Does Niharika need the 3/4 appt.? Her radiation has been moved to 3/6. Should she keep the matheus appt and if so can she see him in [place] instead? Please call to discuss.[phone] Thank You Waleska

## 2019-02-26 NOTE — TELEPHONE ENCOUNTER
Patient would like to see Dr. Lawton in [place] in the morning with start of Radiation.  Patient rescheduled for follow up.  Will have arm draw for labs.  Will route to Dr. Lawton for updated plan in order to release Xeloda in a timely manner.    Twin Ch RN, BSN, OCN  2/26/2019, 3:46 PM

## 2019-02-27 RX ORDER — PROCHLORPERAZINE MALEATE 10 MG
10 TABLET ORAL EVERY 6 HOURS PRN
Qty: 30 TABLET | Refills: 2 | Status: SHIPPED | OUTPATIENT
Start: 2019-02-27 | End: 2019-06-19

## 2019-02-27 RX ORDER — LORAZEPAM 0.5 MG/1
0.5 TABLET ORAL EVERY 4 HOURS PRN
Qty: 30 TABLET | Refills: 2 | Status: SHIPPED | OUTPATIENT
Start: 2019-02-27 | End: 2019-06-19

## 2019-02-28 RX ORDER — CAPECITABINE 500 MG/1
825 TABLET, FILM COATED ORAL 2 TIMES DAILY
Qty: 168 TABLET | Refills: 0 | Status: SHIPPED | OUTPATIENT
Start: 2019-02-28 | End: 2019-06-19

## 2019-02-28 RX ORDER — CAPECITABINE 500 MG/1
825 TABLET, FILM COATED ORAL 2 TIMES DAILY
Qty: 168 TABLET | Refills: 0 | Status: CANCELLED | OUTPATIENT
Start: 2019-03-06 | End: 2019-04-03

## 2019-03-01 ENCOUNTER — APPOINTMENT (OUTPATIENT)
Dept: RADIATION ONCOLOGY | Facility: CLINIC | Age: 64
End: 2019-03-01
Payer: COMMERCIAL

## 2019-03-01 PROCEDURE — 77470 SPECIAL RADIATION TREATMENT: CPT | Performed by: RADIOLOGY

## 2019-03-04 ENCOUNTER — APPOINTMENT (OUTPATIENT)
Dept: RADIATION ONCOLOGY | Facility: CLINIC | Age: 64
End: 2019-03-04
Payer: COMMERCIAL

## 2019-03-04 DIAGNOSIS — E10.65 TYPE 1 DIABETES MELLITUS WITH HYPERGLYCEMIA (H): ICD-10-CM

## 2019-03-04 PROCEDURE — 77301 RADIOTHERAPY DOSE PLAN IMRT: CPT | Performed by: RADIOLOGY

## 2019-03-04 PROCEDURE — 77338 DESIGN MLC DEVICE FOR IMRT: CPT | Performed by: RADIOLOGY

## 2019-03-04 PROCEDURE — 77300 RADIATION THERAPY DOSE PLAN: CPT | Performed by: RADIOLOGY

## 2019-03-05 RX ORDER — INSULIN GLARGINE 100 [IU]/ML
50 INJECTION, SOLUTION SUBCUTANEOUS DAILY
Qty: 15 ML | Refills: 0 | Status: SHIPPED | OUTPATIENT
Start: 2019-03-05 | End: 2019-04-11

## 2019-03-05 NOTE — TELEPHONE ENCOUNTER
"Requested Prescriptions   Pending Prescriptions Disp Refills     insulin glargine (BASAGLAR KWIKPEN) 100 UNIT/ML pen [Pharmacy Med Name: BASAGLAR KWIKPEN 100UNIT/ML SOPN] 15 mL 3    Last Written Prescription Date:  10/19/18  Last Fill Quantity: 15 mL,  # refills: 3   Last office visit: 9/20/2018 with prescribing provider:  7/20/18   Future Office Visit:   Next 5 appointments (look out 90 days)    Mar 06, 2019 10:15 AM CST  Return Visit with Dayron Lawton MD  [facility] [address] [place] [id number]  [phone]          Sig: INJECT 50 UNITS UNDER THE SKIN ONCE DAILY    Long Acting Insulin Protocol Passed - 3/4/2019  9:44 AM       Passed - Blood pressure less than 140/90 in past 6 months    BP Readings from Last 3 Encounters:   02/22/19 105/54   02/11/19 113/61   02/07/19 123/56                Passed - LDL on file in past 12 months    Recent Labs   Lab Test 07/20/18  0922   LDL 63            Passed - Microalbumin on file in past 12 months    Recent Labs   Lab Test 07/20/18  0929   MICROL 20   UMALCR 21.10            Passed - Serum creatinine on file in past 12 months    Recent Labs   Lab Test 02/07/19  0832  09/17/18  1251   CR 0.64   < >  --    CREAT  --   --  0.6    < > = values in this interval not displayed.            Passed - HgbA1C in past 3 or 6 months    If HgbA1C is 8 or greater, it needs to be on file within the past 3 months.  If less than 8, must be on file within the past 6 months.     Recent Labs   Lab Test 11/15/18  1035   A1C 7.1*            Passed - Medication is active on med list       Passed - Patient is age 18 or older       Passed - Recent (6 mo) or future (30 days) visit within the authorizing provider's specialty    Patient had office visit in the last 6 months or has a visit in the next 30 days with authorizing provider or within the authorizing provider's specialty.  See \"Patient Info\" tab in inbasket, or \"Choose Columns\" in Meds " & Orders section of the refill encounter.            Rx refilled per RN protocol.  1 month    Will forward to schedulers to schedule patient for OV - diabetes.  Zaria Layton RN

## 2019-03-05 NOTE — TELEPHONE ENCOUNTER
Called patient and she will call back and schedule an appt with Dr Farris at a later date. Patient states she is going through radiation right now and has a lot going on.

## 2019-03-06 ENCOUNTER — OFFICE VISIT (OUTPATIENT)
Dept: RADIATION ONCOLOGY | Facility: CLINIC | Age: 64
End: 2019-03-06
Payer: COMMERCIAL

## 2019-03-06 ENCOUNTER — APPOINTMENT (OUTPATIENT)
Dept: RADIATION ONCOLOGY | Facility: CLINIC | Age: 64
End: 2019-03-06
Payer: COMMERCIAL

## 2019-03-06 ENCOUNTER — ONCOLOGY VISIT (OUTPATIENT)
Dept: ONCOLOGY | Facility: CLINIC | Age: 64
End: 2019-03-06
Payer: COMMERCIAL

## 2019-03-06 VITALS
OXYGEN SATURATION: 95 % | HEART RATE: 65 BPM | DIASTOLIC BLOOD PRESSURE: 54 MMHG | SYSTOLIC BLOOD PRESSURE: 104 MMHG | WEIGHT: 165 LBS | TEMPERATURE: 98 F | BODY MASS INDEX: 29.23 KG/M2 | RESPIRATION RATE: 18 BRPM

## 2019-03-06 VITALS
RESPIRATION RATE: 16 BRPM | OXYGEN SATURATION: 97 % | BODY MASS INDEX: 29.36 KG/M2 | HEART RATE: 70 BPM | WEIGHT: 165.7 LBS | HEIGHT: 63 IN | SYSTOLIC BLOOD PRESSURE: 100 MMHG | TEMPERATURE: 94.7 F | DIASTOLIC BLOOD PRESSURE: 62 MMHG

## 2019-03-06 DIAGNOSIS — C20 RECTAL CANCER (H): ICD-10-CM

## 2019-03-06 DIAGNOSIS — C20 RECTAL CANCER (H): Primary | ICD-10-CM

## 2019-03-06 DIAGNOSIS — D64.9 NORMOCYTIC ANEMIA: Primary | ICD-10-CM

## 2019-03-06 DIAGNOSIS — C20 RECTAL ADENOCARCINOMA (H): ICD-10-CM

## 2019-03-06 LAB
ALBUMIN SERPL-MCNC: 3.9 G/DL (ref 3.4–5)
ALP SERPL-CCNC: 93 U/L (ref 40–150)
ALT SERPL W P-5'-P-CCNC: 30 U/L (ref 0–50)
ANION GAP SERPL CALCULATED.3IONS-SCNC: 7 MMOL/L (ref 3–14)
AST SERPL W P-5'-P-CCNC: 34 U/L (ref 0–45)
BASOPHILS # BLD AUTO: 0 10E9/L (ref 0–0.2)
BASOPHILS NFR BLD AUTO: 0.6 %
BILIRUB SERPL-MCNC: 0.4 MG/DL (ref 0.2–1.3)
BUN SERPL-MCNC: 16 MG/DL (ref 7–30)
CALCIUM SERPL-MCNC: 8.6 MG/DL (ref 8.5–10.1)
CEA SERPL-MCNC: 3.7 UG/L (ref 0–2.5)
CHLORIDE SERPL-SCNC: 106 MMOL/L (ref 94–109)
CO2 SERPL-SCNC: 29 MMOL/L (ref 20–32)
CREAT SERPL-MCNC: 0.6 MG/DL (ref 0.52–1.04)
DIFFERENTIAL METHOD BLD: ABNORMAL
EOSINOPHIL NFR BLD AUTO: 1.3 %
ERYTHROCYTE [DISTWIDTH] IN BLOOD BY AUTOMATED COUNT: 15.8 % (ref 10–15)
GFR SERPL CREATININE-BSD FRML MDRD: >90 ML/MIN/{1.73_M2}
GLUCOSE SERPL-MCNC: 56 MG/DL (ref 70–99)
HCT VFR BLD AUTO: 35.2 % (ref 35–47)
HGB BLD-MCNC: 11.8 G/DL (ref 11.7–15.7)
IMM GRANULOCYTES # BLD: 0 10E9/L (ref 0–0.4)
IMM GRANULOCYTES NFR BLD: 0.2 %
LYMPHOCYTES # BLD AUTO: 1.7 10E9/L (ref 0.8–5.3)
LYMPHOCYTES NFR BLD AUTO: 37.2 %
MCH RBC QN AUTO: 34.4 PG (ref 26.5–33)
MCHC RBC AUTO-ENTMCNC: 33.5 G/DL (ref 31.5–36.5)
MCV RBC AUTO: 103 FL (ref 78–100)
MONOCYTES # BLD AUTO: 0.5 10E9/L (ref 0–1.3)
MONOCYTES NFR BLD AUTO: 11 %
NEUTROPHILS # BLD AUTO: 2.3 10E9/L (ref 1.6–8.3)
NEUTROPHILS NFR BLD AUTO: 49.7 %
NRBC # BLD AUTO: 0 10*3/UL
NRBC BLD AUTO-RTO: 0 /100
PLATELET # BLD AUTO: 119 10E9/L (ref 150–450)
POTASSIUM SERPL-SCNC: 4.1 MMOL/L (ref 3.4–5.3)
PROT SERPL-MCNC: 6.8 G/DL (ref 6.8–8.8)
RBC # BLD AUTO: 3.43 10E12/L (ref 3.8–5.2)
SODIUM SERPL-SCNC: 142 MMOL/L (ref 133–144)
WBC # BLD AUTO: 4.7 10E9/L (ref 4–11)

## 2019-03-06 PROCEDURE — 99207 ZZC DROP WITH A PROCEDURE: CPT | Performed by: RADIOLOGY

## 2019-03-06 PROCEDURE — 77386 ZZC IMRT TREATMENT DELIVERY, COMPLEX: CPT | Performed by: RADIOLOGY

## 2019-03-06 PROCEDURE — 36415 COLL VENOUS BLD VENIPUNCTURE: CPT | Performed by: INTERNAL MEDICINE

## 2019-03-06 PROCEDURE — 77014 ZZHC CT GUIDE FOR PLACEMENT RADIATION THERAPY FIELDS: CPT | Performed by: RADIOLOGY

## 2019-03-06 PROCEDURE — 85025 COMPLETE CBC W/AUTO DIFF WBC: CPT | Performed by: INTERNAL MEDICINE

## 2019-03-06 PROCEDURE — 80053 COMPREHEN METABOLIC PANEL: CPT | Performed by: INTERNAL MEDICINE

## 2019-03-06 PROCEDURE — 99215 OFFICE O/P EST HI 40 MIN: CPT | Performed by: INTERNAL MEDICINE

## 2019-03-06 PROCEDURE — 82378 CARCINOEMBRYONIC ANTIGEN: CPT | Performed by: INTERNAL MEDICINE

## 2019-03-06 ASSESSMENT — MIFFLIN-ST. JEOR: SCORE: 1285.74

## 2019-03-06 ASSESSMENT — PAIN SCALES - GENERAL
PAINLEVEL: NO PAIN (0)
PAINLEVEL: NO PAIN (0)

## 2019-03-06 NOTE — PATIENT INSTRUCTIONS
Please contact [facility] RN with questions or concerns following today's appointment: [phone].    Thank you!

## 2019-03-06 NOTE — PROGRESS NOTES
FOLLOW-UP VISIT NOTE    PATIENT NAME: Niharika Cason MRN # [id number]  DATE OF VISIT: Mar 6, 2019 YOB: 1955    REFERRING PROVIDER: Liliana Urbina MD  [address] [place] [id number]    CANCER TYPE:Recatl adenocarcinoma  STAGE: cT3cN1 M0  ECOG PS: 1    ONCOLOGY HISTORY:    Niharika Cason is a 63-year-old female with past medical history significant for hypertension, diabetes mellitus type 1, anxiety underwent her first screening colonoscopy on 09/17/18 which revealed a partially obstructing mass in the rectosigmoid colon  Which was biopsied and came back as invasive moderately differentiated adenocarcinoma but intact mismatch repair proteins. Staging CT scans showed a 3 cm enhancing lesion in the right lobe of liver he subsequently had an MRI done which characterized a liver lesion as well as VOCAL lobular hyperplasia with no suspected metastatic disease. MRI of pelvis showed an irregular ulcerated left rectal wall mass at 9 cm from the anal verge extending to the muscularis propria into the perirectal fat. Also noted were suspicious perirectal and presacral lymph nodes -clinical stage T3cN1 M0. She met with medical and surgical oncology and the recommendation was to proceed with total neoadjuvant therapy.      10/25/18  Started XELOX chemotherapy    12/06//18 Upon completion of oxaliplatin dose, patient complained of tingling in extremities and buttock region along with Blurred vision. IV Solu-Medrol was given and symptoms improved within half an hour. Oxaliplatin dsoe reduced with subsequent cycles and symptoms did not recur.    12/21/18 CT scans after 3 cycles shows stable findings with no concerns for progression/new metastasis      SUBJECTIVE     In clinic today to proceed with concurrent chemoradiation. No active complaints. Denies abdominal pain, fatigue, neuropathy, blood in the stools, weight loss, nausea/vomiting or any other issues. Stable appetite and energy levels.       PAST MEDICAL HISTORY     Past Medical History:   Diagnosis Date     Essential hypertension, benign      Generalized anxiety disorder      Hyperlipidemia      Rectal cancer (H) 09/17/2018     Type I (juvenile type) diabetes mellitus without mention of complication, not stated as uncontrolled     diagnosed at age 35     Ulcerative colitis, unspecified     in the 70s.           CURRENT OUTPATIENT MEDICATIONS     Current Outpatient Medications   Medication Sig Dispense Refill     atenolol (TENORMIN) 25 MG tablet Take 1 tablet (25 mg) by mouth daily 93 tablet 3     blood glucose (CONTOUR NEXT TEST) test strip Use to test blood sugar 4 times daily or as directed. 400 each 3     blood glucose (NO BRAND SPECIFIED) test strip Use to test blood sugar 4 times daily or as directed. 300 strip 3     blood glucose calibration (CONTOUR NEXT CONTROL NORMAL VI SOLN) Normal solution Use to calibrate blood glucose monitor as needed as directed. 1 each 11     blood glucose calibration (NO BRAND SPECIFIED) solution Use to calibrate blood glucose monitor as needed as directed. 1 each 1     blood glucose monitoring (MARICEL MICROLET) lancets Use to test blood sugar 4 times daily or as directed. 400 each 3     blood glucose monitoring (CONTOUR NEXT MONITOR W/DEVICE KIT) meter device kit Use to test blood sugar 4 times daily or as directed. 1 kit 0     blood glucose monitoring (ONE TOUCH DELICA) lancets Use to test blood sugar 4 times daily or as directed. 300 each 3     blood glucose monitoring (ONETOUCH VERIO) meter device kit Use to test blood sugar 4 times daily or as directed. 1 kit 0     hydrochlorothiazide (MICROZIDE) 12.5 MG capsule Take 1 capsule (12.5 mg) by mouth daily 93 capsule 3     insulin glargine (BASAGLAR KWIKPEN) 100 UNIT/ML pen Inject 50 Units Subcutaneous daily Appointment needed for additional refills. 15 mL 0     insulin lispro (HUMALOG KWIKPEN) 100 UNIT/ML injection Use 3 units before breakfast, 6 units before lunch,  and 16 units before dinner, plus correction of 1 unit per every 50 glucose over 125, averaging 3 for breakfast, 6 for lunch, 16 for dinner. 30 mL 3     Insulin Pen Needle 33G X 4 MM MISC 1 each daily Use 4 needles per day 200 each prn     lidocaine-prilocaine (EMLA) cream Apply 1 g topically as needed for moderate pain 30 g 3     lisinopril (PRINIVIL/ZESTRIL) 40 MG tablet Take 1 tablet (40 mg) by mouth daily 93 tablet 3     LORazepam (ATIVAN) 0.5 MG tablet Take 1 tablet (0.5 mg) by mouth every 4 hours as needed (Anxiety, Nausea/Vomiting or Sleep) 30 tablet 2     Multiple Vitamins-Minerals (MULTIVITAMIN PO) Take by mouth daily       ondansetron (ZOFRAN-ODT) 8 MG ODT tab Take 1 tablet (8 mg) by mouth every 8 hours as needed for nausea 60 tablet 3     PARoxetine (PAXIL) 30 MG tablet Take 1 tablet (30 mg) by mouth every morning 93 tablet 3     prochlorperazine (COMPAZINE) 10 MG tablet Take 1 tablet (10 mg) by mouth every 6 hours as needed (Nausea/Vomiting) 30 tablet 2     Pyridoxine HCl (VITAMIN B-6 PO) Take by mouth daily       simvastatin (ZOCOR) 10 MG tablet TAKE ONE TABLET BY MOUTH EVERY NIGHT AT BEDTIME 93 tablet 3     capecitabine (XELODA) 500 MG tablet CHEMO Take 3 tablets (1,500 mg) by mouth 2 times daily for 56 doses Take Mon-Fri. Do NOT take on Sat-Sun. Take with water within 30 min after meal (Patient not taking: Reported on 3/6/2019) 168 tablet 0        ALLERGIES     Allergies   Allergen Reactions     No Known Drug Allergies         REVIEW OF SYSTEMS   As above in the HPI, o/w complete 12-point ROS was negative.     PHYSICAL EXAM   B/P: Data Unavailable, T: Data Unavailable, P: Data Unavailable, R: Data Unavailable  SpO2 Readings from Last 4 Encounters:   11/15/18 95%   11/15/18 96%   11/02/18 96%   10/25/18 96%     Wt Readings from Last 3 Encounters:   03/06/19 75.2 kg (165 lb 11.2 oz)   02/22/19 74.3 kg (163 lb 11.2 oz)   02/11/19 74.6 kg (164 lb 8 oz)     GEN: NAD  Mouth/ENT: Oropharynx is clear.  NECK:  no  lymphadenopathy  LUNGS: clear bilaterally  CV: regular, no murmurs, rubs, or gallops  ABDOMEN: soft, non-tender, non-distended,  EXT: warm, well perfused, no edema  NEURO: alert  SKIN: no rashes     LABORATORY AND IMAGING STUDIES     Lab Results   Component Value Date    WBC 4.7 03/06/2019     Lab Results   Component Value Date    RBC 3.43 03/06/2019     Lab Results   Component Value Date    HGB 11.8 03/06/2019     Lab Results   Component Value Date    HCT 35.2 03/06/2019     No components found for: MCT  Lab Results   Component Value Date     03/06/2019     Lab Results   Component Value Date    MCH 34.4 03/06/2019     Lab Results   Component Value Date    MCHC 33.5 03/06/2019     Lab Results   Component Value Date    RDW 15.8 03/06/2019     Lab Results   Component Value Date     03/06/2019     Recent Labs   Lab Test 02/07/19  0832 01/17/19  1041    140   POTASSIUM 3.9 3.8   CHLORIDE 105 106   CO2 29 29   ANIONGAP 6 5   * 49*   BUN 13 14   CR 0.64 0.58   JUNG 8.5 8.5        ASSESSMENT AND PLAN     63-year-old female with past medical history significant for hypertension, diabetes mellitus type 1, anxiety on a screening colonoscopy was diagnosed with rectal adenocarcinoma     - Rectal adenocarcinoma- T3cN1 M0 intact mismatch repair protein  She met with medical and surgical oncology at [facility] and the recommendation was to proceed with total neoadjuvant therapy.  1. 6 cycles of XELOX, then:   2. 2 months of CXRT.   3. Surgery after 6-8 weeks.     - Surveillance for response to therapy [id number] CT c/a/p and Flex Sig at completion of chemotherapy and then prior to the surgery.      10/25/18 Started XELOX chemotherapy with oxaliplatin dose reduced with cycle 4 to 100 mg/m2 given neurological toxicity with higher dose  CT scans were reviewed with no findings concerning for progression. Sigmoidoscopy after completion of neoadjuvant chemotherapy showed positive response to  treatment with shrinkage of the primary tumor. Patient has met with radiation oncology with plans to start neoadjuvant radiation today. Discussed with her today about the role of concurrent chemotherapy with Xeloda at 850 mg/m  Monday to Friday while on radiation. Side effect profile medication plan including but not limited to lowering of blood counts, infections, fatigue, mucositis, diarrhea, hand-foot syndrome etc. Patient understands the risk willing to proceed plan of care. We will recommend starting Xeloda today along with radiation.      - Normocytic anemia/thrombocytopenia  Secondary to chemotherapy  Not symptomatic  Monitor     - Diabetes mellitus  On an insulin regimen and managed by primary care provider      - Anxiety  Continue with paxil  Ativan prn      Return to clinic in 3 weeks for office visit, labs.      Chart documentation with Dragon Voice recognition Software. Although reviewed after completion, some words and grammatical errors may remain.  Dayron Lawton MD  Attending Physician   Hematology/Medical Oncology

## 2019-03-06 NOTE — Clinical Note
3/6/2019         RE: Niharika Cason  [address] [facility] [address]        Dear Colleague,    Thank you for referring your patient, Niharika Cason, to the [facility]. Please see a copy of my visit note below.      FOLLOW-UP VISIT NOTE    PATIENT NAME: Niharika Cason MRN # [id number]  DATE OF VISIT: Mar 6, 2019 YOB: 1955    REFERRING PROVIDER: Liliana Urbina MD  [address] [address]    CANCER TYPE:Recatl adenocarcinoma  STAGE: cT3cN1 M0  ECOG PS: 1    ONCOLOGY HISTORY:    Niharika Cason is a 63-year-old female with past medical history significant for hypertension, diabetes mellitus type 1, anxiety underwent her first screening colonoscopy on 09/17/18 which revealed a partially obstructing mass in the rectosigmoid colon  Which was biopsied and came back as invasive moderately differentiated adenocarcinoma but intact mismatch repair proteins. Staging CT scans showed a 3 cm enhancing lesion in the right lobe of liver he subsequently had an MRI done which characterized a liver lesion as well as VOCAL lobular hyperplasia with no suspected metastatic disease. MRI of pelvis showed an irregular ulcerated left rectal wall mass at 9 cm from the anal verge extending to the muscularis propria into the perirectal fat. Also noted were suspicious perirectal and presacral lymph nodes -clinical stage T3cN1 M0. She met with medical and surgical oncology and the recommendation was to proceed with total neoadjuvant therapy.      10/25/18  Started XELOX chemotherapy    12/06//18 Upon completion of oxaliplatin dose, patient complained of tingling in extremities and buttock region along with Blurred vision. IV Solu-Medrol was given and symptoms improved within half an hour. Oxaliplatin dsoe reduced with subsequent cycles and symptoms did not recur.    12/21/18 CT scans after 3 cycles shows stable findings with no concerns for progression/new metastasis      SUBJECTIVE      In clinic today to proceed with concurrent chemoradiation. No active complaints. Denies abdominal pain, fatigue, neuropathy, blood in the stools, weight loss, nausea/vomiting or any other issues. Stable appetite and energy levels.      PAST MEDICAL HISTORY     Past Medical History:   Diagnosis Date     Essential hypertension, benign      Generalized anxiety disorder      Hyperlipidemia      Rectal cancer (H) 09/17/2018     Type I (juvenile type) diabetes mellitus without mention of complication, not stated as uncontrolled     diagnosed at age 35     Ulcerative colitis, unspecified     in the 70s.           CURRENT OUTPATIENT MEDICATIONS     Current Outpatient Medications   Medication Sig Dispense Refill     atenolol (TENORMIN) 25 MG tablet Take 1 tablet (25 mg) by mouth daily 93 tablet 3     blood glucose (CONTOUR NEXT TEST) test strip Use to test blood sugar 4 times daily or as directed. 400 each 3     blood glucose (NO BRAND SPECIFIED) test strip Use to test blood sugar 4 times daily or as directed. 300 strip 3     blood glucose calibration (CONTOUR NEXT CONTROL NORMAL VI SOLN) Normal solution Use to calibrate blood glucose monitor as needed as directed. 1 each 11     blood glucose calibration (NO BRAND SPECIFIED) solution Use to calibrate blood glucose monitor as needed as directed. 1 each 1     blood glucose monitoring (MARICEL MICROLET) lancets Use to test blood sugar 4 times daily or as directed. 400 each 3     blood glucose monitoring (CONTOUR NEXT MONITOR W/DEVICE KIT) meter device kit Use to test blood sugar 4 times daily or as directed. 1 kit 0     blood glucose monitoring (ONE TOUCH DELICA) lancets Use to test blood sugar 4 times daily or as directed. 300 each 3     blood glucose monitoring (ONETOUCH VERIO) meter device kit Use to test blood sugar 4 times daily or as directed. 1 kit 0     hydrochlorothiazide (MICROZIDE) 12.5 MG capsule Take 1 capsule (12.5 mg) by mouth daily 93 capsule 3     insulin  glargine (BASAGLAR KWIKPEN) 100 UNIT/ML pen Inject 50 Units Subcutaneous daily Appointment needed for additional refills. 15 mL 0     insulin lispro (HUMALOG KWIKPEN) 100 UNIT/ML injection Use 3 units before breakfast, 6 units before lunch, and 16 units before dinner, plus correction of 1 unit per every 50 glucose over 125, averaging 3 for breakfast, 6 for lunch, 16 for dinner. 30 mL 3     Insulin Pen Needle 33G X 4 MM MISC 1 each daily Use 4 needles per day 200 each prn     lidocaine-prilocaine (EMLA) cream Apply 1 g topically as needed for moderate pain 30 g 3     lisinopril (PRINIVIL/ZESTRIL) 40 MG tablet Take 1 tablet (40 mg) by mouth daily 93 tablet 3     LORazepam (ATIVAN) 0.5 MG tablet Take 1 tablet (0.5 mg) by mouth every 4 hours as needed (Anxiety, Nausea/Vomiting or Sleep) 30 tablet 2     Multiple Vitamins-Minerals (MULTIVITAMIN PO) Take by mouth daily       ondansetron (ZOFRAN-ODT) 8 MG ODT tab Take 1 tablet (8 mg) by mouth every 8 hours as needed for nausea 60 tablet 3     PARoxetine (PAXIL) 30 MG tablet Take 1 tablet (30 mg) by mouth every morning 93 tablet 3     prochlorperazine (COMPAZINE) 10 MG tablet Take 1 tablet (10 mg) by mouth every 6 hours as needed (Nausea/Vomiting) 30 tablet 2     Pyridoxine HCl (VITAMIN B-6 PO) Take by mouth daily       simvastatin (ZOCOR) 10 MG tablet TAKE ONE TABLET BY MOUTH EVERY NIGHT AT BEDTIME 93 tablet 3     capecitabine (XELODA) 500 MG tablet CHEMO Take 3 tablets (1,500 mg) by mouth 2 times daily for 56 doses Take Mon-Fri. Do NOT take on Sat-Sun. Take with water within 30 min after meal (Patient not taking: Reported on 3/6/2019) 168 tablet 0        ALLERGIES     Allergies   Allergen Reactions     No Known Drug Allergies         REVIEW OF SYSTEMS   As above in the HPI, o/w complete 12-point ROS was negative.     PHYSICAL EXAM   B/P: Data Unavailable, T: Data Unavailable, P: Data Unavailable, R: Data Unavailable  SpO2 Readings from Last 4 Encounters:   11/15/18 95%    11/15/18 96%   11/02/18 96%   10/25/18 96%     Wt Readings from Last 3 Encounters:   03/06/19 75.2 kg (165 lb 11.2 oz)   02/22/19 74.3 kg (163 lb 11.2 oz)   02/11/19 74.6 kg (164 lb 8 oz)     GEN: NAD  Mouth/ENT: Oropharynx is clear.  NECK: no  lymphadenopathy  LUNGS: clear bilaterally  CV: regular, no murmurs, rubs, or gallops  ABDOMEN: soft, non-tender, non-distended,  EXT: warm, well perfused, no edema  NEURO: alert  SKIN: no rashes     LABORATORY AND IMAGING STUDIES     Lab Results   Component Value Date    WBC 4.7 03/06/2019     Lab Results   Component Value Date    RBC 3.43 03/06/2019     Lab Results   Component Value Date    HGB 11.8 03/06/2019     Lab Results   Component Value Date    HCT 35.2 03/06/2019     No components found for: MCT  Lab Results   Component Value Date     03/06/2019     Lab Results   Component Value Date    MCH 34.4 03/06/2019     Lab Results   Component Value Date    MCHC 33.5 03/06/2019     Lab Results   Component Value Date    RDW 15.8 03/06/2019     Lab Results   Component Value Date     03/06/2019     Recent Labs   Lab Test 02/07/19  0832 01/17/19  1041    140   POTASSIUM 3.9 3.8   CHLORIDE 105 106   CO2 29 29   ANIONGAP 6 5   * 49*   BUN 13 14   CR 0.64 0.58   JUNG 8.5 8.5        ASSESSMENT AND PLAN     63-year-old female with past medical history significant for hypertension, diabetes mellitus type 1, anxiety on a screening colonoscopy was diagnosed with rectal adenocarcinoma     - Rectal adenocarcinoma- T3cN1 M0 intact mismatch repair protein  She met with medical and surgical oncology at [facility] and the recommendation was to proceed with total neoadjuvant therapy.  1. 6 cycles of XELOX, then:   2. 2 months of CXRT.   3. Surgery after 6-8 weeks.     - Surveillance for response to therapy [id number] CT c/a/p and Flex Sig at completion of chemotherapy and then prior to the surgery.      10/25/18 Started XELOX chemotherapy with oxaliplatin  dose reduced with cycle 4 to 100 mg/m2 given neurological toxicity with higher dose  CT scans were reviewed with no findings concerning for progression. Sigmoidoscopy after completion of neoadjuvant chemotherapy showed positive response to treatment with shrinkage of the primary tumor. Patient has met with radiation oncology with plans to start neoadjuvant radiation today. Discussed with her today about the role of concurrent chemotherapy with Xeloda at 850 mg/m  Monday to Friday while on radiation. Side effect profile medication plan including but not limited to lowering of blood counts, infections, fatigue, mucositis, diarrhea, hand-foot syndrome etc. Patient understands the risk willing to proceed plan of care. We will recommend starting Xeloda today along with radiation.      - Normocytic anemia/thrombocytopenia  Secondary to chemotherapy  Not symptomatic  Monitor     - Diabetes mellitus  On an insulin regimen and managed by primary care provider      - Anxiety  Continue with paxil  Ativan prn      Return to clinic in 3 weeks for office visit, labs.      Chart documentation with Dragon Voice recognition Software. Although reviewed after completion, some words and grammatical errors may remain.  Dayron Lawton MD  Attending Physician   Hematology/Medical Oncology          Again, thank you for allowing me to participate in the care of your patient.        Sincerely,        Dayron Lawton MD

## 2019-03-06 NOTE — PATIENT INSTRUCTIONS
Today:  Continue treatment with Xeloda.  Please start today.  Continue with start of Radiation.    Please follow up with Dr. Lawton in [place] on 3/25/19 with Port labs.      Port Lab Date/Time:   3/25/19 at     Office visit follow up with Dr. Lawton Date/Time:   3/25/19 at      If you have any questions or concerns please feel free to call.    If you need to reschedule please call:  Clinic or Lab Appointment - [phone]  Infusion - [phone]  Imaging - [phone]    Twin Ch, RN, BSN, OCN  [facility]   Oncology/Hematology Care Coordinator RN  [facility]  [phone]

## 2019-03-06 NOTE — NURSING NOTE
Teaching Flowsheet   Relevant Diagnosis: rectal cancer  Teaching Topic: radiation therapy     Person(s) involved in teaching:   Patient and      Motivation Level:  Asks Questions: Yes  Eager to Learn: Yes  Cooperative: Yes  Receptive (willing/able to accept information): Yes  Any cultural factors/Jain beliefs that may influence understanding or compliance? No       Patient and Family demonstrates understanding of the following:  Reason for the appointment, diagnosis and treatment plan: Yes  Knowledge of proper use of medications and conditions for which they are ordered (with special attention to potential side effects or drug interactions): Yes  Which situations necessitate calling provider and whom to contact: Yes       Teaching Concerns Addressed:   Comments: Radiation therapy side effects: fatigue, skin changes and skin cares, nausea/vomiting, diarrhea, urinary and bladder changes     Proper use and care of  (medical equip, care aids, etc.): NA  Nutritional needs and diet plan: Yes  Pain management techniques: Yes  Wound Care: Yes  How and/when to access community resources: Yes     Instructional Materials Used/Given: Radiation therapy educational handouts: fatigue, skin changes and skin cares, nausea/vomiting, diarrhea     Time spent with patient: 15 minutes.

## 2019-03-07 ENCOUNTER — APPOINTMENT (OUTPATIENT)
Dept: RADIATION ONCOLOGY | Facility: CLINIC | Age: 64
End: 2019-03-07
Payer: COMMERCIAL

## 2019-03-07 PROCEDURE — 77014 ZZHC CT GUIDE FOR PLACEMENT RADIATION THERAPY FIELDS: CPT | Performed by: RADIOLOGY

## 2019-03-07 PROCEDURE — 77386 ZZC IMRT TREATMENT DELIVERY, COMPLEX: CPT | Performed by: RADIOLOGY

## 2019-03-07 NOTE — PROGRESS NOTES
[facility] PHYSICIANS  SPECIALIZING IN BREAKTHROUGHS  Radiation Oncology    On Treatment Visit Note      Niharika Cason      Date: 3/7/2019   MRN: [id number]   : 1955  Diagnosis: rectal cancer      Reason for Visit:  On Radiation Treatment Visit     Treatment Summary to Date  Treatment Site: pelvis + rectal boost Current Dose: 180/5040 cGy Fractions:       Chemotherapy  Chemo concurrent with radx?: Yes  Oncologist: Dr. Lawton  Drug Name/Frequency 1: Xeloda  Drug Name/Frequency 1: patient completed XELOX on 2019    Subjective:     First day of treatment with no complaints    Nursing ROS:   Nutrition Alteration  Diet Type: Patient's Preference  Nutrition Note: reviewed high-calorie/high-protein diet  Skin  Skin Reaction: 0 - No changes  Skin Intervention: skin changes and skin cares reviewed, Aquaphor samples provided        Cardiovascular  Respiratory effort: 1 - Normal - without distress  Gastrointestinal  Nausea: 0 - None  Diarrhea: 0 - None  Genitourinary  Urinary Status: 0 - Normal  Psychosocial  Mood - Anxiety: 0 - Normal  Mood - Depression: 0 - Normal  Pyschosocial Note: energy level at baseline  Pain Assessment  0-10 Pain Scale: 0      Objective:   /54 (BP Location: Left arm, Patient Position: Chair, Cuff Size: Adult Regular)   Pulse 65   Temp 98  F (36.7  C) (Oral)   Resp 18   Wt 74.8 kg (165 lb)   LMP 10/15/2008 (Approximate)   SpO2 95%   BMI 29.23 kg/m    Gen: Appears well, in no acute distress  Skin: No erythema    Labs:  CBC RESULTS:   Recent Labs   Lab Test 19  1014   WBC 4.7   RBC 3.43*   HGB 11.8   HCT 35.2   *   MCH 34.4*   MCHC 33.5   RDW 15.8*   *     ELECTROLYTES:  Recent Labs   Lab Test 19  1014      POTASSIUM 4.1   CHLORIDE 106   JUNG 8.6   CO2 29   BUN 16   CR 0.60   GLC 56*       Assessment:    Tolerating radiation therapy well.  All questions and concerns addressed.    Toxicities:  Fatigue: Grade 0: No toxicity    Plan:    1. Continue current therapy.        Mosaiq chart and setup information reviewed  MVCT/IGRT images checked    Medication Review  Med list reviewed with patient?: Yes  Med list printed and given: Offered and declined  Med Note: patient reports did not take Xeloda this morning as received prescription this afternoon, will take first dose this evening    Educational Topic Discussed  Education Instructions: radiation therapy side effects: fatigue, skin changes and skin cares, nausea/vomiting, diarrhea, urinary and bladder changes        Jo Morris MD    Please do not send letter to referring physician.

## 2019-03-08 ENCOUNTER — APPOINTMENT (OUTPATIENT)
Dept: RADIATION ONCOLOGY | Facility: CLINIC | Age: 64
End: 2019-03-08
Payer: COMMERCIAL

## 2019-03-08 PROCEDURE — 77386 ZZC IMRT TREATMENT DELIVERY, COMPLEX: CPT | Performed by: RADIOLOGY

## 2019-03-08 PROCEDURE — 77014 ZZHC CT GUIDE FOR PLACEMENT RADIATION THERAPY FIELDS: CPT | Performed by: RADIOLOGY

## 2019-03-11 ENCOUNTER — APPOINTMENT (OUTPATIENT)
Dept: RADIATION ONCOLOGY | Facility: CLINIC | Age: 64
End: 2019-03-11
Payer: COMMERCIAL

## 2019-03-11 PROCEDURE — 77386 ZZC IMRT TREATMENT DELIVERY, COMPLEX: CPT | Performed by: RADIOLOGY

## 2019-03-11 PROCEDURE — 77014 ZZHC CT GUIDE FOR PLACEMENT RADIATION THERAPY FIELDS: CPT | Performed by: RADIOLOGY

## 2019-03-12 ENCOUNTER — APPOINTMENT (OUTPATIENT)
Dept: RADIATION ONCOLOGY | Facility: CLINIC | Age: 64
End: 2019-03-12
Payer: COMMERCIAL

## 2019-03-12 PROCEDURE — 77386 ZZC IMRT TREATMENT DELIVERY, COMPLEX: CPT | Performed by: RADIOLOGY

## 2019-03-12 PROCEDURE — 77336 RADIATION PHYSICS CONSULT: CPT | Performed by: RADIOLOGY

## 2019-03-12 PROCEDURE — 77427 RADIATION TX MANAGEMENT X5: CPT | Performed by: RADIOLOGY

## 2019-03-12 PROCEDURE — 77014 ZZHC CT GUIDE FOR PLACEMENT RADIATION THERAPY FIELDS: CPT | Performed by: RADIOLOGY

## 2019-03-13 ENCOUNTER — OFFICE VISIT (OUTPATIENT)
Dept: RADIATION ONCOLOGY | Facility: CLINIC | Age: 64
End: 2019-03-13
Payer: COMMERCIAL

## 2019-03-13 ENCOUNTER — APPOINTMENT (OUTPATIENT)
Dept: RADIATION ONCOLOGY | Facility: CLINIC | Age: 64
End: 2019-03-13
Payer: COMMERCIAL

## 2019-03-13 VITALS
DIASTOLIC BLOOD PRESSURE: 65 MMHG | BODY MASS INDEX: 29.05 KG/M2 | WEIGHT: 164 LBS | OXYGEN SATURATION: 98 % | RESPIRATION RATE: 18 BRPM | TEMPERATURE: 98.4 F | HEART RATE: 68 BPM | SYSTOLIC BLOOD PRESSURE: 107 MMHG

## 2019-03-13 DIAGNOSIS — C20 RECTAL CANCER (H): Primary | ICD-10-CM

## 2019-03-13 PROCEDURE — 77014 ZZHC CT GUIDE FOR PLACEMENT RADIATION THERAPY FIELDS: CPT | Performed by: RADIOLOGY

## 2019-03-13 PROCEDURE — 99207 ZZC DROP WITH A PROCEDURE: CPT | Performed by: RADIOLOGY

## 2019-03-13 PROCEDURE — 77386 ZZC IMRT TREATMENT DELIVERY, COMPLEX: CPT | Performed by: RADIOLOGY

## 2019-03-13 ASSESSMENT — PAIN SCALES - GENERAL
PAINLEVEL: NO PAIN (0)
PAINLEVEL: NO PAIN (0)

## 2019-03-13 NOTE — PATIENT INSTRUCTIONS
Please contact [facility] RN with questions or concerns following today's appointment: [phone].    Thank you!

## 2019-03-13 NOTE — PROGRESS NOTES
"[facility] PHYSICIANS  SPECIALIZING IN BREAKTHROUGHS  Radiation Oncology    On Treatment Visit Note      Niharika Cason      Date: 3/13/2019   MRN: [id number]   : 1955  Diagnosis: rectal cancer      Reason for Visit:  On Radiation Treatment Visit     Treatment Summary to Date  Treatment Site: pelvis + rectal boost Current Dose: 1080/5040 cGy Fractions:       Chemotherapy  Chemo concurrent with radx?: Yes  Oncologist: Dr. Lwaton  Drug Name/Frequency 1: Xeloda  Drug Name/Frequency 1: patient completed XELOX on 2019    Subjective:     Denies n/v. However has lots of gas and has passed some mucus.  Has loose stools but denies diarrhea    Nursing ROS:   Nutrition Alteration  Diet Type: Patient's Preference  Nutrition Note: reviewed high-calorie/high-protein diet  Skin  Skin Reaction: 0 - No changes  Skin Intervention: patient using Aquaphor daily        Cardiovascular  Respiratory effort: 1 - Normal - without distress  Gastrointestinal  Nausea: 0 - None  Diarrhea: 1 - Abdominal cramping, two or less soft or liquid bowel movements  GI Note: patient reports rectal urgency, \"it feels like I have to go, but I sit down to push and nothing comes out\", patient reports intermittent leaking of mucous, wearing pantyliner, reports when she is able to have a bowel movement her stool is soft/formed, patient reports intermittent blood on toilet paper when wiping  Genitourinary  Urinary Status: 0 - Normal  Psychosocial  Mood - Anxiety: 0 - Normal  Mood - Depression: 0 - Normal  Pyschosocial Note: energy level at baseline  Pain Assessment  0-10 Pain Scale: 0      Objective:   /65 (BP Location: Right arm, Patient Position: Sitting, Cuff Size: Adult Regular)   Pulse 68   Temp 98.4  F (36.9  C) (Oral)   Resp 18   Wt 74.4 kg (164 lb)   LMP 10/15/2008 (Approximate)   SpO2 98%   BMI 29.05 kg/m    Gen: Appears well, in no acute distress  Skin: No erythema    Labs:  CBC RESULTS:   Recent Labs   Lab Test " 03/06/19  1014   WBC 4.7   RBC 3.43*   HGB 11.8   HCT 35.2   *   MCH 34.4*   MCHC 33.5   RDW 15.8*   *     ELECTROLYTES:  Recent Labs   Lab Test 03/06/19  1014      POTASSIUM 4.1   CHLORIDE 106   JUNG 8.6   CO2 29   BUN 16   CR 0.60   GLC 56*       Assessment:    Tolerating radiation therapy well.  All questions and concerns addressed. Ate popcorn and high fiber diet over the weekend.  Discussed diet modifications today.  She will try this over the next few days.  If no improvement recommend GasX    Toxicities:  Fatigue: Grade 1: Fatigue relieved by rest  Nausea: Grade 0: No toxicity  Pain: Grade 0: No toxicity  Diarrhea: Grade 0: No toxicity  Urinary urgency: Grade 0: No toxicity    Plan:   1. Continue current therapy.    2. Diet modifications (low fiber/no popcorn)      Mosaiq chart and setup information reviewed  MVCT/IGRT images checked    Medication Review  Med list reviewed with patient?: Yes  Med list printed and given: Offered and declined  Med Note: patient confirms has been taking Xeloda as prescribed    Educational Topic Discussed  Education Instructions: reviewed hydration/diet        Jo Morris MD    Please do not send letter to referring physician.

## 2019-03-14 ENCOUNTER — APPOINTMENT (OUTPATIENT)
Dept: RADIATION ONCOLOGY | Facility: CLINIC | Age: 64
End: 2019-03-14
Payer: COMMERCIAL

## 2019-03-14 PROCEDURE — 77386 ZZC IMRT TREATMENT DELIVERY, COMPLEX: CPT | Performed by: RADIOLOGY

## 2019-03-14 PROCEDURE — 77014 ZZHC CT GUIDE FOR PLACEMENT RADIATION THERAPY FIELDS: CPT | Performed by: RADIOLOGY

## 2019-03-15 ENCOUNTER — APPOINTMENT (OUTPATIENT)
Dept: RADIATION ONCOLOGY | Facility: CLINIC | Age: 64
End: 2019-03-15
Payer: COMMERCIAL

## 2019-03-15 PROCEDURE — 77386 ZZC IMRT TREATMENT DELIVERY, COMPLEX: CPT | Performed by: RADIOLOGY

## 2019-03-15 PROCEDURE — 77014 ZZHC CT GUIDE FOR PLACEMENT RADIATION THERAPY FIELDS: CPT | Performed by: RADIOLOGY

## 2019-03-18 ENCOUNTER — APPOINTMENT (OUTPATIENT)
Dept: RADIATION ONCOLOGY | Facility: CLINIC | Age: 64
End: 2019-03-18
Payer: COMMERCIAL

## 2019-03-18 PROCEDURE — 77300 RADIATION THERAPY DOSE PLAN: CPT | Performed by: RADIOLOGY

## 2019-03-18 PROCEDURE — 77386 ZZC IMRT TREATMENT DELIVERY, COMPLEX: CPT | Performed by: RADIOLOGY

## 2019-03-18 PROCEDURE — 77338 DESIGN MLC DEVICE FOR IMRT: CPT | Performed by: RADIOLOGY

## 2019-03-18 PROCEDURE — 77014 ZZHC CT GUIDE FOR PLACEMENT RADIATION THERAPY FIELDS: CPT | Performed by: RADIOLOGY

## 2019-03-19 ENCOUNTER — APPOINTMENT (OUTPATIENT)
Dept: RADIATION ONCOLOGY | Facility: CLINIC | Age: 64
End: 2019-03-19
Payer: COMMERCIAL

## 2019-03-19 PROCEDURE — 77386 ZZC IMRT TREATMENT DELIVERY, COMPLEX: CPT | Performed by: RADIOLOGY

## 2019-03-19 PROCEDURE — 77336 RADIATION PHYSICS CONSULT: CPT | Performed by: RADIOLOGY

## 2019-03-19 PROCEDURE — 77427 RADIATION TX MANAGEMENT X5: CPT | Performed by: RADIOLOGY

## 2019-03-19 PROCEDURE — 77014 ZZHC CT GUIDE FOR PLACEMENT RADIATION THERAPY FIELDS: CPT | Performed by: RADIOLOGY

## 2019-03-20 ENCOUNTER — OFFICE VISIT (OUTPATIENT)
Dept: RADIATION ONCOLOGY | Facility: CLINIC | Age: 64
End: 2019-03-20
Payer: COMMERCIAL

## 2019-03-20 ENCOUNTER — APPOINTMENT (OUTPATIENT)
Dept: RADIATION ONCOLOGY | Facility: CLINIC | Age: 64
End: 2019-03-20
Payer: COMMERCIAL

## 2019-03-20 VITALS
SYSTOLIC BLOOD PRESSURE: 105 MMHG | DIASTOLIC BLOOD PRESSURE: 62 MMHG | RESPIRATION RATE: 18 BRPM | BODY MASS INDEX: 29.41 KG/M2 | WEIGHT: 166 LBS | TEMPERATURE: 98.2 F | OXYGEN SATURATION: 96 % | HEART RATE: 74 BPM

## 2019-03-20 DIAGNOSIS — C20 RECTAL CANCER (H): Primary | ICD-10-CM

## 2019-03-20 DIAGNOSIS — E16.2 HYPOGLYCEMIA: ICD-10-CM

## 2019-03-20 PROCEDURE — 77386 ZZC IMRT TREATMENT DELIVERY, COMPLEX: CPT | Performed by: RADIOLOGY

## 2019-03-20 PROCEDURE — 77014 ZZHC CT GUIDE FOR PLACEMENT RADIATION THERAPY FIELDS: CPT | Performed by: RADIOLOGY

## 2019-03-20 PROCEDURE — 99214 OFFICE O/P EST MOD 30 MIN: CPT | Mod: 24 | Performed by: RADIOLOGY

## 2019-03-20 PROCEDURE — 99207 ZZC DROP WITH A PROCEDURE: CPT | Performed by: RADIOLOGY

## 2019-03-20 RX ORDER — LOPERAMIDE HCL 2 MG
2 CAPSULE ORAL 4 TIMES DAILY PRN
COMMUNITY
End: 2021-03-22

## 2019-03-20 RX ADMIN — Medication 15 G: at 15:52

## 2019-03-20 ASSESSMENT — PAIN SCALES - GENERAL: PAINLEVEL: NO PAIN (0)

## 2019-03-20 NOTE — PATIENT INSTRUCTIONS
Please contact [facility] RN with questions or concerns following today's appointment: [phone].    Thank you!

## 2019-03-20 NOTE — NURSING NOTE
This RN was called to clinic exam room by Dr. Morris, patient evaluated, vitals assessed /65 HR93.  Patient with eyes open, unresponsive, unable to follow commands or answer questions, diaphoretic.  Rapid Response Team called by this RN.  Rapid response team arrived and evaluated.  Patient's  was called from cancer center lobby and was present during evaluation.  Blood sugar checked and result of 23.  Patient able to drink two 4 ounce apple juices, slowly responding to questions and remained diaphoretic.  Rapid response team administered oral glucose (Glutose 15) 37.5 grams at 1552.  Blood sugar on recheck was 61.  Patient and  verbalized understanding of recommendation for evaluation at emergency department.  While waiting for ambulance transport patient consumed an additional two 4 ounce apple juices and a [employer] granola bar.  Patient transferred to [facility] Emergency Department for evaluation by [facility] ambulance via cart transport.  Patient's  to follow by car.    Informed patient and  that this RN would review emergency evaluation on Thursday, 3/21/2019 and contact if any changes in daily radiation treatment for 3/20/2019.  Patient and  verbalized understanding of information.      Fallon Herrera, RN BSN OCN

## 2019-03-20 NOTE — PROGRESS NOTES
[facility] PHYSICIANS  SPECIALIZING IN BREAKTHROUGHS  Radiation Oncology    On Treatment Visit Note      Niharika Cason      Date: 3/20/2019   MRN: [id number]   : 1955  Diagnosis: rectal cancer      Reason for Visit:  On Radiation Treatment Visit     Treatment Summary to Date  Treatment Site: pelvis + rectal boost Current Dose: /5040 cGy Fractions:            Subjective:     Patient was initially seen by our MA where the vitals below were obtained.  Patient was likely in the room by herself for 3-4 minutes.  When I entered the examination room patient was sitting on the examination chair unable to respond to questions/commands and diaphoretic. Her eyes were open but she had a blank stair and could only communicate 1-2 words. Rapid response was called immediately, vitals were obtained again and within normal limits.  However glucose was found to be 23.  Symptoms initially responded favorably to juice and oral glucose gel however after short period of time patient once again became unresponsive while sitting in her chair.  Denied chest pain or shortness of breath.    In speaking with her  patient has had ongoing diarrhea over the past week and unclear whether or not Imodium has completely relieved her symptoms.  In addition she has not been drinking a lot of fluids. Per the , yesterday she drank approximately 3 small glasses of water.      Nursing ROS:   Nutrition Alteration  Diet Type: Patient's Preference  Skin  Skin Note: Patient reports no changes to skin in treatment area.  Patient reports using aquaphor twice daily     Gastrointestinal  GI Note: Patient reports diarrhea 3 out of 7 days this week using imodum to relieve symptoms.  Patient reports no nausea, vomiting or constipation.  Genitourinary   Note: Patient reports no urinary concerns  Psychosocial  Pyschosocial Note: Patient reports energy level at baseline  Pain Assessment  0-10 Pain Scale: 0  Pain Note:  Patient reports no pain      Objective:   /62 (BP Location: Left arm, Patient Position: Sitting, Cuff Size: Adult Regular)   Pulse 74   Temp 98.2  F (36.8  C) (Oral)   Resp 18   Wt 75.3 kg (166 lb)   LMP 10/15/2008 (Approximate)   SpO2 96%   BMI 29.41 kg/m    Gen: Alert but not oriented. Unresponsive to commands    Labs:  CBC RESULTS:   Recent Labs   Lab Test 03/06/19  1014   WBC 4.7   RBC 3.43*   HGB 11.8   HCT 35.2   *   MCH 34.4*   MCHC 33.5   RDW 15.8*   *     ELECTROLYTES:  Recent Labs   Lab Test 03/06/19  1014      POTASSIUM 4.1   CHLORIDE 106   JUNG 8.6   CO2 29   BUN 16   CR 0.60   GLC 56*       Assessment:    Hypoglycemia in a diabetic pt in the setting of dehydration secondary to diarrhea.  Diarrhea is caused by radiation therapy for rectal cancer.  Patient currently lives ~ 1hour from our clinic.  Decision was made to have patient transported to a local emergency department given fluctuation in response to sugars, history of questionable excessive diarrhea secondary to radiation therapy, and dehydration in the setting of diabetes.  All questions and concerns addressed. Patient and  are in agreement with plan. Last measured glucose at the time of her leaving was 61.    Plan:   1. Transport pt to nearest ED via ambulance for eval and monitoring        Mosaiq chart and setup information reviewed  MVCT/IGRT images checked    Medication Review  Med list reviewed with patient?: Yes  Med list printed and given: Offered and declined             Jo Mroris MD    Please do not send letter to referring physician.

## 2019-03-21 ENCOUNTER — APPOINTMENT (OUTPATIENT)
Dept: RADIATION ONCOLOGY | Facility: CLINIC | Age: 64
End: 2019-03-21
Payer: COMMERCIAL

## 2019-03-21 ENCOUNTER — PATIENT OUTREACH (OUTPATIENT)
Dept: RADIATION ONCOLOGY | Facility: CLINIC | Age: 64
End: 2019-03-21

## 2019-03-21 LAB
GLUCOSE BLDC GLUCOMTR-MCNC: 23 MG/DL (ref 70–99)
GLUCOSE BLDC GLUCOMTR-MCNC: 61 MG/DL (ref 70–99)

## 2019-03-21 PROCEDURE — 77014 ZZHC CT GUIDE FOR PLACEMENT RADIATION THERAPY FIELDS: CPT | Performed by: RADIOLOGY

## 2019-03-21 PROCEDURE — 77386 ZZC IMRT TREATMENT DELIVERY, COMPLEX: CPT | Performed by: RADIOLOGY

## 2019-03-21 RX ORDER — NICOTINE POLACRILEX 4 MG
15 LOZENGE BUCCAL ONCE
Status: COMPLETED | OUTPATIENT
Start: 2019-03-20 | End: 2019-03-20

## 2019-03-21 NOTE — PROGRESS NOTES
This RN attempted contact with patient at 1000 today, no answer, voice message left requesting return call to follow-up on how patient is feeling.  No return call received.  Contacted patient's  at 1330 today, Fabrice reports that patient is doing well, she is at work today.  He reports that she was released from the emergency department last evening, Care Everywhere updated by this RN.   He reports that she has been drinking fluids today and has thus far drank 74 ounces of water today.  He reports her blood sugar this morning was 107 and at 0900 was 150, he is not sure what her 1200 reading was.  He reports that she has been eating well today and denies concerns.  He reports that he will be leaving work shortly to pick her up for her radiation treatment today.  Fabrice had no questions at this time, confirmed radiation treatment today at 1500 and was encouraged for patient or  to contact this RN with future questions or concerns.    Fallon Herrera, RN BSN OCN

## 2019-03-22 ENCOUNTER — APPOINTMENT (OUTPATIENT)
Dept: RADIATION ONCOLOGY | Facility: CLINIC | Age: 64
End: 2019-03-22
Payer: COMMERCIAL

## 2019-03-22 PROCEDURE — 77014 ZZHC CT GUIDE FOR PLACEMENT RADIATION THERAPY FIELDS: CPT | Performed by: RADIOLOGY

## 2019-03-22 PROCEDURE — 77386 ZZC IMRT TREATMENT DELIVERY, COMPLEX: CPT | Performed by: RADIOLOGY

## 2019-03-25 ENCOUNTER — APPOINTMENT (OUTPATIENT)
Dept: RADIATION ONCOLOGY | Facility: CLINIC | Age: 64
End: 2019-03-25
Payer: COMMERCIAL

## 2019-03-25 ENCOUNTER — ONCOLOGY VISIT (OUTPATIENT)
Dept: ONCOLOGY | Facility: CLINIC | Age: 64
End: 2019-03-25
Payer: COMMERCIAL

## 2019-03-25 VITALS
OXYGEN SATURATION: 95 % | DIASTOLIC BLOOD PRESSURE: 65 MMHG | RESPIRATION RATE: 16 BRPM | WEIGHT: 165.5 LBS | BODY MASS INDEX: 29.32 KG/M2 | HEART RATE: 73 BPM | SYSTOLIC BLOOD PRESSURE: 99 MMHG | TEMPERATURE: 98.2 F | HEIGHT: 63 IN

## 2019-03-25 DIAGNOSIS — C20 RECTAL ADENOCARCINOMA (H): Primary | ICD-10-CM

## 2019-03-25 DIAGNOSIS — C20 RECTAL ADENOCARCINOMA (H): ICD-10-CM

## 2019-03-25 DIAGNOSIS — G62.9 NEUROPATHY: ICD-10-CM

## 2019-03-25 DIAGNOSIS — C20 RECTAL CANCER (H): ICD-10-CM

## 2019-03-25 LAB
ALBUMIN SERPL-MCNC: 4 G/DL (ref 3.4–5)
ALP SERPL-CCNC: 86 U/L (ref 40–150)
ALT SERPL W P-5'-P-CCNC: 26 U/L (ref 0–50)
ANION GAP SERPL CALCULATED.3IONS-SCNC: 7 MMOL/L (ref 3–14)
AST SERPL W P-5'-P-CCNC: 24 U/L (ref 0–45)
BASOPHILS # BLD AUTO: 0 10E9/L (ref 0–0.2)
BASOPHILS NFR BLD AUTO: 0.5 %
BILIRUB SERPL-MCNC: 0.5 MG/DL (ref 0.2–1.3)
BUN SERPL-MCNC: 20 MG/DL (ref 7–30)
CALCIUM SERPL-MCNC: 9.2 MG/DL (ref 8.5–10.1)
CHLORIDE SERPL-SCNC: 102 MMOL/L (ref 94–109)
CO2 SERPL-SCNC: 28 MMOL/L (ref 20–32)
CREAT SERPL-MCNC: 0.54 MG/DL (ref 0.52–1.04)
DIFFERENTIAL METHOD BLD: ABNORMAL
EOSINOPHIL # BLD AUTO: 0.1 10E9/L (ref 0–0.7)
EOSINOPHIL NFR BLD AUTO: 1.4 %
ERYTHROCYTE [DISTWIDTH] IN BLOOD BY AUTOMATED COUNT: 14.9 % (ref 10–15)
GFR SERPL CREATININE-BSD FRML MDRD: >90 ML/MIN/{1.73_M2}
GLUCOSE SERPL-MCNC: 210 MG/DL (ref 70–99)
HCT VFR BLD AUTO: 32.7 % (ref 35–47)
HGB BLD-MCNC: 11.3 G/DL (ref 11.7–15.7)
IMM GRANULOCYTES # BLD: 0 10E9/L (ref 0–0.4)
IMM GRANULOCYTES NFR BLD: 0.5 %
LYMPHOCYTES # BLD AUTO: 0.7 10E9/L (ref 0.8–5.3)
LYMPHOCYTES NFR BLD AUTO: 16.9 %
MCH RBC QN AUTO: 34.6 PG (ref 26.5–33)
MCHC RBC AUTO-ENTMCNC: 34.6 G/DL (ref 31.5–36.5)
MCV RBC AUTO: 100 FL (ref 78–100)
MONOCYTES # BLD AUTO: 0.5 10E9/L (ref 0–1.3)
MONOCYTES NFR BLD AUTO: 10.7 %
NEUTROPHILS # BLD AUTO: 2.9 10E9/L (ref 1.6–8.3)
NEUTROPHILS NFR BLD AUTO: 70 %
PLATELET # BLD AUTO: 166 10E9/L (ref 150–450)
POTASSIUM SERPL-SCNC: 3.8 MMOL/L (ref 3.4–5.3)
PROT SERPL-MCNC: 6.7 G/DL (ref 6.8–8.8)
RBC # BLD AUTO: 3.27 10E12/L (ref 3.8–5.2)
SODIUM SERPL-SCNC: 137 MMOL/L (ref 133–144)
WBC # BLD AUTO: 4.2 10E9/L (ref 4–11)

## 2019-03-25 PROCEDURE — 99215 OFFICE O/P EST HI 40 MIN: CPT | Performed by: INTERNAL MEDICINE

## 2019-03-25 PROCEDURE — 80053 COMPREHEN METABOLIC PANEL: CPT | Performed by: INTERNAL MEDICINE

## 2019-03-25 PROCEDURE — 77386 ZZC IMRT TREATMENT DELIVERY, COMPLEX: CPT | Performed by: RADIOLOGY

## 2019-03-25 PROCEDURE — 99207 ZZC NO CHARGE LOS: CPT

## 2019-03-25 PROCEDURE — 85025 COMPLETE CBC W/AUTO DIFF WBC: CPT | Performed by: INTERNAL MEDICINE

## 2019-03-25 PROCEDURE — 77014 ZZHC CT GUIDE FOR PLACEMENT RADIATION THERAPY FIELDS: CPT | Performed by: RADIOLOGY

## 2019-03-25 RX ORDER — GABAPENTIN 300 MG/1
CAPSULE ORAL
Qty: 60 CAPSULE | Refills: 1 | Status: SHIPPED | OUTPATIENT
Start: 2019-03-25 | End: 2019-04-18

## 2019-03-25 RX ORDER — HEPARIN SODIUM (PORCINE) LOCK FLUSH IV SOLN 100 UNIT/ML 100 UNIT/ML
5 SOLUTION INTRAVENOUS
Status: DISCONTINUED | OUTPATIENT
Start: 2019-03-25 | End: 2019-03-25 | Stop reason: HOSPADM

## 2019-03-25 RX ADMIN — HEPARIN SODIUM (PORCINE) LOCK FLUSH IV SOLN 100 UNIT/ML 5 ML: 100 SOLUTION at 14:54

## 2019-03-25 ASSESSMENT — MIFFLIN-ST. JEOR: SCORE: 1284.7

## 2019-03-25 ASSESSMENT — PAIN SCALES - GENERAL: PAINLEVEL: NO PAIN (0)

## 2019-03-25 NOTE — PROGRESS NOTES
FOLLOW-UP VISIT NOTE    PATIENT NAME: Niharika Cason MRN # [id number]  DATE OF VISIT: Mar 25, 2019 YOB: 1955    REFERRING PROVIDER: Liliana Urbina MD  [address] [place] [id number]    CANCER TYPE:Recatl adenocarcinoma  STAGE: cT3cN1 M0  ECOG PS: 1    ONCOLOGY HISTORY:    Niharika Cason is a 63-year-old female with past medical history significant for hypertension, diabetes mellitus type 1, anxiety underwent her first screening colonoscopy on 09/17/18 which revealed a partially obstructing mass in the rectosigmoid colon  Which was biopsied and came back as invasive moderately differentiated adenocarcinoma but intact mismatch repair proteins. Staging CT scans showed a 3 cm enhancing lesion in the right lobe of liver he subsequently had an MRI done which characterized a liver lesion as well as VOCAL lobular hyperplasia with no suspected metastatic disease. MRI of pelvis showed an irregular ulcerated left rectal wall mass at 9 cm from the anal verge extending to the muscularis propria into the perirectal fat. Also noted were suspicious perirectal and presacral lymph nodes -clinical stage T3cN1 M0. She met with medical and surgical oncology and the recommendation was to proceed with total neoadjuvant therapy.      10/25/18  Started XELOX chemotherapy    12/06//18 Upon completion of oxaliplatin dose, patient complained of tingling in extremities and buttock region along with Blurred vision. IV Solu-Medrol was given and symptoms improved within half an hour. Oxaliplatin dsoe reduced with subsequent cycles and symptoms did not recur.    12/21/18 CT scans after 3 cycles shows stable findings with no concerns for progression/new metastasis    03/06/18 CT scans were reviewed with no findings concerning for progression. Sigmoidoscopy after completion of neoadjuvant chemotherapy showed positive response to treatment with shrinkage of the primary tumor.   Started on neoadjuvant chemoRT with  Xeloda.      SUBJECTIVE     In clinic today to proceed with concurrent chemoradiation. Complains of neuropathy involving bilateral hands and feet which has gradually gotten worse. Complains of mild urinary urgency. Denies nausea/vomiting, mucositis, fever/chills,dysuria, hematuria, diarrhea, constipation, blood in the stools, loss of appetite, weight loss or any other complaints      PAST MEDICAL HISTORY     Past Medical History:   Diagnosis Date     Essential hypertension, benign      Generalized anxiety disorder      Hyperlipidemia      Rectal cancer (H) 09/17/2018     Type I (juvenile type) diabetes mellitus without mention of complication, not stated as uncontrolled     diagnosed at age 35     Ulcerative colitis, unspecified     in the 70s.           CURRENT OUTPATIENT MEDICATIONS     Current Outpatient Medications   Medication Sig Dispense Refill     atenolol (TENORMIN) 25 MG tablet Take 1 tablet (25 mg) by mouth daily 93 tablet 3     blood glucose (CONTOUR NEXT TEST) test strip Use to test blood sugar 4 times daily or as directed. 400 each 3     blood glucose (NO BRAND SPECIFIED) test strip Use to test blood sugar 4 times daily or as directed. 300 strip 3     blood glucose calibration (CONTOUR NEXT CONTROL NORMAL VI SOLN) Normal solution Use to calibrate blood glucose monitor as needed as directed. 1 each 11     blood glucose calibration (NO BRAND SPECIFIED) solution Use to calibrate blood glucose monitor as needed as directed. 1 each 1     blood glucose monitoring (MARICEL MICROLET) lancets Use to test blood sugar 4 times daily or as directed. 400 each 3     blood glucose monitoring (CONTOUR NEXT MONITOR W/DEVICE KIT) meter device kit Use to test blood sugar 4 times daily or as directed. 1 kit 0     blood glucose monitoring (ONE TOUCH DELICA) lancets Use to test blood sugar 4 times daily or as directed. 300 each 3     blood glucose monitoring (ONETOUCH VERIO) meter device kit Use to test blood sugar 4 times  daily or as directed. 1 kit 0     capecitabine (XELODA) 500 MG tablet CHEMO Take 3 tablets (1,500 mg) by mouth 2 times daily for 56 doses Take Mon-Fri. Do NOT take on Sat-Sun. Take with water within 30 min after meal 168 tablet 0     gabapentin (NEURONTIN) 300 MG capsule Take 1 capsule (300 mg) by mouth daily for 5 days, THEN 1 capsule (300 mg) 2 times daily. 60 capsule 1     hydrochlorothiazide (MICROZIDE) 12.5 MG capsule Take 1 capsule (12.5 mg) by mouth daily 93 capsule 3     insulin glargine (BASAGLAR KWIKPEN) 100 UNIT/ML pen Inject 50 Units Subcutaneous daily Appointment needed for additional refills. 15 mL 0     insulin lispro (HUMALOG KWIKPEN) 100 UNIT/ML injection Use 3 units before breakfast, 6 units before lunch, and 16 units before dinner, plus correction of 1 unit per every 50 glucose over 125, averaging 3 for breakfast, 6 for lunch, 16 for dinner. 30 mL 3     Insulin Pen Needle 33G X 4 MM MISC 1 each daily Use 4 needles per day 200 each prn     lidocaine-prilocaine (EMLA) cream Apply 1 g topically as needed for moderate pain 30 g 3     lisinopril (PRINIVIL/ZESTRIL) 40 MG tablet Take 1 tablet (40 mg) by mouth daily 93 tablet 3     loperamide (IMODIUM) 2 MG capsule Take 2 mg by mouth 4 times daily as needed for diarrhea       LORazepam (ATIVAN) 0.5 MG tablet Take 1 tablet (0.5 mg) by mouth every 4 hours as needed (Anxiety, Nausea/Vomiting or Sleep) 30 tablet 2     Multiple Vitamins-Minerals (MULTIVITAMIN PO) Take by mouth daily       ondansetron (ZOFRAN-ODT) 8 MG ODT tab Take 1 tablet (8 mg) by mouth every 8 hours as needed for nausea 60 tablet 3     PARoxetine (PAXIL) 30 MG tablet Take 1 tablet (30 mg) by mouth every morning 93 tablet 3     prochlorperazine (COMPAZINE) 10 MG tablet Take 1 tablet (10 mg) by mouth every 6 hours as needed (Nausea/Vomiting) 30 tablet 2     Pyridoxine HCl (VITAMIN B-6 PO) Take by mouth daily       simvastatin (ZOCOR) 10 MG tablet TAKE ONE TABLET BY MOUTH EVERY NIGHT AT  BEDTIME 93 tablet 3        ALLERGIES     Allergies   Allergen Reactions     No Known Drug Allergies         REVIEW OF SYSTEMS   As above in the HPI, o/w complete 12-point ROS was negative.     PHYSICAL EXAM   B/P: Data Unavailable, T: Data Unavailable, P: Data Unavailable, R: Data Unavailable  SpO2 Readings from Last 4 Encounters:   11/15/18 95%   11/15/18 96%   11/02/18 96%   10/25/18 96%     Wt Readings from Last 3 Encounters:   03/25/19 75.1 kg (165 lb 8 oz)   03/20/19 75.3 kg (166 lb)   03/13/19 74.4 kg (164 lb)     GEN: NAD  Mouth/ENT: Oropharynx is clear.  NECK: no  lymphadenopathy  LUNGS: clear bilaterally  CV: regular, no murmurs, rubs, or gallops  ABDOMEN: soft, non-tender, non-distended,  EXT: warm, well perfused, no edema  NEURO: alert  SKIN: no rashes     LABORATORY AND IMAGING STUDIES     Lab Results   Component Value Date    WBC 4.2 03/25/2019     Lab Results   Component Value Date    RBC 3.27 03/25/2019     Lab Results   Component Value Date    HGB 11.3 03/25/2019     Lab Results   Component Value Date    HCT 32.7 03/25/2019     No components found for: MCT  Lab Results   Component Value Date     03/25/2019     Lab Results   Component Value Date    MCH 34.6 03/25/2019     Lab Results   Component Value Date    MCHC 34.6 03/25/2019     Lab Results   Component Value Date    RDW 14.9 03/25/2019     Lab Results   Component Value Date     03/25/2019     Recent Labs   Lab Test 03/25/19  1455 03/06/19  1014    142   POTASSIUM 3.8 4.1   CHLORIDE 102 106   CO2 28 29   ANIONGAP 7 7   * 56*   BUN 20 16   CR 0.54 0.60   JUNG 9.2 8.6          ASSESSMENT AND PLAN     63-year-old female with past medical history significant for hypertension, diabetes mellitus type 1, anxiety on a screening colonoscopy was diagnosed with rectal adenocarcinoma     - Rectal adenocarcinoma- T3cN1 M0 intact mismatch repair protein  She met with medical and surgical oncology at [facility] and the  recommendation was to proceed with total neoadjuvant therapy.  1. 6 cycles of XELOX, then:   2. 2 months of CXRT.   3. Surgery after 6-8 weeks.     10/25/18 Started XELOX chemotherapy with oxaliplatin dose reduced with cycle 4 to 100 mg/m2 given neurological toxicity with higher dose     03/06/18 CT scans After completion of neoadjuvant chemotherapy had no findings concerning for progression. Sigmoidoscopy  showed positive response to treatment with shrinkage of the primary tumor.   Started on neoadjuvant chemoRT with Xeloda.  Tolerating the treatment well overall. 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      Chart documentation with Dragon Voice recognition Software. Although reviewed after completion, some words and grammatical errors may remain.  Dayron Lawton MD  Attending Physician   Hematology/Medical Oncology

## 2019-03-25 NOTE — Clinical Note
3/25/2019         RE: Niharika Cason  [address] [facility] [id number]        Dear Colleague,    Thank you for referring your patient, Niharika Cason, to the [facility]. Please see a copy of my visit note below.      FOLLOW-UP VISIT NOTE    PATIENT NAME: Niharika Cason MRN # [id number]  DATE OF VISIT: Mar 25, 2019 YOB: 1955    REFERRING PROVIDER: Liliana Urbina MD  [address] [facility] [address]  [place] [address]    CANCER TYPE:Recatl adenocarcinoma  STAGE: cT3cN1 M0  ECOG PS: 1    ONCOLOGY HISTORY:    Niharika Cason is a 63-year-old female with past medical history significant for hypertension, diabetes mellitus type 1, anxiety underwent her first screening colonoscopy on 09/17/18 which revealed a partially obstructing mass in the rectosigmoid colon  Which was biopsied and came back as invasive moderately differentiated adenocarcinoma but intact mismatch repair proteins. Staging CT scans showed a 3 cm enhancing lesion in the right lobe of liver he subsequently had an MRI done which characterized a liver lesion as well as VOCAL lobular hyperplasia with no suspected metastatic disease. MRI of pelvis showed an irregular ulcerated left rectal wall mass at 9 cm from the anal verge extending to the muscularis propria into the perirectal fat. Also noted were suspicious perirectal and presacral lymph nodes -clinical stage T3cN1 M0. She met with medical and surgical oncology and the recommendation was to proceed with total neoadjuvant therapy.      10/25/18  Started XELOX chemotherapy    12/06//18 Upon completion of oxaliplatin dose, patient complained of tingling in extremities and buttock region along with Blurred vision. IV Solu-Medrol was given and symptoms improved within half an hour. Oxaliplatin dsoe reduced with subsequent cycles and symptoms did not recur.    12/21/18 CT scans after 3 cycles shows stable findings with no concerns for progression/new metastasis    03/06/18 CT  scans were reviewed with no findings concerning for progression. Sigmoidoscopy after completion of neoadjuvant chemotherapy showed positive response to treatment with shrinkage of the primary tumor.   Started on neoadjuvant chemoRT with Xeloda.      SUBJECTIVE     In clinic today to proceed with concurrent chemoradiation. Complains of neuropathy involving bilateral hands and feet which has gradually gotten worse. Complains of mild urinary urgency. Denies nausea/vomiting, mucositis, fever/chills,dysuria, hematuria, diarrhea, constipation, blood in the stools, loss of appetite, weight loss or any other complaints      PAST MEDICAL HISTORY     Past Medical History:   Diagnosis Date     Essential hypertension, benign      Generalized anxiety disorder      Hyperlipidemia      Rectal cancer (H) 09/17/2018     Type I (juvenile type) diabetes mellitus without mention of complication, not stated as uncontrolled     diagnosed at age 35     Ulcerative colitis, unspecified     in the 70s.           CURRENT OUTPATIENT MEDICATIONS     Current Outpatient Medications   Medication Sig Dispense Refill     atenolol (TENORMIN) 25 MG tablet Take 1 tablet (25 mg) by mouth daily 93 tablet 3     blood glucose (CONTOUR NEXT TEST) test strip Use to test blood sugar 4 times daily or as directed. 400 each 3     blood glucose (NO BRAND SPECIFIED) test strip Use to test blood sugar 4 times daily or as directed. 300 strip 3     blood glucose calibration (CONTOUR NEXT CONTROL NORMAL VI SOLN) Normal solution Use to calibrate blood glucose monitor as needed as directed. 1 each 11     blood glucose calibration (NO BRAND SPECIFIED) solution Use to calibrate blood glucose monitor as needed as directed. 1 each 1     blood glucose monitoring (MARICEL MICROLET) lancets Use to test blood sugar 4 times daily or as directed. 400 each 3     blood glucose monitoring (CONTOUR NEXT MONITOR W/DEVICE KIT) meter device kit Use to test blood sugar 4 times daily or as  directed. 1 kit 0     blood glucose monitoring (ONE TOUCH DELICA) lancets Use to test blood sugar 4 times daily or as directed. 300 each 3     blood glucose monitoring (ONETOUCH VERIO) meter device kit Use to test blood sugar 4 times daily or as directed. 1 kit 0     capecitabine (XELODA) 500 MG tablet CHEMO Take 3 tablets (1,500 mg) by mouth 2 times daily for 56 doses Take Mon-Fri. Do NOT take on Sat-Sun. Take with water within 30 min after meal 168 tablet 0     gabapentin (NEURONTIN) 300 MG capsule Take 1 capsule (300 mg) by mouth daily for 5 days, THEN 1 capsule (300 mg) 2 times daily. 60 capsule 1     hydrochlorothiazide (MICROZIDE) 12.5 MG capsule Take 1 capsule (12.5 mg) by mouth daily 93 capsule 3     insulin glargine (BASAGLAR KWIKPEN) 100 UNIT/ML pen Inject 50 Units Subcutaneous daily Appointment needed for additional refills. 15 mL 0     insulin lispro (HUMALOG KWIKPEN) 100 UNIT/ML injection Use 3 units before breakfast, 6 units before lunch, and 16 units before dinner, plus correction of 1 unit per every 50 glucose over 125, averaging 3 for breakfast, 6 for lunch, 16 for dinner. 30 mL 3     Insulin Pen Needle 33G X 4 MM MISC 1 each daily Use 4 needles per day 200 each prn     lidocaine-prilocaine (EMLA) cream Apply 1 g topically as needed for moderate pain 30 g 3     lisinopril (PRINIVIL/ZESTRIL) 40 MG tablet Take 1 tablet (40 mg) by mouth daily 93 tablet 3     loperamide (IMODIUM) 2 MG capsule Take 2 mg by mouth 4 times daily as needed for diarrhea       LORazepam (ATIVAN) 0.5 MG tablet Take 1 tablet (0.5 mg) by mouth every 4 hours as needed (Anxiety, Nausea/Vomiting or Sleep) 30 tablet 2     Multiple Vitamins-Minerals (MULTIVITAMIN PO) Take by mouth daily       ondansetron (ZOFRAN-ODT) 8 MG ODT tab Take 1 tablet (8 mg) by mouth every 8 hours as needed for nausea 60 tablet 3     PARoxetine (PAXIL) 30 MG tablet Take 1 tablet (30 mg) by mouth every morning 93 tablet 3     prochlorperazine (COMPAZINE) 10 MG  tablet Take 1 tablet (10 mg) by mouth every 6 hours as needed (Nausea/Vomiting) 30 tablet 2     Pyridoxine HCl (VITAMIN B-6 PO) Take by mouth daily       simvastatin (ZOCOR) 10 MG tablet TAKE ONE TABLET BY MOUTH EVERY NIGHT AT BEDTIME 93 tablet 3        ALLERGIES     Allergies   Allergen Reactions     No Known Drug Allergies         REVIEW OF SYSTEMS   As above in the HPI, o/w complete 12-point ROS was negative.     PHYSICAL EXAM   B/P: Data Unavailable, T: Data Unavailable, P: Data Unavailable, R: Data Unavailable  SpO2 Readings from Last 4 Encounters:   11/15/18 95%   11/15/18 96%   11/02/18 96%   10/25/18 96%     Wt Readings from Last 3 Encounters:   03/25/19 75.1 kg (165 lb 8 oz)   03/20/19 75.3 kg (166 lb)   03/13/19 74.4 kg (164 lb)     GEN: NAD  Mouth/ENT: Oropharynx is clear.  NECK: no  lymphadenopathy  LUNGS: clear bilaterally  CV: regular, no murmurs, rubs, or gallops  ABDOMEN: soft, non-tender, non-distended,  EXT: warm, well perfused, no edema  NEURO: alert  SKIN: no rashes     LABORATORY AND IMAGING STUDIES     Lab Results   Component Value Date    WBC 4.2 03/25/2019     Lab Results   Component Value Date    RBC 3.27 03/25/2019     Lab Results   Component Value Date    HGB 11.3 03/25/2019     Lab Results   Component Value Date    HCT 32.7 03/25/2019     No components found for: MCT  Lab Results   Component Value Date     03/25/2019     Lab Results   Component Value Date    MCH 34.6 03/25/2019     Lab Results   Component Value Date    MCHC 34.6 03/25/2019     Lab Results   Component Value Date    RDW 14.9 03/25/2019     Lab Results   Component Value Date     03/25/2019     Recent Labs   Lab Test 03/25/19  1455 03/06/19  1014    142   POTASSIUM 3.8 4.1   CHLORIDE 102 106   CO2 28 29   ANIONGAP 7 7   * 56*   BUN 20 16   CR 0.54 0.60   JUNG 9.2 8.6          ASSESSMENT AND PLAN     63-year-old female with past medical history significant for hypertension, diabetes mellitus type 1,  anxiety on a screening colonoscopy was diagnosed with rectal adenocarcinoma     - Rectal adenocarcinoma- T3cN1 M0 intact mismatch repair protein  She met with medical and surgical oncology at [facility] and the recommendation was to proceed with total neoadjuvant therapy.  1. 6 cycles of XELOX, then:   2. 2 months of CXRT.   3. Surgery after 6-8 weeks.     10/25/18 Started XELOX chemotherapy with oxaliplatin dose reduced with cycle 4 to 100 mg/m2 given neurological toxicity with higher dose     03/06/18 CT scans After completion of neoadjuvant chemotherapy had no findings concerning for progression. Sigmoidoscopy  showed positive response to treatment with shrinkage of the primary tumor.   Started on neoadjuvant chemoRT with Xeloda.  Tolerating the treatment well overall. 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      Chart documentation with Dragon Voice recognition Software. Although reviewed after completion, some words and grammatical errors may remain.  Dayron Lawton MD  Attending Physician   Hematology/Medical Oncology          Again, thank you for allowing me to participate in the care of your patient.        Sincerely,        Dayron Lawton MD

## 2019-03-25 NOTE — PROGRESS NOTES
Port accessed with no incidient. Blood return noted. Labs drawn, tubes labeled and double signed. Port flushed with saline and heparin. Patient tolerated port draw well.   Reena Rossi RN

## 2019-03-26 ENCOUNTER — APPOINTMENT (OUTPATIENT)
Dept: RADIATION ONCOLOGY | Facility: CLINIC | Age: 64
End: 2019-03-26
Payer: COMMERCIAL

## 2019-03-26 PROCEDURE — 77336 RADIATION PHYSICS CONSULT: CPT | Performed by: RADIOLOGY

## 2019-03-26 PROCEDURE — 77386 ZZC IMRT TREATMENT DELIVERY, COMPLEX: CPT | Performed by: RADIOLOGY

## 2019-03-26 PROCEDURE — 77427 RADIATION TX MANAGEMENT X5: CPT | Performed by: RADIOLOGY

## 2019-03-26 PROCEDURE — 77014 ZZHC CT GUIDE FOR PLACEMENT RADIATION THERAPY FIELDS: CPT | Performed by: RADIOLOGY

## 2019-03-27 ENCOUNTER — APPOINTMENT (OUTPATIENT)
Dept: RADIATION ONCOLOGY | Facility: CLINIC | Age: 64
End: 2019-03-27
Payer: COMMERCIAL

## 2019-03-27 ENCOUNTER — OFFICE VISIT (OUTPATIENT)
Dept: RADIATION ONCOLOGY | Facility: CLINIC | Age: 64
End: 2019-03-27
Payer: COMMERCIAL

## 2019-03-27 ENCOUNTER — TELEPHONE (OUTPATIENT)
Dept: RADIATION ONCOLOGY | Facility: CLINIC | Age: 64
End: 2019-03-27

## 2019-03-27 VITALS
HEART RATE: 65 BPM | OXYGEN SATURATION: 94 % | BODY MASS INDEX: 29.7 KG/M2 | SYSTOLIC BLOOD PRESSURE: 107 MMHG | TEMPERATURE: 97.7 F | WEIGHT: 167.6 LBS | RESPIRATION RATE: 18 BRPM | DIASTOLIC BLOOD PRESSURE: 61 MMHG

## 2019-03-27 DIAGNOSIS — C20 RECTAL CANCER (H): Primary | ICD-10-CM

## 2019-03-27 PROCEDURE — 77386 ZZC IMRT TREATMENT DELIVERY, COMPLEX: CPT | Performed by: RADIOLOGY

## 2019-03-27 PROCEDURE — 99207 ZZC DROP WITH A PROCEDURE: CPT | Performed by: RADIOLOGY

## 2019-03-27 PROCEDURE — 77014 ZZHC CT GUIDE FOR PLACEMENT RADIATION THERAPY FIELDS: CPT | Performed by: RADIOLOGY

## 2019-03-27 ASSESSMENT — PAIN SCALES - GENERAL: PAINLEVEL: NO PAIN (0)

## 2019-03-27 NOTE — TELEPHONE ENCOUNTER
I left a voicemail for patient asking if she could come in early for her appointment at 1445 today 03/27/2019.      Kelly Renteria MA

## 2019-03-27 NOTE — PATIENT INSTRUCTIONS
Please contact [facility] RN with questions or concerns following today's appointment: [phone].    Thank you!

## 2019-03-27 NOTE — NURSING NOTE
This RN met with patient while in clinic for weekly on-treatment visit with Dr. Morris as this RN was notified by Kelly Renteria MA of patient's BP readings.  Upon assessment, patient denies headaches, vision changes, feeling dizzy or light-headed, denies nausea.  Sitting BP 88/51 Sitting HR 70 Standing BP 93/57 Standing HR 70.  Patient reports she has been drinking fluids throughout the day.  Patient reports she does take Atenolol and Lisinopril.  Dr. Morris updated regarding vitals, patient provided with 8 oz cup of water, will proceed with radiation treatment and BP to be assessed following completion of treatment today by MA.    Fallon Herrera RN BSN OCN

## 2019-03-28 ENCOUNTER — APPOINTMENT (OUTPATIENT)
Dept: RADIATION ONCOLOGY | Facility: CLINIC | Age: 64
End: 2019-03-28
Payer: COMMERCIAL

## 2019-03-28 PROCEDURE — 77386 ZZC IMRT TREATMENT DELIVERY, COMPLEX: CPT | Performed by: RADIOLOGY

## 2019-03-28 PROCEDURE — 77014 ZZHC CT GUIDE FOR PLACEMENT RADIATION THERAPY FIELDS: CPT | Performed by: RADIOLOGY

## 2019-03-28 NOTE — PROGRESS NOTES
[facility] PHYSICIANS  SPECIALIZING IN BREAKTHROUGHS  Radiation Oncology    On Treatment Visit Note      Niharika Cason      Date: 3/28/2019   MRN: [id number]   : 1955  Diagnosis: rectal cancer      Reason for Visit:  On Radiation Treatment Visit     Treatment Summary to Date  Treatment Site: pelvis + rectal boost Current Dose: 2880/5040 cGy Fractions:            Subjective:    Has noted urine urgency this week however is otherwise doing well.  Denies any nausea vomiting or diarrhea.  States that sugars have been well controlled.  She feels well and has no other complaints.    Nursing ROS:   Nutrition Alteration  Diet Type: Patient's Preference  Skin  Skin Note: Patient reports no changes to skin in treatment area.  Patient reports using aquaphor twice daily  CNS Alteration  CNS note: Patient's blood pressure was low, after given fluids it rised         Gastrointestinal  GI Note: Patient reports no diarrhea, nausea, vomiting or constipation.  Genitourinary   Note: Patient reports urgency with need to urinate  Psychosocial  Pyschosocial Note: Patient reports energy level at baseline  Pain Assessment  0-10 Pain Scale: 0  Pain Note: Patient reports no pain      Objective:   /61 (BP Location: Right arm, Patient Position: Sitting, Cuff Size: Adult Regular)   Pulse 65   Temp 97.7  F (36.5  C) (Oral)   Resp 18   Wt 76 kg (167 lb 9.6 oz)   LMP 10/15/2008 (Approximate)   SpO2 94%   BMI 29.70 kg/m    Gen: Appears well, in no acute distress  Skin: No erythema    Labs:  CBC RESULTS:   Recent Labs   Lab Test 19  1455   WBC 4.2   RBC 3.27*   HGB 11.3*   HCT 32.7*      MCH 34.6*   MCHC 34.6   RDW 14.9        ELECTROLYTES:  Recent Labs   Lab Test 19  1455      POTASSIUM 3.8   CHLORIDE 102   JUNG 9.2   CO2 28   BUN 20   CR 0.54   *       Assessment:    Tolerating radiation therapy well.  All questions and concerns addressed.  Blood pressure was initially  low at today's visit however responded appropriately to a few cups of water.  Currently no need for medication to help with urinary urgency as it is not significant/severe at this time.    Toxicities:  Fatigue: Grade 1: Fatigue relieved by rest  Nausea: Grade 0: No toxicity  Pain: Grade 0: No toxicity  Diarrhea: Grade 0: No toxicity  Urinary urgency: Grade 1: Present  Dermatitis: Grade 0: No toxicity    Plan:   1. Continue current therapy.   2. Will continue to follow urine urgency   3. Imodium as needed for diarrhea      Mosaiq chart and setup information reviewed  MVCT/IGRT images checked    Medication Review  Med list reviewed with patient?: Yes  Med list printed and given: Offered and declined             Jo Morris MD    Please do not send letter to referring physician.

## 2019-03-29 ENCOUNTER — APPOINTMENT (OUTPATIENT)
Dept: RADIATION ONCOLOGY | Facility: CLINIC | Age: 64
End: 2019-03-29
Payer: COMMERCIAL

## 2019-03-29 PROCEDURE — 77386 ZZC IMRT TREATMENT DELIVERY, COMPLEX: CPT | Performed by: RADIOLOGY

## 2019-03-29 PROCEDURE — 77014 ZZHC CT GUIDE FOR PLACEMENT RADIATION THERAPY FIELDS: CPT | Performed by: RADIOLOGY

## 2019-04-01 ENCOUNTER — APPOINTMENT (OUTPATIENT)
Dept: RADIATION ONCOLOGY | Facility: CLINIC | Age: 64
End: 2019-04-01
Payer: COMMERCIAL

## 2019-04-01 PROCEDURE — 77014 ZZHC CT GUIDE FOR PLACEMENT RADIATION THERAPY FIELDS: CPT | Performed by: RADIOLOGY

## 2019-04-01 PROCEDURE — 77386 ZZC IMRT TREATMENT DELIVERY, COMPLEX: CPT | Performed by: RADIOLOGY

## 2019-04-02 ENCOUNTER — APPOINTMENT (OUTPATIENT)
Dept: RADIATION ONCOLOGY | Facility: CLINIC | Age: 64
End: 2019-04-02
Payer: COMMERCIAL

## 2019-04-02 PROCEDURE — 77336 RADIATION PHYSICS CONSULT: CPT | Performed by: RADIOLOGY

## 2019-04-02 PROCEDURE — 77014 ZZHC CT GUIDE FOR PLACEMENT RADIATION THERAPY FIELDS: CPT | Performed by: RADIOLOGY

## 2019-04-02 PROCEDURE — 77427 RADIATION TX MANAGEMENT X5: CPT | Performed by: RADIOLOGY

## 2019-04-02 PROCEDURE — 77386 ZZC IMRT TREATMENT DELIVERY, COMPLEX: CPT | Performed by: RADIOLOGY

## 2019-04-03 ENCOUNTER — OFFICE VISIT (OUTPATIENT)
Dept: RADIATION ONCOLOGY | Facility: CLINIC | Age: 64
End: 2019-04-03
Payer: COMMERCIAL

## 2019-04-03 ENCOUNTER — APPOINTMENT (OUTPATIENT)
Dept: RADIATION ONCOLOGY | Facility: CLINIC | Age: 64
End: 2019-04-03
Payer: COMMERCIAL

## 2019-04-03 VITALS
DIASTOLIC BLOOD PRESSURE: 54 MMHG | TEMPERATURE: 98 F | RESPIRATION RATE: 18 BRPM | OXYGEN SATURATION: 94 % | SYSTOLIC BLOOD PRESSURE: 112 MMHG | BODY MASS INDEX: 29.41 KG/M2 | WEIGHT: 166 LBS | HEART RATE: 73 BPM

## 2019-04-03 DIAGNOSIS — T66.XXXD ADVERSE EFFECT OF RADIATION, SUBSEQUENT ENCOUNTER: Primary | ICD-10-CM

## 2019-04-03 PROCEDURE — 77014 ZZHC CT GUIDE FOR PLACEMENT RADIATION THERAPY FIELDS: CPT | Performed by: RADIOLOGY

## 2019-04-03 PROCEDURE — 99207 ZZC DROP WITH A PROCEDURE: CPT | Performed by: RADIOLOGY

## 2019-04-03 PROCEDURE — 77386 ZZC IMRT TREATMENT DELIVERY, COMPLEX: CPT | Performed by: RADIOLOGY

## 2019-04-03 RX ORDER — HYDROCORTISONE ACETATE 25 MG/1
25 SUPPOSITORY RECTAL 2 TIMES DAILY
Qty: 42 SUPPOSITORY | Refills: 0 | Status: SHIPPED | OUTPATIENT
Start: 2019-04-03 | End: 2019-06-19

## 2019-04-03 ASSESSMENT — PAIN SCALES - GENERAL: PAINLEVEL: NO PAIN (0)

## 2019-04-03 NOTE — PATIENT INSTRUCTIONS
Please contact [facility] RN with questions or concerns following today's appointment: [phone].    Thank you!

## 2019-04-03 NOTE — PROGRESS NOTES
[facility] PHYSICIANS  SPECIALIZING IN BREAKTHROUGHS  Radiation Oncology    On Treatment Visit Note      Niharika Cason      Date: 4/3/2019   MRN: [id number]   : 1955  Diagnosis: rectal cancer      Reason for Visit:  On Radiation Treatment Visit     Treatment Summary to Date  Treatment Site: pelvis + rectal boost Current Dose: 3780/5040 cGy Fractions:            Subjective:     Has no complaints of rectal discomfort with bowel movements.  Denies any nausea vomiting fevers chills or other GI or  symptoms.    Nursing ROS:   Nutrition Alteration  Diet Type: Patient's Preference  Skin  Skin Note: Patient reports no changes to skin in treatment area.  Patient reports using aquaphor twice daily.  Patient reports raw skin feeling on anus area when having a bowel movement.              Gastrointestinal  GI Note: Patient reports no diarrhea, nausea, vomiting or constipation.  Patient reports raw pain during bowel movement in anus area  Genitourinary   Note: Patient reports no urine symptoms   Psychosocial  Pyschosocial Note: Patient reports energy level at baseline  Pain Assessment  0-10 Pain Scale: 0  Pain Note: Patient reports no pain.  Patient reports pain in anus area when having  a bowel movement      Objective:   /54 (BP Location: Left arm, Patient Position: Sitting, Cuff Size: Adult Regular)   Pulse 73   Temp 98  F (36.7  C) (Oral)   Resp 18   Wt 75.3 kg (166 lb)   LMP 10/15/2008 (Approximate)   SpO2 94%   BMI 29.41 kg/m    Gen: Appears well, in no acute distress  Skin: No erythema    Labs:  CBC RESULTS:   Recent Labs   Lab Test 19  1455   WBC 4.2   RBC 3.27*   HGB 11.3*   HCT 32.7*      MCH 34.6*   MCHC 34.6   RDW 14.9        ELECTROLYTES:  Recent Labs   Lab Test 19  1455      POTASSIUM 3.8   CHLORIDE 102   JUNG 9.2   CO2 28   BUN 20   CR 0.54   *       Assessment:    Tolerating radiation therapy well.  All questions and concerns  addressed. Pt has rectal inflammation 2/2 xrt. No perianal skin breakdown noted on exam today.     Toxicities:  Fatigue: Grade 0: No toxicity  Pain: Grade 1: Mild pain  Diarrhea: Grade 0: No toxicity  Urinary frequency: Grade 0: No toxicity  Urinary tract pain: Grade 0: No toxicity  Urinary urgency: Grade 0: No toxicity  Dermatitis: Grade 0: No toxicity    Plan:   1. Continue current therapy.    2. anusol hc bid called into pts pharmacy today      Mosaiq chart and setup information reviewed  MVCT/IGRT images checked    Medication Review  Med list reviewed with patient?: Yes  Med list printed and given: Offered and declined             Jo Morris MD    Please do not send letter to referring physician.

## 2019-04-04 ENCOUNTER — APPOINTMENT (OUTPATIENT)
Dept: RADIATION ONCOLOGY | Facility: CLINIC | Age: 64
End: 2019-04-04
Payer: COMMERCIAL

## 2019-04-04 PROCEDURE — 77386 ZZC IMRT TREATMENT DELIVERY, COMPLEX: CPT | Performed by: RADIOLOGY

## 2019-04-04 PROCEDURE — 77014 ZZHC CT GUIDE FOR PLACEMENT RADIATION THERAPY FIELDS: CPT | Performed by: RADIOLOGY

## 2019-04-05 ENCOUNTER — APPOINTMENT (OUTPATIENT)
Dept: RADIATION ONCOLOGY | Facility: CLINIC | Age: 64
End: 2019-04-05
Payer: COMMERCIAL

## 2019-04-05 PROCEDURE — 77014 ZZHC CT GUIDE FOR PLACEMENT RADIATION THERAPY FIELDS: CPT | Performed by: RADIOLOGY

## 2019-04-05 PROCEDURE — 77386 ZZC IMRT TREATMENT DELIVERY, COMPLEX: CPT | Performed by: RADIOLOGY

## 2019-04-08 ENCOUNTER — APPOINTMENT (OUTPATIENT)
Dept: RADIATION ONCOLOGY | Facility: CLINIC | Age: 64
End: 2019-04-08
Payer: COMMERCIAL

## 2019-04-08 PROCEDURE — 77014 ZZHC CT GUIDE FOR PLACEMENT RADIATION THERAPY FIELDS: CPT | Performed by: RADIOLOGY

## 2019-04-08 PROCEDURE — 77386 ZZC IMRT TREATMENT DELIVERY, COMPLEX: CPT | Performed by: RADIOLOGY

## 2019-04-09 ENCOUNTER — APPOINTMENT (OUTPATIENT)
Dept: RADIATION ONCOLOGY | Facility: CLINIC | Age: 64
End: 2019-04-09
Payer: COMMERCIAL

## 2019-04-09 PROCEDURE — 77014 ZZHC CT GUIDE FOR PLACEMENT RADIATION THERAPY FIELDS: CPT | Performed by: RADIOLOGY

## 2019-04-09 PROCEDURE — 77427 RADIATION TX MANAGEMENT X5: CPT | Performed by: RADIOLOGY

## 2019-04-09 PROCEDURE — 77386 ZZC IMRT TREATMENT DELIVERY, COMPLEX: CPT | Performed by: RADIOLOGY

## 2019-04-09 PROCEDURE — 77336 RADIATION PHYSICS CONSULT: CPT | Performed by: RADIOLOGY

## 2019-04-10 ENCOUNTER — OFFICE VISIT (OUTPATIENT)
Dept: RADIATION ONCOLOGY | Facility: CLINIC | Age: 64
End: 2019-04-10
Payer: COMMERCIAL

## 2019-04-10 ENCOUNTER — APPOINTMENT (OUTPATIENT)
Dept: RADIATION ONCOLOGY | Facility: CLINIC | Age: 64
End: 2019-04-10
Payer: COMMERCIAL

## 2019-04-10 VITALS
RESPIRATION RATE: 18 BRPM | SYSTOLIC BLOOD PRESSURE: 104 MMHG | OXYGEN SATURATION: 95 % | TEMPERATURE: 98.2 F | WEIGHT: 167 LBS | DIASTOLIC BLOOD PRESSURE: 56 MMHG | HEART RATE: 68 BPM | BODY MASS INDEX: 29.59 KG/M2

## 2019-04-10 DIAGNOSIS — C20 RECTAL CANCER (H): Primary | ICD-10-CM

## 2019-04-10 PROCEDURE — 99207 ZZC DROP WITH A PROCEDURE: CPT | Performed by: RADIOLOGY

## 2019-04-10 PROCEDURE — 77014 ZZHC CT GUIDE FOR PLACEMENT RADIATION THERAPY FIELDS: CPT | Performed by: RADIOLOGY

## 2019-04-10 PROCEDURE — 77386 ZZC IMRT TREATMENT DELIVERY, COMPLEX: CPT | Performed by: RADIOLOGY

## 2019-04-10 ASSESSMENT — PAIN SCALES - GENERAL: PAINLEVEL: NO PAIN (0)

## 2019-04-10 NOTE — PATIENT INSTRUCTIONS
Please contact [facility] RN with questions or concerns following today's appointment: [phone].    Thank you!

## 2019-04-10 NOTE — PROGRESS NOTES
[facility] PHYSICIANS  SPECIALIZING IN BREAKTHROUGHS  Radiation Oncology    On Treatment Visit Note      Niharika Cason      Date: 4/10/2019   MRN: [id number]   : 1955  Diagnosis: rectal cancer      Reason for Visit:  On Radiation Treatment Visit     Treatment Summary to Date  Treatment Site: pelvis + rectal boost Current Dose: 4680/5040 cGy Fractions:            Subjective:       Nursing ROS:   Nutrition Alteration  Diet Type: Patient's Preference  Skin  Skin Note: Patient reports no changes to skin in treatment area.  Patient reports using aquaphor twice daily.  Patient reports raw skin feeling on anus area when having a bowel movement and small amounts of blood when wiping.               Gastrointestinal  GI Note: Patient reports urgency with bowel movements.  Patient reports diarrhea and constipation.  Patient reports no nausea or vomiting  Genitourinary   Note: Patient reports urgency with urination.  Patient reports no pain with urination  Psychosocial  Pyschosocial Note: Patient reports energy level at baseline  Pain Assessment  0-10 Pain Scale: 0  Pain Note: Patient reports no pain at today's visit.  Patient reports pain in anus area when having a bowel movement      Objective:   /56 (BP Location: Right arm, Patient Position: Sitting, Cuff Size: Adult Regular)   Pulse 68   Temp 98.2  F (36.8  C) (Oral)   Resp 18   Wt 75.8 kg (167 lb)   LMP 10/15/2008 (Approximate)   SpO2 95%   BMI 29.59 kg/m    Gen: Appears well, in no acute distress  Skin: No erythema    Labs:  CBC RESULTS:   Recent Labs   Lab Test 19  1455   WBC 4.2   RBC 3.27*   HGB 11.3*   HCT 32.7*      MCH 34.6*   MCHC 34.6   RDW 14.9        ELECTROLYTES:  Recent Labs   Lab Test 19  1455      POTASSIUM 3.8   CHLORIDE 102   JUNG 9.2   CO2 28   BUN 20   CR 0.54   *       Assessment:    Tolerating radiation therapy well.  All questions and concerns  addressed.    Toxicities:  Fatigue: Grade 1: Fatigue relieved by rest  Pain: Grade 1: Mild pain  Diarrhea: Grade 1: Increase of <4 stools per day over baseline; mild increase in ostomy output compared to baseline  Urinary urgency: Grade 1: Present  Dermatitis: Grade 0: No toxicity    Plan:   1. Continue current therapy.    2. Increased anusol suppository to 3 pills TID  3. Urgency is tolerable.  Will continue to follow clinically.  4. Diet modifications discussed for erratic stools.  Imodium prn.  5. Increase hydration and sugar monitoring      Mosaiq chart and setup information reviewed  MVCT/IGRT images checked    Medication Review  Med list reviewed with patient?: Yes  Med list printed and given: Offered and declined             Jo Morris MD    Please do not send letter to referring physician.

## 2019-04-11 ENCOUNTER — TELEPHONE (OUTPATIENT)
Dept: FAMILY MEDICINE | Facility: CLINIC | Age: 64
End: 2019-04-11

## 2019-04-11 ENCOUNTER — APPOINTMENT (OUTPATIENT)
Dept: RADIATION ONCOLOGY | Facility: CLINIC | Age: 64
End: 2019-04-11
Payer: COMMERCIAL

## 2019-04-11 DIAGNOSIS — E10.65 TYPE 1 DIABETES MELLITUS WITH HYPERGLYCEMIA (H): ICD-10-CM

## 2019-04-11 PROCEDURE — 77386 ZZC IMRT TREATMENT DELIVERY, COMPLEX: CPT | Performed by: RADIOLOGY

## 2019-04-11 PROCEDURE — 77014 ZZHC CT GUIDE FOR PLACEMENT RADIATION THERAPY FIELDS: CPT | Performed by: RADIOLOGY

## 2019-04-12 ENCOUNTER — APPOINTMENT (OUTPATIENT)
Dept: RADIATION ONCOLOGY | Facility: CLINIC | Age: 64
End: 2019-04-12
Payer: COMMERCIAL

## 2019-04-12 PROCEDURE — 77427 RADIATION TX MANAGEMENT X5: CPT | Performed by: RADIOLOGY

## 2019-04-12 PROCEDURE — 77014 ZZHC CT GUIDE FOR PLACEMENT RADIATION THERAPY FIELDS: CPT | Performed by: RADIOLOGY

## 2019-04-12 PROCEDURE — 77336 RADIATION PHYSICS CONSULT: CPT | Performed by: RADIOLOGY

## 2019-04-12 PROCEDURE — 77386 ZZC IMRT TREATMENT DELIVERY, COMPLEX: CPT | Performed by: RADIOLOGY

## 2019-04-15 RX ORDER — INSULIN GLARGINE 100 [IU]/ML
INJECTION, SOLUTION SUBCUTANEOUS
Qty: 15 ML | Refills: 0 | Status: SHIPPED | OUTPATIENT
Start: 2019-04-15 | End: 2019-05-08

## 2019-04-15 NOTE — TELEPHONE ENCOUNTER
"Routing refill request to provider for review/approval because:  Germania given x1 and patient did not follow up, please advise  Pt states she was told she could follow up after her CA treatments. I spoke with pt and this would be another 8 weeks.   Germania refill given for 1 month.  Most labs updated through July. Please advise if ok to give additional refills and if ok for an appt in July.     Insulin glargine  Last Written Prescription Date:  3/5/2019  Last Fill Quantity: 15 ml,  # refills: 0   Last office visit: 9/20/2018 with prescribing provider:  Oscar   Future Office Visit:   Next 5 appointments (look out 90 days)    Apr 17, 2019 11:15 AM CDT  Return Visit with Dayron Lawton MD  [facility] ([address] [id number] [phone]   May 13, 2019  3:15 PM CDT  [facility] [address] [place] [id number] [phone]           Requested Prescriptions   Pending Prescriptions Disp Refills     insulin glargine (BASAGLAR KWIKPEN) 100 UNIT/ML pen [Pharmacy Med Name: BASAGLAR KWIKPEN 100UNIT/ML SOPN] 15 mL 0     Sig: INJECT 50 UNITS UNDER THE SKIN ONCE DAILY APPT NEEDED FOR ADDITIONAL REFILLS       Long Acting Insulin Protocol Failed - 4/15/2019 11:48 AM        Failed - Recent (6 mo) or future (30 days) visit within the authorizing provider's specialty     Patient had office visit in the last 6 months or has a visit in the next 30 days with authorizing provider or within the authorizing provider's specialty.  See \"Patient Info\" tab in inbasket, or \"Choose Columns\" in Meds & Orders section of the refill encounter.            Passed - Blood pressure less than 140/90 in past 6 months     BP Readings from Last 3 Encounters:   04/10/19 104/56   04/03/19 112/54   03/27/19 107/61                 Passed - LDL on file in past 12 months     Recent Labs   Lab Test " 07/20/18  0922   LDL 63             Passed - Microalbumin on file in past 12 months     Recent Labs   Lab Test 07/20/18  0929   MICROL 20   UMALCR 21.10             Passed - Serum creatinine on file in past 12 months     Recent Labs   Lab Test 03/25/19  1455  09/17/18  1251   CR 0.54   < >  --    CREAT  --   --  0.6    < > = values in this interval not displayed.             Passed - HgbA1C in past 3 or 6 months     If HgbA1C is 8 or greater, it needs to be on file within the past 3 months.  If less than 8, must be on file within the past 6 months.     Recent Labs   Lab Test 11/15/18  1035   A1C 7.1*             Passed - Medication is active on med list        Passed - Patient is age 18 or older          Angelika Davenport RN on 4/15/2019 at 12:14 PM

## 2019-04-15 NOTE — TELEPHONE ENCOUNTER
Patient was told that she would not have to have diabetic recheck until after cancer treatment was over, she just finished and will be making appointment but in the meantime is out of her insulin, thanks    Kathryn Vincent-Technician  [facility]  [phone]

## 2019-04-16 ENCOUNTER — PATIENT OUTREACH (OUTPATIENT)
Dept: SURGERY | Facility: CLINIC | Age: 64
End: 2019-04-16

## 2019-04-16 DIAGNOSIS — C20 RECTAL ADENOCARCINOMA (H): Primary | ICD-10-CM

## 2019-04-16 NOTE — PROGRESS NOTES
Patient's  was called back after he called letting us know that Niharika has completed Radiation. Patient is scheduled for her return visit to see Dr. Le on June 12, 8 weeks post radiation with her MRI prior and her CT scan prior. Patient's  will receive a call to schedule her MRI. Patient's  stated understanding of these instructions and is in agreement with this plan of care.

## 2019-04-17 ENCOUNTER — INFUSION THERAPY VISIT (OUTPATIENT)
Dept: INFUSION THERAPY | Facility: CLINIC | Age: 64
End: 2019-04-17
Attending: INTERNAL MEDICINE
Payer: COMMERCIAL

## 2019-04-17 ENCOUNTER — ONCOLOGY VISIT (OUTPATIENT)
Dept: ONCOLOGY | Facility: CLINIC | Age: 64
End: 2019-04-17
Payer: COMMERCIAL

## 2019-04-17 VITALS
HEIGHT: 63 IN | TEMPERATURE: 98 F | HEART RATE: 67 BPM | WEIGHT: 165.5 LBS | SYSTOLIC BLOOD PRESSURE: 90 MMHG | OXYGEN SATURATION: 96 % | DIASTOLIC BLOOD PRESSURE: 58 MMHG | RESPIRATION RATE: 20 BRPM | BODY MASS INDEX: 29.32 KG/M2

## 2019-04-17 DIAGNOSIS — C20 RECTAL ADENOCARCINOMA (H): ICD-10-CM

## 2019-04-17 DIAGNOSIS — G62.9 NEUROPATHY: ICD-10-CM

## 2019-04-17 DIAGNOSIS — C20 RECTAL CANCER (H): Primary | ICD-10-CM

## 2019-04-17 DIAGNOSIS — C20 RECTAL ADENOCARCINOMA (H): Primary | ICD-10-CM

## 2019-04-17 LAB
ALBUMIN SERPL-MCNC: 3.9 G/DL (ref 3.4–5)
ALP SERPL-CCNC: 93 U/L (ref 40–150)
ALT SERPL W P-5'-P-CCNC: 25 U/L (ref 0–50)
ANION GAP SERPL CALCULATED.3IONS-SCNC: 6 MMOL/L (ref 3–14)
AST SERPL W P-5'-P-CCNC: 18 U/L (ref 0–45)
BASOPHILS # BLD AUTO: 0 10E9/L (ref 0–0.2)
BASOPHILS NFR BLD AUTO: 0.3 %
BILIRUB SERPL-MCNC: 0.5 MG/DL (ref 0.2–1.3)
BUN SERPL-MCNC: 23 MG/DL (ref 7–30)
CALCIUM SERPL-MCNC: 8.9 MG/DL (ref 8.5–10.1)
CEA SERPL-MCNC: 4.7 UG/L (ref 0–2.5)
CHLORIDE SERPL-SCNC: 103 MMOL/L (ref 94–109)
CO2 SERPL-SCNC: 28 MMOL/L (ref 20–32)
CREAT SERPL-MCNC: 0.62 MG/DL (ref 0.52–1.04)
DIFFERENTIAL METHOD BLD: ABNORMAL
EOSINOPHIL NFR BLD AUTO: 1.8 %
ERYTHROCYTE [DISTWIDTH] IN BLOOD BY AUTOMATED COUNT: 15.6 % (ref 10–15)
GFR SERPL CREATININE-BSD FRML MDRD: >90 ML/MIN/{1.73_M2}
GLUCOSE SERPL-MCNC: 77 MG/DL (ref 70–99)
HCT VFR BLD AUTO: 31.9 % (ref 35–47)
HGB BLD-MCNC: 10.8 G/DL (ref 11.7–15.7)
IMM GRANULOCYTES # BLD: 0 10E9/L (ref 0–0.4)
IMM GRANULOCYTES NFR BLD: 0.6 %
LYMPHOCYTES # BLD AUTO: 0.5 10E9/L (ref 0.8–5.3)
LYMPHOCYTES NFR BLD AUTO: 14.5 %
MCH RBC QN AUTO: 34.2 PG (ref 26.5–33)
MCHC RBC AUTO-ENTMCNC: 33.9 G/DL (ref 31.5–36.5)
MCV RBC AUTO: 101 FL (ref 78–100)
MONOCYTES # BLD AUTO: 0.5 10E9/L (ref 0–1.3)
MONOCYTES NFR BLD AUTO: 14.8 %
NEUTROPHILS # BLD AUTO: 2.3 10E9/L (ref 1.6–8.3)
NEUTROPHILS NFR BLD AUTO: 68 %
NRBC # BLD AUTO: 0 10*3/UL
NRBC BLD AUTO-RTO: 0 /100
PLATELET # BLD AUTO: 170 10E9/L (ref 150–450)
POTASSIUM SERPL-SCNC: 4.2 MMOL/L (ref 3.4–5.3)
PROT SERPL-MCNC: 6.8 G/DL (ref 6.8–8.8)
RBC # BLD AUTO: 3.16 10E12/L (ref 3.8–5.2)
SODIUM SERPL-SCNC: 137 MMOL/L (ref 133–144)
WBC # BLD AUTO: 3.4 10E9/L (ref 4–11)

## 2019-04-17 PROCEDURE — 82378 CARCINOEMBRYONIC ANTIGEN: CPT | Performed by: INTERNAL MEDICINE

## 2019-04-17 PROCEDURE — 80053 COMPREHEN METABOLIC PANEL: CPT | Performed by: INTERNAL MEDICINE

## 2019-04-17 PROCEDURE — 85025 COMPLETE CBC W/AUTO DIFF WBC: CPT | Performed by: INTERNAL MEDICINE

## 2019-04-17 PROCEDURE — 25000128 H RX IP 250 OP 636: Performed by: INTERNAL MEDICINE

## 2019-04-17 PROCEDURE — 36591 DRAW BLOOD OFF VENOUS DEVICE: CPT

## 2019-04-17 PROCEDURE — 99214 OFFICE O/P EST MOD 30 MIN: CPT | Performed by: INTERNAL MEDICINE

## 2019-04-17 RX ORDER — HEPARIN SODIUM (PORCINE) LOCK FLUSH IV SOLN 100 UNIT/ML 100 UNIT/ML
500 SOLUTION INTRAVENOUS EVERY 8 HOURS
Status: CANCELLED
Start: 2019-04-17

## 2019-04-17 RX ORDER — HEPARIN SODIUM (PORCINE) LOCK FLUSH IV SOLN 100 UNIT/ML 100 UNIT/ML
500 SOLUTION INTRAVENOUS EVERY 8 HOURS
Status: DISCONTINUED | OUTPATIENT
Start: 2019-04-17 | End: 2019-04-17 | Stop reason: HOSPADM

## 2019-04-17 RX ADMIN — Medication 500 UNITS: at 11:13

## 2019-04-17 ASSESSMENT — PAIN SCALES - GENERAL: PAINLEVEL: NO PAIN (0)

## 2019-04-17 ASSESSMENT — MIFFLIN-ST. JEOR: SCORE: 1284.83

## 2019-04-17 NOTE — PROGRESS NOTES
FOLLOW-UP VISIT NOTE    PATIENT NAME: Niharika Cason MRN # [id number]  DATE OF VISIT: Apr 17, 2019 YOB: 1955    REFERRING PROVIDER: Liliana Urbina MD  [address] [address]    CANCER TYPE:Recatl adenocarcinoma  STAGE: cT3cN1 M0  ECOG PS: 1    ONCOLOGY HISTORY:    Niharika Cason is a 63-year-old female with past medical history significant for hypertension, diabetes mellitus type 1, anxiety underwent her     09/17/18  Screening colonoscopy revealed a partially obstructing mass in the rectosigmoid colon which was biopsied and came back as invasive moderately differentiated adenocarcinoma - intact mismatch repair proteins. Staging CT scans showed a 3 cm enhancing lesion in the right lobe of liver he subsequently had an MRI done which characterized a liver lesion as well as VOCAL lobular hyperplasia with no suspected metastatic disease. MRI of pelvis showed an irregular ulcerated left rectal wall mass at 9 cm from the anal verge extending to the muscularis propria into the perirectal fat. Also noted were suspicious perirectal and presacral lymph nodes -clinical stage T3cN1 M0. She met with medical and surgical oncology and the recommendation was to proceed with total neoadjuvant therapy.      10/25/18  Started XELOX chemotherapy    12/06//18 Upon completion of oxaliplatin dose, patient complained of tingling in extremities and buttock region along with Blurred vision. IV Solu-Medrol was given and symptoms improved within half an hour. Oxaliplatin dsoe reduced with subsequent cycles and symptoms did not recur.    12/21/18 CT scans after 3 cycles shows stable findings with no concerns for progression/new metastasis    03/06/18 CT scans were reviewed with no findings concerning for progression. Sigmoidoscopy after completion of neoadjuvant chemotherapy showed positive response to treatment with shrinkage of the primary tumor.   Started on neoadjuvant chemoRT with Xeloda.    04/12/19  Completed chemo-RT      SUBJECTIVE     In clinic today for follow up. Complains of neuropathy involving bilateral hands and feet which has gradually gotten worse.Gabapentin has helped some.      Denies nausea/vomiting, mucositis, fever/chills,dysuria, hematuria, diarrhea, constipation, blood in the stools, loss of appetite, weight loss or any other complaints      PAST MEDICAL HISTORY     Past Medical History:   Diagnosis Date     Essential hypertension, benign      Generalized anxiety disorder      Hyperlipidemia      Rectal cancer (H) 09/17/2018     Type I (juvenile type) diabetes mellitus without mention of complication, not stated as uncontrolled     diagnosed at age 35     Ulcerative colitis, unspecified     in the 70s.            ALLERGIES     Allergies   Allergen Reactions     No Known Drug Allergies         REVIEW OF SYSTEMS   As above in the HPI, o/w complete 12-point ROS was negative.     PHYSICAL EXAM   B/P: Data Unavailable, T: Data Unavailable, P: Data Unavailable, R: Data Unavailable  SpO2 Readings from Last 4 Encounters:   11/15/18 95%   11/15/18 96%   11/02/18 96%   10/25/18 96%     Wt Readings from Last 3 Encounters:   04/17/19 75.1 kg (165 lb 8 oz)   04/10/19 75.8 kg (167 lb)   04/03/19 75.3 kg (166 lb)     GEN: NAD  Mouth/ENT: Oropharynx is clear.  NECK: no  lymphadenopathy  LUNGS: clear   CV: regular  ABDOMEN: soft, non-tender, non-distended,  EXT: warm, well perfused, no edema  NEURO: alert  SKIN: no rashes     LABORATORY AND IMAGING STUDIES     Lab Results   Component Value Date    WBC 3.4 04/17/2019     Lab Results   Component Value Date    RBC 3.16 04/17/2019     Lab Results   Component Value Date    HGB 10.8 04/17/2019     Lab Results   Component Value Date    HCT 31.9 04/17/2019     No components found for: MCT  Lab Results   Component Value Date     04/17/2019     Lab Results   Component Value Date    MCH 34.2 04/17/2019     Lab Results   Component Value Date    MCHC 33.9 04/17/2019      Lab Results   Component Value Date    RDW 15.6 04/17/2019     Lab Results   Component Value Date     04/17/2019     Recent Labs   Lab Test 04/17/19  1120 03/25/19  1455    137   POTASSIUM 4.2 3.8   CHLORIDE 103 102   CO2 28 28   ANIONGAP 6 7   GLC 77 210*   BUN 23 20   CR 0.62 0.54   JUNG 8.9 9.2          ASSESSMENT AND PLAN     63-year-old female with past medical history significant for hypertension, diabetes mellitus type 1, anxiety on a screening colonoscopy was diagnosed with rectal adenocarcinoma     - Rectal adenocarcinoma- T3cN1 M0 intact mismatch repair protein  She met with medical and surgical oncology at [facility] and the recommendation was to proceed with total neoadjuvant therapy.  1. 6 cycles of XELOX, then:   2. 2 months of CXRT.   3. Surgery after 6-8 weeks.     10/25/18 Started XELOX chemotherapy with oxaliplatin dose reduced with cycle 4 to 100 mg/m2 given neurological toxicity with higher dose  03/06/18 - 04/12/19  neoadjuvant chemoRT with Xeloda.  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  We'll plan on repeating CT scans, MRI pelvis and flex sigmoidoscopy in 2 months to assess response followed by  surgical evaluation to discuss resection versus watch and wait approach.  Following that ,we'll discuss Surveillance strategies based off on NCCN/ASCO guidelines      - Neuropathy  Grade 2  Secondary to prior oxaliplatin use  Symptoms well controlled currently and we will  increase gabapentin to 600 mg b.i.d.      - Normocytic anemia/Leucopenia  Secondary to chemotherapy  Not symptomatic  Monitor     - Diabetes mellitus  On an insulin regimen and managed by primary care provider      - Anxiety  Continue with paxil  Ativan prn        Return to clinic in 2 months  for office visit, labs.      Chart documentation with Dragon Voice recognition Software. Although reviewed after completion, some words and grammatical errors may  remain.  Dayron Lawton MD  Attending Physician   Hematology/Medical Oncology

## 2019-04-17 NOTE — PATIENT INSTRUCTIONS
Today: Done with Xeloda    Port needs to be flushed every 4-6 weeks.    Infusion Date/Time: May 15, 2019 at 8 am    Please follow up with Dr. Lawton in 2 months with lab work .     Port Lab Date/Time: 6/19/19 at 10:30 am    Follow Up Date/Time: 6/19/19 at 11am    If you have any questions or concerns please feel free to call.    If you need to reschedule please call:  Clinic or Lab Appointment - [phone]  Infusion - [phone]  Imaging - [phone]    Roxie FLOOD CMA  [facility]  Oncology/Hematology at [facility]  [phone]

## 2019-04-17 NOTE — LETTER
4/17/2019         RE: Niharika Cason  [address] [facility] [address]        Dear Colleague,    Thank you for referring your patient, Niharika Cason, to the [facility]. Please see a copy of my visit note below.      FOLLOW-UP VISIT NOTE    PATIENT NAME: Niharika Cason MRN # [id number]  DATE OF VISIT: Apr 17, 2019 YOB: 1955    REFERRING PROVIDER: Liliana Urbina MD  [address] [facility] [address]  [place] [address]    CANCER TYPE:Recatl adenocarcinoma  STAGE: cT3cN1 M0  ECOG PS: 1    ONCOLOGY HISTORY:    Niharika Cason is a 63-year-old female with past medical history significant for hypertension, diabetes mellitus type 1, anxiety underwent her     09/17/18  Screening colonoscopy revealed a partially obstructing mass in the rectosigmoid colon which was biopsied and came back as invasive moderately differentiated adenocarcinoma - intact mismatch repair proteins. Staging CT scans showed a 3 cm enhancing lesion in the right lobe of liver he subsequently had an MRI done which characterized a liver lesion as well as VOCAL lobular hyperplasia with no suspected metastatic disease. MRI of pelvis showed an irregular ulcerated left rectal wall mass at 9 cm from the anal verge extending to the muscularis propria into the perirectal fat. Also noted were suspicious perirectal and presacral lymph nodes -clinical stage T3cN1 M0. She met with medical and surgical oncology and the recommendation was to proceed with total neoadjuvant therapy.      10/25/18  Started XELOX chemotherapy    12/06//18 Upon completion of oxaliplatin dose, patient complained of tingling in extremities and buttock region along with Blurred vision. IV Solu-Medrol was given and symptoms improved within half an hour. Oxaliplatin dsoe reduced with subsequent cycles and symptoms did not recur.    12/21/18 CT scans after 3 cycles shows stable findings with no concerns for progression/new metastasis    03/06/18 CT scans were  reviewed with no findings concerning for progression. Sigmoidoscopy after completion of neoadjuvant chemotherapy showed positive response to treatment with shrinkage of the primary tumor.   Started on neoadjuvant chemoRT with Xeloda.    04/12/19 Completed chemo-RT      SUBJECTIVE     In clinic today for follow up. Complains of neuropathy involving bilateral hands and feet which has gradually gotten worse.Gabapentin has helped some.      Denies nausea/vomiting, mucositis, fever/chills,dysuria, hematuria, diarrhea, constipation, blood in the stools, loss of appetite, weight loss or any other complaints      PAST MEDICAL HISTORY     Past Medical History:   Diagnosis Date     Essential hypertension, benign      Generalized anxiety disorder      Hyperlipidemia      Rectal cancer (H) 09/17/2018     Type I (juvenile type) diabetes mellitus without mention of complication, not stated as uncontrolled     diagnosed at age 35     Ulcerative colitis, unspecified     in the 70s.            ALLERGIES     Allergies   Allergen Reactions     No Known Drug Allergies         REVIEW OF SYSTEMS   As above in the HPI, o/w complete 12-point ROS was negative.     PHYSICAL EXAM   B/P: Data Unavailable, T: Data Unavailable, P: Data Unavailable, R: Data Unavailable  SpO2 Readings from Last 4 Encounters:   11/15/18 95%   11/15/18 96%   11/02/18 96%   10/25/18 96%     Wt Readings from Last 3 Encounters:   04/17/19 75.1 kg (165 lb 8 oz)   04/10/19 75.8 kg (167 lb)   04/03/19 75.3 kg (166 lb)     GEN: NAD  Mouth/ENT: Oropharynx is clear.  NECK: no  lymphadenopathy  LUNGS: clear   CV: regular  ABDOMEN: soft, non-tender, non-distended,  EXT: warm, well perfused, no edema  NEURO: alert  SKIN: no rashes     LABORATORY AND IMAGING STUDIES     Lab Results   Component Value Date    WBC 3.4 04/17/2019     Lab Results   Component Value Date    RBC 3.16 04/17/2019     Lab Results   Component Value Date    HGB 10.8 04/17/2019     Lab Results   Component  Value Date    HCT 31.9 04/17/2019     No components found for: MCT  Lab Results   Component Value Date     04/17/2019     Lab Results   Component Value Date    MCH 34.2 04/17/2019     Lab Results   Component Value Date    MCHC 33.9 04/17/2019     Lab Results   Component Value Date    RDW 15.6 04/17/2019     Lab Results   Component Value Date     04/17/2019     Recent Labs   Lab Test 04/17/19  1120 03/25/19  1455    137   POTASSIUM 4.2 3.8   CHLORIDE 103 102   CO2 28 28   ANIONGAP 6 7   GLC 77 210*   BUN 23 20   CR 0.62 0.54   JUNG 8.9 9.2          ASSESSMENT AND PLAN     63-year-old female with past medical history significant for hypertension, diabetes mellitus type 1, anxiety on a screening colonoscopy was diagnosed with rectal adenocarcinoma     - Rectal adenocarcinoma- T3cN1 M0 intact mismatch repair protein  She met with medical and surgical oncology at [facility] and the recommendation was to proceed with total neoadjuvant therapy.  1. 6 cycles of XELOX, then:   2. 2 months of CXRT.   3. Surgery after 6-8 weeks.     10/25/18 Started XELOX chemotherapy with oxaliplatin dose reduced with cycle 4 to 100 mg/m2 given neurological toxicity with higher dose  03/06/18 - 04/12/19  neoadjuvant chemoRT with Xeloda.  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  We'll plan on repeating CT scans, MRI pelvis and flex sigmoidoscopy in 2 months to assess response followed by  surgical evaluation to discuss resection versus watch and wait approach.  Following that ,we'll discuss Surveillance strategies based off on NCCN/ASCO guidelines      - Neuropathy  Grade 2  Secondary to prior oxaliplatin use  Symptoms well controlled currently and we will  increase gabapentin to 600 mg b.i.d.      - Normocytic anemia/Leucopenia  Secondary to chemotherapy  Not symptomatic  Monitor     - Diabetes mellitus  On an insulin regimen and managed by primary care  provider      - Anxiety  Continue with paxil  Ativan prn        Return to clinic in 2 months  for office visit, labs.      Chart documentation with Dragon Voice recognition Software. Although reviewed after completion, some words and grammatical errors may remain.  Dayron Lawton MD  Attending Physician   Hematology/Medical Oncology          Again, thank you for allowing me to participate in the care of your patient.        Sincerely,        Dayron Lawton MD

## 2019-04-17 NOTE — PROGRESS NOTES
Infusion Nursing Note:  Niharika Cason presents today for Port labs.    Patient seen by provider today: Yes: DR. Lawton   present during visit today: Not Applicable.    Note: Port labs drawn prior to clinic visit.    Intravenous Access:  Labs drawn without difficulty.  Implanted Port.    Treatment Conditions:  Lab Results   Component Value Date    HGB 10.8 04/17/2019     Lab Results   Component Value Date    WBC 3.4 04/17/2019      Lab Results   Component Value Date    ANEU 2.3 04/17/2019     Lab Results   Component Value Date     04/17/2019      Lab Results   Component Value Date     04/17/2019                   Lab Results   Component Value Date    POTASSIUM 4.2 04/17/2019           Lab Results   Component Value Date    MAG 1.9 02/27/2004            Lab Results   Component Value Date    CR 0.62 04/17/2019                   Lab Results   Component Value Date    JUNG 8.9 04/17/2019                Lab Results   Component Value Date    BILITOTAL 0.5 04/17/2019           Lab Results   Component Value Date    ALBUMIN 3.9 04/17/2019                    Lab Results   Component Value Date    ALT 25 04/17/2019           Lab Results   Component Value Date    AST 18 04/17/2019           Post Infusion Assessment:  Blood return noted pre and post infusion.  Site patent and intact, free from redness, edema or discomfort.  No evidence of extravasations.  Access discontinued per protocol.       Discharge Plan:   Discharge instructions reviewed with: Patient.  Patient and/or family verbalized understanding of discharge instructions and all questions answered.  Patient discharged in stable condition accompanied by: self.  Departure Mode: Ambulatory.    Marizol Maradiaga RN

## 2019-04-17 NOTE — NURSING NOTE
DISCHARGE PLAN:  Next appointments: See patient instruction section  Departure Mode: Ambulatory  Accompanied by: self  8 minutes for nursing discharge (face to face time)     Niharika Cason is here today for oncology follow up.  Writing nurse seen patient after Medical Oncology appointment to address questions/concerns/coordinate care.  Appointments scheduled. Patient RTC in 2 months with labs. Patient was informed that port needs to flushed every 4 - 6 week. See patient instructions and Oncologist's Progress note for further details. Questions and concerns addressed to patient's satisfaction. Patient verbalized and demonstrated understanding of plan.  Contact information provided and patient is encouraged to call with any that arise in the interim of care.    Roxie FLOOD [facility]    Oncology/Hematology at [facility]  [phone]  4/17/2019, 1:19 PM

## 2019-04-17 NOTE — NURSING NOTE
"Oncology Rooming Note    April 17, 2019 11:36 AM   Niharika Cason is a 63 year old female who presents for:    Chief Complaint   Patient presents with     Oncology Clinic Visit     rectal adenocarcinoma      Chemotherapy     D1C1 xeloda     Results     labs completed today      Initial Vitals: BP 90/58   Pulse 67   Temp 98  F (36.7  C) (Temporal)   Resp 20   Ht 1.6 m (5' 3\")   Wt 75.1 kg (165 lb 8 oz)   LMP 10/15/2008 (Approximate)   SpO2 96%   BMI 29.32 kg/m   Estimated body mass index is 29.32 kg/m  as calculated from the following:    Height as of this encounter: 1.6 m (5' 3\").    Weight as of this encounter: 75.1 kg (165 lb 8 oz). Body surface area is 1.83 meters squared.  No Pain (0) Comment: Data Unavailable   Patient's last menstrual period was 10/15/2008 (approximate).  Allergies reviewed: Yes  Medications reviewed: Yes    Medications: Medication refills not needed today.  Pharmacy name entered into [employer]:    [facility] [place], [place] - [address] [facility] - [place], [place] - [address]    Clinical concerns: . Concerns reviewed with Dr. Lawton     5 minutes for nursing intake (face to face time)     Jesu ZIMMERMAN CMA                "

## 2019-04-18 ENCOUNTER — TELEPHONE (OUTPATIENT)
Dept: ONCOLOGY | Facility: CLINIC | Age: 64
End: 2019-04-18

## 2019-04-18 DIAGNOSIS — G62.9 NEUROPATHY: ICD-10-CM

## 2019-04-18 RX ORDER — GABAPENTIN 300 MG/1
600 CAPSULE ORAL 2 TIMES DAILY
Qty: 60 CAPSULE | Refills: 1 | Status: SHIPPED | OUTPATIENT
Start: 2019-04-18 | End: 2019-05-17

## 2019-04-18 NOTE — TELEPHONE ENCOUNTER
Patient states at her last office visit with you, you told her to double her dose of Gabapentin so she would be up to 600mg twice daily.  Could you please send a new RX to [facility] reflecting this increase in dose as patient is almost out and we will need correct RX to bill insurance, thanks.    Kathryn Vincent-Technician  [facility]  [phone]

## 2019-04-18 NOTE — TELEPHONE ENCOUNTER
"Niharika's  was working and refilling the water tanks and he stopped by nursing drop down station and demanded Niharika's results because they we're not release to [facility] yet & her visit was 4/17/19.    Consent to communicate on file to speak to Fabrice.     Writer did tell Fabrice that I could speak to Dr. Lawton & Twin to release lab results to [facility] so he can go back to work & he refused & stated \" why can't you just give them to me\" and writer explained she can not release results without a provider approval.     Spoke to Dr. Lawton and he gave me approval to release lab work from 4/17/19 and did state the CEA is up but no concern & to relay that.    Did give Fabrice paper results and he questioned the CEA and started to say that this shows radiation did not work and does she need to be seen sooner due to CEA being up, writer paused him told him I will leave the room and speak to Dr. Lawton. Spoke to Dr. Lawton and per Dr. Lawton no concern follow up in 2 months like discussed in office visit and it is to soon to tell what radiation did with the CEA number. Relayed what Dr. Lawton said to Fabrice, he was ok with the answer but concerned what his wife will say about the lab work. Writer did let him know if Niharika has concerns to please call the clinic & keep upcoming appointments. Fabrice left the clinic after.    Roxie Lares [facility]     "

## 2019-04-18 NOTE — TELEPHONE ENCOUNTER
Spoke with pt to see about scheduling for July. Pt would like to wait until she has her follow up in June with Dr. Lawton to see if she will need surgery, she will call back to schedule at that time. Pt is wondering if there should be more concern or a different treatment option for her neuropathy. Pt states Dr. Lawton told her is was from the chemo, but pt is concerned maybe it may also be her diabetes. Dr. Lawton is prescribing gabapentin to treat the neuropathy at this time. Pt will need further refills of insulin sent for her since only 1 month was given.

## 2019-04-22 ENCOUNTER — TELEPHONE (OUTPATIENT)
Dept: ONCOLOGY | Facility: CLINIC | Age: 64
End: 2019-04-22

## 2019-04-22 NOTE — TELEPHONE ENCOUNTER
----- Message from Juanis Evans sent at 4/22/2019  9:42 AM CDT -----  Could you please call (home number) Niharika RAMIREZ, she is going to get dental work done today 4/22 and needs the OK from her oncologist.  Thank you!

## 2019-04-22 NOTE — TELEPHONE ENCOUNTER
Patient called again and spoke with writing nurse.  Reviewed that Dental work should wait until 30 days after the last chemotherapy treatment.  Patient was on Xeloda up until last week.  Patient verbalized understanding.  Will have dentist call if questions or concerns for rescheduling.    Twin Ch RN, BSN, OCN  4/22/2019, 11:10 AM

## 2019-05-08 DIAGNOSIS — E10.65 TYPE 1 DIABETES MELLITUS WITH HYPERGLYCEMIA (H): ICD-10-CM

## 2019-05-08 NOTE — TELEPHONE ENCOUNTER
"Requested Prescriptions   Pending Prescriptions Disp Refills     insulin glargine (BASAGLAR KWIKPEN) 100 UNIT/ML pen [Pharmacy Med Name: BASAGLAR KWIKPEN 100UNIT/ML SOPN] 15 mL 0     Sig: INJECT 50 UNITS UNDER THE SKIN ONCE DAILY -APPT NEEDED FOR ADDITIONAL REFILLS-   Last Written Prescription Date:  4/15/19  Last Fill Quantity: 15ml,  # refills: 0   Last office visit: 9/20/2018 with prescribing provider:     Future Office Visit:   Next 5 appointments (look out 90 days)    May 13, 2019  3:15 PM CDT  Return Visit with Yohana Morris MD  [facility] [address] [place] [address] [id number] [phone]   Jun 19, 2019 11:00 AM CDT  Return Visit with Dayron Lawton MD  [facility] [address] [facility] [id number] [phone]             Long Acting Insulin Protocol Failed - 5/8/2019  1:59 PM        Failed - Recent (6 mo) or future (30 days) visit within the authorizing provider's specialty     Patient had office visit in the last 6 months or has a visit in the next 30 days with authorizing provider or within the authorizing provider's specialty.  See \"Patient Info\" tab in inbasket, or \"Choose Columns\" in Meds & Orders section of the refill encounter.            Passed - Blood pressure less than 140/90 in past 6 months     BP Readings from Last 3 Encounters:   04/17/19 90/58   04/10/19 104/56   04/03/19 112/54           Passed - LDL on file in past 12 months     Recent Labs   Lab Test 07/20/18  0922   LDL 63           Passed - Microalbumin on file in past 12 months     Recent Labs   Lab Test 07/20/18  0929   MICROL 20   UMALCR 21.10             Passed - Serum creatinine on file in past 12 months     Recent Labs   Lab Test 04/17/19  1120  09/17/18  1251   CR 0.62   < >  --    CREAT  --   --  0.6    < > = values in this interval not displayed.           Passed - HgbA1C in past 3 or 6 months     If HgbA1C is 8 " or greater, it needs to be on file within the past 3 months.  If less than 8, must be on file within the past 6 months.     Recent Labs   Lab Test 11/15/18  1035   A1C 7.1*           Passed - Medication is active on med list        Passed - Patient is age 18 or older      Routing refill request to provider for review/approval because:  Germania given x1 and patient did not follow up, please advise    Flores Watson RN

## 2019-05-09 RX ORDER — INSULIN GLARGINE 100 [IU]/ML
INJECTION, SOLUTION SUBCUTANEOUS
Qty: 15 ML | Refills: 0 | Status: SHIPPED | OUTPATIENT
Start: 2019-05-09 | End: 2019-05-16

## 2019-05-10 ENCOUNTER — TELEPHONE (OUTPATIENT)
Dept: RADIATION ONCOLOGY | Facility: CLINIC | Age: 64
End: 2019-05-10

## 2019-05-10 NOTE — TELEPHONE ENCOUNTER
Attempted to contact patient for a reminder call regarding their appointment with Dr. Morris on 05/13/2019 at 1315 at [facility].  Voicemail was left with appointment time, date and contact information for rescheduling if needed.          Kelly Renteria MA

## 2019-05-13 ENCOUNTER — OFFICE VISIT (OUTPATIENT)
Dept: RADIATION ONCOLOGY | Facility: CLINIC | Age: 64
End: 2019-05-13
Payer: COMMERCIAL

## 2019-05-13 VITALS
SYSTOLIC BLOOD PRESSURE: 99 MMHG | WEIGHT: 170.6 LBS | OXYGEN SATURATION: 93 % | BODY MASS INDEX: 30.22 KG/M2 | RESPIRATION RATE: 18 BRPM | DIASTOLIC BLOOD PRESSURE: 54 MMHG | TEMPERATURE: 98.1 F | HEART RATE: 68 BPM

## 2019-05-13 DIAGNOSIS — E10.8 TYPE 1 DIABETES MELLITUS WITH COMPLICATION (H): Primary | ICD-10-CM

## 2019-05-13 DIAGNOSIS — C20 RECTAL CANCER (H): Primary | ICD-10-CM

## 2019-05-13 PROCEDURE — 99024 POSTOP FOLLOW-UP VISIT: CPT | Performed by: RADIOLOGY

## 2019-05-13 ASSESSMENT — PAIN SCALES - GENERAL: PAINLEVEL: NO PAIN (0)

## 2019-05-13 NOTE — TELEPHONE ENCOUNTER
Insurance now prefers to pay for Lantus instead of the Basaglar, could you please send new RX over for the Lantus?  Thanks.    Kathryn Vincent-Technician  [facility]  [phone]

## 2019-05-13 NOTE — PATIENT INSTRUCTIONS
Please contact [facility] RN with questions or concerns following today's appointment: [phone].    Thank you!

## 2019-05-13 NOTE — NURSING NOTE
FOLLOW-UP VISIT    Patient Name: Niharika Cason      : 1955     Age: 63 year old        ______________________________________________________________________________     Chief Complaint   Patient presents with     Cancer     Radiation Oncology return visit with Dr. Morris     BP 99/54 (BP Location: Left arm, Patient Position: Sitting, Cuff Size: Adult Regular)   Pulse 68   Temp 98.1  F (36.7  C) (Oral)   Resp 18   Wt 77.4 kg (170 lb 9.6 oz)   LMP 10/15/2008 (Approximate)   SpO2 93%   BMI 30.22 kg/m       Date Radiation Completed: 5,040 to Pelvis completed on 2019    Pain  Denies    Labs  Other Labs: Yes: 2019    Imaging: No        Other Appointments:     MD Name: Dr. Le Appointment Date: 2019   MD Name: Dr. Lawton Appointment Date: 2019   MD Name: Dr. Morris Appointment Date: 2019   Other Appointment Notes: CT Chest 2019, MR Pelvis 2019            Nurse face-to-face time: Level 2:  5 min face to face time

## 2019-05-13 NOTE — NURSING NOTE
Teaching Flowsheet   Relevant Diagnosis: rectal cancer  Teaching Topic: Vaginal Dilator Use     Person(s) involved in teaching:   Patient and      Motivation Level:  Asks Questions: Yes  Eager to Learn: Yes  Cooperative: Yes  Receptive (willing/able to accept information): Yes  Any cultural factors/Taoism beliefs that may influence understanding or compliance? No       Patient and  demonstrates understanding of the following:  Reason for the appointment, diagnosis and treatment plan: Yes  Knowledge of proper use of medications and conditions for which they are ordered (with special attention to potential side effects or drug interactions): Yes  Which situations necessitate calling provider and whom to contact: Yes       Teaching Concerns Addressed:   Comments: Vaginal dilator use     Proper use and care of vaginal dilator (medical equip, care aids, etc.): Yes  Nutritional needs and diet plan: Yes  Pain management techniques: Yes  Wound Care: NA  How and/when to access community resources: Yes     Instructional Materials Used/Given: Radiation Therapy Gynecological Cancer pamphlet - specifically reviewed vaginal dilator use and care with patient and .    Patient and  verbalized understanding of all information and had no questions or concerns.     Time spent with patient: 15 minutes.    Fallon Herrera RN BSN OCN

## 2019-05-14 NOTE — PROGRESS NOTES
Dear Colleagues,  Today Niharika Cason was seen in follow up      IDENTIFICATION: Niharika Cason is a 63 year old woman with [id number] rectal adenocarcinoma with intact MMR proteins s/p CAPEOX x 6 cycles.  She completed neoadjuvant chemoradiation therapy on April 14, 2019.  INTERVAL HISTORY:  Niharika Cason was last seen in our clinic during her last week of treatment. While on treatment she experienced the expected side effects of grade 1 fatigue which was relieved by rest.  She also had grade 1 diarrhea which was managed with dietary changes and occasional Imodium and rectal pain with bowel movements.  The latter was managed with Anusol HC twice daily.  She also experienced urinary urgency which resolved on its own. Unfortunately, during her ontreatment visit of her 11th fraction patient was noted to be diaphoretic and unable to respond to commands while sitting in the examination chair.  Rapid response was called immediately and her glucose was measured at 23.  Her symptoms initially responded favorably to juice and oral glucose gel however after short period of time patient once again became unable to speak appropriately.  She was then transferred to the local emergency room by ambulance where they were able to stabilize her glucose as well as improve her hydration.  Last recorded glucose at the time of her leaving our clinic was 61.  When she returned for treatment we subsequently counseled the patient again on maintaining hydration and regular glucose checks in light of her receiving pelvic radiation in the setting of chronic diabetes mellitus. She went on to complete the remainder of her treatment course without any unexpected side effects.  Post treatment she states that all of her radiation induced symptoms have resolved. She returns today in follow up and has no complaints. Specifically denies n/v/ha/sob/cp.    REVIEW OF SYSTEMS: As per HPI, a 14-point review of systems is otherwise negative.      PHYSICAL  EXAMINATION:  BP 99/54 (BP Location: Left arm, Patient Position: Sitting, Cuff Size: Adult Regular)   Pulse 68   Temp 98.1  F (36.7  C) (Oral)   Resp 18   Wt 77.4 kg (170 lb 9.6 oz)   LMP 10/15/2008 (Approximate)   SpO2 93%   BMI 30.22 kg/m    GENERAL Well-appearing woman in no acute distress.  HEENT Normocephalic, atraumatic.  Sclerae anicteric.  CVR  Regular rate and rhythm.  No murmurs, rubs, or gallops.  LUNGS Clear to auscultation bilaterally.  LYMPH deffered.  EXT  No clubbing, cyanosis or edema.    NEURO Gait within normal limits.  SKIN  Warm and well perfused.  See breast exam  PSYCH:         Orientation: Alert, attentive, and oriented to time, place, and person                         Affect: Affect was appropriate to the situation and showed normal range and stability.                         Speech: Speech was clear with normal fluency, rate, tone, and volume.                         Memory: Although not formally assessed, immediate, recent, and remote memory appeared to be intact.                          Insight and Judgement:  demonstrates adequate awareness of the issues discussed.                         Thought: Thought patterns were coherent and logical.      IMAGING: No new imaging.        IMPRESSION/PLAN:  Niharika Cason is a 63 year old woman with [id number] rectal adenocarcinoma with intact MMR proteins s/p CAPEOX x 6 cycles.  She completed neoadjuvant chemoradiation therapy on April 14, 2019. She is being seen here in follow up. She has had resolution of her acute radiation induced side effects and has no complaints today. No new GI/ complaints. Her physical exam reveals well-healed pelvic skin.  She should continue to follow up with CRS and Med Onc as previously recommended.  Plan is for repeat CT scans, pelvic MRI and flex sigmoidoscopy in ~1 month to assess response to neoadjuvant treatment.  At that time there will be a discussion with surgery regarding proceeding with surgery versus  a watch and wait approach. Otherwise I've asked her to follow up in Rad Onc clinic in 3 months.  There was ample time for questions and all were answered to the patient's satisfaction. Thank you for allowing me to participate in the care of this pleasant patient. If you have any questions, please do not hesitate to contact my office.      Sincerely,  Jo Morris MD  Adjunct   Dept of Radiation Oncology  [facility]

## 2019-05-15 ENCOUNTER — INFUSION THERAPY VISIT (OUTPATIENT)
Dept: INFUSION THERAPY | Facility: CLINIC | Age: 64
End: 2019-05-15
Attending: FAMILY MEDICINE
Payer: COMMERCIAL

## 2019-05-15 DIAGNOSIS — C20 RECTAL ADENOCARCINOMA (H): ICD-10-CM

## 2019-05-15 DIAGNOSIS — C20 RECTAL CANCER (H): Primary | ICD-10-CM

## 2019-05-15 PROCEDURE — 96523 IRRIG DRUG DELIVERY DEVICE: CPT

## 2019-05-15 PROCEDURE — 25000128 H RX IP 250 OP 636: Performed by: INTERNAL MEDICINE

## 2019-05-15 RX ORDER — HEPARIN SODIUM (PORCINE) LOCK FLUSH IV SOLN 100 UNIT/ML 100 UNIT/ML
500 SOLUTION INTRAVENOUS EVERY 8 HOURS
Status: DISCONTINUED | OUTPATIENT
Start: 2019-05-15 | End: 2019-05-15 | Stop reason: HOSPADM

## 2019-05-15 RX ORDER — HEPARIN SODIUM (PORCINE) LOCK FLUSH IV SOLN 100 UNIT/ML 100 UNIT/ML
500 SOLUTION INTRAVENOUS EVERY 8 HOURS
Status: CANCELLED
Start: 2019-05-15

## 2019-05-15 RX ADMIN — Medication 500 UNITS: at 08:22

## 2019-05-15 NOTE — PROCEDURES
Radiotherapy Treatment Summary          Date of Report: 2019     PATIENT: JOSELIN GONSALES  MEDICAL RECORD NO: [id number]  : 1955     DIAGNOSIS: C20 Malignant neoplasm of rectum  INTENT OF RADIOTHERAPY: Cure  PATHOLOGY/STAGE:  63 year old woman with [id number] rectal adenocarcinoma with intact MMR   proteins s/p CAPEOX x 6 cycles.  This is the treatment summary for the radiation portion of her neoadjuvant   chemoradiation therapy.                                   Details of the treatments summarized below are found in records kept in the Department of Radiation Oncology   at [facility].     Treatment Summary:  Radiation Oncology - Course: 1 Protocol:   Treatment Site    Current Dose   Modality From  To Elapsed Days     Fx.  1 pelvis     4,500 cGy     10 X   3/06/2019  2019  34    25  1 rectal boost     540 cGy     10 X   4/10/2019  2019   2     3          Dose per Fraction:        Total Dose:        180cGy    4500cGy to the pelvis  180cGy    5040cGy to the mesorectum         COMMENTS:                      While on treatment patient experienced expected 5 side effects of grade 1 fatigue which was relieved by rest.    She also had grade 1 diarrhea which was managed with dietary changes and occasional Imodium as well as   occasional rectal pain with bowel movement.  The latter was managed with Anusol HC twice daily.  She also   had occasional urinary urgency which resolved on its own.     During her OT visit of her 11th fraction patient was noted to be diaphoretic and unable to respond to commands   while sitting in the examination chair.  Rapid response was called immediately and her glucose was measured at   23.  Her symptoms initially responded favorably to juice and oral glucose gel however after short period of time   patient once again became unable to speak appropriately.  She was then transferred to the local emergency room   by ambulance where they were able to stabilize her glucose as  well as improve her hydration.  Last recorded   glucose at the time of her leaving our clinic was 61.  When she returned for treatment we subsequently   counseled the patient again on maintaining hydration and regular glucose checks in light of her receiving pelvic   radiation in the setting of chronic diabetes mellitus.     PAIN MANAGEMENT:                           Anusol HC BID for occasional rectal pain     FOLLOW UP PLAN:                         Follow up in 1 month with Rad Onc  Follow up with CRS and Med Onc as previously directed     Staff Physician: Jo Morris M.D.  Physicist: Chrai Maradiaga MS  CC: Dayron Lawton MD              Radiation Oncology [id number] [place] [id number] Phone: [phone]

## 2019-05-15 NOTE — PROGRESS NOTES
Infusion Nursing Note:  Niharika Cason presents today for Port flush.    Patient seen by provider today: No   present during visit today: Not Applicable.    Note: N/A.    Intravenous Access:  Implanted Port.    Treatment Conditions:  Not Applicable.      Post Infusion Assessment:  Blood return noted, port flushes easily.  Site patent and intact, free from redness, edema or discomfort.  No evidence of extravasations.  Access discontinued per protocol.       Discharge Plan:   Copy of AVS reviewed with patient and/or family.  Patient will return 6/5 for next appointment.  Patient discharged in stable condition accompanied by: self.  Departure Mode: Ambulatory.    Cheryl Smalls RN

## 2019-05-16 ENCOUNTER — TELEPHONE (OUTPATIENT)
Dept: FAMILY MEDICINE | Facility: CLINIC | Age: 64
End: 2019-05-16

## 2019-05-16 ENCOUNTER — ALLIED HEALTH/NURSE VISIT (OUTPATIENT)
Dept: EDUCATION SERVICES | Facility: CLINIC | Age: 64
End: 2019-05-16
Payer: COMMERCIAL

## 2019-05-16 DIAGNOSIS — E10.8 TYPE 1 DIABETES MELLITUS WITH COMPLICATION (H): ICD-10-CM

## 2019-05-16 DIAGNOSIS — E10.65 TYPE 1 DIABETES MELLITUS WITH HYPERGLYCEMIA (H): ICD-10-CM

## 2019-05-16 PROCEDURE — G0108 DIAB MANAGE TRN  PER INDIV: HCPCS

## 2019-05-16 RX ORDER — INSULIN GLARGINE 100 [IU]/ML
30 INJECTION, SOLUTION SUBCUTANEOUS AT BEDTIME
Qty: 15 ML | Refills: 0
Start: 2019-05-16 | End: 2019-06-06

## 2019-05-16 RX ORDER — PROCHLORPERAZINE 25 MG/1
1 SUPPOSITORY RECTAL
Qty: 3 EACH | Refills: 11 | Status: SHIPPED | OUTPATIENT
Start: 2019-05-16 | End: 2020-03-09

## 2019-05-16 RX ORDER — PROCHLORPERAZINE 25 MG/1
1 SUPPOSITORY RECTAL CONTINUOUS
Qty: 1 DEVICE | Refills: 0 | Status: SHIPPED | OUTPATIENT
Start: 2019-05-16

## 2019-05-16 RX ORDER — PROCHLORPERAZINE 25 MG/1
1 SUPPOSITORY RECTAL CONTINUOUS
Qty: 1 EACH | Refills: 3 | Status: SHIPPED | OUTPATIENT
Start: 2019-05-16 | End: 2020-04-30

## 2019-05-16 NOTE — TELEPHONE ENCOUNTER
[facility] Prior Authorization Team Request    Medication: Dexcom G6   Dosing: Use as directed  NDC (required for Medicaid members): [address]-35     Insurance   BIN: [id number]  PCN: [id number]  Grp: [id number]  ID: [id number]    CoverMyMeds Key (if applicable):     Additional documentation: Insurance will only pay for 1 meter per year but she is having uncontrolled blood sugars and needs this meter now    Filling Pharmacy: [facility]  Phone Number: [phone]  Contact:  PHARM CATINA PA ([id number]) please send all responses to this pool.  Pharmacy NPI (required for Medicaid members): [id number]    Kathryn Vincent-Technician  [facility]  [phone]

## 2019-05-16 NOTE — Clinical Note
Just an FYI. I am following up to adjust insulin for severe lows she has been having. Gifty Miranda RDN, LD, CDE

## 2019-05-16 NOTE — TELEPHONE ENCOUNTER
Reason for Call:  Same Day Appointment, Requested Provider:  Leona Farris M.D.    PCP: Leona Farris    Reason for visit: Blood sugars are all over - highs and lows    Duration of symptoms: seems to be getting worse since she stopped chemo and radiation    Have you been treated for this in the past? No    Additional comments: , Fabrice called stating he would like to get Niharika into Diabetic Ed today in [place] because he's wondering if her insulin needs to be adjusted? Nihairka's pcp is out of office til next Tuesday. Was told by oncology she should see her pcp. Juan Enciso (RN) send the message to diabetic ed and see if you can see her today? Fabrice would really like her to see Diabetic Ed today and figure this out. Please call Fabrice to discuss.     Can we leave a detailed message on this number? YES    Phone number patient can be reached at: [phone]    Best Time: anytime    Call taken on 5/16/2019 at 8:42 AM by Ellyn Blanco
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
## 2019-05-16 NOTE — PATIENT INSTRUCTIONS
1. Decrease Lantus/Basaglar to 30 units each evening.   2. Decrease Humalog to 1.5 units per carb choice + 1unit for every 50 points >150 blood sugar.   3. Check blood sugar before each meal and bedtime and before driving.   4. If you continue to have lows send Gifty a [facility] message or call.

## 2019-05-17 DIAGNOSIS — G62.9 NEUROPATHY: ICD-10-CM

## 2019-05-17 RX ORDER — GABAPENTIN 300 MG/1
600 CAPSULE ORAL 2 TIMES DAILY
Qty: 60 CAPSULE | Refills: 1 | Status: SHIPPED | OUTPATIENT
Start: 2019-05-17 | End: 2019-06-19

## 2019-05-17 NOTE — TELEPHONE ENCOUNTER
Central Prior Authorization Team   Phone: [phone]    PA Initiation    Medication: Dexcom G6   Insurance Company: [employer] - Phone [phone] Fax [phone]  Pharmacy Filling the Rx: [facility] - [place], [place] - [address]  Filling Pharmacy Phone: [phone]  Filling Pharmacy Fax: [phone]  Start Date: 5/17/2019

## 2019-05-29 ENCOUNTER — MYC MEDICAL ADVICE (OUTPATIENT)
Dept: EDUCATION SERVICES | Facility: CLINIC | Age: 64
End: 2019-05-29

## 2019-05-30 ENCOUNTER — TELEPHONE (OUTPATIENT)
Dept: ONCOLOGY | Facility: CLINIC | Age: 64
End: 2019-05-30

## 2019-05-30 NOTE — TELEPHONE ENCOUNTER
Reason for Call:  Other call back    Detailed comments: Pt Stated that she is still waiting on a letter for her dentist. She had an apt 5/10 but had to cancel it because she still have not heard anything from us for on a letter. Please call her and get this letter done so she can go to the dentist.     Phone Number Patient can be reached at: Home number on file [phone] (home)    Best Time: NA     Can we leave a detailed message on this number? YES    Call taken on 5/30/2019 at 12:15 PM by Cindy Heller

## 2019-05-30 NOTE — LETTER
[facility]  [address] [place] [id number]  Phone: [phone]    May 30, 2019        Niharika Cason  [address] [address] [place] [id number]          To whom it may concern:    RE: Niharika Cason    Patient is cleared to receive dental work, has been off capecitabine (XELODA) 500 MG tablet for at least 30 days.    Please contact me for questions or concerns.      Sincerely,        Dayron Lawton MD

## 2019-06-05 ENCOUNTER — HOSPITAL ENCOUNTER (OUTPATIENT)
Dept: CT IMAGING | Facility: CLINIC | Age: 64
Discharge: HOME | End: 2019-06-05
Attending: COLON & RECTAL SURGERY | Admitting: COLON & RECTAL SURGERY
Payer: COMMERCIAL

## 2019-06-05 ENCOUNTER — INFUSION THERAPY VISIT (OUTPATIENT)
Dept: INFUSION THERAPY | Facility: CLINIC | Age: 64
End: 2019-06-05
Attending: INTERNAL MEDICINE
Payer: COMMERCIAL

## 2019-06-05 DIAGNOSIS — C20 RECTAL ADENOCARCINOMA (H): ICD-10-CM

## 2019-06-05 DIAGNOSIS — C20 RECTAL CANCER (H): Primary | ICD-10-CM

## 2019-06-05 LAB
CREAT SERPL-MCNC: 0.76 MG/DL (ref 0.52–1.04)
GFR SERPL CREATININE-BSD FRML MDRD: 84 ML/MIN/{1.73_M2}

## 2019-06-05 PROCEDURE — 36591 DRAW BLOOD OFF VENOUS DEVICE: CPT

## 2019-06-05 PROCEDURE — 25000128 H RX IP 250 OP 636: Performed by: INTERNAL MEDICINE

## 2019-06-05 PROCEDURE — 82565 ASSAY OF CREATININE: CPT | Performed by: COLON & RECTAL SURGERY

## 2019-06-05 PROCEDURE — 25000125 ZZHC RX 250: Performed by: COLON & RECTAL SURGERY

## 2019-06-05 PROCEDURE — 71260 CT THORAX DX C+: CPT

## 2019-06-05 PROCEDURE — 25000128 H RX IP 250 OP 636: Performed by: COLON & RECTAL SURGERY

## 2019-06-05 PROCEDURE — 74177 CT ABD & PELVIS W/CONTRAST: CPT

## 2019-06-05 RX ORDER — HEPARIN SODIUM (PORCINE) LOCK FLUSH IV SOLN 100 UNIT/ML 100 UNIT/ML
500 SOLUTION INTRAVENOUS EVERY 8 HOURS
Status: CANCELLED
Start: 2019-06-05

## 2019-06-05 RX ORDER — HEPARIN SODIUM (PORCINE) LOCK FLUSH IV SOLN 100 UNIT/ML 100 UNIT/ML
500 SOLUTION INTRAVENOUS EVERY 8 HOURS
Status: DISCONTINUED | OUTPATIENT
Start: 2019-06-05 | End: 2019-06-05 | Stop reason: HOSPADM

## 2019-06-05 RX ORDER — IOPAMIDOL 755 MG/ML
500 INJECTION, SOLUTION INTRAVASCULAR ONCE
Status: COMPLETED | OUTPATIENT
Start: 2019-06-05 | End: 2019-06-05

## 2019-06-05 RX ADMIN — SODIUM CHLORIDE 60 ML: 9 INJECTION, SOLUTION INTRAVENOUS at 09:15

## 2019-06-05 RX ADMIN — IOPAMIDOL 100 ML: 755 INJECTION, SOLUTION INTRAVENOUS at 09:14

## 2019-06-05 RX ADMIN — Medication 500 UNITS: at 09:38

## 2019-06-05 NOTE — PROGRESS NOTES
Infusion Nursing Note:  Niharika Cason presents today for Power port access/Labs.    Patient seen by provider today: No   present during visit today: Not Applicable.    Note: N/A.    Intravenous Access:  Labs drawn without difficulty.  Implanted Port.    Treatment Conditions:  Creat-0.76      Post Infusion Assessment:  Blood return noted pre and post infusion.  Site patent and intact, free from redness, edema or discomfort.  No evidence of extravasations.  Access discontinued per protocol.       Discharge Plan:   Discharge instructions reviewed with: Patient.  Patient and/or family verbalized understanding of discharge instructions and all questions answered.  Copy of AVS reviewed with patient and/or family.  Patient will return 6/19/19 for next appointment.  Patient discharged in stable condition accompanied by: self.  Departure Mode: Ambulatory.    Marizol Maradiaga RN

## 2019-06-06 ENCOUNTER — ALLIED HEALTH/NURSE VISIT (OUTPATIENT)
Dept: EDUCATION SERVICES | Facility: CLINIC | Age: 64
End: 2019-06-06
Payer: COMMERCIAL

## 2019-06-06 DIAGNOSIS — E10.65 TYPE 1 DIABETES MELLITUS WITH HYPERGLYCEMIA (H): Primary | ICD-10-CM

## 2019-06-06 DIAGNOSIS — C20 RECTAL ADENOCARCINOMA (H): Primary | ICD-10-CM

## 2019-06-06 PROCEDURE — G0108 DIAB MANAGE TRN  PER INDIV: HCPCS

## 2019-06-06 NOTE — PATIENT INSTRUCTIONS
Download Dexcom Clarity juliann and then generate a code so Gifty can connect to your data.     Decrease Lantus to 20 units at bedtime.   Decrease Humalog to 1 unit per 15 grams.

## 2019-06-06 NOTE — PROGRESS NOTES
Diabetes Self-Management Education & Support      Diabetes Education Self-Management & Training: Follow-up and Continuous Glucose Monitor Download    Niharika Cason presents today for download and report review of Personal continuous glucose monitor device      Current Diabetes Management per Patient:  Diabetes Medication(s)     Diabetic Other       glucagon (GLUCAGON EMERGENCY) 1 MG kit    Inject 1 mg into the muscle once for 1 dose    Insulin       insulin glargine (LANTUS SOLOSTAR PEN) 100 UNIT/ML pen    Inject 30 Units Subcutaneous At Bedtime     insulin lispro (HUMALOG KWIKPEN) 100 UNIT/ML pen    1.5 units per carb before each meal plus correction of 1 unit per every 50 points over 150          Taking Medication Assessed Today: Yes  Current Treatments: Insulin Injections  Dose schedule: pre-breakfast, pre-lunch, pre-dinner, at bedtime  Given by: Patient  Injection/Infusion sites: Abdomen    Most Recent A1c Result:    Lab Results   Component Value Date    A1C 7.1 11/15/2018       Continuous Glucose Monitor Interpretation     Reports:  Unavailable - she needs to download phone juliann and then send me sharing code.        Healthy Eating  Healthy Eating Assessed Today: No  Cultural/Yazdanism diet restrictions?: No  Meal planning: Carbohydrate counting  Meals include: Breakfast, Lunch, Dinner, Snacks  Breakfast: CIB  Am snack: yogurt  Lunch: 3 carbs  Having 2 afternoon snacks to prevent low. Having granola bars.   Supper lately has been 8-9 pm. 5 carbs.   HS 10-11 pm. Usually an HS snack.       Beverages: Water, Tea, Milk, Diet soda  Has patient met with a dietitian in the past?: No    Being Active  Barrier to exercise: Time  At work walking all day 8-4:30 pm 5 days a week. In late afternoon usually at baseball games.     Monitoring  Monitoring Assessed Today: Yes  Did patient bring glucose meter to appointment? : Yes  Blood Glucose Meter: Accu-check  Home Glucose (Sugar) Monitorin-2 times per day  Blood glucose  trend: Fluctuating dramtically  Low Glucose Range (mg/dL): <70  Overall Range (mg/dL): >200    Taking Medications  Diabetes Medication(s)     Diabetic Other       glucagon (GLUCAGON EMERGENCY) 1 MG kit    Inject 1 mg into the muscle once for 1 dose    Insulin       insulin glargine (LANTUS SOLOSTAR PEN) 100 UNIT/ML pen    Inject 30 Units Subcutaneous At Bedtime     insulin lispro (HUMALOG KWIKPEN) 100 UNIT/ML pen    1.5 units per carb before each meal plus correction of 1 unit per every 50 points over 150          Taking Medication Assessed Today: Yes  Current Treatments: Insulin Injections  Dose schedule: pre-breakfast, pre-lunch, pre-dinner, at bedtime  Given by: Patient  Injection/Infusion sites: Abdomen    Problem Solving  Problem Solving Assessed Today: Yes  Hypoglycemia Frequency: Daily  Hypoglycemia Treatment: Glucose (tablets or gel), Juice, Candy  Patient carries a carbohydrate source: Yes  Medical alert: No  Severe weather/disaster plan for diabetes management?: No  DKA prevention plan?: No    Hypoglycemia symptoms  Confusion: Yes  Mood changes: Yes  Nervousness/Anxiety: Yes  Sleepiness: No  Speech difficulty: Yes  Sweats: Yes  Tremors: No    Hypoglycemia Complications  Blackouts: Yes  Hospitalization: No  Nocturnal hypoglycemia: Yes  Required assistance: Yes  Required glucagon injection: No  Seizures: No    Reducing Risks  CAD Risks: Diabetes Mellitus, Family history, Hypertension, Obesity  Has dilated eye exam at least once a year?: Yes  Sees dentist every 6 months?: No  Sees podiatrist (foot doctor)?: No    Healthy Coping  Emotional response to diabetes: Ready to learn  Informal Support system:: Children, Ibeth based, Family, Friends, Spouse  Stage of change: ACTION (Actively working towards change)  Difficulty affording diabetes management supplies?: No  Patient Activation Measure Survey Score:  BELLA Score (Last Two) 5/13/2011   BELLA Raw Score 46   Activation Score 75.3   BELLA Level 4          Assessment:  She is still having lows so recommend we reduce insulin further.         INTERVENTION:   Diabetes knowledge and skills assessment:     Patient is knowledgeable in diabetes management concepts related to: Healthy Eating, Being Active, Monitoring, Taking Medication, Problem Solving, Reducing Risks and Healthy Coping    Patient needs further education on the following diabetes management concepts: Taking Medication and Problem Solving    Based on learning assessment above, most appropriate setting for further diabetes education would be: Individual setting.    Education provided today on:  AADE Self-Care Behaviors:  Taking Medication: dose adjust  Problem Solving: low blood glucose - causes, signs/symptoms, treatment and prevention    CGM-specific education:   Dexcom : setting alerts/alarms and Dexcom Mobile juliann    Pt verbalized understanding of concepts discussed and recommendations provided today.       Education Materials Provided:  No new materials provided today    PLAN:  mychart follow up in 2 weeks.   See Patient Instructions for co-developed, patient-stated behavior change goals.  AVS printed and provided to patient today. See Follow-Up section for recommended follow-up.    Gifty Miranda RDN, SANTANA, CDE    Time Spent: 60 minutes  Encounter Type: Individual    Any diabetes medication dose changes were made via the CDE Protocol and Collaborative Practice Agreement with the patient's primary care provider. A copy of this encounter was shared with the provider.

## 2019-06-10 NOTE — PROGRESS NOTES
"Colon and Rectal Surgery Follow-up Clinic Note      RE: Niharika Cason  : 1955  MARILEE: 2019    DIAGNOSIS:     10/2018: mT3cN1 proximal rectal adenocarcinoma (intact IHC) found on 1st screening colonoscopy. 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    10/2018-2019: XELOX x6.    3/-19: CXRT (5040 cGy).    INTERVAL HISTORY: Niharika tolerated CXRT quite well. Denies increased pain, fevers, or chills. Tolerating diet well. Having 1-2 BM's per day with mild fecal urgency and seepage. Denies BPR.    Physical Examination:  /59   Pulse 63   Temp 98.6  F (37  C)   Ht 5' 3\"   Wt 167 lb   LMP 10/15/2008 (Approximate)   SpO2 97%   BMI 29.58 kg/m    Abdomen soft, NT. No inguinal adenopathy palpated.  Perianal skin intact. ALEC: no masses palpated.  Flexible sigmoidoscopy: after obtaining informed consent and performing a \"time out\", an adult flexible sigmoidoscope was introduced through the anus and passed up to the proximal sigmoid colon. The quality of the prep was fair. Findings: 2x2-cm white scar with telangiectasia located at the left posterior aspect of the mid rectum, covering approx 30% of the lumen circumference. Distal edge of the tumor is at the mid rectal valve. Tattoo was identified. Biopsies were taken of the rectal scar with a cold biopsy forceps. No additional abnormalities were seen. Total scope time: 12 minutes. The patient tolerated the procedure well.    ASSESSMENT  Positive response to RUDDY. She appears to have a complete clinical response based on MR showing TRG-3 with no clear evidence of persistent neoplasia, and no residual mass, nodularity or ulceration on Flex Sig. With these findings, he/she would be a candidate for our watch and wait protocol (see below). We further discussed the possibilities of tumor regrowth, local recurrence, and metastatic disease; but these rates have been comparable to those after radical resection as long as there is adherence to a " surveillance watch and wait protocol. We will discuss her case at our multidisciplinary CRC conference. Risks, benefits, and alternatives of operative treatment were thoroughly discussed with the patient, he/she understands these well and agrees to proceed.    CEA: 10.5->5.0->3.7->3.7->4.7.    CT c/a/p (6/5/19):  IMPRESSION:  1. Mild thickening of the wall of the anus/distal rectum with  questionable loss of the fat plane between the rectum and the adjacent  uterus. Recurrent malignancy in this region is difficult to exclude.  This would be better evaluated clinically with sigmoidoscopy and/or  digital exam.  2. No other evidence for metastasis or recurrent malignancy is seen.  3. Stable multiple tiny nodules in the lungs likely represent  granulomatous disease. These have not changed since 9/17/2018. The  largest measures up to 0.5 cm.  4. Calcified fibroids are again noted.  5. Hypoattenuating lesion in segment 7 of the liver was previously  diagnosed as a focal nodular hyperplasia on MRI dated 9/24/2018.    3T MR pelvis (6/12/19):  IMPRESSION:   1. There has been a positive response to treatment, with a  corresponding tumor regression grade of 3. However, there is a  moderate degree of suspicion for invasion into the large adjacent  fibroid.  2. Resolved mesorectal nodes.     PLAN  1. Will discuss at multidisciplinary rectal cancer conference.  2. Await pathology.  3. Schedule 3T MR pelvis, Flex Sig, and CEA in 2 months.    Time spent: 30 minutes.  >50% spent in discussing, counseling and coordinating care.    lAli Le M.D., M.Sc.     Division of Colon and Rectal Surgery  [facility]    Referring Provider:  Manjeet Moore MD  [address] [place] [id number]      Primary Care Provider:  Leona Farris    CC:  Dayron Lawton MD.  Ynes Jimenez MD.  Yohana Morris MD.

## 2019-06-10 NOTE — TELEPHONE ENCOUNTER
Working on getting patient's data to share with our Dexcom clinic account.     Gifty Miranda RDN, SANTANA, CDE

## 2019-06-12 ENCOUNTER — OFFICE VISIT (OUTPATIENT)
Dept: SURGERY | Facility: CLINIC | Age: 64
End: 2019-06-12
Payer: COMMERCIAL

## 2019-06-12 ENCOUNTER — ANCILLARY PROCEDURE (OUTPATIENT)
Dept: MRI IMAGING | Facility: CLINIC | Age: 64
End: 2019-06-12
Attending: COLON & RECTAL SURGERY
Payer: COMMERCIAL

## 2019-06-12 VITALS
OXYGEN SATURATION: 97 % | TEMPERATURE: 98.6 F | HEIGHT: 63 IN | BODY MASS INDEX: 29.59 KG/M2 | WEIGHT: 167 LBS | HEART RATE: 63 BPM | SYSTOLIC BLOOD PRESSURE: 105 MMHG | DIASTOLIC BLOOD PRESSURE: 59 MMHG

## 2019-06-12 DIAGNOSIS — C20 RECTAL CANCER (H): ICD-10-CM

## 2019-06-12 DIAGNOSIS — C20 RECTAL ADENOCARCINOMA (H): ICD-10-CM

## 2019-06-12 DIAGNOSIS — K62.89 RECTAL MASS: ICD-10-CM

## 2019-06-12 DIAGNOSIS — C20 RECTAL CANCER (H): Primary | ICD-10-CM

## 2019-06-12 LAB
ABO + RH BLD: NORMAL
ABO + RH BLD: NORMAL
ALBUMIN SERPL-MCNC: 4 G/DL (ref 3.4–5)
ALP SERPL-CCNC: 104 U/L (ref 40–150)
ALT SERPL W P-5'-P-CCNC: 30 U/L (ref 0–50)
ANION GAP SERPL CALCULATED.3IONS-SCNC: 5 MMOL/L (ref 3–14)
AST SERPL W P-5'-P-CCNC: 21 U/L (ref 0–45)
BILIRUB SERPL-MCNC: 0.4 MG/DL (ref 0.2–1.3)
BLD GP AB SCN SERPL QL: NORMAL
BLOOD BANK CMNT PATIENT-IMP: NORMAL
BUN SERPL-MCNC: 23 MG/DL (ref 7–30)
CALCIUM SERPL-MCNC: 9 MG/DL (ref 8.5–10.1)
CEA SERPL-MCNC: 3.3 UG/L (ref 0–2.5)
CHLORIDE SERPL-SCNC: 102 MMOL/L (ref 94–109)
CO2 SERPL-SCNC: 28 MMOL/L (ref 20–32)
CREAT SERPL-MCNC: 0.66 MG/DL (ref 0.52–1.04)
ERYTHROCYTE [DISTWIDTH] IN BLOOD BY AUTOMATED COUNT: 12.1 % (ref 10–15)
GFR SERPL CREATININE-BSD FRML MDRD: >90 ML/MIN/{1.73_M2}
GLUCOSE SERPL-MCNC: 203 MG/DL (ref 70–99)
HCT VFR BLD AUTO: 36.1 % (ref 35–47)
HGB BLD-MCNC: 12 G/DL (ref 11.7–15.7)
MCH RBC QN AUTO: 31.8 PG (ref 26.5–33)
MCHC RBC AUTO-ENTMCNC: 33.2 G/DL (ref 31.5–36.5)
MCV RBC AUTO: 96 FL (ref 78–100)
PLATELET # BLD AUTO: 179 10E9/L (ref 150–450)
POTASSIUM SERPL-SCNC: 4 MMOL/L (ref 3.4–5.3)
PREALB SERPL IA-MCNC: 18 MG/DL (ref 15–45)
PROT SERPL-MCNC: 6.9 G/DL (ref 6.8–8.8)
RBC # BLD AUTO: 3.77 10E12/L (ref 3.8–5.2)
SODIUM SERPL-SCNC: 136 MMOL/L (ref 133–144)
SPECIMEN EXP DATE BLD: NORMAL
WBC # BLD AUTO: 5.9 10E9/L (ref 4–11)

## 2019-06-12 RX ORDER — GADOBUTROL 604.72 MG/ML
7.5 INJECTION INTRAVENOUS ONCE
Status: COMPLETED | OUTPATIENT
Start: 2019-06-12 | End: 2019-06-12

## 2019-06-12 RX ORDER — HEPARIN SODIUM (PORCINE) LOCK FLUSH IV SOLN 100 UNIT/ML 100 UNIT/ML
5 SOLUTION INTRAVENOUS ONCE
Status: COMPLETED | OUTPATIENT
Start: 2019-06-12 | End: 2019-06-12

## 2019-06-12 RX ADMIN — HEPARIN SODIUM (PORCINE) LOCK FLUSH IV SOLN 100 UNIT/ML 5 ML: 100 SOLUTION at 09:38

## 2019-06-12 RX ADMIN — GADOBUTROL 7.5 ML: 604.72 INJECTION INTRAVENOUS at 09:00

## 2019-06-12 ASSESSMENT — MIFFLIN-ST. JEOR: SCORE: 1291.64

## 2019-06-12 ASSESSMENT — PAIN SCALES - GENERAL: PAINLEVEL: NO PAIN (0)

## 2019-06-12 NOTE — PATIENT INSTRUCTIONS
Follow up per Watch and Wait Protocol:   Completed CRT: April 12, 2019    Flexible sigmoidoscopy     Every 2 months for 6 months    Every 3 months for 3 years    Every 6 months for 5 years    Every year for 10 years      3T MRI of pelvis    6-8 weeks after CRT: Completed     Repeat in 2 months: August 2019    Every 4 months for 2 years    Every 6 months for 2 years    Yearly for 2 years      CT CAP    Every year for 6-8 years      CEA at every clinic or endoscopic visit    Please call with any questions or concerns regarding your clinic visit today.    It is a pleasure being involved in your health care.    Contacts post-consultation depending on your need:    Radiology Appointments [phone]    Schedule Clinic Appointments [phone] # 1   M-F 7:30 - 5 pm    ZOFIA Martini [phone]    Clinic Fax Number [phone]    Surgery Scheduling [phone]    My Chart is available 24 hours a day and is a secure way to access your records and communicate with your care team.  I strongly recommend signing up if you haven't already done so, if you are comfortable with computers.  If you would like to inquire about this or are having problems with My Chart access, you may call [phone] or go online at [email or web address].  Please allow at least 24 hours for a response and extra time on weekends and Holidays.

## 2019-06-12 NOTE — LETTER
"2019       RE: Niharika Cason  [address] [facility] [id number]     Dear Colleague,    Thank you for referring your patient, Niharika Cason, to the [facility] COLON AND RECTAL SURGERY at [facility]. Please see a copy of my visit note below.    Colon and Rectal Surgery Follow-up Clinic Note      RE: Niharika Cason  : 1955  MARILEE: 2019    DIAGNOSIS:     10/2018: mT3cN1 proximal rectal adenocarcinoma (intact IHC) found on 1st screening colonoscopy. 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    10/2018-2019: XELOX x6.    3/-19: CXRT (5040 cGy).    INTERVAL HISTORY: Niharika tolerated CXRT quite well. Denies increased pain, fevers, or chills. Tolerating diet well. Having 1-2 BM's per day with mild fecal urgency and seepage. Denies BPR.    Physical Examination:  /59   Pulse 63   Temp 98.6  F (37  C)   Ht 5' 3\"   Wt 167 lb   LMP 10/15/2008 (Approximate)   SpO2 97%   BMI 29.58 kg/m     Abdomen soft, NT. No inguinal adenopathy palpated.  Perianal skin intact. ALEC: no masses palpated.  Flexible sigmoidoscopy: after obtaining informed consent and performing a \"time out\", an adult flexible sigmoidoscope was introduced through the anus and passed up to the proximal sigmoid colon. The quality of the prep was fair. Findings: 2x 2-cm white scar with telangiectasia located at the left posterior aspect of the mid rectum, covering approx 30% of the lumen circumference. Distal edge of the tumor is at the mid rectal valve. Tattoo was identified.  Biopsies were taken of the rectal scar with a cold biopsy forceps. No additional abnormalities were seen. Total scope time: 12 minutes. The patient tolerated the procedure well.    ASSESSMENT  Positive response to RUDDY. She appears to have a complete clinical response based on MR showing TRG-3 with no clear evidence of persistent neoplasia, and no residual mass, nodularity or ulceration on Flex Sig. With these " findings, he/she would be a candidate for our watch and wait protocol (see below). We further discussed the possibilities of tumor regrowth, local recurrence, and metastatic disease; but these rates have been comparable to those after radical resection as long as there is adherence to a surveillance watch and wait protocol. We will discuss her case at our multidisciplinary CRC conference. Risks, benefits, and alternatives of operative treatment were thoroughly discussed with the patient, he/she understands these well and agrees to proceed.    CEA: 10.5->5.0->3.7->3.7->4.7.    CT c/a/p (6/5/19):  IMPRESSION:  1. Mild thickening of the wall of the anus/distal rectum with  questionable loss of the fat plane between the rectum and the adjacent  uterus. Recurrent malignancy in this region is difficult to exclude.  This would be better evaluated clinically with sigmoidoscopy and/or  digital exam.  2. No other evidence for metastasis or recurrent malignancy is seen.  3. Stable multiple tiny nodules in the lungs likely represent  granulomatous disease. These have not changed since 9/17/2018. The  largest measures up to 0.5 cm.  4. Calcified fibroids are again noted.  5. Hypoattenuating lesion in segment 7 of the liver was previously  diagnosed as a focal nodular hyperplasia on MRI dated 9/24/2018.    3T MR pelvis (6/12/19):  IMPRESSION:   1. There has been a positive response to treatment, with a  corresponding tumor regression grade of 3. However, there is a  moderate degree of suspicion for invasion into the large adjacent  fibroid.  2. Resolved mesorectal nodes.     PLAN  1. Will discuss at multidisciplinary rectal cancer conference.  2. Await pathology.  3. Schedule 3T MR pelvis, Flex Sig, and CEA in 2 months.    Time spent: 30 minutes.  >50% spent in discussing, counseling and coordinating care.    Alli Le M.D., M.Sc.     Division of Colon and Rectal Surgery  [facility]  [facility]    Referring Provider:  Manjeet Moore MD  [address] [place] [address]      Primary Care Provider:  Leona Farris    CC:  Dayron Lawton MD.  Ynes Jimenez MD.  Yohana Morris MD.

## 2019-06-12 NOTE — NURSING NOTE
"Chief Complaint   Patient presents with     Rectal Problem     Flexible Sigmoidoscopy       Vitals:    06/12/19 1137   BP: 105/59   Pulse: 63   Temp: 98.6  F (37  C)   SpO2: 97%   Weight: 167 lb   Height: 5' 3\"       Body mass index is 29.58 kg/m .                          Leeann PAULA MA    "

## 2019-06-13 LAB — COPATH REPORT: NORMAL

## 2019-06-14 ENCOUNTER — TELEPHONE (OUTPATIENT)
Dept: GASTROENTEROLOGY | Facility: OUTPATIENT CENTER | Age: 64
End: 2019-06-14

## 2019-06-14 NOTE — TELEPHONE ENCOUNTER
[facility] Call Center    Phone Message    May a detailed message be left on voicemail: yes    Reason for Call: Other: pt  called to see when CTA results would be release.      Action Taken: Message routed to:  [facility] ([facility] colon rectal

## 2019-06-14 NOTE — TELEPHONE ENCOUNTER
Called patient and  to discuss pathology and CEA results. Advised a callback at their earliest convenience.

## 2019-06-17 ENCOUNTER — TELEPHONE (OUTPATIENT)
Dept: RADIATION ONCOLOGY | Facility: CLINIC | Age: 64
End: 2019-06-17

## 2019-06-17 NOTE — TELEPHONE ENCOUNTER
I left a voicemail for patient regarding upcoming appointment on 08/12/2019 this appointment needs to be reschedule due to office remodeling, Dr. Morris will only be seeing patients on Wednesdays during this time.      Kelly Renteria MA

## 2019-06-19 ENCOUNTER — TELEPHONE (OUTPATIENT)
Dept: ONCOLOGY | Facility: CLINIC | Age: 64
End: 2019-06-19

## 2019-06-19 ENCOUNTER — ONCOLOGY VISIT (OUTPATIENT)
Dept: ONCOLOGY | Facility: CLINIC | Age: 64
End: 2019-06-19
Payer: COMMERCIAL

## 2019-06-19 VITALS
BODY MASS INDEX: 29.62 KG/M2 | DIASTOLIC BLOOD PRESSURE: 50 MMHG | WEIGHT: 167.2 LBS | OXYGEN SATURATION: 95 % | TEMPERATURE: 97.6 F | SYSTOLIC BLOOD PRESSURE: 92 MMHG | RESPIRATION RATE: 16 BRPM | HEIGHT: 63 IN | HEART RATE: 62 BPM

## 2019-06-19 DIAGNOSIS — C20 RECTAL ADENOCARCINOMA (H): Primary | ICD-10-CM

## 2019-06-19 DIAGNOSIS — G62.9 NEUROPATHY: ICD-10-CM

## 2019-06-19 PROCEDURE — 99214 OFFICE O/P EST MOD 30 MIN: CPT | Performed by: INTERNAL MEDICINE

## 2019-06-19 RX ORDER — GABAPENTIN 300 MG/1
CAPSULE ORAL
Qty: 90 CAPSULE | Refills: 3 | Status: SHIPPED | OUTPATIENT
Start: 2019-06-19 | End: 2019-08-19

## 2019-06-19 ASSESSMENT — MIFFLIN-ST. JEOR: SCORE: 1292.54

## 2019-06-19 NOTE — NURSING NOTE
"Oncology Rooming Note    June 19, 2019 2:24 PM   Niharika Cason is a 63 year old female who presents for:    Chief Complaint   Patient presents with     Oncology Clinic Visit     2 month follow up- Rectal adenocarcinoma     Results     labs completed 6/12/19     Medication Therapy Management     Xeloda 500MG TAB     Initial Vitals: BP 92/50   Pulse 62   Temp 97.6  F (36.4  C) (Temporal)   Resp 16   Ht 1.6 m (5' 3\")   Wt 75.8 kg (167 lb 3.2 oz)   LMP 10/15/2008 (Approximate)   SpO2 95%   BMI 29.62 kg/m   Estimated body mass index is 29.62 kg/m  as calculated from the following:    Height as of this encounter: 1.6 m (5' 3\").    Weight as of this encounter: 75.8 kg (167 lb 3.2 oz). Body surface area is 1.84 meters squared.  Data Unavailable Comment: Data Unavailable   Patient's last menstrual period was 10/15/2008 (approximate).  Allergies reviewed: Yes  Medications reviewed: Yes    Medications: Medication refills not needed today.  Pharmacy name entered into whoactually: [facility] [place], [place] - [address]     Clinical concerns: None.     5 minutes for nursing intake (face to face time)     Roxie FLOOD CMA              "

## 2019-06-19 NOTE — NURSING NOTE
DISCHARGE PLAN:  Next appointments: See patient instruction section  Departure Mode: Ambulatory  Accompanied by: self  3 minutes for nursing discharge (face to face time)     Niharika MAX Yoav is here today for Oncology follow up.  Writing nurse seen patient after Medical Oncology appointment to address questions/concerns/coordinate care. Patient to follow up in August with Dr. Lawton in [place] with labs prior.  She will have them cancel labs if they do they in the am when she sees the surgeon.  Appointments scheduled.  See patient instructions and Oncologist's Progress note for further details. Questions and concerns addressed to patient's satisfaction. Patient verbalized and demonstrated understanding of plan.  Contact information provided and patient is encouraged to call with any that arise in the interim of care.    Twin Ch, RN, BSN, OCN   Oncology Care Coordinator RN  [facility]  [phone]  6/19/2019, 11:33 AM

## 2019-06-19 NOTE — TELEPHONE ENCOUNTER
Reason for Call:  Other call back    Detailed comments: patient would like a call back from Twin.  thanks    Phone Number Patient can be reached at: Home number on file [phone] (home)    Best Time: any      Can we leave a detailed message on this number? YES    Call taken on 6/19/2019 at 8:42 AM by Yaz Lam

## 2019-06-19 NOTE — Clinical Note
6/19/2019         RE: Niharika Cason  [address] [facility] [id number]        Dear Colleague,    Thank you for referring your patient, Niharika Cason, to the [facility]. Please see a copy of my visit note below.      FOLLOW-UP VISIT NOTE    PATIENT NAME: Niharika Cason MRN # [id number]  DATE OF VISIT: Jun 19, 2019 YOB: 1955    REFERRING PROVIDER: Liliana Urbina MD  [address] [facility] [address]  [place] [address]    CANCER TYPE:Recatl adenocarcinoma  STAGE: cT3cN1 M0  ECOG PS: 1    ONCOLOGY HISTORY:    Niharika Cason is a 63-year-old female with past medical history significant for hypertension, diabetes mellitus type 1, anxiety underwent her     09/17/18  Screening colonoscopy revealed a partially obstructing mass in the rectosigmoid colon which was biopsied and came back as invasive moderately differentiated adenocarcinoma - intact mismatch repair proteins. Staging CT scans showed a 3 cm enhancing lesion in the right lobe of liver he subsequently had an MRI done which characterized a liver lesion as well as VOCAL lobular hyperplasia with no suspected metastatic disease. MRI of pelvis showed an irregular ulcerated left rectal wall mass at 9 cm from the anal verge extending to the muscularis propria into the perirectal fat. Also noted were suspicious perirectal and presacral lymph nodes -clinical stage T3cN1 M0. She met with medical and surgical oncology and the recommendation was to proceed with total neoadjuvant therapy.      10/25/18  Started XELOX chemotherapy    10/25/18- 02/07/19  Started XELOX chemotherapy s/p 6 cycles with oxaliplatin dose reduced with cycle 4 to 100 mg/m2 given neurological toxicity with higher dose    03/06/18 - 04/12/19  neoadjuvant chemoRT with Xeloda.    06/12/19 Scans showed complete clinical response With sigmoidoscopy showing a 2 cm scar which was biopsied and negative for evidence of malignancy. After discussion with colo rectal surgery, decision  was made to proceed with watch and wait approach with closer surveillance.      SUBJECTIVE     In clinic today for 2 month follow up. Complains of neuropathy involving bilateral hands and feet which is under control on gabapentin 600 mg bid - although notices it more at bedtime.      Denies nausea/vomiting, mucositis, fever/chills,dysuria, hematuria, diarrhea, constipation, blood in the stools, loss of appetite, weight loss or any other complaints      PAST MEDICAL HISTORY     Past Medical History:   Diagnosis Date     Essential hypertension, benign      Generalized anxiety disorder      Hyperlipidemia      Rectal cancer (H) 09/17/2018     S/P radiation therapy     5,040 cGy to Pelvis completed on 04/12/2019. - [facility] with Dr. Morris     Type I (juvenile type) diabetes mellitus without mention of complication, not stated as uncontrolled     diagnosed at age 35     Ulcerative colitis, unspecified     in the 70s.            ALLERGIES     Allergies   Allergen Reactions     No Known Drug Allergies         REVIEW OF SYSTEMS   As above in the HPI, o/w complete 12-point ROS was negative.     PHYSICAL EXAM   B/P: Data Unavailable, T: Data Unavailable, P: Data Unavailable, R: Data Unavailable  SpO2 Readings from Last 4 Encounters:   11/15/18 95%   11/15/18 96%   11/02/18 96%   10/25/18 96%     Wt Readings from Last 3 Encounters:   06/19/19 75.8 kg (167 lb 3.2 oz)   06/12/19 75.8 kg (167 lb)   05/13/19 77.4 kg (170 lb 9.6 oz)     GEN: NAD  Mouth/ENT: Oropharynx is clear.  NECK: no  lymphadenopathy  LUNGS: clear   CV: regular  ABDOMEN: soft, non-tender, non-distended,  EXT: warm, well perfused, no edema  NEURO: alert  SKIN: no rashes     LABORATORY AND IMAGING STUDIES     Component      Latest Ref Rng & Units 6/12/2019   Sodium      133 - 144 mmol/L 136   Potassium      3.4 - 5.3 mmol/L 4.0   Chloride      94 - 109 mmol/L 102   Carbon Dioxide      20 - 32 mmol/L 28   Anion Gap      3 - 14 mmol/L 5   Glucose      70  - 99 mg/dL 203 (H)   Urea Nitrogen      7 - 30 mg/dL 23   Creatinine      0.52 - 1.04 mg/dL 0.66   GFR Estimate      >60 mL/min/1.73:m2 >90   GFR Estimate If Black      >60 mL/min/1.73:m2 >90   Calcium      8.5 - 10.1 mg/dL 9.0   Bilirubin Total      0.2 - 1.3 mg/dL 0.4   Albumin      3.4 - 5.0 g/dL 4.0   Protein Total      6.8 - 8.8 g/dL 6.9   Alkaline Phosphatase      40 - 150 U/L 104   ALT      0 - 50 U/L 30   AST      0 - 45 U/L 21   WBC      4.0 - 11.0 10e9/L 5.9   RBC Count      3.8 - 5.2 10e12/L 3.77 (L)   Hemoglobin      11.7 - 15.7 g/dL 12.0   Hematocrit      35.0 - 47.0 % 36.1   MCV      78 - 100 fl 96   MCH      26.5 - 33.0 pg 31.8   MCHC      31.5 - 36.5 g/dL 33.2   RDW      10.0 - 15.0 % 12.1   Platelet Count      150 - 450 10e9/L 179   CEA      0 - 2.5 ug/L 3.3 (H)     Collected: 6/12/2019   Received: 6/12/2019   Reported: 6/13/2019 11:43   Ordering Phy(s): JANINE MARCI GAERTNER     For improved result formatting, select 'View Enhanced Report Format' under    Linked Documents section.     SPECIMEN(S):   Rectal biopsy, scar     FINAL DIAGNOSIS:   Large intestine, rectum, biopsy of scar   - Lamina propria fibrosis with mild crypt distortion   - No evidence of malignancy or neoplastic polyp     EXAMINATION: MR PELVIS (INTRAPELVIC ORGANS) WO&W CONTRAST,  6/12/2019 9:41 AM         HISTORY: mT3cN1?proximal?rectal adenocarcinoma?(intact IHC), s/p  radiation from 3/19-4/12 and XELOX (capecitabine and oxaliplatin) from  10/18-4/19.      FINDINGS:     LOCATION/SIZE:  On prior exam dated 9/28/2018 there was a tumor identified in the mid  rectum. Previously this tumor measured 4 cm, and currently it measures  2 cm. This is best seen on series series 14, image 14     TREATMENT RESPONSE:   Approximately 40% of the current mass demonstrates tumour signal  intensity, with the remainder appearing to represent fibrosis.  This  corresponds to a tumour response grade of mrTRG-3.      EXTENT:  The tumor does not clearly  involve the anal sphincters or levator ani  muscle.   There is close approximation to adjacent calcified fibroid in the  pelvis, with probable invasion (series 5, image 22 and series 5, image  23).     CIRCUMFERENTIAL RESECTION MARGIN (CRM):  In terms of the resection margin, there is involvement of the  mesorectal fascia, with the nearest potential distance to the  circumferential margin being approximately 10 mm (> 1 mm indicates CRM  is clear).     LYMPH NODES:  The previously identified suspicious lymph nodes now measure as  follows:  The previously described suspicious presacral left mesorectal fat  nodes are not appreciated.     There are no new suspicious appearing pelvic lymph nodes.     VEINS:  There is not evidence of extramural venous extension.      MISCELLANEOUS FINDINGS:  Large myomatous uterus. Normal bone signal. Urinary unremarkable.                                                                      IMPRESSION:   1. There has been a positive response to treatment, with a  corresponding tumor regression grade of 3. However, there is a  moderate degree of suspicion for invasion into the large adjacent  fibroid.  2. Resolved mesorectal nodes.     CT CHEST/ABDOMEN/PELVIS WITH CONTRAST  6/5/2019 9:25 AM     HISTORY: Rectal cancer, stage II-III, monitor, post primary/adjuvant  treatment, resectable disease. Rectal cancer post-rads. Rectal  adenocarcinoma (H).     TECHNIQUE: Scans obtained of the chest, abdomen, and pelvis with IV  contrast. 97mL Isovue-370 injected. Radiation dose for this scan was  reduced using automated exposure control, adjustment of the mA and/or  kV according to patient size, or iterative reconstruction technique.     COMPARISON: CT chest, abdomen and pelvis dated 2/21/2019, 12/21/2018  and 9/17/2018. Dynamic MRI of the abdomen dated 9/24/2018.     FINDINGS:   Chest: There are multiple scattered noncalcified nodules in the  bilateral lungs measuring up to a maximum of 0.5 cm with  the largest  in the left upper lobe anterolaterally (image 133 series 5). These  nodules are all stable since the oldest prior CT chest dated  9/17/2018. Lungs are otherwise clear. No new nodule, mass, infiltrate,  effusion, or pneumothorax.     The heart is normal in size. There is a central venous catheter with  tip in the right atrial/superior vena caval junction. There are  coronary artery calcifications. Some aortic calcifications are also  noted. There are no central pulmonary artery filling defects to  suggest central pulmonary artery embolism. This study was not  optimized for pulmonary artery evaluation. No mediastinal, hilar, or  axillary lymphadenopathy is identified. Partially calcified right  thyroid nodule is stable. No aggressive osseous lesions are seen.  There are degenerative changes in the spine.     Abdomen and pelvis: There is a subtle irregular area of  hypoattenuation in the posterior dome of the right lobe of the liver  (segment 7 image 51 series 6) measuring up to 2.6 cm. This is  indeterminate on today's study, but was diagnosed as a focal nodular  hyperplasia on a prior contrast-enhanced MRI dated 9/24/2018. The  liver, gallbladder pancreas, spleen, bilateral adrenal glands, and  bilateral kidneys otherwise enhance normally. No hydronephrosis,  nephrolithiasis, hydroureter or ureteral calculus is identified.  Urinary bladder is grossly normal in appearance.     Large calcified fibroids are again noted. The uterus is otherwise  unremarkable and unchanged since the prior study. Ovaries are not well  seen, although structures likely representing normal ovaries are seen  in the adnexal regions. No adnexal masses are identified.     No adenopathy, free fluid or free air is seen in the peritoneal  cavity. There is nonaneurysmal atherosclerosis.     There is mild questionable thickening of the rectal/anal wall distally  with loss of the fat planes between the rectum and uterus. This  could  represent recurrent malignancy in the appropriate setting. The colon  is otherwise of normal caliber without pericolonic inflammatory change  to suggest diverticulitis. Appendix is normal in appearance. Small  bowel is of normal caliber.                                                                      IMPRESSION:  1. Mild thickening of the wall of the anus/distal rectum with  questionable loss of the fat plane between the rectum and the adjacent  uterus. Recurrent malignancy in this region is difficult to exclude.  This would be better evaluated clinically with sigmoidoscopy and/or  digital exam.  2. No other evidence for metastasis or recurrent malignancy is seen.  3. Stable multiple tiny nodules in the lungs likely represent  granulomatous disease. These have not changed since 9/17/2018. The  largest measures up to 0.5 cm.  4. Calcified fibroids are again noted.  5. Hypoattenuating lesion in segment 7 of the liver was previously  diagnosed as a focal nodular hyperplasia on MRI dated 9/24/2018.     JOHN ROSARIO MD     ASSESSMENT AND PLAN     63-year-old female with past medical history significant for hypertension, diabetes mellitus type 1, anxiety on a screening colonoscopy was diagnosed with rectal adenocarcinoma     - Rectal adenocarcinoma- T3cN1 M0 intact mismatch repair protein  Started on total neoadjuvant therapy.  10/25/18 Started XELOX chemotherapy s/p 6 cycles with oxaliplatin dose reduced with cycle 4 to 100 mg/m2 given neurological toxicity with higher dose  03/06/18 - 04/12/19  neoadjuvant chemoRT with Xeloda.  Follow-up scans and sigmoidoscopy had showed complete clinical response. Patient met colorectal surgery last week and after discussing benefits and risks, decision was made to hold off on surgical resection and proceed with watch and wait approach  Patient is scheduled for repeat sigmoidoscopy, MRI pelvis and labs in 2 months. I will plan on seeing her back in clinic after that for  follow-up      - Neuropathy  Grade 2  Secondary to prior oxaliplatin use  Currently on gabapentin 600 mg b.i.d.   Will  increase to 900 mg at bedtime given neuropathy more noticeable at bedtime    - Normocytic anemia/Leucopenia  Secondary to chemotherapy  Not symptomatic  Monitor     - Diabetes mellitus  On an insulin regimen and managed by primary care provider      - Anxiety  Continue with paxil  Ativan prn        Return to clinic in 2 months  for office visit, labs.      Chart documentation with Dragon Voice recognition Software. Although reviewed after completion, some words and grammatical errors may remain.  Dayron Lawton MD  Attending Physician   Hematology/Medical Oncology          Again, thank you for allowing me to participate in the care of your patient.        Sincerely,        Dayron Lawton MD

## 2019-06-19 NOTE — PROGRESS NOTES
FOLLOW-UP VISIT NOTE    PATIENT NAME: Niharika Cason MRN # [id number]  DATE OF VISIT: Jun 19, 2019 YOB: 1955    REFERRING PROVIDER: Liliana Urbina MD  [address] [place] [id number]    CANCER TYPE:Recatl adenocarcinoma  STAGE: cT3cN1 M0  ECOG PS: 1    ONCOLOGY HISTORY:    Niharika Cason is a 63-year-old female with past medical history significant for hypertension, diabetes mellitus type 1, anxiety underwent her     09/17/18  Screening colonoscopy revealed a partially obstructing mass in the rectosigmoid colon which was biopsied and came back as invasive moderately differentiated adenocarcinoma - intact mismatch repair proteins. Staging CT scans showed a 3 cm enhancing lesion in the right lobe of liver he subsequently had an MRI done which characterized a liver lesion as well as VOCAL lobular hyperplasia with no suspected metastatic disease. MRI of pelvis showed an irregular ulcerated left rectal wall mass at 9 cm from the anal verge extending to the muscularis propria into the perirectal fat. Also noted were suspicious perirectal and presacral lymph nodes -clinical stage T3cN1 M0. She met with medical and surgical oncology and the recommendation was to proceed with total neoadjuvant therapy.      10/25/18  Started XELOX chemotherapy    10/25/18- 02/07/19  Started XELOX chemotherapy s/p 6 cycles with oxaliplatin dose reduced with cycle 4 to 100 mg/m2 given neurological toxicity with higher dose    03/06/18 - 04/12/19  neoadjuvant chemoRT with Xeloda.    06/12/19 Scans showed complete clinical response With sigmoidoscopy showing a 2 cm scar which was biopsied and negative for evidence of malignancy. After discussion with colo rectal surgery, decision was made to proceed with watch and wait approach with closer surveillance.      SUBJECTIVE     In clinic today for 2 month follow up. Complains of neuropathy involving bilateral hands and feet which is under control on gabapentin 600 mg  bid - although notices it more at bedtime.      Denies nausea/vomiting, mucositis, fever/chills,dysuria, hematuria, diarrhea, constipation, blood in the stools, loss of appetite, weight loss or any other complaints      PAST MEDICAL HISTORY     Past Medical History:   Diagnosis Date     Essential hypertension, benign      Generalized anxiety disorder      Hyperlipidemia      Rectal cancer (H) 09/17/2018     S/P radiation therapy     5,040 cGy to Pelvis completed on 04/12/2019. - [facility] with Dr. Morris     Type I (juvenile type) diabetes mellitus without mention of complication, not stated as uncontrolled     diagnosed at age 35     Ulcerative colitis, unspecified     in the 70s.            ALLERGIES     Allergies   Allergen Reactions     No Known Drug Allergies         REVIEW OF SYSTEMS   As above in the HPI, o/w complete 12-point ROS was negative.     PHYSICAL EXAM   B/P: Data Unavailable, T: Data Unavailable, P: Data Unavailable, R: Data Unavailable  SpO2 Readings from Last 4 Encounters:   11/15/18 95%   11/15/18 96%   11/02/18 96%   10/25/18 96%     Wt Readings from Last 3 Encounters:   06/19/19 75.8 kg (167 lb 3.2 oz)   06/12/19 75.8 kg (167 lb)   05/13/19 77.4 kg (170 lb 9.6 oz)     GEN: NAD  Mouth/ENT: Oropharynx is clear.  NECK: no  lymphadenopathy  LUNGS: clear   CV: regular  ABDOMEN: soft, non-tender, non-distended,  EXT: warm, well perfused, no edema  NEURO: alert  SKIN: no rashes     LABORATORY AND IMAGING STUDIES     Component      Latest Ref Rng & Units 6/12/2019   Sodium      133 - 144 mmol/L 136   Potassium      3.4 - 5.3 mmol/L 4.0   Chloride      94 - 109 mmol/L 102   Carbon Dioxide      20 - 32 mmol/L 28   Anion Gap      3 - 14 mmol/L 5   Glucose      70 - 99 mg/dL 203 (H)   Urea Nitrogen      7 - 30 mg/dL 23   Creatinine      0.52 - 1.04 mg/dL 0.66   GFR Estimate      >60 mL/min/1.73:m2 >90   GFR Estimate If Black      >60 mL/min/1.73:m2 >90   Calcium      8.5 - 10.1 mg/dL 9.0    Bilirubin Total      0.2 - 1.3 mg/dL 0.4   Albumin      3.4 - 5.0 g/dL 4.0   Protein Total      6.8 - 8.8 g/dL 6.9   Alkaline Phosphatase      40 - 150 U/L 104   ALT      0 - 50 U/L 30   AST      0 - 45 U/L 21   WBC      4.0 - 11.0 10e9/L 5.9   RBC Count      3.8 - 5.2 10e12/L 3.77 (L)   Hemoglobin      11.7 - 15.7 g/dL 12.0   Hematocrit      35.0 - 47.0 % 36.1   MCV      78 - 100 fl 96   MCH      26.5 - 33.0 pg 31.8   MCHC      31.5 - 36.5 g/dL 33.2   RDW      10.0 - 15.0 % 12.1   Platelet Count      150 - 450 10e9/L 179   CEA      0 - 2.5 ug/L 3.3 (H)     Collected: 6/12/2019   Received: 6/12/2019   Reported: 6/13/2019 11:43   Ordering Phy(s): JANINE MARCI GAERTNER     For improved result formatting, select 'View Enhanced Report Format' under    Linked Documents section.     SPECIMEN(S):   Rectal biopsy, scar     FINAL DIAGNOSIS:   Large intestine, rectum, biopsy of scar   - Lamina propria fibrosis with mild crypt distortion   - No evidence of malignancy or neoplastic polyp     EXAMINATION: MR PELVIS (INTRAPELVIC ORGANS) WO&W CONTRAST,  6/12/2019 9:41 AM         HISTORY: mT3cN1?proximal?rectal adenocarcinoma?(intact IHC), s/p  radiation from 3/19-4/12 and XELOX (capecitabine and oxaliplatin) from  10/18-4/19.      FINDINGS:     LOCATION/SIZE:  On prior exam dated 9/28/2018 there was a tumor identified in the mid  rectum. Previously this tumor measured 4 cm, and currently it measures  2 cm. This is best seen on series series 14, image 14     TREATMENT RESPONSE:   Approximately 40% of the current mass demonstrates tumour signal  intensity, with the remainder appearing to represent fibrosis.  This  corresponds to a tumour response grade of mrTRG-3.      EXTENT:  The tumor does not clearly involve the anal sphincters or levator ani  muscle.   There is close approximation to adjacent calcified fibroid in the  pelvis, with probable invasion (series 5, image 22 and series 5, image  23).     CIRCUMFERENTIAL RESECTION  MARGIN (CRM):  In terms of the resection margin, there is involvement of the  mesorectal fascia, with the nearest potential distance to the  circumferential margin being approximately 10 mm (> 1 mm indicates CRM  is clear).     LYMPH NODES:  The previously identified suspicious lymph nodes now measure as  follows:  The previously described suspicious presacral left mesorectal fat  nodes are not appreciated.     There are no new suspicious appearing pelvic lymph nodes.     VEINS:  There is not evidence of extramural venous extension.      MISCELLANEOUS FINDINGS:  Large myomatous uterus. Normal bone signal. Urinary unremarkable.                                                                      IMPRESSION:   1. There has been a positive response to treatment, with a  corresponding tumor regression grade of 3. However, there is a  moderate degree of suspicion for invasion into the large adjacent  fibroid.  2. Resolved mesorectal nodes.     CT CHEST/ABDOMEN/PELVIS WITH CONTRAST  6/5/2019 9:25 AM     HISTORY: Rectal cancer, stage II-III, monitor, post primary/adjuvant  treatment, resectable disease. Rectal cancer post-rads. Rectal  adenocarcinoma (H).     TECHNIQUE: Scans obtained of the chest, abdomen, and pelvis with IV  contrast. 97mL Isovue-370 injected. Radiation dose for this scan was  reduced using automated exposure control, adjustment of the mA and/or  kV according to patient size, or iterative reconstruction technique.     COMPARISON: CT chest, abdomen and pelvis dated 2/21/2019, 12/21/2018  and 9/17/2018. Dynamic MRI of the abdomen dated 9/24/2018.     FINDINGS:   Chest: There are multiple scattered noncalcified nodules in the  bilateral lungs measuring up to a maximum of 0.5 cm with the largest  in the left upper lobe anterolaterally (image 133 series 5). These  nodules are all stable since the oldest prior CT chest dated  9/17/2018. Lungs are otherwise clear. No new nodule, mass, infiltrate,  effusion, or  pneumothorax.     The heart is normal in size. There is a central venous catheter with  tip in the right atrial/superior vena caval junction. There are  coronary artery calcifications. Some aortic calcifications are also  noted. There are no central pulmonary artery filling defects to  suggest central pulmonary artery embolism. This study was not  optimized for pulmonary artery evaluation. No mediastinal, hilar, or  axillary lymphadenopathy is identified. Partially calcified right  thyroid nodule is stable. No aggressive osseous lesions are seen.  There are degenerative changes in the spine.     Abdomen and pelvis: There is a subtle irregular area of  hypoattenuation in the posterior dome of the right lobe of the liver  (segment 7 image 51 series 6) measuring up to 2.6 cm. This is  indeterminate on today's study, but was diagnosed as a focal nodular  hyperplasia on a prior contrast-enhanced MRI dated 9/24/2018. The  liver, gallbladder pancreas, spleen, bilateral adrenal glands, and  bilateral kidneys otherwise enhance normally. No hydronephrosis,  nephrolithiasis, hydroureter or ureteral calculus is identified.  Urinary bladder is grossly normal in appearance.     Large calcified fibroids are again noted. The uterus is otherwise  unremarkable and unchanged since the prior study. Ovaries are not well  seen, although structures likely representing normal ovaries are seen  in the adnexal regions. No adnexal masses are identified.     No adenopathy, free fluid or free air is seen in the peritoneal  cavity. There is nonaneurysmal atherosclerosis.     There is mild questionable thickening of the rectal/anal wall distally  with loss of the fat planes between the rectum and uterus. This could  represent recurrent malignancy in the appropriate setting. The colon  is otherwise of normal caliber without pericolonic inflammatory change  to suggest diverticulitis. Appendix is normal in appearance. Small  bowel is of normal  caliber.                                                                      IMPRESSION:  1. Mild thickening of the wall of the anus/distal rectum with  questionable loss of the fat plane between the rectum and the adjacent  uterus. Recurrent malignancy in this region is difficult to exclude.  This would be better evaluated clinically with sigmoidoscopy and/or  digital exam.  2. No other evidence for metastasis or recurrent malignancy is seen.  3. Stable multiple tiny nodules in the lungs likely represent  granulomatous disease. These have not changed since 9/17/2018. The  largest measures up to 0.5 cm.  4. Calcified fibroids are again noted.  5. Hypoattenuating lesion in segment 7 of the liver was previously  diagnosed as a focal nodular hyperplasia on MRI dated 9/24/2018.     JOHN ROSARIO MD     ASSESSMENT AND PLAN     63-year-old female with past medical history significant for hypertension, diabetes mellitus type 1, anxiety on a screening colonoscopy was diagnosed with rectal adenocarcinoma     - Rectal adenocarcinoma- T3cN1 M0 intact mismatch repair protein  Started on total neoadjuvant therapy.  10/25/18 Started XELOX chemotherapy s/p 6 cycles with oxaliplatin dose reduced with cycle 4 to 100 mg/m2 given neurological toxicity with higher dose  03/06/18 - 04/12/19  neoadjuvant chemoRT with Xeloda.  Follow-up scans and sigmoidoscopy had showed complete clinical response. Patient met colorectal surgery last week and after discussing benefits and risks, decision was made to hold off on surgical resection and proceed with watch and wait approach  Patient is scheduled for repeat sigmoidoscopy, MRI pelvis and labs in 2 months. I will plan on seeing her back in clinic after that for follow-up      - Neuropathy  Grade 2  Secondary to prior oxaliplatin use  Currently on gabapentin 600 mg b.i.d.   Will  increase to 900 mg at bedtime given neuropathy more noticeable at bedtime    - Normocytic  anemia/Leucopenia  Secondary to chemotherapy  Not symptomatic  Monitor     - Diabetes mellitus  On an insulin regimen and managed by primary care provider      - Anxiety  Continue with paxil  Ativan prn        Return to clinic in 2 months  for office visit, labs.      Chart documentation with Dragon Voice recognition Software. Although reviewed after completion, some words and grammatical errors may remain.  Dayron Lawton MD  Attending Physician   Hematology/Medical Oncology

## 2019-06-19 NOTE — PATIENT INSTRUCTIONS
Please follow up with Dr. Jered Lawton in [place].      Port Lab Date/Time:   8/12/19 at 1:30    Office visit follow up with Dr. Lawton Date/Time:   8/12/19 at 2:00     If you have any questions or concerns please feel free to call.    If you need to reschedule please call:  Clinic or Lab Appointment - [phone]  Infusion - [phone]  Imaging - [phone]    Twin Ch, RN, BSN, OCN  [facility]   Oncology/Hematology Care Coordinator RN  [facility]  [phone]

## 2019-06-24 ENCOUNTER — TELEPHONE (OUTPATIENT)
Dept: FAMILY MEDICINE | Facility: CLINIC | Age: 64
End: 2019-06-24

## 2019-06-24 NOTE — TELEPHONE ENCOUNTER
Reason for Call:  Same Day Appointment, Requested Provider:  Dr. Farris     PCP: Leona Farris    Reason for visit: DM re ck & form for 's license.     Duration of symptoms: NA     Have you been treated for this in the past? Yes    Additional comments: Pt needs to have thew form turned in by July 15th. Can you work her in somewhere the week of July 8th?     Can we leave a detailed message on this number? YES    Phone number patient can be reached at: Home number on file [phone] (home)    Best Time: any     Call taken on 6/24/2019 at 9:40 AM by July Leahy

## 2019-06-25 NOTE — TELEPHONE ENCOUNTER
Left a message for patient to call  clinic back about an appointment. Per Dr. Farris, recommended to set up an appointment with one of her colleagues to be seen.   July Jeffries, CMA

## 2019-06-26 ENCOUNTER — TEAM CONFERENCE (OUTPATIENT)
Dept: SURGERY | Facility: CLINIC | Age: 64
End: 2019-06-26

## 2019-06-26 NOTE — TELEPHONE ENCOUNTER
COLON AND RECTAL CANCER CONFERENCE DISCUSS:    - Who was present: Surgery, Medical Oncology, Radiation Oncology and Radiology    - Patient Status: Established Patient   Rectal cancer    - Pathology: Not Discussed     - Treatment to Date: Total neoadjuvant therapy    - Recommended Plan: Watch and wait protocol    - Times Spent Discussing: < 30 minutes     ZOFIA Martini [phone]

## 2019-06-26 NOTE — TELEPHONE ENCOUNTER
Niharika returns call.  Is informed of provider message.     Niharika is asking if Dr Sigala would be wiling to work her in.   She is diabetic, and has been in treatment for cancer. She would like to see internal med if possible.   Would Dr Sigala be able to see her soon, and then complete form for her for DMV?   She had an incident of low blood sugar, and needs a form completed as soon as possible.     Please advise, [phone]

## 2019-07-01 ENCOUNTER — MYC MEDICAL ADVICE (OUTPATIENT)
Dept: EDUCATION SERVICES | Facility: CLINIC | Age: 64
End: 2019-07-01

## 2019-07-01 DIAGNOSIS — E10.8 TYPE 1 DIABETES MELLITUS WITH COMPLICATION (H): ICD-10-CM

## 2019-07-01 NOTE — TELEPHONE ENCOUNTER
Diabetes Follow-up    Subjective/Objective:    Niharika Cason sent in blood glucose log for review. Last date of communication was: 6/6.    Diabetes is being managed with   Diabetes Medications   Diabetes Medication(s)     Diabetic Other       glucagon (GLUCAGON EMERGENCY) 1 MG kit    Inject 1 mg into the muscle once for 1 dose    Insulin       insulin glargine (LANTUS SOLOSTAR PEN) 100 UNIT/ML pen    Inject 30 Units Subcutaneous At Bedtime     insulin lispro (HUMALOG KWIKPEN) 100 UNIT/ML pen    1.5 units per carb before each meal plus correction of 1 unit per every 50 points over 150          CGM REPORT         Assessment:  Review of CGM shows she may have too much meal time insulin. Lows may be contributing to highs later.     Plan/Response:  See Patient Instructions for co-developed, patient-stated behavior change goals.  See 7/1 encounter    Gifty Miranda RDN, SANTANA, CDE      Any diabetes medication dose changes were made via the CDE Protocol and Collaborative Practice Agreement with the patient's primary care provider. A copy of this encounter was shared with the provider.
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
## 2019-07-02 ENCOUNTER — MYC MEDICAL ADVICE (OUTPATIENT)
Dept: EDUCATION SERVICES | Facility: CLINIC | Age: 64
End: 2019-07-02

## 2019-07-02 NOTE — TELEPHONE ENCOUNTER
Per education visit 6/6/19 patient was recommended to decrease Lantus to 0-0-0-20 and use a 1:15g carbohydrate ratio with Humalog.  Given CDE recommendation was a 3 unit decrease recommend patient decrease Lantus 0-0-0-20 --> 0-0-0-17.  Recommend upload again in 1 week to ensure changes are correcting BGs.    Jacqueline Brower MS, RD, LD, CDE

## 2019-07-03 ENCOUNTER — MYC MEDICAL ADVICE (OUTPATIENT)
Dept: EDUCATION SERVICES | Facility: CLINIC | Age: 64
End: 2019-07-03

## 2019-07-08 NOTE — TELEPHONE ENCOUNTER
Diabetes Follow-up    Subjective/Objective:    Niharika Cason sent in blood glucose log for review. Last date of communication was: 7/1/19.  Concern: Oh Durbin,  My numbers during the night have been pretty high.  Last night I took a couple units of my regular insulin during the night to bring it down a little.  Was wondering since we eat so late in the evening if maybe I shouldn t have a bedtime snack.  It s usually ice cream, between 1/2 to 1 cup.  Please let me know what you think.  This is quite a science isn t it?    Diabetes is being managed with   Diabetes Medications   Diabetes Medication(s)     Diabetic Other       glucagon (GLUCAGON EMERGENCY) 1 MG kit    Inject 1 mg into the muscle once for 1 dose    Insulin       insulin glargine (LANTUS SOLOSTAR PEN) 100 UNIT/ML pen    Inject 27 Units Subcutaneous At Bedtime     insulin lispro (HUMALOG KWIKPEN) 100 UNIT/ML pen    1.5 units per carb before each meal plus correction of 1 unit per every 50 points over 150          Reports:         Assessment:  May need 1-2 units of Humalog for HS snack and less Humalog for lunch or supper on active days.     Plan/Response:  So if last night was the ice cream that made it high you probably need a unit or 2 of Humalog for that. You are still getting low in afternoon/evening so may need less Humalog for lunch and/or supper on active days. You can try working with those adjustments and let me know in a week or two how its going!     Gifty Miranda RDN, LD, CDE      Any diabetes medication dose changes were made via the CDE Protocol and Collaborative Practice Agreement with the patient's primary care provider. A copy of this encounter was shared with the provider.

## 2019-07-09 ENCOUNTER — OFFICE VISIT (OUTPATIENT)
Dept: FAMILY MEDICINE | Facility: CLINIC | Age: 64
End: 2019-07-09
Payer: COMMERCIAL

## 2019-07-09 VITALS
HEIGHT: 63 IN | SYSTOLIC BLOOD PRESSURE: 100 MMHG | RESPIRATION RATE: 18 BRPM | WEIGHT: 164 LBS | OXYGEN SATURATION: 98 % | DIASTOLIC BLOOD PRESSURE: 68 MMHG | BODY MASS INDEX: 29.06 KG/M2 | HEART RATE: 76 BPM | TEMPERATURE: 98.6 F

## 2019-07-09 DIAGNOSIS — E16.2 HYPOGLYCEMIA: ICD-10-CM

## 2019-07-09 DIAGNOSIS — E78.5 HYPERLIPIDEMIA LDL GOAL <100: ICD-10-CM

## 2019-07-09 DIAGNOSIS — E10.65 TYPE 1 DIABETES MELLITUS WITH HYPERGLYCEMIA (H): ICD-10-CM

## 2019-07-09 DIAGNOSIS — I10 ESSENTIAL HYPERTENSION WITH GOAL BLOOD PRESSURE LESS THAN 130/80: ICD-10-CM

## 2019-07-09 DIAGNOSIS — E10.8 TYPE 1 DIABETES MELLITUS WITH COMPLICATION (H): Primary | ICD-10-CM

## 2019-07-09 DIAGNOSIS — F41.1 GENERALIZED ANXIETY DISORDER: ICD-10-CM

## 2019-07-09 LAB
CHOLEST SERPL-MCNC: 142 MG/DL
CREAT UR-MCNC: 35 MG/DL
HBA1C MFR BLD: 6.5 % (ref 0–5.6)
HDLC SERPL-MCNC: 64 MG/DL
LDLC SERPL CALC-MCNC: 66 MG/DL
MICROALBUMIN UR-MCNC: <5 MG/L
MICROALBUMIN/CREAT UR: NORMAL MG/G CR (ref 0–25)
NONHDLC SERPL-MCNC: 78 MG/DL
TRIGL SERPL-MCNC: 58 MG/DL

## 2019-07-09 PROCEDURE — 99207 C FOOT EXAM  NO CHARGE: CPT | Mod: 25 | Performed by: FAMILY MEDICINE

## 2019-07-09 PROCEDURE — 83036 HEMOGLOBIN GLYCOSYLATED A1C: CPT | Performed by: FAMILY MEDICINE

## 2019-07-09 PROCEDURE — 80061 LIPID PANEL: CPT | Performed by: FAMILY MEDICINE

## 2019-07-09 PROCEDURE — 82043 UR ALBUMIN QUANTITATIVE: CPT | Performed by: FAMILY MEDICINE

## 2019-07-09 PROCEDURE — 99214 OFFICE O/P EST MOD 30 MIN: CPT | Performed by: FAMILY MEDICINE

## 2019-07-09 PROCEDURE — 36415 COLL VENOUS BLD VENIPUNCTURE: CPT | Performed by: FAMILY MEDICINE

## 2019-07-09 RX ORDER — LISINOPRIL 40 MG/1
40 TABLET ORAL DAILY
Qty: 93 TABLET | Refills: 3 | Status: SHIPPED | OUTPATIENT
Start: 2019-07-09 | End: 2020-05-14

## 2019-07-09 RX ORDER — SIMVASTATIN 10 MG
TABLET ORAL
Qty: 93 TABLET | Refills: 3 | Status: SHIPPED | OUTPATIENT
Start: 2019-07-09 | End: 2020-05-14

## 2019-07-09 RX ORDER — ATENOLOL 25 MG/1
25 TABLET ORAL DAILY
Qty: 93 TABLET | Refills: 3 | Status: SHIPPED | OUTPATIENT
Start: 2019-07-09 | End: 2021-03-22

## 2019-07-09 RX ORDER — PAROXETINE 30 MG/1
30 TABLET, FILM COATED ORAL EVERY MORNING
Qty: 93 TABLET | Refills: 3 | Status: SHIPPED | OUTPATIENT
Start: 2019-07-09 | End: 2019-11-18

## 2019-07-09 ASSESSMENT — PAIN SCALES - GENERAL: PAINLEVEL: NO PAIN (0)

## 2019-07-09 ASSESSMENT — MIFFLIN-ST. JEOR: SCORE: 1278.03
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
## 2019-07-09 NOTE — RESULT ENCOUNTER NOTE
Niharika,  Your results look good.  In fact, your hemoglobin A1c may even be a touch on the low side given you have been having issues with your low blood sugars.  I would recommend 3 month follow-up as we discussed today.  Continue to work with Gifty in the meantime.  Please let me know if you have any questions.    Sincerely,  Dr. Gomez

## 2019-07-24 ENCOUNTER — MYC MEDICAL ADVICE (OUTPATIENT)
Dept: EDUCATION SERVICES | Facility: CLINIC | Age: 64
End: 2019-07-24

## 2019-07-24 DIAGNOSIS — E10.8 TYPE 1 DIABETES MELLITUS WITH COMPLICATION (H): ICD-10-CM

## 2019-07-24 DIAGNOSIS — E10.65 TYPE 1 DIABETES MELLITUS WITH HYPERGLYCEMIA (H): ICD-10-CM

## 2019-07-25 ENCOUNTER — INFUSION THERAPY VISIT (OUTPATIENT)
Dept: INFUSION THERAPY | Facility: CLINIC | Age: 64
End: 2019-07-25
Attending: COLON & RECTAL SURGERY
Payer: COMMERCIAL

## 2019-07-25 DIAGNOSIS — C20 RECTAL ADENOCARCINOMA (H): ICD-10-CM

## 2019-07-25 DIAGNOSIS — C20 RECTAL CANCER (H): Primary | ICD-10-CM

## 2019-07-25 PROCEDURE — 96523 IRRIG DRUG DELIVERY DEVICE: CPT

## 2019-07-25 PROCEDURE — 25000128 H RX IP 250 OP 636: Performed by: INTERNAL MEDICINE

## 2019-07-25 RX ORDER — HEPARIN SODIUM (PORCINE) LOCK FLUSH IV SOLN 100 UNIT/ML 100 UNIT/ML
500 SOLUTION INTRAVENOUS EVERY 8 HOURS
Status: DISCONTINUED | OUTPATIENT
Start: 2019-07-25 | End: 2019-07-25 | Stop reason: HOSPADM

## 2019-07-25 RX ORDER — HEPARIN SODIUM (PORCINE) LOCK FLUSH IV SOLN 100 UNIT/ML 100 UNIT/ML
500 SOLUTION INTRAVENOUS EVERY 8 HOURS
Status: CANCELLED
Start: 2019-07-25

## 2019-07-25 RX ADMIN — Medication 500 UNITS: at 08:25

## 2019-07-25 NOTE — PROGRESS NOTES
Infusion Nursing Note:  Niharika Cason presents today for port flush.    Patient seen by provider today: No   present during visit today: Not Applicable.    Note: N/A.    Intravenous Access:  Implanted Port.    Treatment Conditions:  Not Applicable.      Post Infusion Assessment:  Blood return noted pre and post infusion.  Site patent and intact, free from redness, edema or discomfort.  Access discontinued per protocol.       Discharge Plan:   Discharge instructions reviewed with: Patient.  Patient and/or family verbalized understanding of discharge instructions and all questions answered.  Patient discharged in stable condition accompanied by: self.  Departure Mode: Ambulatory.    Marizol Maradiaga RN

## 2019-07-25 NOTE — TELEPHONE ENCOUNTER
Diabetes Follow-up    Subjective/Objective:    Niharika Cason - Dexcom - mychart request- review. Last date of communication was: 7/3.    Oh Durbin, still having trouble with my numbers running high during the night.  Can I try going back to 20 for my Lantas before bed.     And have been trying 1 1/2 of my regular before my evening meal.  I had to meet with Dr. Gomez to get the form filled out for my driving privileges.  Dr. Farris is too hard to get in to.    Otherwise I m feeling good. Just want to get those numbers down at night.        Diabetes is being managed with   Lifestyle (diet/activity), Diabetes Medications   Diabetes Medication(s)     Diabetic Other       glucagon (GLUCAGON EMERGENCY) 1 MG kit    Inject 1 mg into the muscle once for 1 dose    Insulin       insulin glargine (LANTUS SOLOSTAR PEN) 100 UNIT/ML pen    Inject 27 Units Subcutaneous At Bedtime     insulin lispro (HUMALOG KWIKPEN) 100 UNIT/ML pen    1.5 units per carb before each meal plus correction of 1 unit per every 50 points over 150      PATIENT ACTUALLY TAKING 1U/CARB HUMALOG AND 17 LANTUS.       BG/Food Log:         Assessment:  Patient does have pattern of night glucose running high.   Plan/Response:  Recommend increase to insulin - 20 units Lantus   Follow up in 2 weeks if still running high.      Gifty Miranda RDN, SANTANA, CDE      Any diabetes medication dose changes were made via the CDE Protocol and Collaborative Practice Agreement with the patient's primary care provider. A copy of this encounter was shared with the provider.

## 2019-08-07 NOTE — PROGRESS NOTES
"Colon and Rectal Surgery Follow-up Clinic Note      RE: Niharika Cason  : 1955  MARILEE: 2019    DIAGNOSIS:     10/2018: mT3cN1 proximal rectal adenocarcinoma (intact IHC) found on 1st screening colonoscopy. 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    10/2018-2019: XELOX x6.    3/-19: CXRT (5040 cGy).    2019: cCR (enrolled in watch and wait protocol).    INTERVAL HISTORY: Denies increased pain, fevers, or chills. Tolerating diet well. Having 1-2 BM's per day with mild fecal urgency and seepage. Denies incontinence per se or BPR.    Physical Examination:  /65 (BP Location: Left arm, Patient Position: Sitting, Cuff Size: Adult Regular)   Pulse 65   Ht 5' 3\"   Wt 167 lb 9.6 oz   LMP 10/15/2008 (Approximate)   SpO2 96%   BMI 29.69 kg/m    Abdomen soft, NT. No inguinal adenopathy palpated.  Perianal skin intact. ALEC: no masses palpated.  Flexible sigmoidoscopy: after obtaining informed consent and performing a \"time out\", an adult flexible sigmoidoscope was introduced through the anus and passed up to the proximal sigmoid colon. The quality of the prep was fair. Findings: 2x2-cm white scar with central telangiectasia located at the left posterior aspect of the mid rectum (7-cm from dentate line), covering approx 30% of the lumen circumference. Distal edge of the tumor is at the mid rectal valve. Tattoo was identified. No additional abnormalities were seen. Total scope time: 12 minutes. The patient tolerated the procedure well.    ASSESSMENT  cCR. No evidence of recurrent neoplasia.     CEA: 10.5->5.0->3.7->3.7->4.7->3.3->3.4.    CT c/a/p (19):  IMPRESSION:  1. Mild thickening of the wall of the anus/distal rectum with  questionable loss of the fat plane between the rectum and the adjacent  uterus. Recurrent malignancy in this region is difficult to exclude.  This would be better evaluated clinically with sigmoidoscopy and/or  digital exam.  2. No other evidence for " metastasis or recurrent malignancy is seen.  3. Stable multiple tiny nodules in the lungs likely represent  granulomatous disease. These have not changed since 9/17/2018. The  largest measures up to 0.5 cm.  4. Calcified fibroids are again noted.  5. Hypoattenuating lesion in segment 7 of the liver was previously  diagnosed as a focal nodular hyperplasia on MRI dated 9/24/2018.    3T MR pelvis (8/10/19):  IMPRESSION:   1. There has been a positive response to treatment, with a  corresponding tumor regression grade of mr-TRG-2 demonstrating small  amount of residual tumor with predominant fibrotic low signal  intensity.  Improved clear fat plane with the adjacent myomatous  uterus.  2. No suspicious pelvic lymph nodes.    Pathology (6/2019):  FINAL DIAGNOSIS:   Large intestine, rectum, biopsy of scar   - Lamina propria fibrosis with mild crypt distortion   - No evidence of malignancy or neoplastic polyp     PLAN  1. CEA today.  2. Colonoscopy in 10/2019.   3. 3T MR pelvis, CEA and Flex Sig in 12/2019.  4. CT c/a/p in 6/2020.    Watch and wait protocol:  Follow-up in clinic (with obligatory endoscopic evaluation [Flex Sig])    Every 2 months for 6 months after completing CRT, then:    Every 3 months for 3 years from CRT, then:    Every 6 months for 5 years from CRT, then:    Every year for 10 years from CRT.    Radiological studies    3T MR of pelvis at 6-8 weeks after completing CRT, then every 4 months for 2 years; then every 6 months for 2 years; then yearly for 2 years.    CT chest, abdomen and pelvis every year for 6-8 years, after completing CRT.    CEA    At every clinic or endoscopic visit.    Time spent: 30 minutes.  >50% spent in discussing, counseling and coordinating care.    Alli Le M.D., M.Sc.     Division of Colon and Rectal Surgery  [facility]    Referring Provider:  Manjeet Moore MD  [address] [place] [id number]       Primary Care Provider:  Leona Farris    CC:  Dayron Lawton MD.  Ynes Jimenez MD.  Yohana Morris MD.

## 2019-08-10 ENCOUNTER — ANCILLARY PROCEDURE (OUTPATIENT)
Dept: MRI IMAGING | Facility: CLINIC | Age: 64
End: 2019-08-10
Attending: COLON & RECTAL SURGERY
Payer: COMMERCIAL

## 2019-08-10 DIAGNOSIS — C20 RECTAL CANCER (H): ICD-10-CM

## 2019-08-10 LAB — CEA SERPL-MCNC: 3.4 UG/L (ref 0–2.5)

## 2019-08-10 RX ORDER — GADOBUTROL 604.72 MG/ML
7.5 INJECTION INTRAVENOUS ONCE
Status: COMPLETED | OUTPATIENT
Start: 2019-08-10 | End: 2019-08-10

## 2019-08-10 RX ADMIN — GADOBUTROL 7 ML: 604.72 INJECTION INTRAVENOUS at 09:50

## 2019-08-10 NOTE — DISCHARGE INSTRUCTIONS
MRI Contrast Discharge Instructions    The IV contrast you received today will pass out of your body in your  urine. This will happen in the next 24 hours. You will not feel this process.  Your urine will not change color.    Drink at least 4 extra glasses of water or juice today (unless your doctor  has restricted your fluids). This reduces the stress on your kidneys.  You may take your regular medicines.    If you are on dialysis: It is best to have dialysis today.    If you have a reaction: Most reactions happen right away. If you have  any new symptoms after leaving the hospital (such as hives or swelling),  call your hospital at the correct number below. Or call your family doctor.  If you have breathing distress or wheezing, call 911.    Special instructions: ***    I have read and understand the above information.    Signature:______________________________________ Date:___________    Staff:__________________________________________ Date:___________     Time:__________    [facility] Departments:    ___[place]: [phone]  ___[facility]: [phone]  ___[place]: [phone] ___[facility]: [phone]  ___[facility]: [phone]  ___[facility]: [phone]  ___[phone]  ___[facility]: [phone]  ___[phone]

## 2019-08-12 ENCOUNTER — TELEPHONE (OUTPATIENT)
Dept: GASTROENTEROLOGY | Facility: CLINIC | Age: 64
End: 2019-08-12

## 2019-08-12 ENCOUNTER — ONCOLOGY VISIT (OUTPATIENT)
Dept: ONCOLOGY | Facility: CLINIC | Age: 64
End: 2019-08-12
Payer: COMMERCIAL

## 2019-08-12 ENCOUNTER — OFFICE VISIT (OUTPATIENT)
Dept: SURGERY | Facility: CLINIC | Age: 64
End: 2019-08-12
Payer: COMMERCIAL

## 2019-08-12 ENCOUNTER — TELEPHONE (OUTPATIENT)
Dept: FAMILY MEDICINE | Facility: CLINIC | Age: 64
End: 2019-08-12

## 2019-08-12 VITALS
TEMPERATURE: 98 F | HEART RATE: 68 BPM | HEIGHT: 63 IN | DIASTOLIC BLOOD PRESSURE: 72 MMHG | WEIGHT: 167.1 LBS | RESPIRATION RATE: 16 BRPM | OXYGEN SATURATION: 96 % | SYSTOLIC BLOOD PRESSURE: 113 MMHG | BODY MASS INDEX: 29.61 KG/M2

## 2019-08-12 VITALS
SYSTOLIC BLOOD PRESSURE: 130 MMHG | WEIGHT: 167.6 LBS | DIASTOLIC BLOOD PRESSURE: 65 MMHG | BODY MASS INDEX: 29.7 KG/M2 | OXYGEN SATURATION: 96 % | HEIGHT: 63 IN | HEART RATE: 65 BPM

## 2019-08-12 DIAGNOSIS — C20 RECTAL ADENOCARCINOMA (H): ICD-10-CM

## 2019-08-12 DIAGNOSIS — Z85.048 HISTORY OF RECTAL CANCER: Primary | ICD-10-CM

## 2019-08-12 DIAGNOSIS — C20 RECTAL ADENOCARCINOMA (H): Primary | ICD-10-CM

## 2019-08-12 LAB
ALBUMIN SERPL-MCNC: 4.1 G/DL (ref 3.4–5)
ALP SERPL-CCNC: 77 U/L (ref 40–150)
ALT SERPL W P-5'-P-CCNC: 27 U/L (ref 0–50)
ANION GAP SERPL CALCULATED.3IONS-SCNC: 5 MMOL/L (ref 3–14)
AST SERPL W P-5'-P-CCNC: 23 U/L (ref 0–45)
BASOPHILS # BLD AUTO: 0 10E9/L (ref 0–0.2)
BASOPHILS NFR BLD AUTO: 0.7 %
BILIRUB SERPL-MCNC: 0.3 MG/DL (ref 0.2–1.3)
BUN SERPL-MCNC: 22 MG/DL (ref 7–30)
CALCIUM SERPL-MCNC: 9.4 MG/DL (ref 8.5–10.1)
CHLORIDE SERPL-SCNC: 106 MMOL/L (ref 94–109)
CO2 SERPL-SCNC: 28 MMOL/L (ref 20–32)
CREAT SERPL-MCNC: 0.66 MG/DL (ref 0.52–1.04)
DIFFERENTIAL METHOD BLD: NORMAL
EOSINOPHIL # BLD AUTO: 0.1 10E9/L (ref 0–0.7)
EOSINOPHIL NFR BLD AUTO: 1.9 %
ERYTHROCYTE [DISTWIDTH] IN BLOOD BY AUTOMATED COUNT: 11.9 % (ref 10–15)
GFR SERPL CREATININE-BSD FRML MDRD: >90 ML/MIN/{1.73_M2}
GLUCOSE SERPL-MCNC: 239 MG/DL (ref 70–99)
HCT VFR BLD AUTO: 38.2 % (ref 35–47)
HGB BLD-MCNC: 12.6 G/DL (ref 11.7–15.7)
IMM GRANULOCYTES # BLD: 0 10E9/L (ref 0–0.4)
IMM GRANULOCYTES NFR BLD: 0.2 %
LYMPHOCYTES # BLD AUTO: 1 10E9/L (ref 0.8–5.3)
LYMPHOCYTES NFR BLD AUTO: 24.5 %
MCH RBC QN AUTO: 29.7 PG (ref 26.5–33)
MCHC RBC AUTO-ENTMCNC: 33 G/DL (ref 31.5–36.5)
MCV RBC AUTO: 90 FL (ref 78–100)
MONOCYTES # BLD AUTO: 0.4 10E9/L (ref 0–1.3)
MONOCYTES NFR BLD AUTO: 8.8 %
NEUTROPHILS # BLD AUTO: 2.7 10E9/L (ref 1.6–8.3)
NEUTROPHILS NFR BLD AUTO: 63.9 %
PLATELET # BLD AUTO: 184 10E9/L (ref 150–450)
POTASSIUM SERPL-SCNC: 4.4 MMOL/L (ref 3.4–5.3)
PROT SERPL-MCNC: 7.2 G/DL (ref 6.8–8.8)
RBC # BLD AUTO: 4.24 10E12/L (ref 3.8–5.2)
SODIUM SERPL-SCNC: 139 MMOL/L (ref 133–144)
WBC # BLD AUTO: 4.2 10E9/L (ref 4–11)

## 2019-08-12 PROCEDURE — 80053 COMPREHEN METABOLIC PANEL: CPT | Performed by: INTERNAL MEDICINE

## 2019-08-12 PROCEDURE — 85025 COMPLETE CBC W/AUTO DIFF WBC: CPT | Performed by: INTERNAL MEDICINE

## 2019-08-12 PROCEDURE — 99207 ZZC NO CHARGE LOS: CPT

## 2019-08-12 PROCEDURE — 99213 OFFICE O/P EST LOW 20 MIN: CPT | Performed by: INTERNAL MEDICINE

## 2019-08-12 RX ORDER — HEPARIN SODIUM (PORCINE) LOCK FLUSH IV SOLN 100 UNIT/ML 100 UNIT/ML
5 SOLUTION INTRAVENOUS
Status: DISCONTINUED | OUTPATIENT
Start: 2019-08-12 | End: 2019-08-12 | Stop reason: HOSPADM

## 2019-08-12 ASSESSMENT — PAIN SCALES - GENERAL
PAINLEVEL: NO PAIN (0)
PAINLEVEL: NO PAIN (0)

## 2019-08-12 ASSESSMENT — MIFFLIN-ST. JEOR
SCORE: 1291.96
SCORE: 1294.36

## 2019-08-12 NOTE — PROGRESS NOTES
Patient requested peripheral draw for labwork at this time. Port accessed earlier this morning elsewhere.     Peripheral lab draw for ordered labs performed by phlebotomist.     Nicki Cavanaugh RN

## 2019-08-12 NOTE — Clinical Note
8/12/2019         RE: Niharika Cason  [address] [facility] [address]        Dear Colleague,    Thank you for referring your patient, Niharika Cason, to the [facility]. Please see a copy of my visit note below.      FOLLOW-UP VISIT NOTE    PATIENT NAME: Niharika Cason MRN # [id number]  DATE OF VISIT: Aug 12, 2019 YOB: 1955    REFERRING PROVIDER: Liliana Urbina MD  [address] [facility] [address] [facility]  [place] [address]    CANCER TYPE:Recatl adenocarcinoma  STAGE: cT3cN1 M0  ECOG PS: 1    ONCOLOGY HISTORY:    Niharika Cason is a 63-year-old female with past medical history significant for hypertension, diabetes mellitus type 1, anxiety underwent her     09/17/18  Screening colonoscopy revealed a partially obstructing mass in the rectosigmoid colon which was biopsied and came back as invasive moderately differentiated adenocarcinoma - intact mismatch repair proteins. Staging CT scans showed a 3 cm enhancing lesion in the right lobe of liver he subsequently had an MRI done which characterized a liver lesion as well as VOCAL lobular hyperplasia with no suspected metastatic disease. MRI of pelvis showed an irregular ulcerated left rectal wall mass at 9 cm from the anal verge extending to the muscularis propria into the perirectal fat. Also noted were suspicious perirectal and presacral lymph nodes -clinical stage T3cN1 M0. She met with medical and surgical oncology and the recommendation was to proceed with total neoadjuvant therapy.      10/25/18  Started XELOX chemotherapy    10/25/18- 02/07/19  Started XELOX chemotherapy s/p 6 cycles with oxaliplatin dose reduced with cycle 4 to 100 mg/m2 given neurological toxicity with higher dose    03/06/18 - 04/12/19  neoadjuvant chemoRT with Xeloda.    06/12/19 Scans showed complete clinical response With sigmoidoscopy showing a 2 cm scar which was biopsied and negative for evidence of malignancy. After discussion with colo rectal surgery,  decision was made to proceed with watch and wait approach with closer surveillance.      SUBJECTIVE     In clinic today for 3 month follow up. Complains of neuropathy involving bilateral hands and feet which is NOT under control on gabapentin 600 mg morning and 900 mg at bedtime.Denies nausea/vomiting, mucositis, fever/chills,dysuria, hematuria, diarrhea, constipation, blood in the stools, loss of appetite, weight loss or any other complaints      PAST MEDICAL HISTORY     Past Medical History:   Diagnosis Date     Essential hypertension, benign      Generalized anxiety disorder      Hyperlipidemia      Rectal cancer (H) 09/17/2018     S/P radiation therapy     5,040 cGy to Pelvis completed on 04/12/2019. - [facility] with Dr. Morris     Type I (juvenile type) diabetes mellitus without mention of complication, not stated as uncontrolled     diagnosed at age 35     Ulcerative colitis, unspecified     in the 70s.            ALLERGIES     Allergies   Allergen Reactions     No Known Drug Allergies         REVIEW OF SYSTEMS   As above in the HPI, o/w complete 12-point ROS was negative.     PHYSICAL EXAM   B/P: Data Unavailable, T: Data Unavailable, P: Data Unavailable, R: Data Unavailable  SpO2 Readings from Last 4 Encounters:   11/15/18 95%   11/15/18 96%   11/02/18 96%   10/25/18 96%     Wt Readings from Last 3 Encounters:   08/12/19 75.8 kg (167 lb 1.6 oz)   08/12/19 76 kg (167 lb 9.6 oz)   07/09/19 74.4 kg (164 lb)     GEN: NAD  Mouth/ENT: Oropharynx is clear.  NECK: no  lymphadenopathy  LUNGS: clear   CV: regular  ABDOMEN: soft, non-tender, non-distended,  EXT: warm, well perfused, no edema  NEURO: alert  SKIN: no rashes     LABORATORY AND IMAGING STUDIES     EXAMINATION: MR PELVIS (INTRAPELVIC ORGANS) WO&W CONTRAST,  8/10/2019 11:06 AM      COMPARISON: 6/12/2019; 9/28/2018.     TECHNIQUE:  Images were acquired without and with intravenous contrast  through the pelvis. The following MR images were acquired  without  contrast: multiplanar T1, multiplanar T2, axial diffusion-weighted and  axial apparent diffusion coefficient. T1-weighted images with fat  saturation were acquired at the following intervals relative to  intravenous contrast administration: pre-contrast, immediate post  contrast, 1 minute, 3 minutes and delayed.  Contrast dose: 7 ml  Gadavist     HISTORY: Rectal cancer, watch and wait protocol.     FINDINGS:     LOCATION/SIZE:  On prior exam dated 9/28/2018 there was a tumor identified in the mid  rectum. Previously this tumor measured 4 cm on 9/28/2018 and 2 cm on  6/12/2019, and currently it measures 1.7 cm. This is best seen on  series series 5 image 25 an series 21 image 31.     TREATMENT RESPONSE:   There are small amount of residual tumor visible but with a  predominant fibrotic low signal intensity.  This corresponds to a  tumour response grade of mrTRG-2.      EXTENT:  The tumor does not clearly involve the anal sphincters or levator ani  muscle.      CIRCUMFERENTIAL RESECTION MARGIN (CRM):  In terms of the resection margin, there is involvement of the  mesorectal fascia, with the nearest potential distance to the  circumferential margin being approximately 10 mm (> 1 mm indicates CRM  is clear).     LYMPH NODES:  No suspicious appearing pelvic lymph nodes.     VEINS:  There is not evidence of extramural venous extension.     MISCELLANEOUS FINDINGS: Myomatous uterus. Minimal free fluid in the  pelvis. Unremarkable urinary bladder. Small sacral Tarlov cysts. No  aggressive bone lesions.                                                                       IMPRESSION:   1. There has been a positive response to treatment, with a  corresponding tumor regression grade of mr-TRG-2 demonstrating small  amount of residual tumor with predominant fibrotic low signal  intensity.  Improved clear fat plane with the adjacent myomatous  uterus.  2. No suspicious pelvic lymph nodes.    Component      Latest Ref Rng  & Units 8/10/2019 8/12/2019   WBC      4.0 - 11.0 10e9/L  4.2   RBC Count      3.8 - 5.2 10e12/L  4.24   Hemoglobin      11.7 - 15.7 g/dL  12.6   Hematocrit      35.0 - 47.0 %  38.2   MCV      78 - 100 fl  90   MCH      26.5 - 33.0 pg  29.7   MCHC      31.5 - 36.5 g/dL  33.0   RDW      10.0 - 15.0 %  11.9   Platelet Count      150 - 450 10e9/L  184   Diff Method        Automated Method   % Neutrophils      %  63.9   % Lymphocytes      %  24.5   % Monocytes      %  8.8   % Eosinophils      %  1.9   % Basophils      %  0.7   % Immature Granulocytes      %  0.2   Absolute Neutrophil      1.6 - 8.3 10e9/L  2.7   Absolute Lymphocytes      0.8 - 5.3 10e9/L  1.0   Absolute Monocytes      0.0 - 1.3 10e9/L  0.4   Absolute Eosinophils      0.0 - 0.7 10e9/L  0.1   Absolute Basophils      0.0 - 0.2 10e9/L  0.0   Abs Immature Granulocytes      0 - 0.4 10e9/L  0.0   Sodium      133 - 144 mmol/L  139   Potassium      3.4 - 5.3 mmol/L  4.4   Chloride      94 - 109 mmol/L  106   Carbon Dioxide      20 - 32 mmol/L  28   Anion Gap      3 - 14 mmol/L  5   Glucose      70 - 99 mg/dL  239 (H)   Urea Nitrogen      7 - 30 mg/dL  22   Creatinine      0.52 - 1.04 mg/dL  0.66   GFR Estimate      >60 mL/min/1.73:m2  >90   GFR Estimate If Black      >60 mL/min/1.73:m2  >90   Calcium      8.5 - 10.1 mg/dL  9.4   Bilirubin Total      0.2 - 1.3 mg/dL  0.3   Albumin      3.4 - 5.0 g/dL  4.1   Protein Total      6.8 - 8.8 g/dL  7.2   Alkaline Phosphatase      40 - 150 U/L  77   ALT      0 - 50 U/L  27   AST      0 - 45 U/L  23   CEA      0 - 2.5 ug/L 3.4 (H)         ASSESSMENT AND PLAN     63-year-old female with past medical history significant for hypertension, diabetes mellitus type 1, anxiety on a screening colonoscopy was diagnosed with rectal adenocarcinoma     - Rectal adenocarcinoma- T3cN1 M0 intact mismatch repair protein  Started on total neoadjuvant therapy and received XELOX chemotherapy, chemoRT with Xeloda with Follow-up scans and  sigmoidoscopy had showed complete clinical response.After discussing benefits and risks, decision was made to hold off on surgical resection and proceed with watch and wait approach  Reviewed with the results of the recent sigmoidoscopy as well as the MRI pelvis and lab work which are negative for evidence of recurrence and she appears to be in complete clinical response  Plan to continue with water and watch and wait protocol involving sigmoidoscopies, MRI pelvis as well as CT scans and tumor marker.    - Neuropathy  Grade 2  Secondary to prior oxaliplatin use  Will  increase to 900 mg bid          Return to clinic in 3 months  for office visit, labs.      Chart documentation with Dragon Voice recognition Software. Although reviewed after completion, some words and grammatical errors may remain.  Dayron Lawton MD  Attending Physician   Hematology/Medical Oncology          Again, thank you for allowing me to participate in the care of your patient.        Sincerely,        Dayron Lawton MD

## 2019-08-12 NOTE — LETTER
"2019       RE: Niharika Cason  [address] [address] [employer] [id number]     Dear Colleague,    Thank you for referring your patient, Niharika Cason, to the [facility] COLON AND RECTAL SURGERY at [facility]. Please see a copy of my visit note below.    Colon and Rectal Surgery Follow-up Clinic Note      RE: Niharika Cason  : 1955  MARILEE: 2019    DIAGNOSIS:     10/2018: mT3cN1 proximal rectal adenocarcinoma (intact IHC) found on 1st screening colonoscopy. 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    10/2018-2019: XELOX x6.    3/-19: CXRT (5040 cGy).    2019: cCR (enrolled in watch and wait protocol).    INTERVAL HISTORY: Denies increased pain, fevers, or chills. Tolerating diet well. Having 1-2 BM's per day with mild fecal urgency and seepage. Denies incontinence per se or BPR.    Physical Examination:  /65 (BP Location: Left arm, Patient Position: Sitting, Cuff Size: Adult Regular)   Pulse 65   Ht 5' 3\"   Wt 167 lb 9.6 oz   LMP 10/15/2008 (Approximate)   SpO2 96%   BMI 29.69 kg/m     Abdomen soft, NT. No inguinal adenopathy palpated.  Perianal skin intact. ALEC: no masses palpated.  Flexible sigmoidoscopy: after obtaining informed consent and performing a \"time out\", an adult flexible sigmoidoscope was introduced through the anus and passed up to the proximal sigmoid colon. The quality of the prep was fair. Findings: 2x2-cm white scar with central telangiectasia located at the left posterior aspect of the mid rectum (7-cm from dentate line), covering approx 30% of the lumen circumference. Distal edge of the tumor is at the mid rectal valve. Tattoo was identified. No additional abnormalities were seen. Total scope time: 12 minutes. The patient tolerated the procedure well.    ASSESSMENT  cCR. No evidence of recurrent neoplasia.     CEA: 10.5->5.0->3.7->3.7->4.7->3.3->3.4.    CT c/a/p (19):  IMPRESSION:  1. Mild thickening of the wall " of the anus/distal rectum with  questionable loss of the fat plane between the rectum and the adjacent  uterus. Recurrent malignancy in this region is difficult to exclude.  This would be better evaluated clinically with sigmoidoscopy and/or  digital exam.  2. No other evidence for metastasis or recurrent malignancy is seen.  3. Stable multiple tiny nodules in the lungs likely represent  granulomatous disease. These have not changed since 9/17/2018. The  largest measures up to 0.5 cm.  4. Calcified fibroids are again noted.  5. Hypoattenuating lesion in segment 7 of the liver was previously  diagnosed as a focal nodular hyperplasia on MRI dated 9/24/2018.    3T MR pelvis (8/10/19):  IMPRESSION:   1. There has been a positive response to treatment, with a  corresponding tumor regression grade of mr-TRG-2 demonstrating small  amount of residual tumor with predominant fibrotic low signal  intensity.  Improved clear fat plane with the adjacent myomatous  uterus.  2. No suspicious pelvic lymph nodes.    Pathology (6/2019):  FINAL DIAGNOSIS:   Large intestine, rectum, biopsy of scar   - Lamina propria fibrosis with mild crypt distortion   - No evidence of malignancy or neoplastic polyp     PLAN  1. CEA today.  2. Colonoscopy in 10/2019.   3. 3T MR pelvis, CEA and Flex Sig in 12/2019.  4. CT c/a/p in 6/2020.    Watch and wait protocol:  Follow-up in clinic (with obligatory endoscopic evaluation [Flex Sig])    Every 2 months for 6 months after completing CRT, then:    Every 3 months for 3 years from CRT, then:    Every 6 months for 5 years from CRT, then:    Every year for 10 years from CRT.    Radiological studies    3T MR of pelvis at 6-8 weeks after completing CRT, then every 4 months for 2 years; then every 6 months for 2 years; then yearly for 2 years.    CT chest, abdomen and pelvis every year for 6-8 years, after completing CRT.    CEA    At every clinic or endoscopic visit.    Time spent: 30 minutes.  >50% spent in  discussing, counseling and coordinating care.    Alli Le M.D., M.Sc.     Division of Colon and Rectal Surgery  [facility]    Referring Provider:  Manjeet Moore MD  [address] [place] [id number]      Primary Care Provider:  Leona Farris    CC:  Dayron Lawton MD.  Ynes Jimenez MD.  Yohana Morris MD.

## 2019-08-12 NOTE — TELEPHONE ENCOUNTER
Reason for Call:  Form, our goal is to have forms completed with 72 hours, however, some forms may require a visit or additional information.    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

## 2019-08-12 NOTE — NURSING NOTE
"Chief Complaint   Patient presents with     Flexible Sigmoidoscopy     Two-month follow-up.     Cancer     Rectal cancer.       Vitals:    08/12/19 0933   BP: 130/65   BP Location: Left arm   Patient Position: Sitting   Cuff Size: Adult Regular   Pulse: 65   SpO2: 96%   Weight: 167 lb 9.6 oz   Height: 5' 3\"       Body mass index is 29.69 kg/m .      Anitra Kendrick, EMT                      "

## 2019-08-12 NOTE — PROGRESS NOTES
FOLLOW-UP VISIT NOTE    PATIENT NAME: Niharika Cason MRN # [id number]  DATE OF VISIT: Aug 12, 2019 YOB: 1955    REFERRING PROVIDER: Liliana Urbina MD  [address] [place] [id number]    CANCER TYPE:Recatl adenocarcinoma  STAGE: cT3cN1 M0  ECOG PS: 1    ONCOLOGY HISTORY:    Niharika Cason is a 63-year-old female with past medical history significant for hypertension, diabetes mellitus type 1, anxiety underwent her     09/17/18  Screening colonoscopy revealed a partially obstructing mass in the rectosigmoid colon which was biopsied and came back as invasive moderately differentiated adenocarcinoma - intact mismatch repair proteins. Staging CT scans showed a 3 cm enhancing lesion in the right lobe of liver he subsequently had an MRI done which characterized a liver lesion as well as VOCAL lobular hyperplasia with no suspected metastatic disease. MRI of pelvis showed an irregular ulcerated left rectal wall mass at 9 cm from the anal verge extending to the muscularis propria into the perirectal fat. Also noted were suspicious perirectal and presacral lymph nodes -clinical stage T3cN1 M0. She met with medical and surgical oncology and the recommendation was to proceed with total neoadjuvant therapy.      10/25/18  Started XELOX chemotherapy    10/25/18- 02/07/19  Started XELOX chemotherapy s/p 6 cycles with oxaliplatin dose reduced with cycle 4 to 100 mg/m2 given neurological toxicity with higher dose    03/06/18 - 04/12/19  neoadjuvant chemoRT with Xeloda.    06/12/19 Scans showed complete clinical response With sigmoidoscopy showing a 2 cm scar which was biopsied and negative for evidence of malignancy. After discussion with colo rectal surgery, decision was made to proceed with watch and wait approach with closer surveillance.      SUBJECTIVE     In clinic today for 3 month follow up. Complains of neuropathy involving bilateral hands and feet which is NOT under control on gabapentin 600  mg morning and 900 mg at bedtime.Denies nausea/vomiting, mucositis, fever/chills,dysuria, hematuria, diarrhea, constipation, blood in the stools, loss of appetite, weight loss or any other complaints      PAST MEDICAL HISTORY     Past Medical History:   Diagnosis Date     Essential hypertension, benign      Generalized anxiety disorder      Hyperlipidemia      Rectal cancer (H) 09/17/2018     S/P radiation therapy     5,040 cGy to Pelvis completed on 04/12/2019. - [facility] with Dr. Morris     Type I (juvenile type) diabetes mellitus without mention of complication, not stated as uncontrolled     diagnosed at age 35     Ulcerative colitis, unspecified     in the 70s.            ALLERGIES     Allergies   Allergen Reactions     No Known Drug Allergies         REVIEW OF SYSTEMS   As above in the HPI, o/w complete 12-point ROS was negative.     PHYSICAL EXAM   B/P: Data Unavailable, T: Data Unavailable, P: Data Unavailable, R: Data Unavailable  SpO2 Readings from Last 4 Encounters:   11/15/18 95%   11/15/18 96%   11/02/18 96%   10/25/18 96%     Wt Readings from Last 3 Encounters:   08/12/19 75.8 kg (167 lb 1.6 oz)   08/12/19 76 kg (167 lb 9.6 oz)   07/09/19 74.4 kg (164 lb)     GEN: NAD  Mouth/ENT: Oropharynx is clear.  NECK: no  lymphadenopathy  LUNGS: clear   CV: regular  ABDOMEN: soft, non-tender, non-distended,  EXT: warm, well perfused, no edema  NEURO: alert  SKIN: no rashes     LABORATORY AND IMAGING STUDIES     EXAMINATION: MR PELVIS (INTRAPELVIC ORGANS) WO&W CONTRAST,  8/10/2019 11:06 AM      COMPARISON: 6/12/2019; 9/28/2018.     TECHNIQUE:  Images were acquired without and with intravenous contrast  through the pelvis. The following MR images were acquired without  contrast: multiplanar T1, multiplanar T2, axial diffusion-weighted and  axial apparent diffusion coefficient. T1-weighted images with fat  saturation were acquired at the following intervals relative to  intravenous contrast administration:  pre-contrast, immediate post  contrast, 1 minute, 3 minutes and delayed.  Contrast dose: 7 ml  Gadavist     HISTORY: Rectal cancer, watch and wait protocol.     FINDINGS:     LOCATION/SIZE:  On prior exam dated 9/28/2018 there was a tumor identified in the mid  rectum. Previously this tumor measured 4 cm on 9/28/2018 and 2 cm on  6/12/2019, and currently it measures 1.7 cm. This is best seen on  series series 5 image 25 an series 21 image 31.     TREATMENT RESPONSE:   There are small amount of residual tumor visible but with a  predominant fibrotic low signal intensity.  This corresponds to a  tumour response grade of mrTRG-2.      EXTENT:  The tumor does not clearly involve the anal sphincters or levator ani  muscle.      CIRCUMFERENTIAL RESECTION MARGIN (CRM):  In terms of the resection margin, there is involvement of the  mesorectal fascia, with the nearest potential distance to the  circumferential margin being approximately 10 mm (> 1 mm indicates CRM  is clear).     LYMPH NODES:  No suspicious appearing pelvic lymph nodes.     VEINS:  There is not evidence of extramural venous extension.     MISCELLANEOUS FINDINGS: Myomatous uterus. Minimal free fluid in the  pelvis. Unremarkable urinary bladder. Small sacral Tarlov cysts. No  aggressive bone lesions.                                                                       IMPRESSION:   1. There has been a positive response to treatment, with a  corresponding tumor regression grade of mr-TRG-2 demonstrating small  amount of residual tumor with predominant fibrotic low signal  intensity.  Improved clear fat plane with the adjacent myomatous  uterus.  2. No suspicious pelvic lymph nodes.    Component      Latest Ref Rng & Units 8/10/2019 8/12/2019   WBC      4.0 - 11.0 10e9/L  4.2   RBC Count      3.8 - 5.2 10e12/L  4.24   Hemoglobin      11.7 - 15.7 g/dL  12.6   Hematocrit      35.0 - 47.0 %  38.2   MCV      78 - 100 fl  90   MCH      26.5 - 33.0 pg  29.7   MCHC       31.5 - 36.5 g/dL  33.0   RDW      10.0 - 15.0 %  11.9   Platelet Count      150 - 450 10e9/L  184   Diff Method        Automated Method   % Neutrophils      %  63.9   % Lymphocytes      %  24.5   % Monocytes      %  8.8   % Eosinophils      %  1.9   % Basophils      %  0.7   % Immature Granulocytes      %  0.2   Absolute Neutrophil      1.6 - 8.3 10e9/L  2.7   Absolute Lymphocytes      0.8 - 5.3 10e9/L  1.0   Absolute Monocytes      0.0 - 1.3 10e9/L  0.4   Absolute Eosinophils      0.0 - 0.7 10e9/L  0.1   Absolute Basophils      0.0 - 0.2 10e9/L  0.0   Abs Immature Granulocytes      0 - 0.4 10e9/L  0.0   Sodium      133 - 144 mmol/L  139   Potassium      3.4 - 5.3 mmol/L  4.4   Chloride      94 - 109 mmol/L  106   Carbon Dioxide      20 - 32 mmol/L  28   Anion Gap      3 - 14 mmol/L  5   Glucose      70 - 99 mg/dL  239 (H)   Urea Nitrogen      7 - 30 mg/dL  22   Creatinine      0.52 - 1.04 mg/dL  0.66   GFR Estimate      >60 mL/min/1.73:m2  >90   GFR Estimate If Black      >60 mL/min/1.73:m2  >90   Calcium      8.5 - 10.1 mg/dL  9.4   Bilirubin Total      0.2 - 1.3 mg/dL  0.3   Albumin      3.4 - 5.0 g/dL  4.1   Protein Total      6.8 - 8.8 g/dL  7.2   Alkaline Phosphatase      40 - 150 U/L  77   ALT      0 - 50 U/L  27   AST      0 - 45 U/L  23   CEA      0 - 2.5 ug/L 3.4 (H)         ASSESSMENT AND PLAN     63-year-old female with past medical history significant for hypertension, diabetes mellitus type 1, anxiety on a screening colonoscopy was diagnosed with rectal adenocarcinoma     - Rectal adenocarcinoma- T3cN1 M0 intact mismatch repair protein  Started on total neoadjuvant therapy and received XELOX chemotherapy, chemoRT with Xeloda with Follow-up scans and sigmoidoscopy had showed complete clinical response.After discussing benefits and risks, decision was made to hold off on surgical resection and proceed with watch and wait approach  Reviewed with the results of the recent sigmoidoscopy as well as the  MRI pelvis and lab work which are negative for evidence of recurrence and she appears to be in complete clinical response  Plan to continue with water and watch and wait protocol involving sigmoidoscopies, MRI pelvis as well as CT scans and tumor marker.    - Neuropathy  Grade 2  Secondary to prior oxaliplatin use  Will  increase to 900 mg bid          Return to clinic in 3 months  for office visit, labs.      Chart documentation with Dragon Voice recognition Software. Although reviewed after completion, some words and grammatical errors may remain.  Dayron Lawton MD  Attending Physician   Hematology/Medical Oncology

## 2019-08-12 NOTE — NURSING NOTE
"Oncology Rooming Note    August 12, 2019 1:57 PM   Niharika Cason is a 63 year old female who presents for:    Chief Complaint   Patient presents with     Oncology Clinic Visit     2 month follow up     Initial Vitals: /72 (BP Location: Left arm)   Pulse 68   Temp 98  F (36.7  C) (Oral)   Resp 16   Ht 1.6 m (5' 2.99\")   Wt 75.8 kg (167 lb 1.6 oz)   LMP 10/15/2008 (Approximate)   SpO2 96%   BMI 29.61 kg/m   Estimated body mass index is 29.61 kg/m  as calculated from the following:    Height as of this encounter: 1.6 m (5' 2.99\").    Weight as of this encounter: 75.8 kg (167 lb 1.6 oz). Body surface area is 1.84 meters squared.  No Pain (0) Comment: Data Unavailable   Patient's last menstrual period was 10/15/2008 (approximate).  Allergies reviewed: Yes  Medications reviewed: Yes    Medications: Medication refills not needed today.  Pharmacy name entered into [facility]: [facility] [place], [place] - [address] IVONE Khoury LPN            "

## 2019-08-12 NOTE — PATIENT INSTRUCTIONS
Follow up per Watch and Wait Protocol:   Completed CRT: April 2019    Flexible sigmoidoscopy     Every 2 months for 6 months: DUE December 2019    Every 3 months for 3 years:     Every 6 months for 5 years:     Every year for 10 years:      3T MRI of pelvis    6-8 weeks after CRT: Completed    Every 4 months for 2 years: DUE December 2019    Every 6 months for 2 years:     Yearly for 2 years:       CT CAP    Every year for 6-8 years: DUE June 2020      Colonoscopy     DUE October 2019      CEA at every clinic or endoscopic visit    Please call with any questions or concerns regarding your clinic visit today.    It is a pleasure being involved in your health care.    Contacts post-consultation depending on your need:    Radiology Appointments [phone]    Schedule Clinic Appointments [phone] # 1   M-F 7:30 - 5 pm    ZOFIA Martini [phone]    Clinic Fax Number [phone]    Surgery Scheduling [phone]    My Chart is available 24 hours a day and is a secure way to access your records and communicate with your care team.  I strongly recommend signing up if you haven't already done so, if you are comfortable with computers.  If you would like to inquire about this or are having problems with My Chart access, you may call [phone] or go online at [email or web address].  Please allow at least 24 hours for a response and extra time on weekends and Holidays.

## 2019-08-12 NOTE — NURSING NOTE
Patient self-administered two saline enemas in clinic prior to procedure.    Anitra Kendrick, EMT  Colon and Rectal Surgery

## 2019-08-13 NOTE — TELEPHONE ENCOUNTER
Spouse is calling asking if this can be addressed asap. They need to get this information sent in so she does not lose her licence.   Thank you,  Jud Arndt   for [facility]

## 2019-08-13 NOTE — TELEPHONE ENCOUNTER
Patient's  called and stated that they would like to  the original paper work for their records. Please call either Niharika or his  to let them know when they can come do this.       Thank you,  Alyson Lawson   for [facility]

## 2019-08-14 ENCOUNTER — MYC MEDICAL ADVICE (OUTPATIENT)
Dept: EDUCATION SERVICES | Facility: CLINIC | Age: 64
End: 2019-08-14

## 2019-08-14 ENCOUNTER — PATIENT OUTREACH (OUTPATIENT)
Dept: RADIATION ONCOLOGY | Facility: CLINIC | Age: 64
End: 2019-08-14

## 2019-08-14 NOTE — PROGRESS NOTES
"This RN contacted patient as requested through in-basket message from cancer center scheduling desk.   Patient reports she has an upcoming appointment on Wednesday, 8/21/2019 with Dr. Morris.  Patient requesting cancellation of this visit, states \"I am doing really well and I don't think I need this appointment\".  Reviewed recommendations per Dr. Morris's last visit with patient in May, 2019 for three month follow-up.  Patient verbalized understanding and again requests cancellation of appointment without reschedule.  Patient confirms she will continue to follow-up with Dr. Le and Dr. Angel as scheduled.  Patient also confirms she has contact information for this RN should any questions or concerns arise or if patient chooses to reschedule.    Fallon Herrera RN BSN OCN    "

## 2019-08-15 NOTE — TELEPHONE ENCOUNTER
Diabetes Follow-up    Subjective/Objective:    Niharika Cason - Dexcom review. Last date of communication was: 7/24.    Diabetes is being managed with   Lifestyle (diet/activity), Diabetes Medications   Diabetes Medication(s)     Diabetic Other       glucagon (GLUCAGON EMERGENCY) 1 MG kit    Inject 1 mg into the muscle once for 1 dose    Insulin       insulin glargine (LANTUS PEN) 100 UNIT/ML pen    Inject 20 Units Subcutaneous At Bedtime     insulin lispro (HUMALOG KWIKPEN) 100 UNIT/ML pen    1 units per carb before each meal plus correction of 1 unit per every 50 points over 150          Dexcom report:       Assessment:  Glucose control has improved. Recommend considering slight decrease in lunch meal time insulin to prevent lows. Metamucil may improve glucose control as well.     Plan/Response:  The Metamucil may actually help blood sugars due to the fiber effect. Your graphs look great, much less highs at night. I would consider just a little less insulin at lunch when you are going to have an active afternoon, maybe 1 unit less. You can experiment with that if you like.   Glad you are liking the Dexcom - seems to be really helping!     Gifty Miranda RDN, LD, CDE        Any diabetes medication dose changes were made via the CDE Protocol and Collaborative Practice Agreement with the patient's primary care provider. A copy of this encounter was shared with the provider.

## 2019-08-19 ENCOUNTER — TELEPHONE (OUTPATIENT)
Dept: EDUCATION SERVICES | Facility: CLINIC | Age: 64
End: 2019-08-19

## 2019-08-19 DIAGNOSIS — E10.65 TYPE 1 DIABETES MELLITUS WITH HYPERGLYCEMIA (H): ICD-10-CM

## 2019-08-19 NOTE — TELEPHONE ENCOUNTER
Pharmacy requesting dose on Humalog/day.   Adjusted order for up to 25 units daily.     Gifty Miranda RDN, SANTANA, CDE      Any diabetes medication dose changes were made via the CDE Protocol and Collaborative Practice Agreement with the patient's primary care provider. A copy of this encounter was shared with the provider.

## 2019-08-20 ENCOUNTER — TEAM CONFERENCE (OUTPATIENT)
Dept: SURGERY | Facility: CLINIC | Age: 64
End: 2019-08-20

## 2019-08-20 NOTE — TELEPHONE ENCOUNTER
COLON AND RECTAL CANCER CONFERENCE DISCUSS:    - Who was present: Surgery and Medical Oncology    - Patient Status: Established Patient    Flex sig: 2x2-cm white scar with central telangiectasia located at the left posterior aspect of the mid rectum (7-cm from dentate line), covering approx 30% of the lumen circumference. Distal edge of the tumor is at the mid rectal valve. Tattoo was identified. No additional abnormalities were seen.     - Treatment to Date: Total neoadjuvant treatment    PLAN  1. CEA today.  2. Colonoscopy in 10/2019.   3. 3T MR pelvis, CEA and Flex Sig in 12/2019.  4. CT c/a/p in 6/2020.     Watch and wait protocol:  Follow-up in clinic (with obligatory endoscopic evaluation [Flex Sig])    Every 2 months for 6 months after completing CRT, then:    Every 3 months for 3 years from CRT, then:    Every 6 months for 5 years from CRT, then:    Every year for 10 years from CRT.     Radiological studies    3T MR of pelvis at 6-8 weeks after completing CRT, then every 4 months for 2 years; then every 6 months for 2 years; then yearly for 2 years.    CT chest, abdomen and pelvis every year for 6-8 years, after completing CRT.     CEA  At every clinic or endoscopic visit.      - Times Spent Discussing: < 30 minutes     ZOFIA Martini [phone]

## 2019-09-06 ENCOUNTER — INFUSION THERAPY VISIT (OUTPATIENT)
Dept: INFUSION THERAPY | Facility: CLINIC | Age: 64
End: 2019-09-06
Attending: COLON & RECTAL SURGERY
Payer: COMMERCIAL

## 2019-09-06 DIAGNOSIS — C20 RECTAL ADENOCARCINOMA (H): ICD-10-CM

## 2019-09-06 DIAGNOSIS — C20 RECTAL CANCER (H): Primary | ICD-10-CM

## 2019-09-06 PROCEDURE — 96523 IRRIG DRUG DELIVERY DEVICE: CPT

## 2019-09-06 PROCEDURE — 25000128 H RX IP 250 OP 636: Performed by: INTERNAL MEDICINE

## 2019-09-06 RX ORDER — HEPARIN SODIUM (PORCINE) LOCK FLUSH IV SOLN 100 UNIT/ML 100 UNIT/ML
500 SOLUTION INTRAVENOUS EVERY 8 HOURS
Status: CANCELLED
Start: 2019-09-06

## 2019-09-06 RX ORDER — HEPARIN SODIUM (PORCINE) LOCK FLUSH IV SOLN 100 UNIT/ML 100 UNIT/ML
500 SOLUTION INTRAVENOUS EVERY 8 HOURS
Status: DISCONTINUED | OUTPATIENT
Start: 2019-09-06 | End: 2019-09-06 | Stop reason: HOSPADM

## 2019-09-06 RX ADMIN — Medication 500 UNITS: at 08:30

## 2019-09-27 ENCOUNTER — HEALTH MAINTENANCE LETTER (OUTPATIENT)
Age: 64
End: 2019-09-27

## 2019-10-10 ENCOUNTER — TELEPHONE (OUTPATIENT)
Dept: GASTROENTEROLOGY | Facility: OUTPATIENT CENTER | Age: 64
End: 2019-10-10

## 2019-10-10 DIAGNOSIS — Z85.048 HISTORY OF RECTAL CANCER: Primary | ICD-10-CM

## 2019-10-17 ENCOUNTER — DOCUMENTATION ONLY (OUTPATIENT)
Dept: GASTROENTEROLOGY | Facility: OUTPATIENT CENTER | Age: 64
End: 2019-10-17
Payer: COMMERCIAL

## 2019-10-17 ENCOUNTER — TRANSFERRED RECORDS (OUTPATIENT)
Dept: HEALTH INFORMATION MANAGEMENT | Facility: CLINIC | Age: 64
End: 2019-10-17

## 2019-10-26 ENCOUNTER — HEALTH MAINTENANCE LETTER (OUTPATIENT)
Age: 64
End: 2019-10-26

## 2019-11-08 DIAGNOSIS — G62.9 NEUROPATHY: ICD-10-CM

## 2019-11-08 RX ORDER — GABAPENTIN 300 MG/1
900 CAPSULE ORAL 2 TIMES DAILY
Qty: 180 CAPSULE | Refills: 0 | Status: SHIPPED | OUTPATIENT
Start: 2019-11-08 | End: 2019-11-25 | Stop reason: ALTCHOICE

## 2019-11-12 NOTE — PROGRESS NOTES
Oncology Consultation:  Date on this visit: 11/14/2019      Primary Care Physician: Henry Frazier   Colorectal surgeon: Dr. Le  Radiation oncologist: Dr. Arturo Bundy MAX Cason  is referred by Dr.Syed MARIE Lawton for an oncology consultation. She requires evaluation and transfer of care for rectal adenocarcinoma.      Diagnosis: rectal adenocarcinoma  STAGE: cT3cN1 M0  ECOG PS: 1     ONCOLOGY HISTORY:     09/17/18  Screening colonoscopy revealed a partially obstructing mass in the rectosigmoid colon which was biopsied and came back as invasive moderately differentiated adenocarcinoma - intact mismatch repair proteins. Staging CT scans showed a 3 cm enhancing lesion in the right lobe of liver he subsequently had an MRI done which characterized a liver lesion as well as VOCAL lobular hyperplasia with no suspected metastatic disease. MRI of pelvis showed an irregular ulcerated left rectal wall mass at 9 cm from the anal verge extending to the muscularis propria into the perirectal fat. Also noted were suspicious perirectal and presacral lymph nodes -clinical stage T3cN1 M0.   She met with medical and surgical oncology and the recommendation was to proceed with total neoadjuvant therapy.      10/25/18  Started XELOX chemotherapy      10/25/18- 02/07/19  Started XELOX chemotherapy s/p 6 cycles with oxaliplatin dose reduced with cycle 4 to 100 mg/m2 given neurological toxicity with higher dose     03/06/18 - 04/12/19  neoadjuvant chemoRT with Xeloda.     06/12/19 Scans showed complete clinical response with sigmoidoscopy showing a 2 cm scar which was biopsied and negative for evidence of malignancy. After discussion with colorectal surgery, decision was made to proceed with watch and wait approach with closer surveillance.       History Of Present Illness:  Ms. Cason is a 64 year old female  with past medical history significant for hypertension, diabetes mellitus type 1, who presents for follow-up and  transfer of care of of locally advanced rectal adenocarcinoma. She is transferring care to me due to Dr. Lawton's leaving our practice.  She had a pelvic MRI ordered by Dr. Le, on August 10, 2019, which has demonstrated:  a positive response to treatment, with a  corresponding tumor regression grade of mr-TRG-2 demonstrating small  amount of residual tumor with predominant fibrotic low signal  intensity.  Improved clear fat plane with the adjacent myomatous  uterus.  2. No suspicious pelvic lymph nodes.  She also had a colonoscopy by Dr. Le on October 17, 2019 which demonstrated normal anal canal as well as a 2 x 2 centimeter flat scar at the left posterior aspect of the mid rectum.  Repeat colonoscopy was recommended in 2 years.  CEA was mildly elevated at 3.4 in August 2019.  CEA trend has been as below:  Results for JOSELIN GONSALES (MRN [id number]) as of 11/12/2019 11:45   Ref. 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 twice daily.  However, she does not feel it is helpful. She is most bothered by numbness of her feet and hands. She is currently also on Paxil for s/o depression. She is here with her .  In addition, a complete 12 point  review of systems is negative.    Past Medical/Surgical History:  Past Medical History:   Diagnosis Date     Essential hypertension, benign      Generalized anxiety disorder      Hyperlipidemia      Rectal cancer (H) 09/17/2018     S/P radiation therapy     5,040 cGy to Pelvis completed on 04/12/2019. [facility] with Dr. Shoo     Type I (juvenile type) diabetes mellitus without mention of  complication, not stated as uncontrolled     diagnosed at age 35     Ulcerative colitis, unspecified     in the 70s.       Past Surgical History:   Procedure Laterality Date     COLONOSCOPY N/A 9/17/2018    Procedure: COMBINED COLONOSCOPY, SINGLE OR MULTIPLE BIOPSY/POLYPECTOMY BY BIOPSY;  colonoscopy with polypectomies by biopsy forceps and hot snare and submucosal injection with tattoo;  Surgeon: Richard Moore MD;  Location: [facility]     HC COLONOSCOPY THRU STOMA, DIAGNOSTIC  1998    negative     HC CURETTAGE, POSTPARTUM  '91, '93    following miscarriages     HC REMOVAL OF TONSILS,<13 Y/O      Tonsils <14y.o.     INSERT PORT VASCULAR ACCESS Right 10/19/2018    Procedure: Chest Port Placement - right ;  Surgeon: Lawson Hitchcock PA-C;  Location: [facility]       Allergies:  Allergies as of 11/14/2019 - Reviewed 09/06/2019   Allergen Reaction Noted     No known drug allergies  08/27/2001     Current Medications:  Current Outpatient Medications   Medication Sig Dispense Refill     atenolol (TENORMIN) 25 MG tablet Take 1 tablet (25 mg) by mouth daily 93 tablet 3     blood glucose (NO BRAND SPECIFIED) test strip Use to test blood sugar 4 times daily or as directed. 300 strip 3     blood glucose calibration (NO BRAND SPECIFIED) solution Use to calibrate blood glucose monitor as needed as directed. 1 each 1     Continuous Blood Gluc  (DEXCOM G6 ) BRITTANY 1 Device continuous 1 Device 0     Continuous Blood Gluc Sensor (DEXCOM G6 SENSOR) MISC 1 each every 10 days 3 each 11     Continuous Blood Gluc Transmit (DEXCOM G6 TRANSMITTER) MISC 1 Device continuous 1 each 3     gabapentin (NEURONTIN) 300 MG capsule Take 3 capsules (900 mg) by mouth 2 times daily 180 capsule 0     glucagon (GLUCAGON EMERGENCY) 1 MG kit Inject 1 mg into the muscle once for 1 dose 1 mg 0     insulin glargine (LANTUS PEN) 100 UNIT/ML pen Inject 20 Units Subcutaneous At Bedtime 9 mL 3     insulin lispro (HUMALOG KWIKPEN) 100  UNIT/ML pen Take 1u:15g breakfast, 1u:15g lunch, 1u:15g dinner,  + 1u/50mg/dL>150mg/dL +2u prime before each dose-total maximum 25 u/day 24 mL 3     lidocaine-prilocaine (EMLA) cream Apply 1 g topically as needed for moderate pain 30 g 3     lisinopril (PRINIVIL/ZESTRIL) 40 MG tablet Take 1 tablet (40 mg) by mouth daily 93 tablet 3     loperamide (IMODIUM) 2 MG capsule Take 2 mg by mouth 4 times daily as needed for diarrhea       Multiple Vitamins-Minerals (MULTIVITAMIN PO) Take by mouth daily       PARoxetine (PAXIL) 30 MG tablet Take 1 tablet (30 mg) by mouth every morning 93 tablet 3     Pyridoxine HCl (VITAMIN B-6 PO) Take by mouth daily       simvastatin (ZOCOR) 10 MG tablet TAKE ONE TABLET BY MOUTH EVERY NIGHT AT BEDTIME 93 tablet 3      Family History:  Family History   Problem Relation Age of Onset     Heart Disease Maternal Grandfather         MI at 52 yrs     EYE* Paternal Grandfather         cataracts     Arthritis Mother      Gynecology Mother         hysterectomy for fibroids     Hypertension Mother      Lipids Mother      Gastrointestinal Disease Father         ulcers     Heart Disease Father         intermittent rapid heartbeat     Cancer Father 84        Mesothelioma     Social History:  Social History     Socioeconomic History     Marital status:      Spouse name: Fabrice     Number of children: 2     Years of education: 12     Highest education level: Not on file   Occupational History     Occupation:      Employer: [employer]     Comment: on her feet all day     Employer: [employer]   Tobacco Use     Smoking status: Former Smoker     Packs/day: 0.50     Years: 15.00     Pack years: 7.50     Last attempt to quit: 2018     Years since quittin.1     Smokeless tobacco: Never Used   Substance and Sexual Activity     Alcohol use: No     Alcohol/week: 0.0 standard drinks     Frequency: Never     Drug use: No     Sexual activity: Yes     Partners: Male     Birth control/protection:  "Post-menopausal   Lifestyle   Relationships   Social History Narrative     Physical Exam:  /62 (BP Location: Right arm)   Pulse 57   Temp 98.2  F (36.8  C) (Oral)   Resp 16   Ht 1.6 m (5' 2.99\")   Wt 78.7 kg (173 lb 6.4 oz)   LMP 10/15/2008 (Approximate)   SpO2 96%   BMI 30.72 kg/m      LMP 10/15/2008 (Approximate)     GENERAL APPEARANCE:  alert and no distress     HENT: Mouth without ulcers or lesions     NECK: no adenopathy, no asymmetry or masses     LYMPHATICS: No cervical, supraclavicular, axillary  lymphadenopathy     RESP: lungs clear to auscultation - no rales, rhonchi or wheezes     CARDIOVASCULAR: regular rates and rhythm, normal S1 S2, no S3 or S4 and no murmur.     ABDOMEN:  soft, nontender, no HSM or masses and bowel sounds normal     MUSCULOSKELETAL: extremities normal- no gross deformities noted, no evidence of inflammation in joints, FROM in all extremities. No edema b/l LE.     SKIN: no suspicious lesions or rashes     PSYCHIATRIC: mentation appears normal and affect normal  Laboratory/Imaging Studies  Oncology Visit on 11/14/2019   Component Date Value Ref Range Status     CEA 11/14/2019 2.8* 0 - 2.5 ug/L Final    Assay Method:  Chemiluminescence using Siemens Centaur XP     Sodium 11/14/2019 141  133 - 144 mmol/L Final     Potassium 11/14/2019 4.0  3.4 - 5.3 mmol/L Final     Chloride 11/14/2019 109  94 - 109 mmol/L Final     Carbon Dioxide 11/14/2019 29  20 - 32 mmol/L Final     Anion Gap 11/14/2019 3  3 - 14 mmol/L Final     Glucose 11/14/2019 141* 70 - 99 mg/dL Final     Urea Nitrogen 11/14/2019 20  7 - 30 mg/dL Final     Creatinine 11/14/2019 0.58  0.52 - 1.04 mg/dL Final     GFR Estimate 11/14/2019 >90  >60 mL/min/[1.73_m2] Final    Comment: Non  GFR Calc  Starting 12/18/2018, serum creatinine based estimated GFR (eGFR) will be   calculated using the Chronic Kidney Disease Epidemiology Collaboration   (CKD-EPI) equation.       GFR Estimate If Black 11/14/2019 >90  " >60 mL/min/[1.73_m2] Final    Comment:  GFR Calc  Starting 12/18/2018, serum creatinine based estimated GFR (eGFR) will be   calculated using the Chronic Kidney Disease Epidemiology Collaboration   (CKD-EPI) equation.       Calcium 11/14/2019 9.6  8.5 - 10.1 mg/dL Final     Bilirubin Total 11/14/2019 0.4  0.2 - 1.3 mg/dL Final     Albumin 11/14/2019 4.0  3.4 - 5.0 g/dL Final     Protein Total 11/14/2019 6.8  6.8 - 8.8 g/dL Final     Alkaline Phosphatase 11/14/2019 80  40 - 150 U/L Final     ALT 11/14/2019 30  0 - 50 U/L Final     AST 11/14/2019 23  0 - 45 U/L Final     WBC 11/14/2019 4.6  4.0 - 11.0 10e9/L Final     RBC Count 11/14/2019 4.23  3.8 - 5.2 10e12/L Final     Hemoglobin 11/14/2019 12.5  11.7 - 15.7 g/dL Final     Hematocrit 11/14/2019 37.4  35.0 - 47.0 % Final     MCV 11/14/2019 88  78 - 100 fl Final     MCH 11/14/2019 29.6  26.5 - 33.0 pg Final     MCHC 11/14/2019 33.4  31.5 - 36.5 g/dL Final     RDW 11/14/2019 12.1  10.0 - 15.0 % Final     Platelet Count 11/14/2019 202  150 - 450 10e9/L Final     Diff Method 11/14/2019 Automated Method   Final     % Neutrophils 11/14/2019 65.2  % Final     % Lymphocytes 11/14/2019 23.1  % Final     % Monocytes 11/14/2019 8.9  % Final     % Eosinophils 11/14/2019 1.7  % Final     % Basophils 11/14/2019 0.7  % Final     % Immature Granulocytes 11/14/2019 0.4  % Final     Absolute Neutrophil 11/14/2019 3.0  1.6 - 8.3 10e9/L Final     Absolute Lymphocytes 11/14/2019 1.1  0.8 - 5.3 10e9/L Final     Absolute Monocytes 11/14/2019 0.4  0.0 - 1.3 10e9/L Final     Absolute Eosinophils 11/14/2019 0.1  0.0 - 0.7 10e9/L Final     Absolute Basophils 11/14/2019 0.0  0.0 - 0.2 10e9/L Final     Abs Immature Granulocytes 11/14/2019 0.0  0 - 0.4 10e9/L Final       ASSESSMENT/PLAN:  1. [id number] rectal adenocarcinoma-  S/p neoadjuvant XELOX and chemoXRT with Xeloda. On 06/12/19,   scans showed complete clinical response with sigmoidoscopy showed a 2 cm scar which was biopsied  and negative for evidence of malignancy. After discussion with colorectal surgery, decision was made to proceed with watch and wait approach with closer surveillance. She follows with Dr. Le closely.   3T MR pelvis  and Flex Sig planned for 12/2019. CEA remains mildly elevated, stable.  She also had a colonoscopy by Dr. Le on October 17, 2019 which demonstrated normal anal canal as well as a 2 x 2 centimeter flat scar at the left posterior aspect of the mid rectum.  Repeat colonoscopy was recommended in 2 years. Next CT of the chest, abdomen and pelvis due in June 2020.  F/up with med onc in 3 months with cbcd, CMP, CEA     Watch and wait protocol:  Follow-up in clinic (with obligatory endoscopic evaluation [Flex Sig])    Every 2 months for 6 months after completing CRT, then:    Every 3 months for 3 years from CRT, then:    Every 6 months for 5 years from CRT, then:    Every year for 10 years from CRT.     Radiological studies    3T MR of pelvis at 6-8 weeks after completing CRT, then every 4 months for 2 years; then every 6 months for 2 years; then yearly for 2 years.    CT chest, abdomen and pelvis every year for 6-8 years, after completing CRT.    2. Peripheral neuropathy associated with prior Oxaliplatin use- Gabapentin not helping. Discussed Cymbalta. Taper off Gabapentin over the next couple of weeks and start Cymbalta. When starting Cymbalta can stop Paxil and start Cymbalta 30 mg PO daily and increase to 60 mg PO daily as tolerated. Rationale and potential side effects d/w patient.    It was my pleasure to meet Zhane and her .  At the end of our visit patient verbalized understanding and concurred with the plan.       Addendum: Pt reported side effects with Cymbalta and no improvement in neuropathy:  Stomach upset, insomnia, memory issues. Switch back to Paxil for anxiety control and also add Lyrica start at 25 mg PO daily and increase up to max 75 mg PO daily in 2 weeks for control of  s/o neuropathy

## 2019-11-14 ENCOUNTER — ONCOLOGY VISIT (OUTPATIENT)
Dept: ONCOLOGY | Facility: CLINIC | Age: 64
End: 2019-11-14
Payer: COMMERCIAL

## 2019-11-14 ENCOUNTER — ONCOLOGY VISIT (OUTPATIENT)
Dept: ONCOLOGY | Facility: CLINIC | Age: 64
End: 2019-11-14
Attending: INTERNAL MEDICINE
Payer: COMMERCIAL

## 2019-11-14 VITALS
RESPIRATION RATE: 16 BRPM | HEART RATE: 57 BPM | DIASTOLIC BLOOD PRESSURE: 62 MMHG | TEMPERATURE: 98.2 F | OXYGEN SATURATION: 96 % | SYSTOLIC BLOOD PRESSURE: 101 MMHG | HEIGHT: 63 IN | WEIGHT: 173.4 LBS | BODY MASS INDEX: 30.72 KG/M2

## 2019-11-14 DIAGNOSIS — F41.1 GENERALIZED ANXIETY DISORDER: ICD-10-CM

## 2019-11-14 DIAGNOSIS — C20 RECTAL ADENOCARCINOMA (H): ICD-10-CM

## 2019-11-14 DIAGNOSIS — G62.9 PERIPHERAL POLYNEUROPATHY: ICD-10-CM

## 2019-11-14 DIAGNOSIS — C20 MALIGNANT NEOPLASM OF RECTUM (H): ICD-10-CM

## 2019-11-14 DIAGNOSIS — C20 MALIGNANT NEOPLASM OF RECTUM (H): Primary | ICD-10-CM

## 2019-11-14 LAB
ALBUMIN SERPL-MCNC: 4 G/DL (ref 3.4–5)
ALP SERPL-CCNC: 80 U/L (ref 40–150)
ALT SERPL W P-5'-P-CCNC: 30 U/L (ref 0–50)
ANION GAP SERPL CALCULATED.3IONS-SCNC: 3 MMOL/L (ref 3–14)
AST SERPL W P-5'-P-CCNC: 23 U/L (ref 0–45)
BASOPHILS # BLD AUTO: 0 10E9/L (ref 0–0.2)
BASOPHILS NFR BLD AUTO: 0.7 %
BILIRUB SERPL-MCNC: 0.4 MG/DL (ref 0.2–1.3)
BUN SERPL-MCNC: 20 MG/DL (ref 7–30)
CALCIUM SERPL-MCNC: 9.6 MG/DL (ref 8.5–10.1)
CEA SERPL-MCNC: 2.8 UG/L (ref 0–2.5)
CHLORIDE SERPL-SCNC: 109 MMOL/L (ref 94–109)
CO2 SERPL-SCNC: 29 MMOL/L (ref 20–32)
CREAT SERPL-MCNC: 0.58 MG/DL (ref 0.52–1.04)
DIFFERENTIAL METHOD BLD: NORMAL
EOSINOPHIL # BLD AUTO: 0.1 10E9/L (ref 0–0.7)
EOSINOPHIL NFR BLD AUTO: 1.7 %
ERYTHROCYTE [DISTWIDTH] IN BLOOD BY AUTOMATED COUNT: 12.1 % (ref 10–15)
GFR SERPL CREATININE-BSD FRML MDRD: >90 ML/MIN/{1.73_M2}
GLUCOSE SERPL-MCNC: 141 MG/DL (ref 70–99)
HCT VFR BLD AUTO: 37.4 % (ref 35–47)
HGB BLD-MCNC: 12.5 G/DL (ref 11.7–15.7)
IMM GRANULOCYTES # BLD: 0 10E9/L (ref 0–0.4)
IMM GRANULOCYTES NFR BLD: 0.4 %
LYMPHOCYTES # BLD AUTO: 1.1 10E9/L (ref 0.8–5.3)
LYMPHOCYTES NFR BLD AUTO: 23.1 %
MCH RBC QN AUTO: 29.6 PG (ref 26.5–33)
MCHC RBC AUTO-ENTMCNC: 33.4 G/DL (ref 31.5–36.5)
MCV RBC AUTO: 88 FL (ref 78–100)
MONOCYTES # BLD AUTO: 0.4 10E9/L (ref 0–1.3)
MONOCYTES NFR BLD AUTO: 8.9 %
NEUTROPHILS # BLD AUTO: 3 10E9/L (ref 1.6–8.3)
NEUTROPHILS NFR BLD AUTO: 65.2 %
PLATELET # BLD AUTO: 202 10E9/L (ref 150–450)
POTASSIUM SERPL-SCNC: 4 MMOL/L (ref 3.4–5.3)
PROT SERPL-MCNC: 6.8 G/DL (ref 6.8–8.8)
RBC # BLD AUTO: 4.23 10E12/L (ref 3.8–5.2)
SODIUM SERPL-SCNC: 141 MMOL/L (ref 133–144)
WBC # BLD AUTO: 4.6 10E9/L (ref 4–11)

## 2019-11-14 PROCEDURE — 99207 ZZC NO CHARGE NURSE ONLY: CPT

## 2019-11-14 PROCEDURE — 82378 CARCINOEMBRYONIC ANTIGEN: CPT | Performed by: INTERNAL MEDICINE

## 2019-11-14 PROCEDURE — 99214 OFFICE O/P EST MOD 30 MIN: CPT | Performed by: INTERNAL MEDICINE

## 2019-11-14 PROCEDURE — 85025 COMPLETE CBC W/AUTO DIFF WBC: CPT | Performed by: INTERNAL MEDICINE

## 2019-11-14 PROCEDURE — 80053 COMPREHEN METABOLIC PANEL: CPT | Performed by: INTERNAL MEDICINE

## 2019-11-14 RX ORDER — HEPARIN SODIUM (PORCINE) LOCK FLUSH IV SOLN 100 UNIT/ML 100 UNIT/ML
5 SOLUTION INTRAVENOUS
Status: DISCONTINUED | OUTPATIENT
Start: 2019-11-14 | End: 2019-11-14 | Stop reason: HOSPADM

## 2019-11-14 RX ORDER — DULOXETIN HYDROCHLORIDE 30 MG/1
CAPSULE, DELAYED RELEASE ORAL
Qty: 42 CAPSULE | Refills: 3 | Status: SHIPPED | OUTPATIENT
Start: 2019-11-14 | End: 2020-01-10

## 2019-11-14 RX ADMIN — HEPARIN SODIUM (PORCINE) LOCK FLUSH IV SOLN 100 UNIT/ML 5 ML: 100 SOLUTION at 13:59

## 2019-11-14 ASSESSMENT — MIFFLIN-ST. JEOR: SCORE: 1315.54

## 2019-11-14 ASSESSMENT — PAIN SCALES - GENERAL: PAINLEVEL: NO PAIN (0)

## 2019-11-14 NOTE — PROGRESS NOTES
"Patient's name and  were verified.  See Doc Flowsheet - IV assess for details.  IVAD accessed with 20G  3/4\" mckay gripper plus needle  blood return positive: YES  Site without redness, tenderness or swelling: YES  flushed with 30cc NS and 5cc 100u/ml heparin  Needle: De-accessed    Comments: Labs drawn.  Patient tolerated procedure without incident.    Sejal Rosario  RN, BSN, OCN        "

## 2019-11-14 NOTE — NURSING NOTE
"Oncology Rooming Note    November 14, 2019 2:54 PM   Niharika Cason is a 64 year old female who presents for:    Chief Complaint   Patient presents with     Oncology Clinic Visit     3 mon f/u     Initial Vitals: /62 (BP Location: Right arm)   Pulse 57   Temp 98.2  F (36.8  C) (Oral)   Resp 16   Ht 1.6 m (5' 2.99\")   Wt 78.7 kg (173 lb 6.4 oz)   LMP 10/15/2008 (Approximate)   SpO2 96%   BMI 30.72 kg/m   Estimated body mass index is 30.72 kg/m  as calculated from the following:    Height as of this encounter: 1.6 m (5' 2.99\").    Weight as of this encounter: 78.7 kg (173 lb 6.4 oz). Body surface area is 1.87 meters squared.  No Pain (0) Comment: Data Unavailable   Patient's last menstrual period was 10/15/2008 (approximate).  Allergies reviewed: Yes  Medications reviewed: Yes    Medications: Medication refills not needed today.  Pharmacy name entered into [facility]: [facility] [place], [place] - [address] LPN              "

## 2019-11-14 NOTE — LETTER
11/14/2019         RE: Niharika Cason  [id number] [facility] [id number]        Dear Colleague,    Thank you for referring your patient, Niharika Cason, to the [facility]. Please see a copy of my visit note below.    Oncology Consultation:  Date on this visit: 11/14/2019      Primary Care Physician: Henry Frazier   Colorectal surgeon: Dr. Le  Radiation oncologist: Dr. Morris    Niharika Cason  is referred by Dr.Syed MARIE Lawton for an oncology consultation. She requires evaluation and transfer of care for rectal adenocarcinoma.      Diagnosis: rectal adenocarcinoma  STAGE: cT3cN1 M0  ECOG PS: 1     ONCOLOGY HISTORY:     09/17/18  Screening colonoscopy revealed a partially obstructing mass in the rectosigmoid colon which was biopsied and came back as invasive moderately differentiated adenocarcinoma - intact mismatch repair proteins. Staging CT scans showed a 3 cm enhancing lesion in the right lobe of liver he subsequently had an MRI done which characterized a liver lesion as well as VOCAL lobular hyperplasia with no suspected metastatic disease. MRI of pelvis showed an irregular ulcerated left rectal wall mass at 9 cm from the anal verge extending to the muscularis propria into the perirectal fat. Also noted were suspicious perirectal and presacral lymph nodes -clinical stage T3cN1 M0.   She met with medical and surgical oncology and the recommendation was to proceed with total neoadjuvant therapy.      10/25/18  Started XELOX chemotherapy      10/25/18- 02/07/19  Started XELOX chemotherapy s/p 6 cycles with oxaliplatin dose reduced with cycle 4 to 100 mg/m2 given neurological toxicity with higher dose     03/06/18 - 04/12/19  neoadjuvant chemoRT with Xeloda.     06/12/19 Scans showed complete clinical response with sigmoidoscopy showing a 2 cm scar which was biopsied and negative for evidence of malignancy. After discussion with colorectal surgery, decision was made to proceed  with watch and wait approach with closer surveillance.       History Of Present Illness:  Ms. Gonsales is a 64 year old female  with past medical history significant for hypertension, diabetes mellitus type 1, who presents for follow-up and transfer of care of of locally advanced rectal adenocarcinoma. She is transferring care to me due to Dr. Lawton's leaving our practice.  She had a pelvic MRI ordered by Dr. Le, on August 10, 2019, which has demonstrated:  a positive response to treatment, with a  corresponding tumor regression grade of mr-TRG-2 demonstrating small  amount of residual tumor with predominant fibrotic low signal  intensity.  Improved clear fat plane with the adjacent myomatous  uterus.  2. No suspicious pelvic lymph nodes.  She also had a colonoscopy by Dr. Le on October 17, 2019 which demonstrated normal anal canal as well as a 2 x 2 centimeter flat scar at the left posterior aspect of the mid rectum.  Repeat colonoscopy was recommended in 2 years.  CEA was mildly elevated at 3.4 in August 2019.  CEA trend has been as below:  Results for JOSELIN GONSALES (MRN [id number]) as of 11/12/2019 11:45   Ref. 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 twice daily.  However, she does not feel it is helpful. She is most bothered by numbness of her feet and hands. She is currently also on Paxil for s/o depression. She is here with her .  In addition, a complete 12 point  review of systems is negative.    Past Medical/Surgical History:  Past Medical History:   Diagnosis Date     Essential hypertension, benign      Generalized anxiety  disorder      Hyperlipidemia      Rectal cancer (H) 09/17/2018     S/P radiation therapy     5,040 cGy to Pelvis completed on 04/12/2019. - [facility] with Dr. Morris     Type I (juvenile type) diabetes mellitus without mention of complication, not stated as uncontrolled     diagnosed at age 35     Ulcerative colitis, unspecified     in the 70s.       Past Surgical History:   Procedure Laterality Date     COLONOSCOPY N/A 9/17/2018    Procedure: COMBINED COLONOSCOPY, SINGLE OR MULTIPLE BIOPSY/POLYPECTOMY BY BIOPSY;  colonoscopy with polypectomies by biopsy forceps and hot snare and submucosal injection with tattoo;  Surgeon: Richard Moore MD;  Location: [facility]     HC COLONOSCOPY THRU STOMA, DIAGNOSTIC  1998    negative     HC CURETTAGE, POSTPARTUM  '91, '93    following miscarriages     HC REMOVAL OF TONSILS,<13 Y/O      Tonsils <14y.o.     INSERT PORT VASCULAR ACCESS Right 10/19/2018    Procedure: Chest Port Placement - right ;  Surgeon: Lawson Hitchcock PA-C;  Location: [facility]       Allergies:  Allergies as of 11/14/2019 - Reviewed 09/06/2019   Allergen Reaction Noted     No known drug allergies  08/27/2001     Current Medications:  Current Outpatient Medications   Medication Sig Dispense Refill     atenolol (TENORMIN) 25 MG tablet Take 1 tablet (25 mg) by mouth daily 93 tablet 3     blood glucose (NO BRAND SPECIFIED) test strip Use to test blood sugar 4 times daily or as directed. 300 strip 3     blood glucose calibration (NO BRAND SPECIFIED) solution Use to calibrate blood glucose monitor as needed as directed. 1 each 1     Continuous Blood Gluc  (DEXCOM G6 ) BRITTANY 1 Device continuous 1 Device 0     Continuous Blood Gluc Sensor (DEXCOM G6 SENSOR) MISC 1 each every 10 days 3 each 11     Continuous Blood Gluc Transmit (DEXCOM G6 TRANSMITTER) MISC 1 Device continuous 1 each 3     gabapentin (NEURONTIN) 300 MG capsule Take 3 capsules (900 mg) by mouth 2 times daily 180 capsule  0     glucagon (GLUCAGON EMERGENCY) 1 MG kit Inject 1 mg into the muscle once for 1 dose 1 mg 0     insulin glargine (LANTUS PEN) 100 UNIT/ML pen Inject 20 Units Subcutaneous At Bedtime 9 mL 3     insulin lispro (HUMALOG KWIKPEN) 100 UNIT/ML pen Take 1u:15g breakfast, 1u:15g lunch, 1u:15g dinner,  + 1u/50mg/dL>150mg/dL +2u prime before each dose-total maximum 25 u/day 24 mL 3     lidocaine-prilocaine (EMLA) cream Apply 1 g topically as needed for moderate pain 30 g 3     lisinopril (PRINIVIL/ZESTRIL) 40 MG tablet Take 1 tablet (40 mg) by mouth daily 93 tablet 3     loperamide (IMODIUM) 2 MG capsule Take 2 mg by mouth 4 times daily as needed for diarrhea       Multiple Vitamins-Minerals (MULTIVITAMIN PO) Take by mouth daily       PARoxetine (PAXIL) 30 MG tablet Take 1 tablet (30 mg) by mouth every morning 93 tablet 3     Pyridoxine HCl (VITAMIN B-6 PO) Take by mouth daily       simvastatin (ZOCOR) 10 MG tablet TAKE ONE TABLET BY MOUTH EVERY NIGHT AT BEDTIME 93 tablet 3      Family History:  Family History   Problem Relation Age of Onset     Heart Disease Maternal Grandfather         MI at 52 yrs     EYE* Paternal Grandfather         cataracts     Arthritis Mother      Gynecology Mother         hysterectomy for fibroids     Hypertension Mother      Lipids Mother      Gastrointestinal Disease Father         ulcers     Heart Disease Father         intermittent rapid heartbeat     Cancer Father 84        Mesothelioma     Social History:  Social History     Socioeconomic History     Marital status:      Spouse name: Fabrice     Number of children: 2     Years of education: 12     Highest education level: Not on file   Occupational History     Occupation:      Employer: [employer]     Comment: on her feet all day     Employer: [employer]   Tobacco Use     Smoking status: Former Smoker     Packs/day: 0.50     Years: 15.00     Pack years: 7.50     Last attempt to quit: 2018     Years since quittin.1      "Smokeless tobacco: Never Used   Substance and Sexual Activity     Alcohol use: No     Alcohol/week: 0.0 standard drinks     Frequency: Never     Drug use: No     Sexual activity: Yes     Partners: Male     Birth control/protection: Post-menopausal   Lifestyle   Relationships   Social History Narrative     Physical Exam:  /62 (BP Location: Right arm)   Pulse 57   Temp 98.2  F (36.8  C) (Oral)   Resp 16   Ht 1.6 m (5' 2.99\")   Wt 78.7 kg (173 lb 6.4 oz)   LMP 10/15/2008 (Approximate)   SpO2 96%   BMI 30.72 kg/m       LMP 10/15/2008 (Approximate)     GENERAL APPEARANCE:  alert and no distress     HENT: Mouth without ulcers or lesions     NECK: no adenopathy, no asymmetry or masses     LYMPHATICS: No cervical, supraclavicular, axillary  lymphadenopathy     RESP: lungs clear to auscultation - no rales, rhonchi or wheezes     CARDIOVASCULAR: regular rates and rhythm, normal S1 S2, no S3 or S4 and no murmur.     ABDOMEN:  soft, nontender, no HSM or masses and bowel sounds normal     MUSCULOSKELETAL: extremities normal- no gross deformities noted, no evidence of inflammation in joints, FROM in all extremities. No edema b/l LE.     SKIN: no suspicious lesions or rashes     PSYCHIATRIC: mentation appears normal and affect normal  Laboratory/Imaging Studies  Oncology Visit on 11/14/2019   Component Date Value Ref Range Status     CEA 11/14/2019 2.8* 0 - 2.5 ug/L Final    Assay Method:  Chemiluminescence using Siemens Centaur XP     Sodium 11/14/2019 141  133 - 144 mmol/L Final     Potassium 11/14/2019 4.0  3.4 - 5.3 mmol/L Final     Chloride 11/14/2019 109  94 - 109 mmol/L Final     Carbon Dioxide 11/14/2019 29  20 - 32 mmol/L Final     Anion Gap 11/14/2019 3  3 - 14 mmol/L Final     Glucose 11/14/2019 141* 70 - 99 mg/dL Final     Urea Nitrogen 11/14/2019 20  7 - 30 mg/dL Final     Creatinine 11/14/2019 0.58  0.52 - 1.04 mg/dL Final     GFR Estimate 11/14/2019 >90  >60 mL/min/[1.73_m2] Final    Comment: Non "  GFR Calc  Starting 12/18/2018, serum creatinine based estimated GFR (eGFR) will be   calculated using the Chronic Kidney Disease Epidemiology Collaboration   (CKD-EPI) equation.       GFR Estimate If Black 11/14/2019 >90  >60 mL/min/[1.73_m2] Final    Comment:  GFR Calc  Starting 12/18/2018, serum creatinine based estimated GFR (eGFR) will be   calculated using the Chronic Kidney Disease Epidemiology Collaboration   (CKD-EPI) equation.       Calcium 11/14/2019 9.6  8.5 - 10.1 mg/dL Final     Bilirubin Total 11/14/2019 0.4  0.2 - 1.3 mg/dL Final     Albumin 11/14/2019 4.0  3.4 - 5.0 g/dL Final     Protein Total 11/14/2019 6.8  6.8 - 8.8 g/dL Final     Alkaline Phosphatase 11/14/2019 80  40 - 150 U/L Final     ALT 11/14/2019 30  0 - 50 U/L Final     AST 11/14/2019 23  0 - 45 U/L Final     WBC 11/14/2019 4.6  4.0 - 11.0 10e9/L Final     RBC Count 11/14/2019 4.23  3.8 - 5.2 10e12/L Final     Hemoglobin 11/14/2019 12.5  11.7 - 15.7 g/dL Final     Hematocrit 11/14/2019 37.4  35.0 - 47.0 % Final     MCV 11/14/2019 88  78 - 100 fl Final     MCH 11/14/2019 29.6  26.5 - 33.0 pg Final     MCHC 11/14/2019 33.4  31.5 - 36.5 g/dL Final     RDW 11/14/2019 12.1  10.0 - 15.0 % Final     Platelet Count 11/14/2019 202  150 - 450 10e9/L Final     Diff Method 11/14/2019 Automated Method   Final     % Neutrophils 11/14/2019 65.2  % Final     % Lymphocytes 11/14/2019 23.1  % Final     % Monocytes 11/14/2019 8.9  % Final     % Eosinophils 11/14/2019 1.7  % Final     % Basophils 11/14/2019 0.7  % Final     % Immature Granulocytes 11/14/2019 0.4  % Final     Absolute Neutrophil 11/14/2019 3.0  1.6 - 8.3 10e9/L Final     Absolute Lymphocytes 11/14/2019 1.1  0.8 - 5.3 10e9/L Final     Absolute Monocytes 11/14/2019 0.4  0.0 - 1.3 10e9/L Final     Absolute Eosinophils 11/14/2019 0.1  0.0 - 0.7 10e9/L Final     Absolute Basophils 11/14/2019 0.0  0.0 - 0.2 10e9/L Final     Abs Immature Granulocytes 11/14/2019 0.0   0 - 0.4 10e9/L Final       ASSESSMENT/PLAN:  1. [id number] rectal adenocarcinoma-  S/p neoadjuvant XELOX and chemoXRT with Xeloda. On 06/12/19,   scans showed complete clinical response with sigmoidoscopy showed a 2 cm scar which was biopsied and negative for evidence of malignancy. After discussion with colorectal surgery, decision was made to proceed with watch and wait approach with closer surveillance. She follows with Dr. Le closely.   3T MR pelvis  and Flex Sig planned for 12/2019. CEA remains mildly elevated, stable.  She also had a colonoscopy by Dr. Le on October 17, 2019 which demonstrated normal anal canal as well as a 2 x 2 centimeter flat scar at the left posterior aspect of the mid rectum.  Repeat colonoscopy was recommended in 2 years. Next CT of the chest, abdomen and pelvis due in June 2020.  F/up with med onc in 3 months with cbcd, CMP, CEA     Watch and wait protocol:  Follow-up in clinic (with obligatory endoscopic evaluation [Flex Sig])    Every 2 months for 6 months after completing CRT, then:    Every 3 months for 3 years from CRT, then:    Every 6 months for 5 years from CRT, then:    Every year for 10 years from CRT.     Radiological studies    3T MR of pelvis at 6-8 weeks after completing CRT, then every 4 months for 2 years; then every 6 months for 2 years; then yearly for 2 years.    CT chest, abdomen and pelvis every year for 6-8 years, after completing CRT.    2. Peripheral neuropathy associated with prior Oxaliplatin use- Gabapentin not helping. Discussed Cymbalta. Taper off Gabapentin over the next couple of weeks and start Cymbalta. When starting Cymbalta can stop Paxil and start Cymbalta 30 mg PO daily and increase to 60 mg PO daily as tolerated. Rationale and potential side effects d/w patient.    It was my pleasure to meet Zhane and her .  At the end of our visit patient verbalized understanding and concurred with the plan.               Again, thank you for  allowing me to participate in the care of your patient.        Sincerely,        Pauly Angel MD, MD

## 2019-11-18 ENCOUNTER — OFFICE VISIT (OUTPATIENT)
Dept: FAMILY MEDICINE | Facility: CLINIC | Age: 64
End: 2019-11-18
Payer: COMMERCIAL

## 2019-11-18 VITALS
RESPIRATION RATE: 18 BRPM | OXYGEN SATURATION: 96 % | SYSTOLIC BLOOD PRESSURE: 110 MMHG | HEIGHT: 63 IN | DIASTOLIC BLOOD PRESSURE: 66 MMHG | WEIGHT: 173 LBS | TEMPERATURE: 98.1 F | HEART RATE: 80 BPM | BODY MASS INDEX: 30.65 KG/M2

## 2019-11-18 DIAGNOSIS — F41.1 GENERALIZED ANXIETY DISORDER: ICD-10-CM

## 2019-11-18 DIAGNOSIS — E10.8 TYPE 1 DIABETES MELLITUS WITH COMPLICATION (H): Primary | ICD-10-CM

## 2019-11-18 DIAGNOSIS — E10.65 TYPE 1 DIABETES MELLITUS WITH HYPERGLYCEMIA (H): ICD-10-CM

## 2019-11-18 DIAGNOSIS — I10 ESSENTIAL HYPERTENSION WITH GOAL BLOOD PRESSURE LESS THAN 130/80: ICD-10-CM

## 2019-11-18 LAB — HBA1C MFR BLD: 6.8 % (ref 0–5.6)

## 2019-11-18 PROCEDURE — 99214 OFFICE O/P EST MOD 30 MIN: CPT | Performed by: FAMILY MEDICINE

## 2019-11-18 PROCEDURE — 83036 HEMOGLOBIN GLYCOSYLATED A1C: CPT | Performed by: FAMILY MEDICINE

## 2019-11-18 PROCEDURE — 36415 COLL VENOUS BLD VENIPUNCTURE: CPT | Performed by: FAMILY MEDICINE

## 2019-11-18 RX ORDER — INSULIN LISPRO 100 [IU]/ML
INJECTION, SOLUTION INTRAVENOUS; SUBCUTANEOUS
Qty: 24 ML | Refills: 11 | Status: SHIPPED | OUTPATIENT
Start: 2019-11-18 | End: 2020-07-06

## 2019-11-18 ASSESSMENT — PAIN SCALES - GENERAL: PAINLEVEL: NO PAIN (0)

## 2019-11-18 ASSESSMENT — MIFFLIN-ST. JEOR: SCORE: 1313.85

## 2019-11-18 NOTE — RESULT ENCOUNTER NOTE
Niharika,  Your results look great.  If you are having lows, talk with diabetic ed about further insulin adjustments.  Return in about 6 months (around 5/18/2020) for diabetes recheck.   Please let me know if you have any questions.    Sincerely,  Dr. Gmoez

## 2019-11-18 NOTE — PATIENT INSTRUCTIONS
Take 1/2 tablet of atenolol and monitor blood pressure.  If you are below 120/80 after 2 weeks, then stop medication and monitor blood pressure.  If at any point, blood pressure is at or above 130/80, then continue on previous dose.
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
## 2019-12-05 NOTE — PROGRESS NOTES
"Colon and Rectal Surgery Follow-up Clinic Note      RE: Niharika Cason  : 1955  MARILEE: 2019    DIAGNOSIS:     10/2018: mT3cN1 proximal rectal adenocarcinoma (intact IHC) found on 1st screening colonoscopy. 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    10/2018-2019: XELOX x6.    3/-19: CXRT (5040 cGy).    2019: cCR (enrolled in watch and wait protocol).    INTERVAL HISTORY: Denies increased pain, fevers, or chills. Tolerating diet well. Having 1-2 BM's per day with mild fecal urgency and seepage. Denies incontinence per se or BPR.    Physical Examination:  Ht 5' 3\"   Wt 174 lb 9.6 oz   LMP 10/15/2008 (Approximate)   BMI 30.93 kg/m    Abdomen soft, NT. No inguinal adenopathy palpated.  Perianal skin intact. ALEC: no masses palpated.  Flexible sigmoidoscopy: after obtaining informed consent and performing a \"time out\", an adult flexible sigmoidoscope was introduced through the anus and passed up to the proximal sigmoid colon. The quality of the prep was fair. Findings: 2x2-cm white scar with central telangiectasia located at the left posterior aspect of the mid rectum (7-cm from dentate line), covering approx 30% of the lumen circumference. Distal edge of the tumor is at the mid rectal valve. Tattoo was identified. No additional abnormalities were seen. Total scope time: 12 minutes. The patient tolerated the procedure well.    ASSESSMENT  cCR. No evidence of recurrent neoplasia.     CEA: 10.5->5.0->3.7->3.7->4.7->3.3->3.4.->2.8.    CT c/a/p (19):  IMPRESSION:  1. Mild thickening of the wall of the anus/distal rectum with  questionable loss of the fat plane between the rectum and the adjacent  uterus. Recurrent malignancy in this region is difficult to exclude.  This would be better evaluated clinically with sigmoidoscopy and/or  digital exam.  2. No other evidence for metastasis or recurrent malignancy is seen.  3. Stable multiple tiny nodules in the lungs likely " represent  granulomatous disease. These have not changed since 9/17/2018. The  largest measures up to 0.5 cm.  4. Calcified fibroids are again noted.  5. Hypoattenuating lesion in segment 7 of the liver was previously  diagnosed as a focal nodular hyperplasia on MRI dated 9/24/2018.    3T MR pelvis (12/7/19):  IMPRESSION:   1. There has been a positive response to treatment, with a  corresponding tumor regression grade of 2.   2. No suspicious pelvic lymph nodes.  3. Additional incidental findings as described above.    Pathology (6/2019):  FINAL DIAGNOSIS:   Large intestine, rectum, biopsy of scar   - Lamina propria fibrosis with mild crypt distortion   - No evidence of malignancy or neoplastic polyp     Colonoscopy 10/17/19 without evidence of recurrence    PLAN  1. High fiber diet. Increase Metamucil to 1 tbsp BID.  2. CEA at every imaging and/or clinic visit.  3. Flex Sig in 3/2020.  4. 3T MR pelvis in 4/2020.  5. CT c/a/p in 6/2020.  6. Colonoscopy in 10/2021.     Follow up per Watch and Wait Protocol:   Completed CRT: 4/12/19    Flexible sigmoidoscopy     Every 2 months for 6 months: Completed    Every 3 months for 3 years: Until 4/2022 Due 3/2020    Every 6 months for 5 years: Until 4/2024    Every year for 10 years: Until 4/2029      3T MRI of pelvis    6-8 weeks after CRT:    Every 4 months for 2 years: Until 4/2021 Due 4/2020    Every 6 months for 2 years: Until 4/2023    Yearly for 2 years: Until 4/2025      CT CAP    Every year for 6-8 years: Until 4/2027 Due 6/2020      Colonoscopy     Due 10/2021      CEA at every clinic or endoscopic visit    Time spent: 30 minutes.  >50% spent in discussing, counseling and coordinating care.    Alli Le M.D., M.Sc.     Division of Colon and Rectal Surgery  [facility]    Referring Provider:  Manjeet Moore MD  [address] [place] [address]      Primary Care Provider:  Leona Farris  Cherri    CC:  Dayron Lawton MD.  Ynes Jimenez MD.  Yohana Morris MD.

## 2019-12-07 ENCOUNTER — ANCILLARY PROCEDURE (OUTPATIENT)
Dept: MRI IMAGING | Facility: CLINIC | Age: 64
End: 2019-12-07
Attending: COLON & RECTAL SURGERY
Payer: COMMERCIAL

## 2019-12-07 DIAGNOSIS — Z85.048 HISTORY OF RECTAL CANCER: ICD-10-CM

## 2019-12-07 RX ORDER — GADOBUTROL 604.72 MG/ML
7.5 INJECTION INTRAVENOUS ONCE
Status: COMPLETED | OUTPATIENT
Start: 2019-12-07 | End: 2019-12-07

## 2019-12-07 RX ADMIN — GADOBUTROL 7.5 ML: 604.72 INJECTION INTRAVENOUS at 07:45

## 2019-12-09 ENCOUNTER — OFFICE VISIT (OUTPATIENT)
Dept: SURGERY | Facility: CLINIC | Age: 64
End: 2019-12-09
Payer: COMMERCIAL

## 2019-12-09 VITALS — BODY MASS INDEX: 30.94 KG/M2 | WEIGHT: 174.6 LBS | HEIGHT: 63 IN

## 2019-12-09 DIAGNOSIS — Z85.048 HISTORY OF RECTAL CANCER: Primary | ICD-10-CM

## 2019-12-09 ASSESSMENT — PAIN SCALES - GENERAL: PAINLEVEL: NO PAIN (0)

## 2019-12-09 ASSESSMENT — MIFFLIN-ST. JEOR: SCORE: 1321.11

## 2019-12-09 NOTE — PATIENT INSTRUCTIONS
It is a pleasure being involved in your health care.      Please call Ynes RN or Nadia RN at [phone] with any questions or concerns regarding your clinic visit today.     Alternatively, you may send a Social Moov message. Social Moov is available 24 hours a day and is a secure way to access your records and communicate with your care team.  Please consider signing up if you haven't already done so. If you would like to inquire about this or have questions about Social Moov access, you may call [phone] or go online at [email or web address].  Social Moov messages are answered within 24 hours, Monday-Friday.    Phone numbers:  Radiology appointments:  [phone]    Call to schedule   3T MR pelvis in 4/2020   CT c/a/p in 6/2020    Endoscopy will call to schedule  Colonoscopy in 10/2021.     Lab today      Clinic appointments:  [phone] #1  Mon-Fri  7:30 am-5:00 pm    Medical records:  [phone] #2    Clinic fax number:  [phone]    If your treatment plan includes surgery, the  will contact you to schedule your surgery.  If you have not received a call within 3 business days, you may contact her at [phone].      PLAN  1. CEA today.  2. Flex Sig in 3/2020.5. Colonoscopy in 10/2021.

## 2019-12-09 NOTE — LETTER
"2019       RE: Niharika Cason  [address] [facility] [address]     Dear Colleague,    Thank you for referring your patient, Niharika Cason, to the [facility] COLON AND RECTAL SURGERY at [facility]. Please see a copy of my visit note below.    Colon and Rectal Surgery Follow-up Clinic Note    RE: Niharika Cason  : 1955  MARILEE: 2019    DIAGNOSIS:     10/2018: mT3cN1 proximal rectal adenocarcinoma (intact IHC) found on 1st screening colonoscopy. 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    10/2018-2019: XELOX x6.    3/-19: CXRT (5040 cGy).    2019: cCR (enrolled in watch and wait protocol).    INTERVAL HISTORY: Denies increased pain, fevers, or chills. Tolerating diet well. Having 1-2 BM's per day with mild fecal urgency and seepage. Denies incontinence per se or BPR.    Physical Examination:  Ht 5' 3\"   Wt 174 lb 9.6 oz   LMP 10/15/2008 (Approximate)   BMI 30.93 kg/m     Abdomen soft, NT. No inguinal adenopathy palpated.  Perianal skin intact. ALEC: no masses palpated.  Flexible sigmoidoscopy: after obtaining informed consent and performing a \"time out\", an adult flexible sigmoidoscope was introduced through the anus and passed up to the proximal sigmoid colon. The quality of the prep was fair. Findings: 2x2-cm white scar with central telangiectasia located at the left posterior aspect of the mid rectum (7-cm from dentate line), covering approx 30% of the lumen circumference. Distal edge of the tumor is at the mid rectal valve. Tattoo was identified. No additional abnormalities were seen. Total scope time: 12 minutes. The patient tolerated the procedure well.    ASSESSMENT  cCR. No evidence of recurrent neoplasia.     CEA: 10.5->5.0->3.7->3.7->4.7->3.3->3.4.->2.8.    CT c/a/p (19):  IMPRESSION:  1. Mild thickening of the wall of the anus/distal rectum with  questionable loss of the fat plane between the rectum and the " adjacent  uterus. Recurrent malignancy in this region is difficult to exclude.  This would be better evaluated clinically with sigmoidoscopy and/or  digital exam.  2. No other evidence for metastasis or recurrent malignancy is seen.  3. Stable multiple tiny nodules in the lungs likely represent  granulomatous disease. These have not changed since 9/17/2018. The  largest measures up to 0.5 cm.  4. Calcified fibroids are again noted.  5. Hypoattenuating lesion in segment 7 of the liver was previously  diagnosed as a focal nodular hyperplasia on MRI dated 9/24/2018.    3T MR pelvis (12/7/19):  IMPRESSION:   1. There has been a positive response to treatment, with a  corresponding tumor regression grade of 2.   2. No suspicious pelvic lymph nodes.  3. Additional incidental findings as described above.    Pathology (6/2019):  FINAL DIAGNOSIS:   Large intestine, rectum, biopsy of scar   - Lamina propria fibrosis with mild crypt distortion   - No evidence of malignancy or neoplastic polyp     Colonoscopy 10/17/19 without evidence of recurrence    PLAN  1. High fiber diet. Increase Metamucil to 1 tbsp BID.  2. CEA at every imaging and/or clinic visit.  3. Flex Sig in 3/2020.  4. 3T MR pelvis in 4/2020.  5. CT c/a/p in 6/2020.  6. Colonoscopy in 10/2021.     Follow up per Watch and Wait Protocol:   Completed CRT: 4/12/19    Flexible sigmoidoscopy     Every 2 months for 6 months: Completed    Every 3 months for 3 years: Until 4/2022 Due 3/2020    Every 6 months for 5 years: Until 4/2024    Every year for 10 years: Until 4/2029      3T MRI of pelvis    6-8 weeks after CRT:    Every 4 months for 2 years: Until 4/2021 Due 4/2020    Every 6 months for 2 years: Until 4/2023    Yearly for 2 years: Until 4/2025      CT CAP    Every year for 6-8 years: Until 4/2027 Due 6/2020      Colonoscopy     Due 10/2021      CEA at every clinic or endoscopic visit    Time spent: 30 minutes.  >50% spent in discussing, counseling and coordinating  care.    Alli Le M.D., M.Sc.     Division of Colon and Rectal Surgery  [facility]    Referring Provider:  Manjeet Moore MD  [address] [place] [address]      Primary Care Provider:  Leona Farris    CC:  Dayron Lawton MD.  Ynes Jimenez MD.  Yohana Morris MD.

## 2019-12-18 DIAGNOSIS — E10.9 TYPE 1 DIABETES, HBA1C GOAL < 8% (H): Primary | ICD-10-CM

## 2019-12-18 NOTE — TELEPHONE ENCOUNTER
BD Pen Needles 31 G X 8 MM        Last Office Visit: 11/18/19  Future Office visit:       Routing refill request to provider for review/approval because:  Drug not active on patient's medication list    Pharmacy is requesting needles, and I don't see any thing on the medication list.    Please advise.     Christy Pond CMA ([facility])

## 2020-01-07 ENCOUNTER — INFUSION THERAPY VISIT (OUTPATIENT)
Dept: INFUSION THERAPY | Facility: CLINIC | Age: 65
End: 2020-01-07
Attending: SURGERY
Payer: COMMERCIAL

## 2020-01-07 DIAGNOSIS — C20 RECTAL CANCER (H): Primary | ICD-10-CM

## 2020-01-07 DIAGNOSIS — C20 RECTAL ADENOCARCINOMA (H): ICD-10-CM

## 2020-01-07 PROCEDURE — 96523 IRRIG DRUG DELIVERY DEVICE: CPT

## 2020-01-07 PROCEDURE — 25000128 H RX IP 250 OP 636: Performed by: INTERNAL MEDICINE

## 2020-01-07 RX ORDER — HEPARIN SODIUM (PORCINE) LOCK FLUSH IV SOLN 100 UNIT/ML 100 UNIT/ML
500 SOLUTION INTRAVENOUS EVERY 8 HOURS
Status: DISCONTINUED | OUTPATIENT
Start: 2020-01-07 | End: 2020-01-07 | Stop reason: HOSPADM

## 2020-01-07 RX ADMIN — Medication 500 UNITS: at 15:47

## 2020-01-07 NOTE — PROGRESS NOTES
Infusion Nursing Note:  Niharika Cason presents today for Port flush.    Patient seen by provider today: No   present during visit today: Not Applicable.    Note: Declines B/P check at this visit.    Intravenous Access:  Implanted Port.    Treatment Conditions:  Not Applicable.      Post Infusion Assessment:  Blood return noted, flushes easily.  Site patent and intact, free from redness, edema or discomfort.  Access discontinued per protocol with Heparin.      Discharge Plan:   Copy of AVS reviewed with patient and/or family.  Patient will return 2/18 for next appointment.  Patient discharged in stable condition accompanied by: self.  Departure Mode: Ambulatory.    Cheryl Smalls RN

## 2020-01-10 RX ORDER — PAROXETINE 30 MG/1
30 TABLET, FILM COATED ORAL EVERY MORNING
Qty: 93 TABLET | Refills: 3 | Status: SHIPPED | OUTPATIENT
Start: 2020-01-10 | End: 2021-03-12

## 2020-01-10 RX ORDER — PREGABALIN 25 MG/1
CAPSULE ORAL
Qty: 90 CAPSULE | Refills: 3 | Status: SHIPPED | OUTPATIENT
Start: 2020-01-10 | End: 2021-03-22

## 2020-02-18 ENCOUNTER — INFUSION THERAPY VISIT (OUTPATIENT)
Dept: INFUSION THERAPY | Facility: CLINIC | Age: 65
End: 2020-02-18
Attending: INTERNAL MEDICINE
Payer: COMMERCIAL

## 2020-02-18 VITALS — HEART RATE: 72 BPM | DIASTOLIC BLOOD PRESSURE: 59 MMHG | SYSTOLIC BLOOD PRESSURE: 108 MMHG

## 2020-02-18 DIAGNOSIS — C20 RECTAL CANCER (H): Primary | ICD-10-CM

## 2020-02-18 DIAGNOSIS — C20 RECTAL ADENOCARCINOMA (H): ICD-10-CM

## 2020-02-18 PROCEDURE — 96523 IRRIG DRUG DELIVERY DEVICE: CPT

## 2020-02-18 PROCEDURE — 25000128 H RX IP 250 OP 636: Performed by: INTERNAL MEDICINE

## 2020-02-18 RX ORDER — HEPARIN SODIUM (PORCINE) LOCK FLUSH IV SOLN 100 UNIT/ML 100 UNIT/ML
500 SOLUTION INTRAVENOUS EVERY 8 HOURS
Status: DISCONTINUED | OUTPATIENT
Start: 2020-02-18 | End: 2020-02-18 | Stop reason: HOSPADM

## 2020-02-18 RX ADMIN — Medication 500 UNITS: at 15:43

## 2020-02-18 NOTE — PROGRESS NOTES
Infusion Nursing Note:  Niharika Cason presents today for Port flush.    Patient seen by provider today: No   present during visit today: Not Applicable.    Note: N/A.    Intravenous Access:  Implanted Port.    Treatment Conditions:  Not Applicable.      Port Assessment:  Blood return noted, flushes easily.  Site patent and intact, free from redness, edema or discomfort.  No evidence of extravasations.  Access discontinued per protocol with Heparin.       Discharge Plan:   Copy of AVS reviewed with patient and/or family.  Patient will return 3/31 for next appointment.  Patient discharged in stable condition accompanied by: self.  Departure Mode: Ambulatory.    Cheryl Smalls RN

## 2020-02-24 DIAGNOSIS — E10.8 TYPE 1 DIABETES MELLITUS WITH COMPLICATION (H): ICD-10-CM

## 2020-02-26 RX ORDER — INSULIN GLARGINE 100 [IU]/ML
INJECTION, SOLUTION SUBCUTANEOUS
Qty: 9 ML | Refills: 1 | Status: SHIPPED | OUTPATIENT
Start: 2020-02-26 | End: 2020-05-12

## 2020-02-26 NOTE — TELEPHONE ENCOUNTER
"lantus  Last Written Prescription Date:  11/18/2019  Last Fill Quantity: 9 ml,  # refills: 3   Last office visit: 11/18/2019 with prescribing provider:  Jason   Future Office Visit:  None    Requested Prescriptions   Pending Prescriptions Disp Refills     LANTUS SOLOSTAR 100 UNIT/ML soln [Pharmacy Med Name: LANTUS SOLOSTAR 100UNIT/ML SOPN] 9 mL 3     Sig: INJECT 22 UNITS UNDER THE SKIN AT BEDTIME       Long Acting Insulin Protocol Passed - 2/24/2020  9:34 AM        Passed - Blood pressure less than 140/90 in past 6 months     BP Readings from Last 3 Encounters:   02/18/20 108/59   11/18/19 110/66   11/14/19 101/62                 Passed - LDL on file in past 12 months     Recent Labs   Lab Test 07/09/19  1038   LDL 66             Passed - Microalbumin on file in past 12 months     Recent Labs   Lab Test 07/09/19  1047   MICROL <5   UMALCR Unable to calculate due to low value             Passed - Serum creatinine on file in past 12 months     Recent Labs   Lab Test 11/14/19  1400  09/17/18  1251   CR 0.58   < >  --    CREAT  --   --  0.6    < > = values in this interval not displayed.             Passed - HgbA1C in past 3 or 6 months     If HgbA1C is 8 or greater, it needs to be on file within the past 3 months.  If less than 8, must be on file within the past 6 months.     Recent Labs   Lab Test 11/18/19  1006   A1C 6.8*             Passed - Medication is active on med list        Passed - Patient is age 18 or older        Passed - Recent (6 mo) or future (30 days) visit within the authorizing provider's specialty     Patient had office visit in the last 6 months or has a visit in the next 30 days with authorizing provider or within the authorizing provider's specialty.  See \"Patient Info\" tab in inbasket, or \"Choose Columns\" in Meds & Orders section of the refill encounter.            Prescription approved per [facility] [place]ill Protocol.  Sammie Augustine RN BSN      "

## 2020-02-27 ENCOUNTER — TELEPHONE (OUTPATIENT)
Dept: FAMILY MEDICINE | Facility: CLINIC | Age: 65
End: 2020-02-27

## 2020-02-27 NOTE — LETTER
[address] [address]  [phone]        February 27, 2020    Niharika Cason  [address] [place] [id number]          Dear Niharika,     This letter is to inform you that you are OVER DUE for a PE and Mammogram.  Also it's been about 4 months since your last diabetes recheck.  Please call our office today and get those things setup.  [phone].      Sincerely,        Henry Gomez MD

## 2020-03-02 ENCOUNTER — HOSPITAL ENCOUNTER (OUTPATIENT)
Dept: MAMMOGRAPHY | Facility: CLINIC | Age: 65
Discharge: HOME | End: 2020-03-02
Attending: FAMILY MEDICINE | Admitting: FAMILY MEDICINE
Payer: COMMERCIAL

## 2020-03-02 DIAGNOSIS — Z12.31 VISIT FOR SCREENING MAMMOGRAM: ICD-10-CM

## 2020-03-02 PROCEDURE — 77067 SCR MAMMO BI INCL CAD: CPT

## 2020-03-09 DIAGNOSIS — E10.8 TYPE 1 DIABETES MELLITUS WITH COMPLICATION (H): ICD-10-CM

## 2020-03-09 DIAGNOSIS — E10.65 TYPE 1 DIABETES MELLITUS WITH HYPERGLYCEMIA (H): ICD-10-CM

## 2020-03-09 RX ORDER — PROCHLORPERAZINE 25 MG/1
SUPPOSITORY RECTAL
Qty: 3 EACH | Refills: 11 | Status: SHIPPED | OUTPATIENT
Start: 2020-03-09 | End: 2021-02-26

## 2020-03-09 NOTE — TELEPHONE ENCOUNTER
Blood glucose sensor  Last Written Prescription Date:  05/16/2019  Last Fill Quantity: 3,  # refills: 11   Last office visit: 11/18/2019 with prescribing provider:  Jason   Future Office Visit:  None    Requested Prescriptions   Pending Prescriptions Disp Refills     Continuous Blood Gluc Sensor (DEXCOM G6 SENSOR) MISC [Pharmacy Med Name: DEXCOM G6 SENSOR  MISC] 3 each 11     Sig: CHANGE EVERY 10 DAYS       There is no refill protocol information for this order

## 2020-04-03 DIAGNOSIS — E10.8 TYPE 1 DIABETES MELLITUS WITH COMPLICATION (H): ICD-10-CM

## 2020-04-03 DIAGNOSIS — E10.65 TYPE 1 DIABETES MELLITUS WITH HYPERGLYCEMIA (H): ICD-10-CM

## 2020-04-03 NOTE — NURSING NOTE
"Chief Complaint   Patient presents with     Diabetes     Anxiety     Depression       Initial /66  Pulse 77  Temp 98.1  F (36.7  C) (Temporal)  Ht 5' 3\" (1.6 m)  Wt 176 lb 1.9 oz (79.9 kg)  LMP 10/15/2008  SpO2 97%  BMI 31.2 kg/m2 Estimated body mass index is 31.2 kg/(m^2) as calculated from the following:    Height as of this encounter: 5' 3\" (1.6 m).    Weight as of this encounter: 176 lb 1.9 oz (79.9 kg).  Medication Reconciliation: complete   Chasity Limon, SYLVAIN    " (36.4 °C) (Axillary)   Resp 18   Ht 5' 4\" (1.626 m)   Wt 178 lb 9.2 oz (81 kg)   SpO2 99%   BMI 30.65 kg/m²     General appearance: Intubated, sedated  HEENT: Pupils equal, round, and reactive to light. Conjunctivae/corneas clear. Neck: Supple, with full range of motion. No jugular venous distention. Trachea midline. Respiratory:  Normal respiratory effort. Clear to auscultation, bilaterally without Rales/Wheezes/Rhonchi. Cardiovascular: Regular rate and rhythm with normal S1/S2 without murmurs, rubs or gallops. Abdomen: Soft, non-tender, non-distended with normal bowel sounds. Musculoskeletal: No clubbing, cyanosis or edema bilaterally. Full range of motion without deformity. Skin: Skin color, texture, turgor normal.  No rashes or lesions. Neurologic:  intubated sedated  Psychiatric: sedated  Capillary Refill: Brisk,< 3 seconds   Peripheral Pulses: +2 palpable, equal bilaterally       Labs:   Recent Labs     04/01/20 0457 04/02/20 0429 04/03/20  0423   WBC 8.2 8.9 8.8   HGB 10.0* 8.2* 8.9*   HCT 32.3* 25.8* 27.9*   PLT see below 68* 71*     Recent Labs     04/01/20 0457 04/02/20 0429 04/03/20 0423    138 134*   K 4.9 4.0 3.7    101 98*   CO2 20* 25 25   BUN 34* 20 17   CREATININE 2.4* 1.6* 1.3*   CALCIUM 10.9* 10.7* 10.6   PHOS 4.7 3.2 2.1*     No results for input(s): AST, ALT, BILIDIR, BILITOT, ALKPHOS in the last 72 hours. No results for input(s): INR in the last 72 hours. No results for input(s): Brandy Zahira in the last 72 hours. Urinalysis:      Lab Results   Component Value Date    NITRU Negative 03/21/2020    WBCUA 21-50 03/21/2020    BACTERIA 1+ 03/21/2020    RBCUA  03/21/2020    BLOODU LARGE 03/21/2020    SPECGRAV 1.020 03/21/2020    GLUCOSEU Negative 03/21/2020       Radiology:  XR CHEST PORTABLE   Final Result      Worsening pleuroparenchymal consolidation in the right lower lung. Perihilar interstitial prominence suggesting moderate pulmonary edema. patient was intubated and s/p bronchoscopy  - ECHO was down which showed moderate mitral stenosis, but not critical stenosis  - card consulted, pulm consulted, neprhoconsulted  - continue HD per nephrology  - will likely do weaning trial again today, yesterday she failed    Acute on chronic metabolic encephalopathy likely due to pnuemonia  - finsihed 6 day course of merropenem  - was on vanc stopped after MRSA probe neg  - still intubated and sedated    ESRD on HD  - nephro consulted  - challenge dry weight    DMII  - sliding scale    CAD s/p stent 2016  History of aortic stenosis status post TAVR 2016  - statin    Severe protein calorie malnutrition she has a PEG tube in place and tube feeds in place  - dietitian consult    Stage 2 decub  - wound care     Thrombocytopenia  - monitor for now  - hold heparin    DVT Prophylaxis: SCDs  Diet: DIET TUBE FEED CONTINUOUS/CYCLIC NPO; Renal Formula (Nepro);  Gastrostomy; Continuous; 20; 40; 24  Code Status: Full Code    PT/OT Eval Status: none    Dispo - ICU    Alisson Jerez DO

## 2020-04-06 RX ORDER — PROCHLORPERAZINE 25 MG/1
SUPPOSITORY RECTAL
Qty: 3 EACH | Refills: 11 | OUTPATIENT
Start: 2020-04-06

## 2020-04-06 NOTE — TELEPHONE ENCOUNTER
Requested Prescriptions   Pending Prescriptions Disp Refills     Continuous Blood Gluc Sensor (DEXCOM G6 SENSOR) MISC [Pharmacy Med Name: DEXCOM G6 SENSOR  MISC] 3 each 11     Sig: CHANGE EVERY 10 DAYS       There is no refill protocol information for this order        Last Written Prescription Date:  3/9/2020  Last Fill Quantity: 3,  # refills: 11   Last office visit: 11/18/2019 with prescribing provider:     Future Office Visit:      Patient has refills to 3/2021.  Closing this encounter.  Zaria Layton, SACHIN, RN

## 2020-04-21 ENCOUNTER — ALLIED HEALTH/NURSE VISIT (OUTPATIENT)
Dept: FAMILY MEDICINE | Facility: CLINIC | Age: 65
End: 2020-04-21
Payer: COMMERCIAL

## 2020-04-21 VITALS — TEMPERATURE: 98.8 F

## 2020-04-21 DIAGNOSIS — C20 MALIGNANT NEOPLASM OF RECTUM (H): Primary | ICD-10-CM

## 2020-04-21 PROCEDURE — 99207 ZZC NO CHARGE NURSE ONLY: CPT

## 2020-04-21 RX ORDER — HEPARIN SODIUM (PORCINE) LOCK FLUSH IV SOLN 100 UNIT/ML 100 UNIT/ML
500 SOLUTION INTRAVENOUS ONCE
Status: COMPLETED | OUTPATIENT
Start: 2020-04-21 | End: 2020-04-21

## 2020-04-21 RX ADMIN — HEPARIN SODIUM (PORCINE) LOCK FLUSH IV SOLN 100 UNIT/ML 500 UNITS: 100 SOLUTION at 15:00

## 2020-04-21 NOTE — NURSING NOTE
Patient here today for port access with flush per therapy plan by Dr. VELASCO.  This was done in the specialty clinic instead of infusion today per the COVID-19 scheduling changes.      Writing RN followed the [facility] protocol for accessing the port, drawing labs, and de-accessing the port.      The port was cleansed in a sterile fashion, allowed to dry, access with positive blood return.  Flushed with a sterile saline 10 mL flush and Heparin locked with 5 mL of 100 units/mL.      The port site was not bleeding but a spot bandage was placed to keep it clean and covered.     The patient was educated about the s/s that would warrant seeking care.    Twin Ch RN on 4/21/2020 at 2:54 PM

## 2020-04-29 DIAGNOSIS — E10.65 TYPE 1 DIABETES MELLITUS WITH HYPERGLYCEMIA (H): ICD-10-CM

## 2020-04-29 DIAGNOSIS — E10.8 TYPE 1 DIABETES MELLITUS WITH COMPLICATION (H): ICD-10-CM

## 2020-04-29 NOTE — TELEPHONE ENCOUNTER
Requested Prescriptions   Pending Prescriptions Disp Refills     Continuous Blood Gluc Transmit (DEXCOM G6 TRANSMITTER) MISC [Pharmacy Med Name: DEXCOM G6 TRANSMITTER  MISC] 1 each 3     Sig: CHANGE EVERY 3 MONTHS       There is no refill protocol information for this order        Last Written Prescription Date:  03/09/2020  Last Fill Quantity: 3,  # refills: 11   Last office visit: 4/21/2020 with prescribing provider:  11/18/2019  Future Office Visit:

## 2020-04-30 RX ORDER — PROCHLORPERAZINE 25 MG/1
SUPPOSITORY RECTAL
Qty: 1 EACH | Refills: 3 | Status: SHIPPED | OUTPATIENT
Start: 2020-04-30 | End: 2021-03-22

## 2020-05-11 DIAGNOSIS — E10.8 TYPE 1 DIABETES MELLITUS WITH COMPLICATION (H): ICD-10-CM

## 2020-05-12 RX ORDER — INSULIN GLARGINE 100 [IU]/ML
INJECTION, SOLUTION SUBCUTANEOUS
Qty: 9 ML | Refills: 0 | Status: SHIPPED | OUTPATIENT
Start: 2020-05-12 | End: 2020-05-14

## 2020-05-12 NOTE — TELEPHONE ENCOUNTER
Medication refilled x1.  Needs appointment for further refills.  Will have staff notify patient.    Henry Gomez MD

## 2020-05-12 NOTE — TELEPHONE ENCOUNTER
Walter  Last Written Prescription Date:  2/26/2020  Last Fill Quantity: 9 mL,   # refills: 1  Last Office Visit: 11/18/19  Future Office visit:       Routing refill request to provider for review/approval because:  Diabetic

## 2020-05-13 NOTE — TELEPHONE ENCOUNTER
Patient is scheduled 5/14/20 with Dr. Lubin.     Christy Pond CMA ([facility]) 5/13/2020

## 2020-05-13 NOTE — TELEPHONE ENCOUNTER
Called and LM for patient to call back. Please relay message below and help set up telephone/ video medication recheck.     Raiza Fan MA

## 2020-05-13 NOTE — TELEPHONE ENCOUNTER
Tried to make appointment for patient she started arguing with me that its just because you want to charge her insurance and will just message him through my chart, so no appointment made.

## 2020-05-14 ENCOUNTER — VIRTUAL VISIT (OUTPATIENT)
Dept: FAMILY MEDICINE | Facility: CLINIC | Age: 65
End: 2020-05-14
Payer: COMMERCIAL

## 2020-05-14 DIAGNOSIS — E78.5 HYPERLIPIDEMIA LDL GOAL <100: ICD-10-CM

## 2020-05-14 DIAGNOSIS — E10.21 TYPE 1 DIABETES MELLITUS WITH NEPHROPATHY (H): Primary | ICD-10-CM

## 2020-05-14 DIAGNOSIS — E10.65 TYPE 1 DIABETES MELLITUS WITH HYPERGLYCEMIA (H): ICD-10-CM

## 2020-05-14 DIAGNOSIS — E10.8 TYPE 1 DIABETES MELLITUS WITH COMPLICATION (H): ICD-10-CM

## 2020-05-14 DIAGNOSIS — I10 ESSENTIAL HYPERTENSION WITH GOAL BLOOD PRESSURE LESS THAN 130/80: ICD-10-CM

## 2020-05-14 PROCEDURE — 99214 OFFICE O/P EST MOD 30 MIN: CPT | Mod: TEL | Performed by: OBSTETRICS & GYNECOLOGY

## 2020-05-14 RX ORDER — SIMVASTATIN 10 MG
TABLET ORAL
Qty: 93 TABLET | Refills: 3 | Status: SHIPPED | OUTPATIENT
Start: 2020-05-14 | End: 2021-03-29 | Stop reason: ALTCHOICE

## 2020-05-14 RX ORDER — LISINOPRIL 40 MG/1
40 TABLET ORAL DAILY
Qty: 93 TABLET | Refills: 3 | Status: SHIPPED | OUTPATIENT
Start: 2020-05-14 | End: 2021-03-22

## 2020-05-14 NOTE — PROGRESS NOTES
"Niharika Cason is a 64 year old female who is being evaluated via a billable telephone visit.      The patient has been notified of following:     \"This telephone visit will be conducted via a call between you and your physician/provider. We have found that certain health care needs can be provided without the need for a physical exam.  This service lets us provide the care you need with a short phone conversation.  If a prescription is necessary we can send it directly to your pharmacy.  If lab work is needed we can place an order for that and you can then stop by our lab to have the test done at a later time.    Telephone visits are billed at different rates depending on your insurance coverage. During this emergency period, for some insurers they may be billed the same as an in-person visit.  Please reach out to your insurance provider with any questions.    If during the course of the call the physician/provider feels a telephone visit is not appropriate, you will not be charged for this service.\"    Patient has given verbal consent for Telephone visit?  Yes    What phone number would you like to be contacted at?     How would you like to obtain your AVS? MyChart    Subjective: Niharika requested a phone consultation today because of concerns regarding medication refills. Due to the current covid-19 coronavirus epidemic we are managing much of our patients' concerns remotely when possible.    She has type 1 DM- and hyperlipidemia and hypertension- needs her meds refilled. She takes sugars via a continuous glucose monitor and it gave her estimated A1c of 7 for the last 12 weeks.  She is tolerating meds well- actually she told me she takes 25 of lantus daily and wants the refill to reflect that-    No other concerns.    The past medical history and medications and allergies have been reviewed today by me.  .  Past Medical History:   Diagnosis Date     Essential hypertension, benign      Generalized anxiety disorder  "     Hyperlipidemia      Rectal cancer (H) 09/17/2018     S/P radiation therapy     5,040 cGy to Pelvis completed on 04/12/2019. - [facility] with Dr. Morris     Type I (juvenile type) diabetes mellitus without mention of complication, not stated as uncontrolled     diagnosed at age 35     Ulcerative colitis, unspecified     in the 70s.       Allergies   Allergen Reactions     No Known Drug Allergies      Current Outpatient Medications   Medication Sig Dispense Refill     Continuous Blood Gluc  (DEXCOM G6 ) BRITTANY 1 Device continuous 1 Device 0     Continuous Blood Gluc Sensor (DEXCOM G6 SENSOR) MISC CHANGE EVERY 10 DAYS 3 each 11     Continuous Blood Gluc Transmit (DEXCOM G6 TRANSMITTER) MISC CHANGE EVERY 3 MONTHS 1 each 3     insulin lispro (HUMALOG PEN) 100 UNIT/ML (1 unit dial) pen Take 1u:15g breakfast, 1u:15g lunch, 1u:15g dinner,  + 1u/50mg/dL>150mg/dL +2u prime before each dose-total maximum 25 u/day 24 mL 11     insulin pen needle (31G X 8 MM) 31G X 8 MM miscellaneous Use 4 pen needles daily or as directed. 100 each 11     LANTUS SOLOSTAR 100 UNIT/ML soln INJECT 22 UNITS UNDER THE SKIN AT BEDTIME 9 mL 0     lisinopril (PRINIVIL/ZESTRIL) 40 MG tablet Take 1 tablet (40 mg) by mouth daily 93 tablet 3     Multiple Vitamins-Minerals (MULTIVITAMIN PO) Take by mouth daily       PARoxetine (PAXIL) 30 MG tablet Take 1 tablet (30 mg) by mouth every morning 93 tablet 3     Pyridoxine HCl (VITAMIN B-6 PO) Take by mouth daily       simvastatin (ZOCOR) 10 MG tablet TAKE ONE TABLET BY MOUTH EVERY NIGHT AT BEDTIME 93 tablet 3     atenolol (TENORMIN) 25 MG tablet Take 1 tablet (25 mg) by mouth daily (Patient not taking: Reported on 5/14/2020) 93 tablet 3     glucagon (GLUCAGON EMERGENCY) 1 MG kit Inject 1 mg into the muscle once for 1 dose 1 mg 0     loperamide (IMODIUM) 2 MG capsule Take 2 mg by mouth 4 times daily as needed for diarrhea       pregabalin (LYRICA) 25 MG capsule Lyrica start at 25 mg PO  daily and increase to 50 mg PO daily in one week  and to 75 mg PO daily in 2 weeks, as needed for control of symptoms of neuropathy. Maximum dose 75 mg PO daily. (Patient not taking: Reported on 5/14/2020) 90 capsule 3       Assessment/Plan:  1.Type 1 DM- I refilled lantus- see lab orders- she will have these checked when her oncologist sees her for rectal cancer followup on June 1st-future orders placed for A1C and comp panel   Wont check lipids because she wont be fastin    2. Hyperlipidemia- stable on simvastatin- will check CK and LFTs    3. Hypertension- stable on lisinopril- refilled     4. Rectal cancer- has followup with oncologist in 2 weeks.    5. I advised followup exam with Dr. Gomez in September, sooner if concerns.        Phone call duration was   12  minutes.     NOLBERTO Lubin MD

## 2020-05-29 NOTE — PROGRESS NOTES
"Colon and Rectal Surgery Follow-up Clinic Note    RE: Niharika Cason  : 1955  MARILEE: 2020    DIAGNOSIS:     10/2018: mT3cN1 proximal rectal adenocarcinoma (intact IHC) found on 1st screening colonoscopy. 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    10/2018-2019: XELOX x6.    3/-19: CXRT (5040 cGy).    2019: cCR (enrolled in watch and wait protocol).    INTERVAL HISTORY: Denies increased pain, fevers, or chills. Tolerating diet well. Having 1-2 BM's per day with mild fecal urgency and seepage. Denies incontinence per se or BPR.    Physical Examination:  BP (!) 145/66 (BP Location: Left arm, Patient Position: Sitting, Cuff Size: Adult Regular)   Pulse 81   Ht 5' 3\"   Wt 184 lb   LMP 10/15/2008 (Approximate)   SpO2 98%   BMI 32.59 kg/m    Abdomen soft, NT. No inguinal adenopathy palpated.  Perianal skin intact. ALEC: no masses palpated.  Flexible sigmoidoscopy: after obtaining informed consent and performing a \"time out\", an adult flexible sigmoidoscope was introduced through the anus and passed up to the proximal sigmoid colon. The quality of the prep was fair. Findings: linear 2x2-cm white scar with central telangiectasia located at the left posterior aspect of the mid rectum (7-cm from dentate line), covering approx 30% of the lumen circumference. Distal edge of the tumor is at the mid rectal valve. Tattoo was identified. No additional abnormalities were seen. Total scope time: 12 minutes. The patient tolerated the procedure well.    ASSESSMENT  cCR. No evidence of recurrent neoplasia.      Ref. Range 3/6/2019 10:14 2019 11:20 2019 14:37 8/10/2019 10:58 2019 14:00   CEA Latest Ref Range: 0 - 2.5 ug/L 3.7 (H) 4.7 (H) 3.3 (H) 3.4 (H) 2.8 (H)       CT c/a/p (2020):  IMPRESSION:  1. No obvious mass lesion with some loss of fat planes between the  rectum and uterus, this appears unchanged, please for to MRI also  performed today for further detailed " evaluation.  2. Scattered 5 mm under pulmonary nodules, unchanged. No new findings  in the chest.  3. Calcified uterine fibroids.  4. Unchanged hepatic hypodensity in segment 7 measuring 22 mm,  previously diagnosed as focal nodular hyperplasia on MRI scan from  September 2018.  5. Unchanged simple appearing right renal cysts.  6. Aortoiliac and coronary artery calcifications from atherosclerosis.    3T MR pelvis (5/30/2020):  IMPRESSION:   1. Continued positive response to treatment with no significant  progression. Possible minimal residual tumor is much less conspicuous  than prior exam. These findings correspond to a tumor regression grade  of mrTRG2.  2. No suspicious pelvic lymphadenopathy.    Pathology (6/2019):  FINAL DIAGNOSIS:   Large intestine, rectum, biopsy of scar   - Lamina propria fibrosis with mild crypt distortion   - No evidence of malignancy or neoplastic polyp     Colonoscopy 10/17/19 without evidence of recurrence.    PLAN  1. High fiber diet. Metamucil 1 tbsp BID.  2. CEA at every imaging and/or clinic visit.  3. Flex Sig in 9/2020.  4. 3T MR pelvis in 9/2020.  5. CT c/a/p in 6/2021.  6. Colonoscopy in 10/2021.     Follow up per Watch and Wait Protocol:   Completed CRT: 4/12/19    Flexible sigmoidoscopy     Every 2 months for 6 months: Completed    Every 3 months for 3 years: Until 4/2022 Due 9/2020    Every 6 months for 5 years: Until 4/2024    Every year for 10 years: Until 4/2029      3T MRI of pelvis    6-8 weeks after CRT:    Every 4 months for 2 years: Until 4/2021 Due 9/2020    Every 6 months for 2 years: Until 4/2023    Yearly for 2 years: Until 4/2025      CT CAP    Every year for 6-8 years: Until 4/2027 Due 6/2021      Colonoscopy     Due 10/2021      CEA at every clinic or endoscopic visit    Time spent: 30 minutes.  >50% spent in discussing, counseling and coordinating care.    Alli Le M.D., M.Sc.     Division of Colon and Rectal Surgery  [facility]    Referring Provider:  Manjeet Moore MD  [address]  [place] [address]      Primary Care Provider:  Leona Farris    CC:  Dayron Lawton MD.  Ynes Jimenez MD.  Yohana Morris MD.

## 2020-05-30 ENCOUNTER — ANCILLARY PROCEDURE (OUTPATIENT)
Dept: CT IMAGING | Facility: CLINIC | Age: 65
End: 2020-05-30
Attending: COLON & RECTAL SURGERY
Payer: COMMERCIAL

## 2020-05-30 ENCOUNTER — ANCILLARY PROCEDURE (OUTPATIENT)
Dept: MRI IMAGING | Facility: CLINIC | Age: 65
End: 2020-05-30
Attending: COLON & RECTAL SURGERY
Payer: COMMERCIAL

## 2020-05-30 DIAGNOSIS — Z85.048 HISTORY OF RECTAL CANCER: ICD-10-CM

## 2020-05-30 LAB
CREAT BLD-MCNC: 0.7 MG/DL (ref 0.52–1.04)
GFR SERPL CREATININE-BSD FRML MDRD: 85 ML/MIN/{1.73_M2}

## 2020-05-30 RX ORDER — GADOBUTROL 604.72 MG/ML
10 INJECTION INTRAVENOUS ONCE
Status: COMPLETED | OUTPATIENT
Start: 2020-05-30 | End: 2020-05-30

## 2020-05-30 RX ORDER — IOPAMIDOL 755 MG/ML
107 INJECTION, SOLUTION INTRAVASCULAR ONCE
Status: COMPLETED | OUTPATIENT
Start: 2020-05-30 | End: 2020-05-30

## 2020-05-30 RX ADMIN — IOPAMIDOL 107 ML: 755 INJECTION, SOLUTION INTRAVASCULAR at 08:59

## 2020-05-30 RX ADMIN — GADOBUTROL 8 ML: 604.72 INJECTION INTRAVENOUS at 08:13

## 2020-05-30 NOTE — DISCHARGE INSTRUCTIONS

## 2020-06-01 ENCOUNTER — OFFICE VISIT (OUTPATIENT)
Dept: SURGERY | Facility: CLINIC | Age: 65
End: 2020-06-01
Payer: COMMERCIAL

## 2020-06-01 VITALS
HEIGHT: 63 IN | BODY MASS INDEX: 32.6 KG/M2 | HEART RATE: 81 BPM | OXYGEN SATURATION: 98 % | WEIGHT: 184 LBS | SYSTOLIC BLOOD PRESSURE: 145 MMHG | DIASTOLIC BLOOD PRESSURE: 66 MMHG

## 2020-06-01 DIAGNOSIS — C20 RECTAL ADENOCARCINOMA (H): ICD-10-CM

## 2020-06-01 DIAGNOSIS — E10.21 TYPE 1 DIABETES MELLITUS WITH NEPHROPATHY (H): ICD-10-CM

## 2020-06-01 DIAGNOSIS — C20 MALIGNANT NEOPLASM OF RECTUM (H): ICD-10-CM

## 2020-06-01 DIAGNOSIS — Z85.048 HISTORY OF RECTAL CANCER: Primary | ICD-10-CM

## 2020-06-01 DIAGNOSIS — Z85.048 HISTORY OF RECTAL CANCER: ICD-10-CM

## 2020-06-01 LAB
ALBUMIN SERPL-MCNC: 4.1 G/DL (ref 3.4–5)
ALP SERPL-CCNC: 81 U/L (ref 40–150)
ALT SERPL W P-5'-P-CCNC: 35 U/L (ref 0–50)
ANION GAP SERPL CALCULATED.3IONS-SCNC: 4 MMOL/L (ref 3–14)
AST SERPL W P-5'-P-CCNC: 25 U/L (ref 0–45)
BASOPHILS # BLD AUTO: 0 10E9/L (ref 0–0.2)
BASOPHILS NFR BLD AUTO: 0.9 %
BILIRUB SERPL-MCNC: 0.3 MG/DL (ref 0.2–1.3)
BUN SERPL-MCNC: 18 MG/DL (ref 7–30)
CALCIUM SERPL-MCNC: 9.2 MG/DL (ref 8.5–10.1)
CEA SERPL-MCNC: 2.9 UG/L (ref 0–2.5)
CHLORIDE SERPL-SCNC: 106 MMOL/L (ref 94–109)
CK SERPL-CCNC: 98 U/L (ref 30–225)
CO2 SERPL-SCNC: 28 MMOL/L (ref 20–32)
CREAT SERPL-MCNC: 0.63 MG/DL (ref 0.52–1.04)
DIFFERENTIAL METHOD BLD: NORMAL
EOSINOPHIL # BLD AUTO: 0.1 10E9/L (ref 0–0.7)
EOSINOPHIL NFR BLD AUTO: 1.4 %
ERYTHROCYTE [DISTWIDTH] IN BLOOD BY AUTOMATED COUNT: 12.1 % (ref 10–15)
GFR SERPL CREATININE-BSD FRML MDRD: >90 ML/MIN/{1.73_M2}
GLUCOSE SERPL-MCNC: 124 MG/DL (ref 70–99)
HBA1C MFR BLD: 7.3 % (ref 0–5.6)
HCT VFR BLD AUTO: 39.1 % (ref 35–47)
HGB BLD-MCNC: 12.7 G/DL (ref 11.7–15.7)
IMM GRANULOCYTES # BLD: 0 10E9/L (ref 0–0.4)
IMM GRANULOCYTES NFR BLD: 0.2 %
LYMPHOCYTES # BLD AUTO: 1.1 10E9/L (ref 0.8–5.3)
LYMPHOCYTES NFR BLD AUTO: 24.3 %
MCH RBC QN AUTO: 29.1 PG (ref 26.5–33)
MCHC RBC AUTO-ENTMCNC: 32.5 G/DL (ref 31.5–36.5)
MCV RBC AUTO: 90 FL (ref 78–100)
MONOCYTES # BLD AUTO: 0.4 10E9/L (ref 0–1.3)
MONOCYTES NFR BLD AUTO: 9.1 %
NEUTROPHILS # BLD AUTO: 2.8 10E9/L (ref 1.6–8.3)
NEUTROPHILS NFR BLD AUTO: 64.1 %
NRBC # BLD AUTO: 0 10*3/UL
NRBC BLD AUTO-RTO: 0 /100
PLATELET # BLD AUTO: 206 10E9/L (ref 150–450)
POTASSIUM SERPL-SCNC: 4.4 MMOL/L (ref 3.4–5.3)
PROT SERPL-MCNC: 6.7 G/DL (ref 6.8–8.8)
RBC # BLD AUTO: 4.37 10E12/L (ref 3.8–5.2)
SODIUM SERPL-SCNC: 138 MMOL/L (ref 133–144)
WBC # BLD AUTO: 4.4 10E9/L (ref 4–11)

## 2020-06-01 PROCEDURE — 25000128 H RX IP 250 OP 636: Performed by: COLON & RECTAL SURGERY

## 2020-06-01 PROCEDURE — 36591 DRAW BLOOD OFF VENOUS DEVICE: CPT

## 2020-06-01 RX ORDER — HEPARIN SODIUM (PORCINE) LOCK FLUSH IV SOLN 100 UNIT/ML 100 UNIT/ML
5 SOLUTION INTRAVENOUS DAILY PRN
Status: DISCONTINUED | OUTPATIENT
Start: 2020-06-01 | End: 2020-06-09 | Stop reason: HOSPADM

## 2020-06-01 RX ADMIN — HEPARIN SODIUM (PORCINE) LOCK FLUSH IV SOLN 100 UNIT/ML 5 ML: 100 SOLUTION at 11:16

## 2020-06-01 ASSESSMENT — MIFFLIN-ST. JEOR: SCORE: 1363.75

## 2020-06-01 ASSESSMENT — PAIN SCALES - GENERAL: PAINLEVEL: NO PAIN (0)

## 2020-06-01 NOTE — PATIENT INSTRUCTIONS
Follow up:    PLAN  1. High fiber diet. Metamucil to 1 tbsp BID.  2. CEA at every imaging and/or clinic visit.  3. Flex Sig in 9/2020.  4. 3T MR pelvis in 9/2020.  5. CT c/a/p in 6/2021.  6. Colonoscopy in 10/2021.     Please call with any questions or concerns regarding your clinic visit today.    It is a pleasure being involved in your health care.    Contacts post-consultation depending on your need:    Radiology Appointments [phone]    Schedule Clinic Appointments [phone] # 1   M-F 7:30 - 5 pm    ZOFIA Olson [phone]    Clinic Fax Number [phone]    Surgery Scheduling [phone]    My Chart is available 24 hours a day and is a secure way to access your records and communicate with your care team.  I strongly recommend signing up if you haven't already done so, if you are comfortable with computers.  If you would like to inquire about this or are having problems with My Chart access, you may call [phone] or go online at [email or web address].  Please allow at least 24 hours for a response and extra time on weekends and Holidays.

## 2020-06-01 NOTE — LETTER
"2020       RE: Niharika Cason  [id number] [facility] [address]     Dear Colleague,    Thank you for referring your patient, Niharika Cason, to the [facility] COLON AND RECTAL SURGERY at [facility]. Please see a copy of my visit note below.    Colon and Rectal Surgery Follow-up Clinic Note    RE: Niharika Cason  : 1955  MARILEE: 2020    DIAGNOSIS:     10/2018: mT3cN1 proximal rectal adenocarcinoma (intact IHC) found on 1st screening colonoscopy. 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    10/2018-2019: XELOX x6.    3/-19: CXRT (5040 cGy).    2019: cCR (enrolled in watch and wait protocol).    INTERVAL HISTORY: Denies increased pain, fevers, or chills. Tolerating diet well. Having 1-2 BM's per day with mild fecal urgency and seepage. Denies incontinence per se or BPR.    Physical Examination:  BP (!) 145/66 (BP Location: Left arm, Patient Position: Sitting, Cuff Size: Adult Regular)   Pulse 81   Ht 5' 3\"   Wt 184 lb   LMP 10/15/2008 (Approximate)   SpO2 98%   BMI 32.59 kg/m    Abdomen soft, NT. No inguinal adenopathy palpated.  Perianal skin intact. ALEC: no masses palpated.  Flexible sigmoidoscopy: after obtaining informed consent and performing a \"time out\", an adult flexible sigmoidoscope was introduced through the anus and passed up to the proximal sigmoid colon. The quality of the prep was fair. Findings: linear 2x2-cm white scar with central telangiectasia located at the left posterior aspect of the mid rectum (7-cm from dentate line), covering approx 30% of the lumen circumference. Distal edge of the tumor is at the mid rectal valve. Tattoo was identified. No additional abnormalities were seen. Total scope time: 12 minutes. The patient tolerated the procedure well.    ASSESSMENT  cCR. No evidence of recurrent neoplasia.      Ref. Range 3/6/2019 10:14 2019 11:20 2019 14:37 8/10/2019 10:58 2019 14:00   CEA Latest Ref " Range: 0 - 2.5 ug/L 3.7 (H) 4.7 (H) 3.3 (H) 3.4 (H) 2.8 (H)       CT c/a/p (5/30/2020):  IMPRESSION:  1. No obvious mass lesion with some loss of fat planes between the  rectum and uterus, this appears unchanged, please for to MRI also  performed today for further detailed evaluation.  2. Scattered 5 mm under pulmonary nodules, unchanged. No new findings  in the chest.  3. Calcified uterine fibroids.  4. Unchanged hepatic hypodensity in segment 7 measuring 22 mm,  previously diagnosed as focal nodular hyperplasia on MRI scan from  September 2018.  5. Unchanged simple appearing right renal cysts.  6. Aortoiliac and coronary artery calcifications from atherosclerosis.    3T MR pelvis (5/30/2020):  IMPRESSION:   1. Continued positive response to treatment with no significant  progression. Possible minimal residual tumor is much less conspicuous  than prior exam. These findings correspond to a tumor regression grade  of mrTRG2.  2. No suspicious pelvic lymphadenopathy.    Pathology (6/2019):  FINAL DIAGNOSIS:   Large intestine, rectum, biopsy of scar   - Lamina propria fibrosis with mild crypt distortion   - No evidence of malignancy or neoplastic polyp     Colonoscopy 10/17/19 without evidence of recurrence.    PLAN  1. High fiber diet. Metamucil 1 tbsp BID.  2. CEA at every imaging and/or clinic visit.  3. Flex Sig in 9/2020.  4. 3T MR pelvis in 9/2020.  5. CT c/a/p in 6/2021.  6. Colonoscopy in 10/2021.     Follow up per Watch and Wait Protocol:   Completed CRT: 4/12/19    Flexible sigmoidoscopy     Every 2 months for 6 months: Completed    Every 3 months for 3 years: Until 4/2022 Due 9/2020    Every 6 months for 5 years: Until 4/2024    Every year for 10 years: Until 4/2029      3T MRI of pelvis    6-8 weeks after CRT:    Every 4 months for 2 years: Until 4/2021 Due 9/2020    Every 6 months for 2 years: Until 4/2023    Yearly for 2 years: Until 4/2025      CT CAP    Every year for 6-8 years: Until 4/2027 Due  6/2021      Colonoscopy     Due 10/2021      CEA at every clinic or endoscopic visit    Time spent: 30 minutes.  >50% spent in discussing, counseling and coordinating care.    Alli Le M.D., M.Sc.     Division of Colon and Rectal Surgery  [facility]    Referring Provider:  Manjeet Moore MD  [address] [place] [address]      Primary Care Provider:  Leona Farris    CC:  Dayron Lawton MD.  Ynes Jimenez MD.  Yohana Morris MD.

## 2020-06-01 NOTE — NURSING NOTE
"Chief Complaint   Patient presents with     Flexible Sigmoidoscopy     Flexible sigmoidoscopy per watch and wait protocol       Vitals:    06/01/20 1120   BP: (!) 145/66   BP Location: Left arm   Patient Position: Sitting   Cuff Size: Adult Regular   Pulse: 81   SpO2: 98%   Weight: 184 lb   Height: 5' 3\"       Body mass index is 32.59 kg/m .      Santos Yañez LPN                        "

## 2020-06-08 ENCOUNTER — TELEPHONE (OUTPATIENT)
Dept: SURGERY | Facility: CLINIC | Age: 65
End: 2020-06-08

## 2020-06-08 NOTE — TELEPHONE ENCOUNTER
Lvm x 1 for pt to get scheduled with Dr. Le for flex sig in September and also an MRI 3T for September about a week prior to in patient appt

## 2020-06-15 DIAGNOSIS — E10.8 TYPE 1 DIABETES MELLITUS WITH COMPLICATION (H): ICD-10-CM

## 2020-06-23 ENCOUNTER — OFFICE VISIT (OUTPATIENT)
Dept: FAMILY MEDICINE | Facility: CLINIC | Age: 65
End: 2020-06-23
Payer: COMMERCIAL

## 2020-06-23 ENCOUNTER — MYC MEDICAL ADVICE (OUTPATIENT)
Dept: FAMILY MEDICINE | Facility: CLINIC | Age: 65
End: 2020-06-23

## 2020-06-23 VITALS
RESPIRATION RATE: 16 BRPM | OXYGEN SATURATION: 97 % | TEMPERATURE: 98.1 F | BODY MASS INDEX: 33.16 KG/M2 | SYSTOLIC BLOOD PRESSURE: 128 MMHG | HEART RATE: 90 BPM | DIASTOLIC BLOOD PRESSURE: 76 MMHG | WEIGHT: 187.2 LBS

## 2020-06-23 DIAGNOSIS — M54.42 ACUTE LEFT-SIDED LOW BACK PAIN WITH LEFT-SIDED SCIATICA: ICD-10-CM

## 2020-06-23 DIAGNOSIS — E10.65 TYPE 1 DIABETES MELLITUS WITH HYPERGLYCEMIA (H): ICD-10-CM

## 2020-06-23 DIAGNOSIS — I10 HYPERTENSION GOAL BP (BLOOD PRESSURE) < 130/80: ICD-10-CM

## 2020-06-23 DIAGNOSIS — S39.012A SACROILIAC STRAIN, INITIAL ENCOUNTER: Primary | ICD-10-CM

## 2020-06-23 PROCEDURE — 99213 OFFICE O/P EST LOW 20 MIN: CPT | Mod: 25 | Performed by: INTERNAL MEDICINE

## 2020-06-23 PROCEDURE — 20610 DRAIN/INJ JOINT/BURSA W/O US: CPT | Performed by: INTERNAL MEDICINE

## 2020-06-23 RX ORDER — TRIAMCINOLONE ACETONIDE 40 MG/ML
80 INJECTION, SUSPENSION INTRA-ARTICULAR; INTRAMUSCULAR ONCE
Status: COMPLETED | OUTPATIENT
Start: 2020-06-23 | End: 2020-06-23

## 2020-06-23 RX ADMIN — TRIAMCINOLONE ACETONIDE 80 MG: 40 INJECTION, SUSPENSION INTRA-ARTICULAR; INTRAMUSCULAR at 14:19

## 2020-06-23 ASSESSMENT — PAIN SCALES - GENERAL: PAINLEVEL: EXTREME PAIN (8)

## 2020-06-23 NOTE — PROGRESS NOTES
Subjective     Niharika Cason is a 64 year old female who presents to clinic today for the following health issues:    HPI   Hip Pain       Duration: x 1.5 weeks        Specific cause: none    Description:   Location of pain: gluteus left  Character of pain: stabbing  Pain radiation:radiates into the left leg  New numbness or weakness in legs, not attributed to pain:  no     Intensity: severe    History:   Pain interferes with job: YES  History of back problems: recurrent self limited episodes of low back pain in the past  Any previous MRI or X-rays: None  Sees a specialist for back pain:  No  Therapies tried without relief: NSAIDs    Alleviating factors:   Improved by: NSAIDs      Precipitating factors:  Worsened by: Bending                      Chief Complaint         The patient is a pleasant 64-year-old female who presents today with pain in her left hip.  She notes that she has had it for about a week and a half.  She notes no cause or injury.  She has had no falls or trauma.  She rates the pain as occasionally intense and severe.  It does radiate into the buttocks and down to the back of the knee.  It is reproducible with palpation of the sacroiliac notch.  No pain on the contralateral side is noted.  She does have a history of type 1 diabetes and her blood sugars have been fairly well controlled.  She denies any hyper or hypoglycemic episodes.  With respect to her back, bending forward and twisting make it worse.  Heel and toe walking are intact.  She is had no stumbling or tripping.                         PAST, FAMILY,SOCIAL HISTORY:     Medical  History:   has a past medical history of Essential hypertension, benign, Generalized anxiety disorder, Hyperlipidemia, Rectal cancer (H) (09/17/2018), S/P radiation therapy, Type I (juvenile type) diabetes mellitus without mention of complication, not stated as uncontrolled, and Ulcerative colitis, unspecified.     Surgical History:   has a past surgical history  that includes CURETTAGE, POSTPARTUM ('91, '93); REMOVAL OF TONSILS,<13 Y/O; COLONOSCOPY THRU STOMA, DIAGNOSTIC (1998); Colonoscopy (N/A, 9/17/2018); and Insert port vascular access (Right, 10/19/2018).     Social History:   reports that she quit smoking about 21 months ago. She has a 7.50 pack-year smoking history. She has never used smokeless tobacco. She reports that she does not drink alcohol or use drugs.     Family History:  family history includes Arthritis in her mother; Cancer (age of onset: 84) in her father; EYE* in her paternal grandfather; Gastrointestinal Disease in her father; Gynecology in her mother; Heart Disease in her father and maternal grandfather; Hypertension in her mother; Lipids in her mother.            MEDICATIONS  Current Outpatient Medications   Medication Sig Dispense Refill     insulin glargine (LANTUS SOLOSTAR) 100 UNIT/ML pen INJECT 25 UNITS UNDER THE SKIN AT BEDTIME 9 mL 11     insulin lispro (HUMALOG PEN) 100 UNIT/ML (1 unit dial) pen Take 1u:15g breakfast, 1u:15g lunch, 1u:15g dinner,  + 1u/50mg/dL>150mg/dL +2u prime before each dose-total maximum 25 u/day 24 mL 11     lisinopril (ZESTRIL) 40 MG tablet Take 1 tablet (40 mg) by mouth daily 93 tablet 3     Multiple Vitamins-Minerals (MULTIVITAMIN PO) Take by mouth daily       PARoxetine (PAXIL) 30 MG tablet Take 1 tablet (30 mg) by mouth every morning 93 tablet 3     Pyridoxine HCl (VITAMIN B-6 PO) Take by mouth daily       simvastatin (ZOCOR) 10 MG tablet TAKE ONE TABLET BY MOUTH EVERY NIGHT AT BEDTIME 93 tablet 3     atenolol (TENORMIN) 25 MG tablet Take 1 tablet (25 mg) by mouth daily (Patient not taking: Reported on 5/14/2020) 93 tablet 3     Continuous Blood Gluc  (DEXCOM G6 ) BRITTANY 1 Device continuous 1 Device 0     Continuous Blood Gluc Sensor (DEXCOM G6 SENSOR) MISC CHANGE EVERY 10 DAYS 3 each 11     Continuous Blood Gluc Transmit (DEXCOM G6 TRANSMITTER) MISC CHANGE EVERY 3 MONTHS 1 each 3     glucagon  (GLUCAGON EMERGENCY) 1 MG kit Inject 1 mg into the muscle once for 1 dose 1 mg 0     insulin pen needle (31G X 8 MM) 31G X 8 MM miscellaneous Use 4 pen needles daily or as directed. 100 each 11     loperamide (IMODIUM) 2 MG capsule Take 2 mg by mouth 4 times daily as needed for diarrhea       pregabalin (LYRICA) 25 MG capsule Lyrica start at 25 mg PO daily and increase to 50 mg PO daily in one week  and to 75 mg PO daily in 2 weeks, as needed for control of symptoms of neuropathy. Maximum dose 75 mg PO daily. (Patient not taking: Reported on 5/14/2020) 90 capsule 3         --------------------------------------------------------------------------------------------------------------------                              Review of Systems       LUNGS: Pt denies: cough, excess sputum, hemoptysis, or shortness of breath.   HEART: Pt denies: chest pain, arrhythmia, syncope, tachy or bradyarrhythmia.   GI: Pt denies: nausea, vomiting, diarrhea, constipation, melena, or hematochezia.   NEURO: Pt denies: seizures, strokes, diplopia, weakness, paraesthesias, or paralysis.   SKIN: Pt denies: itching, rashes, discoloration, or specific lesions of concern. Denies recent hair loss.   PSYCH: The patient denies significant depression, anxiety, mood imbalance. Specifically denies any suicidal ideation.      Examination       /76   Pulse 90   Temp 98.1  F (36.7  C) (Temporal)   Resp 16   Wt 84.9 kg (187 lb 3.2 oz)   LMP 10/15/2008 (Approximate)   SpO2 97%   BMI 33.16 kg/m         LUNGS: clear bilaterally, airflow is brisk, no intercostal retraction or stridor is noted. No coughing is noted during visit.   HEART:  regular without rubs, clicks, gallops, or murmurs. PMI is nondisplaced. Upstrokes are brisk. S1,S2 are heard.   NEURO: Pt is alert and appropriate. No neurologic lateralization is noted. Cranial nerves 2-12 are intact. Peripheral sensory and motor function are grossly normal.  Straight leg raising on the left is  negative.   MS: Minimal crepitance is noted in the hips. No deformity is present. Muscle strength is appropriate and equal bilaterally. No acute joint erythema or swelling is present.  Reproducible tenderness going down the back of the leg is noted with palpation of the sacroiliac notch on the left.  Heel and toe walking are intact.  No stumbling or leg weakness is noted.         Decision Making       1. Sacroiliac strain, initial encounter  Patient prefers arthrocentesis as oral steroids would increase her blood sugar and over-the-counter anti-inflammatories have already been ineffective    2. Acute left-sided low back pain with left-sided sciatica  As above    3. Type 1 diabetes mellitus with hyperglycemia (HCC)  Watch blood sugar closely following injection    4. Hypertension goal BP (blood pressure) < 130/80  Currently controlled.  Continue current medication                                 FOLLOW UP   I have asked the patient to make an appointment for followup with me as needed.  She will follow-up with her primary care provider for her routine healthcare maintenance and follow-up of her diabetes                I have carefully explained the diagnosis and treatment options to the patient.  The patient has displayed an understanding of the above, and all subsequent questions were answered.      DO SMITH Jimenez    Portions of this note were produced using [employer]  Although every attempt at real-time proof reading has been made, occasional grammar/syntax errors may have been missed.                                                  Procedure Note    Procedure:          Sacroiliac injection/arthrocentesis of major joint    Indication:        Acute sacroiliac strain not responsive to oral medication    Medication:      1 cc of 1% lidocaine without epinephrine and 2 cc of 40 mg/cc Kenalog suspension    Anesthesia:      As above    Details:      Informed consent, alcohol prep, spinal needle  accessed through usual approach, medication injected, pain went away.  Complications = none          Post Procedural Diagnosis:       As above       Follow-up:   As needed.          :         DO SMITH Ugarte    Portions of this note were produced using [employer]  Although every attempt at real-time proof reading has been made, occasional grammar/syntax errors may have been missed.

## 2020-06-26 ENCOUNTER — MYC MEDICAL ADVICE (OUTPATIENT)
Dept: FAMILY MEDICINE | Facility: CLINIC | Age: 65
End: 2020-06-26

## 2020-06-26 NOTE — TELEPHONE ENCOUNTER
Form is at MA's desk for provider to complete when he is back in office on Monday, 6/29/20. Alisson Mohan LPN

## 2020-07-06 ENCOUNTER — ALLIED HEALTH/NURSE VISIT (OUTPATIENT)
Dept: EDUCATION SERVICES | Facility: CLINIC | Age: 65
End: 2020-07-06
Payer: COMMERCIAL

## 2020-07-06 DIAGNOSIS — E10.65 TYPE 1 DIABETES MELLITUS WITH HYPERGLYCEMIA (H): Primary | ICD-10-CM

## 2020-07-06 DIAGNOSIS — E10.65 TYPE 1 DIABETES MELLITUS WITH HYPERGLYCEMIA (H): ICD-10-CM

## 2020-07-06 PROCEDURE — G0108 DIAB MANAGE TRN  PER INDIV: HCPCS | Mod: 95

## 2020-07-06 RX ORDER — INSULIN LISPRO 100 [IU]/ML
INJECTION, SOLUTION INTRAVENOUS; SUBCUTANEOUS
Qty: 45 ML | Refills: 3 | Status: SHIPPED | OUTPATIENT
Start: 2020-07-06 | End: 2021-03-22

## 2020-07-06 NOTE — Clinical Note
I am working with her on insulin adjustments. Just need a new referral since last one was over a year ago.   Thanks,     Gifty Miranda RDN, LD, [facility]

## 2020-07-06 NOTE — PROGRESS NOTES
"Diabetes Self-Management Education & Support  Telephone visit  Patient verbally consented to the telephone visit service today: yes      Presents for: CGM Review    SUBJECTIVE/OBJECTIVE:  Presents for: CGM Review  Diabetes education in the past 24mo: Yes  Focus of Visit: CGM, Taking Medication, Problem Solving  Diabetes type: Type 1  Disease course: Getting harder to manage  Diabetes management related comments/concerns: blood sugar higher lately  Cultural Influences/Ethnic Background:  American      Diabetes Symptoms & Complications:  Fatigue: No  Neuropathy: Yes  Polydipsia: No  Polyphagia: No  Polyuria: No  Visual change: No  Slow healing wounds: No  Symptom course: Stable  Complications assessed today?: Yes  Autonomic neuropathy: No  CVA: No  Heart disease: No  Nephropathy: No  Peripheral neuropathy: Yes  Foot ulcerations: No  Retinopathy: No  Sexual dysfunction: No    Patient Problem List and Family Medical History reviewed for relevant medical history, current medical status, and diabetes risk factors.    Vitals:  LMP 10/15/2008 (Approximate)   Estimated body mass index is 33.16 kg/m  as calculated from the following:    Height as of 6/1/20: 1.6 m (5' 3\").    Weight as of 6/23/20: 84.9 kg (187 lb 3.2 oz).   Last 3 BP:   BP Readings from Last 3 Encounters:   06/23/20 128/76   06/01/20 (!) 145/66   02/18/20 108/59       History   Smoking Status     Former Smoker     Packs/day: 0.50     Years: 15.00     Quit date: 9/17/2018   Smokeless Tobacco     Never Used       Labs:  Lab Results   Component Value Date    A1C 7.3 06/01/2020     Lab Results   Component Value Date     06/01/2020     Lab Results   Component Value Date    LDL 66 07/09/2019     HDL Cholesterol   Date Value Ref Range Status   07/09/2019 64 >49 mg/dL Final   ]  GFR Estimate   Date Value Ref Range Status   06/01/2020 >90 >60 mL/min/[1.73_m2] Final     Comment:     Non  GFR Calc  Starting 12/18/2018, serum creatinine based " estimated GFR (eGFR) will be   calculated using the Chronic Kidney Disease Epidemiology Collaboration   (CKD-EPI) equation.       GFR Estimate If Black   Date Value Ref Range Status   06/01/2020 >90 >60 mL/min/[1.73_m2] Final     Comment:      GFR Calc  Starting 12/18/2018, serum creatinine based estimated GFR (eGFR) will be   calculated using the Chronic Kidney Disease Epidemiology Collaboration   (CKD-EPI) equation.       Lab Results   Component Value Date    CR 0.63 06/01/2020     No results found for: MICROALBUMIN    Healthy Eating:  Healthy Eating Assessed Today: Yes  Cultural/Baptism diet restrictions?: No  Meal planning/habits: Carb counting  Meals include: Breakfast, Lunch, Dinner, Evening Snack  Breakfast: CIB  Lunch: salad or sandwich  Dinner: 4-5 carb choices  Snacks: after work might be chips and dip or m&m's or fruit; HS ice cream and will take 2-3 units  Beverages: Water, Tea, Milk, Diet soda  Has patient met with a dietitian in the past?: No    Being Active:  Being Active Assessed Today: Yes  How intense was your typical exercise? : Moderate (like brisk walking)  Barrier to exercise: Time    Monitoring:  Monitoring Assessed Today: Yes  Blood Glucose Meter: CGM  Blood glucose trend: Increasing        Taking Medications:  Diabetes Medication(s)     Diabetic Other       glucagon (GLUCAGON EMERGENCY) 1 MG kit    Inject 1 mg into the muscle once for 1 dose    Insulin       insulin glargine (LANTUS SOLOSTAR) 100 UNIT/ML pen    INJECT 25 UNITS UNDER THE SKIN AT BEDTIME     insulin lispro (HUMALOG PEN) 100 UNIT/ML (1 unit dial) pen    Take 1u:15g breakfast, 1u:15g lunch, 1u:15g dinner,  + 1u/50mg/dL>150mg/dL +2u prime before each dose-total maximum 25 u/day          Taking Medication Assessed Today: Yes  Current Treatments: Insulin Injections  Dose schedule: Pre-breakfast, Pre-lunch, Pre-dinner, At bedtime  Given by: Patient  Injection/Infusion sites: Abdomen  Problems taking diabetes  medications regularly?: No  Diabetes medication side effects?: No    Problem Solving:  Problem Solving Assessed Today: Yes  Is the patient at risk for hypoglycemia?: Yes  Hypoglycemia Frequency: Daily  Hypoglycemia Treatment: Glucose (tablets or gel), Juice, Candy  Patient carries a carbohydrate source: Yes  Medical ID: No  Does patient have DKA prevention plan?: No  Does patient have severe weather/disaster plan for diabetes management?: No    Hypoglycemia symptoms  Confusion: Yes  Mood changes: Yes  Nervousness/Anxiety: Yes  Sleepiness: No  Speech difficulty: Yes  Sweats: Yes  Feeling shaky: No    Hypoglycemia Complications  Blackouts: Yes  Hospitalization: No  Nocturnal hypoglycemia: Yes  Required assistance: Yes  Required glucagon injection: No  Seizures: No    Reducing Risks:  CAD Risks: Diabetes Mellitus, Family history, Hypertension, Obesity  Has dilated eye exam at least once a year?: Yes  Sees dentist every 6 months?: Yes  Feet checked by healthcare provider in the last year?: Yes    Healthy Coping:  Healthy Coping Assessed Today: Yes  Emotional response to diabetes: Ready to learn  Informal Support system:: Children, [place] based, Family, Friends, Spouse  Stage of change: ACTION (Actively working towards change)  Support resources: Offerings in Clinic Communities  Patient Activation Measure Survey Score:  BELLA Score (Last Two) 5/13/2011   BELLA Raw Score 46   Activation Score 75.3   BELLA Level 4       Diabetes knowledge and skills assessment:   Patient is knowledgeable in diabetes management concepts related to: Healthy Eating, Being Active, Monitoring, Taking Medication, Problem Solving, Reducing Risks and Healthy Coping    Patient needs further education on the following diabetes management concepts: Taking Medication, Problem Solving, Reducing Risks and Insulin Pump Concepts benefits of pump therapy/consideration of insulin pump therapy    Based on learning assessment above, most appropriate setting for  further diabetes education would be: Individual setting.      INTERVENTIONS:    REPORTS:          Education provided today on:  AADE Self-Care Behaviors:  Healthy Eating: getting accurate carb counts on ice cream  Taking Medication: proper site selection and rotation for injections, when to take medications and avoiding scar tissue, timing of evening snack injection  Problem Solving: high blood glucose - causes, signs/symptoms, treatment and prevention  Consideration of pump therapy    Pt verbalized understanding of concepts discussed and recommendations provided today.       Education Materials Provided:  No new materials provided today    ASSESSMENT:  Dexcom report shows past 14 days time in range of 42% with average 194. The 2 weeks preceding that she was 71% time in range.     After reviewing Dexcom reports as well as  patient's routine with food and insulin, main problem is glucose increase after supper and/or HS snack. Problem could be timing of her HS snack injection, needing adjusted dose or pain and less activity.       PLAN  See patient goal for plan.   carlos manuelt follow up in 10 days.       Gifty Miranda RDN, LD, CDCES    Time Spent: 49 minutes  Encounter Type: Individual    Any diabetes medication dose changes were made via the CDE Protocol and Collaborative Practice Agreement with the patient's primary care provider. A copy of this encounter was shared with the provider.

## 2020-07-10 ENCOUNTER — TELEPHONE (OUTPATIENT)
Dept: FAMILY MEDICINE | Facility: CLINIC | Age: 65
End: 2020-07-10

## 2020-07-10 DIAGNOSIS — E10.9 TYPE 1 DIABETES, HBA1C GOAL < 8% (H): ICD-10-CM

## 2020-07-10 DIAGNOSIS — E10.65 TYPE 1 DIABETES MELLITUS WITH HYPERGLYCEMIA (H): Primary | ICD-10-CM

## 2020-07-13 NOTE — TELEPHONE ENCOUNTER
Ordered signed.  Sending back to team to follow-up on notification and/or subsequent scheduling.    Henry Gomez MD

## 2020-07-16 ENCOUNTER — PATIENT OUTREACH (OUTPATIENT)
Dept: EDUCATION SERVICES | Facility: CLINIC | Age: 65
End: 2020-07-16

## 2020-07-16 DIAGNOSIS — E10.8 TYPE 1 DIABETES MELLITUS WITH COMPLICATION (H): ICD-10-CM

## 2020-07-16 DIAGNOSIS — E10.65 TYPE 1 DIABETES MELLITUS WITH HYPERGLYCEMIA (H): Primary | ICD-10-CM

## 2020-07-16 NOTE — PROGRESS NOTES
Diabetes Follow-up  [facility]   Subjective/Objective:    Niharika Cason sent in blood glucose log for review. Last date of communication was: 7/6.    Diabetes is being managed with   Diabetes Medications   Diabetes Medication(s)     Diabetic Other       glucagon (GLUCAGON EMERGENCY) 1 MG kit    Inject 1 mg into the muscle once for 1 dose    Insulin       insulin lispro (HUMALOG KWIKPEN) 100 UNIT/ML (1 unit dial) KWIKPEN    Take 2u:15g breakfast, 2u:15g lunch, 2u:15g dinner,  + 1u/50mg/dL>150mg/dL +2u prime before each dose-total maximum 45 u/day     insulin glargine (LANTUS SOLOSTAR) 100 UNIT/ML pen    INJECT 25 UNITS UNDER THE SKIN AT BEDTIME          Dexcom report                  Assessment:  Time in range is improved but lows have significantly increased.     Plan/Response:  Recommend decrease to insulin - Lantus from 25 to 20 units. Continue working on Humalog dose for night highs.  Follow up in 2 weeks.       Gifty Miranda RDN, SANTANA, CDCES      Any diabetes medication dose changes were made via the CDE Protocol and Collaborative Practice Agreement with the patient's primary care provider. A copy of this encounter was shared with the provider.

## 2020-07-20 ENCOUNTER — TELEPHONE (OUTPATIENT)
Dept: GASTROENTEROLOGY | Facility: OUTPATIENT CENTER | Age: 65
End: 2020-07-20

## 2020-07-20 NOTE — TELEPHONE ENCOUNTER
[facility] Call Center    Phone Message    May a detailed message be left on voicemail: yes     Reason for Call: Other: Pt  wanting to schedule a Flex Sig for his wife. Also stated she will need an MRI.These are needed for September. Please follow up when available. THank you      Action Taken: Message routed to:  [facility] Colon Rect    Travel Screening: Not Applicable

## 2020-07-21 NOTE — TELEPHONE ENCOUNTER
Lvm x 1 for pt to call and get scheduled for flex sig in September and MRI 3t in September for colon and rectal clinic

## 2020-07-23 NOTE — TELEPHONE ENCOUNTER
Lvm x 2 for pt to get scheduled with DR. Le in September and MRI 3 t also in September before the appt with provider.

## 2020-09-22 NOTE — PROGRESS NOTES
"Colon and Rectal Surgery Follow-up Clinic Note    RE: Niharika Cason  : 1955  MARILEE: 2020    DIAGNOSIS:     10/2018: mT3cN1 proximal rectal adenocarcinoma (intact IHC) found on 1st screening colonoscopy. 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    10/2018-2019: XELOX x6.    3/-19: CXRT (5040 cGy).    2019: cCR (enrolled in watch and wait protocol).    INTERVAL HISTORY: Denies increased pain, fevers, or chills. Tolerating diet well. Having 1-2 BM's per day with mild fecal urgency and seepage. Denies incontinence per se or BPR.    Physical Examination:  /64 (BP Location: Left arm, Patient Position: Sitting, Cuff Size: Adult Regular)   Pulse 91   Temp 97.9  F (36.6  C) (Oral)   Ht 5' 3\"   Wt 184 lb 6.4 oz   LMP 10/15/2008 (Approximate)   SpO2 98%   BMI 32.66 kg/m    Abdomen soft, NT. No inguinal adenopathy palpated.  Perianal skin intact.   ALEC: no masses palpated.  Flexible sigmoidoscopy: after obtaining informed consent and performing a \"time out\", an adult flexible sigmoidoscope was introduced through the anus and passed up to the proximal sigmoid colon. The quality of the prep was fair. Findings: linear 2x2-cm white scar with central telangiectasia located at the left posterior aspect of the mid rectum (7-cm from dentate line), covering approx 30% of the lumen circumference. Distal edge of the tumor is at the mid rectal valve. Tattoo was identified. No additional abnormalities were seen. Total scope time: 12 minutes. The patient tolerated the procedure well.6    ASSESSMENT  cCR. No evidence of recurrent neoplasia.      2019 11:20 2019 14:37 8/10/2019 10:58 2019 14:00 2020 11:13   CEA 4.7 (H) 3.3 (H) 3.4 (H) 2.8 (H) 2.9 (H)     CT c/a/p (2020):  IMPRESSION:  1. No obvious mass lesion with some loss of fat planes between the  rectum and uterus, this appears unchanged, please for to MRI also  performed today for further detailed evaluation.  2. " Scattered 5 mm under pulmonary nodules, unchanged. No new findings  in the chest.  3. Calcified uterine fibroids.  4. Unchanged hepatic hypodensity in segment 7 measuring 22 mm,  previously diagnosed as focal nodular hyperplasia on MRI scan from  September 2018.  5. Unchanged simple appearing right renal cysts.  6. Aortoiliac and coronary artery calcifications from atherosclerosis.    3T MR pelvis (9/26/20):  IMPRESSION:   1. Continued positive response to treatment with no significant  progression. Possible minimal residual tumor is much less conspicuous  than prior exam. These findings correspond to a tumor regression grade  of mrTRG2.  2. No suspicious pelvic lymphadenopathy.  3. Myomatous uterus. Additional incidental findings as described  above.    Pathology (6/2019):  FINAL DIAGNOSIS:   Large intestine, rectum, biopsy of scar   - Lamina propria fibrosis with mild crypt distortion   - No evidence of malignancy or neoplastic polyp     Colonoscopy 10/17/19 without evidence of recurrence.    PLAN  1. High fiber diet. Metamucil 1 tbsp BID.  2. CEA at every imaging and/or clinic visit.  3. Flex Sig in 12/2020.  4. 3T MR pelvis in 1/2021.  5. CT c/a/p in 6/2021.  6. Colonoscopy in 10/2021.     Follow up per Watch and Wait Protocol:   Completed CRT: 4/12/19    Flexible sigmoidoscopy     Every 2 months for 6 months: Completed    Every 3 months for 3 years: Until 4/2022 Due 12/2020    Every 6 months for 5 years: Until 4/2024    Every year for 10 years: Until 4/2029      3T MRI of pelvis    6-8 weeks after CRT:    Every 4 months for 2 years: Until 4/2021 Due 01/2021    Every 6 months for 2 years: Until 4/2023    Yearly for 2 years: Until 4/2025      CT CAP    Every year for 6-8 years: Until 4/2027 Due 6/2021      Colonoscopy     Due 10/2021      CEA at every clinic or endoscopic visit    Time spent: 30 minutes.  >50% spent in discussing, counseling and coordinating care.    Alli Le M.D., M.Sc.   Assistant  Professor  Division of Colon and Rectal Surgery  [facility]    Referring Provider:  Manjeet Moore MD  [address] [place] [address]      Primary Care Provider:  Leona Farris    CC:  Dayron Lawton MD.  Ynes Jimenez MD.  Yohana Morris MD.

## 2020-09-26 ENCOUNTER — ANCILLARY PROCEDURE (OUTPATIENT)
Dept: MRI IMAGING | Facility: CLINIC | Age: 65
End: 2020-09-26
Attending: COLON & RECTAL SURGERY
Payer: COMMERCIAL

## 2020-09-26 DIAGNOSIS — Z85.048 HISTORY OF RECTAL CANCER: ICD-10-CM

## 2020-09-26 LAB
CREAT BLD-MCNC: 0.6 MG/DL (ref 0.52–1.04)
GFR SERPL CREATININE-BSD FRML MDRD: >90 ML/MIN/{1.73_M2}

## 2020-09-26 RX ORDER — GADOBUTROL 604.72 MG/ML
10 INJECTION INTRAVENOUS ONCE
Status: COMPLETED | OUTPATIENT
Start: 2020-09-26 | End: 2020-09-26

## 2020-09-26 RX ADMIN — GADOBUTROL 8.5 ML: 604.72 INJECTION INTRAVENOUS at 10:46

## 2020-09-26 NOTE — DISCHARGE INSTRUCTIONS
MRI Contrast Discharge Instructions    The IV contrast you received today will pass out of your body in your  urine. This will happen in the next 24 hours. You will not feel this process.  Your urine will not change color.    Drink at least 4 extra glasses of water or juice today (unless your doctor  has restricted your fluids). This reduces the stress on your kidneys.  You may take your regular medicines.    If you are on dialysis: It is best to have dialysis today.    If you have a reaction: Most reactions happen right away. If you have  any new symptoms after leaving the hospital (such as hives or swelling),  call your hospital at the correct number below. Or call your family doctor.  If you have breathing distress or wheezing, call 911.    Special instructions: ***    I have read and understand the above information.    Signature:______________________________________ Date:___________    Staff:__________________________________________ Date:___________     Time:__________    [facility] Departments:    ___[place]: [phone]  ___[facility]: [phone]  ___[place]: [phone] ___[facility]: [phone]  ___[facility]: [phone]  ___[facility]: [phone]  ___[phone]  ___[facility]: [phone]  ___[phone]

## 2020-09-28 ENCOUNTER — OFFICE VISIT (OUTPATIENT)
Dept: SURGERY | Facility: CLINIC | Age: 65
End: 2020-09-28
Payer: COMMERCIAL

## 2020-09-28 VITALS
SYSTOLIC BLOOD PRESSURE: 133 MMHG | HEIGHT: 63 IN | DIASTOLIC BLOOD PRESSURE: 64 MMHG | HEART RATE: 91 BPM | TEMPERATURE: 97.9 F | BODY MASS INDEX: 32.67 KG/M2 | WEIGHT: 184.4 LBS | OXYGEN SATURATION: 98 %

## 2020-09-28 DIAGNOSIS — Z85.048 HISTORY OF RECTAL CANCER: ICD-10-CM

## 2020-09-28 DIAGNOSIS — Z85.048 HISTORY OF RECTAL CANCER: Primary | ICD-10-CM

## 2020-09-28 LAB — CEA SERPL-MCNC: 3.8 UG/L (ref 0–2.5)

## 2020-09-28 ASSESSMENT — MIFFLIN-ST. JEOR: SCORE: 1360.56

## 2020-09-28 ASSESSMENT — PAIN SCALES - GENERAL: PAINLEVEL: NO PAIN (0)

## 2020-09-28 NOTE — LETTER
"2020       RE: Niharika Cason  [address] [facility] [address]     Dear Colleague,    Thank you for referring your patient, Niharika Cason, to the [facility] COLON AND RECTAL SURGERY at [facility]. Please see a copy of my visit note below.    Colon and Rectal Surgery Follow-up Clinic Note    RE: Niharika Cason  : 1955  MARILEE: 2020    DIAGNOSIS:     10/2018: mT3cN1 proximal rectal adenocarcinoma (intact IHC) found on 1st screening colonoscopy. 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    10/2018-2019: XELOX x6.    3/-19: CXRT (5040 cGy).    2019: cCR (enrolled in watch and wait protocol).    INTERVAL HISTORY: Denies increased pain, fevers, or chills. Tolerating diet well. Having 1-2 BM's per day with mild fecal urgency and seepage. Denies incontinence per se or BPR.    Physical Examination:  /64 (BP Location: Left arm, Patient Position: Sitting, Cuff Size: Adult Regular)   Pulse 91   Temp 97.9  F (36.6  C) (Oral)   Ht 5' 3\"   Wt 184 lb 6.4 oz   LMP 10/15/2008 (Approximate)   SpO2 98%   BMI 32.66 kg/m    Abdomen soft, NT. No inguinal adenopathy palpated.  Perianal skin intact.   ALEC: no masses palpated.  Flexible sigmoidoscopy: after obtaining informed consent and performing a \"time out\", an adult flexible sigmoidoscope was introduced through the anus and passed up to the proximal sigmoid colon. The quality of the prep was fair. Findings: linear 2x2-cm white scar with central telangiectasia located at the left posterior aspect of the mid rectum (7-cm from dentate line), covering approx 30% of the lumen circumference. Distal edge of the tumor is at the mid rectal valve. Tattoo was identified. No additional abnormalities were seen. Total scope time: 12 minutes. The patient tolerated the procedure well.6    ASSESSMENT  cCR. No evidence of recurrent neoplasia.      2019 11:20 2019 14:37 8/10/2019 10:58 2019 14:00 " 6/1/2020 11:13   CEA 4.7 (H) 3.3 (H) 3.4 (H) 2.8 (H) 2.9 (H)     CT c/a/p (5/30/2020):  IMPRESSION:  1. No obvious mass lesion with some loss of fat planes between the  rectum and uterus, this appears unchanged, please for to MRI also  performed today for further detailed evaluation.  2. Scattered 5 mm under pulmonary nodules, unchanged. No new findings  in the chest.  3. Calcified uterine fibroids.  4. Unchanged hepatic hypodensity in segment 7 measuring 22 mm,  previously diagnosed as focal nodular hyperplasia on MRI scan from  September 2018.  5. Unchanged simple appearing right renal cysts.  6. Aortoiliac and coronary artery calcifications from atherosclerosis.    3T MR pelvis (9/26/20):  IMPRESSION:   1. Continued positive response to treatment with no significant  progression. Possible minimal residual tumor is much less conspicuous  than prior exam. These findings correspond to a tumor regression grade  of mrTRG2.  2. No suspicious pelvic lymphadenopathy.  3. Myomatous uterus. Additional incidental findings as described  above.    Pathology (6/2019):  FINAL DIAGNOSIS:   Large intestine, rectum, biopsy of scar   - Lamina propria fibrosis with mild crypt distortion   - No evidence of malignancy or neoplastic polyp     Colonoscopy 10/17/19 without evidence of recurrence.    PLAN  1. High fiber diet. Metamucil 1 tbsp BID.  2. CEA at every imaging and/or clinic visit.  3. Flex Sig in 12/2020.  4. 3T MR pelvis in 1/2021.  5. CT c/a/p in 6/2021.  6. Colonoscopy in 10/2021.     Follow up per Watch and Wait Protocol:   Completed CRT: 4/12/19    Flexible sigmoidoscopy     Every 2 months for 6 months: Completed    Every 3 months for 3 years: Until 4/2022 Due 12/2020    Every 6 months for 5 years: Until 4/2024    Every year for 10 years: Until 4/2029      3T MRI of pelvis    6-8 weeks after CRT:    Every 4 months for 2 years: Until 4/2021 Due 01/2021    Every 6 months for 2 years: Until 4/2023    Yearly for 2 years:  Until 4/2025      CT CAP    Every year for 6-8 years: Until 4/2027 Due 6/2021      Colonoscopy     Due 10/2021      CEA at every clinic or endoscopic visit    Time spent: 30 minutes.  >50% spent in discussing, counseling and coordinating care.    Alli Le M.D., M.Sc.     Division of Colon and Rectal Surgery  [facility]    Referring Provider:  Manjeet Moore MD  [address] [place] [address]      Primary Care Provider:  Leona Farris    CC:  Dayron Lawton MD.  Ynes Jimenez MD.  Yohana Morris MD.      Again, thank you for allowing me to participate in the care of your patient.      Sincerely,    Alli Le MD

## 2020-09-28 NOTE — LETTER
"2020       RE: Niharika Cason  [address] [facility] [address]     Dear Colleague,    Thank you for referring your patient, Niharika Cason, to the [facility] COLON AND RECTAL SURGERY at [facility]. Please see a copy of my visit note below.    Colon and Rectal Surgery Follow-up Clinic Note    RE: Niharika Cason  : 1955  MARILEE: 2020    DIAGNOSIS:     10/2018: mT3cN1 proximal rectal adenocarcinoma (intact IHC) found on 1st screening colonoscopy. 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    10/2018-2019: XELOX x6.    3/-19: CXRT (5040 cGy).    2019: cCR (enrolled in watch and wait protocol).    INTERVAL HISTORY: Denies increased pain, fevers, or chills. Tolerating diet well. Having 1-2 BM's per day with mild fecal urgency and seepage. Denies incontinence per se or BPR.    Physical Examination:  /64 (BP Location: Left arm, Patient Position: Sitting, Cuff Size: Adult Regular)   Pulse 91   Temp 97.9  F (36.6  C) (Oral)   Ht 5' 3\"   Wt 184 lb 6.4 oz   LMP 10/15/2008 (Approximate)   SpO2 98%   BMI 32.66 kg/m    Abdomen soft, NT. No inguinal adenopathy palpated.  Perianal skin intact.   ALEC: no masses palpated.  Flexible sigmoidoscopy: after obtaining informed consent and performing a \"time out\", an adult flexible sigmoidoscope was introduced through the anus and passed up to the proximal sigmoid colon. The quality of the prep was fair. Findings: linear 2x2-cm white scar with central telangiectasia located at the left posterior aspect of the mid rectum (7-cm from dentate line), covering approx 30% of the lumen circumference. Distal edge of the tumor is at the mid rectal valve. Tattoo was identified. No additional abnormalities were seen. Total scope time: 12 minutes. The patient tolerated the procedure well.6    ASSESSMENT  cCR. No evidence of recurrent neoplasia.      2019 11:20 2019 14:37 8/10/2019 10:58 2019 14:00 " 6/1/2020 11:13   CEA 4.7 (H) 3.3 (H) 3.4 (H) 2.8 (H) 2.9 (H)     CT c/a/p (5/30/2020):  IMPRESSION:  1. No obvious mass lesion with some loss of fat planes between the  rectum and uterus, this appears unchanged, please for to MRI also  performed today for further detailed evaluation.  2. Scattered 5 mm under pulmonary nodules, unchanged. No new findings  in the chest.  3. Calcified uterine fibroids.  4. Unchanged hepatic hypodensity in segment 7 measuring 22 mm,  previously diagnosed as focal nodular hyperplasia on MRI scan from  September 2018.  5. Unchanged simple appearing right renal cysts.  6. Aortoiliac and coronary artery calcifications from atherosclerosis.    3T MR pelvis (9/26/20):  IMPRESSION:   1. Continued positive response to treatment with no significant  progression. Possible minimal residual tumor is much less conspicuous  than prior exam. These findings correspond to a tumor regression grade  of mrTRG2.  2. No suspicious pelvic lymphadenopathy.  3. Myomatous uterus. Additional incidental findings as described  above.    Pathology (6/2019):  FINAL DIAGNOSIS:   Large intestine, rectum, biopsy of scar   - Lamina propria fibrosis with mild crypt distortion   - No evidence of malignancy or neoplastic polyp     Colonoscopy 10/17/19 without evidence of recurrence.    PLAN  1. High fiber diet. Metamucil 1 tbsp BID.  2. CEA at every imaging and/or clinic visit.  3. Flex Sig in 12/2020.  4. 3T MR pelvis in 1/2021.  5. CT c/a/p in 6/2021.  6. Colonoscopy in 10/2021.     Follow up per Watch and Wait Protocol:   Completed CRT: 4/12/19    Flexible sigmoidoscopy     Every 2 months for 6 months: Completed    Every 3 months for 3 years: Until 4/2022 Due 12/2020    Every 6 months for 5 years: Until 4/2024    Every year for 10 years: Until 4/2029      3T MRI of pelvis    6-8 weeks after CRT:    Every 4 months for 2 years: Until 4/2021 Due 01/2021    Every 6 months for 2 years: Until 4/2023    Yearly for 2 years:  Until 4/2025      CT CAP    Every year for 6-8 years: Until 4/2027 Due 6/2021      Colonoscopy     Due 10/2021      CEA at every clinic or endoscopic visit    Time spent: 30 minutes.  >50% spent in discussing, counseling and coordinating care.      Again, thank you for allowing me to participate in the care of your patient.  Sincerely,    Alli Le M.D., M.Sc.     Division of Colon and Rectal Surgery  [facility]    Referring Provider:  Manjeet Moore MD  [address] [place] [address]      Primary Care Provider:  Leona Farris    CC:  Dayron Lawton MD.  Ynes Jimenez MD.  Yohana Morris MD.

## 2020-09-28 NOTE — PATIENT INSTRUCTIONS
Follow up:    Follow up per Watch and Wait Protocol:   Completed CRT: 4/12/19    Flexible sigmoidoscopy   ? Every 2 months for 6 months: Completed  ? Every 3 months for 3 years: Until 4/2022 Due 12/2020  ? Every 6 months for 5 years: Until 4/2024  ? Every year for 10 years: Until 4/2029       3T MRI of pelvis  ? 6-8 weeks after CRT:  ? Every 4 months for 2 years: Until 4/2021 Due 01/2021  ? Every 6 months for 2 years: Until 4/2023  ? Yearly for 2 years: Until 4/2025       CT CAP  ? Every year for 6-8 years: Until 4/2027 Due 6/2021       Colonoscopy   ? Due 10/2021       CEA at every clinic or endoscopic visit    Please call with any questions or concerns regarding your clinic visit today.    It is a pleasure being involved in your health care.    Contacts post-consultation depending on your need:    Radiology Appointments [phone]    Schedule Clinic Appointments [phone] # 1   M-F 7:30 - 5 pm    ZOFIA Olson [phone]    Clinic Fax Number [phone]    Surgery Scheduling [phone]    My Chart is available 24 hours a day and is a secure way to access your records and communicate with your care team.  I strongly recommend signing up if you haven't already done so, if you are comfortable with computers.  If you would like to inquire about this or are having problems with My Chart access, you may call [phone] or go online at [email or web address].  Please allow at least 24 hours for a response and extra time on weekends and Holidays.

## 2020-10-01 NOTE — TELEPHONE ENCOUNTER
Panel Management Review      Patient has the following on her problem list:     Diabetes    ASA: Passed    Last A1C  Lab Results   Component Value Date    A1C 6.8 11/18/2019    A1C 6.5 07/09/2019    A1C 7.1 11/15/2018    A1C 8.3 07/20/2018    A1C 7.7 08/25/2017     A1C tested: Passed    Last LDL:    Lab Results   Component Value Date    CHOL 142 07/09/2019     Lab Results   Component Value Date    HDL 64 07/09/2019     Lab Results   Component Value Date    LDL 66 07/09/2019     Lab Results   Component Value Date    TRIG 58 07/09/2019     Lab Results   Component Value Date    CHOLHDLRATIO 2.3 11/07/2015     Lab Results   Component Value Date    NHDL 78 07/09/2019       Is the patient on a Statin? YES             Is the patient on Aspirin? NO    Medications     HMG CoA Reductase Inhibitors     simvastatin (ZOCOR) 10 MG tablet             Last three blood pressure readings:  BP Readings from Last 3 Encounters:   02/18/20 108/59   11/18/19 110/66   11/14/19 101/62       Date of last diabetes office visit:11/18/2019     Tobacco History:     History   Smoking Status     Former Smoker     Packs/day: 0.50     Years: 15.00     Quit date: 9/17/2018   Smokeless Tobacco     Never Used           Composite cancer screening  Chart review shows that this patient is due/due soon for the following Mammogram and PE  Summary:    Patient is due/failing the following:   MAMMOGRAM and PHYSICAL    Action needed:   Patient needs office visit for PE, mammo and a 6 month Diabetes. .    Type of outreach:    Sent letter.    Questions for provider review:    None                                                                                                                                    kh     Chart routed to  .           Pt amenable to Atorvastatin. RX sent. Labs entered for 3 months from now.

## 2020-10-02 DIAGNOSIS — Z85.048 HISTORY OF RECTAL CANCER: Primary | ICD-10-CM

## 2020-11-03 ENCOUNTER — INFUSION THERAPY VISIT (OUTPATIENT)
Dept: INFUSION THERAPY | Facility: CLINIC | Age: 65
End: 2020-11-03
Attending: INTERNAL MEDICINE
Payer: COMMERCIAL

## 2020-11-03 DIAGNOSIS — C20 RECTAL ADENOCARCINOMA (H): ICD-10-CM

## 2020-11-03 DIAGNOSIS — C20 RECTAL CANCER (H): Primary | ICD-10-CM

## 2020-11-03 PROCEDURE — 96523 IRRIG DRUG DELIVERY DEVICE: CPT

## 2020-11-03 PROCEDURE — 250N000011 HC RX IP 250 OP 636: Performed by: INTERNAL MEDICINE

## 2020-11-03 RX ORDER — HEPARIN SODIUM (PORCINE) LOCK FLUSH IV SOLN 100 UNIT/ML 100 UNIT/ML
5 SOLUTION INTRAVENOUS
Status: DISCONTINUED | OUTPATIENT
Start: 2020-11-03 | End: 2020-11-03 | Stop reason: HOSPADM

## 2020-11-03 RX ORDER — HEPARIN SODIUM (PORCINE) LOCK FLUSH IV SOLN 100 UNIT/ML 100 UNIT/ML
5 SOLUTION INTRAVENOUS
Status: CANCELLED | OUTPATIENT
Start: 2020-11-03

## 2020-11-03 RX ADMIN — Medication 5 ML: at 08:14

## 2020-11-03 NOTE — PROGRESS NOTES
Infusion Nursing Note:  Niharika Cason presents today for Port flush.    Patient seen by provider today: No   present during visit today: Not Applicable.    Note: N/A.    Intravenous Access:  Implanted Port.    Treatment Conditions:  Not Applicable.      Post Infusion Assessment:  Blood return noted pre and post infusion.  Site patent and intact, free from redness, edema or discomfort.  Access discontinued per protocol.       Discharge Plan:   Discharge instructions reviewed with: Patient.  Patient discharged in stable condition accompanied by: self.  Departure Mode: Ambulatory.  Patient plans to have port taken out in January 2021. Did schedule her for a port flush in 6 weeks.    Marizol Maradiaga RN

## 2020-12-10 DIAGNOSIS — Z85.048 HISTORY OF RECTAL CANCER: Primary | ICD-10-CM

## 2020-12-15 ENCOUNTER — INFUSION THERAPY VISIT (OUTPATIENT)
Dept: INFUSION THERAPY | Facility: CLINIC | Age: 65
End: 2020-12-15
Attending: INTERNAL MEDICINE
Payer: COMMERCIAL

## 2020-12-15 DIAGNOSIS — C20 RECTAL ADENOCARCINOMA (H): ICD-10-CM

## 2020-12-15 DIAGNOSIS — C20 MALIGNANT NEOPLASM OF RECTUM (H): ICD-10-CM

## 2020-12-15 DIAGNOSIS — Z85.048 HISTORY OF RECTAL CANCER: ICD-10-CM

## 2020-12-15 DIAGNOSIS — C20 RECTAL CANCER (H): Primary | ICD-10-CM

## 2020-12-15 LAB
ALBUMIN SERPL-MCNC: 3.7 G/DL (ref 3.4–5)
ALP SERPL-CCNC: 75 U/L (ref 40–150)
ALT SERPL W P-5'-P-CCNC: 18 U/L (ref 0–50)
ANION GAP SERPL CALCULATED.3IONS-SCNC: 5 MMOL/L (ref 3–14)
AST SERPL W P-5'-P-CCNC: 12 U/L (ref 0–45)
BASOPHILS # BLD AUTO: 0 10E9/L (ref 0–0.2)
BASOPHILS NFR BLD AUTO: 0.8 %
BILIRUB SERPL-MCNC: 0.3 MG/DL (ref 0.2–1.3)
BUN SERPL-MCNC: 24 MG/DL (ref 7–30)
CALCIUM SERPL-MCNC: 9 MG/DL (ref 8.5–10.1)
CEA SERPL-MCNC: 2.6 UG/L (ref 0–2.5)
CHLORIDE SERPL-SCNC: 109 MMOL/L (ref 94–109)
CO2 SERPL-SCNC: 28 MMOL/L (ref 20–32)
CREAT SERPL-MCNC: 0.61 MG/DL (ref 0.52–1.04)
DIFFERENTIAL METHOD BLD: NORMAL
EOSINOPHIL NFR BLD AUTO: 1.8 %
ERYTHROCYTE [DISTWIDTH] IN BLOOD BY AUTOMATED COUNT: 11.9 % (ref 10–15)
GFR SERPL CREATININE-BSD FRML MDRD: >90 ML/MIN/{1.73_M2}
GLUCOSE SERPL-MCNC: 105 MG/DL (ref 70–99)
HCT VFR BLD AUTO: 38.2 % (ref 35–47)
HGB BLD-MCNC: 12.5 G/DL (ref 11.7–15.7)
IMM GRANULOCYTES # BLD: 0 10E9/L (ref 0–0.4)
IMM GRANULOCYTES NFR BLD: 0.2 %
LYMPHOCYTES # BLD AUTO: 0.9 10E9/L (ref 0.8–5.3)
LYMPHOCYTES NFR BLD AUTO: 17.5 %
MCH RBC QN AUTO: 29 PG (ref 26.5–33)
MCHC RBC AUTO-ENTMCNC: 32.7 G/DL (ref 31.5–36.5)
MCV RBC AUTO: 89 FL (ref 78–100)
MONOCYTES # BLD AUTO: 0.4 10E9/L (ref 0–1.3)
MONOCYTES NFR BLD AUTO: 8.2 %
NEUTROPHILS # BLD AUTO: 3.7 10E9/L (ref 1.6–8.3)
NEUTROPHILS NFR BLD AUTO: 71.5 %
NRBC # BLD AUTO: 0 10*3/UL
NRBC BLD AUTO-RTO: 0 /100
PLATELET # BLD AUTO: 220 10E9/L (ref 150–450)
POTASSIUM SERPL-SCNC: 3.9 MMOL/L (ref 3.4–5.3)
PROT SERPL-MCNC: 6.8 G/DL (ref 6.8–8.8)
RBC # BLD AUTO: 4.31 10E12/L (ref 3.8–5.2)
SODIUM SERPL-SCNC: 142 MMOL/L (ref 133–144)
WBC # BLD AUTO: 5.1 10E9/L (ref 4–11)

## 2020-12-15 PROCEDURE — 82378 CARCINOEMBRYONIC ANTIGEN: CPT | Performed by: COLON & RECTAL SURGERY

## 2020-12-15 PROCEDURE — 36591 DRAW BLOOD OFF VENOUS DEVICE: CPT

## 2020-12-15 PROCEDURE — 250N000011 HC RX IP 250 OP 636: Performed by: INTERNAL MEDICINE

## 2020-12-15 PROCEDURE — 85025 COMPLETE CBC W/AUTO DIFF WBC: CPT | Performed by: COLON & RECTAL SURGERY

## 2020-12-15 PROCEDURE — 80053 COMPREHEN METABOLIC PANEL: CPT | Performed by: COLON & RECTAL SURGERY

## 2020-12-15 RX ORDER — HEPARIN SODIUM (PORCINE) LOCK FLUSH IV SOLN 100 UNIT/ML 100 UNIT/ML
5 SOLUTION INTRAVENOUS
Status: DISCONTINUED | OUTPATIENT
Start: 2020-12-15 | End: 2020-12-15 | Stop reason: HOSPADM

## 2020-12-15 RX ORDER — HEPARIN SODIUM (PORCINE) LOCK FLUSH IV SOLN 100 UNIT/ML 100 UNIT/ML
5 SOLUTION INTRAVENOUS
Status: CANCELLED | OUTPATIENT
Start: 2020-12-15

## 2020-12-15 RX ADMIN — Medication 5 ML: at 08:08

## 2020-12-15 NOTE — PROGRESS NOTES
Infusion Nursing Note:  Niharika Cason presents today for port labs.    Patient seen by provider today: No   present during visit today: Not Applicable.    Note: Patient will be talking to Oncologist next month to possibly get port out. She will call to schedule appt in 6 weeks if needed for port flush.    Intravenous Access:  Labs drawn without difficulty.  Implanted Port.    Treatment Conditions:  Lab Results   Component Value Date    HGB 12.5 12/15/2020     Lab Results   Component Value Date    WBC 5.1 12/15/2020      Lab Results   Component Value Date    ANEU 3.7 12/15/2020     Lab Results   Component Value Date     12/15/2020      Lab Results   Component Value Date     12/15/2020                   Lab Results   Component Value Date    POTASSIUM 3.9 12/15/2020           Lab Results   Component Value Date    MAG 1.9 02/27/2004            Lab Results   Component Value Date    CR 0.61 12/15/2020                   Lab Results   Component Value Date    JUNG 9.0 12/15/2020                Lab Results   Component Value Date    BILITOTAL 0.3 12/15/2020           Lab Results   Component Value Date    ALBUMIN 3.7 12/15/2020                    Lab Results   Component Value Date    ALT 18 12/15/2020           Lab Results   Component Value Date    AST 12 12/15/2020           Post Infusion Assessment:  Blood return noted pre and post infusion.  Site patent and intact, free from redness, edema or discomfort.  Access discontinued per protocol.       Discharge Plan:   Discharge instructions reviewed with: Patient.  Patient discharged in stable condition accompanied by: self.  Departure Mode: Ambulatory.    Marizol Maradiaga RN

## 2021-01-09 ENCOUNTER — HEALTH MAINTENANCE LETTER (OUTPATIENT)
Age: 66
End: 2021-01-09

## 2021-01-14 NOTE — PROGRESS NOTES
"Colon and Rectal Surgery Follow-up Clinic Note    RE: Niharika Cason  : 1955  MARILEE: 2021    DIAGNOSIS:     10/2018: mT3cN1 proximal rectal adenocarcinoma (intact IHC) found on 1st screening colonoscopy. 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    10/2018-2019: XELOX x6.    3/-19: CXRT (5040 cGy).    2019: cCR (enrolled in watch and wait protocol).    INTERVAL HISTORY: Denies increased pain, fevers, or chills. Tolerating diet well. Having 1-2 BM's per day with mild fecal urgency and seepage. Denies incontinence per se or BPR.    Physical Examination:  /66 (BP Location: Left arm, Patient Position: Sitting, Cuff Size: Adult Regular)   Pulse 96   Wt 186 lb 14.4 oz   LMP 10/15/2008 (Approximate)   SpO2 96%   BMI 33.11 kg/m    Abdomen soft, NT. No inguinal adenopathy palpated.  Perianal skin intact.   ALEC: no masses palpated.  Flexible sigmoidoscopy: after obtaining informed consent and performing a \"time out\", an adult flexible sigmoidoscope was introduced through the anus and passed up to the proximal sigmoid colon. The quality of the prep was fair. Findings: linear 2x2-cm white scar with central telangiectasia located at the left posterior aspect of the mid rectum (7-cm from dentate line), covering approx 30% of the lumen circumference. Distal edge of the tumor is at the mid rectal valve. Tattoo was identified. 2-mm flat hyperplastic polyp at the mid rectum, just distal to the 2nd rectal valve. No additional abnormalities were seen. Total scope time: 12 minutes. The patient tolerated the procedure well.    ASSESSMENT  cCR. No evidence of recurrent neoplasia.      8/10/2019 10:58 2019 14:00 2020 11:13 2020 15:22 12/15/2020 08:15   CEA 3.4 (H) 2.8 (H) 2.9 (H) 3.8 (H) 2.6 (H)       CT c/a/p (2020):  IMPRESSION:  1. No obvious mass lesion with some loss of fat planes between the  rectum and uterus, this appears unchanged, please for to MRI also  performed " today for further detailed evaluation.  2. Scattered 5 mm under pulmonary nodules, unchanged. No new findings  in the chest.  3. Calcified uterine fibroids.  4. Unchanged hepatic hypodensity in segment 7 measuring 22 mm,  previously diagnosed as focal nodular hyperplasia on MRI scan from  September 2018.  5. Unchanged simple appearing right renal cysts.  6. Aortoiliac and coronary artery calcifications from atherosclerosis.    3T MR pelvis (1/16/21):   IMPRESSION:   1. Ongoing continued positive response to treatment with no  significant progression. Questionable minimal residual tumor is again  noted as described above, no interval change. These findings  correspond to a tumor regression grade of mrTRG2.  2. No suspicious pelvic lymphadenopathy.  3. Myomatous uterus. Additional incidental findings as described  above.    Pathology (6/2019):  FINAL DIAGNOSIS:   Large intestine, rectum, biopsy of scar   - Lamina propria fibrosis with mild crypt distortion   - No evidence of malignancy or neoplastic polyp     Colonoscopy 10/17/19 without evidence of recurrence.    PLAN  1. High fiber diet. Metamucil 1 tbsp BID.  2. CEA at every imaging and/or clinic visit.  3. Flex Sig in 5/2020.  4. 3T MR pelvis in 5/2021.  5. CT c/a/p in 5/2021.  6. Colonoscopy in 10/2021.   7. Contact medical oncology for removal of port.    Follow up per Watch and Wait Protocol:   Completed CRT: 4/12/19    Flexible sigmoidoscopy     Every 2 months for 6 months: Completed    Every 3 months for 3 years: Until 4/2022 Due 5/2021    Every 6 months for 5 years: Until 4/2024    Every year for 10 years: Until 4/2029      3T MRI of pelvis    6-8 weeks after CRT:    Every 4 months for 2 years: Until 4/2021 DUE 5/2021    Every 6 months for 2 years: Until 4/2023    Yearly for 2 years: Until 4/2025      CT CAP    Every year for 6-8 years: Until 4/2027 Due 5/2021      Colonoscopy     Due 10/2021      CEA at every clinic or endoscopic visit    30 minutes spent  on the date of the encounter doing chart review, history and exam, documentation and further activities as noted above.     Alli Le M.D., M.Sc.     Division of Colon and Rectal Surgery  [facility]    Referring Provider:  Manjeet Moore MD  [address] [place] [id number]      Primary Care Provider:  Leona Farris    CC:  Dayron Lawton MD.  Ynes Jimenez MD.  Yohana Morris MD.

## 2021-01-16 ENCOUNTER — ANCILLARY PROCEDURE (OUTPATIENT)
Dept: MRI IMAGING | Facility: CLINIC | Age: 66
End: 2021-01-16
Attending: COLON & RECTAL SURGERY
Payer: COMMERCIAL

## 2021-01-16 DIAGNOSIS — Z85.048 HISTORY OF RECTAL CANCER: ICD-10-CM

## 2021-01-16 PROCEDURE — 72197 MRI PELVIS W/O & W/DYE: CPT | Performed by: RADIOLOGY

## 2021-01-16 PROCEDURE — A9585 GADOBUTROL INJECTION: HCPCS | Performed by: RADIOLOGY

## 2021-01-16 RX ORDER — GADOBUTROL 604.72 MG/ML
10 INJECTION INTRAVENOUS ONCE
Status: COMPLETED | OUTPATIENT
Start: 2021-01-16 | End: 2021-01-16

## 2021-01-16 RX ADMIN — GADOBUTROL 8.5 ML: 604.72 INJECTION INTRAVENOUS at 07:23

## 2021-01-18 ENCOUNTER — OFFICE VISIT (OUTPATIENT)
Dept: SURGERY | Facility: CLINIC | Age: 66
End: 2021-01-18
Payer: COMMERCIAL

## 2021-01-18 ENCOUNTER — MYC MEDICAL ADVICE (OUTPATIENT)
Dept: SURGERY | Facility: CLINIC | Age: 66
End: 2021-01-18

## 2021-01-18 VITALS
BODY MASS INDEX: 33.11 KG/M2 | OXYGEN SATURATION: 96 % | DIASTOLIC BLOOD PRESSURE: 66 MMHG | SYSTOLIC BLOOD PRESSURE: 121 MMHG | HEART RATE: 96 BPM | WEIGHT: 186.9 LBS

## 2021-01-18 DIAGNOSIS — Z85.048 HISTORY OF RECTAL CANCER: Primary | ICD-10-CM

## 2021-01-18 PROCEDURE — 45330 DIAGNOSTIC SIGMOIDOSCOPY: CPT | Performed by: COLON & RECTAL SURGERY

## 2021-01-18 ASSESSMENT — PAIN SCALES - GENERAL: PAINLEVEL: NO PAIN (0)

## 2021-01-18 NOTE — NURSING NOTE
No chief complaint on file.      Vitals:    01/18/21 1341   BP: 121/66   BP Location: Left arm   Patient Position: Sitting   Cuff Size: Adult Regular   Pulse: 96   SpO2: 96%   Weight: 186 lb 14.4 oz       Body mass index is 33.11 kg/m .    Donna Heller MA

## 2021-01-18 NOTE — LETTER
"2021       RE: Niharika Cason  [address] [facility] [address]     Dear Colleague,    Thank you for referring your patient, Niharika Cason, to the [facility] COLON [facility] RE[facility]AL SURGERY CLINIC [place] at [facility]. Please see a copy of my visit note below.    Colon and Rectal Surgery Follow-up Clinic Note    RE: Niharika Cason  : 1955  MARILEE: 2021    DIAGNOSIS:     10/2018: mT3cN1 proximal rectal adenocarcinoma (intact IHC) found on 1st screening colonoscopy. 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    10/2018-2019: XELOX x6.    3/-19: CXRT (5040 cGy).    2019: cCR (enrolled in watch and wait protocol).    INTERVAL HISTORY: Denies increased pain, fevers, or chills. Tolerating diet well. Having 1-2 BM's per day with mild fecal urgency and seepage. Denies incontinence per se or BPR.    Physical Examination:  /66 (BP Location: Left arm, Patient Position: Sitting, Cuff Size: Adult Regular)   Pulse 96   Wt 186 lb 14.4 oz   LMP 10/15/2008 (Approximate)   SpO2 96%   BMI 33.11 kg/m    Abdomen soft, NT. No inguinal adenopathy palpated.  Perianal skin intact.   ALEC: no masses palpated.  Flexible sigmoidoscopy: after obtaining informed consent and performing a \"time out\", an adult flexible sigmoidoscope was introduced through the anus and passed up to the proximal sigmoid colon. The quality of the prep was fair. Findings: linear 2x2-cm white scar with central telangiectasia located at the left posterior aspect of the mid rectum (7-cm from dentate line), covering approx 30% of the lumen circumference. Distal edge of the tumor is at the mid rectal valve. Tattoo was identified. 2-mm flat hyperplastic polyp at the mid rectum, just distal to the 2nd rectal valve. No additional abnormalities were seen. Total scope time: 12 minutes. The patient tolerated the procedure well.    ASSESSMENT  cCR. No evidence of recurrent neoplasia. "      8/10/2019 10:58 11/14/2019 14:00 6/1/2020 11:13 9/28/2020 15:22 12/15/2020 08:15   CEA 3.4 (H) 2.8 (H) 2.9 (H) 3.8 (H) 2.6 (H)       CT c/a/p (5/30/2020):  IMPRESSION:  1. No obvious mass lesion with some loss of fat planes between the  rectum and uterus, this appears unchanged, please for to MRI also  performed today for further detailed evaluation.  2. Scattered 5 mm under pulmonary nodules, unchanged. No new findings  in the chest.  3. Calcified uterine fibroids.  4. Unchanged hepatic hypodensity in segment 7 measuring 22 mm,  previously diagnosed as focal nodular hyperplasia on MRI scan from  September 2018.  5. Unchanged simple appearing right renal cysts.  6. Aortoiliac and coronary artery calcifications from atherosclerosis.    3T MR pelvis (1/16/21):   IMPRESSION:   1. Ongoing continued positive response to treatment with no  significant progression. Questionable minimal residual tumor is again  noted as described above, no interval change. These findings  correspond to a tumor regression grade of mrTRG2.  2. No suspicious pelvic lymphadenopathy.  3. Myomatous uterus. Additional incidental findings as described  above.    Pathology (6/2019):  FINAL DIAGNOSIS:   Large intestine, rectum, biopsy of scar   - Lamina propria fibrosis with mild crypt distortion   - No evidence of malignancy or neoplastic polyp     Colonoscopy 10/17/19 without evidence of recurrence.    PLAN  1. High fiber diet. Metamucil 1 tbsp BID.  2. CEA at every imaging and/or clinic visit.  3. Flex Sig in 5/2020.  4. 3T MR pelvis in 5/2021.  5. CT c/a/p in 5/2021.  6. Colonoscopy in 10/2021.   7. Contact medical oncology for removal of port.    Follow up per Watch and Wait Protocol:   Completed CRT: 4/12/19    Flexible sigmoidoscopy     Every 2 months for 6 months: Completed    Every 3 months for 3 years: Until 4/2022 Due 5/2021    Every 6 months for 5 years: Until 4/2024    Every year for 10 years: Until 4/2029      3T MRI of  pelvis    6-8 weeks after CRT:    Every 4 months for 2 years: Until 4/2021 DUE 5/2021    Every 6 months for 2 years: Until 4/2023    Yearly for 2 years: Until 4/2025      CT CAP    Every year for 6-8 years: Until 4/2027 Due 5/2021      Colonoscopy     Due 10/2021      CEA at every clinic or endoscopic visit    30 minutes spent on the date of the encounter doing chart review, history and exam, documentation and further activities as noted above.     Alli Le M.D., M.Sc.     Division of Colon and Rectal Surgery  [facility]    Referring Provider:  Manjeet Moore MD  [address] [place] [address]      Primary Care Provider:  Leona Farris    CC:  Dayron Lawton MD.  Ynes Jimenez MD.  Yohana Morris MD.

## 2021-02-17 DIAGNOSIS — E10.8 TYPE 1 DIABETES MELLITUS WITH COMPLICATION (H): ICD-10-CM

## 2021-02-19 NOTE — TELEPHONE ENCOUNTER
Lantus solostar      Last Written Prescription Date:  7/16/2020  Last Fill Quantity: 15 mL,   # refills: 1  Last Office Visit: 6/23/20 benjamín  Future Office visit:       Routing refill request to provider for review/approval because:  Hemoglobin A1C   Date Value Ref Range Status   06/01/2020 7.3 (H) 0 - 5.6 % Final     Comment:     Normal <5.7% Prediabetes 5.7-6.4%  Diabetes 6.5% or higher - adopted from ADA   consensus guidelines.               Janina Mcdaniel RN  Triage Nurse  [facility], 24 hour nurse line, available by calling clinic at [phone] and following prompts.

## 2021-02-25 DIAGNOSIS — E10.8 TYPE 1 DIABETES MELLITUS WITH COMPLICATION (H): ICD-10-CM

## 2021-02-25 DIAGNOSIS — E10.65 TYPE 1 DIABETES MELLITUS WITH HYPERGLYCEMIA (H): ICD-10-CM

## 2021-02-25 NOTE — TELEPHONE ENCOUNTER
Continuous Blood Gluc Sensor (DEXCOM G6 SENSOR) MISC    Last Written Prescription Date:  03/09/2020  Last Fill Quantity: 3,   # refills: 11  Last Office Visit: 06/23/2020  Future Office visit:       Routing refill request to provider for review/approval because:  Drug not on the [facility], P or  Health refill protocol or controlled substance

## 2021-02-26 RX ORDER — PROCHLORPERAZINE 25 MG/1
SUPPOSITORY RECTAL
Qty: 3 EACH | Refills: 0 | Status: SHIPPED | OUTPATIENT
Start: 2021-02-26 | End: 2021-03-22

## 2021-03-11 DIAGNOSIS — F41.1 GENERALIZED ANXIETY DISORDER: ICD-10-CM

## 2021-03-12 RX ORDER — PAROXETINE 30 MG/1
TABLET, FILM COATED ORAL
Qty: 93 TABLET | Refills: 1 | Status: SHIPPED | OUTPATIENT
Start: 2021-03-12 | End: 2021-09-01

## 2021-03-12 NOTE — TELEPHONE ENCOUNTER
"  Requested Prescriptions   Pending Prescriptions Disp Refills     PARoxetine (PAXIL) 30 MG tablet [Pharmacy Med Name: PAROXETINE 30MG TABLET] 93 tablet 3     Sig: TAKE 1 TABLET BY MOUTH EVERY MORNING   6/23/2020    SSRIs Protocol Passed - 3/11/2021 10:38 AM        Passed - Recent (12 mo) or future (30 days) visit within the authorizing provider's specialty     Patient has had an office visit with the authorizing provider or a provider within the authorizing providers department within the previous 12 mos or has a future within next 30 days. See \"Patient Info\" tab in inbasket, or \"Choose Columns\" in Meds & Orders section of the refill encounter.              Passed - Medication is active on med list        Passed - Patient is age 18 or older        Passed - No active pregnancy on record        Passed - No positive pregnancy test in last 12 months         Prescription approved per [facility] Refill Protocol.  Flores Watson RN      "

## 2021-03-22 ENCOUNTER — OFFICE VISIT (OUTPATIENT)
Dept: FAMILY MEDICINE | Facility: CLINIC | Age: 66
End: 2021-03-22
Payer: COMMERCIAL

## 2021-03-22 VITALS
DIASTOLIC BLOOD PRESSURE: 72 MMHG | BODY MASS INDEX: 32.96 KG/M2 | OXYGEN SATURATION: 96 % | WEIGHT: 186 LBS | TEMPERATURE: 98 F | RESPIRATION RATE: 18 BRPM | HEIGHT: 63 IN | SYSTOLIC BLOOD PRESSURE: 116 MMHG | HEART RATE: 101 BPM

## 2021-03-22 DIAGNOSIS — E10.8 TYPE 1 DIABETES MELLITUS WITH COMPLICATION (H): Primary | ICD-10-CM

## 2021-03-22 DIAGNOSIS — Z85.048 HISTORY OF RECTAL CANCER: ICD-10-CM

## 2021-03-22 DIAGNOSIS — G62.0 DRUG-INDUCED POLYNEUROPATHY (H): ICD-10-CM

## 2021-03-22 DIAGNOSIS — I10 ESSENTIAL HYPERTENSION WITH GOAL BLOOD PRESSURE LESS THAN 130/80: ICD-10-CM

## 2021-03-22 PROBLEM — C20 RECTAL ADENOCARCINOMA (H): Status: RESOLVED | Noted: 2018-10-15 | Resolved: 2021-03-22

## 2021-03-22 PROBLEM — G62.9 NEUROPATHY: Status: RESOLVED | Noted: 2019-04-17 | Resolved: 2021-03-22

## 2021-03-22 LAB
CHOLEST SERPL-MCNC: 173 MG/DL
CREAT UR-MCNC: 116 MG/DL
HBA1C MFR BLD: 6.6 % (ref 0–5.6)
HDLC SERPL-MCNC: 73 MG/DL
LDLC SERPL CALC-MCNC: 88 MG/DL
MICROALBUMIN UR-MCNC: 17 MG/L
MICROALBUMIN/CREAT UR: 14.48 MG/G CR (ref 0–25)
NONHDLC SERPL-MCNC: 100 MG/DL
TRIGL SERPL-MCNC: 61 MG/DL

## 2021-03-22 PROCEDURE — 36415 COLL VENOUS BLD VENIPUNCTURE: CPT | Performed by: FAMILY MEDICINE

## 2021-03-22 PROCEDURE — 99207 PR FOOT EXAM NO CHARGE: CPT | Performed by: FAMILY MEDICINE

## 2021-03-22 PROCEDURE — 83036 HEMOGLOBIN GLYCOSYLATED A1C: CPT | Performed by: FAMILY MEDICINE

## 2021-03-22 PROCEDURE — 99215 OFFICE O/P EST HI 40 MIN: CPT | Performed by: FAMILY MEDICINE

## 2021-03-22 PROCEDURE — 80061 LIPID PANEL: CPT | Performed by: FAMILY MEDICINE

## 2021-03-22 PROCEDURE — 82043 UR ALBUMIN QUANTITATIVE: CPT | Performed by: FAMILY MEDICINE

## 2021-03-22 RX ORDER — PROCHLORPERAZINE 25 MG/1
SUPPOSITORY RECTAL
Qty: 1 EACH | Refills: 3 | Status: SHIPPED | OUTPATIENT
Start: 2021-03-22 | End: 2022-02-03

## 2021-03-22 RX ORDER — PROCHLORPERAZINE 25 MG/1
SUPPOSITORY RECTAL
Qty: 10 EACH | Refills: 4 | Status: SHIPPED | OUTPATIENT
Start: 2021-03-22 | End: 2022-02-03

## 2021-03-22 RX ORDER — LISINOPRIL 40 MG/1
40 TABLET ORAL DAILY
Qty: 93 TABLET | Refills: 3 | Status: SHIPPED | OUTPATIENT
Start: 2021-03-22 | End: 2022-02-03

## 2021-03-22 RX ORDER — INSULIN LISPRO 100 [IU]/ML
INJECTION, SOLUTION INTRAVENOUS; SUBCUTANEOUS
Qty: 45 ML | Refills: 3 | Status: SHIPPED | OUTPATIENT
Start: 2021-03-22 | End: 2022-02-03

## 2021-03-22 ASSESSMENT — MIFFLIN-ST. JEOR: SCORE: 1367.82

## 2021-03-22 ASSESSMENT — PATIENT HEALTH QUESTIONNAIRE - PHQ9: SUM OF ALL RESPONSES TO PHQ QUESTIONS 1-9: 1

## 2021-03-22 ASSESSMENT — PAIN SCALES - GENERAL: PAINLEVEL: NO PAIN (0)

## 2021-03-22 NOTE — PROGRESS NOTES
"    Assessment & Plan     ASSESSMENT/ORDERS:    ICD-10-CM    1. Type 1 diabetes mellitus with complication (H)  E10.8 FOOT EXAM     Lipid panel reflex to direct LDL Fasting     Albumin Random Urine Quantitative with Creat Ratio     Hemoglobin A1c     Continuous Blood Gluc Sensor (DEXCOM G6 SENSOR) MISC     Continuous Blood Gluc Transmit (DEXCOM G6 TRANSMITTER) MISC     glucagon 1 MG kit     insulin glargine (LANTUS SOLOSTAR) 100 UNIT/ML pen     insulin lispro (HUMALOG KWIKPEN) 100 UNIT/ML (1 unit dial) KWIKPEN     blood glucose (NO BRAND SPECIFIED) test strip   2. History of rectal cancer  Z85.048    3. Essential hypertension with goal blood pressure less than 130/80  I10 lisinopril (ZESTRIL) 40 MG tablet   4. Drug-induced polyneuropathy (H)  G62.0      PLAN:  1.  Patient is on moderate intensity statin.  Will consider changing this to Lipitor (atorvastatin) if LDL is above 70.    2.   Medications refilled for all other above noted stable conditions.  Labs ordered as noted above.        40 minutes spent on the date of the encounter doing chart review, review of test results, patient visit, documentation and communication of results to patient via [facility]        BMI:   Estimated body mass index is 32.95 kg/m  as calculated from the following:    Height as of this encounter: 1.6 m (5' 3\").    Weight as of this encounter: 84.4 kg (186 lb).           Return in about 6 months (around 9/22/2021) for diabetes recheck.    Henry Gomez MD  [facility] SISI Bundy is a 65 year old who presents for the following health issues     HPI     Diabetes Follow-up    How often are you checking your blood sugar? Continuous glucose monitor  What time of day are you checking your blood sugars (select all that apply)?  Not applicable  Have you had any blood sugars above 200? Average is 148  Not applicable  Have you had any blood sugars below 70?  Yes     What symptoms do you notice when your blood " "sugar is low?  Dizzy and Weak    What concerns do you have today about your diabetes? None     Do you have any of these symptoms? (Select all that apply)  Numbness in feet    Have you had a diabetic eye exam in the last 12 months? No        BP Readings from Last 2 Encounters:   03/22/21 116/72   01/18/21 121/66     Hemoglobin A1C (%)   Date Value   03/22/2021 6.6 (H)   06/01/2020 7.3 (H)     LDL Cholesterol Calculated (mg/dL)   Date Value   03/22/2021 88   07/09/2019 66                 How many servings of fruits and vegetables do you eat daily?  2-3    On average, how many sweetened beverages do you drink each day (Examples: soda, juice, sweet tea, etc.  Do NOT count diet or artificially sweetened beverages)?   0    How many days per week do you exercise enough to make your heart beat faster?  Active at work, wlking     How many minutes a day do you exercise enough to make your heart beat faster? 30 - 60    How many days per week do you miss taking your medication? 0      History of unrecognized hypoglycemia.  Has been monitoring blood sugar closely with continuous glucose monitor.    Has neuropathy as a result of chemotherapy for rectal cancer.  Continues to be monitored by oncology.  Is in remission.      Hypertension at goal with lisinopril.      Review of Systems         Objective    /72   Pulse 101   Temp 98  F (36.7  C) (Temporal)   Resp 18   Ht 1.6 m (5' 3\")   Wt 84.4 kg (186 lb)   LMP 10/15/2008 (Approximate)   SpO2 96%   Breastfeeding No   BMI 32.95 kg/m    Body mass index is 32.95 kg/m .  Physical Exam  Constitutional:       Appearance: She is well-developed.   Cardiovascular:      Rate and Rhythm: Normal rate and regular rhythm.      Heart sounds: Normal heart sounds, S1 normal and S2 normal. No murmur.   Pulmonary:      Effort: Pulmonary effort is normal. No respiratory distress.      Breath sounds: Normal breath sounds. No wheezing, rhonchi or rales.   Musculoskeletal:      Comments: " FEET:  monofilament exam shows absent sensation bilaterally.  Good hair growth.  Dorsalis pedis pulses 2+ bilaterally.  Feet warm.    Feet:      Right foot:      Skin integrity: No ulcer, blister, skin breakdown or erythema.      Left foot:      Skin integrity: No ulcer, blister, skin breakdown or erythema.   Neurological:      Mental Status: She is alert.

## 2021-03-23 NOTE — RESULT ENCOUNTER NOTE
Niharika,  Your results show that your LDL is higher.  I would recommend changing your simvastatin to a high intensity statin medication called Lipitor (atorvastatin) which will better prevent heart disease and stroke.  Please let me know if you have any questions and if you are okay with this change.  The rest of your labs look good.    Sincerely,  Dr. Gomez

## 2021-03-24 ENCOUNTER — OFFICE VISIT (OUTPATIENT)
Dept: SURGERY | Facility: CLINIC | Age: 66
End: 2021-03-24
Payer: COMMERCIAL

## 2021-03-24 VITALS
WEIGHT: 186 LBS | TEMPERATURE: 98.1 F | SYSTOLIC BLOOD PRESSURE: 120 MMHG | BODY MASS INDEX: 32.96 KG/M2 | HEIGHT: 63 IN | DIASTOLIC BLOOD PRESSURE: 62 MMHG

## 2021-03-24 DIAGNOSIS — Z45.2 ENCOUNTER FOR CARE RELATED TO PORT-A-CATH: Primary | ICD-10-CM

## 2021-03-24 PROCEDURE — 99203 OFFICE O/P NEW LOW 30 MIN: CPT | Performed by: SURGERY

## 2021-03-24 ASSESSMENT — MIFFLIN-ST. JEOR: SCORE: 1367.82

## 2021-03-24 NOTE — PROGRESS NOTES
Patient seen in consultation for port removal by Henry Gomez    HPI:  Patient is a 65 year old female with history of rectal cancer s/p chemoradiation. Last infusion was in 2019. She has the ok for port removal from her surgeon, but not yet her oncologist. This is because there hasn't been communication with her for some time now. She does not take any blood thinning medications. She would be interested in having it removed in the office if possible.    Review Of Systems    Skin: negative  Ears/Nose/Throat: negative  Respiratory: No shortness of breath, dyspnea on exertion, cough, or hemoptysis  Cardiovascular: negative  Gastrointestinal: negative  Genitourinary: negative  Musculoskeletal: negative  Neurologic: negative  Hematologic/Lymphatic/Immunologic: negative  Endocrine: negative      Past Medical History:   Diagnosis Date     Essential hypertension, benign      Generalized anxiety disorder      Hyperlipidemia      Rectal cancer (H) 09/17/2018     S/P radiation therapy     5,040 cGy to Pelvis completed on 04/12/2019. - [facility] with Dr. Morris     Type I (juvenile type) diabetes mellitus without mention of complication, not stated as uncontrolled     diagnosed at age 35     Ulcerative colitis, unspecified     in the 70s.         Past Surgical History:   Procedure Laterality Date     COLONOSCOPY N/A 9/17/2018    Procedure: COMBINED COLONOSCOPY, SINGLE OR MULTIPLE BIOPSY/POLYPECTOMY BY BIOPSY;  colonoscopy with polypectomies by biopsy forceps and hot snare and submucosal injection with tattoo;  Surgeon: Richard Moore MD;  Location:  GI     HC COLONOSCOPY THRU STOMA, DIAGNOSTIC  1998    negative     HC CURETTAGE, POSTPARTUM  '91, '93    following miscarriages     HC REMOVAL OF TONSILS,<11 Y/O      Tonsils <14y.o.     INSERT PORT VASCULAR ACCESS Right 10/19/2018    Procedure: Chest Port Placement - right ;  Surgeon: Lawson Hitchcock PA-C;  Location:  OR       Family  History   Problem Relation Age of Onset     Heart Disease Maternal Grandfather         MI at 52 yrs     EYE* Paternal Grandfather         cataracts     Arthritis Mother      Gynecology Mother         hysterectomy for fibroids     Hypertension Mother      Lipids Mother      Gastrointestinal Disease Father         ulcers     Heart Disease Father         intermittent rapid heartbeat     Cancer Father 84        Mesothelioma       Social History     Socioeconomic History     Marital status:      Spouse name: Fabrice     Number of children: 2     Years of education: 12     Highest education level: Not on file   Occupational History     Occupation:      Employer: [employer]     Comment: on her feet all day     Employer: [employer]   Social Needs     Financial resource strain: Not on file     Food insecurity     Worry: Not on file     Inability: Not on file     Transportation needs     Medical: Not on file     Non-medical: Not on file   Tobacco Use     Smoking status: Former Smoker     Packs/day: 0.50     Years: 15.00     Pack years: 7.50     Quit date: 2018     Years since quittin.5     Smokeless tobacco: Never Used   Substance and Sexual Activity     Alcohol use: No     Alcohol/week: 0.0 standard drinks     Frequency: Never     Drug use: No     Sexual activity: Yes     Partners: Male     Birth control/protection: Post-menopausal   Lifestyle     Physical activity     Days per week: Not on file     Minutes per session: Not on file     Stress: Not on file   Relationships     Social connections     Talks on phone: Not on file     Gets together: Not on file     Attends [employer] service: Not on file     Active member of club or organization: Not on file     Attends meetings of clubs or organizations: Not on file     Relationship status: Not on file     Intimate partner violence     Fear of current or ex partner: Not on file     Emotionally abused: Not on file     Physically abused: Not on file     Forced  sexual activity: Not on file   Other Topics Concern      Service No     Blood Transfusions No     Caffeine Concern No     Occupational Exposure No     Hobby Hazards No     Sleep Concern No     Stress Concern No     Weight Concern No     Special Diet Yes     Comment: diabetic     Back Care Yes     Exercise Yes     Bike Helmet No     Seat Belt Yes     Self-Exams Yes     Comment: occassionally     Parent/sibling w/ CABG, MI or angioplasty before 65F 55M? No   Social History Narrative     Not on file       Current Outpatient Medications   Medication Sig Dispense Refill     blood glucose (NO BRAND SPECIFIED) test strip Use to test blood sugar 1 times daily or as directed. 100 strip 4     Continuous Blood Gluc  (DEXCOM G6 ) BRITTANY 1 Device continuous 1 Device 0     Continuous Blood Gluc Sensor (DEXCOM G6 SENSOR) MISC CHANGE EVERY 10 DAYS 10 each 4     Continuous Blood Gluc Transmit (DEXCOM G6 TRANSMITTER) MISC CHANGE EVERY 3 MONTHS 1 each 3     glucagon 1 MG kit Inject 1 mg into the muscle once for 1 dose 1 each 0     insulin glargine (LANTUS SOLOSTAR) 100 UNIT/ML pen Inject 22 Units Subcutaneous At Bedtime 30 mL 1     insulin lispro (HUMALOG KWIKPEN) 100 UNIT/ML (1 unit dial) KWIKPEN Take 2u:15g breakfast, 2u:15g lunch, 2u:15g dinner,  + 1u/50mg/dL>150mg/dL +2u prime before each dose-total maximum 45 u/day 45 mL 3     insulin pen needle (31G X 8 MM) 31G X 8 MM miscellaneous Use 4 pen needles daily or as directed. 100 each 11     lisinopril (ZESTRIL) 40 MG tablet Take 1 tablet (40 mg) by mouth daily 93 tablet 3     Multiple Vitamins-Minerals (MULTIVITAMIN PO) Take by mouth daily       PARoxetine (PAXIL) 30 MG tablet TAKE 1 TABLET BY MOUTH EVERY MORNING 93 tablet 1     Pyridoxine HCl (VITAMIN B-6 PO) Take by mouth daily       simvastatin (ZOCOR) 10 MG tablet TAKE ONE TABLET BY MOUTH EVERY NIGHT AT BEDTIME 93 tablet 3     TURMERIC PO          Medications and history reviewed    Physical exam:  Vitals:  "/62   Temp 98.1  F (36.7  C) (Temporal)   Ht 1.6 m (5' 3\")   Wt 84.4 kg (186 lb)   LMP 10/15/2008 (Approximate)   BMI 32.95 kg/m    BMI= Body mass index is 32.95 kg/m .    Constitutional: Healthy, alert, non-distressed   Head: Normo-cephalic, atraumatic, no lesions, masses or tenderness   Cardiovascular: RRR, no new murmurs, +S1, +S2 heart sounds, no clicks, rubs or gallops   Respiratory: CTAB, no rales, rhonchi or wheezing, equal chest rise, good respiratory effort   Gastrointestinal: Soft, non-tender, non distended, no rebound rigidity or guarding, no masses or hernias palpated   : Deferred  Musculoskeletal: Moves all extremities, normal  strength, no deformities noted   Skin: No suspicious lesions or rashes   Psychiatric: Mentation appears normal, affect appropriate   Hematologic/Lymphatic/Immunologic: Normal cervical and supraclavicular lymph nodes   Patient able to get up on table without difficulty.    Labs show:  No results found for this or any previous visit (from the past 24 hour(s)).    Imaging shows:  No results found for this or any previous visit (from the past 744 hour(s)).      Assessment:     ICD-10-CM    1. Encounter for care related to Port-a-Cath  Z45.2      Plan: We will reach out to her oncologist and get the ok for removal. Sounds like it will be approved for removal. We have her scheduled for in office removal of port next week pending response from oncologist. We discussed the procedure. We also discussed after care. All of her questions were answered.    Aditya Harp, DO    "

## 2021-03-24 NOTE — LETTER
3/24/2021         RE: Niharika Cason  [address] [facility] [address]        Dear Colleague,    Thank you for referring your patient, Niharika Cason, to the [facility]. Please see a copy of my visit note below.    Patient seen in consultation for port removal by Henry Gomez    HPI:  Patient is a 65 year old female with history of rectal cancer s/p chemoradiation. Last infusion was in 2019. She has the ok for port removal from her surgeon, but not yet her oncologist. This is because there hasn't been communication with her for some time now. She does not take any blood thinning medications. She would be interested in having it removed in the office if possible.    Review Of Systems    Skin: negative  Ears/Nose/Throat: negative  Respiratory: No shortness of breath, dyspnea on exertion, cough, or hemoptysis  Cardiovascular: negative  Gastrointestinal: negative  Genitourinary: negative  Musculoskeletal: negative  Neurologic: negative  Hematologic/Lymphatic/Immunologic: negative  Endocrine: negative      Past Medical History:   Diagnosis Date     Essential hypertension, benign      Generalized anxiety disorder      Hyperlipidemia      Rectal cancer (H) 09/17/2018     S/P radiation therapy     5,040 cGy to Pelvis completed on 04/12/2019. - [facility] with Dr. Morris     Type I (juvenile type) diabetes mellitus without mention of complication, not stated as uncontrolled     diagnosed at age 35     Ulcerative colitis, unspecified     in the 70s.         Past Surgical History:   Procedure Laterality Date     COLONOSCOPY N/A 9/17/2018    Procedure: COMBINED COLONOSCOPY, SINGLE OR MULTIPLE BIOPSY/POLYPECTOMY BY BIOPSY;  colonoscopy with polypectomies by biopsy forceps and hot snare and submucosal injection with tattoo;  Surgeon: Richard Moore MD;  Location:  GI     HC COLONOSCOPY THRU STOMA, DIAGNOSTIC  1998    negative     HC CURETTAGE, POSTPARTUM  '91, '93     following miscarriages     HC REMOVAL OF TONSILS,<13 Y/O      Tonsils <14y.o.     INSERT PORT VASCULAR ACCESS Right 10/19/2018    Procedure: Chest Port Placement - right ;  Surgeon: Lawson Hitchcock PA-C;  [facility]       Family History   Problem Relation Age of Onset     Heart Disease Maternal Grandfather         MI at 52 yrs     EYE* Paternal Grandfather         cataracts     Arthritis Mother      Gynecology Mother         hysterectomy for fibroids     Hypertension Mother      Lipids Mother      Gastrointestinal Disease Father         ulcers     Heart Disease Father         intermittent rapid heartbeat     Cancer Father 84        Mesothelioma       Social History     Socioeconomic History     Marital status:      Spouse name: Fabrice     Number of children: 2     Years of education: 12     Highest education level: Not on file   Occupational History     Occupation:      Employer: [employer]     Comment: on her feet all day     Employer: [employer]   Social Needs     Financial resource strain: Not on file     Food insecurity     Worry: Not on file     Inability: Not on file     Transportation needs     Medical: Not on file     Non-medical: Not on file   Tobacco Use     Smoking status: Former Smoker     Packs/day: 0.50     Years: 15.00     Pack years: 7.50     Quit date: 2018     Years since quittin.5     Smokeless tobacco: Never Used   Substance and Sexual Activity     Alcohol use: No     Alcohol/week: 0.0 standard drinks     Frequency: Never     Drug use: No     Sexual activity: Yes     Partners: Male     Birth control/protection: Post-menopausal   Lifestyle     Physical activity     Days per week: Not on file     Minutes per session: Not on file     Stress: Not on file   Relationships     Social connections     Talks on phone: Not on file     Gets together: Not on file     Attends Temple service: Not on file     Active member of club or organization: Not on file     Attends  meetings of clubs or organizations: Not on file     Relationship status: Not on file     Intimate partner violence     Fear of current or ex partner: Not on file     Emotionally abused: Not on file     Physically abused: Not on file     Forced sexual activity: Not on file   Other Topics Concern      Service No     Blood Transfusions No     Caffeine Concern No     Occupational Exposure No     Hobby Hazards No     Sleep Concern No     Stress Concern No     Weight Concern No     Special Diet Yes     Comment: diabetic     Back Care Yes     Exercise Yes     Bike Helmet No     Seat Belt Yes     Self-Exams Yes     Comment: occassionally     Parent/sibling w/ CABG, MI or angioplasty before 65F 55M? No   Social History Narrative     Not on file       Current Outpatient Medications   Medication Sig Dispense Refill     blood glucose (NO BRAND SPECIFIED) test strip Use to test blood sugar 1 times daily or as directed. 100 strip 4     Continuous Blood Gluc  (DEXCOM G6 ) BRITTANY 1 Device continuous 1 Device 0     Continuous Blood Gluc Sensor (DEXCOM G6 SENSOR) MISC CHANGE EVERY 10 DAYS 10 each 4     Continuous Blood Gluc Transmit (DEXCOM G6 TRANSMITTER) MISC CHANGE EVERY 3 MONTHS 1 each 3     glucagon 1 MG kit Inject 1 mg into the muscle once for 1 dose 1 each 0     insulin glargine (LANTUS SOLOSTAR) 100 UNIT/ML pen Inject 22 Units Subcutaneous At Bedtime 30 mL 1     insulin lispro (HUMALOG KWIKPEN) 100 UNIT/ML (1 unit dial) KWIKPEN Take 2u:15g breakfast, 2u:15g lunch, 2u:15g dinner,  + 1u/50mg/dL>150mg/dL +2u prime before each dose-total maximum 45 u/day 45 mL 3     insulin pen needle (31G X 8 MM) 31G X 8 MM miscellaneous Use 4 pen needles daily or as directed. 100 each 11     lisinopril (ZESTRIL) 40 MG tablet Take 1 tablet (40 mg) by mouth daily 93 tablet 3     Multiple Vitamins-Minerals (MULTIVITAMIN PO) Take by mouth daily       PARoxetine (PAXIL) 30 MG tablet TAKE 1 TABLET BY MOUTH EVERY MORNING 93  "tablet 1     Pyridoxine HCl (VITAMIN B-6 PO) Take by mouth daily       simvastatin (ZOCOR) 10 MG tablet TAKE ONE TABLET BY MOUTH EVERY NIGHT AT BEDTIME 93 tablet 3     TURMERIC PO          Medications and history reviewed    Physical exam:  Vitals: /62   Temp 98.1  F (36.7  C) (Temporal)   Ht 1.6 m (5' 3\")   Wt 84.4 kg (186 lb)   LMP 10/15/2008 (Approximate)   BMI 32.95 kg/m    BMI= Body mass index is 32.95 kg/m .    Constitutional: Healthy, alert, non-distressed   Head: Normo-cephalic, atraumatic, no lesions, masses or tenderness   Cardiovascular: RRR, no new murmurs, +S1, +S2 heart sounds, no clicks, rubs or gallops   Respiratory: CTAB, no rales, rhonchi or wheezing, equal chest rise, good respiratory effort   Gastrointestinal: Soft, non-tender, non distended, no rebound rigidity or guarding, no masses or hernias palpated   : Deferred  Musculoskeletal: Moves all extremities, normal  strength, no deformities noted   Skin: No suspicious lesions or rashes   Psychiatric: Mentation appears normal, affect appropriate   Hematologic/Lymphatic/Immunologic: Normal cervical and supraclavicular lymph nodes   Patient able to get up on table without difficulty.    Labs show:  No results found for this or any previous visit (from the past 24 hour(s)).    Imaging shows:  No results found for this or any previous visit (from the past 744 hour(s)).      Assessment:     ICD-10-CM    1. Encounter for care related to Port-a-Cath  Z45.2      Plan: We will reach out to her oncologist and get the ok for removal. Sounds like it will be approved for removal. We have her scheduled for in office removal of port next week pending response from oncologist. We discussed the procedure. We also discussed after care. All of her questions were answered.    Aditya Harp, DO        Again, thank you for allowing me to participate in the care of your patient.        Sincerely,        Aditya Harp, DO    "

## 2021-03-29 PROBLEM — E78.2 MIXED HYPERLIPIDEMIA: Status: ACTIVE | Noted: 2021-03-29

## 2021-03-31 ENCOUNTER — OFFICE VISIT (OUTPATIENT)
Dept: SURGERY | Facility: CLINIC | Age: 66
End: 2021-03-31
Payer: COMMERCIAL

## 2021-03-31 VITALS
WEIGHT: 188 LBS | BODY MASS INDEX: 33.31 KG/M2 | SYSTOLIC BLOOD PRESSURE: 130 MMHG | HEIGHT: 63 IN | DIASTOLIC BLOOD PRESSURE: 72 MMHG

## 2021-03-31 DIAGNOSIS — Z45.2 ENCOUNTER FOR CARE RELATED TO PORT-A-CATH: Primary | ICD-10-CM

## 2021-03-31 PROCEDURE — 36590 REMOVAL TUNNELED CV CATH: CPT | Performed by: SURGERY

## 2021-03-31 ASSESSMENT — MIFFLIN-ST. JEOR: SCORE: 1376.89

## 2021-03-31 NOTE — PROGRESS NOTES
PROCEDURE: port removal   Written consent was obtained    The right chest area was prepped and appropriately anesthetized with 1% lidocaine with epinephrine. Using the usual technique, tunneled internal jugular port removal was performed. The port was removed without fracture. Figure of eight 3-0 vicryl used to close tract. Skin closure was accomplished with interrupted 3-0 vicryl for the dermal layer and running subcuticular 4-0 monocryl for the skin. Total length of the incision after closure was 2.0 cm. An appropriate  dressing was applied.  The procedure was well tolerated and without complications.

## 2021-03-31 NOTE — LETTER
3/31/2021         RE: Niharika Cason  [address] [facility] [address]        Dear Colleague,    Thank you for referring your patient, Niharika Cason, to the [facility]. Please see a copy of my visit note below.    PROCEDURE: port removal   Written consent was obtained    The right chest area was prepped and appropriately anesthetized with 1% lidocaine with epinephrine. Using the usual technique, tunneled internal jugular port removal was performed. The port was removed without fracture. Figure of eight 3-0 vicryl used to close tract. Skin closure was accomplished with interrupted 3-0 vicryl for the dermal layer and running subcuticular 4-0 monocryl for the skin. Total length of the incision after closure was 2.0 cm. An appropriate  dressing was applied.  The procedure was well tolerated and without complications.           Again, thank you for allowing me to participate in the care of your patient.        Sincerely,        Aditya Harp, DO

## 2021-05-13 NOTE — PROGRESS NOTES
"Colon and Rectal Surgery Follow-up Clinic Note    RE: Niharika Gonsales  : 1955  MARILEE: 2021    DIAGNOSIS:     10/2018: mT3cN1 proximal rectal adenocarcinoma (intact IHC) found on 1st screening colonoscopy. 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    10/2018-2019: XELOX x6.    3/-19: CXRT (5040 cGy).    2019: cCR (enrolled in watch and wait protocol).    INTERVAL HISTORY: Denies increased pain, fevers, or chills. Tolerating diet well. Having 1-2 BM's per day with mild fecal urgency and seepage. Denies incontinence per se or BPR.  Denies any urinary or sexual function issues at this time.    Physical Examination:  /73 (BP Location: Left arm, Patient Position: Sitting, Cuff Size: Adult Regular)   Pulse 80   Ht 5' 3\"   Wt 189 lb 11.2 oz   LMP 10/15/2008 (Approximate)   SpO2 98%   BMI 33.60 kg/m    Abdomen soft, NT. No inguinal adenopathy palpated.  Perianal skin intact.   ALEC: no masses palpated.  Flexible sigmoidoscopy: after obtaining informed consent and performing a \"time out\", an adult flexible sigmoidoscope was introduced through the anus and passed up to the proximal sigmoid colon. The quality of the prep was fair. Findings: linear 2x2-cm white scar with central telangiectasia located at the left posterior aspect of the mid rectum (7-cm from dentate line), covering approx 30% of the lumen circumference. Distal edge of the scar is at the mid rectal valve. Tattoo was identified. No additional abnormalities were seen. Total scope time: 12 minutes. The patient tolerated the procedure well.    ASSESSMENT  cCR. No evidence of recurrent neoplasia.     Results for NIHARIKA GONSALES (MRN [id number]) as of 2021 10:54   Ref. Range 2019 14:00 2020 11:13 2020 15:22 12/15/2020 08:15 5/15/2021 10:52   CEA Latest Ref Range: 0 - 2.5 ug/L 2.8 (H) 2.9 (H) 3.8 (H) 2.6 (H) 3.8 (H)     CT c/a/p (5/15/2021):  IMPRESSION: In this patient with history of rectal cancer on " watch and  wait protocol:  1. No convincing local recurrence about Doppler rectal on today's  exam. Please refer to same-day rectal MRI for better detailed  evaluation.  2. No convincing evidence for metastatic disease in the chest,  abdomen, pelvis and bones.  3. Stable indeterminate subsequent millimeter pulmonary nodules.  Occasional follow-up studies.  4. Myomatous uterus with scattered calcified fibroids. Additional  incidental findings as described above.    3T MR pelvis (5/15/21):   IMPRESSION:   1. Ongoing continued positive response to treatment with no  significant progression. Questionable minimal residual tumor is again  noted as described above, no interval change. These findings  correspond to a tumor regression grade of mrTRG2.  2. No suspicious pelvic lymphadenopathy.  3. Myomatous uterus. Additional incidental findings as described  above.    Pathology (6/2019):  FINAL DIAGNOSIS:   Large intestine, rectum, biopsy of scar   - Lamina propria fibrosis with mild crypt distortion   - No evidence of malignancy or neoplastic polyp     Colonoscopy 10/17/19: without evidence of recurrence.    PLAN  1. High fiber diet. Metamucil 1 tbsp BID.  2. CEA at every imaging and/or clinic visit.  3. Flex Sig in 12/2021.   4. 3T MR pelvis in 11/2021.  5. CT c/a/p in 5/2022.  6. Colonoscopy in 9/2021.   7. We will discuss at multidisciplinary rectal cancer conference.    Follow up per Watch and Wait Protocol:   Completed CRT: 4/12/19    Flexible sigmoidoscopy     Every 2 months for 6 months: Completed    Every 3 months for 3 years: Until 4/2022 Due 8/2021    Every 6 months for 5 years: Until 4/2024    Every year for 10 years: Until 4/2029      3T MRI of pelvis    6-8 weeks after CRT:    Every 4 months for 2 years: Until 4/2021 Completed    Every 6 months for 2 years: Until 4/2023 Due 11/2021    Yearly for 2 years: Until 4/2025      CT CAP    Every year for 6-8 years: Until 4/2027 Due 5/2022      Colonoscopy     Due  10/2021      CEA at every clinic or endoscopic visit    30 minutes spent on the date of the encounter doing chart review, history and exam, documentation and further activities as noted above.     Alli Le M.D., M.Sc.     Division of Colon and Rectal Surgery  [facility]    Referring Provider:  Manjeet Moore MD  [address] [place] [address]      Primary Care Provider:  Leona Farris    CC:  Dayron Lawton MD.  Ynes Jimenez MD.  Yohana Morris MD.

## 2021-05-15 ENCOUNTER — ANCILLARY PROCEDURE (OUTPATIENT)
Dept: CT IMAGING | Facility: CLINIC | Age: 66
End: 2021-05-15
Attending: COLON & RECTAL SURGERY
Payer: COMMERCIAL

## 2021-05-15 ENCOUNTER — ANCILLARY PROCEDURE (OUTPATIENT)
Dept: MRI IMAGING | Facility: CLINIC | Age: 66
End: 2021-05-15
Attending: COLON & RECTAL SURGERY
Payer: COMMERCIAL

## 2021-05-15 DIAGNOSIS — Z85.048 HISTORY OF RECTAL CANCER: ICD-10-CM

## 2021-05-15 LAB
CEA SERPL-MCNC: 3.8 UG/L (ref 0–2.5)
CREAT BLD-MCNC: 0.6 MG/DL (ref 0.52–1.04)
GFR SERPL CREATININE-BSD FRML MDRD: >90 ML/MIN/{1.73_M2}

## 2021-05-15 PROCEDURE — 71260 CT THORAX DX C+: CPT | Mod: GV | Performed by: RADIOLOGY

## 2021-05-15 PROCEDURE — 36415 COLL VENOUS BLD VENIPUNCTURE: CPT | Performed by: PATHOLOGY

## 2021-05-15 PROCEDURE — 82378 CARCINOEMBRYONIC ANTIGEN: CPT | Mod: 90 | Performed by: PATHOLOGY

## 2021-05-15 PROCEDURE — 72197 MRI PELVIS W/O & W/DYE: CPT | Mod: GV | Performed by: RADIOLOGY

## 2021-05-15 PROCEDURE — 74177 CT ABD & PELVIS W/CONTRAST: CPT | Mod: GV | Performed by: RADIOLOGY

## 2021-05-15 PROCEDURE — 82565 ASSAY OF CREATININE: CPT | Performed by: PATHOLOGY

## 2021-05-15 PROCEDURE — A9585 GADOBUTROL INJECTION: HCPCS | Mod: GV | Performed by: RADIOLOGY

## 2021-05-15 RX ORDER — IOPAMIDOL 755 MG/ML
115 INJECTION, SOLUTION INTRAVASCULAR ONCE
Status: COMPLETED | OUTPATIENT
Start: 2021-05-15 | End: 2021-05-15

## 2021-05-15 RX ORDER — GADOBUTROL 604.72 MG/ML
7.5 INJECTION INTRAVENOUS ONCE
Status: COMPLETED | OUTPATIENT
Start: 2021-05-15 | End: 2021-05-15

## 2021-05-15 RX ADMIN — IOPAMIDOL 115 ML: 755 INJECTION, SOLUTION INTRAVASCULAR at 10:15

## 2021-05-15 RX ADMIN — GADOBUTROL 7.5 ML: 604.72 INJECTION INTRAVENOUS at 09:25

## 2021-05-15 NOTE — DISCHARGE INSTRUCTIONS
MRI Contrast Discharge Instructions    The IV contrast you received today will pass out of your body in your  urine. This will happen in the next 24 hours. You will not feel this process.  Your urine will not change color.    Drink at least 4 extra glasses of water or juice today (unless your doctor  has restricted your fluids). This reduces the stress on your kidneys.  You may take your regular medicines.    If you are on dialysis: It is best to have dialysis today.    If you have a reaction: Most reactions happen right away. If you have  any new symptoms after leaving the hospital (such as hives or swelling),  call your hospital at the correct number below. Or call your family doctor.  If you have breathing distress or wheezing, call 911.    Special instructions: ***    I have read and understand the above information.    Signature:______________________________________ Date:___________    Staff:__________________________________________ Date:___________     Time:__________    [facility] Departments:    ___[place]: [phone]  ___[facility]: [phone]  ___[place]: [phone] ___[facility]: [phone]  ___[facility]: [phone]  ___[facility]: [phone]  ___[phone]  ___[facility]: [phone]  ___[phone]

## 2021-05-17 ENCOUNTER — OFFICE VISIT (OUTPATIENT)
Dept: SURGERY | Facility: CLINIC | Age: 66
End: 2021-05-17
Payer: COMMERCIAL

## 2021-05-17 VITALS
WEIGHT: 189.7 LBS | HEIGHT: 63 IN | OXYGEN SATURATION: 98 % | DIASTOLIC BLOOD PRESSURE: 73 MMHG | HEART RATE: 80 BPM | SYSTOLIC BLOOD PRESSURE: 138 MMHG | BODY MASS INDEX: 33.61 KG/M2

## 2021-05-17 DIAGNOSIS — Z85.048 HISTORY OF RECTAL CANCER: Primary | ICD-10-CM

## 2021-05-17 PROCEDURE — 45330 DIAGNOSTIC SIGMOIDOSCOPY: CPT | Performed by: COLON & RECTAL SURGERY

## 2021-05-17 ASSESSMENT — PAIN SCALES - GENERAL: PAINLEVEL: NO PAIN (0)

## 2021-05-17 ASSESSMENT — MIFFLIN-ST. JEOR: SCORE: 1384.6

## 2021-05-17 NOTE — LETTER
"2021       RE: Niharika Cason  [address] [facility] [address]     Dear Colleague,    Thank you for referring your patient, Niharika Cason, to the [facility] COLON AND RECTAL SURGERY CLINIC [place] at [facility]. Please see a copy of my visit note below.    Colon and Rectal Surgery Follow-up Clinic Note    RE: Niharika Cason  : 1955  MARILEE: 2021    DIAGNOSIS:     10/2018: mT3cN1 proximal rectal adenocarcinoma (intact IHC) found on 1st screening colonoscopy. 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    10/2018-2019: XELOX x6.    3/-19: CXRT (5040 cGy).    2019: cCR (enrolled in watch and wait protocol).    INTERVAL HISTORY: Denies increased pain, fevers, or chills. Tolerating diet well. Having 1-2 BM's per day with mild fecal urgency and seepage. Denies incontinence per se or BPR.  Denies any urinary or sexual function issues at this time.    Physical Examination:  /73 (BP Location: Left arm, Patient Position: Sitting, Cuff Size: Adult Regular)   Pulse 80   Ht 5' 3\"   Wt 189 lb 11.2 oz   LMP 10/15/2008 (Approximate)   SpO2 98%   BMI 33.60 kg/m    Abdomen soft, NT. No inguinal adenopathy palpated.  Perianal skin intact.   ALEC: no masses palpated.  Flexible sigmoidoscopy: after obtaining informed consent and performing a \"time out\", an adult flexible sigmoidoscope was introduced through the anus and passed up to the proximal sigmoid colon. The quality of the prep was fair. Findings: linear 2x2-cm white scar with central telangiectasia located at the left posterior aspect of the mid rectum (7-cm from dentate line), covering approx 30% of the lumen circumference. Distal edge of the scar is at the mid rectal valve. Tattoo was identified. No additional abnormalities were seen. Total scope time: 12 minutes. The patient tolerated the procedure well.    ASSESSMENT  cCR. No evidence of recurrent neoplasia. "     Results for JOSELIN GONSALES (MRN [id number]) as of 5/17/2021 10:54   Ref. Range 11/14/2019 14:00 6/1/2020 11:13 9/28/2020 15:22 12/15/2020 08:15 5/15/2021 10:52   CEA Latest Ref Range: 0 - 2.5 ug/L 2.8 (H) 2.9 (H) 3.8 (H) 2.6 (H) 3.8 (H)     CT c/a/p (5/15/2021):  IMPRESSION: In this patient with history of rectal cancer on watch and  wait protocol:  1. No convincing local recurrence about Doppler rectal on today's  exam. Please refer to same-day rectal MRI for better detailed  evaluation.  2. No convincing evidence for metastatic disease in the chest,  abdomen, pelvis and bones.  3. Stable indeterminate subsequent millimeter pulmonary nodules.  Occasional follow-up studies.  4. Myomatous uterus with scattered calcified fibroids. Additional  incidental findings as described above.    3T MR pelvis (5/15/21):   IMPRESSION:   1. Ongoing continued positive response to treatment with no  significant progression. Questionable minimal residual tumor is again  noted as described above, no interval change. These findings  correspond to a tumor regression grade of mrTRG2.  2. No suspicious pelvic lymphadenopathy.  3. Myomatous uterus. Additional incidental findings as described  above.    Pathology (6/2019):  FINAL DIAGNOSIS:   Large intestine, rectum, biopsy of scar   - Lamina propria fibrosis with mild crypt distortion   - No evidence of malignancy or neoplastic polyp     Colonoscopy 10/17/19: without evidence of recurrence.    PLAN  1. High fiber diet. Metamucil 1 tbsp BID.  2. CEA at every imaging and/or clinic visit.  3. Flex Sig in 12/2021.   4. 3T MR pelvis in 11/2021.  5. CT c/a/p in 5/2022.  6. Colonoscopy in 9/2021.   7. We will discuss at multidisciplinary rectal cancer conference.    Follow up per Watch and Wait Protocol:   Completed CRT: 4/12/19    Flexible sigmoidoscopy     Every 2 months for 6 months: Completed    Every 3 months for 3 years: Until 4/2022 Due 8/2021    Every 6 months for 5 years: Until  4/2024    Every year for 10 years: Until 4/2029      3T MRI of pelvis    6-8 weeks after CRT:    Every 4 months for 2 years: Until 4/2021 Completed    Every 6 months for 2 years: Until 4/2023 Due 11/2021    Yearly for 2 years: Until 4/2025      CT CAP    Every year for 6-8 years: Until 4/2027 Due 5/2022      Colonoscopy     Due 10/2021      CEA at every clinic or endoscopic visit    30 minutes spent on the date of the encounter doing chart review, history and exam, documentation and further activities as noted above.     Alli Le M.D., M.Sc.     Division of Colon and Rectal Surgery  [facility]    Referring Provider:  Manjeet Moore MD  [address] [place] [address]      Primary Care Provider:  Leona Farris    CC:  Dayron Lawton MD.  Ynes Jimenez MD.  Yohana Morris MD.

## 2021-05-17 NOTE — NURSING NOTE
"Chief Complaint   Patient presents with     Flexible Sigmoidoscopy     Flexible sigmoidoscopy per [facility] protocol for rectal cancer       Vitals:    05/17/21 1049   BP: 138/73   BP Location: Left arm   Patient Position: Sitting   Cuff Size: Adult Regular   Pulse: 80   SpO2: 98%   Weight: 189 lb 11.2 oz   Height: 5' 3\"       Body mass index is 33.6 kg/m .    Donna Heller MA    "

## 2021-05-24 ENCOUNTER — TUMOR CONFERENCE (OUTPATIENT)
Dept: ONCOLOGY | Facility: CLINIC | Age: 66
End: 2021-05-24
Payer: COMMERCIAL

## 2021-05-24 NOTE — PROGRESS NOTES
Tumor Conference Information  Tumor Conference: Colorectal  Specialties Present: Medical oncology, Radiation oncology, Radiology, Surgery  Patient Status: A current patient  Pathology: Not Discussed  Treatment to Date: Chemoradiation  Clinical Trial Eligibility: Not discussed  Recommended Plan: Observation (see comment) (Comment: [facility] patient)  Did the review exceed 30 minutes?: did not           Documentation / Disclaimer Cancer Tumor Board Note  Cancer tumor board recommendations do not override what is determined to be reasonable care and treatment, which is dependent on the circumstances of a patient's case; the patient's medical, social, and personal concerns; and the clinical judgment of the oncologist [physician].

## 2021-07-10 DIAGNOSIS — E10.8 TYPE 1 DIABETES MELLITUS WITH COMPLICATION (H): Primary | ICD-10-CM

## 2021-07-13 RX ORDER — PEN NEEDLE, DIABETIC 32GX 5/32"
NEEDLE, DISPOSABLE MISCELLANEOUS
Qty: 100 EACH | Refills: 1 | Status: SHIPPED | OUTPATIENT
Start: 2021-07-13 | End: 2021-12-31

## 2021-07-20 DIAGNOSIS — Z85.048 HISTORY OF RECTAL CANCER: Primary | ICD-10-CM

## 2021-07-23 ENCOUNTER — TRANSFERRED RECORDS (OUTPATIENT)
Dept: HEALTH INFORMATION MANAGEMENT | Facility: CLINIC | Age: 66
End: 2021-07-23

## 2021-07-23 ENCOUNTER — LAB (OUTPATIENT)
Dept: LAB | Facility: CLINIC | Age: 66
End: 2021-07-23
Payer: COMMERCIAL

## 2021-07-23 DIAGNOSIS — Z85.048 HISTORY OF RECTAL CANCER: ICD-10-CM

## 2021-07-23 LAB
CEA SERPL-MCNC: 3.3 UG/L (ref 0–2.5)
RETINOPATHY: NEGATIVE

## 2021-07-23 PROCEDURE — 36415 COLL VENOUS BLD VENIPUNCTURE: CPT

## 2021-07-23 PROCEDURE — 82378 CARCINOEMBRYONIC ANTIGEN: CPT

## 2021-08-03 NOTE — PROGRESS NOTES
"Our records indicate that you have not scheduled an appointment for a colonoscopy, as recommended by Alli Melo. If you wish to schedule within [facility], we have several options to help you schedule your appointment:      Call [phone] option 2    Visit our website at [email or web address] and click \"I Want To\" (in upper right) and select Request an Appointment    Request an appointment via [employer] at [email or web address]     If you have chosen to schedule elsewhere or if you have already made an appointment, please disregard this letter.    If you have any questions or concerns regarding the information above, please contact the [facility] Gastro[facility]ology Scheduling department, at [phone] option 2.      Sincerely,      [facility] Scheduling     "

## 2021-08-08 ENCOUNTER — ANCILLARY PROCEDURE (OUTPATIENT)
Dept: MRI IMAGING | Facility: CLINIC | Age: 66
End: 2021-08-08
Attending: COLON & RECTAL SURGERY
Payer: COMMERCIAL

## 2021-08-08 DIAGNOSIS — Z85.048 HISTORY OF RECTAL CANCER: ICD-10-CM

## 2021-08-08 PROCEDURE — 72197 MRI PELVIS W/O & W/DYE: CPT | Mod: GV | Performed by: RADIOLOGY

## 2021-08-08 PROCEDURE — A9585 GADOBUTROL INJECTION: HCPCS | Mod: GV | Performed by: RADIOLOGY

## 2021-08-08 RX ORDER — GADOBUTROL 604.72 MG/ML
10 INJECTION INTRAVENOUS ONCE
Status: COMPLETED | OUTPATIENT
Start: 2021-08-08 | End: 2021-08-08

## 2021-08-08 RX ADMIN — GADOBUTROL 8.5 ML: 604.72 INJECTION INTRAVENOUS at 09:36

## 2021-08-08 NOTE — DISCHARGE INSTRUCTIONS
MRI Contrast Discharge Instructions    The IV contrast you received today will pass out of your body in your  urine. This will happen in the next 24 hours. You will not feel this process.  Your urine will not change color.    Drink at least 4 extra glasses of water or juice today (unless your doctor  has restricted your fluids). This reduces the stress on your kidneys.  You may take your regular medicines.    If you are on dialysis: It is best to have dialysis today.    If you have a reaction: Most reactions happen right away. If you have  any new symptoms after leaving the hospital (such as hives or swelling),  call your hospital at the correct number below. Or call your family doctor.  If you have breathing distress or wheezing, call 911.    Special instructions: ***    I have read and understand the above information.    Signature:______________________________________ Date:___________    Staff:__________________________________________ Date:___________     Time:__________    [place] [facility] Departments:    ___[place]: [phone]  ___[facility]: [phone]  ___[place]: [phone] ___[facility]: [phone]  ___[facility]: [phone]  ___[facility]: [phone]  ___[phone]  ___[facility]: [phone]  ___[phone]

## 2021-08-19 NOTE — PROGRESS NOTES
"Colon and Rectal Surgery Follow-up Clinic Note    RE: Niharika Cason  : 1955  MARILEE: 2021    DIAGNOSIS:     10/2018: mT3cN1 proximal rectal adenocarcinoma (intact IHC) found on 1st screening colonoscopy. 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    10/2018-2019: XELOX x6.    3/-19: CXRT (5040 cGy).    2019: cCR (enrolled in watch and wait protocol).    INTERVAL HISTORY: Denies increased pain, fevers, or chills. Tolerating diet well. Having 1-2 BM's per day with mild fecal urgency and seepage. Denies incontinence per se or BPR. Does acknowledge stress urinary incontinence but this was present before her rectal cancer treatment. Denies additional urinary or sexual function issues at this time.    Physical Examination:  /68 (BP Location: Left arm, Patient Position: Sitting, Cuff Size: Adult Regular)   Pulse 90   Temp 98.1  F (36.7  C) (Oral)   Ht 5' 3\"   Wt 190 lb 4.8 oz   LMP 10/15/2008 (Approximate)   SpO2 95%   BMI 33.71 kg/m    Abdomen soft, NT. No inguinal adenopathy palpated.  Perianal skin intact.   ALEC: no masses palpated.  Flexible sigmoidoscopy: after obtaining informed consent and performing a \"time out\", an adult flexible sigmoidoscope was introduced through the anus and passed up to the proximal sigmoid colon. The quality of the prep was fair. Findings: linear 2x2-cm white scar with central telangiectasia located at the left posterior aspect of the mid rectum (7-cm from dentate line), covering approx 30% of the lumen circumference. Distal edge of the scar is at the mid rectal valve. Tattoo was identified. No additional abnormalities were seen. Total scope time: 12 minutes. The patient tolerated the procedure well.    ASSESSMENT  cCR. No evidence of recurrent neoplasia.      2020 11:13 2020 15:22 12/15/2020 08:15 5/15/2021 10:52 2021 16:19   CEA 2.9 (H) 3.8 (H) 2.6 (H) 3.8 (H) 3.3 (H)     CT c/a/p (5/15/2021):  IMPRESSION: In this patient with " history of rectal cancer on watch and  wait protocol:  1. No convincing local recurrence about Doppler rectal on today's  exam. Please refer to same-day rectal MRI for better detailed  evaluation.  2. No convincing evidence for metastatic disease in the chest,  abdomen, pelvis and bones.  3. Stable indeterminate subsequent millimeter pulmonary nodules.  Occasional follow-up studies.  4. Myomatous uterus with scattered calcified fibroids. Additional  incidental findings as described above.    3T MR pelvis (8/8/21):   IMPRESSION:   1. Ongoing continued positive response to treatment with no  significant progression. No appreciable MRI findings concerning for  residual tumor on today's exam. These findings correspond to a tumor  regression grade of mrTRG1.  2. No suspicious pelvic lymphadenopathy.  3. Myomatous uterus. Additional incidental findings as described  above.    Pathology (6/2019):  FINAL DIAGNOSIS:   Large intestine, rectum, biopsy of scar   - Lamina propria fibrosis with mild crypt distortion   - No evidence of malignancy or neoplastic polyp     Colonoscopy 10/17/19: without evidence of recurrence.    PLAN  1. High fiber diet. Metamucil 1 tbsp BID.  2. CEA at every imaging and/or clinic visit.  3. Flex Sig in 1/2022.   4. 3T MR pelvis in 2/2022.  5. CT c/a/p in 5/2022.  6. Colonoscopy in 10/2021.     Follow up per Watch and Wait Protocol:   Completed CRT: 4/12/19    Flexible sigmoidoscopy     Every 2 months for 6 months: Completed    Every 3 months for 3 years: Until 4/2022 Due 1/2022    Every 6 months for 5 years: Until 4/2024    Every year for 10 years: Until 4/2029      3T MRI of pelvis    6-8 weeks after CRT:    Every 4 months for 2 years: Until 4/2021 Completed    Every 6 months for 2 years: Until 4/2023 Due 2/2022    Yearly for 2 years: Until 4/2025      CT CAP    Every year for 6-8 years: Until 4/2027 Due 5/2022      Colonoscopy     Due 10/2021      CEA at every clinic or endoscopic visit    30  minutes spent on the date of the encounter doing chart review, history and exam, documentation and further activities as noted above.     Alli Le M.D., M.Sc.     Division of Colon and Rectal Surgery  [facility]    Referring Provider:  Manjeet Moore MD  [address] [place] [id number]      Primary Care Provider:  Leona Farris    CC:  Dayron Lawton MD.  Ynes Jimenez MD.  Yohana Morris MD.

## 2021-08-23 ENCOUNTER — TELEPHONE (OUTPATIENT)
Dept: GASTROENTEROLOGY | Facility: OUTPATIENT CENTER | Age: 66
End: 2021-08-23

## 2021-08-23 ENCOUNTER — OFFICE VISIT (OUTPATIENT)
Dept: SURGERY | Facility: CLINIC | Age: 66
End: 2021-08-23
Payer: COMMERCIAL

## 2021-08-23 VITALS
SYSTOLIC BLOOD PRESSURE: 137 MMHG | WEIGHT: 190.3 LBS | DIASTOLIC BLOOD PRESSURE: 68 MMHG | HEART RATE: 90 BPM | TEMPERATURE: 98.1 F | HEIGHT: 63 IN | BODY MASS INDEX: 33.72 KG/M2 | OXYGEN SATURATION: 95 %

## 2021-08-23 DIAGNOSIS — Z85.048 HISTORY OF RECTAL CANCER: Primary | ICD-10-CM

## 2021-08-23 PROCEDURE — 45330 DIAGNOSTIC SIGMOIDOSCOPY: CPT | Performed by: COLON & RECTAL SURGERY

## 2021-08-23 ASSESSMENT — MIFFLIN-ST. JEOR: SCORE: 1387.33

## 2021-08-23 ASSESSMENT — PAIN SCALES - GENERAL: PAINLEVEL: NO PAIN (0)

## 2021-08-23 NOTE — NURSING NOTE
"Chief Complaint   Patient presents with     Flexible Sigmoidoscopy     3 month follow up flex sig per [facility] protocol       Vitals:    08/23/21 1357   BP: 137/68   BP Location: Left arm   Patient Position: Sitting   Cuff Size: Adult Regular   Pulse: 90   Temp: 98.1  F (36.7  C)   TempSrc: Oral   SpO2: 95%   Weight: 190 lb 4.8 oz   Height: 5' 3\"       Body mass index is 33.71 kg/m .         Leeann Groves CMA    "

## 2021-08-23 NOTE — LETTER
"2021       RE: Niharika Cason  [address] [facility] [address]     Dear Colleague,    Thank you for referring your patient, Niharika Cason, to the [facility] COLON AND RECTAL SURGERY CLINIC [place] at [facility]. Please see a copy of my visit note below.    Colon and Rectal Surgery Follow-up Clinic Note    RE: Niharika Cason  : 1955  MARILEE: 2021    DIAGNOSIS:     10/2018: mT3cN1 proximal rectal adenocarcinoma (intact IHC) found on 1st screening colonoscopy. 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    10/2018-2019: XELOX x6.    3/-19: CXRT (5040 cGy).    2019: cCR (enrolled in watch and wait protocol).    INTERVAL HISTORY: Denies increased pain, fevers, or chills. Tolerating diet well. Having 1-2 BM's per day with mild fecal urgency and seepage. Denies incontinence per se or BPR. Does acknowledge stress urinary incontinence but this was present before her rectal cancer treatment. Denies additional urinary or sexual function issues at this time.    Physical Examination:  /68 (BP Location: Left arm, Patient Position: Sitting, Cuff Size: Adult Regular)   Pulse 90   Temp 98.1  F (36.7  C) (Oral)   Ht 5' 3\"   Wt 190 lb 4.8 oz   LMP 10/15/2008 (Approximate)   SpO2 95%   BMI 33.71 kg/m    Abdomen soft, NT. No inguinal adenopathy palpated.  Perianal skin intact.   ALEC: no masses palpated.  Flexible sigmoidoscopy: after obtaining informed consent and performing a \"time out\", an adult flexible sigmoidoscope was introduced through the anus and passed up to the proximal sigmoid colon. The quality of the prep was fair. Findings: linear 2x2-cm white scar with central telangiectasia located at the left posterior aspect of the mid rectum (7-cm from dentate line), covering approx 30% of the lumen circumference. Distal edge of the scar is at the mid rectal valve. Tattoo was identified. No additional abnormalities were " seen. Total scope time: 12 minutes. The patient tolerated the procedure well.    ASSESSMENT  cCR. No evidence of recurrent neoplasia.      6/1/2020 11:13 9/28/2020 15:22 12/15/2020 08:15 5/15/2021 10:52 7/23/2021 16:19   CEA 2.9 (H) 3.8 (H) 2.6 (H) 3.8 (H) 3.3 (H)     CT c/a/p (5/15/2021):  IMPRESSION: In this patient with history of rectal cancer on watch and  wait protocol:  1. No convincing local recurrence about Doppler rectal on today's  exam. Please refer to same-day rectal MRI for better detailed  evaluation.  2. No convincing evidence for metastatic disease in the chest,  abdomen, pelvis and bones.  3. Stable indeterminate subsequent millimeter pulmonary nodules.  Occasional follow-up studies.  4. Myomatous uterus with scattered calcified fibroids. Additional  incidental findings as described above.    3T MR pelvis (8/8/21):   IMPRESSION:   1. Ongoing continued positive response to treatment with no  significant progression. No appreciable MRI findings concerning for  residual tumor on today's exam. These findings correspond to a tumor  regression grade of mrTRG1.  2. No suspicious pelvic lymphadenopathy.  3. Myomatous uterus. Additional incidental findings as described  above.    Pathology (6/2019):  FINAL DIAGNOSIS:   Large intestine, rectum, biopsy of scar   - Lamina propria fibrosis with mild crypt distortion   - No evidence of malignancy or neoplastic polyp     Colonoscopy 10/17/19: without evidence of recurrence.    PLAN  1. High fiber diet. Metamucil 1 tbsp BID.  2. CEA at every imaging and/or clinic visit.  3. Flex Sig in 1/2022.   4. 3T MR pelvis in 2/2022.  5. CT c/a/p in 5/2022.  6. Colonoscopy in 10/2021.     Follow up per Watch and Wait Protocol:   Completed CRT: 4/12/19    Flexible sigmoidoscopy     Every 2 months for 6 months: Completed    Every 3 months for 3 years: Until 4/2022 Due 1/2022    Every 6 months for 5 years: Until 4/2024    Every year for 10 years: Until 4/2029      3T MRI of  pelvis    6-8 weeks after CRT:    Every 4 months for 2 years: Until 4/2021 Completed    Every 6 months for 2 years: Until 4/2023 Due 2/2022    Yearly for 2 years: Until 4/2025      CT CAP    Every year for 6-8 years: Until 4/2027 Due 5/2022      Colonoscopy     Due 10/2021      CEA at every clinic or endoscopic visit    30 minutes spent on the date of the encounter doing chart review, history and exam, documentation and further activities as noted above.     Alli Le M.D., M.Sc.     Division of Colon and Rectal Surgery  [facility]    Referring Provider:  Manjeet Moore MD  [address] [place] [address]      Primary Care Provider:  Leona Farris    CC:  Dayron Lawton MD.  Ynes Jimenez MD.  Yohana Morris MD.        Again, thank you for allowing me to participate in the care of your patient.      Sincerely,    Alli Le MD

## 2021-08-23 NOTE — PATIENT INSTRUCTIONS
1. Endoscopy will call you to schedule your colonoscopy. If you do not hear from them please call [phone]    Follow up per Watch and Wait Protocol:   Completed CRT: 4/12/19    Flexible sigmoidoscopy   ? Every 2 months for 6 months: Completed  ? Every 3 months for 3 years: Until 4/2022 Due 11/2021 skip this in lieu of colonoscopy in October?  ? Every 6 months for 5 years: Until 4/2024  ? Every year for 10 years: Until 4/2029       3T MRI of pelvis  ? 6-8 weeks after CRT:  ? Every 4 months for 2 years: Until 4/2021 Completed  ? Every 6 months for 2 years: Until 4/2023 Due 2/2022  ? Yearly for 2 years: Until 4/2025       CT CAP  ? Every year for 6-8 years: Until 4/2027 Due 5/2022       Colonoscopy   ? Due 10/2021       CEA at every clinic or endoscopic visit

## 2021-08-23 NOTE — TELEPHONE ENCOUNTER
Screening Questions  1. Are you active on mychart? yes    2. What insurance is in the chart? [employer]    2.  Ordering/Referring Provider: Mimi    3. BMI 33.71    4. Are you on daily home oxygen? no    5. Do you have a history of difficult airway? no    6. Have you had a heart, lung, or liver transplant? no    7. Are you currently on dialysis? no    8. Have you had a stroke or Transient ischemic atttack (TIA) within 6 months? no    9. In the past 6 months, have you had any heart related issues including cardiomyopathy or heart attack?         If yes, did it require cardiac stenting or other implantable device?no    10. Do you have any implantable devices in your body (pacemaker, defib, LVAD)? no    11. Do you take nitroglycerin? If yes, how often? no    12. Are you currently taking any blood thinners?no    13. Are you a diabetic? Type 1 insulin    14. (Females) Are you currently pregnant? no  If yes, how many weeks?    15. Have you had a procedure in the past that was difficult to tolerate with conscious sedation? Any allergies to Fentanyl or Versed no    16. Are you taking any scheduled prescription narcotics more than once daily? no    17. Do you have any chemical dependencies such as alcohol, street drugs, or methadone? no    18. Do you have any history of post-traumatic stress syndrome or mental health issues? no    19. Do you transfer independently? yes    20.  Do you have any issues with constipation? no    21. Preferred Pharmacy for Pre Prescription [facility]    Scheduling Details    Which Colonoscopy Prep was Sent?: golytely  Procedure Scheduled: colonoscopy  Provider/Surgeon: mimi  Date of Procedure: 10/15/21  Location: [facility]  Caller (Please ask for phone number if not scheduled by patient): robin inman      Sedation Type: mac  Conscious Sedation- Needs  for 6 hours after the procedure  MAC/General-Needs  for 24 hours after procedure    Pre-op Required at [facility] [place],  [place] and OR for MAC sedation:   (if yes advise patient they will need a pre-op prior to procedure)      Is patient on blood thinners? -no (If yes- inform patient to follow up with PCP or provider for follow up instructions)     Informed patient they will need an adult  yes  Cannot take any type of public or medical transportation alone    Informed Patient of COVID Test Requirement yes    Confirmed Nurse will call to complete assessment yes    Additional comments:

## 2021-08-31 DIAGNOSIS — F41.1 GENERALIZED ANXIETY DISORDER: ICD-10-CM

## 2021-09-01 ENCOUNTER — TELEPHONE (OUTPATIENT)
Dept: SURGERY | Facility: CLINIC | Age: 66
End: 2021-09-01

## 2021-09-01 RX ORDER — PAROXETINE 30 MG/1
TABLET, FILM COATED ORAL
Qty: 90 TABLET | Refills: 0 | Status: SHIPPED | OUTPATIENT
Start: 2021-09-01 | End: 2021-12-28

## 2021-09-01 NOTE — TELEPHONE ENCOUNTER
Patient called back and claimed that scheduling in December was too far out for her and that she would call back soon once she figures out her schedule. Encouraged pt to call soon as Dr Le can book out far.

## 2021-09-12 DIAGNOSIS — Z11.59 ENCOUNTER FOR SCREENING FOR OTHER VIRAL DISEASES: ICD-10-CM

## 2021-09-15 ENCOUNTER — TELEPHONE (OUTPATIENT)
Dept: GASTROENTEROLOGY | Facility: CLINIC | Age: 66
End: 2021-09-15

## 2021-09-15 NOTE — TELEPHONE ENCOUNTER
Caller: SELF TO PATIENT    Procedure: COLONOSCOPY     Date of Procedure Cancelled: 10/15/2021    Ordering Provider:JANINE LUNSFORD    Reason for cancel: GAERTNER NO LONGER AVAILABLE     Any Staff messages sent regarding case?: YES, EMAIL FROM IJE                                    Message Details: NEED TO RESCHEDULE DUE TO GAERTNER NO LONGER AVAILABLE ON FRIDAYS      Rescheduled: NO, LEFT MESSAGE FOR PATIENT TO CALL BACK TO RESCHEDULE      If rescheduled:    Date:    Location:    Note any change or update to original order/sedation:

## 2021-09-20 NOTE — TELEPHONE ENCOUNTER
Patient calling in and was on a virtual hold for Endoscopy. Was unable to reach patient. Message was left to return call.

## 2021-09-21 ENCOUNTER — TELEPHONE (OUTPATIENT)
Dept: GASTROENTEROLOGY | Facility: CLINIC | Age: 66
End: 2021-09-21

## 2021-09-21 NOTE — TELEPHONE ENCOUNTER
Caller: Niharika Cason     Procedure: COLONOSCOPY      Date of Procedure Cancelled: 10/15/2021     Ordering Provider:JANINE LUNSFORD     Reason for cancel: GAERTNER NO LONGER AVAILABLE      Any Staff messages sent regarding case?: YES, EMAIL FROM IJE                                    Message Details: NEED TO RESCHEDULE DUE TO GAERTNER NO LONGER AVAILABLE ON FRIDAYS        Rescheduled: YES     If rescheduled:    Date: 10/27/2021   Location: [facility]   Note any change or update to original order/sedation: NO

## 2021-09-21 NOTE — TELEPHONE ENCOUNTER
Caller: Patient , Fabrice called to reschedule same day for a morning appointment.    Procedure: Colon    Date of Procedure Cancelled: 10/27    Ordering Provider:JANINE LUNSFORD    Reason for cancel: rescheduling time to morning    Any Staff messages sent regarding case?:                   Recipient and Sender:  & *                  Message Details:       Rescheduled: yes     If rescheduled:    Date: 10/27   Location: [facility]   Note any change or update to original order/sedation:

## 2021-09-28 DIAGNOSIS — Z11.59 ENCOUNTER FOR SCREENING FOR OTHER VIRAL DISEASES: ICD-10-CM

## 2021-10-14 ENCOUNTER — TELEPHONE (OUTPATIENT)
Dept: GASTROENTEROLOGY | Facility: OUTPATIENT CENTER | Age: 66
End: 2021-10-14

## 2021-10-14 DIAGNOSIS — Z12.11 ENCOUNTER FOR SCREENING COLONOSCOPY: Primary | ICD-10-CM

## 2021-10-20 RX ORDER — BISACODYL 5 MG
TABLET, DELAYED RELEASE (ENTERIC COATED) ORAL
Qty: 4 TABLET | Refills: 0 | Status: SHIPPED | OUTPATIENT
Start: 2021-10-20 | End: 2022-01-18

## 2021-10-20 NOTE — TELEPHONE ENCOUNTER
Patient returned call.    Pre assessment questions completed for upcoming colonoscopy procedure scheduled on 10/27/21    COVID test scheduled 10/23/21    Procedural arrival time and facility location reviewed.    Designated  policy reviewed. Instructed to have someone stay 24 hours post procedure.     Bowel prep recommendation: Golytely d/t EM    Golytely prep script sent to [id number] - [place], [place] - [address] [place] [address] pharmacy. Prep instructions sent via [email or web address].    Reviewed Golytely prep instructions with patient. No fiber/iron supplements or foods that contain nuts/seeds 7 days prior to procedure.     Anticoagulation/blood thinners? no    Electronic implanted devices? no    Patient verbalized understanding and had no questions or concerns at this time.    July Buck RN

## 2021-10-23 ENCOUNTER — LAB (OUTPATIENT)
Dept: LAB | Facility: CLINIC | Age: 66
End: 2021-10-23
Payer: COMMERCIAL

## 2021-10-23 ENCOUNTER — HEALTH MAINTENANCE LETTER (OUTPATIENT)
Age: 66
End: 2021-10-23

## 2021-10-23 DIAGNOSIS — Z11.59 ENCOUNTER FOR SCREENING FOR OTHER VIRAL DISEASES: ICD-10-CM

## 2021-10-23 PROCEDURE — U0003 INFECTIOUS AGENT DETECTION BY NUCLEIC ACID (DNA OR RNA); SEVERE ACUTE RESPIRATORY SYNDROME CORONAVIRUS 2 (SARS-COV-2) (CORONAVIRUS DISEASE [COVID-19]), AMPLIFIED PROBE TECHNIQUE, MAKING USE OF HIGH THROUGHPUT TECHNOLOGIES AS DESCRIBED BY CMS-2020-01-R: HCPCS

## 2021-10-23 PROCEDURE — U0005 INFEC AGEN DETEC AMPLI PROBE: HCPCS

## 2021-10-24 LAB — SARS-COV-2 RNA RESP QL NAA+PROBE: NEGATIVE

## 2021-10-27 ENCOUNTER — TRANSFERRED RECORDS (OUTPATIENT)
Dept: HEALTH INFORMATION MANAGEMENT | Facility: CLINIC | Age: 66
End: 2021-10-27

## 2021-10-27 ENCOUNTER — DOCUMENTATION ONLY (OUTPATIENT)
Dept: GASTROENTEROLOGY | Facility: OUTPATIENT CENTER | Age: 66
End: 2021-10-27
Payer: COMMERCIAL

## 2021-10-27 DIAGNOSIS — Z85.048 HISTORY OF RECTAL CANCER: ICD-10-CM

## 2021-11-30 NOTE — PATIENT INSTRUCTIONS
"*Treatment today with potassium supplement.    *Fluids & Port lab with infusion in 1 week.    Infusion Date/Time:    *CT Scan Date/Time:12/21/18 1pm Port access with infusion  Check in at 1:30pm for scan, it will start at 2pm  Nothing to eat or drink for 2 hours prior to scan, start at 12pm.  You will check in 30 minutes early to start a \"water prep\" prior to scan instead of oral contrast.  Radiology will call you with different instructions if oral contrast needed.    *Please follow up with Dr. Lawton in 3 weeks with labs, office visit and treatment    Port Lab Date/Time:    Follow Up Date/Time:     Infusion Date/Time:    If you have any questions or concerns please feel free to call.    If you need to reschedule please call:  Clinic or Lab Appointment - [phone]  Infusion - [phone]  Imaging - [phone]    Roxie FLOOD [facility]  Oncology/Hematology at [facility]  [phone]    "
This document is complete and the patient is ready for discharge.

## 2021-12-03 DIAGNOSIS — E10.8 TYPE 1 DIABETES MELLITUS WITH COMPLICATION (H): ICD-10-CM

## 2021-12-03 RX ORDER — INSULIN GLARGINE 100 [IU]/ML
INJECTION, SOLUTION SUBCUTANEOUS
Qty: 30 ML | Refills: 0 | Status: SHIPPED | OUTPATIENT
Start: 2021-12-03 | End: 2022-02-03

## 2021-12-03 NOTE — TELEPHONE ENCOUNTER
I left a call back message.      I am calling with the following :    Medication is being filled for 1 time samuel refill only due to:  Patient is due for medication follow up     Please call and help schedule.  Thank you

## 2021-12-03 NOTE — TELEPHONE ENCOUNTER
Pending Prescriptions:                       Disp   Refills    LANTUS SOLOSTAR 100 UNIT/ML soln [Pharmac*30 mL  0            Sig: INJECT 22 UNITS SUBCUTANEOUS AT BEDTIME    Medication is being filled for 1 time samuel refill only due to:  Patient is due for medication follow up    Please call and help schedule.  Thank you!    July Renteria RN on 12/3/2021 at 4:45 PM

## 2021-12-29 DIAGNOSIS — C20 RECTAL CANCER (H): Primary | ICD-10-CM

## 2021-12-30 DIAGNOSIS — E10.8 TYPE 1 DIABETES MELLITUS WITH COMPLICATION (H): ICD-10-CM

## 2021-12-31 RX ORDER — PEN NEEDLE, DIABETIC 32GX 5/32"
NEEDLE, DISPOSABLE MISCELLANEOUS
Qty: 100 EACH | Refills: 0 | Status: SHIPPED | OUTPATIENT
Start: 2021-12-31 | End: 2022-02-03

## 2022-01-03 DIAGNOSIS — C20 RECTAL CANCER (H): Primary | ICD-10-CM

## 2022-01-07 DIAGNOSIS — R39.15 URINARY URGENCY: ICD-10-CM

## 2022-01-07 DIAGNOSIS — R35.0 URINARY FREQUENCY: Primary | ICD-10-CM

## 2022-01-18 ENCOUNTER — OFFICE VISIT (OUTPATIENT)
Dept: UROLOGY | Facility: CLINIC | Age: 67
End: 2022-01-18
Attending: COLON & RECTAL SURGERY
Payer: COMMERCIAL

## 2022-01-18 VITALS — WEIGHT: 197.3 LBS | BODY MASS INDEX: 34.95 KG/M2 | DIASTOLIC BLOOD PRESSURE: 70 MMHG | SYSTOLIC BLOOD PRESSURE: 132 MMHG

## 2022-01-18 DIAGNOSIS — R35.0 URINARY FREQUENCY: ICD-10-CM

## 2022-01-18 DIAGNOSIS — R39.15 URINARY URGENCY: ICD-10-CM

## 2022-01-18 LAB
ALBUMIN UR-MCNC: NEGATIVE MG/DL
APPEARANCE UR: CLEAR
BILIRUB UR QL STRIP: NEGATIVE
COLOR UR AUTO: YELLOW
GLUCOSE UR STRIP-MCNC: NEGATIVE MG/DL
HGB UR QL STRIP: NEGATIVE
KETONES UR STRIP-MCNC: NEGATIVE MG/DL
LEUKOCYTE ESTERASE UR QL STRIP: ABNORMAL
MUCOUS THREADS #/AREA URNS LPF: PRESENT /LPF
NITRATE UR QL: NEGATIVE
PH UR STRIP: 6 [PH] (ref 5–7)
RBC URINE: <1 /HPF
SP GR UR STRIP: 1.02 (ref 1–1.03)
SQUAMOUS EPITHELIAL: <1 /HPF
UROBILINOGEN UR STRIP-MCNC: NORMAL MG/DL
WBC URINE: 7 /HPF

## 2022-01-18 PROCEDURE — 99205 OFFICE O/P NEW HI 60 MIN: CPT | Mod: 25 | Performed by: UROLOGY

## 2022-01-18 PROCEDURE — 52000 CYSTOURETHROSCOPY: CPT | Performed by: UROLOGY

## 2022-01-18 PROCEDURE — 81001 URINALYSIS AUTO W/SCOPE: CPT | Performed by: UROLOGY

## 2022-01-18 NOTE — PROGRESS NOTES
"Niharika Cason is a 66 year old female seen in consultation for urgency/frequency. Consult from Henry Gomez.      Pt with daytime urgency, about q 60-90 minutes. Nocturia x 1 (can go 7 hrs between voids at nite).    Occas TRACE, does not wear a pad.    Also concerned about \"a cyst in my vagina.\"      Denies dysuria, gross hematuria, prior  eval, hx bladder surgery, use of bladder meds, success with Kegel's.     Hx 2 vag deliveries, no hyster.     Some fecal urgency, some anal seepage of \"mucous, not stool.\"    Drinks 3 caf coffee, 3 [place] , one soda per day. (Did not complete voiding diary).    Reviewed PMH in detail including rectal cancer with radiation 2019 (MRI benign on 8/21), ulcerative colitis, anxiety disorder, DM      Past Medical History:   Diagnosis Date     Essential hypertension, benign      Generalized anxiety disorder      Hyperlipidemia      Rectal cancer (H) 09/17/2018     S/P radiation therapy     5,040 cGy to Pelvis completed on 04/12/2019. [facility] with Dr. Morris     Type I (juvenile type) diabetes mellitus without mention of complication, not stated as uncontrolled     diagnosed at age 35     Ulcerative colitis, unspecified     in the 70s.         Past Surgical History:   Procedure Laterality Date     COLONOSCOPY N/A 9/17/2018    Procedure: COMBINED COLONOSCOPY, SINGLE OR MULTIPLE BIOPSY/POLYPECTOMY BY BIOPSY;  colonoscopy with polypectomies by biopsy forceps and hot snare and submucosal injection with tattoo;  Surgeon: Richard Moore MD;  Location: [facility]     HC CURETTAGE, POSTPARTUM  '91, '93    following miscarriages     HC REMOVAL OF TONSILS,<13 Y/O      Tonsils <14y.o.     INSERT PORT VASCULAR ACCESS Right 10/19/2018    Procedure: Chest Port Placement - right ;  Surgeon: Lawson Hitchcock PA-C;  Location:  OR     [facility] COLONOSCOPY THRU STOMA, DIAGNOSTIC  1998    negative       Social History     Socioeconomic History     Marital status:      " Spouse name: Fabrice     Number of children: 2     Years of education: 12     Highest education level: Not on file   Occupational History     Occupation:      Employer: [employer]     Comment: on her feet all day     Employer: [employer]   Tobacco Use     Smoking status: Former Smoker     Packs/day: 0.50     Years: 15.00     Pack years: 7.50     Quit date: 9/17/2018     Years since quitting: 3.3     Smokeless tobacco: Never Used   Substance and Sexual Activity     Alcohol use: No     Alcohol/week: 0.0 standard drinks     Drug use: No     Sexual activity: Yes     Partners: Male     Birth control/protection: Post-menopausal   Other Topics Concern      Service No     Blood Transfusions No     Caffeine Concern No     Occupational Exposure No     Hobby Hazards No     Sleep Concern No     Stress Concern No     Weight Concern No     Special Diet Yes     Comment: diabetic     Back Care Yes     Exercise Yes     Bike Helmet No     Seat Belt Yes     Self-Exams Yes     Comment: occassionally     Parent/sibling w/ CABG, MI or angioplasty before 65F 55M? No   Social History Narrative     Not on file     Social Determinants of Health     Financial Resource Strain: Not on file   Food Insecurity: Not on file   Transportation Needs: Not on file   Physical Activity: Not on file   Stress: Not on file   Social Connections: Not on file   Intimate Partner Violence: Not on file   Housing Stability: Not on file       Current Outpatient Medications   Medication Sig Dispense Refill     atorvastatin (LIPITOR) 40 MG tablet Take 1 tablet (40 mg) by mouth daily 90 tablet 4     bisacodyl (DULCOLAX) 5 MG EC tablet Take as directed. One day before exam take 2 tablets at 3 PM. Take 2 tablets at 11 PM 4 tablet 0     blood glucose (NO BRAND SPECIFIED) test strip Use to test blood sugar 1 times daily or as directed. 100 strip 4     Continuous Blood Gluc  (DEXCOM G6 ) BRITTANY 1 Device continuous 1 Device 0     Continuous Blood  "Gluc Sensor (DEXCOM G6 SENSOR) MISC CHANGE EVERY 10 DAYS 10 each 4     Continuous Blood Gluc Transmit (DEXCOM G6 TRANSMITTER) MISC CHANGE EVERY 3 MONTHS 1 each 3     glucagon 1 MG kit Inject 1 mg into the muscle once for 1 dose 1 each 0     insulin lispro (HUMALOG KWIKPEN) 100 UNIT/ML (1 unit dial) KWIKPEN Take 2u:15g breakfast, 2u:15g lunch, 2u:15g dinner,  + 1u/50mg/dL>150mg/dL +2u prime before each dose-total maximum 45 u/day 45 mL 3     insulin pen needle (31G X 8 MM) 31G X 8 MM miscellaneous Use 4 pen needles daily or as directed. 100 each 11     insulin pen needle (BD PEN NEEDLE KELECHI 2ND GEN) 32G X 4 MM miscellaneous Use to check blood sugar 1 time daily or as directed. 100 each 0     LANTUS SOLOSTAR 100 UNIT/ML soln INJECT 22 UNITS SUBCUTANEOUS AT BEDTIME 30 mL 0     lisinopril (ZESTRIL) 40 MG tablet Take 1 tablet (40 mg) by mouth daily 93 tablet 3     Multiple Vitamins-Minerals (MULTIVITAMIN PO) Take by mouth daily       PARoxetine (PAXIL) 30 MG tablet Take 1 tablet (30 mg) by mouth every morning 90 tablet 1     polyethylene glycol (GOLYTELY) 236 g suspension Take as directed. One day before exam fill the jug with water. Cover and shake until well mixed. At 6 PM start drinking an 8oz glass of mixture every 15 minutes until jug is 1/2 empty. Store remainder in the refrigerator.  At 11 PM Start drinking the other half of the Golytely jug. Drink one 8-ounce glass every 15 minutes until the jug is empty. 4000 mL 0     Pyridoxine HCl (VITAMIN B-6 PO) Take by mouth daily       TURMERIC PO          Physical Exam:    GENL: NAD. 5'3\"   ABD: Soft, non-tender, no masses.    EG: Well-estrogenized, no masses.    VAGINA: Well-estrogenized, no masses.    BN HYPERMOBILITY: Mild.    CYSTOCELE: Grade 3.    APICAL PROLAPSE: Grade 3.    RECTOCELE: Mild.    BIMANUAL: No mass or tenderness.    Cysto:    (Informed consent obtained. Pause for cause performed)   Sterile prep.    17 Fr scope inserted through urethra. Systematic " examination w 70 degree lens.   PVR: 5 cc   MUCOSA: Normal without lesion   ORIFICES: Normal location and morphology   CAPACITY: 450 cc; no pain with filling   Scope withdrawn without untoward effect.      (Pt tolerated procedure without difficulty).        Results for orders placed or performed in visit on 01/18/22   UA reflex to Microscopic     Status: Abnormal   Result Value Ref Range    Color Urine Yellow Colorless, Straw, Light Yellow, Yellow    Appearance Urine Clear Clear    Glucose Urine Negative Negative mg/dL    Bilirubin Urine Negative Negative    Ketones Urine Negative Negative mg/dL    Specific Gravity Urine 1.017 1.003 - 1.035    Blood Urine Negative Negative    pH Urine 6.0 5.0 - 7.0    Protein Albumin Urine Negative Negative mg/dL    Urobilinogen Urine Normal Normal, 2.0 mg/dL    Nitrite Urine Negative Negative    Leukocyte Esterase Urine Trace (A) Negative    RBC Urine <1 <=2 /HPF    WBC Urine 7 (H) <=5 /HPF    Squamous Epithelials Urine <1 <=1 /HPF    Mucus Urine Present (A) None Seen /LPF         IMP:  1. Symptomatic pelvic prolapse, hx rectal cancer      PLAN:  1. Discussed situation with patient in detail. Pt has apt for next MRI in one week followed by visit with rectal surgery; he has previously raised the question of hyster; she will bring it forward at the next visit    We offered assistance with finding a pelvic reconstructive surgeon in the [place] region if she would like; she'll let us know if she would like help with this    Also discussed other rx for bladder sx's such as meds; given her sx and exam at this time we'll hold off for now    2. RTC prn    3. Total time spent in reviewing patient records, discussing history, performing exam, discussing diagnosis, outlining treatment plan and documentation: 60 minutes

## 2022-01-21 ENCOUNTER — LAB (OUTPATIENT)
Dept: LAB | Facility: CLINIC | Age: 67
End: 2022-01-21
Payer: COMMERCIAL

## 2022-01-21 ENCOUNTER — ANCILLARY PROCEDURE (OUTPATIENT)
Dept: MRI IMAGING | Facility: CLINIC | Age: 67
End: 2022-01-21
Attending: COLON & RECTAL SURGERY
Payer: COMMERCIAL

## 2022-01-21 DIAGNOSIS — C20 RECTAL CANCER (H): ICD-10-CM

## 2022-01-21 LAB — CEA SERPL-MCNC: 4.7 UG/L (ref 0–2.5)

## 2022-01-21 PROCEDURE — 82378 CARCINOEMBRYONIC ANTIGEN: CPT | Mod: 90 | Performed by: PATHOLOGY

## 2022-01-21 PROCEDURE — A9585 GADOBUTROL INJECTION: HCPCS | Performed by: RADIOLOGY

## 2022-01-21 PROCEDURE — 99000 SPECIMEN HANDLING OFFICE-LAB: CPT | Performed by: PATHOLOGY

## 2022-01-21 PROCEDURE — 72197 MRI PELVIS W/O & W/DYE: CPT | Performed by: RADIOLOGY

## 2022-01-21 PROCEDURE — 36415 COLL VENOUS BLD VENIPUNCTURE: CPT | Performed by: PATHOLOGY

## 2022-01-21 RX ORDER — GADOBUTROL 604.72 MG/ML
10 INJECTION INTRAVENOUS ONCE
Status: COMPLETED | OUTPATIENT
Start: 2022-01-21 | End: 2022-01-21

## 2022-01-21 RX ADMIN — GADOBUTROL 9 ML: 604.72 INJECTION INTRAVENOUS at 17:44

## 2022-01-25 NOTE — PROGRESS NOTES
"Colon and Rectal Surgery Follow-up Clinic Note    RE: Niharika Cason  : 1955  MARILEE: 2022    DIAGNOSIS:     10/2018: mT3cN1 proximal rectal adenocarcinoma (intact IHC) found on 1st screening colonoscopy. 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    10/2018-2019: XELOX x6.    3/-19: CXRT (5040 cGy).    2019: cCR (enrolled in watch and wait protocol).    INTERVAL HISTORY: Denies increased pain, fevers, or chills. Tolerating diet well. Having 1-2 BM's per day with mild fecal urgency and seepage. Denies incontinence per se or BPR. Does acknowledge stress urinary incontinence but this was present before her rectal cancer treatment. Denies additional urinary or sexual function issues at this time.    Niharika was diagnosed with urogenital prolapse and a fibroid uterus uterus recently.  Hysterectomy has been recommended.  I did mention to her that if she has a hysterectomy at the [facility] that I am more than happy to be available to assess her pelvis.  I did remind her to mention to her gynecologist that she received pelvic radiation.    Physical Examination:  BP (!) 143/83 (BP Location: Left arm, Patient Position: Sitting, Cuff Size: Adult Regular)   Pulse 76   Temp 97.9  F (36.6  C) (Oral)   Ht 5' 3\"   Wt 195 lb 8 oz   LMP 10/15/2008 (Approximate)   SpO2 98%   BMI 34.63 kg/m    Abdomen soft, NT. No inguinal adenopathy palpated.  Perianal skin intact.   ALEC: no masses palpated.  Flexible sigmoidoscopy: after obtaining informed consent and performing a \"time out\", an adult flexible sigmoidoscope was introduced through the anus and passed up to the proximal sigmoid colon. The quality of the prep was fair. Findings: linear 2x2-cm white scar with central telangiectasia located at the left posterior aspect of the mid rectum (7-cm from dentate line), covering approx 30% of the lumen circumference. Distal edge of the scar is at the mid rectal valve. Tattoo was " identified. No additional abnormalities were seen. Total scope time: 12 minutes. The patient tolerated the procedure well.    ASSESSMENT  cCR. No clear evidence of recurrent neoplasia.  CEA rising although her initial CEA was 10.5 and has fluctuated between 3-5.    Results for JOSELIN GONSALES (MRN [id number]) as of 1/28/2022 12:44   Ref. Range 9/28/2020 15:22 12/15/2020 08:15 5/15/2021 10:52 7/23/2021 16:19 1/21/2022 16:56   CEA Latest Ref Range: 0.0 - 2.5 ug/L 3.8 (H) 2.6 (H) 3.8 (H) 3.3 (H) 4.7 (H)     CT c/a/p (5/15/2021):  IMPRESSION: In this patient with history of rectal cancer on watch and  wait protocol:  1. No convincing local recurrence about Doppler rectal on today's  exam. Please refer to same-day rectal MRI for better detailed  evaluation.  2. No convincing evidence for metastatic disease in the chest,  abdomen, pelvis and bones.  3. Stable indeterminate subsequent millimeter pulmonary nodules.  Occasional follow-up studies.  4. Myomatous uterus with scattered calcified fibroids. Additional  incidental findings as described above.    3T MR pelvis (1/21/22):   IMPRESSION:   1. Ongoing continued positive response to treatment with no  significant progression. No appreciable MRI findings concerning for  residual tumor on today's exam. These findings correspond to a tumor  regression grade of mrTRG1.  2. No suspicious pelvic lymphadenopathy.  3. Myomatous uterus. Additional incidental findings as described  above.    Pathology (6/2019):  FINAL DIAGNOSIS:   Large intestine, rectum, biopsy of scar   - Lamina propria fibrosis with mild crypt distortion   - No evidence of malignancy or neoplastic polyp     Colonoscopy 10/17/19: without evidence of recurrence.    PLAN  1. High fiber diet. Metamucil 1 tbsp BID.  2. Given CEA rise, repeat in 2 months.  If increases further, we would likely have to do CT chest abdomen pelvis early.   3. Flex Sig in 4/2022.   4. 3T MR pelvis in 2/2022.  5. CT c/a/p in 5/2022.  6.  Colonoscopy in 10/2023.     Follow up per Watch and Wait Protocol:   Completed CRT: 4/12/19    Flexible sigmoidoscopy     Every 2 months for 6 months: Completed    Every 3 months for 3 years: Until 4/2022 Due 4/2022    Every 6 months for 5 years: Until 4/2024    Every year for 10 years: Until 4/2029      3T MRI of pelvis    6-8 weeks after CRT:    Every 4 months for 2 years: Until 4/2021 Completed    Every 6 months for 2 years: Until 4/2023 Due 7/2022    Yearly for 2 years: Until 4/2025      CT CAP    Every year for 6-8 years: Until 4/2027 Due 5/2022      Colonoscopy     Due 10/2021      CEA at every clinic or endoscopic visit    30 minutes spent on the date of the encounter doing chart review, history and exam, documentation and further activities as noted above.     Alli Le M.D., M.Sc.     Division of Colon and Rectal Surgery  [facility]    Referring Provider:  Manjeet Moore MD  [address] [place] [address]      Primary Care Provider:  Leona Farris    CC:  Dayron Lawton MD.  Ynes Jimenez MD.  Yohana Morris MD.      Diagnosis, Referred by & from: Rectal Cancer, Alli Le MD   Appt date: 1/28/2022   NOTES STATUS DETAILS   OFFICE NOTE from Alli Le Internal  8/23/2021   OFFICE NOTE from other specialist N/A    DISCHARGE SUMMARY from hospital N/A    DISCHARGE REPORT from the ER N/A    OPERATIVE REPORT N/A    MEDICATION LIST Internal    LABS     ANAL PAP N/A    BIOPSIES/PATHOLOGY RELATED TO DIAGNOSIS Rectal Biopsy on 6/12/2019    DIAGNOSTIC PROCEDURES     PFC TESTING (from the Pelvic floor center includes Manometry, PDNL, EMG, etc.) N/A    COLONOSCOPY 10/27/2021    UPPER ENDOSCOPY (EGD) N/A    FLEX SIGMOIDOSCOPY 2/11/2019    ERCP N/A    IMAGING (DISC & REPORT)      CT 5/15/2021 Chest/Abdomen   MRI 1/21/2022    XRAY N/A    ULTRASOUND  (ENDOANAL/ENDORECTAL) N/A      Records Requested  01/25/22    Facility   N/A   Outcome N/A

## 2022-01-28 ENCOUNTER — OFFICE VISIT (OUTPATIENT)
Dept: SURGERY | Facility: CLINIC | Age: 67
End: 2022-01-28
Payer: COMMERCIAL

## 2022-01-28 VITALS
OXYGEN SATURATION: 98 % | BODY MASS INDEX: 34.64 KG/M2 | SYSTOLIC BLOOD PRESSURE: 143 MMHG | DIASTOLIC BLOOD PRESSURE: 83 MMHG | WEIGHT: 195.5 LBS | TEMPERATURE: 97.9 F | HEART RATE: 76 BPM | HEIGHT: 63 IN

## 2022-01-28 DIAGNOSIS — Z85.048 HISTORY OF RECTAL CANCER: Primary | ICD-10-CM

## 2022-01-28 PROCEDURE — 99214 OFFICE O/P EST MOD 30 MIN: CPT | Mod: 25 | Performed by: COLON & RECTAL SURGERY

## 2022-01-28 PROCEDURE — 45330 DIAGNOSTIC SIGMOIDOSCOPY: CPT | Performed by: COLON & RECTAL SURGERY

## 2022-01-28 ASSESSMENT — PAIN SCALES - GENERAL: PAINLEVEL: NO PAIN (0)

## 2022-01-28 ASSESSMENT — MIFFLIN-ST. JEOR: SCORE: 1405.91

## 2022-01-28 NOTE — LETTER
"    2022         RE: Niharika Cason  [address] [place] [address]        Dear Colleague,    Thank you for referring your patient, Niharika Cason, to the [facility] [place]. Please see a copy of my visit note below.    Colon and Rectal Surgery Follow-up Clinic Note    RE: Niharika Cason  : 1955  MARILEE: 2022    DIAGNOSIS:     10/2018: mT3cN1 proximal rectal adenocarcinoma (intact IHC) found on 1st screening colonoscopy. 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    10/2018-2019: XELOX x6.    3/-19: CXRT (5040 cGy).    2019: cCR (enrolled in watch and wait protocol).    INTERVAL HISTORY: Denies increased pain, fevers, or chills. Tolerating diet well. Having 1-2 BM's per day with mild fecal urgency and seepage. Denies incontinence per se or BPR. Does acknowledge stress urinary incontinence but this was present before her rectal cancer treatment. Denies additional urinary or sexual function issues at this time.    Niharika was diagnosed with urogenital prolapse and a fibroid uterus uterus recently.  Hysterectomy has been recommended.  I did mention to her that if she has a hysterectomy at the [facility] that I am more than happy to be available to assess her pelvis.  I did remind her to mention to her gynecologist that she received pelvic radiation.    Physical Examination:  BP (!) 143/83 (BP Location: Left arm, Patient Position: Sitting, Cuff Size: Adult Regular)   Pulse 76   Temp 97.9  F (36.6  C) (Oral)   Ht 5' 3\"   Wt 195 lb 8 oz   LMP 10/15/2008 (Approximate)   SpO2 98%   BMI 34.63 kg/m    Abdomen soft, NT. No inguinal adenopathy palpated.  Perianal skin intact.   ALEC: no masses palpated.  Flexible sigmoidoscopy: after obtaining informed consent and performing a \"time out\", an adult flexible sigmoidoscope was introduced through the anus and passed up to the proximal sigmoid colon. The quality of the prep was fair. " Findings: linear 2x2-cm white scar with central telangiectasia located at the left posterior aspect of the mid rectum (7-cm from dentate line), covering approx 30% of the lumen circumference. Distal edge of the scar is at the mid rectal valve. Tattoo was identified. No additional abnormalities were seen. Total scope time: 12 minutes. The patient tolerated the procedure well.    ASSESSMENT  cCR. No clear evidence of recurrent neoplasia.  CEA rising although her initial CEA was 10.5 and has fluctuated between 3-5.    Results for JOSELIN GONSALES (MRN [id number]) as of 1/28/2022 12:44   Ref. Range 9/28/2020 15:22 12/15/2020 08:15 5/15/2021 10:52 7/23/2021 16:19 1/21/2022 16:56   CEA Latest Ref Range: 0.0 - 2.5 ug/L 3.8 (H) 2.6 (H) 3.8 (H) 3.3 (H) 4.7 (H)     CT c/a/p (5/15/2021):  IMPRESSION: In this patient with history of rectal cancer on watch and  wait protocol:  1. No convincing local recurrence about Doppler rectal on today's  exam. Please refer to same-day rectal MRI for better detailed  evaluation.  2. No convincing evidence for metastatic disease in the chest,  abdomen, pelvis and bones.  3. Stable indeterminate subsequent millimeter pulmonary nodules.  Occasional follow-up studies.  4. Myomatous uterus with scattered calcified fibroids. Additional  incidental findings as described above.    3T MR pelvis (1/21/22):   IMPRESSION:   1. Ongoing continued positive response to treatment with no  significant progression. No appreciable MRI findings concerning for  residual tumor on today's exam. These findings correspond to a tumor  regression grade of mrTRG1.  2. No suspicious pelvic lymphadenopathy.  3. Myomatous uterus. Additional incidental findings as described  above.    Pathology (6/2019):  FINAL DIAGNOSIS:   Large intestine, rectum, biopsy of scar   - Lamina propria fibrosis with mild crypt distortion   - No evidence of malignancy or neoplastic polyp     Colonoscopy 10/17/19: without evidence of  recurrence.    PLAN  1. High fiber diet. Metamucil 1 tbsp BID.  2. Given CEA rise, repeat in 2 months.  If increases further, we would likely have to do CT chest abdomen pelvis early.   3. Flex Sig in 4/2022.   4. 3T MR pelvis in 2/2022.  5. CT c/a/p in 5/2022.  6. Colonoscopy in 10/2023.     Follow up per Watch and Wait Protocol:   Completed CRT: 4/12/19    Flexible sigmoidoscopy     Every 2 months for 6 months: Completed    Every 3 months for 3 years: Until 4/2022 Due 4/2022    Every 6 months for 5 years: Until 4/2024    Every year for 10 years: Until 4/2029      3T MRI of pelvis    6-8 weeks after CRT:    Every 4 months for 2 years: Until 4/2021 Completed    Every 6 months for 2 years: Until 4/2023 Due 7/2022    Yearly for 2 years: Until 4/2025      CT CAP    Every year for 6-8 years: Until 4/2027 Due 5/2022      Colonoscopy     Due 10/2021      CEA at every clinic or endoscopic visit    30 minutes spent on the date of the encounter doing chart review, history and exam, documentation and further activities as noted above.     Alli Le M.D., M.Sc.     Division of Colon and Rectal Surgery  [facility]    Referring Provider:  Manjeet Moore MD  [address] [place] [address]      Primary Care Provider:  Leona Farris    CC:  Dayron Lawton MD.  Ynes Jimenez MD.  Yohana Morris MD.      Diagnosis, Referred by & from: Rectal Cancer, Alli Le MD   Appt date: 1/28/2022   NOTES STATUS DETAILS   OFFICE NOTE from Alli Le Internal  8/23/2021   OFFICE NOTE from other specialist N/A    DISCHARGE SUMMARY from hospital N/A    DISCHARGE REPORT from the ER N/A    OPERATIVE REPORT N/A    MEDICATION LIST Internal    LABS     ANAL PAP N/A    BIOPSIES/PATHOLOGY RELATED TO DIAGNOSIS Rectal Biopsy on 6/12/2019    DIAGNOSTIC PROCEDURES     PFC TESTING (from the Pelvic floor center includes Manometry, PDNL, EMG, etc.) N/A     COLONOSCOPY 10/27/2021    UPPER ENDOSCOPY (EGD) N/A    FLEX SIGMOIDOSCOPY 2/11/2019    ERCP N/A    IMAGING (DISC & REPORT)      CT 5/15/2021 Chest/Abdomen   MRI 1/21/2022    XRAY N/A    ULTRASOUND  (ENDOANAL/ENDORECTAL) N/A      Records Requested  01/25/22    Facility  N/A   Outcome N/A           Again, thank you for allowing me to participate in the care of your patient.        Sincerely,        Alli Le MD

## 2022-01-28 NOTE — NURSING NOTE
"Chief Complaint   Patient presents with     Flexible Sigmoidoscopy     Flex sig follow up.       Vitals:    01/28/22 1256   BP: (!) 143/83   BP Location: Left arm   Patient Position: Sitting   Cuff Size: Adult Regular   Pulse: 76   Temp: 97.9  F (36.6  C)   TempSrc: Oral   SpO2: 98%   Weight: 195 lb 8 oz   Height: 5' 3\"       Body mass index is 34.63 kg/m .             Leeann Groves CMA    "

## 2022-01-28 NOTE — PATIENT INSTRUCTIONS
Follow up:    1. High fiber diet. Metamucil 1 tbsp BID.  2. CEA in 2 months and at every imaging and/or clinic visit.  3. Flex Sig in 4/2022. - 4/18 at 3:30pm  4. 3T MR pelvis in 2/2022.  5. CT c/a/p in 5/2022.  6. Colonoscopy in 10/2023. Please call with any questions or concerns regarding your clinic visit today.    It is a pleasure being involved in your health care.    Contacts post-consultation depending on your need:    Radiology Appointments [phone]    Schedule Clinic Appointments [phone] # 1   M-F 7:30 - 5 pm    [facility]estin [phone]    Clinic Fax Number [phone]    Surgery Scheduling [phone]    My Chart is available 24 hours a day and is a secure way to access your records and communicate with your care team.  I strongly recommend signing up if you haven't already done so, if you are comfortable with computers.  If you would like to inquire about this or are having problems with My Chart access, you may call [phone] or go online at [email or web address].  Please allow at least 24 hours for a response and extra time on weekends and Holidays.

## 2022-02-03 ENCOUNTER — OFFICE VISIT (OUTPATIENT)
Dept: FAMILY MEDICINE | Facility: CLINIC | Age: 67
End: 2022-02-03
Payer: COMMERCIAL

## 2022-02-03 VITALS
DIASTOLIC BLOOD PRESSURE: 74 MMHG | OXYGEN SATURATION: 98 % | RESPIRATION RATE: 16 BRPM | SYSTOLIC BLOOD PRESSURE: 122 MMHG | TEMPERATURE: 97.2 F | HEART RATE: 80 BPM | BODY MASS INDEX: 34.93 KG/M2 | WEIGHT: 197.2 LBS

## 2022-02-03 DIAGNOSIS — G62.0 DRUG-INDUCED POLYNEUROPATHY (H): ICD-10-CM

## 2022-02-03 DIAGNOSIS — F41.1 GENERALIZED ANXIETY DISORDER: ICD-10-CM

## 2022-02-03 DIAGNOSIS — E78.2 MIXED HYPERLIPIDEMIA: ICD-10-CM

## 2022-02-03 DIAGNOSIS — I10 ESSENTIAL HYPERTENSION WITH GOAL BLOOD PRESSURE LESS THAN 130/80: ICD-10-CM

## 2022-02-03 DIAGNOSIS — Z12.31 ENCOUNTER FOR SCREENING MAMMOGRAM FOR BREAST CANCER: ICD-10-CM

## 2022-02-03 DIAGNOSIS — E10.9 TYPE 1 DIABETES MELLITUS WITHOUT COMPLICATION (H): Primary | ICD-10-CM

## 2022-02-03 LAB
ANION GAP SERPL CALCULATED.3IONS-SCNC: 6 MMOL/L (ref 3–14)
BUN SERPL-MCNC: 18 MG/DL (ref 7–30)
CALCIUM SERPL-MCNC: 9.1 MG/DL (ref 8.5–10.1)
CHLORIDE BLD-SCNC: 107 MMOL/L (ref 94–109)
CHOLEST SERPL-MCNC: 142 MG/DL
CO2 SERPL-SCNC: 28 MMOL/L (ref 20–32)
CREAT SERPL-MCNC: 0.72 MG/DL (ref 0.52–1.04)
CREAT UR-MCNC: 36 MG/DL
FASTING STATUS PATIENT QL REPORTED: NO
GFR SERPL CREATININE-BSD FRML MDRD: >90 ML/MIN/1.73M2
GLUCOSE BLD-MCNC: 168 MG/DL (ref 70–99)
HBA1C MFR BLD: 7.2 % (ref 0–5.6)
HDLC SERPL-MCNC: 69 MG/DL
LDLC SERPL CALC-MCNC: 55 MG/DL
MICROALBUMIN UR-MCNC: <5 MG/L
MICROALBUMIN/CREAT UR: NORMAL MG/G{CREAT}
NONHDLC SERPL-MCNC: 73 MG/DL
POTASSIUM BLD-SCNC: 4 MMOL/L (ref 3.4–5.3)
SODIUM SERPL-SCNC: 141 MMOL/L (ref 133–144)
TRIGL SERPL-MCNC: 90 MG/DL

## 2022-02-03 PROCEDURE — 83036 HEMOGLOBIN GLYCOSYLATED A1C: CPT | Performed by: FAMILY MEDICINE

## 2022-02-03 PROCEDURE — 99214 OFFICE O/P EST MOD 30 MIN: CPT | Performed by: FAMILY MEDICINE

## 2022-02-03 PROCEDURE — 36415 COLL VENOUS BLD VENIPUNCTURE: CPT | Performed by: FAMILY MEDICINE

## 2022-02-03 PROCEDURE — 80048 BASIC METABOLIC PNL TOTAL CA: CPT | Performed by: FAMILY MEDICINE

## 2022-02-03 PROCEDURE — 96127 BRIEF EMOTIONAL/BEHAV ASSMT: CPT | Performed by: FAMILY MEDICINE

## 2022-02-03 PROCEDURE — 82043 UR ALBUMIN QUANTITATIVE: CPT | Performed by: FAMILY MEDICINE

## 2022-02-03 PROCEDURE — 80061 LIPID PANEL: CPT | Performed by: FAMILY MEDICINE

## 2022-02-03 RX ORDER — LISINOPRIL 40 MG/1
40 TABLET ORAL DAILY
Qty: 93 TABLET | Refills: 3 | Status: SHIPPED | OUTPATIENT
Start: 2022-02-03 | End: 2023-02-10

## 2022-02-03 RX ORDER — PAROXETINE 30 MG/1
30 TABLET, FILM COATED ORAL EVERY MORNING
Qty: 90 TABLET | Refills: 4 | Status: SHIPPED | OUTPATIENT
Start: 2022-02-03 | End: 2023-02-10

## 2022-02-03 RX ORDER — ATORVASTATIN CALCIUM 40 MG/1
40 TABLET, FILM COATED ORAL DAILY
Qty: 90 TABLET | Refills: 4 | Status: SHIPPED | OUTPATIENT
Start: 2022-02-03 | End: 2023-02-10

## 2022-02-03 RX ORDER — PROCHLORPERAZINE 25 MG/1
SUPPOSITORY RECTAL
Qty: 1 EACH | Refills: 3 | Status: SHIPPED | OUTPATIENT
Start: 2022-02-03 | End: 2022-12-11

## 2022-02-03 RX ORDER — PEN NEEDLE, DIABETIC 32GX 5/32"
NEEDLE, DISPOSABLE MISCELLANEOUS
Qty: 100 EACH | Refills: 0 | Status: SHIPPED | OUTPATIENT
Start: 2022-02-03 | End: 2023-10-11

## 2022-02-03 RX ORDER — PROCHLORPERAZINE 25 MG/1
SUPPOSITORY RECTAL
Qty: 10 EACH | Refills: 4 | Status: SHIPPED | OUTPATIENT
Start: 2022-02-03 | End: 2022-12-11

## 2022-02-03 RX ORDER — INSULIN GLARGINE 100 [IU]/ML
22 INJECTION, SOLUTION SUBCUTANEOUS AT BEDTIME
Qty: 30 ML | Refills: 3 | Status: SHIPPED | OUTPATIENT
Start: 2022-02-03 | End: 2022-09-30

## 2022-02-03 RX ORDER — INSULIN LISPRO 100 [IU]/ML
INJECTION, SOLUTION INTRAVENOUS; SUBCUTANEOUS
Qty: 45 ML | Refills: 3 | Status: SHIPPED | OUTPATIENT
Start: 2022-02-03 | End: 2022-10-03

## 2022-02-03 ASSESSMENT — ANXIETY QUESTIONNAIRES
6. BECOMING EASILY ANNOYED OR IRRITABLE: NOT AT ALL
GAD7 TOTAL SCORE: 0
7. FEELING AFRAID AS IF SOMETHING AWFUL MIGHT HAPPEN: NOT AT ALL
3. WORRYING TOO MUCH ABOUT DIFFERENT THINGS: NOT AT ALL
2. NOT BEING ABLE TO STOP OR CONTROL WORRYING: NOT AT ALL
1. FEELING NERVOUS, ANXIOUS, OR ON EDGE: NOT AT ALL
5. BEING SO RESTLESS THAT IT IS HARD TO SIT STILL: NOT AT ALL

## 2022-02-03 ASSESSMENT — PATIENT HEALTH QUESTIONNAIRE - PHQ9
5. POOR APPETITE OR OVEREATING: NOT AT ALL
SUM OF ALL RESPONSES TO PHQ QUESTIONS 1-9: 0

## 2022-02-03 ASSESSMENT — PAIN SCALES - GENERAL: PAINLEVEL: NO PAIN (0)

## 2022-02-03 NOTE — PROGRESS NOTES
"  Assessment & Plan     ASSESSMENT/ORDERS:    ICD-10-CM    1. Type 1 diabetes mellitus without complication (H)  E10.9 Continuous Blood Gluc Sensor (DEXCOM G6 SENSOR) MISC     Continuous Blood Gluc Transmit (DEXCOM G6 TRANSMITTER) MISC     Albumin Random Urine Quantitative with Creat Ratio     Basic metabolic panel     Lipid panel reflex to direct LDL Non-fasting     Hemoglobin A1c     MA Screen Bilateral w/Zafar     blood glucose (NO BRAND SPECIFIED) test strip     insulin lispro (HUMALOG KWIKPEN) 100 UNIT/ML (1 unit dial) KWIKPEN     insulin pen needle (BD PEN NEEDLE KELECHI 2ND GEN) 32G X 4 MM miscellaneous     insulin glargine (LANTUS SOLOSTAR) 100 UNIT/ML pen     Hemoglobin A1c     Lipid panel reflex to direct LDL Non-fasting     Basic metabolic panel     Albumin Random Urine Quantitative with Creat Ratio   2. Drug-induced polyneuropathy (H)  G62.0    3. Mixed hyperlipidemia  E78.2 atorvastatin (LIPITOR) 40 MG tablet   4. Essential hypertension with goal blood pressure less than 130/80  I10 lisinopril (ZESTRIL) 40 MG tablet   5. Generalized anxiety disorder  F41.1 PARoxetine (PAXIL) 30 MG tablet   6. Encounter for screening mammogram for breast cancer  Z12.31 MA Screen Bilateral w/Zafar     PLAN:  1.   Medications refilled for all other above noted stable conditions.  Labs ordered as noted above.   2.  Reviewed all immunizations that are currently due on her health care maintenance plan and explained importance of all of them.  Her daughter is a pharmacist so she gets her vaccines done there.  She did inform me that she is not interested in the COVID-19 vaccine.             BMI:   Estimated body mass index is 34.93 kg/m  as calculated from the following:    Height as of 1/28/22: 1.6 m (5' 3\").    Weight as of this encounter: 89.4 kg (197 lb 3.2 oz).           No follow-ups on file.    Henry Gomez MD  [facility]    Artemio Bundy is a 66 year old who presents for the following " health issues     HPI     Diabetes Follow-up    How often are you checking your blood sugar? Continuous glucose monitor  What time of day are you checking your blood sugars (select all that apply)?  Before meals and After meals  Have you had any blood sugars above 200?  Yes within the last week  Have you had any blood sugars below 70?  Yes within the last week    What symptoms do you notice when your blood sugar is low?  None    What concerns do you have today about your diabetes? None     Do you have any of these symptoms? (Select all that apply)  Numbness in feet and Weight gain      BP Readings from Last 2 Encounters:   02/03/22 122/74   01/28/22 (!) 143/83     Hemoglobin A1C POCT (%)   Date Value   03/22/2021 6.6 (H)   06/01/2020 7.3 (H)     LDL Cholesterol Calculated (mg/dL)   Date Value   03/22/2021 88   07/09/2019 66               Hyperlipidemia Follow-Up      Are you regularly taking any medication or supplement to lower your cholesterol?   Yes- Lipitor    Are you having muscle aches or other side effects that you think could be caused by your cholesterol lowering medication?  No    Hypertension Follow-up      Do you check your blood pressure regularly outside of the clinic? Yes     Are you following a low salt diet? No    Are your blood pressures ever more than 140 on the top number (systolic) OR more   than 90 on the bottom number (diastolic), for example 140/90? No    Anxiety Follow-Up    How are you doing with your anxiety since your last visit? No change    Are you having other symptoms that might be associated with anxiety? No    Have you had a significant life event? Grief or Loss     Are you feeling depressed? Yes:  depressed for her sister's loss    Do you have any concerns with your use of alcohol or other drugs? No    Social History     Tobacco Use     Smoking status: Former Smoker     Packs/day: 0.50     Years: 15.00     Pack years: 7.50     Quit date: 9/17/2018     Years since quitting: 3.3      Smokeless tobacco: Never Used   Substance Use Topics     Alcohol use: No     Alcohol/week: 0.0 standard drinks     Drug use: No     TAMMIE-7 SCORE 8/28/2017 2/3/2022   Total Score 8 0     PHQ 8/28/2017 3/22/2021 2/3/2022   PHQ-9 Total Score 8 1 0   Q9: Thoughts of better off dead/self-harm past 2 weeks Not at all Not at all Not at all     Last PHQ-9 2/3/2022   1.  Little interest or pleasure in doing things 0   2.  Feeling down, depressed, or hopeless 0   3.  Trouble falling or staying asleep, or sleeping too much 0   4.  Feeling tired or having little energy 0   5.  Poor appetite or overeating 0   6.  Feeling bad about yourself 0   7.  Trouble concentrating 0   8.  Moving slowly or restless 0   Q9: Thoughts of better off dead/self-harm past 2 weeks 0   PHQ-9 Total Score 0   Difficulty at work, home, or with people -     TAMMIE-7  2/3/2022   1. Feeling nervous, anxious, or on edge 0   2. Not being able to stop or control worrying 0   3. Worrying too much about different things 0   4. Trouble relaxing 0   5. Being so restless that it is hard to sit still 0   6. Becoming easily annoyed or irritable 0   7. Feeling afraid, as if something awful might happen 0   TAMMIE-7 Total Score 0   If you checked any problems, how difficult have they made it for you to do your work, take care of things at home, or get along with other people? -         How many servings of fruits and vegetables do you eat daily?  2-3    On average, how many sweetened beverages do you drink each day (Examples: soda, juice, sweet tea, etc.  Do NOT count diet or artificially sweetened beverages)?   0    How many days per week do you exercise enough to make your heart beat faster? 5    How many minutes a day do you exercise enough to make your heart beat faster? 20 - 29    How many days per week do you miss taking your medication? 0    Patient has no concerns about diabetes care.  No issues with hypoglycemia anymore.  All is going well for her.    History of  neuropathy from her cancer treatment.    Hypertension is at goal.      generalized anxiety disorder controlled with current medication.    Had COVID-19 11/2020.  Recovered.      Review of Systems         Objective    /74 (BP Location: Right arm, Patient Position: Chair, Cuff Size: Adult Large)   Pulse 80   Temp 97.2  F (36.2  C) (Temporal)   Resp 16   Wt 89.4 kg (197 lb 3.2 oz)   LMP 10/15/2008 (Approximate)   SpO2 98%   BMI 34.93 kg/m    Body mass index is 34.93 kg/m .  Physical Exam  Constitutional:       Appearance: She is well-developed.   Cardiovascular:      Rate and Rhythm: Normal rate and regular rhythm.      Heart sounds: Normal heart sounds, S1 normal and S2 normal. No murmur heard.      Pulmonary:      Effort: Pulmonary effort is normal. No respiratory distress.      Breath sounds: Normal breath sounds. No wheezing, rhonchi or rales.   Neurological:      Mental Status: She is alert.   Psychiatric:         Mood and Affect: Mood normal.         Behavior: Behavior normal.         Thought Content: Thought content normal.         Judgment: Judgment normal.

## 2022-02-03 NOTE — NURSING NOTE
Health Maintenance Due   Topic Date Due     ANNUAL REVIEW OF HM ORDERS  Never done     ADVANCE CARE PLANNING  Never done     COVID-19 Vaccine (1) Never done     ZOSTER IMMUNIZATION (1 of 2) Never done     PREVENTIVE CARE VISIT  07/20/2019     DTAP/TDAP/TD IMMUNIZATION (4 - Td or Tdap) 05/13/2021     INFLUENZA VACCINE (1) 09/01/2021     A1C  09/22/2021     BMP  12/15/2021     MAMMO SCREENING  03/02/2022     Rosalind RUIZ LPN

## 2022-02-04 ASSESSMENT — ANXIETY QUESTIONNAIRES: GAD7 TOTAL SCORE: 0

## 2022-02-04 NOTE — RESULT ENCOUNTER NOTE
Niharika,  Your results look good.  Please let me know if you have any questions.    Sincerely,  Dr. Gomez

## 2022-02-12 ENCOUNTER — HEALTH MAINTENANCE LETTER (OUTPATIENT)
Age: 67
End: 2022-02-12

## 2022-02-23 ENCOUNTER — MYC MEDICAL ADVICE (OUTPATIENT)
Dept: UROLOGY | Facility: CLINIC | Age: 67
End: 2022-02-23
Payer: COMMERCIAL

## 2022-02-23 DIAGNOSIS — Z71.9 ENCOUNTER FOR CONSULTATION: Primary | ICD-10-CM

## 2022-02-24 NOTE — TELEPHONE ENCOUNTER
July Anand, RN  [facility] Ob[facility]yn Triage; [facility] Patient Care Pool 1 minute ago (11:14 AM)     JS    Please reach out to patient to schedule an appointment for consideration of hysterectomy    Routing comment      Unable to reach patient via phone. RN left a message and instructed patient to call the clinic at [phone].    RN also sent Urgent Careert message. Pt needs to be schedule for consult with Dr. Vargas in clinic.    Deanna Kramer RN on 2/24/2022 at 11:18 AM

## 2022-02-24 NOTE — TELEPHONE ENCOUNTER
Per Dr. Huynh:    Referral placed to OBGYN-, called and left a message for patient to call us back to inform her of the request of Dr. Huynh to have an appointemtn with Dr. Vargas with OBGYN.    Awaiting call back.    July NELSON RN Urology 2/24/2022 10:38 AM

## 2022-03-01 DIAGNOSIS — Z85.048 HISTORY OF RECTAL CANCER: Primary | ICD-10-CM

## 2022-03-29 ENCOUNTER — LAB (OUTPATIENT)
Dept: LAB | Facility: CLINIC | Age: 67
End: 2022-03-29
Payer: COMMERCIAL

## 2022-03-29 DIAGNOSIS — Z85.048 HISTORY OF RECTAL CANCER: ICD-10-CM

## 2022-03-29 LAB — CEA SERPL-MCNC: 4.6 UG/L (ref 0–2.5)

## 2022-03-29 PROCEDURE — 36415 COLL VENOUS BLD VENIPUNCTURE: CPT

## 2022-03-29 PROCEDURE — 82378 CARCINOEMBRYONIC ANTIGEN: CPT

## 2022-03-31 ENCOUNTER — OFFICE VISIT (OUTPATIENT)
Dept: OBGYN | Facility: CLINIC | Age: 67
End: 2022-03-31
Attending: UROLOGY
Payer: COMMERCIAL

## 2022-03-31 VITALS
BODY MASS INDEX: 35.07 KG/M2 | WEIGHT: 198 LBS | DIASTOLIC BLOOD PRESSURE: 82 MMHG | SYSTOLIC BLOOD PRESSURE: 129 MMHG | OXYGEN SATURATION: 97 % | HEART RATE: 75 BPM

## 2022-03-31 DIAGNOSIS — Z71.9 ENCOUNTER FOR CONSULTATION: ICD-10-CM

## 2022-03-31 DIAGNOSIS — Z85.048 HISTORY OF RECTAL CANCER: Primary | ICD-10-CM

## 2022-03-31 DIAGNOSIS — E66.01 MORBID OBESITY (H): ICD-10-CM

## 2022-03-31 DIAGNOSIS — N81.89 PELVIC FLOOR RELAXATION: ICD-10-CM

## 2022-03-31 PROCEDURE — 99214 OFFICE O/P EST MOD 30 MIN: CPT | Performed by: OBSTETRICS & GYNECOLOGY

## 2022-03-31 NOTE — Clinical Note
Angel Luis, I am referring this patient to you.  I don't have any experience doing a reconstruction in a patient post pelvic radiation.

## 2022-03-31 NOTE — PROGRESS NOTES
Niharika is a 66 year old   is here today complaining of pelvic pressure and presumed pelvic prolapse.  This has been a problem for more than a year and seems to be worsening.  She also reports worsening STRESS URINARY INCONTINENCE.   Her history is significant for Rectal cancer, now in remission but treated with both Chemo and Radiation.  As well as Type 1 DM.    OB Hx: 2 SPONTANEOUS VAGINAL DELIVERY the largest >9 lbs.    ROS: Pertinent ROS as above.    Gyn Hx:      Past Medical History:   Diagnosis Date     Essential hypertension, benign      Generalized anxiety disorder      Hyperlipidemia      Rectal cancer (H) 2018     S/P radiation therapy     5,040 cGy to Pelvis completed on 2019. - [facility] with Dr. Morris     Type I (juvenile type) diabetes mellitus without mention of complication, not stated as uncontrolled     diagnosed at age 35     Ulcerative colitis, unspecified     in the 70s.       Past Surgical History:   Procedure Laterality Date     COLONOSCOPY N/A 2018    Procedure: COMBINED COLONOSCOPY, SINGLE OR MULTIPLE BIOPSY/POLYPECTOMY BY BIOPSY;  colonoscopy with polypectomies by biopsy forceps and hot snare and submucosal injection with tattoo;  Surgeon: Richard Moore MD;  Location:  GI     HC CURETTAGE, POSTPARTUM  , '    following miscarriages     HC REMOVAL OF TONSILS,<11 Y/O      Tonsils <14y.o.     INSERT PORT VASCULAR ACCESS Right 10/19/2018    Procedure: Chest Port Placement - right ;  Surgeon: Lawson Hitchcock PA-C;  Location:  OR     [facility] COLONOSCOPY THRU STOMA, DIAGNOSTIC      negative     Patient Active Problem List   Diagnosis     Female stress incontinence     Generalized anxiety disorder     Constipation     Type 1 diabetes mellitus without complication (H)     Essential hypertension with goal blood pressure less than 130/80     History of rectal cancer     Drug-induced polyneuropathy (H)     Mixed hyperlipidemia     Morbid  obesity (H)       ALL/Meds: Her medication and allergy histories were reviewed and are documented in their appropriate chart areas.    SH: Reviewed and documented in the appropriate area of the chart.  FH:  Her family history is reviewed and updated in the chart, today.  PMH: Her past medical, surgical, and obstetric histories were reviewed and updated today in the appropriate chart areas.    PE: /82 (BP Location: Left arm, Patient Position: Sitting, Cuff Size: Adult Large)   Pulse 75   Wt 89.8 kg (198 lb)   LMP 10/15/2008 (Approximate)   SpO2 97%   BMI 35.07 kg/m    Body mass index is 35.07 kg/m .    Pertinent Physical exam findings:    General Appearance:  healthy, alert, active, no distress  Pelvic:  Deferred to Urogyn    I discussed pelvic support and how it can be damaged.  We discussed the association of the pelvic organs and how they are attached.  We discussed treatment options including surgery.  Given her history of pelvic radiation and Type 1 DM, I suspect she may need a more advanced pelvic reconstruction and I recommend we refer her to Dr. Asif at the [place]    (Z85.048) History of rectal cancer  (primary encounter diagnosis)  (N81.89) Pelvic floor relaxation    Plan: Adult Urology Referral  (E66.01) Morbid obesity (H)  Comment: 30 minutes spent on the date of the encounter doing review of test results, patient visit and documentation   Plan: Refer to Urogyn     -   Orders Placed This Encounter   Procedures     Adult Urology Referral

## 2022-04-27 ENCOUNTER — OFFICE VISIT (OUTPATIENT)
Dept: UROLOGY | Facility: CLINIC | Age: 67
End: 2022-04-27
Attending: OBSTETRICS & GYNECOLOGY
Payer: COMMERCIAL

## 2022-04-27 ENCOUNTER — ANCILLARY PROCEDURE (OUTPATIENT)
Dept: CT IMAGING | Facility: CLINIC | Age: 67
End: 2022-04-27
Attending: COLON & RECTAL SURGERY
Payer: COMMERCIAL

## 2022-04-27 VITALS
SYSTOLIC BLOOD PRESSURE: 118 MMHG | WEIGHT: 196.9 LBS | HEART RATE: 80 BPM | HEIGHT: 63 IN | DIASTOLIC BLOOD PRESSURE: 72 MMHG | BODY MASS INDEX: 34.89 KG/M2

## 2022-04-27 DIAGNOSIS — N81.11 CYSTOCELE, MIDLINE: ICD-10-CM

## 2022-04-27 DIAGNOSIS — N81.6 RECTOCELE: ICD-10-CM

## 2022-04-27 DIAGNOSIS — Z85.048 HISTORY OF RECTAL CANCER: ICD-10-CM

## 2022-04-27 DIAGNOSIS — N81.4 PROLAPSE OF UTERUS: ICD-10-CM

## 2022-04-27 DIAGNOSIS — N81.89 PELVIC FLOOR RELAXATION: ICD-10-CM

## 2022-04-27 DIAGNOSIS — D25.9 UTERINE LEIOMYOMA, UNSPECIFIED LOCATION: Primary | ICD-10-CM

## 2022-04-27 LAB
CREAT BLD-MCNC: 0.7 MG/DL (ref 0.5–1)
GFR SERPL CREATININE-BSD FRML MDRD: >60 ML/MIN/1.73M2

## 2022-04-27 PROCEDURE — 99204 OFFICE O/P NEW MOD 45 MIN: CPT | Performed by: OBSTETRICS & GYNECOLOGY

## 2022-04-27 PROCEDURE — 71260 CT THORAX DX C+: CPT | Performed by: RADIOLOGY

## 2022-04-27 PROCEDURE — 74177 CT ABD & PELVIS W/CONTRAST: CPT | Performed by: RADIOLOGY

## 2022-04-27 RX ORDER — VIT C/B6/B5/MAGNESIUM/HERB 173 50-5-6-5MG
1 CAPSULE ORAL DAILY
COMMUNITY

## 2022-04-27 RX ORDER — IOPAMIDOL 755 MG/ML
76 INJECTION, SOLUTION INTRAVASCULAR ONCE
Status: DISCONTINUED | OUTPATIENT
Start: 2022-04-27 | End: 2022-04-27 | Stop reason: CLARIF

## 2022-04-27 RX ORDER — IOPAMIDOL 755 MG/ML
122 INJECTION, SOLUTION INTRAVASCULAR ONCE
Status: COMPLETED | OUTPATIENT
Start: 2022-04-27 | End: 2022-04-27

## 2022-04-27 RX ADMIN — IOPAMIDOL 122 ML: 755 INJECTION, SOLUTION INTRAVASCULAR at 08:39

## 2022-04-27 ASSESSMENT — PAIN SCALES - GENERAL: PAINLEVEL: NO PAIN (0)

## 2022-04-27 NOTE — PROGRESS NOTES
April 27, 2022    Referring Provider: Guanakito Vargas MD  [address] [place] [address]    Primary Care Provider: Henry Gomez    CC: prolapse    HPI:  Niharika Cason is a 66 year old female who presents for evaluation of her pelvic floor symptoms.  She has prolapse for a couple of years. Feels that it is getting worse. Has issues with urinary leakage. TRACE daily.Urgency and urge incontinence frequently.       Prolapse:  Do you feel a vaginal bulge? yes                                      Pressure? yes   Do you have to place your fingers in the vagina or in the rectum to have a bowel movement? no  Impact to quality of life? moderate     Stress Incontinence:  Do you leak urine with cough, sneeze, exercise? yes  How often do you leak with cough, sneeze, exercise?  yes  How much do you usually leak? More than drops   Do you wear a pad? yes If so; maxi  Impact to quality of life? moderate    Urge Incontinence:  Do you often get sudden urges to urinate? yes  How often do have urges? occ  If so, do you leak with these urges? yes  How much do you usually leak? drops  Impact to quality of life? moderate    Voids/day:>10  Nocturia: occ  Fluid intake: 60 ounces  Caffeine: 2-3 cups coffee    Urinating:  Difficulty starting urination or strain to void? no  Weak or intermittent stream? no  Incomplete emptying or dribbling? occ  Pain or burning with urination? no  Any blood in your urine? no    GI:  Constipation? no  Frequency stools daily    Straining for stools occ  Stool consistency normal     Ever leak stool (Accidental Bowel Leakage)? occ      If so, how often?               occ      If so, do you leak?                   mucous      Soiling without sensation? no  History of irritable bowel or Crohn's? no    Sexual/Pain:  Are you currently having sex?. no  Pain with sex?   Yes from prolapse   Sexual Partner: male  Do any of these symptoms interfere with sex? yes  Impact to quality of life?  moderate    Prior therapy:  Ever done pelvic floor physical therapy? no  Trial of medication? no  Have you ever tried a pessary? no    Medical History:  Do you have?   High Cholesterol? yes     Diabetes? yes  High Blood pressure? yes     Recurrent UTIs? no  Sleep Apnea? no  Other medical problems: no    Surgical History:    Hysterectomy? no   Bladder Surgery? no   Other? no     OB/Gyn History:  Pregnancies? 2  Deliveries? 2  Vaginal 2  Section 0  Current birth control? NA  Periods? no  When was the first day of your last period? -  Last Pap smear? - Any abnormal? -  Last mammogram? -  Last colonoscopy? -    Medications/Vitamins/Supplements: insulin, reviewed    Drug Allergies: no    Latex Allergy: no  Iodine Allergy no    Family History: (list relationship and age at diagnosis)  Breast cancer? no   Ovarian cancer? no   Colon cancer? no  Other? no    Social History:  Marital status:   Do you/ have you ever smoke(d)  cigarettes? no  Drink more than 1 alcoholic beverage a day?  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 no                   Unusual headaches? no  Leg pain w/ walking/exercise? no                       Easy bruising? no  Difficulty breathing w/ walking/exercise? no  Problems with vision? no  Dizziness, falls, or fainting? no  Excessive bleeding from cuts, gums, surgery? no  Other: no    Past Medical History:   Diagnosis Date     Colorectal cancer (H)      Essential hypertension, benign      Generalized anxiety disorder      Hyperlipidemia      Rectal cancer (H) 2018     S/P radiation therapy     5,040 cGy to Pelvis completed on 2019. - [facility] with Dr. Morris     Type I (juvenile type) diabetes mellitus without mention of complication, not stated as uncontrolled     diagnosed at age 35     Ulcerative colitis, unspecified     in the 70s.         Past Surgical History:   Procedure Laterality Date      COLONOSCOPY N/A 9/17/2018    Procedure: COMBINED COLONOSCOPY, SINGLE OR MULTIPLE BIOPSY/POLYPECTOMY BY BIOPSY;  colonoscopy with polypectomies by biopsy forceps and hot snare and submucosal injection with tattoo;  Surgeon: Richard Moore MD;  Location: [facility]     HC CURETTAGE, POSTPARTUM  '91, '93    following miscarriages     HC REMOVAL OF TONSILS,<11 Y/O      Tonsils <14y.o.     INSERT PORT VASCULAR ACCESS Right 10/19/2018    Procedure: Chest Port Placement - right ;  Surgeon: Lawson Hitchcock PA-C;  [facility]     [facility]    negative       Social History     Socioeconomic History     Marital status:      Spouse name: Fabrice     Number of children: 2     Years of education: 12     Highest education level: Not on file   Occupational History     Occupation:      Employer: [employer]     Comment: on her feet all day     Employer: [employer]   Tobacco Use     Smoking status: Former Smoker     Packs/day: 0.50     Years: 15.00     Pack years: 7.50     Quit date: 9/17/2018     Years since quitting: 3.6     Smokeless tobacco: Never Used   Vaping Use     Vaping Use: Not on file   Substance and Sexual Activity     Alcohol use: Yes     Alcohol/week: 7.0 standard drinks     Types: 7 Standard drinks or equivalent per week     Drug use: No     Sexual activity: Yes     Partners: Male     Birth control/protection: Post-menopausal   Other Topics Concern      Service No     Blood Transfusions No     Caffeine Concern No     Occupational Exposure No     Hobby Hazards No     Sleep Concern No     Stress Concern No     Weight Concern No     Special Diet Yes     Comment: diabetic     Back Care Yes     Exercise Yes     Bike Helmet No     Seat Belt Yes     Self-Exams Yes     Comment: occassionally     Parent/sibling w/ CABG, MI or angioplasty before 65F 55M? No   Social History Narrative     Not on file     Social Determinants of Health     Financial  "Resource Strain: Not on file   Food Insecurity: Not on file   Transportation Needs: Not on file   Physical Activity: Not on file   Stress: Not on file   Social Connections: Not on file   Intimate Partner Violence: Not on file   Housing Stability: Not on file       Family History   Problem Relation Age of Onset     Heart Disease Maternal Grandfather         MI at 52 yrs     EYE* Paternal Grandfather         cataracts     Arthritis Mother      Gynecology Mother         hysterectomy for fibroids     Hypertension Mother      Lipids Mother      Gastrointestinal Disease Father         ulcers     Heart Disease Father         intermittent rapid heartbeat     Cancer Father 84        Mesothelioma       ROS    No Known Allergies    Current Outpatient Medications   Medication     atorvastatin (LIPITOR) 40 MG tablet     blood glucose (NO BRAND SPECIFIED) test strip     Continuous Blood Gluc  (DEXCOM G6 ) BRITTANY     Continuous Blood Gluc Sensor (DEXCOM G6 SENSOR) MISC     Continuous Blood Gluc Transmit (DEXCOM G6 TRANSMITTER) MISC     insulin glargine (LANTUS SOLOSTAR) 100 UNIT/ML pen     insulin lispro (HUMALOG KWIKPEN) 100 UNIT/ML (1 unit dial) KWIKPEN     insulin pen needle (BD PEN NEEDLE KELECHI 2ND GEN) 32G X 4 MM miscellaneous     lisinopril (ZESTRIL) 40 MG tablet     Multiple Vitamins-Minerals (MULTIVITAMIN PO)     PARoxetine (PAXIL) 30 MG tablet     Pyridoxine HCl (VITAMIN B-6 PO)     Turmeric 500 MG CAPS     No current facility-administered medications for this visit.       /72   Pulse 80   Ht 1.6 m (5' 2.99\")   Wt 89.3 kg (196 lb 14.4 oz)   LMP 10/15/2008 (Approximate)   BMI 34.89 kg/m   Patient's last menstrual period was 10/15/2008 (approximate). Body mass index is 34.89 kg/m .  Ms. Cason is alert, comfortable in no acute distress, non-labored breathing.   Abdomen is soft, non-tender, non-distended, no CVAT.    Normal external female genitalia. The urethra was normal appearing without masses, " NT.   She has uterine prolapse and a cystocele to HR, with a small rectocele on supine strain.  Speculum and bimanual exam are remarkable for atrophic vaginal mucosa.  3/5 kegels.      POPQ  Aa 0   Ba 0  C 0  D -4  GH 5  PB 3  TVL 9  Ap -1  Bp -1    neg SST  VOID 200 ml  PVR 0 mL in and out cath  Urine dip ND    A/P: Niharika Cason is a 66 year old F with uterine prolapse, cystocele and fibroid uterus    Will get pelvic US to better assess the fibroids.    We discussed the diagnosis and treatment options. Treatment options to include:  1) observation with f/u in 6 -12 months  2) pessary fitting  3) surgical management    Surgical options to include TVH and vaginal native tissue repair or total hysterectomy vs supracervical hyst and sacrocolpopexy and possible sling procedure for urinary incontinence. We discussed the risks benefits and alternatives to each procedure. I am concerned about using mesh for a sacrocolpopexy    will reach out to Dr. Le to discuss surgical plan    We discussed that the risks to the procedure include but not limited to bleeding, infection, injury to adjacent organs including bowel, bladder, and blood vessels, conversion to open procedure, de pb or worsening stress incontinence or urge incontinence, need for post-operative whitney catheter, need for further procedures including for mesh extrusion or erosion and possible failure of the procedure with recurrence of her prolapse.  Patient expressed understanding and agreeable to proceed.        I spent a total of 45 minutes with  Ms. Cason  on the date of the encounter in chart review, face to face patient visit, review of tests, documentation and/or discussion with other providers about the issues documented above.    Angel Luis Asif MD  Professor, OB/GYN  Urogynecologist  CC  Patient Care Team:  Henry Gomez MD as PCP - General (Family Practice)  Lianet Palmer RN as Clinic Care Coordinator (Colon and Rectal  Surgery)  Gifty Miranda RD as Diabetes Educator (Dietitian, Registered)  Dayron Lawton MD as Specialty Provider (Hematology & Oncology)  Yohana Morris MD as MD (Radiation Oncology)  Alli Le MD as Assigned Surgical Provider  Henry Gomez MD as Assigned PCP  Chicho Huynh MD as MD (Urology)  Angel Luis Asif MD as MD (OB/Gyn)  Francisco Javier Garcia MD as Referring Physician (OB/Gyn)  Francisco Javier Garcia MD as Assigned OBGYN Provider  Lakshmi Welch, RN as Specialty Care Coordinator (Hematology & Oncology)  FRANCISCO JAVIER GARCIA

## 2022-04-27 NOTE — LETTER
4/27/2022       RE: Niharika Cason  [address] [facility] [address]     Dear Colleague,    Thank you for referring your patient, Niharika Cason, to the [facility] [place] at [facility]. Please see a copy of my visit note below.    April 27, 2022    Referring Provider: Guanakito Vargas MD  [address] [place] [address]    Primary Care Provider: Henry Gomez    CC: prolapse    HPI:  Niharika Cason is a 66 year old female who presents for evaluation of her pelvic floor symptoms.  She has prolapse for a couple of years. Feels that it is getting worse. Has issues with urinary leakage. TRACE daily.Urgency and urge incontinence frequently.       Prolapse:  Do you feel a vaginal bulge? yes                                      Pressure? yes   Do you have to place your fingers in the vagina or in the rectum to have a bowel movement? no  Impact to quality of life? moderate     Stress Incontinence:  Do you leak urine with cough, sneeze, exercise? yes  How often do you leak with cough, sneeze, exercise?  yes  How much do you usually leak? More than drops   Do you wear a pad? yes If so; maxi  Impact to quality of life? moderate    Urge Incontinence:  Do you often get sudden urges to urinate? yes  How often do have urges? occ  If so, do you leak with these urges? yes  How much do you usually leak? drops  Impact to quality of life? moderate    Voids/day:>10  Nocturia: occ  Fluid intake: 60 ounces  Caffeine: 2-3 cups coffee    Urinating:  Difficulty starting urination or strain to void? no  Weak or intermittent stream? no  Incomplete emptying or dribbling? occ  Pain or burning with urination? no  Any blood in your urine? no    GI:  Constipation? no  Frequency stools daily    Straining for stools occ  Stool consistency normal     Ever leak stool (Accidental Bowel Leakage)? occ      If so, how often?               occ      If so, do you  leak?                   mucous      Soiling without sensation? no  History of irritable bowel or Crohn's? no    Sexual/Pain:  Are you currently having sex?. no  Pain with sex?   Yes from prolapse   Sexual Partner: male  Do any of these symptoms interfere with sex? yes  Impact to quality of life? moderate    Prior therapy:  Ever done pelvic floor physical therapy? no  Trial of medication? no  Have you ever tried a pessary? no    Medical History:  Do you have?   High Cholesterol? yes     Diabetes? yes  High Blood pressure? yes     Recurrent UTIs? no  Sleep Apnea? no  Other medical problems: no    Surgical History:    Hysterectomy? no   Bladder Surgery? no   Other? no     OB/Gyn History:  Pregnancies? 2  Deliveries? 2  Vaginal 2  Section 0  Current birth control? NA  Periods? no  When was the first day of your last period? -  Last Pap smear? - Any abnormal? -  Last mammogram? -  Last colonoscopy? -    Medications/Vitamins/Supplements: insulin, reviewed    Drug Allergies: no    Latex Allergy: no  Iodine Allergy no    Family History: (list relationship and age at diagnosis)  Breast cancer? no   Ovarian cancer? no   Colon cancer? no  Other? no    Social History:  Marital status:   Do you/ have you ever smoke(d)  cigarettes? no  Drink more than 1 alcoholic beverage a day?  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 no                   Unusual headaches? no  Leg pain w/ walking/exercise? no                       Easy bruising? no  Difficulty breathing w/ walking/exercise? no  Problems with vision? no  Dizziness, falls, or fainting? no  Excessive bleeding from cuts, gums, surgery? no  Other: no    Past Medical History:   Diagnosis Date     Colorectal cancer (H)      Essential hypertension, benign      Generalized anxiety disorder      Hyperlipidemia      Rectal cancer (H) 2018     S/P radiation therapy     5,040 cGy to Pelvis  completed on 04/12/2019. - [facility] with Dr. Morris     Type I (juvenile type) diabetes mellitus without mention of complication, not stated as uncontrolled     diagnosed at age 35     Ulcerative colitis, unspecified     in the 70s.         Past Surgical History:   Procedure Laterality Date     COLONOSCOPY N/A 9/17/2018    Procedure: COMBINED COLONOSCOPY, SINGLE OR MULTIPLE BIOPSY/POLYPECTOMY BY BIOPSY;  colonoscopy with polypectomies by biopsy forceps and hot snare and submucosal injection with tattoo;  Surgeon: Richard Moore MD;  Location: [facility]     HC CURETTAGE, POSTPARTUM  '91, '93    following miscarriages     HC REMOVAL OF TONSILS,<13 Y/O      Tonsils <14y.o.     INSERT PORT VASCULAR ACCESS Right 10/19/2018    Procedure: Chest Port Placement - right ;  Surgeon: Lawson Hitchcock PA-C;  Location: [facility]     ZZHC COLONOSCOPY THRU STOMA, DIAGNOSTIC  1998    negative       Social History     Socioeconomic History     Marital status:      Spouse name: Fabrice     Number of children: 2     Years of education: 12     Highest education level: Not on file   Occupational History     Occupation:      Employer: [employer]     Comment: on her feet all day     Employer: [employer]   Tobacco Use     Smoking status: Former Smoker     Packs/day: 0.50     Years: 15.00     Pack years: 7.50     Quit date: 9/17/2018     Years since quitting: 3.6     Smokeless tobacco: Never Used   Vaping Use     Vaping Use: Not on file   Substance and Sexual Activity     Alcohol use: Yes     Alcohol/week: 7.0 standard drinks     Types: 7 Standard drinks or equivalent per week     Drug use: No     Sexual activity: Yes     Partners: Male     Birth control/protection: Post-menopausal   Other Topics Concern      Service No     Blood Transfusions No     Caffeine Concern No     Occupational Exposure No     Hobby Hazards No     Sleep Concern No     Stress Concern No     Weight Concern No     Special Diet Yes  "    Comment: diabetic     Back Care Yes     Exercise Yes     Bike Helmet No     Seat Belt Yes     Self-Exams Yes     Comment: occassionally     Parent/sibling w/ CABG, MI or angioplasty before 65F 55M? No   Social History Narrative     Not on file     Social Determinants of Health     Financial Resource Strain: Not on file   Food Insecurity: Not on file   Transportation Needs: Not on file   Physical Activity: Not on file   Stress: Not on file   Social Connections: Not on file   Intimate Partner Violence: Not on file   Housing Stability: Not on file       Family History   Problem Relation Age of Onset     Heart Disease Maternal Grandfather         MI at 52 yrs     EYE* Paternal Grandfather         cataracts     Arthritis Mother      Gynecology Mother         hysterectomy for fibroids     Hypertension Mother      Lipids Mother      Gastrointestinal Disease Father         ulcers     Heart Disease Father         intermittent rapid heartbeat     Cancer Father 84        Mesothelioma       ROS    No Known Allergies    Current Outpatient Medications   Medication     atorvastatin (LIPITOR) 40 MG tablet     blood glucose (NO BRAND SPECIFIED) test strip     Continuous Blood Gluc  (DEXCOM G6 ) BRITTANY     Continuous Blood Gluc Sensor (DEXCOM G6 SENSOR) MISC     Continuous Blood Gluc Transmit (DEXCOM G6 TRANSMITTER) MISC     insulin glargine (LANTUS SOLOSTAR) 100 UNIT/ML pen     insulin lispro (HUMALOG KWIKPEN) 100 UNIT/ML (1 unit dial) KWIKPEN     insulin pen needle (BD PEN NEEDLE KELECHI 2ND GEN) 32G X 4 MM miscellaneous     lisinopril (ZESTRIL) 40 MG tablet     Multiple Vitamins-Minerals (MULTIVITAMIN PO)     PARoxetine (PAXIL) 30 MG tablet     Pyridoxine HCl (VITAMIN B-6 PO)     Turmeric 500 MG CAPS     No current facility-administered medications for this visit.       /72   Pulse 80   Ht 1.6 m (5' 2.99\")   Wt 89.3 kg (196 lb 14.4 oz)   LMP 10/15/2008 (Approximate)   BMI 34.89 kg/m   Patient's last " menstrual period was 10/15/2008 (approximate). Body mass index is 34.89 kg/m .  Ms. Cason is alert, comfortable in no acute distress, non-labored breathing.   Abdomen is soft, non-tender, non-distended, no CVAT.    Normal external female genitalia. The urethra was normal appearing without masses, NT.   She has uterine prolapse and a cystocele to HR, with a small rectocele on supine strain.  Speculum and bimanual exam are remarkable for atrophic vaginal mucosa.  3/5 kegels.      POPQ  Aa 0   Ba 0  C 0  D -4  GH 5  PB 3  TVL 9  Ap -1  Bp -1    neg SST  VOID 200 ml  PVR 0 mL in and out cath  Urine dip ND    A/P: Niharika Cason is a 66 year old F with uterine prolapse, cystocele and fibroid uterus    Will get pelvic US to better assess the fibroids.    We discussed the diagnosis and treatment options. Treatment options to include:  1) observation with f/u in 6 -12 months  2) pessary fitting  3) surgical management    Surgical options to include TVH and vaginal native tissue repair or total hysterectomy vs supracervical hyst and sacrocolpopexy and possible sling procedure for urinary incontinence. We discussed the risks benefits and alternatives to each procedure. I am concerned about using mesh for a sacrocolpopexy    will reach out to Dr. Le to discuss surgical plan    We discussed that the risks to the procedure include but not limited to bleeding, infection, injury to adjacent organs including bowel, bladder, and blood vessels, conversion to open procedure, de pb or worsening stress incontinence or urge incontinence, need for post-operative whitney catheter, need for further procedures including for mesh extrusion or erosion and possible failure of the procedure with recurrence of her prolapse.  Patient expressed understanding and agreeable to proceed.        I spent a total of 45 minutes with  Ms. Cason  on the date of the encounter in chart review, face to face patient visit, review of tests, documentation  and/or discussion with other providers about the issues documented above.    Angel Luis Asif MD  Professor, OB/GYN  Urogynecologist  CC  Patient Care Team:  Henry Gomez MD as PCP - General (Family Practice)  Lianet Palmer, RN as Clinic Care Coordinator (Colon and Rectal Surgery)  Gifty Miranda RD as Diabetes Educator (Dietitian, Registered)  Dayron Lawton MD as Specialty Provider (Hematology & Oncology)  Yohana Morris MD as MD (Radiation Oncology)  Alli Le MD as Assigned Surgical Provider  Henry Gomez MD as Assigned PCP  Chicho Huynh MD as MD (Urology)  Angel Luis Asif MD as MD (OB/Gyn)  Francisco Javier Garcia MD as Referring Physician (OB/Gyn)  Francisco Javier Garcia MD as Assigned OBGYN Provider  Lakshmi Welch, RN as Specialty Care Coordinator (Hematology & Oncology)  FRANCISCO JAVIER GARCIA

## 2022-05-09 NOTE — PROGRESS NOTES
"Colon and Rectal Surgery Follow-up Clinic Note    RE: Niharika Cason  : 1955  MARILEE: 6/3/2022    DIAGNOSIS:     10/2018: mT3cN1 proximal rectal adenocarcinoma (intact IHC) found on 1st screening colonoscopy. 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    10/2018-2019: XELOX x6.    3/-19: CXRT (5040 cGy).    2019: cCR (enrolled in watch and wait protocol).    INTERVAL HISTORY: Denies increased pain, fevers, or chills. Tolerating diet well. Having 1-2 BM's per day with mild fecal urgency and seepage. Denies incontinence per se or BPR. Does acknowledge stress urinary incontinence but this was present before her rectal cancer treatment. Denies additional urinary or sexual function issues at this time.    Niharika was diagnosed with urogenital prolapse and a fibroid uterus uterus recently.  Hysterectomy has been recommended.  I did mention to her that if she has a hysterectomy at the [facility] that I am more than happy to be available to assess her pelvis.  I did remind her to mention to her gynecologist that she received pelvic radiation. She saw Dr. Asif on 22 to discuss possible sacrocolpopexy although he had concerns with regards to placing mesh in the pelvis after chemoradiation.  A pessary was also discussed although she is reluctant to use this.  I believe Dr. Lin also discussed sacrocolpopexy with a biologic prosthesis which she is inclined to discuss more about.    Physical Examination:  /78   Pulse 75   Wt 195 lb   LMP 10/15/2008 (Approximate)   SpO2 98%   BMI 34.55 kg/m    Perianal skin intact.   ALEC: no masses palpated.  Flexible sigmoidoscopy: after obtaining informed consent and performing a \"time out\", an adult flexible sigmoidoscope was introduced through the anus and passed up to the proximal sigmoid colon. The quality of the prep was fair. Findings: linear 2x2-cm white scar with central telangiectasia located at the left posterior " aspect of the mid rectum (7-cm from dentate line), covering approx 30% of the lumen circumference. Distal edge of the scar is at the mid rectal valve. Tattoo was identified. No additional abnormalities were seen. Total scope time: 12 minutes. The patient tolerated the procedure well.    ASSESSMENT  cCR. No clear evidence of recurrent neoplasia.  CEA rising although stable at this time.  Her initial CEA was 10.5 and has fluctuated between 3-5.     04/17/19 11:20 06/12/19 14:37 08/10/19 10:58 11/14/19 14:00 06/01/20 11:13 09/28/20 15:22 12/15/20 08:15 05/15/21 10:52 07/23/21 16:19 01/21/22 16:56 03/29/22 08:13   CEA 4.7 (H) [1] 3.3 (H) [2] 3.4 (H) [3] 2.8 (H) [4] 2.9 (H) [5] 3.8 (H) [6] 2.6 (H) [7] 3.8 (H) [8] 3.3 (H) [9] 4.7 (H) [10] 4.6 (H) [11]     CT c/a/p (4/27/22):  1.  No convincing evidence of recurrent or metastatic disease in the  chest, abdomen, or pelvis.  2.  Stable pulmonary nodules.    3T MR pelvis (1/21/22):   IMPRESSION:   1. Ongoing continued positive response to treatment with no  significant progression. No appreciable MRI findings concerning for  residual tumor on today's exam. These findings correspond to a tumor  regression grade of mrTRG1.  2. No suspicious pelvic lymphadenopathy.  3. Myomatous uterus. Additional incidental findings as described  above.    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    PLAN  1. High fiber diet. Metamucil 1 tbsp BID.  2.  Repeat CEA in July with MR.  3. Flex Sig in 10/2022.   4. 3T MR pelvis in 7/2022.  5. CT c/a/p in 10/2022.  6. Colonoscopy in 10/2023.     Follow up per Watch and Wait Protocol:   Completed CRT: 4/12/19.    Flexible sigmoidoscopy     Every 2 months for 6 months: Completed    Every 3 months for 3 years: Until 4/2022 Completed    Every 6 months for 5  years: Until 4/2024 Due 10/2022    Every year for 10 years: Until 4/2029      3T MRI of pelvis    6-8 weeks after CRT:    Every 4 months for 2 years: Until 4/2021 Completed    Every 6 months for 2 years: Until 4/2023 Due 7/2022    Yearly for 2 years: Until 4/2025      CT CAP    Every year for 6-8 years: Until 4/2027 Due 5/2022      Colonoscopy     Due 10/2023      CEA at every clinic or endoscopic visit    30 minutes spent on the date of encounter (excluding time performing procedures with or without biopsy) performing chart review, history and exam, documentation and further activities as noted above.     Alli Le M.D., M.Sc.     Division of Colon and Rectal Surgery  [facility]    Referring Provider:  Manjeet Moore MD  [address] [place] [address]      Primary Care Provider:  Leona Farris    CC:  Dayron Lawton MD.  Ynes Jimenez MD.  Yohana Morris MD.  Angel Luis Asif MD

## 2022-05-11 ENCOUNTER — VIRTUAL VISIT (OUTPATIENT)
Dept: UROLOGY | Facility: CLINIC | Age: 67
End: 2022-05-11
Attending: OBSTETRICS & GYNECOLOGY
Payer: COMMERCIAL

## 2022-05-11 DIAGNOSIS — N81.11 CYSTOCELE, MIDLINE: ICD-10-CM

## 2022-05-11 DIAGNOSIS — N81.6 RECTOCELE: ICD-10-CM

## 2022-05-11 DIAGNOSIS — N81.4 PROLAPSE OF UTERUS: Primary | ICD-10-CM

## 2022-05-11 PROCEDURE — 99214 OFFICE O/P EST MOD 30 MIN: CPT | Mod: TEL | Performed by: OBSTETRICS & GYNECOLOGY

## 2022-05-11 NOTE — PROGRESS NOTES
Telemedicine Visit: The patient's condition can be safely assessed and treated via synchronous audio and visual telemedicine encounter.      Reason for Telemedicine Visit: Patient has requested telehealth visit    Originating Site (Patient Location): Patient's home    Distant Site (Provider Location): [facility] [place]    Consent:  The patient/guardian has verbally consented to: the potential risks and benefits of telemedicine (video visit) versus in person care; bill my insurance or make self-payment for services provided; and responsibility for payment of non-covered services.     Mode of Communication:  telephone visit    As the provider I attest to compliance with applicable laws and regulations related to telemedicine.  Call started 2:15    May 11, 2022    Return visit    Patient returns today to discuss US result and treatment plan. Since her last visit she had her pelvic US. This confirmed the presence of multiple fibroids, all smaller than 2cm.     LMP 10/15/2008 (Approximate)   She is comfortable, in no distress, non-labored breathing.      A/P: 66 year old F with pelvic organ prolapse and a h/o rectal cancer treated with radiation and chemotherapy    I had a long discussion with the patient regarding her treatment options.    .We discussed the diagnosis and treatment options. Treatment options to include:  1) observation with f/u in 6 -12 months  2) pessary fitting  3) surgical management    Surgical options to include TVH and vaginal native tissue repair or RA supracervical sacrocolpopexy with porcine dermis. We discussed the risks benefits and alternatives to each procedure and the patient wishes to do a trial of pessary.     We discussed that the risks to the procedure include but not limited to bleeding, infection, injury to adjacent organs including bowel, bladder, and blood vessels, conversion to open procedure, de pb or worsening stress incontinence or urge incontinence, need  for post-operative whitney catheter, need for further procedures including for mesh extrusion or erosion and possible failure of the procedure with recurrence of her prolapse.  Patient expressed understanding and agreeable to proceed.    I spent a total of 30 minutes with  Ms. Cason  on the date of the encounter in chart review, face to face patient visit, review of tests, documentation and/or discussion with other providers about the issues documented above.    Angel Luis Asif MD  Professor, OB/GYN  Urogynecologist    CC  Patient Care Team:  Henry Gomez MD as PCP - General (Family Practice)  Lianet Palmer, RN as Clinic Care Coordinator (Colon and Rectal Surgery)  Gifty Miranda RD as Diabetes Educator (Dietitian, Registered)  Dayron Lawton MD as Specialty Provider (Hematology & Oncology)  Yohana Morris MD as MD (Radiation Oncology)  Alli Le MD as Assigned Surgical Provider  Henry Gomez MD as Assigned PCP  Chicho Huynh MD as MD (Urology)  Angel Luis Asif MD as MD (OB/Gyn)  Guanakito Vargas MD as Referring Physician (OB/Gyn)  Guanakito Vargas MD as Assigned OBGYN Provider  Lakshmi Welch RN as Specialty Care Coordinator (Hematology & Oncology)          Call ended 2:45

## 2022-05-11 NOTE — LETTER
5/11/2022       RE: Niharika Cason  [address] [facility] [address]     Dear Colleague,    Thank you for referring your patient, Niharika Cason, to the [facility] [place] at [facility]. Please see a copy of my visit note below.    Telemedicine Visit: The patient's condition can be safely assessed and treated via synchronous audio and visual telemedicine encounter.      Reason for Telemedicine Visit: Patient has requested telehealth visit    Originating Site (Patient Location): Patient's home    Distant Site (Provider Location): [facility]    Consent:  The patient/guardian has verbally consented to: the potential risks and benefits of telemedicine (video visit) versus in person care; bill my insurance or make self-payment for services provided; and responsibility for payment of non-covered services.     Mode of Communication:  telephone visit    As the provider I attest to compliance with applicable laws and regulations related to telemedicine.  Call started 2:15    May 11, 2022    Return visit    Patient returns today to discuss US result and treatment plan. Since her last visit she had her pelvic US. This confirmed the presence of multiple fibroids, all smaller than 2cm.     LMP 10/15/2008 (Approximate)   She is comfortable, in no distress, non-labored breathing.      A/P: 66 year old F with pelvic organ prolapse and a h/o rectal cancer treated with radiation and chemotherapy    I had a long discussion with the patient regarding her treatment options.    .We discussed the diagnosis and treatment options. Treatment options to include:  1) observation with f/u in 6 -12 months  2) pessary fitting  3) surgical management    Surgical options to include TVH and vaginal native tissue repair or RA supracervical sacrocolpopexy with porcine dermis. We discussed the risks benefits and alternatives to each procedure and the  patient wishes to do a trial of pessary.     We discussed that the risks to the procedure include but not limited to bleeding, infection, injury to adjacent organs including bowel, bladder, and blood vessels, conversion to open procedure, de pb or worsening stress incontinence or urge incontinence, need for post-operative whitney catheter, need for further procedures including for mesh extrusion or erosion and possible failure of the procedure with recurrence of her prolapse.  Patient expressed understanding and agreeable to proceed.    I spent a total of 30 minutes with  Ms. Cason  on the date of the encounter in chart review, face to face patient visit, review of tests, documentation and/or discussion with other providers about the issues documented above.    Angel Luis Asif MD  Professor, OB/GYN  Urogynecologist    CC  Patient Care Team:  Henry Gomez MD as PCP - General (Family Practice)  Lianet Palmer, RN as Clinic Care Coordinator (Colon and Rectal Surgery)  Gifty Miranda RD as Diabetes Educator (Dietitian, Registered)  Dayron Lawton MD as Specialty Provider (Hematology & Oncology)  Yohana Morris MD as MD (Radiation Oncology)  Alli Le MD as Assigned Surgical Provider  Henry Gomez MD as Assigned PCP  Chicho Huynh MD as MD (Urology)  Angel Luis Asif MD as MD (OB/Gyn)  Guanakito Vargas MD as Referring Physician (OB/Gyn)  Guanakito Vargas MD as Assigned OBGYN Provider  Lakshmi Welch, RN as Specialty Care Coordinator (Hematology & Oncology)    Call ended 2:45

## 2022-05-18 ENCOUNTER — PRE VISIT (OUTPATIENT)
Dept: SURGERY | Facility: CLINIC | Age: 67
End: 2022-05-18
Payer: COMMERCIAL

## 2022-05-18 NOTE — TELEPHONE ENCOUNTER
Diagnosis, Referred by & from: Dx: history of rectal cancer   Appt date: 05/27/22   NOTES STATUS DETAILS   OFFICE NOTE from referring provider N/A    OFFICE NOTE from other specialist N/A     DISCHARGE SUMMARY from hospital Internal rectal mass 09/17/2018 [facility] medical   DISCHARGE REPORT from the ER N/A      OPERATIVE REPORT Internal rectal mass 09/17/2018 [facility] medical   MEDICATION LIST Internal    LABS     ANAL PAP internal 06/12/2019 rectal cancer    BIOPSIES/PATHOLOGY RELATED TO DIAGNOSIS Internal 06/12/2019 rectal cancer   DIAGNOSTIC PROCEDURES     PFC TESTING (from the Pelvic floor center includes Manometry, PDNL, EMG, etc.) N/A    COLONOSCOPY internal 10/27/2021 [facility]   UPPER ENDOSCOPY (EGD) N/A    FLEX SIGMOIDOSCOPY internal 01/28/2022    ERCP Internal     IMAGING (DISC & REPORT)      CT internal 04/18/2022 history of rectal cancer [facility]   MRI Internal 01/21/2022 MR pelvis [facility]   XRAY N/A    ULTRASOUND  (ENDOANAL/ENDORECTAL) N/A

## 2022-06-03 ENCOUNTER — OFFICE VISIT (OUTPATIENT)
Dept: SURGERY | Facility: CLINIC | Age: 67
End: 2022-06-03
Payer: COMMERCIAL

## 2022-06-03 VITALS
OXYGEN SATURATION: 98 % | WEIGHT: 195 LBS | SYSTOLIC BLOOD PRESSURE: 133 MMHG | DIASTOLIC BLOOD PRESSURE: 78 MMHG | BODY MASS INDEX: 34.55 KG/M2 | HEART RATE: 75 BPM

## 2022-06-03 DIAGNOSIS — Z85.048 HISTORY OF RECTAL CANCER: Primary | ICD-10-CM

## 2022-06-03 PROCEDURE — 45331 SIGMOIDOSCOPY AND BIOPSY: CPT | Performed by: COLON & RECTAL SURGERY

## 2022-06-03 NOTE — NURSING NOTE
Chief Complaint   Patient presents with     Flexible Sigmoidoscopy         Vitals:    06/03/22 0932   BP: 133/78   Pulse: 75   SpO2: 98%   Weight: 88.5 kg (195 lb)       Body mass index is 34.55 kg/m .    Jacquelyn Combs MA

## 2022-06-03 NOTE — PATIENT INSTRUCTIONS
Follow up:  MR pelvis and labs in Early July  CT and flex sig in October 2022    Please call with any questions or concerns regarding your clinic visit today.    It is a pleasure being involved in your health care.    Contacts post-consultation depending on your need:    Radiology Appointments [phone]    Schedule Clinic Appointments [phone] # 1   M-F 7:30 - 5 pm    ZOFIA Celestin [phone]    Clinic Fax Number [phone]    Surgery Scheduling [phone]    My Chart is available 24 hours a day and is a secure way to access your records and communicate with your care team.  I strongly recommend signing up if you haven't already done so, if you are comfortable with computers.  If you would like to inquire about this or are having problems with My Chart access, you may call [phone] or go online at [email or web address].  Please allow at least 24 hours for a response and extra time on weekends and Holidays.

## 2022-06-03 NOTE — LETTER
6/3/2022         RE: Niharika Cason  [address] [facility] [address]        Dear Colleague,    Thank you for referring your patient, Niharika Cason, to the [facility] [place]. Please see a copy of my visit note below.    Colon and Rectal Surgery Follow-up Clinic Note    RE: Niharika Cason  : 1955  MARILEE: 6/3/2022    DIAGNOSIS:     10/2018: mT3cN1 proximal rectal adenocarcinoma (intact IHC) found on 1st screening colonoscopy. 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    10/2018-2019: XELOX x6.    3/-19: CXRT (5040 cGy).    2019: cCR (enrolled in watch and wait protocol).    INTERVAL HISTORY: Denies increased pain, fevers, or chills. Tolerating diet well. Having 1-2 BM's per day with mild fecal urgency and seepage. Denies incontinence per se or BPR. Does acknowledge stress urinary incontinence but this was present before her rectal cancer treatment. Denies additional urinary or sexual function issues at this time.    Niharika was diagnosed with urogenital prolapse and a fibroid uterus uterus recently.  Hysterectomy has been recommended.  I did mention to her that if she has a hysterectomy at the [facility] that I am more than happy to be available to assess her pelvis.  I did remind her to mention to her gynecologist that she received pelvic radiation. She saw Dr. Asif on 22 to discuss possible sacrocolpopexy although he had concerns with regards to placing mesh in the pelvis after chemoradiation.  A pessary was also discussed although she is reluctant to use this.  I believe Dr. Lin also discussed sacrocolpopexy with a biologic prosthesis which she is inclined to discuss more about.    Physical Examination:  /78   Pulse 75   Wt 195 lb   LMP 10/15/2008 (Approximate)   SpO2 98%   BMI 34.55 kg/m    Perianal skin intact.   ALEC: no masses palpated.  Flexible sigmoidoscopy: after obtaining informed consent and performing a  "\"time out\", an adult flexible sigmoidoscope was introduced through the anus and passed up to the proximal sigmoid colon. The quality of the prep was fair. Findings: linear 2x2-cm white scar with central telangiectasia located at the left posterior aspect of the mid rectum (7-cm from dentate line), covering approx 30% of the lumen circumference. Distal edge of the scar is at the mid rectal valve. Tattoo was identified. No additional abnormalities were seen. Total scope time: 12 minutes. The patient tolerated the procedure well.    ASSESSMENT  cCR. No clear evidence of recurrent neoplasia.  CEA rising although stable at this time.  Her initial CEA was 10.5 and has fluctuated between 3-5.     04/17/19 11:20 06/12/19 14:37 08/10/19 10:58 11/14/19 14:00 06/01/20 11:13 09/28/20 15:22 12/15/20 08:15 05/15/21 10:52 07/23/21 16:19 01/21/22 16:56 03/29/22 08:13   CEA 4.7 (H) [1] 3.3 (H) [2] 3.4 (H) [3] 2.8 (H) [4] 2.9 (H) [5] 3.8 (H) [6] 2.6 (H) [7] 3.8 (H) [8] 3.3 (H) [9] 4.7 (H) [10] 4.6 (H) [11]     CT c/a/p (4/27/22):  1.  No convincing evidence of recurrent or metastatic disease in the  chest, abdomen, or pelvis.  2.  Stable pulmonary nodules.    3T MR pelvis (1/21/22):   IMPRESSION:   1. Ongoing continued positive response to treatment with no  significant progression. No appreciable MRI findings concerning for  residual tumor on today's exam. These findings correspond to a tumor  regression grade of mrTRG1.  2. No suspicious pelvic lymphadenopathy.  3. Myomatous uterus. Additional incidental findings as described  above.    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    PLAN  1. High fiber diet. Metamucil 1 tbsp BID.  2.  Repeat CEA in July with MR.  3. Flex Sig in 10/2022.   4. 3T MR pelvis in 7/2022.  5. CT c/a/p in " 10/2022.  6. Colonoscopy in 10/2023.     Follow up per Watch and Wait Protocol:   Completed CRT: 4/12/19.    Flexible sigmoidoscopy     Every 2 months for 6 months: Completed    Every 3 months for 3 years: Until 4/2022 Completed    Every 6 months for 5 years: Until 4/2024 Due 10/2022    Every year for 10 years: Until 4/2029      3T MRI of pelvis    6-8 weeks after CRT:    Every 4 months for 2 years: Until 4/2021 Completed    Every 6 months for 2 years: Until 4/2023 Due 7/2022    Yearly for 2 years: Until 4/2025      CT CAP    Every year for 6-8 years: Until 4/2027 Due 5/2022      Colonoscopy     Due 10/2023      CEA at every clinic or endoscopic visit    30 minutes spent on the date of encounter (excluding time performing procedures with or without biopsy) performing chart review, history and exam, documentation and further activities as noted above.     Alli Le M.D., M.Sc.     Division of Colon and Rectal Surgery  [facility]    Referring Provider:  Manjeet Moore MD  [address] [place] [address]      Primary Care Provider:  Leona Farris    CC:  Dayron Lawton MD.  Ynes Jimenez MD.  Yohana Morris MD.  Angel Luis Asif MD            Again, thank you for allowing me to participate in the care of your patient.        Sincerely,        Alli Le MD

## 2022-06-04 ENCOUNTER — HEALTH MAINTENANCE LETTER (OUTPATIENT)
Age: 67
End: 2022-06-04

## 2022-07-01 DIAGNOSIS — Z85.048 HISTORY OF RECTAL CANCER: Primary | ICD-10-CM

## 2022-07-02 ENCOUNTER — LAB (OUTPATIENT)
Dept: LAB | Facility: CLINIC | Age: 67
End: 2022-07-02
Payer: COMMERCIAL

## 2022-07-02 DIAGNOSIS — Z85.048 HISTORY OF RECTAL CANCER: ICD-10-CM

## 2022-07-02 LAB — CEA SERPL-MCNC: 4.5 NG/ML

## 2022-07-02 PROCEDURE — 82378 CARCINOEMBRYONIC ANTIGEN: CPT | Mod: 90 | Performed by: PATHOLOGY

## 2022-07-02 PROCEDURE — 36415 COLL VENOUS BLD VENIPUNCTURE: CPT | Performed by: PATHOLOGY

## 2022-07-02 PROCEDURE — 99000 SPECIMEN HANDLING OFFICE-LAB: CPT | Performed by: PATHOLOGY

## 2022-09-29 DIAGNOSIS — Z85.048 HISTORY OF RECTAL CANCER: Primary | ICD-10-CM

## 2022-09-30 ENCOUNTER — MYC MEDICAL ADVICE (OUTPATIENT)
Dept: FAMILY MEDICINE | Facility: CLINIC | Age: 67
End: 2022-09-30

## 2022-09-30 DIAGNOSIS — E10.9 TYPE 1 DIABETES MELLITUS WITHOUT COMPLICATION (H): ICD-10-CM

## 2022-10-03 NOTE — TELEPHONE ENCOUNTER
Patient now on Medicare and needing insulin changed to amount pended.     July Martell, BSN, RN

## 2022-10-03 NOTE — TELEPHONE ENCOUNTER
"Requested Prescriptions   Pending Prescriptions Disp Refills    insulin lispro (HUMALOG KWIKPEN) 100 UNIT/ML (1 unit dial) KWIKPEN 15 mL 3     Sig: Take 2u:15g breakfast, 2u:15g lunch, 2u:15g dinner,  + 1u/50mg/dL>150mg/dL +2u prime before each dose-total maximum 45 u/day        Short Acting Insulin Protocol Failed - 10/3/2022  7:28 AM        Failed - HgbA1C in past 3 or 6 months     If HgbA1C is 8 or greater, it needs to be on file within the past 3 months.  If less than 8, must be on file within the past 6 months.     Recent Labs   Lab Test 02/03/22  1432   A1C 7.2*             Failed - Recent (6 mo) or future (30 days) visit within the authorizing provider's specialty     Patient had office visit in the last 6 months or has a visit in the next 30 days with authorizing provider or within the authorizing provider's specialty.  See \"Patient Info\" tab in inbasket, or \"Choose Columns\" in Meds & Orders section of the refill encounter.            Passed - Serum creatinine on file in past 12 months     Recent Labs   Lab Test 04/27/22  0834 02/03/22  1432 05/15/21  1014   CR 0.7   < >  --    CREAT  --   --  0.6    < > = values in this interval not displayed.       Ok to refill medication if creatinine is low          Passed - Medication is active on med list        Passed - Patient is age 18 or older           insulin glargine (LANTUS SOLOSTAR) 100 UNIT/ML pen 15 mL 3     Sig: Inject 22 Units Subcutaneous At Bedtime        Long Acting Insulin Protocol Failed - 10/3/2022  7:28 AM        Failed - HgbA1C in past 3 or 6 months     If HgbA1C is 8 or greater, it needs to be on file within the past 3 months.  If less than 8, must be on file within the past 6 months.     Recent Labs   Lab Test 02/03/22  1432   A1C 7.2*             Failed - Recent (6 mo) or future (30 days) visit within the authorizing provider's specialty     Patient had office visit in the last 6 months or has a visit in the next 30 days with authorizing " "provider or within the authorizing provider's specialty.  See \"Patient Info\" tab in inbasket, or \"Choose Columns\" in Meds & Orders section of the refill encounter.            Passed - Serum creatinine on file in past 12 months     Recent Labs   Lab Test 04/27/22  0834 02/03/22  1432 05/15/21  1014   CR 0.7   < >  --    CREAT  --   --  0.6    < > = values in this interval not displayed.       Ok to refill medication if creatinine is low          Passed - Medication is active on med list        Passed - Patient is age 18 or older                SACHI MtzN, RN          "

## 2022-10-04 RX ORDER — INSULIN LISPRO 100 [IU]/ML
INJECTION, SOLUTION INTRAVENOUS; SUBCUTANEOUS
Qty: 15 ML | Refills: 3 | Status: SHIPPED | OUTPATIENT
Start: 2022-10-04 | End: 2022-10-04

## 2022-10-04 RX ORDER — INSULIN GLARGINE 100 [IU]/ML
22 INJECTION, SOLUTION SUBCUTANEOUS AT BEDTIME
Qty: 15 ML | Refills: 3 | Status: SHIPPED | OUTPATIENT
Start: 2022-10-04 | End: 2022-10-04

## 2022-10-04 RX ORDER — INSULIN LISPRO 100 [IU]/ML
INJECTION, SOLUTION INTRAVENOUS; SUBCUTANEOUS
Qty: 45 ML | Refills: 0 | Status: SHIPPED | OUTPATIENT
Start: 2022-10-04 | End: 2022-10-12

## 2022-10-04 RX ORDER — INSULIN GLARGINE 100 [IU]/ML
22 INJECTION, SOLUTION SUBCUTANEOUS AT BEDTIME
Qty: 30 ML | Refills: 0 | Status: SHIPPED | OUTPATIENT
Start: 2022-10-04 | End: 2022-10-12

## 2022-10-04 NOTE — TELEPHONE ENCOUNTER
Called and spoke with pharmacy. Unfortunately this is a Medicare quantity/day supply issue. The prescription is correctly written for 1 box of 5 pens and the pharmacy cannot split the boxes.     For her Novolog with using a max of 46 units per day as written on the prescription, 1 box of pens would be a 34 day supply. The pharmacy has to dispense a full box, but since this is over a 30 day supply Medicare charges her 2 copays (1+ month supply).     Likewise for the Lantus, at 22 units per day, one box is a 68 day supply, so Medicare charges her 3 copays (since it's a 2+ month supply).     The only way around this is to adjust the dose on each prescription, if appropriate. For example, if the prescription for Novolog was changed to state a max of 50 units per day, then the 1 box would be exactly a 30 day supply and she would be charged 1 copay.     Hope this helps,    Sabi Currie, PharmD, BCACP  Medication Management (MTM) Pharmacist  [facility]

## 2022-10-06 NOTE — PROGRESS NOTES
"Colon and Rectal Surgery Follow-up Clinic Note    RE: Niharika Cason  : 1955  MARILEE: 10/21/22    DIAGNOSIS:     10/2018: mT3cN1 proximal rectal adenocarcinoma (intact IHC) found on 1st screening colonoscopy. 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    10/2018-2019: XELOX x6.    3/-19: CXRT (5040 cGy).    2019: cCR (enrolled in watch and wait protocol).    INTERVAL HISTORY: Denies increased pain, fevers, or chills. Tolerating diet well. Having 1-2 BM's per day with mild fecal urgency and seepage. Denies incontinence per se or BPR. Does acknowledge stress urinary incontinence but this was present before her rectal cancer treatment. Denies additional urinary or sexual function issues at this time.     Physical Examination:   /66 (BP Location: Left arm, Patient Position: Sitting, Cuff Size: Adult Large)   Pulse 75   Wt 193 lb 11.2 oz   LMP 10/15/2008 (Approximate)   SpO2 97%   BMI 34.32 kg/m    Perianal skin intact.   ALEC: no masses palpated.  Flexible sigmoidoscopy: after obtaining informed consent and performing a \"time out\", an adult flexible sigmoidoscope was introduced through the anus and passed up to the proximal sigmoid colon. The quality of the prep was fair. Findings: linear 2x2-cm white scar with central telangiectasia located at the left posterior aspect of the mid rectum (7-cm from dentate line), covering approx 30% of the lumen circumference. Distal edge of the scar is at the mid rectal valve. Tattoo was identified. No additional abnormalities were seen. Total scope time: 12 minutes. The patient tolerated the procedure well.    ASSESSMENT  cCR. No clear evidence of recurrent neoplasia.  CEA stable.      19 11:20 19 14:37 08/10/19 10:58 19 14:00 20 11:13 20 15:22 12/15/20 08:15 05/15/21 10:52 21 16:19 22 16:56 22 08:13   CEA 4.7 (H) [1] 3.3 (H) [2] 3.4 (H) [3] 2.8 (H) [4] 2.9 (H) [5] 3.8 (H) [6] 2.6 (H) [7] 3.8 (H) [8] " 3.3 (H) [9] 4.7 (H) [10] 4.6 (H) [11]     CEA 10/15: 4.7.    CT c/a/p (10/15/22):   IMPRESSION: No evidence of recurrence or metastatic disease. Unchanged  renal cysts. Unchanged calcified uterine fibroids. Unchanged sub-5 mm  pulmonary nodules. Atherosclerosis.    3T MR Pelvis (7/2/22):  IMPRESSION:   1. Continued complete response to treatment, with a corresponding  tumor regression grade of mrTRG-1.    2. No suspicious lymphadenopathy.  3. Myomatous uterus.    CT c/a/p (10/15/22):  1.  No convincing evidence of recurrent or metastatic disease in the  chest, abdomen, or pelvis.  2.  Stable pulmonary nodules.    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      PLAN  1.  High fiber diet. Metamucil 1 tbsp BID.   2.  Order ctDNA.    Follow up per Watch and Wait Protocol:   Completed CRT: 4/12/19.    Flexible sigmoidoscopy     Every 2 months for 6 months: Completed    Every 3 months for 3 years: Until 4/2022 Completed    Every 6 months for 5 years: Until 4/2024 Due 4/2023    Every year for 10 years: Until 4/2029      3T MRI of pelvis    6-8 weeks after CRT:    Every 4 months for 2 years: Until 4/2021 Completed    Every 6 months for 2 years: Until 4/2023 Due 1/2023    Yearly for 2 years: Until 4/2025      CT CAP    Every year for 6-8 years: Until 4/2027 Due 5/2023      Colonoscopy     Due 10/2023      CEA at every clinic or endoscopic visit    30 minutes spent on the date of encounter (excluding time performing procedures with or without biopsy) performing chart review, history and exam, documentation and further activities as noted above.     Alli Le M.D., M.Sc.     Division of Colon and Rectal Surgery  [facility]    Referring Provider:  Manjeet Moore,  MD  [address] [place] [id number]      Primary Care Provider:  Leona Farris    CC:  Dayron Lawton MD.  Ynes Jimenez MD.  Yohana Morris MD.  Angel Luis Asif MD

## 2022-10-09 ENCOUNTER — HEALTH MAINTENANCE LETTER (OUTPATIENT)
Age: 67
End: 2022-10-09

## 2022-10-12 RX ORDER — INSULIN LISPRO 100 [IU]/ML
INJECTION, SOLUTION INTRAVENOUS; SUBCUTANEOUS
Qty: 45 ML | Refills: 0 | Status: SHIPPED | OUTPATIENT
Start: 2022-10-12 | End: 2022-11-22

## 2022-10-12 RX ORDER — INSULIN GLARGINE 100 [IU]/ML
22 INJECTION, SOLUTION SUBCUTANEOUS AT BEDTIME
Qty: 30 ML | Refills: 0 | Status: SHIPPED | OUTPATIENT
Start: 2022-10-12 | End: 2023-02-10

## 2022-10-12 NOTE — TELEPHONE ENCOUNTER
RN called and spoke to Kathryn. She stated that they do add 2 units on. So with a max dose of 23 units per day, and the additional 2 units pharmacy adds on, then patient would be at a 60 day supply. This has been signed and sent to pharmacy and provider for cosign.     RN will reach out to patient to see if they are interested in a referral to [facility] pharmacist that can discuss medication options and the most cost saving options for patient.     SACHI AnthonyN, RN

## 2022-10-15 ENCOUNTER — ANCILLARY PROCEDURE (OUTPATIENT)
Dept: CT IMAGING | Facility: CLINIC | Age: 67
End: 2022-10-15
Attending: SURGERY
Payer: MEDICARE

## 2022-10-15 ENCOUNTER — LAB (OUTPATIENT)
Dept: LAB | Facility: CLINIC | Age: 67
End: 2022-10-15
Payer: MEDICARE

## 2022-10-15 DIAGNOSIS — Z85.048 HISTORY OF RECTAL CANCER: ICD-10-CM

## 2022-10-15 DIAGNOSIS — E10.9 TYPE 1 DIABETES MELLITUS WITHOUT COMPLICATION (H): ICD-10-CM

## 2022-10-15 LAB
CREAT BLD-MCNC: 0.6 MG/DL (ref 0.5–1)
GFR SERPL CREATININE-BSD FRML MDRD: >60 ML/MIN/1.73M2
HBA1C MFR BLD: 7.1 %
HOLD SPECIMEN: NORMAL

## 2022-10-15 PROCEDURE — 82565 ASSAY OF CREATININE: CPT | Performed by: PATHOLOGY

## 2022-10-15 PROCEDURE — 71260 CT THORAX DX C+: CPT | Performed by: RADIOLOGY

## 2022-10-15 PROCEDURE — 99000 SPECIMEN HANDLING OFFICE-LAB: CPT | Performed by: PATHOLOGY

## 2022-10-15 PROCEDURE — 36415 COLL VENOUS BLD VENIPUNCTURE: CPT | Performed by: PATHOLOGY

## 2022-10-15 PROCEDURE — 74177 CT ABD & PELVIS W/CONTRAST: CPT | Performed by: RADIOLOGY

## 2022-10-15 PROCEDURE — 82378 CARCINOEMBRYONIC ANTIGEN: CPT | Performed by: COLON & RECTAL SURGERY

## 2022-10-15 PROCEDURE — 83036 HEMOGLOBIN GLYCOSYLATED A1C: CPT | Performed by: PATHOLOGY

## 2022-10-15 RX ORDER — IOPAMIDOL 755 MG/ML
109 INJECTION, SOLUTION INTRAVASCULAR ONCE
Status: COMPLETED | OUTPATIENT
Start: 2022-10-15 | End: 2022-10-15

## 2022-10-15 RX ADMIN — IOPAMIDOL 109 ML: 755 INJECTION, SOLUTION INTRAVASCULAR at 10:09

## 2022-10-17 DIAGNOSIS — Z85.048 HISTORY OF RECTAL CANCER: Primary | ICD-10-CM

## 2022-10-17 LAB — CEA SERPL-MCNC: 4.7 NG/ML

## 2022-10-21 ENCOUNTER — OFFICE VISIT (OUTPATIENT)
Dept: SURGERY | Facility: CLINIC | Age: 67
End: 2022-10-21
Payer: MEDICARE

## 2022-10-21 VITALS
HEART RATE: 75 BPM | DIASTOLIC BLOOD PRESSURE: 66 MMHG | WEIGHT: 193.7 LBS | BODY MASS INDEX: 34.32 KG/M2 | SYSTOLIC BLOOD PRESSURE: 127 MMHG | OXYGEN SATURATION: 97 %

## 2022-10-21 DIAGNOSIS — Z85.048 HISTORY OF RECTAL CANCER: Primary | ICD-10-CM

## 2022-10-21 PROCEDURE — G0104 CA SCREEN;FLEXI SIGMOIDSCOPE: HCPCS | Performed by: COLON & RECTAL SURGERY

## 2022-10-21 ASSESSMENT — PAIN SCALES - GENERAL: PAINLEVEL: NO PAIN (0)

## 2022-10-21 NOTE — NURSING NOTE
Chief Complaint   Patient presents with     Flexible Sigmoidoscopy       Vitals:    10/21/22 1003   BP: 127/66   BP Location: Left arm   Patient Position: Sitting   Cuff Size: Adult Large   Pulse: 75   SpO2: 97%   Weight: 87.9 kg (193 lb 11.2 oz)       Body mass index is 34.32 kg/m .      DINORA Watters

## 2022-10-21 NOTE — LETTER
"    10/21/2022         RE: Niharika Cason  [address] [facility] [address]        Dear Colleague,    Thank you for referring your patient, Niharika Cason, to the [facility] [place]. Please see a copy of my visit note below.    Colon and Rectal Surgery Follow-up Clinic Note    RE: Niharika Cason  : 1955  MARILEE: 10/21/22    DIAGNOSIS:     10/2018: mT3cN1 proximal rectal adenocarcinoma (intact IHC) found on 1st screening colonoscopy. 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    10/2018-2019: XELOX x6.    3/-19: CXRT (5040 cGy).    2019: cCR (enrolled in watch and wait protocol).    INTERVAL HISTORY: Denies increased pain, fevers, or chills. Tolerating diet well. Having 1-2 BM's per day with mild fecal urgency and seepage. Denies incontinence per se or BPR. Does acknowledge stress urinary incontinence but this was present before her rectal cancer treatment. Denies additional urinary or sexual function issues at this time.     Physical Examination:   /66 (BP Location: Left arm, Patient Position: Sitting, Cuff Size: Adult Large)   Pulse 75   Wt 193 lb 11.2 oz   LMP 10/15/2008 (Approximate)   SpO2 97%   BMI 34.32 kg/m    Perianal skin intact.   ALEC: no masses palpated.  Flexible sigmoidoscopy: after obtaining informed consent and performing a \"time out\", an adult flexible sigmoidoscope was introduced through the anus and passed up to the proximal sigmoid colon. The quality of the prep was fair. Findings: linear 2x2-cm white scar with central telangiectasia located at the left posterior aspect of the mid rectum (7-cm from dentate line), covering approx 30% of the lumen circumference. Distal edge of the scar is at the mid rectal valve. Tattoo was identified. No additional abnormalities were seen. Total scope time: 12 minutes. The patient tolerated the procedure well.    ASSESSMENT  cCR. No clear evidence of recurrent neoplasia.  CEA stable.      19 " 11:20 06/12/19 14:37 08/10/19 10:58 11/14/19 14:00 06/01/20 11:13 09/28/20 15:22 12/15/20 08:15 05/15/21 10:52 07/23/21 16:19 01/21/22 16:56 03/29/22 08:13   CEA 4.7 (H) [1] 3.3 (H) [2] 3.4 (H) [3] 2.8 (H) [4] 2.9 (H) [5] 3.8 (H) [6] 2.6 (H) [7] 3.8 (H) [8] 3.3 (H) [9] 4.7 (H) [10] 4.6 (H) [11]     CEA 10/15: 4.7.    CT c/a/p (10/15/22):   IMPRESSION: No evidence of recurrence or metastatic disease. Unchanged  renal cysts. Unchanged calcified uterine fibroids. Unchanged sub-5 mm  pulmonary nodules. Atherosclerosis.    3T MR Pelvis (7/2/22):  IMPRESSION:   1. Continued complete response to treatment, with a corresponding  tumor regression grade of mrTRG-1.    2. No suspicious lymphadenopathy.  3. Myomatous uterus.    CT c/a/p (10/15/22):  1.  No convincing evidence of recurrent or metastatic disease in the  chest, abdomen, or pelvis.  2.  Stable pulmonary nodules.    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      PLAN  1.  High fiber diet. Metamucil 1 tbsp BID.   2.  Order ctDNA.    Follow up per Watch and Wait Protocol:   Completed CRT: 4/12/19.    Flexible sigmoidoscopy     Every 2 months for 6 months: Completed    Every 3 months for 3 years: Until 4/2022 Completed    Every 6 months for 5 years: Until 4/2024 Due 4/2023    Every year for 10 years: Until 4/2029      3T MRI of pelvis    6-8 weeks after CRT:    Every 4 months for 2 years: Until 4/2021 Completed    Every 6 months for 2 years: Until 4/2023 Due 1/2023    Yearly for 2 years: Until 4/2025      CT CAP    Every year for 6-8 years: Until 4/2027 Due 5/2023      Colonoscopy     Due 10/2023      CEA at every clinic or endoscopic visit    30 minutes spent on the date of encounter (excluding time performing procedures with or without biopsy) performing chart review,  history and exam, documentation and further activities as noted above.     Alli Le M.D., M.Sc.     Division of Colon and Rectal Surgery  [facility]    Referring Provider:  Manjeet Moore MD  [address] [place] [address]      Primary Care Provider:  Leona Farris    CC:  Dayron Lawton MD.  Ynes Jimenez MD.  Yohana Morris MD.  Angel Luis Asif MD            Again, thank you for allowing me to participate in the care of your patient.        Sincerely,        Alli Le MD

## 2022-10-21 NOTE — PATIENT INSTRUCTIONS
Follow up:  MRI and CEA level in January   Flex sig in clinic in April  CT chest, abdomen, pelvis in May  Colonoscopy in Oct 2023      Follow up per Watch and Wait Protocol:   Completed CRT: 4/12/19.  Flexible sigmoidoscopy   Every 2 months for 6 months: Completed  Every 3 months for 3 years: Until 4/2022 Completed  Every 6 months for 5 years: Until 4/2024 Due 4/2023  Every year for 10 years: Until 4/2029  3T MRI of pelvis  6-8 weeks after CRT:  Every 4 months for 2 years: Until 4/2021 Completed  Every 6 months for 2 years: Until 4/2023 Due 1/2023  Yearly for 2 years: Until 4/2025  CT CAP  Every year for 6-8 years: Until 4/2027 Due 5/2023  Colonoscopy   Due 10/2023  CEA at every clinic or endoscopic visit    Please call with any questions or concerns regarding your clinic visit today.    Radiology Appointments [phone]  ZOFIA Celestin [phone]  Clinic Fax Number [phone]      My Chart is available 24 hours a day and is a secure way to access your records and communicate with your care team.  I strongly recommend signing up if you haven't already done so, if you are comfortable with computers.  If you would like to inquire about this or are having problems with My Chart access, you may call [phone] or go online at [email or web address].  Please allow at least 24 hours for a response and extra time on weekends and Holidays.

## 2022-10-31 NOTE — RESULT ENCOUNTER NOTE
Niharika,  Your results of your hemoglobin A1c look fine.  You are due for a follow-up soon.  Please schedule when you are able.    Sincerely,  Dr. Gomez

## 2022-11-17 ENCOUNTER — HOSPITAL ENCOUNTER (OUTPATIENT)
Dept: MAMMOGRAPHY | Facility: CLINIC | Age: 67
Discharge: HOME | End: 2022-11-17
Attending: FAMILY MEDICINE | Admitting: FAMILY MEDICINE
Payer: MEDICARE

## 2022-11-17 DIAGNOSIS — Z12.31 ENCOUNTER FOR SCREENING MAMMOGRAM FOR BREAST CANCER: ICD-10-CM

## 2022-11-17 DIAGNOSIS — E10.9 TYPE 1 DIABETES MELLITUS WITHOUT COMPLICATION (H): ICD-10-CM

## 2022-11-17 PROCEDURE — 77067 SCR MAMMO BI INCL CAD: CPT

## 2022-11-18 DIAGNOSIS — E10.9 TYPE 1 DIABETES MELLITUS WITHOUT COMPLICATION (H): ICD-10-CM

## 2022-11-18 NOTE — RESULT ENCOUNTER NOTE
Niharika,  Your results are normal.  Please let me know if you have any questions.    Sincerely,  Dr. Gomez

## 2022-11-22 RX ORDER — INSULIN LISPRO 100 [IU]/ML
INJECTION, SOLUTION INTRAVENOUS; SUBCUTANEOUS
Qty: 45 ML | Refills: 0 | Status: SHIPPED | OUTPATIENT
Start: 2022-11-22 | End: 2023-02-10

## 2022-11-22 NOTE — TELEPHONE ENCOUNTER
"Requested Prescriptions   Pending Prescriptions Disp Refills    HUMALOG KWIKPEN 100 UNIT/ML soln [Pharmacy Med Name: HUMALOG 100UNIT/ML KWIKPEN INJ] 45 mL 0     Sig: INJECT UNDER THE SKIN AS DIRECTED PER SLIDING SCALE;MAX OF 50 UNITS PER DAY       Short Acting Insulin Protocol Failed - 11/18/2022  9:30 PM        Failed - Recent (6 mo) or future (30 days) visit within the authorizing provider's specialty     Patient had office visit in the last 6 months or has a visit in the next 30 days with authorizing provider or within the authorizing provider's specialty.  See \"Patient Info\" tab in inbasket, or \"Choose Columns\" in Meds & Orders section of the refill encounter.            Passed - Serum creatinine on file in past 12 months     Recent Labs   Lab Test 10/15/22  1009 02/03/22  1432 05/15/21  1014   CR 0.6   < >  --    CREAT  --   --  0.6    < > = values in this interval not displayed.       Ok to refill medication if creatinine is low          Passed - HgbA1C in past 3 or 6 months     If HgbA1C is 8 or greater, it needs to be on file within the past 3 months.  If less than 8, must be on file within the past 6 months.     Recent Labs   Lab Test 10/15/22  0919   A1C 7.1*             Passed - Medication is active on med list        Passed - Patient is age 18 or older               "

## 2022-11-22 NOTE — TELEPHONE ENCOUNTER
Medication refilled x1.  Office visit due for further refills.  Will have staff notify patient.    Henry Gomez MD

## 2022-11-23 NOTE — TELEPHONE ENCOUNTER
Patient informed via mychart to schedule, 11/28 reminder if not read to send letter.     Closing encounter.   Raiza Fan MA

## 2022-11-26 ENCOUNTER — HEALTH MAINTENANCE LETTER (OUTPATIENT)
Age: 67
End: 2022-11-26

## 2022-12-06 ENCOUNTER — MYC MEDICAL ADVICE (OUTPATIENT)
Dept: FAMILY MEDICINE | Facility: CLINIC | Age: 67
End: 2022-12-06

## 2022-12-07 DIAGNOSIS — E10.9 TYPE 1 DIABETES MELLITUS WITHOUT COMPLICATION (H): ICD-10-CM

## 2022-12-07 NOTE — TELEPHONE ENCOUNTER
Patient switched pharmacys to [facility], needing new Rx's.    Ly Rodriguez, CMA

## 2022-12-10 NOTE — TELEPHONE ENCOUNTER
"Requested Prescriptions   Pending Prescriptions Disp Refills    Continuous Blood Gluc Sensor (DEXCOM G6 SENSOR) MISC 10 each 4     Sig: CHANGE EVERY 10 DAYS       There is no refill protocol information for this order       Continuous Blood Gluc Transmit (DEXCOM G6 TRANSMITTER) MISC 1 each 3     Sig: CHANGE EVERY 3 MONTHS       Diabetic Supplies Protocol Failed - 12/7/2022  2:43 PM        Failed - Recent (6 mo) or future (30 days) visit within the authorizing provider's specialty     Patient had office visit in the last 6 months or has a visit in the next 30 days with authorizing provider.  See \"Patient Info\" tab in inbasket, or \"Choose Columns\" in Meds & Orders section of the refill encounter.            Passed - Medication is active on med list        Passed - Patient is 18 years of age or older          blood glucose (NO BRAND SPECIFIED) test strip 100 strip 4     Sig: Use to test blood sugar 1 times daily or as directed.       Diabetic Supplies Protocol Failed - 12/7/2022  2:43 PM        Failed - Recent (6 mo) or future (30 days) visit within the authorizing provider's specialty     Patient had office visit in the last 6 months or has a visit in the next 30 days with authorizing provider.  See \"Patient Info\" tab in inbasket, or \"Choose Columns\" in Meds & Orders section of the refill encounter.            Passed - Medication is active on med list        Passed - Patient is 18 years of age or older               "

## 2022-12-11 RX ORDER — PROCHLORPERAZINE 25 MG/1
SUPPOSITORY RECTAL
Qty: 1 EACH | Refills: 3 | Status: SHIPPED | OUTPATIENT
Start: 2022-12-11

## 2022-12-11 RX ORDER — PROCHLORPERAZINE 25 MG/1
SUPPOSITORY RECTAL
Qty: 10 EACH | Refills: 4 | Status: SHIPPED | OUTPATIENT
Start: 2022-12-11

## 2022-12-13 ENCOUNTER — MYC MEDICAL ADVICE (OUTPATIENT)
Dept: FAMILY MEDICINE | Facility: CLINIC | Age: 67
End: 2022-12-13

## 2022-12-14 NOTE — TELEPHONE ENCOUNTER
Left message for pt to return call regarding overdue result for FIT TEST. Patient asked additional questions via [employer].  Will wait for a response.

## 2022-12-15 ENCOUNTER — NURSE TRIAGE (OUTPATIENT)
Dept: NURSING | Facility: CLINIC | Age: 67
End: 2022-12-15

## 2022-12-15 NOTE — TELEPHONE ENCOUNTER
Nurse Triage SBAR    Is this a 2nd Level Triage? NO    Situation: Received a call from Sharron with [employer]. She was wondering if the clinic had received a fax from them.     Background: They have been trying to fax a CMN request for Dexcom G6 the last two days and have not hear back.    Assessment: I was able to get a hold of the clinic and verify the fax had been received. They informed me that it could take up to 5 business days for the request to be addressed. Unfortunately, the call to Sharron was disconnected and I did not have a call back number.    Recommendation: If pt or supply store call back, please inform them about the above information.    Reason for Disposition    Nursing judgment    Additional Information    Negative: Nursing judgment    Negative: Nursing judgment    Negative: Nursing judgment    Negative: Nursing judgment    Negative: Nursing judgment    Negative: Nursing judgment    Negative: Nursing judgment    Negative: Nursing judgment    Negative: Nursing judgment    Negative: Nursing judgment    Negative: Nursing judgment    Negative: Information only call; adult is not ill or injured    Negative: Nursing judgment    Protocols used: NO GUIDELINE OR REFERENCE [id number] Teran RN  [facility] Nurse Advisor   12/15/2022  4:55 PM

## 2022-12-16 ENCOUNTER — TELEPHONE (OUTPATIENT)
Dept: FAMILY MEDICINE | Facility: CLINIC | Age: 67
End: 2022-12-16

## 2022-12-16 ENCOUNTER — NURSE TRIAGE (OUTPATIENT)
Dept: NURSING | Facility: CLINIC | Age: 67
End: 2022-12-16

## 2022-12-16 NOTE — TELEPHONE ENCOUNTER
Call from [employer]; they did not receive FAX and now want form  emailed to them  @   [email or web address]      Advised message received aftr clinic hours ans will be addressed during  Business hours on Monday       Yoselin Guerrero RN  FNA          Reason for Disposition    [1] Caller requesting NON-URGENT health information AND [2] PCP's office is the best resource    Protocols used: INFORMATION ONLY CALL - NO TRIAGE-A-

## 2023-02-08 ASSESSMENT — ENCOUNTER SYMPTOMS
HEARTBURN: 0
PALPITATIONS: 0
NAUSEA: 0
SORE THROAT: 0
JOINT SWELLING: 0
SHORTNESS OF BREATH: 0
NERVOUS/ANXIOUS: 0
EYE PAIN: 0
DYSURIA: 0
DIARRHEA: 0
CHILLS: 0
FEVER: 0
HEADACHES: 0
ARTHRALGIAS: 1
HEMATOCHEZIA: 0
ABDOMINAL PAIN: 0
WEAKNESS: 0
BREAST MASS: 0
PARESTHESIAS: 0
CONSTIPATION: 0
HEMATURIA: 0
MYALGIAS: 0
DIZZINESS: 0
FREQUENCY: 0

## 2023-02-08 ASSESSMENT — ACTIVITIES OF DAILY LIVING (ADL): CURRENT_FUNCTION: NO ASSISTANCE NEEDED

## 2023-02-10 ENCOUNTER — OFFICE VISIT (OUTPATIENT)
Dept: FAMILY MEDICINE | Facility: CLINIC | Age: 68
End: 2023-02-10
Payer: MEDICARE

## 2023-02-10 VITALS
WEIGHT: 196.8 LBS | SYSTOLIC BLOOD PRESSURE: 138 MMHG | TEMPERATURE: 98.7 F | HEART RATE: 84 BPM | DIASTOLIC BLOOD PRESSURE: 72 MMHG | BODY MASS INDEX: 33.6 KG/M2 | HEIGHT: 64 IN | OXYGEN SATURATION: 97 %

## 2023-02-10 DIAGNOSIS — F41.1 GENERALIZED ANXIETY DISORDER: ICD-10-CM

## 2023-02-10 DIAGNOSIS — Z00.00 ENCOUNTER FOR MEDICARE ANNUAL WELLNESS EXAM: Primary | ICD-10-CM

## 2023-02-10 DIAGNOSIS — E78.2 MIXED HYPERLIPIDEMIA: ICD-10-CM

## 2023-02-10 DIAGNOSIS — Z85.048 HISTORY OF RECTAL CANCER: ICD-10-CM

## 2023-02-10 DIAGNOSIS — G62.0 DRUG-INDUCED POLYNEUROPATHY (H): ICD-10-CM

## 2023-02-10 DIAGNOSIS — E10.9 TYPE 1 DIABETES MELLITUS WITHOUT COMPLICATION (H): ICD-10-CM

## 2023-02-10 DIAGNOSIS — I10 ESSENTIAL HYPERTENSION WITH GOAL BLOOD PRESSURE LESS THAN 130/80: ICD-10-CM

## 2023-02-10 PROBLEM — E66.01 MORBID OBESITY (H): Status: RESOLVED | Noted: 2022-03-31 | Resolved: 2023-02-10

## 2023-02-10 LAB
ANION GAP SERPL CALCULATED.3IONS-SCNC: 11 MMOL/L (ref 7–15)
BUN SERPL-MCNC: 14.9 MG/DL (ref 8–23)
CALCIUM SERPL-MCNC: 9.6 MG/DL (ref 8.8–10.2)
CEA SERPL-MCNC: 4.7 NG/ML
CHLORIDE SERPL-SCNC: 104 MMOL/L (ref 98–107)
CHOLEST SERPL-MCNC: 150 MG/DL
CREAT SERPL-MCNC: 0.69 MG/DL (ref 0.51–0.95)
CREAT UR-MCNC: 115.3 MG/DL
DEPRECATED HCO3 PLAS-SCNC: 25 MMOL/L (ref 22–29)
GFR SERPL CREATININE-BSD FRML MDRD: >90 ML/MIN/1.73M2
GLUCOSE SERPL-MCNC: 101 MG/DL (ref 70–99)
HDLC SERPL-MCNC: 70 MG/DL
LDLC SERPL CALC-MCNC: 68 MG/DL
MICROALBUMIN UR-MCNC: 13.3 MG/L
MICROALBUMIN/CREAT UR: 11.54 MG/G CR (ref 0–25)
NONHDLC SERPL-MCNC: 80 MG/DL
POTASSIUM SERPL-SCNC: 4.1 MMOL/L (ref 3.4–5.3)
SODIUM SERPL-SCNC: 140 MMOL/L (ref 136–145)
TRIGL SERPL-MCNC: 60 MG/DL

## 2023-02-10 PROCEDURE — 82570 ASSAY OF URINE CREATININE: CPT | Performed by: FAMILY MEDICINE

## 2023-02-10 PROCEDURE — 82043 UR ALBUMIN QUANTITATIVE: CPT | Performed by: FAMILY MEDICINE

## 2023-02-10 PROCEDURE — 99213 OFFICE O/P EST LOW 20 MIN: CPT | Mod: 25 | Performed by: FAMILY MEDICINE

## 2023-02-10 PROCEDURE — 99207 PR INITIAL PREVENTIVE EXAM STAT: CPT | Performed by: FAMILY MEDICINE

## 2023-02-10 PROCEDURE — 82378 CARCINOEMBRYONIC ANTIGEN: CPT | Performed by: FAMILY MEDICINE

## 2023-02-10 PROCEDURE — 80048 BASIC METABOLIC PNL TOTAL CA: CPT | Performed by: FAMILY MEDICINE

## 2023-02-10 PROCEDURE — 36415 COLL VENOUS BLD VENIPUNCTURE: CPT | Performed by: FAMILY MEDICINE

## 2023-02-10 PROCEDURE — 80061 LIPID PANEL: CPT | Performed by: FAMILY MEDICINE

## 2023-02-10 RX ORDER — INSULIN GLARGINE 100 [IU]/ML
23 INJECTION, SOLUTION SUBCUTANEOUS AT BEDTIME
Qty: 30 ML | Refills: 1 | Status: SHIPPED | OUTPATIENT
Start: 2023-02-10 | End: 2023-03-22

## 2023-02-10 RX ORDER — PAROXETINE 30 MG/1
30 TABLET, FILM COATED ORAL EVERY MORNING
Qty: 90 TABLET | Refills: 4 | Status: SHIPPED | OUTPATIENT
Start: 2023-02-10 | End: 2023-11-21

## 2023-02-10 RX ORDER — ATORVASTATIN CALCIUM 40 MG/1
40 TABLET, FILM COATED ORAL DAILY
Qty: 90 TABLET | Refills: 4 | Status: SHIPPED | OUTPATIENT
Start: 2023-02-10 | End: 2023-11-21

## 2023-02-10 RX ORDER — LISINOPRIL 40 MG/1
40 TABLET ORAL DAILY
Qty: 93 TABLET | Refills: 3 | Status: SHIPPED | OUTPATIENT
Start: 2023-02-10 | End: 2023-11-21

## 2023-02-10 RX ORDER — INSULIN LISPRO 100 [IU]/ML
INJECTION, SOLUTION INTRAVENOUS; SUBCUTANEOUS
Qty: 45 ML | Refills: 4 | Status: SHIPPED | OUTPATIENT
Start: 2023-02-10 | End: 2023-11-21

## 2023-02-10 ASSESSMENT — ENCOUNTER SYMPTOMS
DYSURIA: 0
ABDOMINAL PAIN: 0
SORE THROAT: 0
HEADACHES: 0
HEARTBURN: 0
FEVER: 0
CHILLS: 0
DIZZINESS: 0
DIARRHEA: 0
NAUSEA: 0
PALPITATIONS: 0
NERVOUS/ANXIOUS: 0
FREQUENCY: 0
HEMATURIA: 0
PARESTHESIAS: 0
HEMATOCHEZIA: 0
BREAST MASS: 0
MYALGIAS: 0
EYE PAIN: 0
JOINT SWELLING: 0
WEAKNESS: 0
CONSTIPATION: 0
SHORTNESS OF BREATH: 0
ARTHRALGIAS: 1

## 2023-02-10 ASSESSMENT — ACTIVITIES OF DAILY LIVING (ADL): CURRENT_FUNCTION: NO ASSISTANCE NEEDED

## 2023-02-10 NOTE — PROGRESS NOTES
"SUBJECTIVE:   Niharika is a 67 year old who presents for Preventive Visit.  Patient has been advised of split billing requirements and indicates understanding: Yes  Are you in the first 12 months of your Medicare coverage?  Yes,  Visual Acuity:  Right Eye: 20/25   Left Eye: 20/30  Both Eyes: 20/20- with corrective lenses    Healthy Habits:     In general, how would you rate your overall health?  Fair    Frequency of exercise:  2-3 days/week    Duration of exercise:  15-30 minutes    Do you usually eat at least 4 servings of fruit and vegetables a day, include whole grains    & fiber and avoid regularly eating high fat or \"junk\" foods?  Yes    Taking medications regularly:  Yes    Medication side effects:  Not applicable    Ability to successfully perform activities of daily living:  No assistance needed    Home Safety:  No safety concerns identified    Hearing Impairment:  No hearing concerns    In the past 6 months, have you been bothered by leaking of urine? Yes    In general, how would you rate your overall mental or emotional health?  Good      PHQ-2 Total Score: 2    Additional concerns today:  No    Follow-up on diabetes.  History of neuropathy from cancer treatment.    Have you ever done Advance Care Planning? (For example, a Health Directive, POLST, or a discussion with a medical provider or your loved ones about your wishes): No, advance care planning information given to patient to review.  Advanced care planning was discussed at today's visit.       Fall risk  Fallen 2 or more times in the past year?: No  Any fall with injury in the past year?: No    Cognitive Screening   1) Repeat 3 items (Leader, Season, Table)    2) Clock draw: NORMAL  3) 3 item recall: Recalls 3 objects  Results: 3 items recalled: COGNITIVE IMPAIRMENT LESS LIKELY    Mini-CogTM Copyright LESLIE June. Licensed by the author for use in [facility] [employer]; reprinted with permission (latoya@.[facility]). All rights reserved.      Do you have " sleep apnea, excessive snoring or daytime drowsiness?: no    Reviewed and updated as needed this visit by clinical staff   Tobacco  Allergies  Meds              Reviewed and updated as needed this visit by Provider                 Social History     Tobacco Use     Smoking status: Former     Packs/day: 0.50     Years: 15.00     Pack years: 7.50     Types: Cigarettes     Quit date: 2018     Years since quittin.4     Smokeless tobacco: Never   Substance Use Topics     Alcohol use: Yes     Alcohol/week: 7.0 standard drinks     Types: 7 Standard drinks or equivalent per week         Alcohol Use 2023   Prescreen: >3 drinks/day or >7 drinks/week? No   Prescreen: >3 drinks/day or >7 drinks/week? -           Current providers sharing in care for this patient include:   Patient Care Team:  Henry Gomez MD as PCP - General (Family Practice)  Lianet Palmer, RN as Clinic Care Coordinator (Colon and Rectal Surgery)  Gifty Miranda RD as Diabetes Educator (Dietitian, Registered)  Dayron Lawton MD as Specialty Provider (Hematology & Oncology)  Yohana Morris MD as MD (Radiation Oncology)  Alli Le MD as Assigned Surgical Provider  Henry Gomez MD as Assigned PCP  Chicho Huynh MD as MD (Urology)  Angel Luis Asif MD as MD (OB/Gyn)  Guanakito Vargas MD as Referring Physician (OB/Gyn)  Lakshmi Welch, RN as Specialty Care Coordinator (Hematology & Oncology)  Angel Luis Asif MD as Assigned OBGYN Provider    The following health maintenance items are reviewed in Epic and correct as of today:  Health Maintenance   Topic Date Due     DEXA  Never done     ANNUAL REVIEW OF HM ORDERS  Never done     ADVANCE CARE PLANNING  Never done     COVID-19 Vaccine (1) Never done     ZOSTER IMMUNIZATION (1 of 2) Never done     DTAP/TDAP/TD IMMUNIZATION (4 - Td or Tdap) 2021     DIABETIC FOOT EXAM  2022     EYE EXAM  2022     INFLUENZA VACCINE (1) 2022      MEDICARE ANNUAL WELLNESS VISIT  09/21/2022     BMP  02/03/2023     LIPID  02/03/2023     MICROALBUMIN  02/03/2023     Pneumococcal Vaccine: 65+ Years (3 - PPSV23 if available, else PCV20) 09/21/2022     A1C  04/15/2023     LUNG CANCER SCREENING  10/15/2023     FALL RISK ASSESSMENT  02/10/2024     MAMMO SCREENING  11/17/2024     COLORECTAL CANCER SCREENING  10/27/2031     HEPATITIS C SCREENING  Completed     PHQ-2 (once per calendar year)  Completed     HIV SCREENING  Addressed     IPV IMMUNIZATION  Aged Out     MENINGITIS IMMUNIZATION  Aged Out     PAP  Discontinued           FHS-7:   Breast CA Risk Assessment (FHS-7) 11/17/2022   Did any of your first-degree relatives have breast or ovarian cancer? No   Did any of your relatives have bilateral breast cancer? No   Did any man in your family have breast cancer? No   Did any woman in your family have breast and ovarian cancer? No   Did any woman in your family have breast cancer before age 50 y? No   Do you have 2 or more relatives with breast and/or ovarian cancer? No   Do you have 2 or more relatives with breast and/or bowel cancer? No       Mammogram Screening: Recommended mammography every 1-2 years with patient discussion and risk factor consideration  Pertinent mammograms are reviewed under the imaging tab.    Review of Systems   Constitutional: Negative for chills and fever.   HENT: Negative for congestion, ear pain, hearing loss and sore throat.    Eyes: Negative for pain and visual disturbance.   Respiratory: Negative for shortness of breath.    Cardiovascular: Negative for chest pain, palpitations and peripheral edema.   Gastrointestinal: Negative for abdominal pain, constipation, diarrhea, heartburn, hematochezia and nausea.   Breasts:  Negative for tenderness, breast mass and discharge.   Genitourinary: Negative for dysuria, frequency, genital sores, hematuria, pelvic pain, urgency, vaginal bleeding and vaginal discharge.   Musculoskeletal: Positive for  "arthralgias. Negative for joint swelling and myalgias.   Skin: Negative for rash.   Neurological: Negative for dizziness, weakness, headaches and paresthesias.   Psychiatric/Behavioral: Negative for mood changes. The patient is not nervous/anxious.          OBJECTIVE:   /72   Pulse 84   Temp 98.7  F (37.1  C) (Tympanic)   Ht 1.613 m (5' 3.5\")   Wt 89.3 kg (196 lb 12.8 oz)   LMP 10/15/2008 (Approximate)   SpO2 97%   BMI 34.31 kg/m   Estimated body mass index is 34.31 kg/m  as calculated from the following:    Height as of this encounter: 1.613 m (5' 3.5\").    Weight as of this encounter: 89.3 kg (196 lb 12.8 oz).  Physical Exam          ASSESSMENT / PLAN:     ASSESSMENT/ORDERS:    ICD-10-CM    1. Encounter for Medicare annual wellness exam  Z00.00       2. Type 1 diabetes mellitus without complication (H)  E10.9 insulin glargine (LANTUS SOLOSTAR) 100 UNIT/ML pen     insulin lispro (HUMALOG KWIKPEN) 100 UNIT/ML (1 unit dial) KWIKPEN      3. Generalized anxiety disorder  F41.1 PARoxetine (PAXIL) 30 MG tablet      4. Essential hypertension with goal blood pressure less than 130/80  I10 lisinopril (ZESTRIL) 40 MG tablet     Albumin Random Urine Quantitative with Creat Ratio     Basic metabolic panel     Lipid panel reflex to direct LDL Fasting      5. Mixed hyperlipidemia  E78.2 atorvastatin (LIPITOR) 40 MG tablet     Lipid panel reflex to direct LDL Fasting      6. Drug-induced polyneuropathy (H)  G62.0         PLAN:  1.  Diabetes reviewed. Labs ordered.  2.  Patient declined flu vaccine today. I inquired about her declination and she noted that she distrusted the government.  I asked her more about her distrust and then she started stating that she cannot trust what is being placed in things that she used to trust.  She then stated that it all started with the \"COVID-19 crap\" and how things have all changed and she is worried that it never will go back to how it was.  I inquired about how she was feeling " "and if she distrusted other things.  She then started to get defensive and ask why I am questioning how content she was and that she has control over herself and how she is feeling.  I agreed with her but stated that I was concerned about how her personality seems to change suddenly and I asked what it was that I did for her to suddenly become angry with me.  She only was able to tell me that I seem to be telling her that she is not able to feel how she feels.  I told her that this was not the nature of my questions, but rather I am concerned about her sudden changes in demeanor and her overall distrust in things around her.  She left the visit angry, upset and that she will be going to lab and eventually to another doctor.  I told her, that is up to her, but I am concerned about how her personality and mood was and that was the nature of my inquiry today. I told her that I was sorry for upsetting her, but my job is to make sure that she her health is okay and that is all.    Patient has been advised of split billing requirements and indicates understanding: Yes      COUNSELING:  Reviewed preventive health counseling, as reflected in patient instructions      BMI:   Estimated body mass index is 34.31 kg/m  as calculated from the following:    Height as of this encounter: 1.613 m (5' 3.5\").    Weight as of this encounter: 89.3 kg (196 lb 12.8 oz).   Weight management plan: Discussed healthy diet and exercise guidelines      She reports that she quit smoking about 4 years ago. Her smoking use included cigarettes. She has a 7.50 pack-year smoking history. She has never used smokeless tobacco.      Appropriate preventive services were discussed with this patient, including applicable screening as appropriate for cardiovascular disease, diabetes, osteopenia/osteoporosis, and glaucoma.  As appropriate for age/gender, discussed screening for colorectal cancer, prostate cancer, breast cancer, and cervical cancer. Checklist " reviewing preventive services available has been given to the patient.    Reviewed patients plan of care and provided an AVS. The Basic Care Plan (routine screening as documented in Health Maintenance) for Niharika meets the Care Plan requirement. This Care Plan has been established and reviewed with the Patient.          Henry Gomez MD  [facility]    Identified Health Risks:

## 2023-02-10 NOTE — PROGRESS NOTES
The patient was provided with suggestions to help her develop a healthy physical lifestyle.  Information on urinary incontinence and treatment options given to patient.

## 2023-02-10 NOTE — PATIENT INSTRUCTIONS
Patient Education   Personalized Prevention Plan  You are due for the preventive services outlined below.  Your care team is available to assist you in scheduling these services.  If you have already completed any of these items, please share that information with your care team to update in your medical record.  Health Maintenance Due   Topic Date Due     Osteoporosis Screening  Never done     ANNUAL REVIEW OF HM ORDERS  Never done     COVID-19 Vaccine (1) Never done     Zoster (Shingles) Vaccine (1 of 2) Never done     Diptheria Tetanus Pertussis (DTAP/TDAP/TD) Vaccine (4 - Td or Tdap) 05/13/2021     Diabetic Foot Exam  03/22/2022     Eye Exam  07/23/2022     Flu Vaccine (1) 09/01/2022     Basic Metabolic Panel  02/03/2023     Cholesterol Lab  02/03/2023     Kidney Microalbumin Urine Test  02/03/2023     Pneumococcal Vaccine (3 - PPSV23 if available, else PCV20) 09/21/2022     Your Health Risk Assessment indicates you feel you are not in good health    A healthy lifestyle helps keep the body fit and the mind alert. It helps protect you from disease, helps you fight disease, and helps prevent chronic disease (disease that doesn't go away) from getting worse. This is important as you get older and begin to notice twinges in muscles and joints and a decline in the strength and stamina you once took for granted. A healthy lifestyle includes good healthcare, good nutrition, weight control, recreation, and regular exercise. Avoid harmful substances and do what you can to keep safe. Another part of a healthy lifestyle is stay mentally active and socially involved.    Good healthcare     Have a wellness visit every year.     If you have new symptoms, let us know right away. Don't wait until the next checkup.     Take medicines exactly as prescribed and keep your medicines in a safe place. Tell us if your medicine causes problems.   Healthy diet and weight control     Eat 3 or 4 small, nutritious, low-fat, high-fiber  meals a day. Include a variety of fruits, vegetables, and whole-grain foods.     Make sure you get enough calcium in your diet. Calcium, vitamin D, and exercise help prevent osteoporosis (bone thinning).     If you live alone, try eating with others when you can. That way you get a good meal and have company while you eat it.     Try to keep a healthy weight. If you eat more calories than your body uses for energy, it will be stored as fat and you will gain weight.     Recreation   Recreation is not limited to sports and team events. It includes any activity that provides relaxation, interest, enjoyment, and exercise. Recreation provides an outlet for physical, mental, and social energy. It can give a sense of worth and achievement. It can help you stay healthy.    Mental Exercise and Social Involvement  Mental and emotional health is as important as physical health. Keep in touch with friends and family. Stay as active as possible. Continue to learn and challenge yourself.   Things you can do to stay mentally active are:    Learn something new, like a foreign language or musical instrument.     Play SCRABBLE or do crossword puzzles. If you cannot find people to play these games with you at home, you can play them with others on your computer through the Internet.     Join a games club--anything from card games to chess or checkers or lawn bowling.     Start a new hobby.     Go back to school.     Volunteer.     Read.   Keep up with world events.    Urinary Incontinence, Female (Adult)   Urinary incontinence means loss of bladder control. This problem affects many women, especially as they get older. If you have incontinence, you may be embarrassed to ask for help. But know that this problem can be treated.   Types of Incontinence  There are different types of incontinence. Two of the main types are described here. You can have more than one type.     Stress incontinence. With this type, urine leaks when pressure  (stress) is put on the bladder. This may happen when you cough, sneeze, or laugh. Stress incontinence most often occurs because the pelvic floor muscles that support the bladder and urethra are weak. This can happen after pregnancy and vaginal childbirth or a hysterectomy. It can also be due to excess body weight or hormone changes.    Urge incontinence (also called overactive bladder). With this type, a sudden urge to urinate is felt often. This may happen even though there may not be much urine in the bladder. The need to urinate often during the night is common. Urge incontinence most often occurs because of bladder spasms. This may be due to bladder irritation or infection. Damage to bladder nerves or pelvic muscles, constipation, and certain medicines can also lead to urge incontinence.  Treatment depends on the cause. Further evaluation is needed to find the type you have. This will likely include an exam and certain tests. Based on the results, you and your healthcare provider can then plan treatment. Until a diagnosis is made, the home care tips below can help ease symptoms.   Home care    Do pelvic floor muscle exercises, if they are prescribed. The pelvic floor muscles help support the bladder and urethra. Many women find that their symptoms improve when doing special exercises that strengthen these muscles. To do the exercises, contract the muscles you would use to stop your stream of urine. But do this when you re not urinating. Hold for 10 seconds, then relax. Repeat 10 to 20 times in a row, at least 3 times a day. Your healthcare provider may give you other instructions for how to do the exercises and how often.    Keep a bladder diary. This helps track how often and how much you urinate over a set period of time. Bring this diary with you to your next visit with the provider. The information can help your provider learn more about your bladder problem.    Lose weight, if advised to by your provider.  Extra weight puts pressure on the bladder. Your provider can help you create a weight-loss plan that s right for you. This may include exercising more and making certain diet changes.    Don't have foods and drinks that may irritate the bladder. These can include alcohol and caffeinated drinks.    Quit smoking. Smoking and other tobacco use can lead to a long-term (chronic) cough that strains the pelvic floor muscles. Smoking may also damage the bladder and urethra. Talk with your provider about treatments or methods you can use to quit smoking.    If drinking large amounts of fluid makes you have symptoms, you may be advised to limit your fluid intake. You may also be advised to drink most of your fluids during the day and to limit fluids at night.    If you re worried about urine leakage or accidents, you may wear absorbent pads to catch urine. Change the pads often. This helps reduce discomfort. It may also reduce the risk of skin or bladder infections.    Follow-up care  Follow up with your healthcare provider, or as directed. It may take some to find the right treatment for your problem. But healthy lifestyle changes can be made right away. These include such things as exercising on a regular basis, eating a healthy diet, losing weight (if needed), and quitting smoking. Your treatment plan may include special therapies or medicines. Certain procedures or surgery may also be options. Talk about any questions you have with your provider.   When to seek medical advice  Call the healthcare provider right away if any of these occur:    Fever of 100.4 F (38 C) or higher, or as directed by your provider    Bladder pain or fullness    Belly swelling    Nausea or vomiting    Back pain    Weakness, dizziness, or fainting  [employer] last reviewed this educational content on 1/1/2020 2000-2021 [employer]. All rights reserved. This information is not intended as a substitute for professional medical care.  Always follow your healthcare professional's instructions.

## 2023-02-14 ENCOUNTER — MYC MEDICAL ADVICE (OUTPATIENT)
Dept: FAMILY MEDICINE | Facility: CLINIC | Age: 68
End: 2023-02-14
Payer: MEDICARE

## 2023-03-04 ENCOUNTER — ANCILLARY PROCEDURE (OUTPATIENT)
Dept: MRI IMAGING | Facility: CLINIC | Age: 68
End: 2023-03-04
Attending: COLON & RECTAL SURGERY
Payer: MEDICARE

## 2023-03-04 DIAGNOSIS — Z85.048 HISTORY OF RECTAL CANCER: ICD-10-CM

## 2023-03-04 PROCEDURE — A9585 GADOBUTROL INJECTION: HCPCS | Performed by: STUDENT IN AN ORGANIZED HEALTH CARE EDUCATION/TRAINING PROGRAM

## 2023-03-04 PROCEDURE — 74183 MRI ABD W/O CNTR FLWD CNTR: CPT | Mod: MG | Performed by: STUDENT IN AN ORGANIZED HEALTH CARE EDUCATION/TRAINING PROGRAM

## 2023-03-04 PROCEDURE — G1010 CDSM STANSON: HCPCS | Mod: GC | Performed by: STUDENT IN AN ORGANIZED HEALTH CARE EDUCATION/TRAINING PROGRAM

## 2023-03-04 RX ORDER — GADOBUTROL 604.72 MG/ML
10 INJECTION INTRAVENOUS ONCE
Status: COMPLETED | OUTPATIENT
Start: 2023-03-04 | End: 2023-03-04

## 2023-03-04 RX ADMIN — GADOBUTROL 10 ML: 604.72 INJECTION INTRAVENOUS at 11:09

## 2023-03-20 DIAGNOSIS — E10.9 TYPE 1 DIABETES MELLITUS WITHOUT COMPLICATION (H): ICD-10-CM

## 2023-03-22 RX ORDER — INSULIN GLARGINE 100 [IU]/ML
INJECTION, SOLUTION SUBCUTANEOUS
Qty: 30 ML | Refills: 1 | Status: SHIPPED | OUTPATIENT
Start: 2023-03-22 | End: 2023-05-16

## 2023-03-22 NOTE — TELEPHONE ENCOUNTER
Prescription approved per [facility] Refill Protocol.  July Renteria RN on 3/22/2023 at 9:53 AM

## 2023-03-24 ENCOUNTER — TRANSFERRED RECORDS (OUTPATIENT)
Dept: FAMILY MEDICINE | Facility: CLINIC | Age: 68
End: 2023-03-24
Payer: MEDICARE

## 2023-03-24 LAB — RETINOPATHY: NEGATIVE

## 2023-05-04 NOTE — PROGRESS NOTES
"Colon and Rectal Surgery Follow-up Clinic Note    RE: Niharika Cason  : 1955  MARILEE: 2023    DIAGNOSIS:     10/2018: mT3cN1 proximal rectal adenocarcinoma (intact IHC) found on 1st screening colonoscopy. 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    10/2018-2019: XELOX x6.    3/-19: CXRT (5040 cGy).    2019: cCR (enrolled in watch and wait protocol).    INTERVAL HISTORY: Denies increased pain, fevers, or chills. Tolerating diet well. Having 1-2 BM's per day with mild fecal urgency and seepage. Denies incontinence per se or BPR. Does acknowledge stress urinary incontinence but this was present before her rectal cancer treatment. Denies additional urinary or sexual function issues at this time.    Physical Examination:   /76   Pulse 89   Resp 16   Wt 89.4 kg (197 lb)   LMP 10/15/2008 (Approximate)   SpO2 95%   BMI 34.35 kg/m    Perianal skin intact.   ALEC: no masses palpated.  Flexible sigmoidoscopy: after obtaining informed consent and performing a \"time out\", an adult flexible sigmoidoscope was introduced through the anus and passed up to the proximal sigmoid colon. The quality of the prep was fair. Findings: linear 2x2-cm white scar with central telangiectasia located at the left posterior aspect of the mid rectum (7-cm from dentate line), covering approx 30% of the lumen circumference. Distal edge of the scar is at the mid rectal valve. Tattoo was identified. No additional abnormalities were seen. Total scope time: 12 minutes. The patient tolerated the procedure well.    ASSESSMENT  cCR. No evidence of recurrent neoplasia.  CEA stable.      Latest Reference Range & Units Most Recent 08/10/19 10:58 19 14:00 20 11:13 20 15:22 12/15/20 08:15 05/15/21 10:52 21 16:19 22 16:56 22 08:13 02/10/23 12:13   CEA ng/mL 4.7  2/10/23 12:13          4.7   CEA 0.0 - 2.5 ug/L 4.6 (H)  3/29/22 08:13 3.4 (H) 2.8 (H) 2.9 (H) 3.8 (H) 2.6 (H) 3.8 (H) 3.3 (H) " 4.7 (H) 4.6 (H)        MR 3T Rectum 3/4/23  IMPRESSION:   1. Continued complete response to treatment, with a corresponding  tumor compression grade of mrTRG-1.  2. No suspicious lymphadenopathy.  3. Myomatous uterus.    CT c/a/p (10/15/22):  1.  No convincing evidence of recurrent or metastatic disease in the  chest, abdomen, or pelvis.  2.  Stable pulmonary nodules.    Colonoscopy 10/27/19: without evidence of recurrence.    Follow up per Watch and Wait Protocol:   Completed CRT: 4/12/19.    Flexible sigmoidoscopy     Every 2 months for 6 months: Completed    Every 3 months for 3 years: Until 4/2022 Completed    Every 6 months for 5 years: Until 4/2024 Due March 2024    Every year for 10 years: Until 4/2029      3T MRI of pelvis    6-8 weeks after CRT:    Every 4 months for 2 years: Until 4/2021 Completed    Every 6 months for 2 years: Until 4/2023 Completed    Yearly for 2 years: Until 4/2025 -Due March 2024      CT CAP    Every year for 6-8 years: Until 4/2027 Due 10/2023      Colonoscopy     Due 10/2023      CEA at every clinic or endoscopic visit    30 minutes spent on the date of encounter (excluding time performing procedures with or without biopsy) performing chart review, history and exam, documentation and further activities as noted above.     Alli Le M.D., M.Sc.     Division of Colon and Rectal Surgery  [facility]    Referring Provider:  Manjeet Moore MD  [address] [place] [id number]      Primary Care Provider:  Leona Farris    CC:  Dayron Lawton MD.  Ynes Jimenez MD.  Yohana Morris MD.  Angel Luis Asif MD

## 2023-05-14 DIAGNOSIS — E10.9 TYPE 1 DIABETES MELLITUS WITHOUT COMPLICATION (H): ICD-10-CM

## 2023-05-16 RX ORDER — INSULIN GLARGINE 100 [IU]/ML
INJECTION, SOLUTION SUBCUTANEOUS
Qty: 30 ML | Refills: 1 | Status: SHIPPED | OUTPATIENT
Start: 2023-05-16 | End: 2023-11-21

## 2023-05-16 NOTE — TELEPHONE ENCOUNTER
"Requested Prescriptions   Pending Prescriptions Disp Refills    LANTUS SOLOSTAR 100 UNIT/ML soln [Pharmacy Med Name: LANTUS SOLOSTAR 100UNIT/ML INJ] 30 mL 1     Sig: INJECT 23 UNITS SUBCUTANEOUSLY AT BEDTIME       Long Acting Insulin Protocol Failed - 5/14/2023 10:47 PM        Failed - HgbA1C in past 3 or 6 months     If HgbA1C is 8 or greater, it needs to be on file within the past 3 months.  If less than 8, must be on file within the past 6 months.     Recent Labs   Lab Test 10/15/22  0919   A1C 7.1*             Passed - Serum creatinine on file in past 12 months     Recent Labs   Lab Test 02/10/23  1212 02/03/22  1432 05/15/21  1014   CR 0.69   < >  --    CREAT  --   --  0.6    < > = values in this interval not displayed.       Ok to refill medication if creatinine is low          Passed - Medication is active on med list        Passed - Patient is age 18 or older        Passed - Recent (6 mo) or future (30 days) visit within the authorizing provider's specialty     Patient had office visit in the last 6 months or has a visit in the next 30 days with authorizing provider or within the authorizing provider's specialty.  See \"Patient Info\" tab in inbasket, or \"Choose Columns\" in Meds & Orders section of the refill encounter.                 "

## 2023-05-21 ENCOUNTER — HEALTH MAINTENANCE LETTER (OUTPATIENT)
Age: 68
End: 2023-05-21

## 2023-06-02 ENCOUNTER — OFFICE VISIT (OUTPATIENT)
Dept: SURGERY | Facility: CLINIC | Age: 68
End: 2023-06-02
Payer: MEDICARE

## 2023-06-02 ENCOUNTER — TELEPHONE (OUTPATIENT)
Dept: GASTROENTEROLOGY | Facility: CLINIC | Age: 68
End: 2023-06-02

## 2023-06-02 VITALS
SYSTOLIC BLOOD PRESSURE: 124 MMHG | BODY MASS INDEX: 34.35 KG/M2 | HEART RATE: 89 BPM | DIASTOLIC BLOOD PRESSURE: 76 MMHG | RESPIRATION RATE: 16 BRPM | WEIGHT: 197 LBS | OXYGEN SATURATION: 95 %

## 2023-06-02 DIAGNOSIS — Z12.11 ENCOUNTER FOR SCREENING COLONOSCOPY: ICD-10-CM

## 2023-06-02 DIAGNOSIS — C20 RECTAL CANCER (H): Primary | ICD-10-CM

## 2023-06-02 PROCEDURE — 45331 SIGMOIDOSCOPY AND BIOPSY: CPT | Performed by: COLON & RECTAL SURGERY

## 2023-06-02 PROCEDURE — 99214 OFFICE O/P EST MOD 30 MIN: CPT | Mod: 25 | Performed by: COLON & RECTAL SURGERY

## 2023-06-02 NOTE — TELEPHONE ENCOUNTER
Screening Questions  BLUE  KIND OF PREP RED  LOCATION [review exclusion criteria] GREEN  SEDATION TYPE        y Are you active on mychart?       Alli Le MD in [facility] Ordering/Referring Provider?        MEDICARE What type of coverage do you have?      n Have you had a positive covid test in the last 14 days?     34.9 1. BMI  [BMI 40+ - review exclusion criteria& smart-phrase document]    y  2. Are you able to give consent for your medical care? [IF NO,RN REVIEW]          n  3. Are you taking any prescription pain medications on a routine schedule   (ex narcotics: oxycodone, roxicodone, oxycontin,  and percocet)? [RN Review]        n  3a. EXTENDED PREP What kind of prescription?     n 4. Do you have any chemical dependencies such as alcohol, street drugs, or methadone?        **If yes 3- 5 , please schedule with MAC sedation.**          IF YES TO ANY 6 - 10 - HOSPITAL SETTING ONLY.     n 6.   Do you need assistance transferring?     n 7.   Have you had a heart or lung transplant?    n 8.   Are you currently on dialysis?   n 9.   Do you use daily home oxygen?   n 10. Do you take nitroglycerin?   10a. n If yes, how often?     n 11. Are you currently pregnant?    11a. n If yes, how many weeks? [ Greater than 12 weeks, OR NEEDED]    n 12. Do you have Pulmonary Hypertension? *NEED PAC APPT AT [facility] w/ MAC*     n 13. [review exclusion criteria]  Do you have any implantable devices in your body (pacemaker, defib, LVAD)?    n 14. In the past 6 months, have you had any heart related issues including cardiomyopathy or heart attack?     14a. n If yes, did it require cardiac stenting if so when?     n 15. Have you had a stroke or Transient ischemic attack (TIA - aka  mini stroke ) within 6 months?      n 16. Do you have mod to severe Obstructive Sleep Apnea?  [Hospital only]    n 17. Do you have SEVERE AND UNCONTROLLED asthma? *NEED PAC APPT AT [facility] w/MAC*     18.Do you take blood  "thinners?  No    n 19. Do you take any of the following medications?    nPhentermine    nOzempic    nWegovy (Semaglutide)      19a. If yes, \"Hold for 7 days before procedure.  Please consult your prescribing provider if you have questions about holding this medication.\"     n  20. Do you have chronic kidney disease?      y  21. Do you have a diagnosis of diabetes?     n  22. On a regular basis do you go 3-5 days between bowel movements?      23. Preferred LOCAL Pharmacy for Pre Prescription           [id number] - [address]        - CLOSING REMINDERS -    You will receive a call from a Nurse to review instructions and health history.  This assessment must be completed prior to your procedure.  Failure to complete the Nurse assessment may result in the procedure being cancelled.      On the day of your procedure, please designatean adult(s) who can drive you home stay with you for the next 24 hours. The medicines used in the exam will make you sleepy. You will not be able to drive.      You cannot take public transportation, ride share services, or non-medical taxi service without a responsible caregiver.  Medical transport services are allowed with the requirement that a responsible caregiver will receive you at your destination.  We require that drivers and caregivers are confirmed prior to your procedure.      - SCHEDULING DETAILS -  n Hospital Setting Required & If yes, what is the exclusion?   Geartarti  Surgeon    10/04/2023  Date of Procedure  Lower Endoscopy [Colonoscopy]  [facility] of Procedure [facility]uled  [facility]   STANDARD GOLYTELY- If you answer yes to questions #8, #20, #21 [  pts ]Which Colonoscopy Prep was Sent?     mac2 Sedation Type     n PAC / Pre-op Required                 "

## 2023-06-02 NOTE — PATIENT INSTRUCTIONS
Follow up per Watch and Wait Protocol:   Completed CRT: 4/12/19.  Flexible sigmoidoscopy   Every 2 months for 6 months: Completed  Every 3 months for 3 years: Until 4/2022 Completed  Every 6 months for 5 years: Until 4/2024 Due 11/2023  Every year for 10 years: Until 4/2029    3T MRI of pelvis  6-8 weeks after CRT:  Every 4 months for 2 years: Until 4/2021 Completed  Every 6 months for 2 years: Until 4/2023 Completed  Yearly for 2 years: Until 4/2025 -Due March 2024    CT CAP  Every year for 6-8 years: Until 4/2027 Due 10/2023 please call [phone] to schedule     Colonoscopy   Due 10/2023- this will be in place of your flex sig for November. Endoscopy will call you but if you don't hear from them please call [phone] opt 2     CEA at every clinic or endoscopic visit

## 2023-06-02 NOTE — LETTER
"    2023         RE: Niharika Cason  [address] [facility] [id number]        Dear Colleague,    Thank you for referring your patient, Niharika Cason, to the [facility] [place]. Please see a copy of my visit note below.    Colon and Rectal Surgery Follow-up Clinic Note    RE: Niharika Cason  : 1955  MARILEE: 2023    DIAGNOSIS:     10/2018: mT3cN1 proximal rectal adenocarcinoma (intact IHC) found on 1st screening colonoscopy. 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    10/2018-2019: XELOX x6.    3/-19: CXRT (5040 cGy).    2019: cCR (enrolled in watch and wait protocol).    INTERVAL HISTORY: Denies increased pain, fevers, or chills. Tolerating diet well. Having 1-2 BM's per day with mild fecal urgency and seepage. Denies incontinence per se or BPR. Does acknowledge stress urinary incontinence but this was present before her rectal cancer treatment. Denies additional urinary or sexual function issues at this time.    Physical Examination:   /76   Pulse 89   Resp 16   Wt 89.4 kg (197 lb)   LMP 10/15/2008 (Approximate)   SpO2 95%   BMI 34.35 kg/m    Perianal skin intact.   ALEC: no masses palpated.  Flexible sigmoidoscopy: after obtaining informed consent and performing a \"time out\", an adult flexible sigmoidoscope was introduced through the anus and passed up to the proximal sigmoid colon. The quality of the prep was fair. Findings: linear 2x2-cm white scar with central telangiectasia located at the left posterior aspect of the mid rectum (7-cm from dentate line), covering approx 30% of the lumen circumference. Distal edge of the scar is at the mid rectal valve. Tattoo was identified. No additional abnormalities were seen. Total scope time: 12 minutes. The patient tolerated the procedure well.    ASSESSMENT  cCR. No evidence of recurrent neoplasia.  CEA stable.      Latest Reference Range & Units Most Recent 08/10/19 10:58 19 14:00 20 " 11:13 09/28/20 15:22 12/15/20 08:15 05/15/21 10:52 07/23/21 16:19 01/21/22 16:56 03/29/22 08:13 02/10/23 12:13   CEA ng/mL 4.7  2/10/23 12:13          4.7   CEA 0.0 - 2.5 ug/L 4.6 (H)  3/29/22 08:13 3.4 (H) 2.8 (H) 2.9 (H) 3.8 (H) 2.6 (H) 3.8 (H) 3.3 (H) 4.7 (H) 4.6 (H)        MR 3T Rectum 3/4/23  IMPRESSION:   1. Continued complete response to treatment, with a corresponding  tumor compression grade of mrTRG-1.  2. No suspicious lymphadenopathy.  3. Myomatous uterus.    CT c/a/p (10/15/22):  1.  No convincing evidence of recurrent or metastatic disease in the  chest, abdomen, or pelvis.  2.  Stable pulmonary nodules.    Colonoscopy 10/27/19: without evidence of recurrence.    Follow up per Watch and Wait Protocol:   Completed CRT: 4/12/19.    Flexible sigmoidoscopy     Every 2 months for 6 months: Completed    Every 3 months for 3 years: Until 4/2022 Completed    Every 6 months for 5 years: Until 4/2024 Due March 2024    Every year for 10 years: Until 4/2029      3T MRI of pelvis    6-8 weeks after CRT:    Every 4 months for 2 years: Until 4/2021 Completed    Every 6 months for 2 years: Until 4/2023 Completed    Yearly for 2 years: Until 4/2025 -Due March 2024      CT CAP    Every year for 6-8 years: Until 4/2027 Due 10/2023      Colonoscopy     Due 10/2023      CEA at every clinic or endoscopic visit    30 minutes spent on the date of encounter (excluding time performing procedures with or without biopsy) performing chart review, history and exam, documentation and further activities as noted above.     Alli Le M.D., M.Sc.     Division of Colon and Rectal Surgery  [facility]    Referring Provider:  Manjeet Moore MD  [address] [place] [id number]      Primary Care Provider:  Leona Farris    CC:  Dayron Lawton MD.  Ynes Jimenez MD.  Yohana Morris MD.  Angel Luis Asif MD            Again, thank you for allowing me to  participate in the care of your patient.        Sincerely,        Alli Le MD

## 2023-06-19 ENCOUNTER — TELEPHONE (OUTPATIENT)
Dept: SURGERY | Facility: CLINIC | Age: 68
End: 2023-06-19
Payer: MEDICARE

## 2023-06-19 NOTE — TELEPHONE ENCOUNTER
[facility]   [facility] Chest Abdomen Pelvis scan ordered by Dr. Le  Timing: Oct 2023

## 2023-07-03 ENCOUNTER — APPOINTMENT (OUTPATIENT)
Dept: GENERAL RADIOLOGY | Facility: CLINIC | Age: 68
End: 2023-07-03
Attending: FAMILY MEDICINE
Payer: MEDICARE

## 2023-07-03 ENCOUNTER — HOSPITAL ENCOUNTER (EMERGENCY)
Facility: CLINIC | Age: 68
Discharge: HOME | End: 2023-07-03
Attending: FAMILY MEDICINE | Admitting: FAMILY MEDICINE
Payer: MEDICARE

## 2023-07-03 VITALS
BODY MASS INDEX: 34.7 KG/M2 | TEMPERATURE: 98.8 F | RESPIRATION RATE: 16 BRPM | WEIGHT: 199 LBS | SYSTOLIC BLOOD PRESSURE: 150 MMHG | OXYGEN SATURATION: 94 % | DIASTOLIC BLOOD PRESSURE: 79 MMHG | HEART RATE: 88 BPM

## 2023-07-03 DIAGNOSIS — S92.334A NONDISPLACED FRACTURE OF THIRD METATARSAL BONE, RIGHT FOOT, INITIAL ENCOUNTER FOR CLOSED FRACTURE: ICD-10-CM

## 2023-07-03 PROCEDURE — 28470 CLTX METATARSAL FX WO MNP EA: CPT | Mod: 55 | Performed by: PODIATRIST

## 2023-07-03 PROCEDURE — 99284 EMERGENCY DEPT VISIT MOD MDM: CPT | Mod: 25 | Performed by: FAMILY MEDICINE

## 2023-07-03 PROCEDURE — 99283 EMERGENCY DEPT VISIT LOW MDM: CPT | Mod: 25 | Performed by: FAMILY MEDICINE

## 2023-07-03 PROCEDURE — 28470 CLTX METATARSAL FX WO MNP EA: CPT | Mod: 54 | Performed by: FAMILY MEDICINE

## 2023-07-03 PROCEDURE — 28470 CLTX METATARSAL FX WO MNP EA: CPT | Mod: RT | Performed by: FAMILY MEDICINE

## 2023-07-03 PROCEDURE — 73630 X-RAY EXAM OF FOOT: CPT | Mod: RT

## 2023-07-03 ASSESSMENT — ACTIVITIES OF DAILY LIVING (ADL): ADLS_ACUITY_SCORE: 35

## 2023-07-03 NOTE — ED TRIAGE NOTES
Pt presents with right foot swelling and pain. Warm to touch. Pt states she had twisted ankle last week but this is ok now.      Triage Assessment     Row Name 07/03/23 1222       Triage Assessment (Adult)    Airway WDL WDL       Respiratory WDL    Respiratory WDL WDL       Skin Circulation/Temperature WDL    Skin Circulation/Temperature WDL WDL       Cardiac WDL    Cardiac WDL WDL       Peripheral/Neurovascular WDL    Peripheral Neurovascular WDL X  Right foot swelling. Top of foot.       Cognitive/Neuro/Behavioral WDL    Cognitive/Neuro/Behavioral WDL WDL

## 2023-07-03 NOTE — ED PROVIDER NOTES
History     Chief Complaint   Patient presents with     Foot Pain     HPI  Niharika Cason is a 67 year old female who presents with right foot pain has been going on for the past week.  Patient twisted her ankle a week ago and initially had pain in her ankle and then this, migrated down to the midfoot.  Patient can stand on it but is worse when she goes to take a step or pushes off on her foot.  Denies any previous injuries to that foot.  Patient has been icing it.  Denies any fevers or chills.  Denies any rashes.    Allergies:  Allergies   Allergen Reactions     No Known Allergies        Problem List:    Patient Active Problem List    Diagnosis Date Noted     Mixed hyperlipidemia 03/29/2021     Priority: Medium     Drug-induced polyneuropathy (H) 03/22/2021     Priority: Medium     History of rectal cancer 10/15/2018     Priority: Medium     Type 1 diabetes mellitus without complication (H) 07/05/2011     Priority: Medium     Essential hypertension with goal blood pressure less than 130/80 07/05/2011     Priority: Medium     Constipation 12/29/2009     Priority: Medium     Generalized anxiety disorder      Priority: Medium     Female stress incontinence 02/18/2004     Priority: Medium        Past Medical History:    Past Medical History:   Diagnosis Date     Colorectal cancer (H)      Essential hypertension, benign      Generalized anxiety disorder      Hyperlipidemia      Rectal cancer (H) 09/17/2018     S/P radiation therapy      Type I (juvenile type) diabetes mellitus without mention of complication, not stated as uncontrolled      Ulcerative colitis, unspecified        Past Surgical History:    Past Surgical History:   Procedure Laterality Date     COLONOSCOPY N/A 9/17/2018    Procedure: COMBINED COLONOSCOPY, SINGLE OR MULTIPLE BIOPSY/POLYPECTOMY BY BIOPSY;  colonoscopy with polypectomies by biopsy forceps and hot snare and submucosal injection with tattoo;  Surgeon: Richard Moore MD;  Location:   GI     [facility] CURETTAGE, POSTPARTUM  ', '    following miscarriages     [facility] REMOVAL OF TONSILS,<13 Y/O      Tonsils <14y.o.     INSERT PORT VASCULAR ACCESS Right 10/19/2018    Procedure: Chest Port Placement - right ;  Surgeon: Lawson Hitchcock PA-C;  Location:  OR     [facility] COLONOSCOPY THRU STOMA, DIAGNOSTIC      negative       Family History:    Family History   Problem Relation Age of Onset     Heart Disease Maternal Grandfather         MI at 52 yrs     EYE* Paternal Grandfather         cataracts     Arthritis Mother      Gynecology Mother         hysterectomy for fibroids     Hypertension Mother      Lipids Mother      Gastrointestinal Disease Father         ulcers     Heart Disease Father         intermittent rapid heartbeat     Cancer Father 84        Mesothelioma       Social History:  Marital Status:   [2]  Social History     Tobacco Use     Smoking status: Former     Packs/day: 0.50     Years: 15.00     Pack years: 7.50     Types: Cigarettes     Quit date: 2018     Years since quittin.7     Smokeless tobacco: Never   Vaping Use     Vaping Use: Never used   Substance Use Topics     Alcohol use: Yes     Alcohol/week: 7.0 standard drinks of alcohol     Types: 7 Standard drinks or equivalent per week     Drug use: No        Medications:    atorvastatin (LIPITOR) 40 MG tablet  blood glucose (NO BRAND SPECIFIED) test strip  Continuous Blood Gluc  (DEXCOM G6 ) BRITTANY  Continuous Blood Gluc Sensor (DEXCOM G6 SENSOR) MISC  Continuous Blood Gluc Transmit (DEXCOM G6 TRANSMITTER) MISC  insulin lispro (HUMALOG KWIKPEN) 100 UNIT/ML (1 unit dial) KWIKPEN  insulin pen needle (BD PEN NEEDLE KELECHI 2ND GEN) 32G X 4 MM miscellaneous  LANTUS SOLOSTAR 100 UNIT/ML soln  lisinopril (ZESTRIL) 40 MG tablet  Multiple Vitamins-Minerals (MULTIVITAMIN PO)  PARoxetine (PAXIL) 30 MG tablet  Pyridoxine HCl (VITAMIN B-6 PO)  Turmeric 500 MG CAPS          Review of Systems   All other systems  reviewed and are negative.      Physical Exam   BP: (!) 150/79  Pulse: 88  Temp: 98.8  F (37.1  C)  Resp: 16  Weight: 90.3 kg (199 lb)  SpO2: 94 %      Physical Exam  Vitals and nursing note reviewed.   Constitutional:       General: She is not in acute distress.     Appearance: Normal appearance. She is not ill-appearing.   Musculoskeletal:      Right foot: Normal range of motion and normal capillary refill. Swelling, prominent metatarsal heads, tenderness and bony tenderness present. No deformity, bunion, Charcot foot, foot drop, laceration or crepitus. Normal pulse.   Neurological:      Mental Status: She is alert.         ED Course                 Procedures        Results for orders placed or performed during the hospital encounter of 07/03/23 (from the past 24 hour(s))   Foot  XR, G/E 3 views, right    Narrative    XR FOOT RIGHT G/E 3 VIEWS 7/3/2023 12:37 PM     HISTORY: twisting injury, pain    COMPARISON: None.       Impression    IMPRESSION:   Acute appearing nondisplaced oblique fracture third metatarsal shaft.  No additional fracture. Calcaneal spur.    TIEN AYOUB MD         SYSTEM ID:  [id number]       Medications - No data to display    X-ray of the foot does show an acute nondisplaced fracture of the third metatarsal.  Patient has been walking on this for the past week.  Pain is not that bad.  Patient will be placed in a walking boot for more support of the foot and ankle.  Patient will be allowed to weight-bear.  We will have patient follow-up with podiatry and a week to 10 days for continued management.  Patient will use Tylenol as needed for pain.    Assessments & Plan (with Medical Decision Making)  Right third metatarsal fracture     I have reviewed the nursing notes.    I have reviewed the findings, diagnosis, plan and need for follow up with the patient.      New Prescriptions    No medications on file       Final diagnoses:   Nondisplaced fracture of third metatarsal bone, right foot,  initial encounter for closed fracture       7/3/2023   [facility] EMERGENCY DEPT     Jim Elias MD  07/03/23 0698

## 2023-07-03 NOTE — ED NOTES
Reviewed discharge instruction, verbalized understanding. No questions or concerns. EDT in room applying ortho boot.

## 2023-07-13 ENCOUNTER — OFFICE VISIT (OUTPATIENT)
Dept: PODIATRY | Facility: CLINIC | Age: 68
End: 2023-07-13
Payer: MEDICARE

## 2023-07-13 VITALS
BODY MASS INDEX: 33.63 KG/M2 | HEIGHT: 64 IN | WEIGHT: 197 LBS | DIASTOLIC BLOOD PRESSURE: 58 MMHG | SYSTOLIC BLOOD PRESSURE: 124 MMHG

## 2023-07-13 DIAGNOSIS — G62.0 DRUG-INDUCED POLYNEUROPATHY (H): Primary | ICD-10-CM

## 2023-07-13 DIAGNOSIS — S92.334A NONDISPLACED FRACTURE OF THIRD METATARSAL BONE, RIGHT FOOT, INITIAL ENCOUNTER FOR CLOSED FRACTURE: ICD-10-CM

## 2023-07-13 DIAGNOSIS — E10.9 TYPE 1 DIABETES MELLITUS WITHOUT COMPLICATION (H): ICD-10-CM

## 2023-07-13 PROCEDURE — 99207 PR FRACTURE CARE IN GLOBAL PERIOD: CPT | Performed by: PODIATRIST

## 2023-07-13 ASSESSMENT — PAIN SCALES - GENERAL: PAINLEVEL: NO PAIN (0)

## 2023-07-13 NOTE — LETTER
7/13/2023         RE: Niharika Cason  [id number] [facility] [address]        Dear Colleague,    Thank you for referring your patient, Niharika Cason, to the [facility]. Please see a copy of my visit note below.    HPI:  Niharika Cason is a 67 year old female who is seen in consultation at the request of ED DEPT - Jim Elias MD    Pt presents for eval of:   (Onset, Location, L/R, Character, Treatments, Injury if yes)    XR Right foot 7/3/2023    DM type 1     Onset 6/29/2023, slipped on the grass and twisted Right foot, could ambulate, continued to swell, seen in ED DEPT 7/3/2023. Presents WB w/tall gray fx boot and her  Fabrice.  Constant, swelling. Denies pain today.     Rest, elevation, fx boot for daily activity only.    Works at [email or web address] work, on her feet 6 hour work days and at home.  12 hours a week.     ROS:  10 point ROS neg other than the symptoms noted above in the HPI.    Patient Active Problem List   Diagnosis     Female stress incontinence     Generalized anxiety disorder     Constipation     Type 1 diabetes mellitus without complication (H)     Essential hypertension with goal blood pressure less than 130/80     History of rectal cancer     Drug-induced polyneuropathy (H)     Mixed hyperlipidemia       PAST MEDICAL HISTORY:   Past Medical History:   Diagnosis Date     Colorectal cancer (H)      Essential hypertension, benign      Generalized anxiety disorder      Hyperlipidemia      Rectal cancer (H) 09/17/2018     S/P radiation therapy     5,040 cGy to Pelvis completed on 04/12/2019. - [facility] with Dr. Morris     Type I (juvenile type) diabetes mellitus without mention of complication, not stated as uncontrolled     diagnosed at age 35     Ulcerative colitis, unspecified     in the 70s.          PAST SURGICAL HISTORY:   Past Surgical History:   Procedure Laterality Date     COLONOSCOPY N/A 9/17/2018    Procedure: COMBINED COLONOSCOPY,  SINGLE OR MULTIPLE BIOPSY/POLYPECTOMY BY BIOPSY;  colonoscopy with polypectomies by biopsy forceps and hot snare and submucosal injection with tattoo;  Surgeon: Richard Moore MD;  Location: [facility]     HC CURETTAGE, POSTPARTUM  '91, '93    following miscarriages     HC REMOVAL OF TONSILS,<11 Y/O      Tonsils <14y.o.     INSERT PORT VASCULAR ACCESS Right 10/19/2018    Procedure: Chest Port Placement - right ;  Surgeon: Lawson Hitchcock PA-C;  Location:  OR     [facility] COLONOSCOPY THRU STOMA, DIAGNOSTIC  1998    negative        MEDICATIONS:   Current Outpatient Medications:      atorvastatin (LIPITOR) 40 MG tablet, Take 1 tablet (40 mg) by mouth daily, Disp: 90 tablet, Rfl: 4     blood glucose (NO BRAND SPECIFIED) test strip, Use to test blood sugar 1 times daily or as directed., Disp: 100 strip, Rfl: 4     Continuous Blood Gluc  (DEXCOM G6 ) BRITTANY, 1 Device continuous, Disp: 1 Device, Rfl: 0     Continuous Blood Gluc Sensor (DEXCOM G6 SENSOR) MISC, CHANGE EVERY 10 DAYS, Disp: 10 each, Rfl: 4     Continuous Blood Gluc Transmit (DEXCOM G6 TRANSMITTER) MISC, CHANGE EVERY 3 MONTHS, Disp: 1 each, Rfl: 3     insulin lispro (HUMALOG KWIKPEN) 100 UNIT/ML (1 unit dial) KWIKPEN, Inject under the skin.  Take 8 units at breakfast, 8 units at lunch, 10-12 units at supper. Take additional sliding scale as indicated. Minimum daily dose range is 36-38 units with max of 50 units/day., Disp: 45 mL, Rfl: 4     insulin pen needle (BD PEN NEEDLE KELECHI 2ND GEN) 32G X 4 MM miscellaneous, Use to check blood sugar 1 time daily or as directed., Disp: 100 each, Rfl: 0     LANTUS SOLOSTAR 100 UNIT/ML soln, INJECT 23 UNITS SUBCUTANEOUSLY AT BEDTIME, Disp: 30 mL, Rfl: 1     lisinopril (ZESTRIL) 40 MG tablet, Take 1 tablet (40 mg) by mouth daily, Disp: 93 tablet, Rfl: 3     Multiple Vitamins-Minerals (MULTIVITAMIN PO), Take by mouth daily, Disp: , Rfl:      PARoxetine (PAXIL) 30 MG tablet, Take 1 tablet (30 mg) by  mouth every morning, Disp: 90 tablet, Rfl: 4     Pyridoxine HCl (VITAMIN B-6 PO), Take by mouth daily, Disp: , Rfl:      Turmeric 500 MG CAPS, Take 1 capsule by mouth daily, Disp: , Rfl:      ALLERGIES:    Allergies   Allergen Reactions     No Known Allergies         SOCIAL HISTORY:   Social History     Socioeconomic History     Marital status:      Spouse name: Fabrice     Number of children: 2     Years of education: 12     Highest education level: Not on file   Occupational History     Occupation:      Employer: [employer]     Comment: on her feet all day     Employer: [employer]   Tobacco Use     Smoking status: Former     Packs/day: 0.50     Years: 15.00     Pack years: 7.50     Types: Cigarettes     Quit date: 2018     Years since quittin.8     Smokeless tobacco: Never   Vaping Use     Vaping Use: Never used   Substance and Sexual Activity     Alcohol use: Yes     Alcohol/week: 7.0 standard drinks of alcohol     Types: 7 Standard drinks or equivalent per week     Drug use: No     Sexual activity: Yes     Partners: Male     Birth control/protection: Post-menopausal   Other Topics Concern      Service No     Blood Transfusions No     Caffeine Concern No     Occupational Exposure No     Hobby Hazards No     Sleep Concern No     Stress Concern No     Weight Concern No     Special Diet Yes     Comment: diabetic     Back Care Yes     Exercise Yes     Bike Helmet No     Seat Belt Yes     Self-Exams Yes     Comment: occassionally     Parent/sibling w/ CABG, MI or angioplasty before 65F 55M? No   Social History Narrative     Not on file     Social Determinants of Health     Financial Resource Strain: Not on file   Food Insecurity: Not on file   Transportation Needs: Not on file   Physical Activity: Not on file   Stress: Not on file   Social Connections: Not on file   Intimate Partner Violence: Not on file   Housing Stability: Not on file        FAMILY HISTORY:   Family History   Problem  "Relation Age of Onset     Heart Disease Maternal Grandfather         MI at 52 yrs     EYE* Paternal Grandfather         cataracts     Arthritis Mother      Gynecology Mother         hysterectomy for fibroids     Hypertension Mother      Lipids Mother      Gastrointestinal Disease Father         ulcers     Heart Disease Father         intermittent rapid heartbeat     Cancer Father 84        Mesothelioma        EXAM:Vitals: /58 (BP Location: Left arm, Patient Position: Sitting, Cuff Size: Adult Large)   Ht 1.613 m (5' 3.5\")   Wt 89.4 kg (197 lb)   LMP 10/15/2008 (Approximate)   BMI 34.35 kg/m    BMI= Body mass index is 34.35 kg/m .    General appearance: Patient is alert and fully cooperative with history & exam.  No sign of distress is noted during the visit.     Psychiatric: Affect is pleasant & appropriate.  Patient appears motivated to improve health.     Respiratory: Breathing is regular & unlabored while sitting.     HEENT: Hearing is intact to spoken word.  Speech is clear.  No gross evidence of visual impairment that would impact ambulation.     Vascular: DP & PT pulses are intact & regular bilaterally.  No significant edema or varicosities noted.  CFT and skin temperature is normal to both lower extremities.     Neurologic: Lower extremity sensation is intact to light touch.  No evidence of weakness or contracture in the lower extremities.  No evidence of neuropathy.    Dermatologic: Skin is intact to both lower extremities with adequate texture, turgor and tone about the integument.  No paronychia or evidence of soft tissue infection is noted.     Musculoskeletal: Patient is ambulatory with with a fracture boot about the right foot.  Considerable edema is noted along the shaft of the third metatarsal right foot.    Radiographs: 3 views of the right foot demonstrate small disruption of the cortex of the third metatarsal shaft.  No angulation noted.    Hemoglobin A1C (%)   Date Value   10/15/2022 7.1 " (H)   02/03/2022 7.2 (H)   03/22/2021 6.6 (H)   06/01/2020 7.3 (H)   11/18/2019 6.8 (H)   07/09/2019 6.5 (H)   11/15/2018 7.1 (H)   07/20/2018 8.3 (H)   08/25/2017 7.7 (H)   06/17/2016 7.6 (H)     Creatinine (mg/dL)   Date Value   02/10/2023 0.69   02/03/2022 0.72   05/15/2021 0.6   12/15/2020 0.61   09/26/2020 0.6   06/01/2020 0.63   05/30/2020 0.7   11/14/2019 0.58   08/12/2019 0.66   06/12/2019 0.66   06/05/2019 0.76   09/17/2018 0.6     Creatinine POCT (mg/dL)   Date Value   10/15/2022 0.6   04/27/2022 0.7          ASSESSMENT:       ICD-10-CM    1. Nondisplaced fracture of third metatarsal bone, right foot, initial encounter for closed fracture  S92.334A Orthopedic  Referral     XR Foot Right G/E 3 Views           PLAN:  Reviewed patient's chart in Psychiatric.      7/13/2023   Recommend staying in the fracture boot weightbearing to tolerance.  Compression as needed.  Follow-up in 2 weeks to repeat imaging ordered future today.    Aleksey Sexton DPM          Again, thank you for allowing me to participate in the care of your patient.        Sincerely,        Aleksey Sexton DPM

## 2023-07-13 NOTE — PROGRESS NOTES
HPI:  Niharika Cason is a 67 year old female who is seen in consultation at the request of ED DEPT - Jim Elias MD    Pt presents for eval of:   (Onset, Location, L/R, Character, Treatments, Injury if yes)    XR Right foot 7/3/2023    DM type 1     Onset 6/29/2023, slipped on the grass and twisted Right foot, could ambulate, continued to swell, seen in ED DEPT 7/3/2023. Presents WB w/tall gray fx boot and her  Fabrice.  Constant, swelling. Denies pain today.     Rest, elevation, fx boot for daily activity only.    Works at [employer], [employer] work, on her feet 6 hour work days and at home.  12 hours a week.     ROS:  10 point ROS neg other than the symptoms noted above in the HPI.    Patient Active Problem List   Diagnosis    Female stress incontinence    Generalized anxiety disorder    Constipation    Type 1 diabetes mellitus without complication (H)    Essential hypertension with goal blood pressure less than 130/80    History of rectal cancer    Drug-induced polyneuropathy (H)    Mixed hyperlipidemia       PAST MEDICAL HISTORY:   Past Medical History:   Diagnosis Date    Colorectal cancer (H)     Essential hypertension, benign     Generalized anxiety disorder     Hyperlipidemia     Rectal cancer (H) 09/17/2018    S/P radiation therapy     5,040 cGy to Pelvis completed on 04/12/2019. - [facility] with Dr. Morris    Type I (juvenile type) diabetes mellitus without mention of complication, not stated as uncontrolled     diagnosed at age 35    Ulcerative colitis, unspecified     in the 70s.          PAST SURGICAL HISTORY:   Past Surgical History:   Procedure Laterality Date    COLONOSCOPY N/A 9/17/2018    Procedure: COMBINED COLONOSCOPY, SINGLE OR MULTIPLE BIOPSY/POLYPECTOMY BY BIOPSY;  colonoscopy with polypectomies by biopsy forceps and hot snare and submucosal injection with tattoo;  Surgeon: Richard Moore MD;  Location:  GI    HC CURETTAGE, POSTPARTUM  '91, '93    following  miscarriages    HC REMOVAL OF TONSILS,<15 Y/O      Tonsils <14y.o.    INSERT PORT VASCULAR ACCESS Right 10/19/2018    Procedure: Chest Port Placement - right ;  Surgeon: Lawson Hitchcock PA-C;  Location:  OR    [facility] COLONOSCOPY THRU STOMA, DIAGNOSTIC  1998    negative        MEDICATIONS:   Current Outpatient Medications:     atorvastatin (LIPITOR) 40 MG tablet, Take 1 tablet (40 mg) by mouth daily, Disp: 90 tablet, Rfl: 4    blood glucose (NO BRAND SPECIFIED) test strip, Use to test blood sugar 1 times daily or as directed., Disp: 100 strip, Rfl: 4    Continuous Blood Gluc  (DEXCOM G6 ) BRITTANY, 1 Device continuous, Disp: 1 Device, Rfl: 0    Continuous Blood Gluc Sensor (DEXCOM G6 SENSOR) MISC, CHANGE EVERY 10 DAYS, Disp: 10 each, Rfl: 4    Continuous Blood Gluc Transmit (DEXCOM G6 TRANSMITTER) MISC, CHANGE EVERY 3 MONTHS, Disp: 1 each, Rfl: 3    insulin lispro (HUMALOG KWIKPEN) 100 UNIT/ML (1 unit dial) KWIKPEN, Inject under the skin.  Take 8 units at breakfast, 8 units at lunch, 10-12 units at supper. Take additional sliding scale as indicated. Minimum daily dose range is 36-38 units with max of 50 units/day., Disp: 45 mL, Rfl: 4    insulin pen needle (BD PEN NEEDLE KELECHI 2ND GEN) 32G X 4 MM miscellaneous, Use to check blood sugar 1 time daily or as directed., Disp: 100 each, Rfl: 0    LANTUS SOLOSTAR 100 UNIT/ML soln, INJECT 23 UNITS SUBCUTANEOUSLY AT BEDTIME, Disp: 30 mL, Rfl: 1    lisinopril (ZESTRIL) 40 MG tablet, Take 1 tablet (40 mg) by mouth daily, Disp: 93 tablet, Rfl: 3    Multiple Vitamins-Minerals (MULTIVITAMIN PO), Take by mouth daily, Disp: , Rfl:     PARoxetine (PAXIL) 30 MG tablet, Take 1 tablet (30 mg) by mouth every morning, Disp: 90 tablet, Rfl: 4    Pyridoxine HCl (VITAMIN B-6 PO), Take by mouth daily, Disp: , Rfl:     Turmeric 500 MG CAPS, Take 1 capsule by mouth daily, Disp: , Rfl:      ALLERGIES:    Allergies   Allergen Reactions    No Known Allergies         SOCIAL  HISTORY:   Social History     Socioeconomic History    Marital status:      Spouse name: Fabrice    Number of children: 2    Years of education: 12    Highest education level: Not on file   Occupational History    Occupation:      Employer: [employer]     Comment: on her feet all day     Employer: [employer]   Tobacco Use    Smoking status: Former     Packs/day: 0.50     Years: 15.00     Pack years: 7.50     Types: Cigarettes     Quit date: 2018     Years since quittin.8    Smokeless tobacco: Never   Vaping Use    Vaping Use: Never used   Substance and Sexual Activity    Alcohol use: Yes     Alcohol/week: 7.0 standard drinks of alcohol     Types: 7 Standard drinks or equivalent per week    Drug use: No    Sexual activity: Yes     Partners: Male     Birth control/protection: Post-menopausal   Other Topics Concern     Service No    Blood Transfusions No    Caffeine Concern No    Occupational Exposure No    Hobby Hazards No    Sleep Concern No    Stress Concern No    Weight Concern No    Special Diet Yes     Comment: diabetic    Back Care Yes    Exercise Yes    Bike Helmet No    Seat Belt Yes    Self-Exams Yes     Comment: occassionally    Parent/sibling w/ CABG, MI or angioplasty before 65F 55M? No   Social History Narrative    Not on file     Social Determinants of Health     Financial Resource Strain: Not on file   Food Insecurity: Not on file   Transportation Needs: Not on file   Physical Activity: Not on file   Stress: Not on file   Social Connections: Not on file   Intimate Partner Violence: Not on file   Housing Stability: Not on file        FAMILY HISTORY:   Family History   Problem Relation Age of Onset    Heart Disease Maternal Grandfather         MI at 52 yrs    EYE* Paternal Grandfather         cataracts    Arthritis Mother     Gynecology Mother         hysterectomy for fibroids    Hypertension Mother     Lipids Mother     Gastrointestinal Disease Father         ulcers    Heart  "Disease Father         intermittent rapid heartbeat    Cancer Father 84        Mesothelioma        EXAM:Vitals: /58 (BP Location: Left arm, Patient Position: Sitting, Cuff Size: Adult Large)   Ht 1.613 m (5' 3.5\")   Wt 89.4 kg (197 lb)   LMP 10/15/2008 (Approximate)   BMI 34.35 kg/m    BMI= Body mass index is 34.35 kg/m .    General appearance: Patient is alert and fully cooperative with history & exam.  No sign of distress is noted during the visit.     Psychiatric: Affect is pleasant & appropriate.  Patient appears motivated to improve health.     Respiratory: Breathing is regular & unlabored while sitting.     HEENT: Hearing is intact to spoken word.  Speech is clear.  No gross evidence of visual impairment that would impact ambulation.     Vascular: DP & PT pulses are intact & regular bilaterally.  No significant edema or varicosities noted.  CFT and skin temperature is normal to both lower extremities.     Neurologic: Lower extremity sensation is intact to light touch.  No evidence of weakness or contracture in the lower extremities.  No evidence of neuropathy.    Dermatologic: Skin is intact to both lower extremities with adequate texture, turgor and tone about the integument.  No paronychia or evidence of soft tissue infection is noted.     Musculoskeletal: Patient is ambulatory with with a fracture boot about the right foot.  Considerable edema is noted along the shaft of the third metatarsal right foot.    Radiographs: 3 views of the right foot demonstrate small disruption of the cortex of the third metatarsal shaft.  No angulation noted.    Hemoglobin A1C (%)   Date Value   10/15/2022 7.1 (H)   02/03/2022 7.2 (H)   03/22/2021 6.6 (H)   06/01/2020 7.3 (H)   11/18/2019 6.8 (H)   07/09/2019 6.5 (H)   11/15/2018 7.1 (H)   07/20/2018 8.3 (H)   08/25/2017 7.7 (H)   06/17/2016 7.6 (H)     Creatinine (mg/dL)   Date Value   02/10/2023 0.69   02/03/2022 0.72   05/15/2021 0.6   12/15/2020 0.61   09/26/2020 " 0.6   06/01/2020 0.63   05/30/2020 0.7   11/14/2019 0.58   08/12/2019 0.66   06/12/2019 0.66   06/05/2019 0.76   09/17/2018 0.6     Creatinine POCT (mg/dL)   Date Value   10/15/2022 0.6   04/27/2022 0.7          ASSESSMENT:       ICD-10-CM    1. Drug-induced polyneuropathy (H)  G62.0       2. Nondisplaced fracture of third metatarsal bone, right foot, initial encounter for closed fracture  S92.334A Orthopedic  Referral     XR Foot Right G/E 3 Views      3. Type 1 diabetes mellitus without complication (H)  E10.9            PLAN:  Reviewed patient's chart in [facility].      7/13/2023   Recommend staying in the fracture boot weightbearing to tolerance.  Compression as needed.  Follow-up in 2 weeks to repeat imaging ordered future today.    Aleksey Sexton DPM

## 2023-07-31 NOTE — PATIENT INSTRUCTIONS
Follow up:    Please call with any questions or concerns regarding your clinic visit today.    It is a pleasure being involved in your health care.    Contacts post-consultation depending on your need:    Radiology Appointments                [phone]    Schedule Clinic Appointments       [phone] # 1   M-F 7:30 - 5 pm    Lianet YIP RN Coordinator           [phone]    Clinic Fax Number          [phone]    Janeth           [phone]    My Chart is available 24 hours a day and is a secure way to access your records and communicate with your care team.  I strongly recommend signing up if you haven't already done so, if you are comfortable with computers.  If you would like to inquire about this or are having problems with My Chart access, you may call [phone] or go online at [email or web address].  Please allow at least 24 hours for a response and extra time on weekends and Holidays.   Followed protocol

## 2023-08-01 ENCOUNTER — ANCILLARY PROCEDURE (OUTPATIENT)
Dept: GENERAL RADIOLOGY | Facility: CLINIC | Age: 68
End: 2023-08-01
Attending: PODIATRIST
Payer: MEDICARE

## 2023-08-01 ENCOUNTER — OFFICE VISIT (OUTPATIENT)
Dept: PODIATRY | Facility: CLINIC | Age: 68
End: 2023-08-01
Payer: MEDICARE

## 2023-08-01 VITALS
HEIGHT: 64 IN | WEIGHT: 197 LBS | SYSTOLIC BLOOD PRESSURE: 128 MMHG | BODY MASS INDEX: 33.63 KG/M2 | DIASTOLIC BLOOD PRESSURE: 68 MMHG

## 2023-08-01 DIAGNOSIS — S92.334A NONDISPLACED FRACTURE OF THIRD METATARSAL BONE, RIGHT FOOT, INITIAL ENCOUNTER FOR CLOSED FRACTURE: ICD-10-CM

## 2023-08-01 DIAGNOSIS — S92.334A NONDISPLACED FRACTURE OF THIRD METATARSAL BONE, RIGHT FOOT, INITIAL ENCOUNTER FOR CLOSED FRACTURE: Primary | ICD-10-CM

## 2023-08-01 DIAGNOSIS — E10.9 TYPE 1 DIABETES MELLITUS WITHOUT COMPLICATION (H): ICD-10-CM

## 2023-08-01 PROCEDURE — 73630 X-RAY EXAM OF FOOT: CPT | Mod: TC | Performed by: RADIOLOGY

## 2023-08-01 PROCEDURE — 99207 PR FRACTURE CARE IN GLOBAL PERIOD: CPT | Performed by: PODIATRIST

## 2023-08-01 ASSESSMENT — PAIN SCALES - GENERAL: PAINLEVEL: NO PAIN (0)

## 2023-08-01 NOTE — PROGRESS NOTES
Chief Complaint   Patient presents with    RECHECK     (4w5d) WB w/tall gray fx boot, Right 3rd metatarsal fx, DOI 6/29/2023; XR R foot 8/1/2023; LOV 7/13/2023    Diabetes     Type 1     HPI:  Niharika Cason is a 67 year old female who is seen in consultation at the request of ED DEPT - Jim Elias MD    Pt presents for eval of:   (Onset, Location, L/R, Character, Treatments, Injury if yes)    XR Right foot 7/3/2023    DM type 1     Onset 6/29/2023, slipped on the grass and twisted Right foot, could ambulate, continued to swell, seen in ED DEPT 7/3/2023. Presents WB w/tall gray fx boot and her  Fabrice.  Constant, swelling. Denies pain today.     Rest, elevation, fx boot for daily activity only.    Works at [employer], [employer] work, on her feet 6 hour work days and at home.  12 hours a week.     ROS:  10 point ROS neg other than the symptoms noted above in the HPI.    Patient Active Problem List   Diagnosis    Female stress incontinence    Generalized anxiety disorder    Constipation    Type 1 diabetes mellitus without complication (H)    Essential hypertension with goal blood pressure less than 130/80    History of rectal cancer    Drug-induced polyneuropathy (H)    Mixed hyperlipidemia       PAST MEDICAL HISTORY:   Past Medical History:   Diagnosis Date    Colorectal cancer (H)     Essential hypertension, benign     Generalized anxiety disorder     Hyperlipidemia     Rectal cancer (H) 09/17/2018    S/P radiation therapy     5,040 cGy to Pelvis completed on 04/12/2019. [facility] with Dr. Morris    Type I (juvenile type) diabetes mellitus without mention of complication, not stated as uncontrolled     diagnosed at age 35    Ulcerative colitis, unspecified     in the 70s.          PAST SURGICAL HISTORY:   Past Surgical History:   Procedure Laterality Date    COLONOSCOPY N/A 9/17/2018    Procedure: COMBINED COLONOSCOPY, SINGLE OR MULTIPLE BIOPSY/POLYPECTOMY BY BIOPSY;  colonoscopy with  polypectomies by biopsy forceps and hot snare and submucosal injection with tattoo;  Surgeon: Richard Moore MD;  Location: [facility]    HC CURETTAGE, POSTPARTUM  '91, '93    following miscarriages    HC REMOVAL OF TONSILS,<13 Y/O      Tonsils <14y.o.    INSERT PORT VASCULAR ACCESS Right 10/19/2018    Procedure: Chest Port Placement - right ;  Surgeon: Lawson Hitchcock PA-C;  Location: [facility]    ZZ COLONOSCOPY THRU STOMA, DIAGNOSTIC  1998    negative        MEDICATIONS:   Current Outpatient Medications:     atorvastatin (LIPITOR) 40 MG tablet, Take 1 tablet (40 mg) by mouth daily, Disp: 90 tablet, Rfl: 4    blood glucose (NO BRAND SPECIFIED) test strip, Use to test blood sugar 1 times daily or as directed., Disp: 100 strip, Rfl: 4    Continuous Blood Gluc  (DEXCOM G6 ) BRITTANY, 1 Device continuous, Disp: 1 Device, Rfl: 0    Continuous Blood Gluc Sensor (DEXCOM G6 SENSOR) MISC, CHANGE EVERY 10 DAYS, Disp: 10 each, Rfl: 4    Continuous Blood Gluc Transmit (DEXCOM G6 TRANSMITTER) MISC, CHANGE EVERY 3 MONTHS, Disp: 1 each, Rfl: 3    insulin lispro (HUMALOG KWIKPEN) 100 UNIT/ML (1 unit dial) KWIKPEN, Inject under the skin.  Take 8 units at breakfast, 8 units at lunch, 10-12 units at supper. Take additional sliding scale as indicated. Minimum daily dose range is 36-38 units with max of 50 units/day., Disp: 45 mL, Rfl: 4    insulin pen needle (BD PEN NEEDLE KELECHI 2ND GEN) 32G X 4 MM miscellaneous, Use to check blood sugar 1 time daily or as directed., Disp: 100 each, Rfl: 0    LANTUS SOLOSTAR 100 UNIT/ML soln, INJECT 23 UNITS SUBCUTANEOUSLY AT BEDTIME, Disp: 30 mL, Rfl: 1    lisinopril (ZESTRIL) 40 MG tablet, Take 1 tablet (40 mg) by mouth daily, Disp: 93 tablet, Rfl: 3    Multiple Vitamins-Minerals (MULTIVITAMIN PO), Take by mouth daily, Disp: , Rfl:     PARoxetine (PAXIL) 30 MG tablet, Take 1 tablet (30 mg) by mouth every morning, Disp: 90 tablet, Rfl: 4    Pyridoxine HCl (VITAMIN B-6 PO), Take  by mouth daily, Disp: , Rfl:     Turmeric 500 MG CAPS, Take 1 capsule by mouth daily, Disp: , Rfl:      ALLERGIES:    Allergies   Allergen Reactions    No Known Allergies         SOCIAL HISTORY:   Social History     Socioeconomic History    Marital status:      Spouse name: Fabrice    Number of children: 2    Years of education: 12    Highest education level: Not on file   Occupational History    Occupation:      Employer: [employer]     Comment: on her feet all day     Employer: [employer]   Tobacco Use    Smoking status: Former     Packs/day: 0.50     Years: 15.00     Pack years: 7.50     Types: Cigarettes     Quit date: 2018     Years since quittin.8    Smokeless tobacco: Never   Vaping Use    Vaping Use: Never used   Substance and Sexual Activity    Alcohol use: Yes     Alcohol/week: 7.0 standard drinks of alcohol     Types: 7 Standard drinks or equivalent per week    Drug use: No    Sexual activity: Yes     Partners: Male     Birth control/protection: Post-menopausal   Other Topics Concern     Service No    Blood Transfusions No    Caffeine Concern No    Occupational Exposure No    Hobby Hazards No    Sleep Concern No    Stress Concern No    Weight Concern No    Special Diet Yes     Comment: diabetic    Back Care Yes    Exercise Yes    Bike Helmet No    Seat Belt Yes    Self-Exams Yes     Comment: occassionally    Parent/sibling w/ CABG, MI or angioplasty before 65F 55M? No   Social History Narrative    Not on file     Social Determinants of Health     Financial Resource Strain: Not on file   Food Insecurity: Not on file   Transportation Needs: Not on file   Physical Activity: Not on file   Stress: Not on file   Social Connections: Not on file   Intimate Partner Violence: Not on file   Housing Stability: Not on file        FAMILY HISTORY:   Family History   Problem Relation Age of Onset    Heart Disease Maternal Grandfather         MI at 52 yrs    EYE* Paternal Grandfather          "cataracts    Arthritis Mother     Gynecology Mother         hysterectomy for fibroids    Hypertension Mother     Lipids Mother     Gastrointestinal Disease Father         ulcers    Heart Disease Father         intermittent rapid heartbeat    Cancer Father 84        Mesothelioma        EXAM:Vitals: /68 (BP Location: Left arm, Patient Position: Sitting, Cuff Size: Adult Large)   Ht 1.613 m (5' 3.5\")   Wt 89.4 kg (197 lb)   LMP 10/15/2008 (Approximate)   BMI 34.35 kg/m    BMI= Body mass index is 34.35 kg/m .    General appearance: Patient is alert and fully cooperative with history & exam.  No sign of distress is noted during the visit.     Psychiatric: Affect is pleasant & appropriate.  Patient appears motivated to improve health.     Respiratory: Breathing is regular & unlabored while sitting.     HEENT: Hearing is intact to spoken word.  Speech is clear.  No gross evidence of visual impairment that would impact ambulation.     Vascular: DP & PT pulses are intact & regular bilaterally.  No significant edema or varicosities noted.  CFT and skin temperature is normal to both lower extremities.     Neurologic: Lower extremity sensation is intact to light touch.  No evidence of weakness or contracture in the lower extremities.  No evidence of neuropathy.    Dermatologic: Skin is intact to both lower extremities with adequate texture, turgor and tone about the integument.  No paronychia or evidence of soft tissue infection is noted.     Musculoskeletal: Patient is ambulatory with with a fracture boot about the right foot.  Most edema is resolved there is still some firm prominence about the third metatarsal shaft consistent with bone callus.    Radiographs: 3 views of the right foot demonstrate small disruption of the cortex of the third metatarsal shaft.  No angulation noted.    Radiographs: 3 views right foot 8/1/2023 demonstrate appropriate interval healing with appropriate bone callus around the " fracture.    Hemoglobin A1C (%)   Date Value   10/15/2022 7.1 (H)   02/03/2022 7.2 (H)   03/22/2021 6.6 (H)   06/01/2020 7.3 (H)   11/18/2019 6.8 (H)   07/09/2019 6.5 (H)   11/15/2018 7.1 (H)   07/20/2018 8.3 (H)   08/25/2017 7.7 (H)   06/17/2016 7.6 (H)     Creatinine (mg/dL)   Date Value   02/10/2023 0.69   02/03/2022 0.72   05/15/2021 0.6   12/15/2020 0.61   09/26/2020 0.6   06/01/2020 0.63   05/30/2020 0.7   11/14/2019 0.58   08/12/2019 0.66   06/12/2019 0.66   06/05/2019 0.76   09/17/2018 0.6     Creatinine POCT (mg/dL)   Date Value   10/15/2022 0.6   04/27/2022 0.7          ASSESSMENT:       ICD-10-CM    1. Nondisplaced fracture of third metatarsal bone, right foot, initial encounter for closed fracture  S92.334A       2. Type 1 diabetes mellitus without complication (H)  E10.9              PLAN:  Reviewed patient's chart in [facility].      7/13/2023   Recommend staying in the fracture boot weightbearing to tolerance.  Compression as needed.  Follow-up in 2 weeks to repeat imaging ordered future today.    8/1/2023  Recommend staying in the fracture boot for weightbearing to tolerance until 6 weeks post injury which will be next Thursday then she can remove the fracture boot for short periods of walking and standing at home but continue the fracture boot when leaving the home for the first few days until no pain no swelling is noted then return to regular shoe gear as tolerated.    Follow-up again in 3 weeks with any symptoms otherwise as needed.    Aleksey Sexton DPM

## 2023-08-01 NOTE — LETTER
8/1/2023         RE: Niharika Cason  [address] [facility] [address]        Dear Colleague,    Thank you for referring your patient, Niharika Cason, to the [facility]. Please see a copy of my visit note below.    Chief Complaint   Patient presents with     RECHECK     (4w5d) WB w/tall gray fx boot, Right 3rd metatarsal fx, DOI 6/29/2023; XR R foot 8/1/2023; LOV 7/13/2023     Diabetes     Type 1     HPI:  Niharika Caosn is a 67 year old female who is seen in consultation at the request of ED DEPT - Jim Elias MD    Pt presents for eval of:   (Onset, Location, L/R, Character, Treatments, Injury if yes)    XR Right foot 7/3/2023    DM type 1     Onset 6/29/2023, slipped on the grass and twisted Right foot, could ambulate, continued to swell, seen in ED DEPT 7/3/2023. Presents WB w/tall gray fx boot and her  Fabrice.  Constant, swelling. Denies pain today.     Rest, elevation, fx boot for daily activity only.    Works at [employer], [employer] work, on her feet 6 hour work days and at home.  12 hours a week.     ROS:  10 point ROS neg other than the symptoms noted above in the HPI.    Patient Active Problem List   Diagnosis     Female stress incontinence     Generalized anxiety disorder     Constipation     Type 1 diabetes mellitus without complication (H)     Essential hypertension with goal blood pressure less than 130/80     History of rectal cancer     Drug-induced polyneuropathy (H)     Mixed hyperlipidemia       PAST MEDICAL HISTORY:   Past Medical History:   Diagnosis Date     Colorectal cancer (H)      Essential hypertension, benign      Generalized anxiety disorder      Hyperlipidemia      Rectal cancer (H) 09/17/2018     S/P radiation therapy     5,040 cGy to Pelvis completed on 04/12/2019. - [facility] with Dr. Morris     Type I (juvenile type) diabetes mellitus without mention of complication, not stated as uncontrolled     diagnosed at age 35     Ulcerative  colitis, unspecified     in the 70s.          PAST SURGICAL HISTORY:   Past Surgical History:   Procedure Laterality Date     COLONOSCOPY N/A 9/17/2018    Procedure: COMBINED COLONOSCOPY, SINGLE OR MULTIPLE BIOPSY/POLYPECTOMY BY BIOPSY;  colonoscopy with polypectomies by biopsy forceps and hot snare and submucosal injection with tattoo;  Surgeon: Richard Moore MD;  Location:  GI     HC CURETTAGE, POSTPARTUM  '91, '93    following miscarriages     HC REMOVAL OF TONSILS,<15 Y/O      Tonsils <14y.o.     INSERT PORT VASCULAR ACCESS Right 10/19/2018    Procedure: Chest Port Placement - right ;  Surgeon: Lawson Hitchcock PA-C;  Location:  OR     [facility] COLONOSCOPY THRU STOMA, DIAGNOSTIC  1998    negative        MEDICATIONS:   Current Outpatient Medications:      atorvastatin (LIPITOR) 40 MG tablet, Take 1 tablet (40 mg) by mouth daily, Disp: 90 tablet, Rfl: 4     blood glucose (NO BRAND SPECIFIED) test strip, Use to test blood sugar 1 times daily or as directed., Disp: 100 strip, Rfl: 4     Continuous Blood Gluc  (DEXCOM G6 ) BRITTANY, 1 Device continuous, Disp: 1 Device, Rfl: 0     Continuous Blood Gluc Sensor (DEXCOM G6 SENSOR) MISC, CHANGE EVERY 10 DAYS, Disp: 10 each, Rfl: 4     Continuous Blood Gluc Transmit (DEXCOM G6 TRANSMITTER) MISC, CHANGE EVERY 3 MONTHS, Disp: 1 each, Rfl: 3     insulin lispro (HUMALOG KWIKPEN) 100 UNIT/ML (1 unit dial) KWIKPEN, Inject under the skin.  Take 8 units at breakfast, 8 units at lunch, 10-12 units at supper. Take additional sliding scale as indicated. Minimum daily dose range is 36-38 units with max of 50 units/day., Disp: 45 mL, Rfl: 4     insulin pen needle (BD PEN NEEDLE KELECHI 2ND GEN) 32G X 4 MM miscellaneous, Use to check blood sugar 1 time daily or as directed., Disp: 100 each, Rfl: 0     LANTUS SOLOSTAR 100 UNIT/ML soln, INJECT 23 UNITS SUBCUTANEOUSLY AT BEDTIME, Disp: 30 mL, Rfl: 1     lisinopril (ZESTRIL) 40 MG tablet, Take 1 tablet (40 mg) by  mouth daily, Disp: 93 tablet, Rfl: 3     Multiple Vitamins-Minerals (MULTIVITAMIN PO), Take by mouth daily, Disp: , Rfl:      PARoxetine (PAXIL) 30 MG tablet, Take 1 tablet (30 mg) by mouth every morning, Disp: 90 tablet, Rfl: 4     Pyridoxine HCl (VITAMIN B-6 PO), Take by mouth daily, Disp: , Rfl:      Turmeric 500 MG CAPS, Take 1 capsule by mouth daily, Disp: , Rfl:      ALLERGIES:    Allergies   Allergen Reactions     No Known Allergies         SOCIAL HISTORY:   Social History     Socioeconomic History     Marital status:      Spouse name: Fabrice     Number of children: 2     Years of education: 12     Highest education level: Not on file   Occupational History     Occupation:      Employer: [employer]     Comment: on her feet all day     Employer: [employer]   Tobacco Use     Smoking status: Former     Packs/day: 0.50     Years: 15.00     Pack years: 7.50     Types: Cigarettes     Quit date: 2018     Years since quittin.8     Smokeless tobacco: Never   Vaping Use     Vaping Use: Never used   Substance and Sexual Activity     Alcohol use: Yes     Alcohol/week: 7.0 standard drinks of alcohol     Types: 7 Standard drinks or equivalent per week     Drug use: No     Sexual activity: Yes     Partners: Male     Birth control/protection: Post-menopausal   Other Topics Concern      Service No     Blood Transfusions No     Caffeine Concern No     Occupational Exposure No     Hobby Hazards No     Sleep Concern No     Stress Concern No     Weight Concern No     Special Diet Yes     Comment: diabetic     Back Care Yes     Exercise Yes     Bike Helmet No     Seat Belt Yes     Self-Exams Yes     Comment: occassionally     Parent/sibling w/ CABG, MI or angioplasty before 65F 55M? No   Social History Narrative     Not on file     Social Determinants of Health     Financial Resource Strain: Not on file   Food Insecurity: Not on file   Transportation Needs: Not on file   Physical Activity: Not on file  "  Stress: Not on file   Social Connections: Not on file   Intimate Partner Violence: Not on file   Housing Stability: Not on file        FAMILY HISTORY:   Family History   Problem Relation Age of Onset     Heart Disease Maternal Grandfather         MI at 52 yrs     EYE* Paternal Grandfather         cataracts     Arthritis Mother      Gynecology Mother         hysterectomy for fibroids     Hypertension Mother      Lipids Mother      Gastrointestinal Disease Father         ulcers     Heart Disease Father         intermittent rapid heartbeat     Cancer Father 84        Mesothelioma        EXAM:Vitals: /68 (BP Location: Left arm, Patient Position: Sitting, Cuff Size: Adult Large)   Ht 1.613 m (5' 3.5\")   Wt 89.4 kg (197 lb)   LMP 10/15/2008 (Approximate)   BMI 34.35 kg/m    BMI= Body mass index is 34.35 kg/m .    General appearance: Patient is alert and fully cooperative with history & exam.  No sign of distress is noted during the visit.     Psychiatric: Affect is pleasant & appropriate.  Patient appears motivated to improve health.     Respiratory: Breathing is regular & unlabored while sitting.     HEENT: Hearing is intact to spoken word.  Speech is clear.  No gross evidence of visual impairment that would impact ambulation.     Vascular: DP & PT pulses are intact & regular bilaterally.  No significant edema or varicosities noted.  CFT and skin temperature is normal to both lower extremities.     Neurologic: Lower extremity sensation is intact to light touch.  No evidence of weakness or contracture in the lower extremities.  No evidence of neuropathy.    Dermatologic: Skin is intact to both lower extremities with adequate texture, turgor and tone about the integument.  No paronychia or evidence of soft tissue infection is noted.     Musculoskeletal: Patient is ambulatory with with a fracture boot about the right foot.  Most edema is resolved there is still some firm prominence about the third metatarsal " shaft consistent with bone callus.    Radiographs: 3 views of the right foot demonstrate small disruption of the cortex of the third metatarsal shaft.  No angulation noted.    Radiographs: 3 views right foot 8/1/2023 demonstrate appropriate interval healing with appropriate bone callus around the fracture.    Hemoglobin A1C (%)   Date Value   10/15/2022 7.1 (H)   02/03/2022 7.2 (H)   03/22/2021 6.6 (H)   06/01/2020 7.3 (H)   11/18/2019 6.8 (H)   07/09/2019 6.5 (H)   11/15/2018 7.1 (H)   07/20/2018 8.3 (H)   08/25/2017 7.7 (H)   06/17/2016 7.6 (H)     Creatinine (mg/dL)   Date Value   02/10/2023 0.69   02/03/2022 0.72   05/15/2021 0.6   12/15/2020 0.61   09/26/2020 0.6   06/01/2020 0.63   05/30/2020 0.7   11/14/2019 0.58   08/12/2019 0.66   06/12/2019 0.66   06/05/2019 0.76   09/17/2018 0.6     Creatinine POCT (mg/dL)   Date Value   10/15/2022 0.6   04/27/2022 0.7          ASSESSMENT:       ICD-10-CM    1. Nondisplaced fracture of third metatarsal bone, right foot, initial encounter for closed fracture  S92.334A       2. Type 1 diabetes mellitus without complication (H)  E10.9              PLAN:  Reviewed patient's chart in [facility].      7/13/2023   Recommend staying in the fracture boot weightbearing to tolerance.  Compression as needed.  Follow-up in 2 weeks to repeat imaging ordered future today.    8/1/2023  Recommend staying in the fracture boot for weightbearing to tolerance until 6 weeks post injury which will be next Thursday then she can remove the fracture boot for short periods of walking and standing at home but continue the fracture boot when leaving the home for the first few days until no pain no swelling is noted then return to regular shoe gear as tolerated.    Follow-up again in 3 weeks with any symptoms otherwise as needed.    lAeksey Sexton DPM              Again, thank you for allowing me to participate in the care of your patient.        Sincerely,        Aleksey Sexton DPM   0 = independent

## 2023-08-01 NOTE — PATIENT INSTRUCTIONS
Can remove boot for sleep and rest now  On  8/10/23 may remove the boot in the home for weight bearing activity but keep it on when leaving your home.   After one week or so then can remove the boot for all activities

## 2023-09-13 NOTE — TELEPHONE ENCOUNTER
Re: Missing Information  Dear  office staff, we are missing information from your office on patient Anel Lofton, MRN: [id number], : 1991. We are unable to optimize your patient for surgery without the information listed below.    Date of surgery: 2023  Surgeon(s):  Arash Pavon MD Spencer, David, MD  Procedure(s):  L5-S1 POSTERIOR LUMBAR HARDWARE REMOVAL WITH EXPLORATION OF FUSION AND REVISION POSTERIOR SPINAL FUSION WITH INSTRUMENTATION AND BONE GRAFTING WITH BONE MORPHOGENIC PROTEIN AND LEFT SIDED L5-S1 POSTERIOR LUMBAR INTERBODY FUSION    Please fax the following missing items as noted below:    [x] Labs within 90 days  [x] EKG within 6 months  [x] Medical Clearance within 30 days (signed by a physician)  [] Cardiac Clearance within 30 days (signed by a physician)  [x] History and Physical within 30 days (signed by a physician)  [] Chest X-ray  [x] Other EKG Tracing Please    Please fax back as soon as possible to [phone]. If you have any questions, please contact the [facility] Pre-Surgical Testing at [phone] as soon as possible to avoid any delay in service for your patient.    [facility] Order Requirements state:  ALL ORDERS MUST BE DATED, LEGIBLE AND CONTAIN:  Full Patient Legal Name (as appears on ’s license)  Patient’s Date of Birth (as appears on ’s license)  Pre-admission testing as per anesthesia guidelines  Diagnosis and procedural consent.  (No spelling errors or abbreviations)  Physician/provider name  Physician/Provider CMS Approved Signature    All documentation (ie. History and Physical, labs) MUST contain name and date of birth on EACH page.    Thank you for helping us provide you and your patient with the best possible experience!   Forms completed and placed in basket.  Please fax.    Henry Gomez MD

## 2023-09-17 NOTE — Clinical Note
2/7/2019         RE: Niharika Cason  [address] [facility] [id number]        Dear Colleague,    Thank you for referring your patient, Niharika Cason, to the [facility]. Please see a copy of my visit note below.      FOLLOW-UP VISIT NOTE    PATIENT NAME: Niharika Cason MRN # [id number]  DATE OF VISIT: Feb 7, 2019 YOB: 1955    REFERRING PROVIDER: Liliana Urbina MD  [address] [facility] [address] [place] [id number]    CANCER TYPE:Recatl adenocarcinoma  STAGE: cT3cN1 M0  ECOG PS: 1    ONCOLOGY HISTORY:    Niharika Cason is a 63-year-old female with past medical history significant for hypertension, diabetes mellitus type 1, anxiety underwent her first screening colonoscopy on 09/17/18 which revealed a partially obstructing mass in the rectosigmoid colon  Which was biopsied and came back as invasive moderately differentiated adenocarcinoma but intact mismatch repair proteins. Staging CT scans showed a 3 cm enhancing lesion in the right lobe of liver he subsequently had an MRI done which characterized a liver lesion as well as VOCAL lobular hyperplasia with no suspected metastatic disease. MRI of pelvis showed an irregular ulcerated left rectal wall mass at 9 cm from the anal verge extending to the muscularis propria into the perirectal fat. Also noted were suspicious perirectal and presacral lymph nodes -clinical stage T3cN1 M0. She met with medical and surgical oncology and the recommendation was to proceed with total neoadjuvant therapy.      10/25/18  Started XELOX chemotherapy    12/06//18 Upon completion of oxaliplatin dose, patient complained of tingling in extremities and buttock region along with Blurred vision. IV Solu-Medrol was given and symptoms improved within half an hour. Oxaliplatin dsoe reduced with subsequent cycles    12/21/18 CT scans after 4 cycles shows stable findings with no concerns for progression/new metastasis      SUBJECTIVE     Sense to the clinic today to  The patient is Watcher - Medium risk of patient condition declining or worsening    Shift Goals  Clinical Goals: tolerate tube feeds  Patient Goals: comfort and rest  Family Goals: nuzhat    Patient with no complaints at this time, resting comfortably in bed. Tolerated bolus feed. Call light and personal items within reach, encouraged to use call light for assistance         proceed with cycle 6 of chemotherapy. Tolerated the last chemotherapy well with no active complaints today.  Denies visual symptoms, nausea/vomiting, diarrhea, mouth sores, fever/chills.. Cold Induced neuropathy resolves within few days.      PAST MEDICAL HISTORY     Past Medical History:   Diagnosis Date     Essential hypertension, benign      Generalized anxiety disorder      Malignant neoplasm of rectum (H) 10/15/2018     Rectal cancer (H) 10/15/2018     Type I (juvenile type) diabetes mellitus without mention of complication, not stated as uncontrolled     diagnosed at age 35     Ulcerative colitis, unspecified     in the 70s.           CURRENT OUTPATIENT MEDICATIONS     Current Outpatient Medications   Medication Sig Dispense Refill     atenolol (TENORMIN) 25 MG tablet Take 1 tablet (25 mg) by mouth daily 93 tablet 3     BASAGLAR 100 UNIT/ML injection Inject 50 Units Subcutaneous daily 15 mL 3     blood glucose (CONTOUR NEXT TEST) test strip Use to test blood sugar 4 times daily or as directed. 400 each 3     blood glucose calibration (CONTOUR NEXT CONTROL NORMAL VI SOLN) Normal solution Use to calibrate blood glucose monitor as needed as directed. 1 each 11     blood glucose monitoring (MARICEL MICROLET) lancets Use to test blood sugar 4 times daily or as directed. 400 each 3     blood glucose monitoring (CONTOUR NEXT MONITOR W/DEVICE KIT) meter device kit Use to test blood sugar 4 times daily or as directed. 1 kit 0     capecitabine (XELODA) 500 MG tablet CHEMO Take 4 tablets (2,000 mg) by mouth 2 times daily for 14 days Take on Days 1 through 14, then off for 7 days. 112 tablet 0     hydrochlorothiazide (MICROZIDE) 12.5 MG capsule Take 1 capsule (12.5 mg) by mouth daily 93 capsule 3     insulin lispro (HUMALOG KWIKPEN) 100 UNIT/ML injection Use 3 units before breakfast, 6 units before lunch, and 16 units before dinner, plus correction of 1 unit per every 50 glucose over 125, averaging 3 for breakfast, 6 for  lunch, 16 for dinner. 30 mL 3     Insulin Pen Needle 33G X 4 MM MISC 1 each daily Use 4 needles per day 200 each prn     lidocaine-prilocaine (EMLA) cream Apply 1 g topically as needed for moderate pain 30 g 3     lisinopril (PRINIVIL/ZESTRIL) 40 MG tablet Take 1 tablet (40 mg) by mouth daily 93 tablet 3     loperamide (IMODIUM) 2 MG capsule Start with 2 caps (4 mg), then take one cap (2 mg) after each diarrheal stool as needed. Do not use more than 8 caps (16 mg) per day. 30 capsule 0     LORazepam (ATIVAN) 0.5 MG tablet Take 1 tablet (0.5 mg) by mouth every 4 hours as needed (Anxiety, Nausea/Vomiting or Sleep) 30 tablet 2     ondansetron (ZOFRAN-ODT) 8 MG ODT tab Take 1 tablet (8 mg) by mouth every 8 hours as needed for nausea 60 tablet 3     PARoxetine (PAXIL) 30 MG tablet Take 1 tablet (30 mg) by mouth every morning 93 tablet 3     prochlorperazine (COMPAZINE) 10 MG tablet Take 1 tablet (10 mg) by mouth every 6 hours as needed (Nausea/Vomiting) 30 tablet 2     simvastatin (ZOCOR) 10 MG tablet TAKE ONE TABLET BY MOUTH EVERY NIGHT AT BEDTIME 93 tablet 3     capecitabine (XELODA) 500 MG tablet CHEMO Take 4 tablets (2,000 mg) by mouth 2 times daily for 14 days Take on Days 1 through 14, then off for 7 days. 112 tablet 0     capecitabine (XELODA) 500 MG tablet CHEMO Take 4 tablets (2,000 mg) by mouth 2 times daily for 14 days Take on Days 1 through 14, then off for 7 days. 112 tablet 0        ALLERGIES     Allergies   Allergen Reactions     No Known Drug Allergies         REVIEW OF SYSTEMS   As above in the HPI, o/w complete 12-point ROS was negative.     PHYSICAL EXAM   B/P: Data Unavailable, T: Data Unavailable, P: Data Unavailable, R: Data Unavailable  SpO2 Readings from Last 4 Encounters:   11/15/18 95%   11/15/18 96%   11/02/18 96%   10/25/18 96%     Wt Readings from Last 3 Encounters:   02/07/19 76.4 kg (168 lb 8 oz)   01/17/19 77.8 kg (171 lb 8 oz)   12/27/18 78.8 kg (173 lb 11.2 oz)     GEN: NAD  Mouth/ENT:  Oropharynx is clear.  NECK: no  lymphadenopathy  LUNGS: clear bilaterally  CV: regular, no murmurs, rubs, or gallops  ABDOMEN: soft, non-tender, non-distended,  EXT: warm, well perfused, no edema  NEURO: alert  SKIN: no rashes     LABORATORY AND IMAGING STUDIES     Lab Results   Component Value Date    WBC 4.5 02/07/2019     Lab Results   Component Value Date    RBC 3.15 02/07/2019     Lab Results   Component Value Date    HGB 10.8 02/07/2019     Lab Results   Component Value Date    HCT 31.6 02/07/2019     No components found for: MCT  Lab Results   Component Value Date     02/07/2019     Lab Results   Component Value Date    MCH 34.3 02/07/2019     Lab Results   Component Value Date    MCHC 34.2 02/07/2019     Lab Results   Component Value Date    RDW 18.8 02/07/2019     Lab Results   Component Value Date     02/07/2019     Recent Labs   Lab Test 02/07/19  0832 01/17/19  1041    140   POTASSIUM 3.9 3.8   CHLORIDE 105 106   CO2 29 29   ANIONGAP 6 5   * 49*   BUN 13 14   CR 0.64 0.58   JUNG 8.5 8.5        ASSESSMENT AND PLAN     63-year-old female with past medical history significant for hypertension, diabetes mellitus type 1, anxiety on a screening colonoscopy was diagnosed with rectal adenocarcinoma     - Rectal adenocarcinoma- T3cN1 M0 intact mismatch repair protein  She met with medical and surgical oncology at [facility] and the recommendation was to proceed with total neoadjuvant therapy.  1. 6 cycles of XELOX, then:   2. 2 months of CXRT.   3. Surgery after 6-8 weeks.     - Surveillance for response to therapy [id number] CT c/a/p and Flex Sig at completion of chemotherapy and then prior to the surgery.      10/25/18 Started XELOX chemotherapy.    CT scans after 3 cycles shows stable findings with no concerns for progression/new metastasis  Will continue the plan of total neoadjuvant therapy and proceed with cycle 6 of XELOX today with dose reduced oxaliplatin at 100 mg/m2  given neurological toxicity with higher dose     - Cold sensitivity  Secondary to oxaliplatin use  Monitor    - Normocytic anemia  Secondary to chemotherapy  Not symptomatic  Monitor     - Diabetes mellitus  On an insulin regimen and managed by primary care provider      - Anxiety  Continue with paxil  Ativan prn         Refer her to radiation oncology clinic plan for chemoradiation along with surgery follow up for sigmoidoscopy as well as repeat CT chest abdomen and pelvis.  Return to clinic in 4 weeks for a visit, labs, tentatively proceeding with chemoradiation with daily Xeloda      Chart documentation with Dragon Voice recognition Software. Although reviewed after completion, some words and grammatical errors may remain.  Dayron Lawton MD  Attending Physician   Hematology/Medical Oncology          Again, thank you for allowing me to participate in the care of your patient.        Sincerely,        Dayron Lawton MD

## 2023-09-20 ENCOUNTER — TELEPHONE (OUTPATIENT)
Dept: GASTROENTEROLOGY | Facility: CLINIC | Age: 68
End: 2023-09-20
Payer: MEDICARE

## 2023-09-20 DIAGNOSIS — Z12.11 ENCOUNTER FOR SCREENING COLONOSCOPY: Primary | ICD-10-CM

## 2023-09-20 RX ORDER — BISACODYL 5 MG/1
TABLET, DELAYED RELEASE ORAL
Qty: 4 TABLET | Refills: 0 | Status: SHIPPED | OUTPATIENT
Start: 2023-09-20 | End: 2023-11-21

## 2023-09-20 NOTE — TELEPHONE ENCOUNTER
Pre assessment completed for upcoming procedure.      Procedure details:    Patient scheduled for Colonoscopy  on 10.4.2023.     Arrival time: 0900. Procedure time 1000    Pre op exam needed? N/A    Facility location: [facility]; [address], [place] [address]    Sedation type: MAC    Indication for procedure: screening colonoscopy; hx rectal CA3    COVID policy reviewed.    Designated  policy reviewed. Instructed to have someone stay 24 hours post procedure.       Chart review:     Electronic implanted devices? No    Diabetic? Yes. See medication holding recommendations     Diabetic medication HOLDING recommendations: (if applicable)  Oral diabetic medications: No  Diabetic injectables: No  Insulin: Yes. Consult with managing provider     Medication review:    Anticoagulants? No    NSAIDS? No    Other medication HOLDING recommendations:  N/A      Prep for procedure:     Bowel prep recommendation: Standard Golytely   Due to: diabetes.     Prep instructions sent via [employer] Bowel prep script sent to    [id number] - [place], [place] - [address] [place] [address]    Patient declined having RN review bowel prep instructions as they have done this before.    Patient verbalized understanding and had no questions or concerns at this time.        Regla Villanueva RN  Endoscopy Procedure Pre Assessment RN  [phone] option 4

## 2023-10-04 ENCOUNTER — TRANSFERRED RECORDS (OUTPATIENT)
Dept: HEALTH INFORMATION MANAGEMENT | Facility: CLINIC | Age: 68
End: 2023-10-04
Payer: MEDICARE

## 2023-10-11 DIAGNOSIS — E10.9 TYPE 1 DIABETES MELLITUS WITHOUT COMPLICATION (H): ICD-10-CM

## 2023-10-11 RX ORDER — PEN NEEDLE, DIABETIC 32GX 5/32"
NEEDLE, DISPOSABLE MISCELLANEOUS
Qty: 100 EACH | Refills: 0 | Status: SHIPPED | OUTPATIENT
Start: 2023-10-11 | End: 2023-11-21

## 2023-10-12 DIAGNOSIS — E10.9 TYPE 1 DIABETES MELLITUS WITHOUT COMPLICATION (H): ICD-10-CM

## 2023-10-12 RX ORDER — PEN NEEDLE, DIABETIC 32GX 5/32"
NEEDLE, DISPOSABLE MISCELLANEOUS
Refills: 0 | OUTPATIENT
Start: 2023-10-12

## 2023-10-13 ENCOUNTER — HOSPITAL ENCOUNTER (OUTPATIENT)
Dept: CT IMAGING | Facility: CLINIC | Age: 68
Discharge: HOME | End: 2023-10-13
Attending: COLON & RECTAL SURGERY | Admitting: COLON & RECTAL SURGERY
Payer: MEDICARE

## 2023-10-13 DIAGNOSIS — C20 RECTAL CANCER (H): ICD-10-CM

## 2023-10-13 LAB
CREAT BLD-MCNC: 0.8 MG/DL (ref 0.5–1)
EGFRCR SERPLBLD CKD-EPI 2021: >60 ML/MIN/1.73M2

## 2023-10-13 PROCEDURE — G1010 CDSM STANSON: HCPCS

## 2023-10-13 PROCEDURE — 74177 CT ABD & PELVIS W/CONTRAST: CPT | Mod: MG

## 2023-10-13 PROCEDURE — 82565 ASSAY OF CREATININE: CPT

## 2023-10-13 PROCEDURE — 250N000011 HC RX IP 250 OP 636: Performed by: COLON & RECTAL SURGERY

## 2023-10-13 RX ORDER — IOPAMIDOL 755 MG/ML
500 INJECTION, SOLUTION INTRAVASCULAR ONCE
Status: COMPLETED | OUTPATIENT
Start: 2023-10-13 | End: 2023-10-13

## 2023-10-13 RX ADMIN — IOPAMIDOL 96 ML: 755 INJECTION, SOLUTION INTRAVENOUS at 08:37

## 2023-11-21 ENCOUNTER — OFFICE VISIT (OUTPATIENT)
Dept: FAMILY MEDICINE | Facility: CLINIC | Age: 68
End: 2023-11-21
Payer: MEDICARE

## 2023-11-21 VITALS
HEIGHT: 63 IN | TEMPERATURE: 98 F | WEIGHT: 198.13 LBS | OXYGEN SATURATION: 95 % | BODY MASS INDEX: 35.11 KG/M2 | HEART RATE: 85 BPM | RESPIRATION RATE: 20 BRPM | DIASTOLIC BLOOD PRESSURE: 70 MMHG | SYSTOLIC BLOOD PRESSURE: 124 MMHG

## 2023-11-21 DIAGNOSIS — E66.01 CLASS 2 SEVERE OBESITY DUE TO EXCESS CALORIES WITH SERIOUS COMORBIDITY AND BODY MASS INDEX (BMI) OF 35.0 TO 35.9 IN ADULT (H): ICD-10-CM

## 2023-11-21 DIAGNOSIS — E10.9 TYPE 1 DIABETES MELLITUS WITHOUT COMPLICATION (H): Primary | ICD-10-CM

## 2023-11-21 DIAGNOSIS — E66.812 CLASS 2 SEVERE OBESITY DUE TO EXCESS CALORIES WITH SERIOUS COMORBIDITY AND BODY MASS INDEX (BMI) OF 35.0 TO 35.9 IN ADULT (H): ICD-10-CM

## 2023-11-21 DIAGNOSIS — E78.2 MIXED HYPERLIPIDEMIA: ICD-10-CM

## 2023-11-21 DIAGNOSIS — I10 ESSENTIAL HYPERTENSION WITH GOAL BLOOD PRESSURE LESS THAN 130/80: ICD-10-CM

## 2023-11-21 DIAGNOSIS — F41.1 GENERALIZED ANXIETY DISORDER: ICD-10-CM

## 2023-11-21 DIAGNOSIS — Z78.0 ASYMPTOMATIC POSTMENOPAUSAL STATUS: ICD-10-CM

## 2023-11-21 DIAGNOSIS — L72.3 SEBACEOUS CYST: ICD-10-CM

## 2023-11-21 DIAGNOSIS — E10.69 TYPE 1 DIABETES MELLITUS WITH OTHER SPECIFIED COMPLICATION (H): ICD-10-CM

## 2023-11-21 LAB — HBA1C MFR BLD: 6.8 %

## 2023-11-21 PROCEDURE — 99214 OFFICE O/P EST MOD 30 MIN: CPT | Performed by: PHYSICIAN ASSISTANT

## 2023-11-21 PROCEDURE — 83036 HEMOGLOBIN GLYCOSYLATED A1C: CPT | Performed by: PHYSICIAN ASSISTANT

## 2023-11-21 PROCEDURE — 36415 COLL VENOUS BLD VENIPUNCTURE: CPT | Performed by: PHYSICIAN ASSISTANT

## 2023-11-21 RX ORDER — ATORVASTATIN CALCIUM 40 MG/1
40 TABLET, FILM COATED ORAL DAILY
Qty: 90 TABLET | Refills: 4 | Status: SHIPPED | OUTPATIENT
Start: 2023-11-21 | End: 2024-06-13

## 2023-11-21 RX ORDER — PEN NEEDLE, DIABETIC 32GX 5/32"
NEEDLE, DISPOSABLE MISCELLANEOUS
Qty: 100 EACH | Refills: 0 | Status: SHIPPED | OUTPATIENT
Start: 2023-11-21 | End: 2024-04-05

## 2023-11-21 RX ORDER — PAROXETINE 30 MG/1
30 TABLET, FILM COATED ORAL EVERY MORNING
Qty: 90 TABLET | Refills: 4 | Status: SHIPPED | OUTPATIENT
Start: 2023-11-21 | End: 2024-06-13

## 2023-11-21 RX ORDER — INSULIN GLARGINE 100 [IU]/ML
INJECTION, SOLUTION SUBCUTANEOUS
Qty: 30 ML | Refills: 1 | Status: SHIPPED | OUTPATIENT
Start: 2023-11-21 | End: 2024-06-13

## 2023-11-21 RX ORDER — LISINOPRIL 40 MG/1
40 TABLET ORAL DAILY
Qty: 93 TABLET | Refills: 4 | Status: SHIPPED | OUTPATIENT
Start: 2023-11-21 | End: 2024-06-13

## 2023-11-21 RX ORDER — INSULIN LISPRO 100 [IU]/ML
INJECTION, SOLUTION INTRAVENOUS; SUBCUTANEOUS
Qty: 45 ML | Refills: 4 | Status: SHIPPED | OUTPATIENT
Start: 2023-11-21 | End: 2024-06-13

## 2023-11-21 ASSESSMENT — PAIN SCALES - GENERAL: PAINLEVEL: NO PAIN (0)

## 2023-11-21 NOTE — PROGRESS NOTES
Artemio Bundy is a 68 year old, presenting for the following health issues:  Recheck Medication and Diabetes      11/21/2023     7:24 AM   Additional Questions   Roomed by Raiza GONZALEZ       History of Present Illness       Diabetes:   She presents for follow up of diabetes.   She is checking home blood glucose with a continuous glucose monitor.   She checks blood glucose before and after meals and at bedtime.  Blood glucose is sometimes over 200 and sometimes under 70. She is aware of hypoglycemia symptoms including shakiness, weakness, lethargy and confusion.    She has no concerns regarding her diabetes at this time.  She is having numbness in feet.            Reason for visit:  Diabetes check, growth on left earlobe, looking for new provider.    She eats 2-3 servings of fruits and vegetables daily.She consumes 0 sweetened beverage(s) daily.She exercises with enough effort to increase her heart rate 9 or less minutes per day.  She exercises with enough effort to increase her heart rate 3 or less days per week.   She is taking medications regularly.     Patient with type 1 diabetes. She reports she was diagnosed around the age of 32. This has generally been well controlled with an A1c of under 8 for many years. She reports that she recently starting getting generic Humalog instead of the brand name. This does not seem to work as well. Has been needing to give herself more insulin. Sugars otherwise doing well. She does have some neuropathy in the sensation she has a sock on but denies any pain.     Patient presents today for follow-up of blood pressure. Numbers have been well controlled. Tolerating medications well without side effects. Monitoring salt in diet. Patient denies headaches, vision changes, chest pain, shortness of breath or paresthesias.    History of rectal cancer. Required chemo and radiation only. This has been in remission. Follows with colorectal routinely.     Growth on left earlobe that has  "been slowly increasing in size.       Review of Systems   Constitutional, HEENT, cardiovascular, pulmonary, GI, , musculoskeletal, neuro, skin, endocrine and psych systems are negative, except as otherwise noted.      Objective    /70   Pulse 85   Temp 98  F (36.7  C) (Temporal)   Resp 20   Ht 1.6 m (5' 2.99\")   Wt 89.9 kg (198 lb 2 oz)   LMP 10/15/2008 (Approximate)   SpO2 95%   BMI 35.11 kg/m    Body mass index is 35.11 kg/m .  Physical Exam   GENERAL: healthy, alert and no distress  EYES: Eyes grossly normal to inspection, PERRL and conjunctivae and sclerae normal  HENT: ear canals and TM's normal, nose and mouth without ulcers or lesions  NECK: no adenopathy, no asymmetry, masses, or scars and thyroid normal to palpation  RESP: lungs clear to auscultation - no rales, rhonchi or wheezes  CV: regular rate and rhythm, normal S1 S2, no S3 or S4, no murmur, click or rub, no peripheral edema and peripheral pulses strong  ABDOMEN: soft, nontender, no hepatosplenomegaly, no masses and bowel sounds normal  MS: no gross musculoskeletal defects noted, no edema  SKIN: no suspicious lesions or rashes and suspected sebaceous cyst on left lower ear lobe  NEURO: Normal strength and tone, mentation intact and speech normal  PSYCH: mentation appears normal, affect normal/bright        Assessment & Plan     Type 1 diabetes mellitus without complication (H)  Due for A1c today. Overall blood sugars have been in good control. She reports that generic humalog is not working as well as brand name - requiring more insulin. Would like to go back to brand name pending insurance approval.   - HEMOGLOBIN A1C; Future  - insulin pen needle (BD PEN NEEDLE KELECHI 2ND GEN) 32G X 4 MM miscellaneous; Use to check blood sugar 1 time daily or as directed.  - insulin lispro (HUMALOG KWIKPEN) 100 UNIT/ML (1 unit dial) KWIKPEN; Inject under the skin.  Take 8 units at breakfast, 8 units at lunch, 10-12 units at supper. Take additional " sliding scale as indicated. Minimum daily dose range is 36-38 units with max of 50 units/day.  - insulin glargine (LANTUS SOLOSTAR) 100 UNIT/ML pen; INJECT 23 UNITS SUBCUTANEOUSLY AT BEDTIME  - HEMOGLOBIN A1C    Type 1 diabetes mellitus with other specified complication (H)  See above.     Class 2 severe obesity due to excess calories with serious comorbidity and body mass index (BMI) of 35.0 to 35.9 in adult (H)  Encouraged ongoing diet and exercise modifications as able.     Essential hypertension with goal blood pressure less than 130/80  Stable - continue current dose without change.   - lisinopril (ZESTRIL) 40 MG tablet; Take 1 tablet (40 mg) by mouth daily    Mixed hyperlipidemia  - atorvastatin (LIPITOR) 40 MG tablet; Take 1 tablet (40 mg) by mouth daily    Generalized anxiety disorder  Stable.   - PARoxetine (PAXIL) 30 MG tablet; Take 1 tablet (30 mg) by mouth every morning    Asymptomatic postmenopausal status  - DEXA HIP/PELVIS/SPINE - Future; Future    Sebaceous cyst -left ear  Referral to general surgery for excision of lesion.   - Adult General Surg Referral; Future    Follow-up 6 months, sooner if needed.     The patient indicates understanding of these issues and agrees with the plan.    ANGELICA Mijares [facility]

## 2023-12-06 ENCOUNTER — OFFICE VISIT (OUTPATIENT)
Dept: SURGERY | Facility: CLINIC | Age: 68
End: 2023-12-06
Payer: MEDICARE

## 2023-12-06 VITALS
DIASTOLIC BLOOD PRESSURE: 60 MMHG | TEMPERATURE: 97.9 F | SYSTOLIC BLOOD PRESSURE: 130 MMHG | BODY MASS INDEX: 35.08 KG/M2 | WEIGHT: 198 LBS | HEIGHT: 63 IN

## 2023-12-06 DIAGNOSIS — L72.3 SEBACEOUS CYST: ICD-10-CM

## 2023-12-06 PROCEDURE — 88305 TISSUE EXAM BY PATHOLOGIST: CPT | Performed by: DERMATOLOGY

## 2023-12-06 PROCEDURE — 11441 EXC FACE-MM B9+MARG 0.6-1 CM: CPT | Performed by: SURGERY

## 2023-12-06 PROCEDURE — 99213 OFFICE O/P EST LOW 20 MIN: CPT | Mod: 25 | Performed by: SURGERY

## 2023-12-06 PROCEDURE — 88342 IMHCHEM/IMCYTCHM 1ST ANTB: CPT | Performed by: DERMATOLOGY

## 2023-12-06 PROCEDURE — 88341 IMHCHEM/IMCYTCHM EA ADD ANTB: CPT | Performed by: DERMATOLOGY

## 2023-12-06 ASSESSMENT — PAIN SCALES - GENERAL: PAINLEVEL: NO PAIN (0)

## 2023-12-06 NOTE — PROGRESS NOTES
"Patient seen in consultation for left ear cyst by Reena Marin    HPI:  Patient is a 68 year old female with many year history of subcutaneous cyst of the left earlobe.  She states it is gotten larger over time.  She has tried to \"drain\" it but only has gotten blood out.  Never had any personal history of skin cancer.  Does not take blood thinning medication.    Review Of Systems    Skin: as above  Ears/Nose/Throat: negative  Respiratory: No shortness of breath, dyspnea on exertion, cough, or hemoptysis  Cardiovascular: negative  Gastrointestinal: negative  Genitourinary: negative  Musculoskeletal: negative  Neurologic: negative  Hematologic/Lymphatic/Immunologic: negative  Endocrine: negative      Past Medical History:   Diagnosis Date    Colorectal cancer (H)     Essential hypertension, benign     Generalized anxiety disorder     Hyperlipidemia     Rectal cancer (H) 09/17/2018    S/P radiation therapy     5,040 cGy to Pelvis completed on 04/12/2019. [facility] with Dr. Morris    Type I (juvenile type) diabetes mellitus without mention of complication, not stated as uncontrolled     diagnosed at age 35    Ulcerative colitis, unspecified     in the 70s.         Past Surgical History:   Procedure Laterality Date    COLONOSCOPY N/A 9/17/2018    Procedure: COMBINED COLONOSCOPY, SINGLE OR MULTIPLE BIOPSY/POLYPECTOMY BY BIOPSY;  colonoscopy with polypectomies by biopsy forceps and hot snare and submucosal injection with tattoo;  Surgeon: Richard Moore MD;  Location:  GI    HC CURETTAGE, POSTPARTUM  '91, '93    following miscarriages    HC REMOVAL OF TONSILS,<13 Y/O      Tonsils <14y.o.    INSERT PORT VASCULAR ACCESS Right 10/19/2018    Procedure: Chest Port Placement - right ;  Surgeon: Lawson Hitchcock PA-C;  Location:  OR    [facility] COLONOSCOPY THRU STOMA, DIAGNOSTIC  1998    negative       Family History   Problem Relation Age of Onset    Heart Disease Maternal Grandfather         MI at " 52 yrs    EYE* Paternal Grandfather         cataracts    Arthritis Mother     Gynecology Mother         hysterectomy for fibroids    Hypertension Mother     Lipids Mother     Gastrointestinal Disease Father         ulcers    Heart Disease Father         intermittent rapid heartbeat    Cancer Father 84        Mesothelioma       Social History     Socioeconomic History    Marital status:      Spouse name: Fabrice    Number of children: 2    Years of education: 12    Highest education level: Not on file   Occupational History    Occupation:      Employer: [employer]     Comment: on her feet all day     Employer: [employer]   Tobacco Use    Smoking status: Former     Packs/day: 0.50     Years: 15.00     Additional pack years: 0.00     Total pack years: 7.50     Types: Cigarettes     Quit date: 2018     Years since quittin.2    Smokeless tobacco: Never   Vaping Use    Vaping Use: Never used   Substance and Sexual Activity    Alcohol use: Yes     Alcohol/week: 7.0 standard drinks of alcohol     Types: 7 Standard drinks or equivalent per week    Drug use: No    Sexual activity: Yes     Partners: Male     Birth control/protection: Post-menopausal   Other Topics Concern     Service No    Blood Transfusions No    Caffeine Concern No    Occupational Exposure No    Hobby Hazards No    Sleep Concern No    Stress Concern No    Weight Concern No    Special Diet Yes     Comment: diabetic    Back Care Yes    Exercise Yes    Bike Helmet No    Seat Belt Yes    Self-Exams Yes     Comment: occassionally    Parent/sibling w/ CABG, MI or angioplasty before 65F 55M? No   Social History Narrative    Not on file     Social Determinants of Health     Financial Resource Strain: Low Risk  (2023)    Financial Resource Strain     Within the past 12 months, have you or your family members you live with been unable to get utilities (heat, electricity) when it was really needed?: No   Food Insecurity: Low Risk   (11/20/2023)    Food Insecurity     Within the past 12 months, did you worry that your food would run out before you got money to buy more?: No     Within the past 12 months, did the food you bought just not last and you didn t have money to get more?: No   Transportation Needs: Low Risk  (11/20/2023)    Transportation Needs     Within the past 12 months, has lack of transportation kept you from medical appointments, getting your medicines, non-medical meetings or appointments, work, or from getting things that you need?: No   Physical Activity: Not on file   Stress: Not on file   Social Connections: Not on file   Interpersonal Safety: Low Risk  (11/21/2023)    Interpersonal Safety     Do you feel physically and emotionally safe where you currently live?: Yes     Within the past 12 months, have you been hit, slapped, kicked or otherwise physically hurt by someone?: No     Within the past 12 months, have you been humiliated or emotionally abused in other ways by your partner or ex-partner?: No   Housing Stability: Low Risk  (11/20/2023)    Housing Stability     Do you have housing? : Yes     Are you worried about losing your housing?: No       Current Outpatient Medications   Medication Sig Dispense Refill    atorvastatin (LIPITOR) 40 MG tablet Take 1 tablet (40 mg) by mouth daily 90 tablet 4    blood glucose (NO BRAND SPECIFIED) test strip Use to test blood sugar 1 times daily or as directed. 100 strip 4    Continuous Blood Gluc  (DEXCOM G6 ) BRITTANY 1 Device continuous 1 Device 0    Continuous Blood Gluc Sensor (DEXCOM G6 SENSOR) MISC CHANGE EVERY 10 DAYS 10 each 4    Continuous Blood Gluc Transmit (DEXCOM G6 TRANSMITTER) MISC CHANGE EVERY 3 MONTHS 1 each 3    insulin glargine (LANTUS SOLOSTAR) 100 UNIT/ML pen INJECT 23 UNITS SUBCUTANEOUSLY AT BEDTIME 30 mL 1    insulin lispro (HUMALOG KWIKPEN) 100 UNIT/ML (1 unit dial) KWIKPEN Inject under the skin.  Take 8 units at breakfast, 8 units at lunch, 10-12  "units at supper. Take additional sliding scale as indicated. Minimum daily dose range is 36-38 units with max of 50 units/day. 45 mL 4    insulin pen needle (BD PEN NEEDLE KELECHI 2ND GEN) 32G X 4 MM miscellaneous Use to check blood sugar 1 time daily or as directed. 100 each 0    lisinopril (ZESTRIL) 40 MG tablet Take 1 tablet (40 mg) by mouth daily 93 tablet 4    Multiple Vitamins-Minerals (MULTIVITAMIN PO) Take by mouth daily      PARoxetine (PAXIL) 30 MG tablet Take 1 tablet (30 mg) by mouth every morning 90 tablet 4    Pyridoxine HCl (VITAMIN B-6 PO) Take by mouth daily      Turmeric 500 MG CAPS Take 1 capsule by mouth daily         Medications and history reviewed    Physical exam:  Vitals: /60   Temp 97.9  F (36.6  C) (Temporal)   Ht 1.598 m (5' 2.9\")   Wt 89.8 kg (198 lb)   LMP 10/15/2008 (Approximate)   BMI 35.19 kg/m    BMI= Body mass index is 35.19 kg/m .    Constitutional: alert, non-distressed   Head: normo-cephalic, atraumatic   Cardiovascular: RRR  Respiratory: equal chest rise, good respiratory effort, no audible wheeze  Gastrointestinal: Soft, non-tender, non distended, no masses or hernias palpated   : deferred  Musculoskeletal: moves all extremities   Skin: Subcentimeter skin cyst/papule, dark, thin skin.  No signs of infection  Psychiatric: mentation appears normal, affect appropriate   Patient able to get up on table without difficulty.    Labs show:  No results found for this or any previous visit (from the past 24 hour(s)).    Imaging shows:  No results found for this or any previous visit (from the past 744 hour(s)).     Assessment:     ICD-10-CM    1. Sebaceous cyst -left ear  L72.3 Adult General Surg Referral     Dermatological path order and indications        Plan: Due to the skin changes and chronicity of the lesion, I recommend excisional biopsy.  We discussed her options and the procedure.  After our discussion and agreed to proceed with an office excision " today.    PROCEDURE: Excision left ear skin cyst     Written consent was obtained    The left ear area was prepped and appropriately anesthetized with 1% lidocaine with epinephrine. Using the usual technique, ellipse excision of skin lesion and subcutaneous cyst was performed. The total area excised, including lesion and margins was 1.0 x 0.5 x 0.5 cm.  Closure was accomplished with interrupted 3-0 vicryl for the dermal layer and running subcuticular 4-0 monocryl for the skin. Total length of the incision after closure was 1.0 cm. An appropriate dressing was applied.  The procedure was well tolerated and without complications. Specimen was sent to pathology.    Dr. Loyd DO, FACS      60 minutes spent by me on the date of the encounter doing chart review, history and exam, documentation and further activities per the note 30 minutes of which was the procedure itself    Aditya aHrp DO

## 2023-12-06 NOTE — LETTER
"    12/6/2023         RE: Niharika Cason  [id number] [facility] [id number]        Dear Colleague,    Thank you for referring your patient, Niharika Cason, to the [facility]. Please see a copy of my visit note below.    Patient seen in consultation for left ear cyst by Reena Marin    HPI:  Patient is a 68 year old female with many year history of subcutaneous cyst of the left earlobe.  She states it is gotten larger over time.  She has tried to \"drain\" it but only has gotten blood out.  Never had any personal history of skin cancer.  Does not take blood thinning medication.    Review Of Systems    Skin: as above  Ears/Nose/Throat: negative  Respiratory: No shortness of breath, dyspnea on exertion, cough, or hemoptysis  Cardiovascular: negative  Gastrointestinal: negative  Genitourinary: negative  Musculoskeletal: negative  Neurologic: negative  Hematologic/Lymphatic/Immunologic: negative  Endocrine: negative      Past Medical History:   Diagnosis Date     Colorectal cancer (H)      Essential hypertension, benign      Generalized anxiety disorder      Hyperlipidemia      Rectal cancer (H) 09/17/2018     S/P radiation therapy     5,040 cGy to Pelvis completed on 04/12/2019. - [facility] with Dr. Morris     Type I (juvenile type) diabetes mellitus without mention of complication, not stated as uncontrolled     diagnosed at age 35     Ulcerative colitis, unspecified     in the 70s.         Past Surgical History:   Procedure Laterality Date     COLONOSCOPY N/A 9/17/2018    Procedure: COMBINED COLONOSCOPY, SINGLE OR MULTIPLE BIOPSY/POLYPECTOMY BY BIOPSY;  colonoscopy with polypectomies by biopsy forceps and hot snare and submucosal injection with tattoo;  Surgeon: Richard Moore MD;  Location: [facility]     HC CURETTAGE, POSTPARTUM  '91, '93    following miscarriages     HC REMOVAL OF TONSILS,<11 Y/O      Tonsils <14y.o.     INSERT PORT VASCULAR ACCESS Right 10/19/2018    " Procedure: Chest Port Placement - right ;  Surgeon: Lawson Hitchcock PA-C;  Location:  OR     [facility] COLONOSCOPY THRU STOMA, DIAGNOSTIC      negative       Family History   Problem Relation Age of Onset     Heart Disease Maternal Grandfather         MI at 52 yrs     EYE* Paternal Grandfather         cataracts     Arthritis Mother      Gynecology Mother         hysterectomy for fibroids     Hypertension Mother      Lipids Mother      Gastrointestinal Disease Father         ulcers     Heart Disease Father         intermittent rapid heartbeat     Cancer Father 84        Mesothelioma       Social History     Socioeconomic History     Marital status:      Spouse name: Fabrice     Number of children: 2     Years of education: 12     Highest education level: Not on file   Occupational History     Occupation:      Employer: [employer]     Comment: on her feet all day     Employer: [employer]   Tobacco Use     Smoking status: Former     Packs/day: 0.50     Years: 15.00     Additional pack years: 0.00     Total pack years: 7.50     Types: Cigarettes     Quit date: 2018     Years since quittin.2     Smokeless tobacco: Never   Vaping Use     Vaping Use: Never used   Substance and Sexual Activity     Alcohol use: Yes     Alcohol/week: 7.0 standard drinks of alcohol     Types: 7 Standard drinks or equivalent per week     Drug use: No     Sexual activity: Yes     Partners: Male     Birth control/protection: Post-menopausal   Other Topics Concern      Service No     Blood Transfusions No     Caffeine Concern No     Occupational Exposure No     Hobby Hazards No     Sleep Concern No     Stress Concern No     Weight Concern No     Special Diet Yes     Comment: diabetic     Back Care Yes     Exercise Yes     Bike Helmet No     Seat Belt Yes     Self-Exams Yes     Comment: occassionally     Parent/sibling w/ CABG, MI or angioplasty before 65F 55M? No   Social History Narrative     Not on file      Social Determinants of Health     Financial Resource Strain: Low Risk  (11/20/2023)    Financial Resource Strain      Within the past 12 months, have you or your family members you live with been unable to get utilities (heat, electricity) when it was really needed?: No   Food Insecurity: Low Risk  (11/20/2023)    Food Insecurity      Within the past 12 months, did you worry that your food would run out before you got money to buy more?: No      Within the past 12 months, did the food you bought just not last and you didn t have money to get more?: No   Transportation Needs: Low Risk  (11/20/2023)    Transportation Needs      Within the past 12 months, has lack of transportation kept you from medical appointments, getting your medicines, non-medical meetings or appointments, work, or from getting things that you need?: No   Physical Activity: Not on file   Stress: Not on file   Social Connections: Not on file   Interpersonal Safety: Low Risk  (11/21/2023)    Interpersonal Safety      Do you feel physically and emotionally safe where you currently live?: Yes      Within the past 12 months, have you been hit, slapped, kicked or otherwise physically hurt by someone?: No      Within the past 12 months, have you been humiliated or emotionally abused in other ways by your partner or ex-partner?: No   Housing Stability: Low Risk  (11/20/2023)    Housing Stability      Do you have housing? : Yes      Are you worried about losing your housing?: No       Current Outpatient Medications   Medication Sig Dispense Refill     atorvastatin (LIPITOR) 40 MG tablet Take 1 tablet (40 mg) by mouth daily 90 tablet 4     blood glucose (NO BRAND SPECIFIED) test strip Use to test blood sugar 1 times daily or as directed. 100 strip 4     Continuous Blood Gluc  (DEXCOM G6 ) BRITTANY 1 Device continuous 1 Device 0     Continuous Blood Gluc Sensor (DEXCOM G6 SENSOR) MISC CHANGE EVERY 10 DAYS 10 each 4     Continuous Blood Gluc  "Transmit (DEXCOM G6 TRANSMITTER) MISC CHANGE EVERY 3 MONTHS 1 each 3     insulin glargine (LANTUS SOLOSTAR) 100 UNIT/ML pen INJECT 23 UNITS SUBCUTANEOUSLY AT BEDTIME 30 mL 1     insulin lispro (HUMALOG KWIKPEN) 100 UNIT/ML (1 unit dial) KWIKPEN Inject under the skin.  Take 8 units at breakfast, 8 units at lunch, 10-12 units at supper. Take additional sliding scale as indicated. Minimum daily dose range is 36-38 units with max of 50 units/day. 45 mL 4     insulin pen needle (BD PEN NEEDLE KELECHI 2ND GEN) 32G X 4 MM miscellaneous Use to check blood sugar 1 time daily or as directed. 100 each 0     lisinopril (ZESTRIL) 40 MG tablet Take 1 tablet (40 mg) by mouth daily 93 tablet 4     Multiple Vitamins-Minerals (MULTIVITAMIN PO) Take by mouth daily       PARoxetine (PAXIL) 30 MG tablet Take 1 tablet (30 mg) by mouth every morning 90 tablet 4     Pyridoxine HCl (VITAMIN B-6 PO) Take by mouth daily       Turmeric 500 MG CAPS Take 1 capsule by mouth daily         Medications and history reviewed    Physical exam:  Vitals: /60   Temp 97.9  F (36.6  C) (Temporal)   Ht 1.598 m (5' 2.9\")   Wt 89.8 kg (198 lb)   LMP 10/15/2008 (Approximate)   BMI 35.19 kg/m    BMI= Body mass index is 35.19 kg/m .    Constitutional: alert, non-distressed   Head: normo-cephalic, atraumatic   Cardiovascular: RRR  Respiratory: equal chest rise, good respiratory effort, no audible wheeze  Gastrointestinal: Soft, non-tender, non distended, no masses or hernias palpated   : deferred  Musculoskeletal: moves all extremities   Skin: Subcentimeter skin cyst/papule, dark, thin skin.  No signs of infection  Psychiatric: mentation appears normal, affect appropriate   Patient able to get up on table without difficulty.    Labs show:  No results found for this or any previous visit (from the past 24 hour(s)).    Imaging shows:  No results found for this or any previous visit (from the past 744 hour(s)).     Assessment:     ICD-10-CM    1. Sebaceous " cyst -left ear  L72.3 Adult General Surg Referral     Dermatological path order and indications        Plan: Due to the skin changes and chronicity of the lesion, I recommend excisional biopsy.  We discussed her options and the procedure.  After our discussion and agreed to proceed with an office excision today.    PROCEDURE: Excision left ear skin cyst     Written consent was obtained    The left ear area was prepped and appropriately anesthetized with 1% lidocaine with epinephrine. Using the usual technique, ellipse excision of skin lesion and subcutaneous cyst was performed. The total area excised, including lesion and margins was 1.0 x 0.5 x 0.5 cm.  Closure was accomplished with interrupted 3-0 vicryl for the dermal layer and running subcuticular 4-0 monocryl for the skin. Total length of the incision after closure was 1.0 cm. An appropriate dressing was applied.  The procedure was well tolerated and without complications. Specimen was sent to pathology.    Dr. Loyd DO, FACS      60 minutes spent by me on the date of the encounter doing chart review, history and exam, documentation and further activities per the note 30 minutes of which was the procedure itself    Aditya Harp DO      Again, thank you for allowing me to participate in the care of your patient.        Sincerely,        Aditya Harp DO

## 2023-12-15 ENCOUNTER — TELEPHONE (OUTPATIENT)
Dept: FAMILY MEDICINE | Facility: CLINIC | Age: 68
End: 2023-12-15
Payer: MEDICARE

## 2023-12-15 NOTE — TELEPHONE ENCOUNTER
ADF physican order form that was faxed to us, calling to inquire if this has been completed    For: Dexcom G7      Ph: [phone]  Fax [phone]  Carmen with [employer]

## 2023-12-20 ENCOUNTER — TELEPHONE (OUTPATIENT)
Dept: ADMISSION | Facility: CLINIC | Age: 68
End: 2023-12-20
Payer: MEDICARE

## 2023-12-20 NOTE — TELEPHONE ENCOUNTER
Reason for Call:  Other  called     Detailed comments:  the pathologist reading her results called and requested  give  a call at [phone]. He said its not urgent to jsut give him a call when he's back on Friday     Call taken on 12/20/2023 at 3:58 PM by Mirian Sigala

## 2023-12-22 ENCOUNTER — LAB REQUISITION (OUTPATIENT)
Dept: LAB | Facility: CLINIC | Age: 68
End: 2023-12-22
Payer: MEDICARE

## 2023-12-27 ENCOUNTER — HOSPITAL ENCOUNTER (OUTPATIENT)
Dept: BONE DENSITY | Facility: CLINIC | Age: 68
Discharge: HOME | End: 2023-12-27
Attending: PHYSICIAN ASSISTANT | Admitting: PHYSICIAN ASSISTANT
Payer: MEDICARE

## 2023-12-27 DIAGNOSIS — Z78.0 ASYMPTOMATIC POSTMENOPAUSAL STATUS: ICD-10-CM

## 2023-12-27 PROCEDURE — 77080 DXA BONE DENSITY AXIAL: CPT

## 2023-12-29 LAB
BKR LAB AP ADD'L TEST STATUS: NORMAL
BKR PATH ADDL TEST FINAL COMMENTS: NORMAL
PATH REPORT.ADDENDUM SPEC: NORMAL
PATH REPORT.COMMENTS IMP SPEC: NORMAL
PATH REPORT.FINAL DX SPEC: NORMAL
PATH REPORT.GROSS SPEC: NORMAL
PATH REPORT.MICROSCOPIC SPEC OTHER STN: NORMAL
PATH REPORT.RELEVANT HX SPEC: NORMAL

## 2024-01-05 ENCOUNTER — OFFICE VISIT (OUTPATIENT)
Dept: SURGERY | Facility: CLINIC | Age: 69
End: 2024-01-05
Payer: MEDICARE

## 2024-01-05 VITALS
WEIGHT: 199.9 LBS | DIASTOLIC BLOOD PRESSURE: 80 MMHG | TEMPERATURE: 98 F | SYSTOLIC BLOOD PRESSURE: 130 MMHG | HEIGHT: 63 IN | BODY MASS INDEX: 35.42 KG/M2

## 2024-01-05 DIAGNOSIS — Q82.5: Primary | ICD-10-CM

## 2024-01-05 PROCEDURE — 11441 EXC FACE-MM B9+MARG 0.6-1 CM: CPT | Performed by: SURGERY

## 2024-01-05 PROCEDURE — 88341 IMHCHEM/IMCYTCHM EA ADD ANTB: CPT | Performed by: PATHOLOGY

## 2024-01-05 PROCEDURE — 88305 TISSUE EXAM BY PATHOLOGIST: CPT | Performed by: PATHOLOGY

## 2024-01-05 PROCEDURE — 88342 IMHCHEM/IMCYTCHM 1ST ANTB: CPT | Performed by: PATHOLOGY

## 2024-01-05 ASSESSMENT — PAIN SCALES - GENERAL: PAINLEVEL: NO PAIN (0)

## 2024-01-05 NOTE — PROGRESS NOTES
Intradermal melanocytoma on final pathology, pathologist recommended reexcision due to the lesion abutting the margin.    PROCEDURE: Reexcision melanocytoma     Written consent was obtained    The left ear area was prepped and appropriately anesthetized with 1% lidocaine with epinephrine. Using the usual technique, ellipse excision of previous scar was performed. The total area excised, including lesion and margins was 0.7 x 0.3 x 0.1 cm.  Closure was accomplished with interrupted 3-0 vicryl for the dermal layer and a subcuticular 4-0 monocryl for the skin. Total length of the incision after closure was 1.0 cm. An appropriate dressing was applied.  The procedure was well tolerated and without complications. Specimen was sent to pathology.    Dr. Loyd DO, FACS

## 2024-01-05 NOTE — LETTER
1/5/2024         RE: Niharika Cason  [address] [facility] [address] [id number]        Dear Colleague,    Thank you for referring your patient, Niharika Cason, to the [facility]. Please see a copy of my visit note below.    Intradermal melanocytoma on final pathology, pathologist recommended reexcision due to the lesion abutting the margin.    PROCEDURE: Reexcision melanocytoma     Written consent was obtained    The left ear area was prepped and appropriately anesthetized with 1% lidocaine with epinephrine. Using the usual technique, ellipse excision of previous scar was performed. The total area excised, including lesion and margins was 0.7 x 0.3 x 0.1 cm.  Closure was accomplished with interrupted 3-0 vicryl for the dermal layer and a subcuticular 4-0 monocryl for the skin. Total length of the incision after closure was 1.0 cm. An appropriate dressing was applied.  The procedure was well tolerated and without complications. Specimen was sent to pathology.    Dr. Loyd DO, FACS      Again, thank you for allowing me to participate in the care of your patient.        Sincerely,        Aditya Harp DO

## 2024-01-05 NOTE — LETTER
10/1/2018       RE: Niharika Cason  [address] [address] [place] [id number]     Dear Colleague,    Thank you for referring your patient, Niharika Cason, to the [facility]. Please see a copy of my visit note below.    Referred for possible liver met from Sigmoid cancer.    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    No further evaluation or follow up is necessary    Patient should proceed with treatment of rectosigmoid cancer per Dr. Le.    Again, thank you for allowing me to participate in the care of your patient.      Sincerely,    Yossi White MD       [facility] Medicine  Progress Note    Patient Name: Pili Teixeira  MRN: [id number]  Patient Class: OP- Observation   Admission Date: 1/2/2024  Length of Stay: 0 days  Attending Physician: Ciara Gordillo MD  Primary Care Provider: LUÍS Huggins MD        Subjective:     Principal Problem:Partial small bowel obstruction        HPI:  Pili Teixeira is a 78 year old female with a past medical history of anemia, COPD, CAD, TIA on Plavix, HTN, HLD, DM, fibromyalgia, GERD, REJI, NICOLAS with iron transfusions, abdominal hernia, and diverticulosis who presented with moderate midepigastric abdominal pain that radiates into the right upper quadrant and back associated with constipation and nausea x2 weeks. Denies chest pain, shortness of breath, fever, chills, cough, dizziness, lightheadedness, vomiting, hematuria, diarrhea, hematochezia, or LOC. She reports receiving a CT 2 weeks ago at [facility] which showed constipation. She began taking laxatives with little relief. ED work-up reveals CBC unremarkable, CMP shows mildly elevated lipase 78, and UA positive . Abdominal/pelvis CT concerning for ileus/enteritis or low-grade partial obstruction. Started on IV rocephin, maintenance fluids,  and consult placed to general surgery, Dr. Guidry. Admitted to hospital medicine for further evaluation and treatment.                Overview/Hospital Course:  Patient was met in the hospital with partial small bowel obstruction.  The patient was monitored closely throughout hospital stay.  General surgery was consulted on admission.  Patient was hypomagnesemia and replacement magnesium ordered.  UA significant for UTI and patient was started on Rocephin.  Urine culture in process.  Diet was advanced to CLD per surgery recommendations.    Interval History:  Sitting up in bed, awake and alert.  Had a BM last night.  No changes in her pain, and now reports having pain to the mid epigastric which radiates  across upper abdomen.  Tolerating clear liquid diet.  Awaiting General surgery disposition.        Review of Systems   Constitutional:  Positive for fatigue. Negative for activity change and chills.   Respiratory:  Negative for cough, chest tightness and shortness of breath.    Cardiovascular:  Negative for chest pain, palpitations and leg swelling.   Gastrointestinal:  Positive for abdominal pain (RLQ pain that radiates around to the back.). Negative for constipation, diarrhea, nausea and vomiting.   Genitourinary:  Negative for decreased urine volume, dysuria and hematuria.   Musculoskeletal:  Positive for arthralgias and neck pain (Request home pain regimen). Negative for back pain, gait problem and myalgias.   Skin:  Negative for color change and wound.   Neurological:  Positive for weakness (Generalized). Negative for dizziness, speech difficulty and numbness.   Psychiatric/Behavioral:  Negative for agitation, confusion, hallucinations and sleep disturbance.      Objective:     Vital Signs (Most Recent):  Temp: 97.3 °F (36.3 °C) (01/05/24 1221)  Pulse: 60 (01/05/24 1221)  Resp: 18 (01/05/24 1426)  BP: (!) 173/78 (01/05/24 1221)  SpO2: 98 % (01/05/24 1221) Vital Signs (24h Range):  Temp:  [97.3 °F (36.3 °C)-98.1 °F (36.7 °C)] 97.3 °F (36.3 °C)  Pulse:  [52-60] 60  Resp:  [16-18] 18  SpO2:  [96 %-100 %] 98 %  BP: (163-193)/(72-84) 173/78     Weight: 52 kg (114 lb 10.2 oz)  Body mass index is 20.31 kg/m².    Intake/Output Summary (Last 24 hours) at 1/5/2024 1628  Last data filed at 1/5/2024 0536  Gross per 24 hour   Intake 2738.59 ml   Output --   Net 2738.59 ml           Physical Exam  Constitutional:       General: She is not in acute distress.     Appearance: Normal appearance. She is normal weight.   HENT:      Head: Normocephalic and atraumatic.      Nose: Nose normal.      Mouth/Throat:      Mouth: Mucous membranes are moist.      Pharynx: Oropharynx is clear.   Eyes:      Extraocular Movements: Extraocular  movements intact.      Conjunctiva/sclera: Conjunctivae normal.      Pupils: Pupils are equal, round, and reactive to light.   Cardiovascular:      Rate and Rhythm: Normal rate and regular rhythm.      Pulses: Normal pulses.      Heart sounds: Normal heart sounds.   Pulmonary:      Effort: Pulmonary effort is normal. No respiratory distress.      Breath sounds: Normal breath sounds.   Abdominal:      General: Abdomen is flat. Bowel sounds are normal. There is no distension.      Palpations: Abdomen is soft.      Tenderness: There is abdominal tenderness (RLQ area). There is no guarding or rebound.   Musculoskeletal:         General: No swelling or tenderness. Normal range of motion.      Cervical back: Normal range of motion and neck supple. No tenderness.      Right lower leg: No edema.      Left lower leg: No edema.   Skin:     General: Skin is warm and dry.      Capillary Refill: Capillary refill takes less than 2 seconds.      Findings: No lesion.   Neurological:      General: No focal deficit present.      Mental Status: She is alert and oriented to person, place, and time.      Motor: Weakness (Generalized) present.   Psychiatric:         Mood and Affect: Mood normal.         Behavior: Behavior normal.         Thought Content: Thought content normal.         Judgment: Judgment normal.             Significant Labs: All pertinent labs within the past 24 hours have been reviewed.  CBC:   Recent Labs   Lab 01/04/24 0441 01/05/24  0451   WBC 6.73 6.04   HGB 10.3* 11.2*   HCT 32.1* 35.5*    134*       CMP:   Recent Labs   Lab 01/04/24 0441 01/05/24  0451    139   K 3.5 3.6    107   CO2 22* 24   GLU 88 92   BUN 11 7*   CREATININE 0.7 0.7   CALCIUM 8.7 9.2   PROT 5.4* 5.8*   ALBUMIN 2.9* 3.2*   BILITOT 0.4 0.5   ALKPHOS 48* 50*   AST 14 18   ALT 11 14   ANIONGAP 9 8       POCT Glucose:   Recent Labs   Lab 01/04/24 2058 01/05/24  0809 01/05/24  1130   POCTGLUCOSE 78 85 83       Microbiology  Results (last 7 days)       Procedure Component Value Units Date/Time    Urine culture [[id number]] Collected: 01/02/24 2045    Order Status: Completed Specimen: Urine Updated: 01/05/24 0816     Urine Culture, Routine Multiple organisms isolated. None in predominance.  Repeat if      clinically necessary.    Narrative:      Specimen Source->Urine              Significant Imaging: I have reviewed all pertinent imaging results/findings within the past 24 hours.    Assessment/Plan:      * Partial small bowel obstruction  Had BM x1 last shift  General surgeon, Dr. Guidry, following  Tolerating clear liquid diet  PT following  Had Gastrografin and awaiting surgeon to review results and make recommendations.      Acute cystitis with hematuria  Final urine culture result showed Multiple organisms isolated. None in predominance.   Discontinue IV Rocephin  Macrodantin for abnormal UA    Hypomagnesemia  Patient has Abnormal Magnesium: hypomagnesemia. Will continue to monitor electrolytes closely. Will replace the affected electrolytes and repeat labs to be done after interventions completed. The patient's magnesium results have been reviewed and are listed below.  Recent Labs   Lab 01/05/24  0451   MG 1.4*          Chronic pain syndrome  HX of fibromyalgia and chronic back pain  Resume home pain regimen  Utilize IV narcotic pain medicine for breakthrough pain only  Monitor      Coronary artery disease involving native coronary artery of native heart without angina pectoris  Patient with known CAD, which is controlled Will continue Statin and monitor for S/Sx of angina/ACS. Continue to monitor on telemetry.       Unknown why patient is on Plavix  Plavix on hold in case patient needs possible surgical intervention    Type 2 diabetes mellitus, without long-term current use of insulin    Patient's FSGs are controlled on current medication regimen.  Last A1c reviewed-   Lab Results   Component Value Date    HGBA1C 5.9  09/01/2023     Most recent fingerstick glucose reviewed-   Recent Labs   Lab 01/03/24  1131 01/03/24 2056 01/04/24  0737   POCTGLUCOSE 82 81 78     Current correctional scale  Low  Maintain anti-hyperglycemic dose as follows-   Antihyperglycemics (From admission, onward)      Start     Stop Route Frequency Ordered    01/03/24 0421  insulin aspart U-100 pen 0-5 Units         -- SubQ Before meals & nightly PRN 01/03/24 0324          Hold Oral hypoglycemics while patient is in the hospital.      Chronic diastolic heart failure  Chronic/stable.  Not on chronic medications.  Monitor for volume overload      Hyperlipidemia associated with type 2 diabetes mellitus  Chronic condition noted  Continue statin      Hypertension associated with diabetes   Chronic, better controlled today.  Did not require any IV antihypertensive during the night.  Latest blood pressure and vitals reviewed-     Temp:  [97.6 °F (36.4 °C)-98 °F (36.7 °C)]   Pulse:  [54-67]   Resp:  [16-18]   BP: (136-183)/(63-86)   SpO2:  [95 %-100 %] .     Not on Home meds for hypertension      While in the hospital, will manage blood pressure as follows;   Will utilize p.r.n. blood pressure medication only if patient's blood pressure greater than 180/110 and she develops symptoms such as worsening chest pain or shortness of breath.         COPD (chronic obstructive pulmonary disease)  Patient's COPD is controlled currently.  Patient is currently off COPD Pathway. Continue scheduled inhalers and Supplemental oxygen prn and monitor respiratory status closely.     Iron deficiency anemia  Patient's anemia is currently controlled. Has not received any PRBCs to date. Etiology likely d/t Iron deficiency  Current CBC reviewed-   Lab Results   Component Value Date    HGB 10.3 (L) 01/04/2024    HCT 32.1 (L) 01/04/2024     Monitor serial CBC and transfuse if patient becomes hemodynamically unstable, symptomatic or H/H drops below 7/21.    GERD (gastroesophageal reflux  disease)  Chronic/stable  Transition to oral Protonix        VTE Risk Mitigation (From admission, onward)           Ordered     Place sequential compression device  Until discontinued         01/03/24 0324                    Discharge Planning   MONSTER: 1/6/2024     Code Status: Full Code   Is the patient medically ready for discharge?:     Reason for patient still in hospital (select all that apply): Patient trending condition and Treatment  Discharge Plan A: Home with family                  Wilda Murry NP  Department of Hospital Medicine   [facility]/Surg

## 2024-01-12 LAB
PATH REPORT.COMMENTS IMP SPEC: NORMAL
PATH REPORT.FINAL DX SPEC: NORMAL
PATH REPORT.GROSS SPEC: NORMAL
PATH REPORT.MICROSCOPIC SPEC OTHER STN: NORMAL
PATH REPORT.RELEVANT HX SPEC: NORMAL

## 2024-03-01 DIAGNOSIS — C20 RECTAL CANCER (H): Primary | ICD-10-CM

## 2024-03-12 NOTE — NURSING NOTE
"Oncology Rooming Note    March 25, 2019 3:32 PM   Niharika Cason is a 63 year old female who presents for:    Chief Complaint   Patient presents with     Oncology Clinic Visit     follow up     Initial Vitals: BP 99/65 (BP Location: Left arm)   Pulse 73   Temp 98.2  F (36.8  C) (Oral)   Resp 16   Ht 1.6 m (5' 2.99\")   Wt 75.1 kg (165 lb 8 oz)   LMP 10/15/2008 (Approximate)   SpO2 95%   BMI 29.32 kg/m   Estimated body mass index is 29.32 kg/m  as calculated from the following:    Height as of this encounter: 1.6 m (5' 2.99\").    Weight as of this encounter: 75.1 kg (165 lb 8 oz). Body surface area is 1.83 meters squared.  No Pain (0) Comment: Data Unavailable   Patient's last menstrual period was 10/15/2008 (approximate).  Allergies reviewed: Yes  Medications reviewed: Yes    Medications: Medication refills not needed today.  Pharmacy name entered into [employer]:    [facility] [place], [place] - [address]  [facility] - [place], [place] - [address]            "
Detail Level: Detailed
Detail Level: Simple

## 2024-03-16 ENCOUNTER — HEALTH MAINTENANCE LETTER (OUTPATIENT)
Age: 69
End: 2024-03-16

## 2024-03-18 ENCOUNTER — LAB (OUTPATIENT)
Dept: LAB | Facility: CLINIC | Age: 69
End: 2024-03-18
Payer: MEDICARE

## 2024-03-18 DIAGNOSIS — C20 RECTAL CANCER (H): ICD-10-CM

## 2024-03-18 PROCEDURE — 36415 COLL VENOUS BLD VENIPUNCTURE: CPT

## 2024-03-18 PROCEDURE — 82378 CARCINOEMBRYONIC ANTIGEN: CPT

## 2024-03-19 LAB — CEA SERPL-MCNC: 4 NG/ML

## 2024-03-31 ENCOUNTER — ANCILLARY PROCEDURE (OUTPATIENT)
Dept: MRI IMAGING | Facility: CLINIC | Age: 69
End: 2024-03-31
Attending: COLON & RECTAL SURGERY
Payer: MEDICARE

## 2024-03-31 DIAGNOSIS — C20 RECTAL CANCER (H): ICD-10-CM

## 2024-03-31 PROCEDURE — 74183 MRI ABD W/O CNTR FLWD CNTR: CPT | Mod: MG | Performed by: RADIOLOGY

## 2024-03-31 PROCEDURE — G1010 CDSM STANSON: HCPCS | Performed by: RADIOLOGY

## 2024-03-31 PROCEDURE — A9585 GADOBUTROL INJECTION: HCPCS | Performed by: RADIOLOGY

## 2024-03-31 RX ORDER — GADOBUTROL 604.72 MG/ML
10 INJECTION INTRAVENOUS ONCE
Status: COMPLETED | OUTPATIENT
Start: 2024-03-31 | End: 2024-03-31

## 2024-03-31 RX ADMIN — GADOBUTROL 10 ML: 604.72 INJECTION INTRAVENOUS at 09:36

## 2024-04-05 DIAGNOSIS — E10.9 TYPE 1 DIABETES MELLITUS WITHOUT COMPLICATION (H): ICD-10-CM

## 2024-04-05 RX ORDER — PEN NEEDLE, DIABETIC 32GX 5/32"
NEEDLE, DISPOSABLE MISCELLANEOUS
Qty: 100 EACH | Refills: 0 | Status: SHIPPED | OUTPATIENT
Start: 2024-04-05 | End: 2024-04-05

## 2024-04-05 RX ORDER — PEN NEEDLE, DIABETIC 32GX 5/32"
NEEDLE, DISPOSABLE MISCELLANEOUS
Qty: 100 EACH | Refills: 0 | Status: SHIPPED | OUTPATIENT
Start: 2024-04-05 | End: 2024-09-23

## 2024-04-05 NOTE — TELEPHONE ENCOUNTER
[facility].       With route to PCP    Leona Parker RN on 4/5/2024 at 3:50 PM

## 2024-04-19 NOTE — PROGRESS NOTES
"Colon and Rectal Surgery Follow-up Clinic Note    RE: Niharika Cason  : 1955  MARILEE: 2024.    DIAGNOSIS:   10/2018: mT3cN1 proximal rectal adenocarcinoma (intact IHC) found on 1st screening colonoscopy. 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  10/2018-2019: XELOX x6.  3/-19: CXRT (5040 cGy).  2019: cCR (enrolled in watch and wait protocol).    INTERVAL HISTORY: Denies increased pain, fevers, or chills. Tolerating diet well. Having 1-2 BM's per day with mild fecal urgency and seepage. Denies incontinence per se or BPR. Does acknowledge stress urinary incontinence but this was present before her rectal cancer treatment. Denies additional urinary or sexual function issues at this time.     Physical Examination:   /73   Pulse 68   Resp 18   Wt 88.9 kg (196 lb)   LMP 10/15/2008 (Approximate)   SpO2 94%   BMI 34.72 kg/m    Perianal skin intact.   ALEC: no masses palpated.  Flexible sigmoidoscopy: after obtaining informed consent and performing a \"time out\", an adult flexible sigmoidoscope was introduced through the anus and passed up to the proximal sigmoid colon. The quality of the prep was fair. Findings: linear 2x2-cm white scar with central telangiectasia located at the left posterior aspect of the mid rectum (7-cm from dentate line), covering approx 30% of the lumen circumference. Distal edge of the scar is at the mid rectal valve. Tattoo was identified. No additional abnormalities were seen. Total scope time: 12 minutes. The patient tolerated the procedure well.    ASSESSMENT  cCR. No evidence of recurrent neoplasia.  CEA stable.      Latest Reference Range & Units Most Recent 08/10/19 10:58 19 14:00 20 11:13 20 15:22 12/15/20 08:15 05/15/21 10:52 21 16:19 22 16:56 22 08:13 02/10/23 12:13 3/18/24   CEA ng/mL 4.7  2/10/23 12:13          4.7 4.0   CEA 0.0 - 2.5 ug/L 4.6 (H)  3/29/22 08:13 3.4 (H) 2.8 (H) 2.9 (H) 3.8 (H) 2.6 (H) 3.8 (H) " 3.3 (H) 4.7 (H) 4.6 (H)       CT c/a/p (10/13/23):  IMPRESSION:  1.  No evidence of metastatic disease.  2.  Nonacute findings as above.    Colonoscopy 10/2023: without evident of recurrence.    MR rectum (3/31/24)  IMPRESSION:   1. Continued complete response to treatment, with a corresponding  tumor regression grade of mrTRG-1.  2. No suspicious lymphadenopathy.  3. Myomatous uterus.    Follow up per Watch and Wait Protocol:   Completed CRT: 4/12/19.  Flexible sigmoidoscopy   Every 2 months for 6 months: Completed  Every 3 months for 3 years: Until 4/2022 Completed  Every 6 months for 5 years: Until 4/2024 Completed  Every year for 10 years: Until 4/2029 Due May 2025    3T MRI of pelvis  6-8 weeks after CRT:  Every 4 months for 2 years: Until 4/2021 Completed  Every 6 months for 2 years: Until 4/2023 Completed  Yearly for 2 years: Until 4/2025 -Due March 2025    CT CAP  Every year for 6-8 years: Until 4/2027 Due 10/2024    Colonoscopy   Due 10/2026    CEA at every clinic or endoscopic visit    20 minutes spent on the date of encounter performing chart review, history and exam, review of diagnostic studies, documentation and further activities as noted above; in addition to 10 minutes for flexible sigmoidoscopy.     Alli Le M.D., M.Sc.     Division of Colon and Rectal Surgery  [facility]    Referring Provider:  Manjeet Moore MD  [address] [place] [address]      Primary Care Provider:  Leona Farris    CC:  Dayron Lawton MD.  Ynes Jimenez MD.  Yohana Morris MD.  Angel Luis Asif MD

## 2024-05-17 ENCOUNTER — OFFICE VISIT (OUTPATIENT)
Dept: SURGERY | Facility: CLINIC | Age: 69
End: 2024-05-17
Payer: MEDICARE

## 2024-05-17 VITALS
SYSTOLIC BLOOD PRESSURE: 119 MMHG | BODY MASS INDEX: 34.72 KG/M2 | OXYGEN SATURATION: 94 % | RESPIRATION RATE: 18 BRPM | HEART RATE: 68 BPM | DIASTOLIC BLOOD PRESSURE: 73 MMHG | WEIGHT: 196 LBS

## 2024-05-17 DIAGNOSIS — Z85.048 HISTORY OF RECTAL CANCER: Primary | ICD-10-CM

## 2024-05-17 PROCEDURE — 45330 DIAGNOSTIC SIGMOIDOSCOPY: CPT | Performed by: COLON & RECTAL SURGERY

## 2024-05-17 NOTE — LETTER
"    2024         RE: Niharika Cason  [address] [facility] [place] [id number]        Dear Colleague,    Thank you for referring your patient, Niharika Cason, to the [facility] [place]. Please see a copy of my visit note below.    Colon and Rectal Surgery Follow-up Clinic Note    RE: Niharika Cason  : 1955  MARILEE: 2024.    DIAGNOSIS:   10/2018: mT3cN1 proximal rectal adenocarcinoma (intact IHC) found on 1st screening colonoscopy. 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  10/2018-2019: XELOX x6.  3/-19: CXRT (5040 cGy).  2019: cCR (enrolled in watch and wait protocol).    INTERVAL HISTORY: Denies increased pain, fevers, or chills. Tolerating diet well. Having 1-2 BM's per day with mild fecal urgency and seepage. Denies incontinence per se or BPR. Does acknowledge stress urinary incontinence but this was present before her rectal cancer treatment. Denies additional urinary or sexual function issues at this time.     Physical Examination:   /73   Pulse 68   Resp 18   Wt 88.9 kg (196 lb)   LMP 10/15/2008 (Approximate)   SpO2 94%   BMI 34.72 kg/m    Perianal skin intact.   ALEC: no masses palpated.  Flexible sigmoidoscopy: after obtaining informed consent and performing a \"time out\", an adult flexible sigmoidoscope was introduced through the anus and passed up to the proximal sigmoid colon. The quality of the prep was fair. Findings: linear 2x2-cm white scar with central telangiectasia located at the left posterior aspect of the mid rectum (7-cm from dentate line), covering approx 30% of the lumen circumference. Distal edge of the scar is at the mid rectal valve. Tattoo was identified. No additional abnormalities were seen. Total scope time: 12 minutes. The patient tolerated the procedure well.    ASSESSMENT  cCR. No evidence of recurrent neoplasia.  CEA stable.      Latest Reference Range & Units Most Recent 08/10/19 10:58 19 14:00 20 11:13 " 09/28/20 15:22 12/15/20 08:15 05/15/21 10:52 07/23/21 16:19 01/21/22 16:56 03/29/22 08:13 02/10/23 12:13 3/18/24   CEA ng/mL 4.7  2/10/23 12:13          4.7 4.0   CEA 0.0 - 2.5 ug/L 4.6 (H)  3/29/22 08:13 3.4 (H) 2.8 (H) 2.9 (H) 3.8 (H) 2.6 (H) 3.8 (H) 3.3 (H) 4.7 (H) 4.6 (H)       CT c/a/p (10/13/23):  IMPRESSION:  1.  No evidence of metastatic disease.  2.  Nonacute findings as above.    Colonoscopy 10/2023: without evident of recurrence.    MR rectum (3/31/24)  IMPRESSION:   1. Continued complete response to treatment, with a corresponding  tumor regression grade of mrTRG-1.  2. No suspicious lymphadenopathy.  3. Myomatous uterus.    Follow up per Watch and Wait Protocol:   Completed CRT: 4/12/19.  Flexible sigmoidoscopy   Every 2 months for 6 months: Completed  Every 3 months for 3 years: Until 4/2022 Completed  Every 6 months for 5 years: Until 4/2024 Completed  Every year for 10 years: Until 4/2029 Due May 2025    3T MRI of pelvis  6-8 weeks after CRT:  Every 4 months for 2 years: Until 4/2021 Completed  Every 6 months for 2 years: Until 4/2023 Completed  Yearly for 2 years: Until 4/2025 -Due March 2025    CT CAP  Every year for 6-8 years: Until 4/2027 Due 10/2024    Colonoscopy   Due 10/2026    CEA at every clinic or endoscopic visit    20 minutes spent on the date of encounter performing chart review, history and exam, review of diagnostic studies, documentation and further activities as noted above; in addition to 10 minutes for flexible sigmoidoscopy.     Alli Le M.D., M.Sc.     Division of Colon and Rectal Surgery  [facility]    Referring Provider:  Manjeet Moore MD  [address] [place] [id number]      Primary Care Provider:  Leona Farris    CC:  Dayron Lawton MD.  Ynes Jimenez MD.  Yohana Morris MD.  Angel Luis Asif MD          Again, thank you for allowing me to participate in the care of your patient.         Sincerely,        Alli Le MD

## 2024-05-17 NOTE — PATIENT INSTRUCTIONS
Follow up:  CT and CEA in October  Flex sig in March 2025        Please call with any questions or concerns regarding your clinic visit today.    It is a pleasure being involved in your health care.    Contacts post-consultation depending on your need:    Radiology Appointments [phone]    Schedule Clinic Appointments [phone] # 1   M-F 7:30 - 5 pm    [facility] [phone]    Clinic Fax Number [phone]    Surgery Scheduling [phone]    My Chart is available 24 hours a day and is a secure way to access your records and communicate with your care team.  I strongly recommend signing up if you haven't already done so, if you are comfortable with computers.  If you would like to inquire about this or are having problems with My Chart access, you may call [phone] or go online at [email or web address].  Please allow at least 24 hours for a response and extra time on weekends and Holidays.

## 2024-05-25 ENCOUNTER — HEALTH MAINTENANCE LETTER (OUTPATIENT)
Age: 69
End: 2024-05-25

## 2024-06-11 SDOH — HEALTH STABILITY: PHYSICAL HEALTH: ON AVERAGE, HOW MANY MINUTES DO YOU ENGAGE IN EXERCISE AT THIS LEVEL?: 20 MIN

## 2024-06-11 SDOH — HEALTH STABILITY: PHYSICAL HEALTH: ON AVERAGE, HOW MANY DAYS PER WEEK DO YOU ENGAGE IN MODERATE TO STRENUOUS EXERCISE (LIKE A BRISK WALK)?: 2 DAYS

## 2024-06-11 ASSESSMENT — SOCIAL DETERMINANTS OF HEALTH (SDOH): HOW OFTEN DO YOU GET TOGETHER WITH FRIENDS OR RELATIVES?: THREE TIMES A WEEK

## 2024-06-13 ENCOUNTER — OFFICE VISIT (OUTPATIENT)
Dept: FAMILY MEDICINE | Facility: CLINIC | Age: 69
End: 2024-06-13
Payer: MEDICARE

## 2024-06-13 VITALS
DIASTOLIC BLOOD PRESSURE: 78 MMHG | SYSTOLIC BLOOD PRESSURE: 118 MMHG | TEMPERATURE: 97.2 F | OXYGEN SATURATION: 98 % | RESPIRATION RATE: 18 BRPM | HEIGHT: 63 IN | HEART RATE: 81 BPM | BODY MASS INDEX: 33.91 KG/M2 | WEIGHT: 191.38 LBS

## 2024-06-13 DIAGNOSIS — C20 RECTAL CANCER (H): ICD-10-CM

## 2024-06-13 DIAGNOSIS — F41.1 GENERALIZED ANXIETY DISORDER: ICD-10-CM

## 2024-06-13 DIAGNOSIS — Z12.31 VISIT FOR SCREENING MAMMOGRAM: ICD-10-CM

## 2024-06-13 DIAGNOSIS — G62.0 DRUG-INDUCED POLYNEUROPATHY (H): ICD-10-CM

## 2024-06-13 DIAGNOSIS — Z00.00 ENCOUNTER FOR MEDICARE ANNUAL WELLNESS EXAM: Primary | ICD-10-CM

## 2024-06-13 DIAGNOSIS — E66.812 CLASS 2 SEVERE OBESITY DUE TO EXCESS CALORIES WITH SERIOUS COMORBIDITY AND BODY MASS INDEX (BMI) OF 35.0 TO 35.9 IN ADULT (H): ICD-10-CM

## 2024-06-13 DIAGNOSIS — E78.2 MIXED HYPERLIPIDEMIA: ICD-10-CM

## 2024-06-13 DIAGNOSIS — E10.69 TYPE 1 DIABETES MELLITUS WITH OTHER SPECIFIED COMPLICATION (H): ICD-10-CM

## 2024-06-13 DIAGNOSIS — I10 ESSENTIAL HYPERTENSION WITH GOAL BLOOD PRESSURE LESS THAN 130/80: ICD-10-CM

## 2024-06-13 DIAGNOSIS — E10.9 TYPE 1 DIABETES MELLITUS WITHOUT COMPLICATION (H): ICD-10-CM

## 2024-06-13 DIAGNOSIS — E66.01 CLASS 2 SEVERE OBESITY DUE TO EXCESS CALORIES WITH SERIOUS COMORBIDITY AND BODY MASS INDEX (BMI) OF 35.0 TO 35.9 IN ADULT (H): ICD-10-CM

## 2024-06-13 LAB
ANION GAP SERPL CALCULATED.3IONS-SCNC: 13 MMOL/L (ref 7–15)
BUN SERPL-MCNC: 23.7 MG/DL (ref 8–23)
CALCIUM SERPL-MCNC: 9.4 MG/DL (ref 8.8–10.2)
CHLORIDE SERPL-SCNC: 103 MMOL/L (ref 98–107)
CHOLEST SERPL-MCNC: 123 MG/DL
CREAT SERPL-MCNC: 0.76 MG/DL (ref 0.51–0.95)
CREAT UR-MCNC: 160 MG/DL
DEPRECATED HCO3 PLAS-SCNC: 22 MMOL/L (ref 22–29)
EGFRCR SERPLBLD CKD-EPI 2021: 86 ML/MIN/1.73M2
FASTING STATUS PATIENT QL REPORTED: YES
FASTING STATUS PATIENT QL REPORTED: YES
GLUCOSE SERPL-MCNC: 146 MG/DL (ref 70–99)
HBA1C MFR BLD: 6.6 %
HDLC SERPL-MCNC: 53 MG/DL
LDLC SERPL CALC-MCNC: 56 MG/DL
MICROALBUMIN UR-MCNC: <12 MG/L
MICROALBUMIN/CREAT UR: NORMAL MG/G{CREAT}
NONHDLC SERPL-MCNC: 70 MG/DL
POTASSIUM SERPL-SCNC: 4.7 MMOL/L (ref 3.4–5.3)
SODIUM SERPL-SCNC: 138 MMOL/L (ref 135–145)
TRIGL SERPL-MCNC: 69 MG/DL

## 2024-06-13 PROCEDURE — 80061 LIPID PANEL: CPT | Performed by: PHYSICIAN ASSISTANT

## 2024-06-13 PROCEDURE — 99214 OFFICE O/P EST MOD 30 MIN: CPT | Mod: 25 | Performed by: PHYSICIAN ASSISTANT

## 2024-06-13 PROCEDURE — 99207 PR FOOT EXAM NO CHARGE: CPT | Performed by: PHYSICIAN ASSISTANT

## 2024-06-13 PROCEDURE — 83036 HEMOGLOBIN GLYCOSYLATED A1C: CPT | Performed by: PHYSICIAN ASSISTANT

## 2024-06-13 PROCEDURE — 82043 UR ALBUMIN QUANTITATIVE: CPT | Performed by: PHYSICIAN ASSISTANT

## 2024-06-13 PROCEDURE — 80048 BASIC METABOLIC PNL TOTAL CA: CPT | Performed by: PHYSICIAN ASSISTANT

## 2024-06-13 PROCEDURE — 82570 ASSAY OF URINE CREATININE: CPT | Performed by: PHYSICIAN ASSISTANT

## 2024-06-13 PROCEDURE — 36415 COLL VENOUS BLD VENIPUNCTURE: CPT | Performed by: PHYSICIAN ASSISTANT

## 2024-06-13 PROCEDURE — G0438 PPPS, INITIAL VISIT: HCPCS | Performed by: PHYSICIAN ASSISTANT

## 2024-06-13 RX ORDER — INSULIN LISPRO 100 [IU]/ML
INJECTION, SOLUTION INTRAVENOUS; SUBCUTANEOUS
Qty: 45 ML | Refills: 4 | Status: SHIPPED | OUTPATIENT
Start: 2024-06-13

## 2024-06-13 RX ORDER — RESPIRATORY SYNCYTIAL VIRUS VACCINE 120MCG/0.5
0.5 KIT INTRAMUSCULAR ONCE
Qty: 1 EACH | Refills: 0 | Status: CANCELLED | OUTPATIENT
Start: 2024-06-13 | End: 2024-06-13

## 2024-06-13 RX ORDER — ATORVASTATIN CALCIUM 40 MG/1
40 TABLET, FILM COATED ORAL DAILY
Qty: 90 TABLET | Refills: 4 | Status: SHIPPED | OUTPATIENT
Start: 2024-06-13

## 2024-06-13 RX ORDER — LISINOPRIL 40 MG/1
40 TABLET ORAL DAILY
Qty: 93 TABLET | Refills: 4 | Status: SHIPPED | OUTPATIENT
Start: 2024-06-13

## 2024-06-13 RX ORDER — PAROXETINE 30 MG/1
30 TABLET, FILM COATED ORAL EVERY MORNING
Qty: 90 TABLET | Refills: 4 | Status: SHIPPED | OUTPATIENT
Start: 2024-06-13

## 2024-06-13 RX ORDER — INSULIN GLARGINE 100 [IU]/ML
INJECTION, SOLUTION SUBCUTANEOUS
Qty: 30 ML | Refills: 1 | Status: SHIPPED | OUTPATIENT
Start: 2024-06-13

## 2024-06-13 ASSESSMENT — PAIN SCALES - GENERAL: PAINLEVEL: NO PAIN (0)

## 2024-06-13 NOTE — PROGRESS NOTES
Preventive Care Visit  [facility]  Reena Marin PA-C, Family Medicine  Jun 13, 2024      Artemio Bundy is a 68 year old, presenting for the following:  Wellness Visit        6/13/2024     7:26 AM   Additional Questions   Roomed by Silvia         Health Care [place]  Patient does not have a Health Care Directive or Living Will: Discussed advance care planning with patient; however, patient declined at this time.    HPI  Patient presents today for follow-up of blood pressure. Numbers have been well controlled. Tolerating medications well without side effects. Monitoring salt in diet. Patient denies headaches, vision changes, chest pain, shortness of breath or paresthesias.    Blood sugars have been doing well. She notes the average is 148 on her Dexcom. Has been decreasing her carb intake and has lost 10 pounds. She has noticed some lows but has been adjusting her insulin level.     Moods have been doing well. No concerns.     Did have some shortness of breath occurring with activity but since she has lost weight this has been much less frequent. No chest pain. She will continue to monitor.        6/11/2024   General Health   How would you rate your overall physical health? (!) FAIR   Feel stress (tense, anxious, or unable to sleep) To some extent   (!) STRESS CONCERN      6/11/2024   Nutrition   Diet: Diabetic    Carbohydrate counting         6/11/2024   Exercise   Days per week of moderate/strenous exercise 2 days   Average minutes spent exercising at this level 20 min   (!) EXERCISE CONCERN      6/11/2024   Social Factors   Frequency of gathering with friends or relatives Three times a week   Worry food won't last until get money to buy more No   Food not last or not have enough money for food? No   Do you have housing?  Yes   Are you worried about losing your housing? No   Lack of transportation? No   Unable to get utilities (heat,electricity)? No         6/11/2024    Fall Risk   Fallen 2 or more times in the past year? No    No   Trouble with walking or balance? No    No          2024   Activities of Daily Living- Home Safety   Needs help with the following daily activites None of the above   Safety concerns in the home None of the above         2024   Dental   Dentist two times every year? (!) NO         2024   Hearing Screening   Hearing concerns? (!) TROUBLE UNDERSTANDING SOFT OR WHISPERED SPEECH.         2024   Driving Risk Screening   Patient/family members have concerns about driving No         2024   General Alertness/Fatigue Screening   Have you been more tired than usual lately? No         2024   Urinary Incontinence Screening   Bothered by leaking urine in past 6 months Yes         2024   TB Screening   Were you born outside of the [place]? No         Today's PHQ-2 Score:       2024     9:31 PM   PHQ-2 (  [employer])   Q1: Little interest or pleasure in doing things 0   Q2: Feeling down, depressed or hopeless 0   PHQ-2 Score 0   Q1: Little interest or pleasure in doing things Not at all   Q2: Feeling down, depressed or hopeless Not at all   PHQ-2 Score 0           2024   Substance Use   Alcohol more than 3/day or more than 7/wk No   Do you have a current opioid prescription? No   How severe/bad is pain from 1 to 10? 0/10 (No Pain)   Do you use any other substances recreationally? No    (!) ALCOHOL     Social History     Tobacco Use    Smoking status: Former     Current packs/day: 0.00     Average packs/day: 0.5 packs/day for 15.0 years (7.5 ttl pk-yrs)     Types: Cigarettes     Start date: 2003     Quit date: 2018     Years since quittin.7    Smokeless tobacco: Never   Vaping Use    Vaping status: Never Used   Substance Use Topics    Alcohol use: Yes     Alcohol/week: 7.0 standard drinks of alcohol     Types: 7 Standard drinks or equivalent per week    Drug use: No           2022   LAST FHS-7 RESULTS   1st  degree relative breast or ovarian cancer No   Any relative bilateral breast cancer No   Any male have breast cancer No   Any ONE woman have BOTH breast AND ovarian cancer No   Any woman with breast cancer before 50yrs No   2 or more relatives with breast AND/OR ovarian cancer No   2 or more relatives with breast AND/OR bowel cancer No        Mammogram Screening - Mammogram every 1-2 years updated in Health Maintenance based on mutual decision making      History of abnormal Pap smear: No - age 65 or older with adequate negative prior screening test results (3 consecutive negative cytology results, 2 consecutive negative cotesting results, or 2 consecutive negative HrHPV test results within 10 years, with the most recent test occurring within the recommended screening interval for the test used)        Latest Ref Rng & Units 7/20/2018     9:07 AM 7/20/2018     8:54 AM 8/9/2013    12:00 AM   PAP / HPV   PAP (Historical)   NIL  NIL    HPV 16 DNA NEG^Negative Negative      HPV 18 DNA NEG^Negative Negative      Other HR HPV NEG^Negative Negative        ASCVD Risk   The 10-year ASCVD risk score (Lokesh WARNER, et al., 2019) is: 10.6%    Values used to calculate the score:      Age: 68 years      Sex: Female      Is Non- : No      Diabetic: Yes      Tobacco smoker: No      Systolic Blood Pressure: 118 mmHg      Is BP treated: Yes      HDL Cholesterol: 70 mg/dL      Total Cholesterol: 150 mg/dL          Reviewed and updated as needed this visit by Provider                    BP Readings from Last 3 Encounters:   06/13/24 118/78   05/17/24 119/73   01/05/24 130/80    Wt Readings from Last 3 Encounters:   06/13/24 86.8 kg (191 lb 6 oz)   05/17/24 88.9 kg (196 lb)   01/05/24 90.7 kg (199 lb 14.4 oz)                  Patient Active Problem List   Diagnosis    Female stress incontinence    Generalized anxiety disorder    Constipation    Type 1 diabetes mellitus with other specified complication (H)     Essential hypertension with goal blood pressure less than 130/80    History of rectal cancer    Drug-induced polyneuropathy (H24)    Mixed hyperlipidemia    Class 2 severe obesity due to excess calories with serious comorbidity in adult (H)    Rectal cancer (H)     Past Surgical History:   Procedure Laterality Date    COLONOSCOPY N/A 2018    Procedure: COMBINED COLONOSCOPY, SINGLE OR MULTIPLE BIOPSY/POLYPECTOMY BY BIOPSY;  colonoscopy with polypectomies by biopsy forceps and hot snare and submucosal injection with tattoo;  Surgeon: Richard Moore MD;  Location: [facility]    HC CURETTAGE, POSTPARTUM  ,     following miscarriages    HC REMOVAL OF TONSILS,<11 Y/O      Tonsils <14y.o.    INSERT PORT VASCULAR ACCESS Right 10/19/2018    Procedure: Chest Port Placement - right ;  Surgeon: Lawson Hitchcock PA-C;  Location:  OR    [facility] COLONOSCOPY THRU STOMA, DIAGNOSTIC      negative       Social History     Tobacco Use    Smoking status: Former     Current packs/day: 0.00     Average packs/day: 0.5 packs/day for 15.0 years (7.5 ttl pk-yrs)     Types: Cigarettes     Start date: 2003     Quit date: 2018     Years since quittin.7    Smokeless tobacco: Never   Substance Use Topics    Alcohol use: Yes     Alcohol/week: 7.0 standard drinks of alcohol     Types: 7 Standard drinks or equivalent per week     Family History   Problem Relation Age of Onset    Heart Disease Maternal Grandfather         MI at 52 yrs    EYE* Paternal Grandfather         cataracts    Arthritis Mother     Gynecology Mother         hysterectomy for fibroids    Hypertension Mother     Lipids Mother     Gastrointestinal Disease Father         ulcers    Heart Disease Father         intermittent rapid heartbeat    Cancer Father 84        Mesothelioma    Other Cancer Father          Current Outpatient Medications   Medication Sig Dispense Refill    atorvastatin (LIPITOR) 40 MG tablet Take 1 tablet (40 mg) by mouth  daily 90 tablet 4    blood glucose (NO BRAND SPECIFIED) test strip Use to test blood sugar 1 times daily or as directed. 100 strip 4    Continuous Blood Gluc  (DEXCOM G6 ) BRITTANY 1 Device continuous 1 Device 0    Continuous Blood Gluc Sensor (DEXCOM G6 SENSOR) MISC CHANGE EVERY 10 DAYS 10 each 4    Continuous Blood Gluc Transmit (DEXCOM G6 TRANSMITTER) MISC CHANGE EVERY 3 MONTHS 1 each 3    insulin glargine (LANTUS SOLOSTAR) 100 UNIT/ML pen INJECT 23 UNITS SUBCUTANEOUSLY AT BEDTIME 30 mL 1    insulin lispro (HUMALOG KWIKPEN) 100 UNIT/ML (1 unit dial) KWIKPEN Inject under the skin.  Take 8 units at breakfast, 8 units at lunch, 10-12 units at supper. Take additional sliding scale as indicated. Minimum daily dose range is 36-38 units with max of 50 units/day. 45 mL 4    insulin pen needle (BD KELECHI U/F) 32G X 4 MM miscellaneous Use once daily as needed with insulin 100 each 0    lisinopril (ZESTRIL) 40 MG tablet Take 1 tablet (40 mg) by mouth daily 93 tablet 4    Multiple Vitamins-Minerals (MULTIVITAMIN PO) Take by mouth daily      PARoxetine (PAXIL) 30 MG tablet Take 1 tablet (30 mg) by mouth every morning 90 tablet 4    Turmeric 500 MG CAPS Take 1 capsule by mouth daily       Current providers sharing in care for this patient include:  Patient Care Team:  Reena Marin PA-C as PCP - General (Family Medicine)  Lianet Plamer, RN as Clinic Care Coordinator (Colon and Rectal Surgery)  Gifty Miranda RD as Diabetes Educator (Dietitian, Registered)  Dayron Lawton MD as Specialty Provider (Hematology & Oncology)  Yohana Morris MD as MD (Radiation Oncology)  Chicho Huynh MD as MD (Urology)  Angel Luis Asif MD as MD (OB/Gyn)  Guanakito Vargas MD as Referring Physician (OB/Gyn)  Renea Marin PA-C as Assigned PCP  Alli Le MD as Assigned Surgical Provider    The following health maintenance items are reviewed in Epic and correct as of today:  Health Maintenance   Topic Date  "Due    ZOSTER IMMUNIZATION (1 of 2) Never done    RSV VACCINE (Pregnancy & 60+) (1 - 1-dose 60+ series) Never done    DTAP/TDAP/TD IMMUNIZATION (2 - Td or Tdap) 05/13/2021    DIABETIC FOOT EXAM  03/22/2022    Pneumococcal Vaccine: 65+ Years (3 of 3 - PPSV23 or PCV20) 09/21/2022    COVID-19 Vaccine (1 - 2023-24 season) Never done    MEDICARE ANNUAL WELLNESS VISIT  02/10/2024    BMP  02/10/2024    LIPID  02/10/2024    MICROALBUMIN  02/10/2024    ANNUAL REVIEW OF HM ORDERS  02/10/2024    EYE EXAM  03/24/2024    A1C  05/21/2024    INFLUENZA VACCINE (Season Ended) 09/01/2024    LUNG CANCER SCREENING  10/13/2024    MAMMO SCREENING  11/17/2024    FALL RISK ASSESSMENT  06/13/2025    COLORECTAL CANCER SCREENING  10/04/2026    ADVANCE CARE PLANNING  02/10/2028    DEXA  12/27/2038    HEPATITIS C SCREENING  Completed    PHQ-2 (once per calendar year)  Completed    IPV IMMUNIZATION  Aged Out    HPV IMMUNIZATION  Aged Out    MENINGITIS IMMUNIZATION  Aged Out    RSV MONOCLONAL ANTIBODY  Aged Out    PAP  Discontinued         Review of Systems  Constitutional, HEENT, cardiovascular, pulmonary, GI, , musculoskeletal, neuro, skin, endocrine and psych systems are negative, except as otherwise noted.     Objective    Exam  /78   Pulse 81   Temp 97.2  F (36.2  C) (Temporal)   Resp 18   Ht 1.6 m (5' 2.99\")   Wt 86.8 kg (191 lb 6 oz)   LMP 10/15/2008 (Approximate)   SpO2 98%   BMI 33.91 kg/m     Estimated body mass index is 33.91 kg/m  as calculated from the following:    Height as of this encounter: 1.6 m (5' 2.99\").    Weight as of this encounter: 86.8 kg (191 lb 6 oz).    Physical Exam  GENERAL: alert and no distress  EYES: Eyes grossly normal to inspection, PERRL and conjunctivae and sclerae normal  HENT: ear canals and TM's normal, nose and mouth without ulcers or lesions  NECK: no adenopathy, no asymmetry, masses, or scars  RESP: lungs clear to auscultation - no rales, rhonchi or wheezes  CV: regular rate and rhythm, " normal S1 S2, no S3 or S4, no murmur, click or rub, no peripheral edema  ABDOMEN: soft, nontender, no hepatosplenomegaly, no masses and bowel sounds normal  MS: no gross musculoskeletal defects noted, no edema  SKIN: no suspicious lesions or rashes  NEURO: Normal strength and tone, mentation intact and speech normal  PSYCH: mentation appears normal, affect normal/bright  Diabetic foot exam: normal DP and PT pulses, no trophic changes or ulcerative lesions, and normal sensory exam        6/13/2024   Mini Cog   Clock Draw Score 2 Normal   3 Item Recall 0 objects recalled   Mini Cog Total Score 2          Assessment & Plan     Encounter for Medicare annual wellness exam  Vaccinations reviewed and declined at this time.    Essential hypertension with goal blood pressure less than 130/80  Stable. Continue current treatment without change.   - BASIC METABOLIC PANEL; Future  - BASIC METABOLIC PANEL  - lisinopril (ZESTRIL) 40 MG tablet; Take 1 tablet (40 mg) by mouth daily    Type 1 diabetes mellitus with other specified complication (H)  Stable. Continue close monitoring and adjusting of insulin as needed. Reminded patient to schedule eye exam.   - FOOT EXAM    Type 1 diabetes mellitus without complication (H)  See above.  - Lipid panel reflex to direct LDL Non-fasting; Future  - Albumin Random Urine Quantitative with Creat Ratio; Future  - HEMOGLOBIN A1C; Future  - Lipid panel reflex to direct LDL Non-fasting  - Albumin Random Urine Quantitative with Creat Ratio  - HEMOGLOBIN A1C  - insulin glargine (LANTUS SOLOSTAR) 100 UNIT/ML pen; INJECT 23 UNITS SUBCUTANEOUSLY AT BEDTIME  - insulin lispro (HUMALOG KWIKPEN) 100 UNIT/ML (1 unit dial) KWIKPEN; Inject under the skin.  Take 8 units at breakfast, 8 units at lunch, 10-12 units at supper. Take additional sliding scale as indicated. Minimum daily dose range is 36-38 units with max of 50 units/day.    Rectal cancer (H)   No new concerns - continues to follow with colorectal.  "    Drug-induced polyneuropathy (H24)  Stable - no acute concerns.     Class 2 severe obesity due to excess calories with serious comorbidity and body mass index (BMI) of 35.0 to 35.9 in adult (H)  Encouraged ongoing diet and exercise modifications as able.     Visit for screening mammogram  - MA Screen Bilateral w/Zafar; Future    Mixed hyperlipidemia  - atorvastatin (LIPITOR) 40 MG tablet; Take 1 tablet (40 mg) by mouth daily    Generalized anxiety disorder  - PARoxetine (PAXIL) 30 MG tablet; Take 1 tablet (30 mg) by mouth every morning    Patient has been advised of split billing requirements and indicates understanding: Yes      BMI  Estimated body mass index is 33.91 kg/m  as calculated from the following:    Height as of this encounter: 1.6 m (5' 2.99\").    Weight as of this encounter: 86.8 kg (191 lb 6 oz).   Weight management plan: Discussed healthy diet and exercise guidelines    Counseling  Appropriate preventive services were discussed with this patient, including applicable screening as appropriate for fall prevention, nutrition, physical activity, Tobacco-use cessation, weight loss and cognition.  Checklist reviewing preventive services available has been given to the patient.  Reviewed patient's diet, addressing concerns and/or questions.   She is at risk for lack of exercise and has been provided with information to increase physical activity for the benefit of her well-being.   The patient was instructed to see the dentist every 6 months.   The patient was provided with written information regarding signs of hearing loss.   Information on urinary incontinence and treatment options given to patient.         Signed Electronically by: Reena Marin PA-C    "

## 2024-06-13 NOTE — PATIENT INSTRUCTIONS
"Patient Education   Preventive Care Advice   This is general advice we often give to help people stay healthy. Your care team may have specific advice just for you. Please talk to your care team about your own preventive care needs.  Lifestyle  Exercise at least 150 minutes each week (30 minutes a day, 5 days a week).  Do muscle strengthening activities 2 days a week. These help control your weight and prevent disease.  No smoking.  Wear sunscreen to prevent skin cancer.  Have your home tested for radon every 2 to 5 years. Radon is a colorless, odorless gas that can harm your lungs. To learn more, go to www.health.[facility].[place] and search for \"Radon in Homes.\"  Keep guns unloaded and locked up in a safe place like a safe or gun vault, or, use a gun lock and hide the keys. Always lock away bullets separately. To learn more, visit [place].gov and search for \"safe gun storage.\"  Nutrition  Eat 5 or more servings of fruits and vegetables each day.  Try wheat bread, brown rice and whole grain pasta (instead of white bread, rice, and pasta).  Get enough calcium and vitamin D. Check the label on foods and aim for 100% of the RDA (recommended daily allowance).  Regular exams  Have a dental exam and cleaning every 6 months.  See your health care team every year to talk about:  Any changes in your health.  Any medicines your care team has prescribed.  Preventive care, family planning, and ways to prevent chronic diseases.  Shots (vaccines)   HPV shots (up to age 26), if you've never had them before.  Hepatitis B shots (up to age 59), if you've never had them before.  COVID-19 shot: Get this shot when it's due.  Flu shot: Get a flu shot every year.  Tetanus shot: Get a tetanus shot every 10 years.  Pneumococcal, hepatitis A, and RSV shots: Ask your care team if you need these based on your risk.  Shingles shot (for age 50 and up).  General health tests  Diabetes screening:  Starting at age 35, Get screened for diabetes at least " every 3 years.  If you are younger than age 35, ask your care team if you should be screened for diabetes.  Cholesterol test: At age 39, start having a cholesterol test every 5 years, or more often if advised.  Bone density scan (DEXA): At age 50, ask your care team if you should have this scan for osteoporosis (brittle bones).  Hepatitis C: Get tested at least once in your life.  Abdominal aortic aneurysm screening: Talk to your doctor about having this screening if you:  Have ever smoked; and  Are biologically male; and  Are between the ages of 65 and 75.  STIs (sexually transmitted infections)  Before age 24: Ask your care team if you should be screened for STIs.  After age 24: Get screened for STIs if you're at risk. You are at risk for STIs (including HIV) if:  You are sexually active with more than one person.  You don't use condoms every time.  You or a partner was diagnosed with a sexually transmitted infection.  If you are at risk for HIV, ask about PrEP medicine to prevent HIV.  Get tested for HIV at least once in your life, whether you are at risk for HIV or not.  Cancer screening tests  Cervical cancer screening: If you have a cervix, begin getting regular cervical cancer screening tests at age 21. Most people who have regular screenings with normal results can stop after age 65. Talk about this with your provider.  Breast cancer scan (mammogram): If you've ever had breasts, begin having regular mammograms starting at age 40. This is a scan to check for breast cancer.  Colon cancer screening: It is important to start screening for colon cancer at age 45.  Have a colonoscopy test every 10 years (or more often if you're at risk) Or, ask your provider about stool tests like a FIT test every year or Cologuard test every 3 years.  To learn more about your testing options, visit: www.[employer]book[employer]/227083.pdf.  For help making a decision, visit: gilda/ri17778.  Prostate cancer screening test: If you have a  prostate and are age 55 to 69, ask your provider if you would benefit from a yearly prostate cancer screening test.  Lung cancer screening: If you are a current or former smoker age 50 to 80, ask your care team if ongoing lung cancer screenings are right for you.  For informational purposes only. Not to replace the advice of your health care provider. Copyright   2023 [place]. All rights reserved. Clinically reviewed by the  [facility] [place]. [employer] [id number] - REV 04/24.  Hearing Loss: Care Instructions  Overview     Hearing loss is a sudden or slow decrease in how well you hear. It can range from slight to profound. Permanent hearing loss can occur with aging. It also can happen when you are exposed long-term to loud noise. Examples include listening to loud music, riding motorcycles, or being around other loud machines.  Hearing loss can affect your work and home life. It can make you feel lonely or depressed. You may feel that you have lost your independence. But hearing aids and other devices can help you hear better and feel connected to others.  Follow-up care is a key part of your treatment and safety. Be sure to make and go to all appointments, and call your doctor if you are having problems. It's also a good idea to know your test results and keep a list of the medicines you take.  How can you care for yourself at home?  Avoid loud noises whenever possible. This helps keep your hearing from getting worse.  Always wear hearing protection around loud noises.  Wear a hearing aid as directed.  A professional can help you pick a hearing aid that will work best for you.  You can also get hearing aids over the counter for mild to moderate hearing loss.  Have hearing tests as your doctor suggests. They can show whether your hearing has changed. Your hearing aid may need to be adjusted.  Use other devices as needed. These may include:  Telephone amplifiers and hearing aids that  "can connect to a television, stereo, radio, or microphone.  Devices that use lights or vibrations. These alert you to the doorbell, a ringing telephone, or a baby monitor.  Television closed-captioning. This shows the words at the bottom of the screen. Most new TVs can do this.  TTY (text telephone). This lets you type messages back and forth on the telephone instead of talking or listening. These devices are also called TDD. When messages are typed on the keyboard, they are sent over the phone line to a receiving TTY. The message is shown on a monitor.  Use text messaging, social media, and email if it is hard for you to communicate by telephone.  Try to learn a listening technique called speechreading. It is not lipreading. You pay attention to people's gestures, expressions, posture, and tone of voice. These clues can help you understand what a person is saying. Face the person you are talking to, and have them face you. Make sure the lighting is good. You need to see the other person's face clearly.  Think about counseling if you need help to adjust to your hearing loss.  When should you call for help?  Watch closely for changes in your health, and be sure to contact your doctor if:    You think your hearing is getting worse.     You have new symptoms, such as dizziness or nausea.   Where can you learn more?  Go to https://www.[employer].net/patiented  Enter R798 in the search box to learn more about \"Hearing Loss: Care Instructions.\"  Current as of: September 27, 2023               Content Version: 14.0    6052-0097 [employer].   Care instructions adapted under license by your healthcare professional. If you have questions about a medical condition or this instruction, always ask your healthcare professional. [employer] disclaims any warranty or liability for your use of this information.      Bladder Training: Care Instructions  Your Care Instructions     Bladder training is used to " treat urge incontinence and stress incontinence. Urge incontinence means that the need to urinate comes on so fast that you can't get to a toilet in time. Stress incontinence means that you leak urine because of pressure on your bladder. For example, it may happen when you laugh, cough, or lift something heavy.  Bladder training can increase how long you can wait before you have to urinate. It can also help your bladder hold more urine. And it can give you better control over the urge to urinate.  It is important to remember that bladder training takes a few weeks to a few months to make a difference. You may not see results right away, but don't give up.  Follow-up care is a key part of your treatment and safety. Be sure to make and go to all appointments, and call your doctor if you are having problems. It's also a good idea to know your test results and keep a list of the medicines you take.  How can you care for yourself at home?  Work with your doctor to come up with a bladder training program that is right for you. You may use one or more of the following methods.  Delayed urination  In the beginning, try to keep from urinating for 5 minutes after you first feel the need to go.  While you wait, take deep, slow breaths to relax. Kegel exercises can also help you delay the need to go to the bathroom.  After some practice, when you can easily wait 5 minutes to urinate, try to wait 10 minutes before you urinate.  Slowly increase the waiting period until you are able to control when you have to urinate.  Scheduled urination  Empty your bladder when you first wake up in the morning.  Schedule times throughout the day when you will urinate.  Start by going to the bathroom every hour, even if you don't need to go.  Slowly increase the time between trips to the bathroom.  When you have found a schedule that works well for you, keep doing it.  If you wake up during the night and have to urinate, do it. Apply your  "schedule to waking hours only.  Kegel exercises  These tighten and strengthen pelvic muscles, which can help you control the flow of urine. (If doing these exercises causes pain, stop doing them and talk with your doctor.) To do Kegel exercises:  Squeeze your muscles as if you were trying not to pass gas. Or squeeze your muscles as if you were stopping the flow of urine. Your belly, legs, and buttocks shouldn't move.  Hold the squeeze for 3 seconds, then relax for 5 to 10 seconds.  Start with 3 seconds, then add 1 second each week until you are able to squeeze for 10 seconds.  Repeat the exercise 10 times a session. Do 3 to 8 sessions a day.  When should you call for help?  Watch closely for changes in your health, and be sure to contact your doctor if:    Your incontinence is getting worse.     You do not get better as expected.   Where can you learn more?  Go to https://www.[employer].net/patiented  Enter V684 in the search box to learn more about \"Bladder Training: Care Instructions.\"  Current as of: November 15, 2023               Content Version: 14.0    0107-9432 [employer]   Care instructions adapted under license by your healthcare professional. If you have questions about a medical condition or this instruction, always ask your healthcare professional. [employer] disclaims any warranty or liability for your use of this information.      Substance Use Disorder: Care Instructions  Overview     You can improve your life and health by stopping your use of alcohol or drugs. When you don't drink or use drugs, you may feel and sleep better. You may get along better with your family, friends, and coworkers. There are medicines and programs that can help with substance use disorder.  How can you care for yourself at home?  Here are some ways to help you stay sober and prevent relapse.  If you have been given medicine to help keep you sober or reduce your cravings, be sure to take it " exactly as prescribed.  Talk to your doctor about programs that can help you stop using drugs or drinking alcohol.  Do not keep alcohol or drugs in your home.  Plan ahead. Think about what you'll say if other people ask you to drink or use drugs. Try not to spend time with people who drink or use drugs.  Use the time and money spent on drinking or drugs to do something that's important to you.  Preventing a relapse  Have a plan to deal with relapse. Learn to recognize changes in your thinking that lead you to drink or use drugs. Get help before you start to drink or use drugs again.  Try to stay away from situations, friends, or places that may lead you to drink or use drugs.  If you feel the need to drink alcohol or use drugs again, seek help right away. Call a trusted friend or family member. Some people get support from organizations such as Narcotics Anonymous or [employer] or from treatment facilities.  If you relapse, get help as soon as you can. Some people make a plan with another person that outlines what they want that person to do for them if they relapse. The plan usually includes how to handle the relapse and who to notify in case of relapse.  Don't give up. Remember that a relapse doesn't mean that you have failed. Use the experience to learn the triggers that lead you to drink or use drugs. Then quit again. Recovery is a lifelong process. Many people have several relapses before they are able to quit for good.  Follow-up care is a key part of your treatment and safety. Be sure to make and go to all appointments, and call your doctor if you are having problems. It's also a good idea to know your test results and keep a list of the medicines you take.  When should you call for help?   Call 911  anytime you think you may need emergency care. For example, call if you or someone else:    Has overdosed or has withdrawal signs. Be sure to tell the emergency workers that you are or someone else is using  "or trying to quit using drugs. Overdose or withdrawal signs may include:  Losing consciousness.  Seizure.  Seeing or hearing things that aren't there (hallucinations).     Is thinking or talking about suicide or harming others.   Where to get help 24 hours a day, 7 days a week   If you or someone you know talks about suicide, self-harm, a mental health crisis, a substance use crisis, or any other kind of emotional distress, get help right away. You can:    Call the Suicide and Crisis Lifeline at 165.     Call 1-469-565-KaChing! (1-481.329.1197).     Text HOME to 306612 to access the Crisis Text Line.   Consider saving these numbers in your phone.  Go to [employer] for more information or to chat online.  Call your doctor now or seek immediate medical care if:    You are having withdrawal symptoms. These may include nausea or vomiting, sweating, shakiness, and anxiety.   Watch closely for changes in your health, and be sure to contact your doctor if:    You have a relapse.     You need more help or support to stop.   Where can you learn more?  Go to https://www.mSnap.net/patiented  Enter H573 in the search box to learn more about \"Substance Use Disorder: Care Instructions.\"  Current as of: November 15, 2023               Content Version: 14.0    5937-7272 [employer].   Care instructions adapted under license by your healthcare professional. If you have questions about a medical condition or this instruction, always ask your healthcare professional. [employer] disclaims any warranty or liability for your use of this information.         "

## 2024-06-14 ENCOUNTER — PATIENT OUTREACH (OUTPATIENT)
Dept: GASTROENTEROLOGY | Facility: CLINIC | Age: 69
End: 2024-06-14
Payer: MEDICARE

## 2024-07-16 ENCOUNTER — HOSPITAL ENCOUNTER (OUTPATIENT)
Dept: MAMMOGRAPHY | Facility: CLINIC | Age: 69
Discharge: HOME | End: 2024-07-16
Attending: PHYSICIAN ASSISTANT | Admitting: PHYSICIAN ASSISTANT
Payer: MEDICARE

## 2024-07-16 DIAGNOSIS — Z12.31 VISIT FOR SCREENING MAMMOGRAM: ICD-10-CM

## 2024-07-16 PROCEDURE — 77063 BREAST TOMOSYNTHESIS BI: CPT

## 2024-08-04 ENCOUNTER — NURSE TRIAGE (OUTPATIENT)
Dept: NURSING | Facility: CLINIC | Age: 69
End: 2024-08-04
Payer: MEDICARE

## 2024-08-05 NOTE — TELEPHONE ENCOUNTER
Patient calling. She took the wrong insulin before bed at 2010.. 22 units of lispro instead of Lantus. Current blood sugar is 174.    MD consult, Dr. ELSIE Koch Paged by RN at 10:26 PM  Reason for page: insulin error    Provider, Dr. Koch, returning page to [facility] at 10:29 PM  Provider Recommendations/Plan:  No lantus tonight, Stay up for the next couple of hours and watch your blood sugars.     Dr. Koch's recommendations relayed to patient. Patient was offered suggestions regarding keeping the different types of insulin in different locations to reduce the risk of taking the wrong insulin.     Patient stated understanding of Dr. Koch's instructions and stated she will continue to monitor her blood sugars.     Camila Sigala RN  [place] [facility]  August 4, 2024, 10:43 PM    Reason for Disposition   Diabetes drug error or overdose (e.g., took wrong type of insulin or took extra dose)    Additional Information   Negative: [1] Intentional drug overdose AND [2] suicidal thoughts or ideas   Negative: Drug overdose and triager unable to answer question   Negative: Caller requesting a renewal or refill of a medicine patient is currently taking   Negative: Caller requesting information unrelated to medicine   Negative: Caller requesting information about COVID-19 Vaccine   Negative: Caller requesting information about Emergency Contraception   Negative: Caller requesting information about Combined Birth Control Pills   Negative: Caller requesting information about Progestin Birth Control Pills   Negative: Caller requesting information about Post-Op pain or medicines   Negative: Caller requesting a prescription antibiotic (such as Penicillin) for Strep throat and has a positive culture result   Negative: Caller requesting a prescription anti-viral med (such as Tamiflu) and has influenza (flu) symptoms   Negative: Immunization reaction suspected   Negative: Rash while taking a  medicine or within 3 days of stopping it   Negative: [1] Asthma and [2] having symptoms of asthma (cough, wheezing, etc.)   Negative: [1] Symptom of illness (e.g., headache, abdominal pain, earache, vomiting) AND [2] more than mild   Negative: Breastfeeding questions about mother's medicines and diet   Negative: MORE THAN A DOUBLE DOSE of a prescription or over-the-counter (OTC) drug   Negative: [1] DOUBLE DOSE (an extra dose or lesser amount) of prescription drug AND [2] any symptoms (e.g., dizziness, nausea, pain, sleepiness)   Negative: [1] DOUBLE DOSE (an extra dose or lesser amount) of over-the-counter (OTC) drug AND [2] any symptoms (e.g., dizziness, nausea, pain, sleepiness)   Negative: Took another person's prescription drug   Negative: [1] DOUBLE DOSE (an extra dose or lesser amount) of prescription drug AND [2] NO symptoms  (Exception: A double dose of antibiotics.)    Protocols used: Medication Question Call-A-

## 2024-08-23 NOTE — PATIENT INSTRUCTIONS
1. High fiber diet. Metamucil 1 tbsp twice daily.  2. CEA at every imaging and/or clinic visit.  3. 3T MR pelvis in 11/2021.  4. CT c/a/p in 5/2022.  5. Colonoscopy in 10/2021.   6.: Flex sig in 12/2021       Follow up per Watch and Wait Protocol:   Completed CRT: 4/12/19    Flexible sigmoidoscopy   ? Every 2 months for 6 months: Completed  ? Every 3 months for 3 years: Until 4/2022 Due 8/2021  ? Every 6 months for 5 years: Until 4/2024  ? Every year for 10 years: Until 4/2029       3T MRI of pelvis  ? 6-8 weeks after CRT:  ? Every 4 months for 2 years: Until 4/2021 Completed  ? Every 6 months for 2 years: Until 4/2023 Due 11/2021  ? Yearly for 2 years: Until 4/2025       CT CAP  ? Every year for 6-8 years: Until 4/2027 Due 5/2022       Colonoscopy   ? Due 10/2021       CEA at every clinic or endoscopic visit      Please call with any questions or concerns regarding your clinic visit today.    It is a pleasure being involved in your health care.    Lynnette Khan RN  Colorectal Care Coordinator  [phone]     [address] [place] [address]  Mail Code: [email or web address]  [place]  [address]      Contacts post-consultation depending on your need:    Radiology Appointments [phone]    Schedule Clinic Appointments [phone] # 1   M-F 7:30 - 5 pm    Clinic Fax Number [phone]    Surgery Scheduling [phone]    My Chart is available 24 hours a day and is a secure way to access your records and communicate with your care team.  I strongly recommend signing up if you haven't already done so, if you are comfortable with computers.  If you would like to inquire about this or are having problems with My Chart access, you may call [phone] or go online at [email or web address].  Please allow at least 24 hours for a response and extra time on weekends and Holidays.      
HFrEF (heart failure with reduced ejection fraction)

## 2024-09-23 DIAGNOSIS — E10.9 TYPE 1 DIABETES MELLITUS WITHOUT COMPLICATION (H): ICD-10-CM

## 2024-09-23 RX ORDER — PEN NEEDLE, DIABETIC 32GX 5/32"
NEEDLE, DISPOSABLE MISCELLANEOUS
Qty: 100 EACH | Refills: 2 | Status: SHIPPED | OUTPATIENT
Start: 2024-09-23

## 2024-10-14 NOTE — DISCHARGE INSTRUCTIONS
MRI Contrast Discharge Instructions    The IV contrast you received today will pass out of your body in your  urine. This will happen in the next 24 hours. You will not feel this process.  Your urine will not change color.    Drink at least 4 extra glasses of water or juice today (unless your doctor  has restricted your fluids). This reduces the stress on your kidneys.  You may take your regular medicines.    If you are on dialysis: It is best to have dialysis today.    If you have a reaction: Most reactions happen right away. If you have  any new symptoms after leaving the hospital (such as hives or swelling),  call your hospital at the correct number below. Or call your family doctor.  If you have breathing distress or wheezing, call 911.    Special instructions: ***    I have read and understand the above information.    Signature:______________________________________ Date:___________    Staff:__________________________________________ Date:___________     Time:__________    [facility] Departments:    ___[place]: [phone]  ___[facility]: [phone]  ___[place]: [phone] ___[facility]: [phone]  ___[facility]: [phone]  ___[facility]: [phone]  ___[phone]  ___[facility]: [phone]  ___[phone]  
no

## 2024-10-19 ENCOUNTER — APPOINTMENT (OUTPATIENT)
Dept: GENERAL RADIOLOGY | Facility: CLINIC | Age: 69
End: 2024-10-19
Attending: PHYSICIAN ASSISTANT
Payer: MEDICARE

## 2024-10-19 ENCOUNTER — HOSPITAL ENCOUNTER (EMERGENCY)
Facility: CLINIC | Age: 69
Discharge: HOME | End: 2024-10-19
Attending: PHYSICIAN ASSISTANT | Admitting: PHYSICIAN ASSISTANT
Payer: MEDICARE

## 2024-10-19 VITALS
WEIGHT: 191.36 LBS | TEMPERATURE: 97.6 F | RESPIRATION RATE: 18 BRPM | SYSTOLIC BLOOD PRESSURE: 143 MMHG | BODY MASS INDEX: 33.91 KG/M2 | DIASTOLIC BLOOD PRESSURE: 73 MMHG | OXYGEN SATURATION: 100 % | HEART RATE: 82 BPM

## 2024-10-19 DIAGNOSIS — M75.30: ICD-10-CM

## 2024-10-19 DIAGNOSIS — M25.512 LEFT SHOULDER PAIN: ICD-10-CM

## 2024-10-19 PROCEDURE — 250N000013 HC RX MED GY IP 250 OP 250 PS 637: Performed by: PHYSICIAN ASSISTANT

## 2024-10-19 PROCEDURE — 73030 X-RAY EXAM OF SHOULDER: CPT | Mod: LT

## 2024-10-19 PROCEDURE — 99283 EMERGENCY DEPT VISIT LOW MDM: CPT

## 2024-10-19 PROCEDURE — 99284 EMERGENCY DEPT VISIT MOD MDM: CPT | Performed by: PHYSICIAN ASSISTANT

## 2024-10-19 RX ORDER — INSULIN LISPRO 100 [IU]/ML
10-12 INJECTION, SOLUTION INTRAVENOUS; SUBCUTANEOUS
COMMUNITY

## 2024-10-19 RX ORDER — MELOXICAM 15 MG/1
15 TABLET ORAL DAILY
Qty: 7 TABLET | Refills: 0 | Status: SHIPPED | OUTPATIENT
Start: 2024-10-19 | End: 2024-10-30

## 2024-10-19 RX ORDER — MULTIVITAMIN WITH IRON
1 TABLET ORAL DAILY
COMMUNITY

## 2024-10-19 RX ORDER — IBUPROFEN 200 MG
400 TABLET ORAL 2 TIMES DAILY PRN
COMMUNITY

## 2024-10-19 RX ORDER — MELOXICAM 7.5 MG/1
15 TABLET ORAL ONCE
Status: COMPLETED | OUTPATIENT
Start: 2024-10-19 | End: 2024-10-19

## 2024-10-19 RX ADMIN — MELOXICAM 15 MG: 7.5 TABLET ORAL at 18:47

## 2024-10-19 ASSESSMENT — COLUMBIA-SUICIDE SEVERITY RATING SCALE - C-SSRS
2. HAVE YOU ACTUALLY HAD ANY THOUGHTS OF KILLING YOURSELF IN THE PAST MONTH?: NO
1. IN THE PAST MONTH, HAVE YOU WISHED YOU WERE DEAD OR WISHED YOU COULD GO TO SLEEP AND NOT WAKE UP?: NO
6. HAVE YOU EVER DONE ANYTHING, STARTED TO DO ANYTHING, OR PREPARED TO DO ANYTHING TO END YOUR LIFE?: NO

## 2024-10-19 ASSESSMENT — ACTIVITIES OF DAILY LIVING (ADL)
ADLS_ACUITY_SCORE: 35
ADLS_ACUITY_SCORE: 35

## 2024-10-19 NOTE — ED TRIAGE NOTES
Patient presents with L shoulder pain, mostly posterior, ongoing for a week. Denies any injury or trauma to the shoulder. Reports having had pain in the past there but was not seen for it then. Reports trying topicals and ibuprofen with minimal relief.     Triage Assessment (Adult)       Row Name 10/19/24 1706          Triage Assessment    Airway WDL WDL        Respiratory WDL    Respiratory WDL WDL        Skin Circulation/Temperature WDL    Skin Circulation/Temperature WDL WDL        Cardiac WDL    Cardiac WDL WDL        Peripheral/Neurovascular WDL    Peripheral Neurovascular WDL X        Cognitive/Neuro/Behavioral WDL    Cognitive/Neuro/Behavioral WDL WDL

## 2024-10-19 NOTE — ED PROVIDER NOTES
History     Chief Complaint   Patient presents with    Shoulder Pain     HPI  Niharika Cason is a 69 year old female who presents for evaluation of increasing left shoulder pain over the past 2 weeks.  No injury or fall that she can recall.  She states the pain is worse with lifting her arm or doing any motion with her arm.  She now is having some issues with sleeping due to the discomfort.  She is right-hand dominant.  Her discomfort is rated 1 on a scale of 10 when she is sitting but when she tries to move her shoulder, she has pain upwards of 7 on a scale of 10.  Denies any upper extremity numbness or tingling.  Denies any neck pain.  Denies any chest pain, dyspnea, palpitations, fever, chills, or cough.  Has had off and on left shoulder pain in the past but she states it always gets better after a week or 2.  This time, the pain is persisting.    Allergies:  Allergies   Allergen Reactions    No Known Allergies        Problem List:    Patient Active Problem List    Diagnosis Date Noted    Rectal cancer (H) 06/13/2024     Priority: Medium    Class 2 severe obesity due to excess calories with serious comorbidity in adult (H) 11/21/2023     Priority: Medium    Mixed hyperlipidemia 03/29/2021     Priority: Medium    Drug-induced polyneuropathy (H) 03/22/2021     Priority: Medium    History of rectal cancer 10/15/2018     Priority: Medium    Type 1 diabetes mellitus with other specified complication (H) 07/05/2011     Priority: Medium    Essential hypertension with goal blood pressure less than 130/80 07/05/2011     Priority: Medium    Constipation 12/29/2009     Priority: Medium    Generalized anxiety disorder      Priority: Medium    Female stress incontinence 02/18/2004     Priority: Medium        Past Medical History:    Past Medical History:   Diagnosis Date    Colorectal cancer (H)     Essential hypertension, benign     Generalized anxiety disorder     Hyperlipidemia     Rectal cancer (H) 09/17/2018    S/P  radiation therapy     Type I (juvenile type) diabetes mellitus without mention of complication, not stated as uncontrolled     Ulcerative colitis, unspecified        Past Surgical History:    Past Surgical History:   Procedure Laterality Date    COLONOSCOPY N/A 2018    Procedure: COMBINED COLONOSCOPY, SINGLE OR MULTIPLE BIOPSY/POLYPECTOMY BY BIOPSY;  colonoscopy with polypectomies by biopsy forceps and hot snare and submucosal injection with tattoo;  Surgeon: Richard Moore MD;  Location: [facility]    HC CURETTAGE, POSTPARTUM  ,     following miscarriages    HC REMOVAL OF TONSILS,<13 Y/O      Tonsils <14y.o.    INSERT PORT VASCULAR ACCESS Right 10/19/2018    Procedure: Chest Port Placement - right ;  Surgeon: Lawson Hitchcock PA-C;  [facility]    [facility]      negative       Family History:    Family History   Problem Relation Age of Onset    Heart Disease Maternal Grandfather         MI at 52 yrs    EYE* Paternal Grandfather         cataracts    Arthritis Mother     Gynecology Mother         hysterectomy for fibroids    Hypertension Mother     Lipids Mother     Gastrointestinal Disease Father         ulcers    Heart Disease Father         intermittent rapid heartbeat    Cancer Father 84        Mesothelioma    Other Cancer Father        Social History:  Marital Status:   [2]  Social History     Tobacco Use    Smoking status: Former     Current packs/day: 0.00     Average packs/day: 0.5 packs/day for 15.0 years (7.5 ttl pk-yrs)     Types: Cigarettes     Start date: 2003     Quit date: 2018     Years since quittin.0    Smokeless tobacco: Never   Vaping Use    Vaping status: Never Used   Substance Use Topics    Alcohol use: Yes     Alcohol/week: 7.0 standard drinks of alcohol     Types: 7 Standard drinks or equivalent per week    Drug use: No        Medications:    atorvastatin (LIPITOR) 40 MG tablet  blood glucose (NO BRAND  SPECIFIED) test strip  Continuous Blood Gluc  (DEXCOM G6 ) BRITTANY  Continuous Blood Gluc Sensor (DEXCOM G6 SENSOR) MISC  Continuous Blood Gluc Transmit (DEXCOM G6 TRANSMITTER) MISC  ibuprofen (ADVIL/MOTRIN) 200 MG tablet  insulin glargine (LANTUS SOLOSTAR) 100 UNIT/ML pen  insulin lispro (HUMALOG KWIKPEN) 100 UNIT/ML (1 unit dial) KWIKPEN  insulin lispro (HUMALOG KWIKPEN) 100 UNIT/ML (1 unit dial) KWIKPEN  insulin pen needle (BD KELECHI U/F) 32G X 4 MM miscellaneous  lisinopril (ZESTRIL) 40 MG tablet  magnesium 250 MG tablet  meloxicam (MOBIC) 15 MG tablet  Multiple Vitamins-Minerals (MULTIVITAMIN PO)  PARoxetine (PAXIL) 30 MG tablet  Turmeric 500 MG CAPS          Review of Systems   All other systems reviewed and are negative.      Physical Exam   BP: (!) 143/73  Pulse: 82  Temp: 97.6  F (36.4  C)  Resp: 18  Weight: 86.8 kg (191 lb 5.8 oz)  SpO2: 100 %      Physical Exam  Vitals and nursing note reviewed.   Constitutional:       General: She is not in acute distress.     Appearance: She is not diaphoretic.   HENT:      Head: Normocephalic and atraumatic.      Right Ear: External ear normal.      Left Ear: External ear normal.      Nose: Nose normal.      Mouth/Throat:      Pharynx: No oropharyngeal exudate.   Eyes:      General: No scleral icterus.        Right eye: No discharge.         Left eye: No discharge.      Conjunctiva/sclera: Conjunctivae normal.      Pupils: Pupils are equal, round, and reactive to light.   Neck:      Thyroid: No thyromegaly.   Cardiovascular:      Rate and Rhythm: Normal rate and regular rhythm.      Heart sounds: Normal heart sounds. No murmur heard.  Pulmonary:      Effort: Pulmonary effort is normal. No respiratory distress.      Breath sounds: Normal breath sounds. No wheezing or rales.   Chest:      Chest wall: No tenderness.   Abdominal:      General: Bowel sounds are normal. There is no distension.      Palpations: Abdomen is soft. There is no mass.      Tenderness:  There is no abdominal tenderness. There is no guarding or rebound.   Musculoskeletal:         General: No deformity.      Cervical back: Normal range of motion and neck supple.      Comments: Patient appears comfortable and in NAD.  No obvious abnormality, ecchymosis, or swelling on inspection of the shoulder.  Limited ROM with pain during abduction, flexion, and external rotation.  No crepitus noted.  Nontender to palpation along all the bone structures of the shoulder.  Slight tenderness over the posterior aspect of the shoulder.  weakness with flexion and weakness with abduction  EXT:  Strength is equal and appropriate on testing of the biceps, triceps, and  strength.  Distal pulses are 2+. Sensation intact to light touch intact.     Lymphadenopathy:      Cervical: No cervical adenopathy.   Skin:     General: Skin is warm and dry.      Capillary Refill: Capillary refill takes less than 2 seconds.      Findings: No erythema or rash.   Neurological:      Mental Status: She is alert and oriented to person, place, and time.      Cranial Nerves: No cranial nerve deficit.   Psychiatric:         Behavior: Behavior normal.         Thought Content: Thought content normal.         ED Course        Procedures              Critical Care time:  none              Results for orders placed or performed during the hospital encounter of 10/19/24 (from the past 24 hour(s))   XR Shoulder Left 3 Views    Narrative    EXAM: XR SHOULDER LEFT G/E 3 VIEWS  LOCATION: [facility]  DATE: 10/19/2024    INDICATION: Left shoulder pain.  COMPARISON: None.      Impression    IMPRESSION: No fracture or dislocation. Moderate-sized calcification along the superolateral margin of the greater tuberosity of the humeral head compatible with calcific tendinitis.       Medications   meloxicam (MOBIC) tablet 15 mg (15 mg Oral $Given 10/19/24 2479)       Assessments & Plan (with Medical Decision Making)     Left shoulder  pain  Calcifying tendinitis of shoulder     69 year old female presents for evaluation of increasing left shoulder pain over the past 2 weeks with no known injury.  Right-hand-dominant.  No numbness or tingling.  No neck pain.  No chest pain or dyspnea.  Exam with blood pressure 143/73, temperature 97.6, pulse 82, respiration 18, oxygen saturation 100% on room air.  Patient is in no acute distress.  Cardiovascular exam normal.  Lung fields are clear.  No obvious abnormality on inspection of the left upper extremity.  C-spine testing normal.  No pain with axial loading.  Normal range of motion.  No radicular symptoms with range of motion.  Mild tenderness to the posterior shoulder area.  Pain with flexion, abduction, and external rotation.  Mild weakness.  Remainder of the exam is otherwise normal.  X-ray of the shoulder displays calcific tendinitis noted.  No acute fracture or dislocation.  I performed independent review of the x-ray and agree with the findings.  I printed a copy of the x-ray and explained it to the patient.  Hopefully this is just calcific tendinitis and an inflammatory process rather than an actual rotator cuff tear.  Regardless, we placed her in a sling to use for comfort during the day.  She should have her arm out of the sling with passive range of motion exercises at least 4 times daily to prevent adhesive capsulitis.  Encouraged ice to help with inflammation.  We started her on meloxicam therapy to help with the inflammation.  Orthopedic  referral placed for follow-up next week for further exam to see if she would be a candidate for potential steroid injection versus physical therapy referral versus advanced imaging.  Patient was in agreement with this plan and was suitable for discharge.     I have reviewed the nursing notes.    I have reviewed the findings, diagnosis, plan and need for follow up with the patient.           Medical Decision Making  The patient's presentation was of  moderate complexity (an acute complicated injury).    The patient's evaluation involved:  ordering and/or review of 1 test(s) in this encounter (see separate area of note for details)    The patient's management necessitated moderate risk (prescription drug management including medications given in the ED).        Discharge Medication List as of 10/19/2024  6:39 PM        START taking these medications    Details   meloxicam (MOBIC) 15 MG tablet Take 1 tablet (15 mg) by mouth daily for 7 days., Disp-7 tablet, R-0, E-Prescribe             Final diagnoses:   Left shoulder pain   Calcifying tendinitis of shoulder     Disclaimer: This note consists of symbols derived from keyboarding, dictation and/or voice recognition software. As a result, there may be errors in the script that have gone undetected. Please consider this when interpreting information found in this chart.    10/19/2024   [facility] EMERGENCY DEPT       Jose Masterson PA-C  10/19/24 9855

## 2024-10-19 NOTE — DISCHARGE INSTRUCTIONS
It was a pleasure working with you today!  I hope your condition improves rapidly!     Please take the meloxicam once daily with food to help with the inflammation and pain.  You can still take Tylenol 1000 mg every 6 hours as needed on top of this.  Do not take ibuprofen or Aleve at the same time.  The orthopedic department should be contacting you on Monday to line up a recheck appointment with Dr. Evans.  Use the sling during the day while you are up and about.  Please make sure that you get your arm out of the sling at least 4-5 times per day and perform the range of motion activity that we discussed at least 10 repetitions to make sure that we do not develop a frozen shoulder.  You can ice the shoulder for 20 minutes every 2-3 hours to help with the inflammation.

## 2024-10-19 NOTE — MEDICATION SCRIBE - ADMISSION MEDICATION HISTORY
Medication Scribe Admission Medication History    Admission medication history is complete. The information provided in this note is only as accurate as the sources available at the time of the update.    Information Source(s): Patient and CareEverywhere/SureScripts via in-person    Pertinent Information: no last doses due to discharge, but med list is up to date.    Changes made to PTA medication list:  Added: ibuprofen  Deleted: None  Changed: None    Allergies reviewed with patient and updates made in EHR: yes    Medication History Completed By: LAWRENCE CARY 10/19/2024 6:38 PM    PTA Med List   Medication Sig Last Dose    atorvastatin (LIPITOR) 40 MG tablet Take 1 tablet (40 mg) by mouth daily 10/19/2024 at am    blood glucose (NO BRAND SPECIFIED) test strip Use to test blood sugar 1 times daily or as directed.     Continuous Blood Gluc  (DEXCOM G6 ) BRITTANY 1 Device continuous     Continuous Blood Gluc Sensor (DEXCOM G6 SENSOR) MISC CHANGE EVERY 10 DAYS     Continuous Blood Gluc Transmit (DEXCOM G6 TRANSMITTER) MISC CHANGE EVERY 3 MONTHS     ibuprofen (ADVIL/MOTRIN) 200 MG tablet Take 400 mg by mouth 2 times daily as needed for pain or fever.     insulin glargine (LANTUS SOLOSTAR) 100 UNIT/ML pen INJECT 23 UNITS SUBCUTANEOUSLY AT BEDTIME     insulin lispro (HUMALOG KWIKPEN) 100 UNIT/ML (1 unit dial) KWIKPEN Inject 10-12 Units subcutaneously daily (with dinner). Plus sliding scale (also takes 8 units at breakfast and lunch)     insulin lispro (HUMALOG KWIKPEN) 100 UNIT/ML (1 unit dial) KWIKPEN Inject under the skin.  Take 8 units at breakfast, 8 units at lunch, 10-12 units at supper. Take additional sliding scale as indicated. Minimum daily dose range is 36-38 units with max of 50 units/day. (Patient taking differently: Inject 8 Units subcutaneously 2 times daily (before meals). Inject under the skin.  Take 8 units at breakfast, 8 units at lunch, 10-12 units at supper. Take additional sliding scale  as indicated. Minimum daily dose range is 36-38 units with max of 50 units/day.)     insulin pen needle (BD KELECHI U/F) 32G X 4 MM miscellaneous USE ONCE DAILY AS NEEDED WITH INSULIN     lisinopril (ZESTRIL) 40 MG tablet Take 1 tablet (40 mg) by mouth daily     magnesium 250 MG tablet Take 1 tablet by mouth daily. 10/19/2024 at am    meloxicam (MOBIC) 15 MG tablet Take 1 tablet (15 mg) by mouth daily for 7 days.     Multiple Vitamins-Minerals (MULTIVITAMIN PO) Take by mouth daily     PARoxetine (PAXIL) 30 MG tablet Take 1 tablet (30 mg) by mouth every morning     Turmeric 500 MG CAPS Take 1 capsule by mouth daily

## 2024-10-23 DIAGNOSIS — M25.512 LEFT SHOULDER PAIN: Primary | ICD-10-CM

## 2024-10-29 NOTE — PROGRESS NOTES
ORTHOPEDIC CONSULT      Chief Complaint: Niharika Cason is a 69 year old female who is being seen for   Chief Complaint   Patient presents with    Left Shoulder - Follow Up       History of Present Illness:   Reports has had some mild off and on left shoulder pain for about 3 years. No specific injury though notes at the time was doing quite a bit of lifting and moving objects with her job. She has since retired and the shoulder had been doing very well up until recently when she had a significant flare up in the shoulder with pain. No injury or new activity at that time. The pain was preventing her use of the arm and reports difficulty with any overhead. Seen in the ED, dx with calcific tendonitis, placed on mobic. She reports over the last 2 weeks since the ED visit the shoulder has gotten much better. Now it is tolerable again, has full function again and doing well.  Non-radiating. No numbness/tingling.     Hx of DM last A1c 6.6.      Patient's past medical, surgical, social and family histories reviewed.     Past Medical History:   Diagnosis Date    Colorectal cancer (H)     Essential hypertension, benign     Generalized anxiety disorder     Hyperlipidemia     Rectal cancer (H) 09/17/2018    S/P radiation therapy     5,040 cGy to Pelvis completed on 04/12/2019. [facility] with Dr. Morris    Type I (juvenile type) diabetes mellitus without mention of complication, not stated as uncontrolled     diagnosed at age 35    Ulcerative colitis, unspecified     in the 70s.         Past Surgical History:   Procedure Laterality Date    COLONOSCOPY N/A 9/17/2018    Procedure: COMBINED COLONOSCOPY, SINGLE OR MULTIPLE BIOPSY/POLYPECTOMY BY BIOPSY;  colonoscopy with polypectomies by biopsy forceps and hot snare and submucosal injection with tattoo;  Surgeon: Richard Moore MD;  Location: [facility]    HC CURETTAGE, POSTPARTUM  '91, '93    following miscarriages    HC REMOVAL OF TONSILS,<15 Y/O      Tonsils <14y.o.     INSERT PORT VASCULAR ACCESS Right 10/19/2018    Procedure: Chest Port Placement - right ;  Surgeon: Lawson Hitchcock PA-C;  Location:  OR    [facility] COLONOSCOPY THRU STOMA, DIAGNOSTIC  1998    negative       Medications:  Current Outpatient Medications   Medication Sig Dispense Refill    meloxicam (MOBIC) 15 MG tablet Take 1 tablet (15 mg) by mouth daily as needed for moderate pain. 30 tablet 1    atorvastatin (LIPITOR) 40 MG tablet Take 1 tablet (40 mg) by mouth daily 90 tablet 4    blood glucose (NO BRAND SPECIFIED) test strip Use to test blood sugar 1 times daily or as directed. 100 strip 4    Continuous Blood Gluc  (DEXCOM G6 ) BRITTANY 1 Device continuous 1 Device 0    Continuous Blood Gluc Sensor (DEXCOM G6 SENSOR) MISC CHANGE EVERY 10 DAYS 10 each 4    Continuous Blood Gluc Transmit (DEXCOM G6 TRANSMITTER) MISC CHANGE EVERY 3 MONTHS 1 each 3    ibuprofen (ADVIL/MOTRIN) 200 MG tablet Take 400 mg by mouth 2 times daily as needed for pain or fever.      insulin glargine (LANTUS SOLOSTAR) 100 UNIT/ML pen INJECT 23 UNITS SUBCUTANEOUSLY AT BEDTIME 30 mL 1    insulin lispro (HUMALOG KWIKPEN) 100 UNIT/ML (1 unit dial) KWIKPEN Inject 10-12 Units subcutaneously daily (with dinner). Plus sliding scale (also takes 8 units at breakfast and lunch)      insulin lispro (HUMALOG KWIKPEN) 100 UNIT/ML (1 unit dial) KWIKPEN Inject under the skin.  Take 8 units at breakfast, 8 units at lunch, 10-12 units at supper. Take additional sliding scale as indicated. Minimum daily dose range is 36-38 units with max of 50 units/day. (Patient taking differently: Inject 8 Units subcutaneously 2 times daily (before meals). Inject under the skin.  Take 8 units at breakfast, 8 units at lunch, 10-12 units at supper. Take additional sliding scale as indicated. Minimum daily dose range is 36-38 units with max of 50 units/day.) 45 mL 4    insulin pen needle (BD KELECHI U/F) 32G X 4 MM miscellaneous USE ONCE DAILY AS NEEDED WITH  "INSULIN 100 each 2    lisinopril (ZESTRIL) 40 MG tablet Take 1 tablet (40 mg) by mouth daily 93 tablet 4    magnesium 250 MG tablet Take 1 tablet by mouth daily.      Multiple Vitamins-Minerals (MULTIVITAMIN PO) Take by mouth daily      PARoxetine (PAXIL) 30 MG tablet Take 1 tablet (30 mg) by mouth every morning 90 tablet 4    Turmeric 500 MG CAPS Take 1 capsule by mouth daily       No current facility-administered medications for this visit.       Allergies   Allergen Reactions    No Known Allergies        Social History     Occupational History    Occupation:      Employer: [employer]     Comment: on her feet all day     Employer: [employer]   Tobacco Use    Smoking status: Former     Current packs/day: 0.00     Average packs/day: 0.5 packs/day for 15.0 years (7.5 ttl pk-yrs)     Types: Cigarettes     Start date: 2003     Quit date: 2018     Years since quittin.1    Smokeless tobacco: Never   Vaping Use    Vaping status: Never Used   Substance and Sexual Activity    Alcohol use: Yes     Alcohol/week: 7.0 standard drinks of alcohol     Types: 7 Standard drinks or equivalent per week    Drug use: No    Sexual activity: Yes     Partners: Male     Birth control/protection: Post-menopausal       Family History   Problem Relation Age of Onset    Heart Disease Maternal Grandfather         MI at 52 yrs    EYE* Paternal Grandfather         cataracts    Arthritis Mother     Gynecology Mother         hysterectomy for fibroids    Hypertension Mother     Lipids Mother     Gastrointestinal Disease Father         ulcers    Heart Disease Father         intermittent rapid heartbeat    Cancer Father 84        Mesothelioma    Other Cancer Father        REVIEW OF SYSTEMS  10 point review systems performed otherwise negative as noted as per history of present illness.    Physical Exam:  Vitals: /79   Temp 97.8  F (36.6  C)   Ht 1.59 m (5' 2.6\")   Wt 88.2 kg (194 lb 6.4 oz)   LMP 10/15/2008 (Approximate)  "  BMI 34.88 kg/m    BMI= Body mass index is 34.88 kg/m .  Constitutional: healthy, alert and no acute distress   Psychiatric: mentation appears normal and affect normal/bright  NEURO: no focal deficits  RESP: Normal with easy respirations and no use of accessory muscles to breathe, no audible wheezing or retractions  CV: No peripheral edema         Regular rate and rhythm by palpation  SKIN: No erythema, rashes, excoriation, or breakdown. No evidence of infection.   JOINT/EXTREMITIES:left shoulder; full active motion. Pain at maximum flexion and abduction. No weakness or pain with supraspinatus, infraspinatus, or subscapularis testing. No focal or bony tenderness. Negative crossover, speed's, jergensen. Negative lift off.  Negative empty can.     GAIT: not tested     Diagnostic Modalities:  Left shoulder x-rays: Taken October 19, 2024 shows no acute fractures or dislocations.  Glenohumeral joint is well-preserved.  AC joint degenerative changes and narrowing noted.  Calcific deposit adjacent to the greater tuberosity noted.  Left shoulder axillary view taken today in the clinic shows no acute fractures or dislocation.  Glenohumeral joint is well-preserved.    Independent visualization of the images was performed.      Impression: left shoulder calcific tendonitis.    Plan:  All of the above pertinent physical exam and imaging modalities findings was reviewed with Niharika. Exam history consistent with calcific tendonitis.  However, today is doing very well. Mobic helped considerably. Will provide a refill in case flares up again. Discussed exercises and therapy and she would like to hold off for now.  Discussed if flares up to return to clinic for injection.                                          NSAIDS RISKS:  I have prescribed an antiinflammatory medication.  We discussed that it is the same class of some the common over the counter medications (Ibuprofen, Advil, Motrin, Aleve, Naproxen, and  Naprosyn). I recommend  to avoid taking these OTC's medication when taking the medication that I prescribed. This medication should be stopped if having stomach issues, bleeding, high blood pressure and/or chest pain        Return to clinic PRN, or sooner as needed for changes.  Re-x-ray on return: Leeanne Evans D.O.

## 2024-10-30 ENCOUNTER — ANCILLARY PROCEDURE (OUTPATIENT)
Dept: GENERAL RADIOLOGY | Facility: CLINIC | Age: 69
End: 2024-10-30
Attending: NURSE PRACTITIONER
Payer: MEDICARE

## 2024-10-30 ENCOUNTER — OFFICE VISIT (OUTPATIENT)
Dept: ORTHOPEDICS | Facility: CLINIC | Age: 69
End: 2024-10-30
Attending: PHYSICIAN ASSISTANT
Payer: MEDICARE

## 2024-10-30 VITALS
WEIGHT: 194.4 LBS | HEIGHT: 63 IN | TEMPERATURE: 97.8 F | SYSTOLIC BLOOD PRESSURE: 129 MMHG | BODY MASS INDEX: 34.45 KG/M2 | DIASTOLIC BLOOD PRESSURE: 79 MMHG

## 2024-10-30 DIAGNOSIS — M75.32 CALCIFIC TENDINITIS OF LEFT SHOULDER: ICD-10-CM

## 2024-10-30 DIAGNOSIS — M25.512 LEFT SHOULDER PAIN: ICD-10-CM

## 2024-10-30 PROCEDURE — 73020 X-RAY EXAM OF SHOULDER: CPT | Mod: TC | Performed by: RADIOLOGY

## 2024-10-30 PROCEDURE — 99204 OFFICE O/P NEW MOD 45 MIN: CPT | Performed by: ORTHOPAEDIC SURGERY

## 2024-10-30 RX ORDER — MELOXICAM 15 MG/1
15 TABLET ORAL DAILY PRN
Qty: 30 TABLET | Refills: 1 | Status: SHIPPED | OUTPATIENT
Start: 2024-10-30

## 2024-10-30 NOTE — LETTER
10/30/2024      Niharika Cason  [address] [facility] [id number]      Dear Colleague,    Thank you for referring your patient, Niharika Cason, to the [facility]. Please see a copy of my visit note below.    ORTHOPEDIC CONSULT      Chief Complaint: Niharika Cason is a 69 year old female who is being seen for   Chief Complaint   Patient presents with     Left Shoulder - Follow Up       History of Present Illness:   Reports has had some mild off and on left shoulder pain for about 3 years. No specific injury though notes at the time was doing quite a bit of lifting and moving objects with her job. She has since retired and the shoulder had been doing very well up until recently when she had a significant flare up in the shoulder with pain. No injury or new activity at that time. The pain was preventing her use of the arm and reports difficulty with any overhead. Seen in the ED, dx with calcific tendonitis, placed on mobic. She reports over the last 2 weeks since the ED visit the shoulder has gotten much better. Now it is tolerable again, has full function again and doing well.  Non-radiating. No numbness/tingling.     Hx of DM last A1c 6.6.      Patient's past medical, surgical, social and family histories reviewed.     Past Medical History:   Diagnosis Date     Colorectal cancer (H)      Essential hypertension, benign      Generalized anxiety disorder      Hyperlipidemia      Rectal cancer (H) 09/17/2018     S/P radiation therapy     5,040 cGy to Pelvis completed on 04/12/2019. - [facility] with Dr. Morris     Type I (juvenile type) diabetes mellitus without mention of complication, not stated as uncontrolled     diagnosed at age 35     Ulcerative colitis, unspecified     in the 70s.         Past Surgical History:   Procedure Laterality Date     COLONOSCOPY N/A 9/17/2018    Procedure: COMBINED COLONOSCOPY, SINGLE OR MULTIPLE BIOPSY/POLYPECTOMY BY BIOPSY;  colonoscopy with  polypectomies by biopsy forceps and hot snare and submucosal injection with tattoo;  Surgeon: Richard Moore MD;  Location: [facility]     HC CURETTAGE, POSTPARTUM  '91, '93    following miscarriages     HC REMOVAL OF TONSILS,<13 Y/O      Tonsils <14y.o.     INSERT PORT VASCULAR ACCESS Right 10/19/2018    Procedure: Chest Port Placement - right ;  Surgeon: Lawson Hitchcock PA-C;  [facility]     [facility]  [facility]    negative       Medications:  Current Outpatient Medications   Medication Sig Dispense Refill     meloxicam (MOBIC) 15 MG tablet Take 1 tablet (15 mg) by mouth daily as needed for moderate pain. 30 tablet 1     atorvastatin (LIPITOR) 40 MG tablet Take 1 tablet (40 mg) by mouth daily 90 tablet 4     blood glucose (NO BRAND SPECIFIED) test strip Use to test blood sugar 1 times daily or as directed. 100 strip 4     Continuous Blood Gluc  (DEXCOM G6 ) BRITTANY 1 Device continuous 1 Device 0     Continuous Blood Gluc Sensor (DEXCOM G6 SENSOR) MISC CHANGE EVERY 10 DAYS 10 each 4     Continuous Blood Gluc Transmit (DEXCOM G6 TRANSMITTER) MISC CHANGE EVERY 3 MONTHS 1 each 3     ibuprofen (ADVIL/MOTRIN) 200 MG tablet Take 400 mg by mouth 2 times daily as needed for pain or fever.       insulin glargine (LANTUS SOLOSTAR) 100 UNIT/ML pen INJECT 23 UNITS SUBCUTANEOUSLY AT BEDTIME 30 mL 1     insulin lispro (HUMALOG KWIKPEN) 100 UNIT/ML (1 unit dial) KWIKPEN Inject 10-12 Units subcutaneously daily (with dinner). Plus sliding scale (also takes 8 units at breakfast and lunch)       insulin lispro (HUMALOG KWIKPEN) 100 UNIT/ML (1 unit dial) KWIKPEN Inject under the skin.  Take 8 units at breakfast, 8 units at lunch, 10-12 units at supper. Take additional sliding scale as indicated. Minimum daily dose range is 36-38 units with max of 50 units/day. (Patient taking differently: Inject 8 Units subcutaneously 2 times daily (before meals). Inject under the skin.  Take 8  units at breakfast, 8 units at lunch, 10-12 units at supper. Take additional sliding scale as indicated. Minimum daily dose range is 36-38 units with max of 50 units/day.) 45 mL 4     insulin pen needle (BD KELECHI U/F) 32G X 4 MM miscellaneous USE ONCE DAILY AS NEEDED WITH INSULIN 100 each 2     lisinopril (ZESTRIL) 40 MG tablet Take 1 tablet (40 mg) by mouth daily 93 tablet 4     magnesium 250 MG tablet Take 1 tablet by mouth daily.       Multiple Vitamins-Minerals (MULTIVITAMIN PO) Take by mouth daily       PARoxetine (PAXIL) 30 MG tablet Take 1 tablet (30 mg) by mouth every morning 90 tablet 4     Turmeric 500 MG CAPS Take 1 capsule by mouth daily       No current facility-administered medications for this visit.       Allergies   Allergen Reactions     No Known Allergies        Social History     Occupational History     Occupation:      Employer: [employer]     Comment: on her feet all day     Employer: [employer]   Tobacco Use     Smoking status: Former     Current packs/day: 0.00     Average packs/day: 0.5 packs/day for 15.0 years (7.5 ttl pk-yrs)     Types: Cigarettes     Start date: 2003     Quit date: 2018     Years since quittin.1     Smokeless tobacco: Never   Vaping Use     Vaping status: Never Used   Substance and Sexual Activity     Alcohol use: Yes     Alcohol/week: 7.0 standard drinks of alcohol     Types: 7 Standard drinks or equivalent per week     Drug use: No     Sexual activity: Yes     Partners: Male     Birth control/protection: Post-menopausal       Family History   Problem Relation Age of Onset     Heart Disease Maternal Grandfather         MI at 52 yrs     EYE* Paternal Grandfather         cataracts     Arthritis Mother      Gynecology Mother         hysterectomy for fibroids     Hypertension Mother      Lipids Mother      Gastrointestinal Disease Father         ulcers     Heart Disease Father         intermittent rapid heartbeat     Cancer Father 84        Mesothelioma  "    Other Cancer Father        REVIEW OF SYSTEMS  10 point review systems performed otherwise negative as noted as per history of present illness.    Physical Exam:  Vitals: /79   Temp 97.8  F (36.6  C)   Ht 1.59 m (5' 2.6\")   Wt 88.2 kg (194 lb 6.4 oz)   LMP 10/15/2008 (Approximate)   BMI 34.88 kg/m    BMI= Body mass index is 34.88 kg/m .  Constitutional: healthy, alert and no acute distress   Psychiatric: mentation appears normal and affect normal/bright  NEURO: no focal deficits  RESP: Normal with easy respirations and no use of accessory muscles to breathe, no audible wheezing or retractions  CV: No peripheral edema         Regular rate and rhythm by palpation  SKIN: No erythema, rashes, excoriation, or breakdown. No evidence of infection.   JOINT/EXTREMITIES:left shoulder; full active motion. Pain at maximum flexion and abduction. No weakness or pain with supraspinatus, infraspinatus, or subscapularis testing. No focal or bony tenderness. Negative crossover, speed's, jergensen. Negative lift off.  Negative empty can.     GAIT: not tested     Diagnostic Modalities:  Left shoulder x-rays: Taken October 19, 2024 shows no acute fractures or dislocations.  Glenohumeral joint is well-preserved.  AC joint degenerative changes and narrowing noted.  Calcific deposit adjacent to the greater tuberosity noted.  Left shoulder axillary view taken today in the clinic shows no acute fractures or dislocation.  Glenohumeral joint is well-preserved.    Independent visualization of the images was performed.      Impression: left shoulder calcific tendonitis.    Plan:  All of the above pertinent physical exam and imaging modalities findings was reviewed with Niharika. Exam history consistent with calcific tendonitis.  However, today is doing very well. Mobic helped considerably. Will provide a refill in case flares up again. Discussed exercises and therapy and she would like to hold off for now.  Discussed if flares up to " return to clinic for injection.                                          NSAIDS RISKS:  I have prescribed an antiinflammatory medication.  We discussed that it is the same class of some the common over the counter medications (Ibuprofen, Advil, Motrin, Aleve, Naproxen, and  Naprosyn). I recommend to avoid taking these OTC's medication when taking the medication that I prescribed. This medication should be stopped if having stomach issues, bleeding, high blood pressure and/or chest pain        Return to clinic PRN, or sooner as needed for changes.  Re-x-ray on return: No    Pato Evans D.O.      Again, thank you for allowing me to participate in the care of your patient.        Sincerely,        Xu Evans, DO

## 2024-11-05 DIAGNOSIS — C20 RECTAL CANCER (H): ICD-10-CM

## 2024-11-05 DIAGNOSIS — Z85.048 HISTORY OF RECTAL CANCER: Primary | ICD-10-CM

## 2024-11-13 ENCOUNTER — HOSPITAL ENCOUNTER (OUTPATIENT)
Dept: CT IMAGING | Facility: CLINIC | Age: 69
Discharge: HOME | End: 2024-11-13
Attending: COLON & RECTAL SURGERY | Admitting: COLON & RECTAL SURGERY
Payer: MEDICARE

## 2024-11-13 ENCOUNTER — LAB (OUTPATIENT)
Dept: LAB | Facility: CLINIC | Age: 69
End: 2024-11-13
Payer: MEDICARE

## 2024-11-13 DIAGNOSIS — Z85.048 HISTORY OF RECTAL CANCER: ICD-10-CM

## 2024-11-13 DIAGNOSIS — C20 RECTAL CANCER (H): ICD-10-CM

## 2024-11-13 LAB
CEA SERPL-MCNC: 4.5 NG/ML
CREAT BLD-MCNC: 0.9 MG/DL (ref 0.5–1)
EGFRCR SERPLBLD CKD-EPI 2021: >60 ML/MIN/1.73M2

## 2024-11-13 PROCEDURE — 74177 CT ABD & PELVIS W/CONTRAST: CPT | Mod: MG

## 2024-11-13 PROCEDURE — 82378 CARCINOEMBRYONIC ANTIGEN: CPT

## 2024-11-13 PROCEDURE — 82565 ASSAY OF CREATININE: CPT

## 2024-11-13 PROCEDURE — 250N000011 HC RX IP 250 OP 636: Performed by: COLON & RECTAL SURGERY

## 2024-11-13 PROCEDURE — 250N000009 HC RX 250: Performed by: COLON & RECTAL SURGERY

## 2024-11-13 PROCEDURE — 36415 COLL VENOUS BLD VENIPUNCTURE: CPT

## 2024-11-13 RX ORDER — IOPAMIDOL 755 MG/ML
500 INJECTION, SOLUTION INTRAVASCULAR ONCE
Status: COMPLETED | OUTPATIENT
Start: 2024-11-13 | End: 2024-11-13

## 2024-11-13 RX ADMIN — SODIUM CHLORIDE 60 ML: 9 INJECTION, SOLUTION INTRAVENOUS at 09:12

## 2024-11-13 RX ADMIN — IOPAMIDOL 95 ML: 755 INJECTION, SOLUTION INTRAVENOUS at 09:12

## 2024-11-20 ENCOUNTER — HOSPITAL ENCOUNTER (OUTPATIENT)
Dept: ULTRASOUND IMAGING | Facility: CLINIC | Age: 69
Discharge: HOME | End: 2024-11-20
Attending: PHYSICIAN ASSISTANT
Payer: MEDICARE

## 2024-11-20 DIAGNOSIS — N83.202 LEFT OVARIAN CYST: ICD-10-CM

## 2024-11-20 PROCEDURE — 76856 US EXAM PELVIC COMPLETE: CPT

## 2024-11-21 ENCOUNTER — VIRTUAL VISIT (OUTPATIENT)
Dept: FAMILY MEDICINE | Facility: CLINIC | Age: 69
End: 2024-11-21
Payer: MEDICARE

## 2024-11-21 DIAGNOSIS — E66.812 CLASS 2 SEVERE OBESITY DUE TO EXCESS CALORIES WITH SERIOUS COMORBIDITY AND BODY MASS INDEX (BMI) OF 35.0 TO 35.9 IN ADULT (H): ICD-10-CM

## 2024-11-21 DIAGNOSIS — E10.69 TYPE 1 DIABETES MELLITUS WITH OTHER SPECIFIED COMPLICATION (H): ICD-10-CM

## 2024-11-21 DIAGNOSIS — C20 RECTAL CANCER (H): ICD-10-CM

## 2024-11-21 DIAGNOSIS — E66.01 CLASS 2 SEVERE OBESITY DUE TO EXCESS CALORIES WITH SERIOUS COMORBIDITY AND BODY MASS INDEX (BMI) OF 35.0 TO 35.9 IN ADULT (H): ICD-10-CM

## 2024-11-21 DIAGNOSIS — E78.2 MIXED HYPERLIPIDEMIA: ICD-10-CM

## 2024-11-21 DIAGNOSIS — G62.0 DRUG-INDUCED POLYNEUROPATHY (H): ICD-10-CM

## 2024-11-21 DIAGNOSIS — I25.10 CORONARY ARTERY CALCIFICATION SEEN ON CT SCAN: Primary | ICD-10-CM

## 2024-11-21 NOTE — PROGRESS NOTES
Niharika is a 69 year old who is being evaluated via a billable telephone visit.    What phone number would you like to be contacted at? [phone]  How would you like to obtain your AVS? Osbaldo  Originating Location (pt. Location): Home    Distant Location (provider location):  On-site        Subjective   Niharika is a 69 year old, presenting for the following health issues:  Follow Up (CT scan 11/13/24)      11/21/2024     7:29 AM   Additional Questions   Roomed by Silvia     History of Present Illness       Reason for visit:  Check on results of a CT  Symptoms include:  Concerns on latest CT scan   She is taking medications regularly.    Spoke with patient today via telephone visit. She recent had her routine CT of chest/abdomen/pelvis due to her history of rectal cancer. This showed a new cyst on her left ovary. Ultrasound recommended for further evaluation. Ultrasound was completed yesterday. Cysts were seen on both ovaries with the left being the largest at 2.6 cm. It was recommended to have a follow-up ultrasound in 1 year. She has noticed some uterine prolapse but otherwise no pain.     The CT scan also showed severe coronary artery calcification. This is a change from last year where it was considered moderate. She does have Type I diabetes with hyperlipidemia however both are well controlled and she is on a statin. Neither parents had heart disease but had a grandparent who passed at 51 from a heart attack. She does note times that if she over-exerts that she feels a pressure/sometimes crushing sensation in her chest and feels more short of breath.       Review of Systems  Constitutional, HEENT, cardiovascular, pulmonary, GI, , musculoskeletal, neuro, skin, endocrine and psych systems are negative, except as otherwise noted.      Objective    Vitals - Patient Reported  Pain Score: No Pain (0)        Physical Exam   General: Alert and no distress //Respiratory: No audible wheeze, cough, or shortness of  breath // Psychiatric:  Appropriate affect, tone, and pace of words      Assessment & Plan     Coronary artery calcification seen on CT scan  Recommended stress test (patient does not feel treadmill would be possible) as well as an echocardiogram given her CT findings and based on her symptoms/risk factors. Further follow-up pending these results. We reviewed symptoms that should prompt immediate care in the ER.   - NM Lexiscan stress test; Future  - Echocardiogram Complete; Future    Type 1 diabetes mellitus with other specified complication (H)  Stable. Continue current treatments without change.     Mixed hyperlipidemia  Continue statin daily.     Class 2 severe obesity due to excess calories with serious comorbidity and body mass index (BMI) of 35.0 to 35.9 in adult (H)  Encouraged ongoing diet and exercise modifications as able.     Drug-induced polyneuropathy (H)  Stable. No acute concerns.    Rectal cancer (H)  Continues to follow for routine screenings.    The patient indicates understanding of these issues and agrees with the plan.    Phone call duration: 9 minutes  Signed Electronically by: Reena Marin PA-C

## 2024-11-26 ENCOUNTER — HOSPITAL ENCOUNTER (OUTPATIENT)
Dept: NUCLEAR MEDICINE | Facility: CLINIC | Age: 69
Setting detail: NUCLEAR MEDICINE
Discharge: HOME | End: 2024-11-26
Attending: PHYSICIAN ASSISTANT
Payer: MEDICARE

## 2024-11-26 ENCOUNTER — HOSPITAL ENCOUNTER (OUTPATIENT)
Dept: CARDIOLOGY | Facility: CLINIC | Age: 69
Setting detail: NUCLEAR MEDICINE
Discharge: HOME | End: 2024-11-26
Attending: PHYSICIAN ASSISTANT
Payer: MEDICARE

## 2024-11-26 DIAGNOSIS — I25.10 CORONARY ARTERY CALCIFICATION SEEN ON CT SCAN: ICD-10-CM

## 2024-11-26 LAB
CV STRESS MAX HR HE: 99
RATE PRESSURE PRODUCT: NORMAL
STRESS ECHO BASELINE DIASTOLIC HE: 74
STRESS ECHO BASELINE HR: 81 BPM
STRESS ECHO BASELINE SYSTOLIC BP: 158
STRESS ECHO CALCULATED PERCENT HR: 65 %
STRESS ECHO LAST STRESS DIASTOLIC BP: 72
STRESS ECHO LAST STRESS SYSTOLIC BP: 134
STRESS ECHO TARGET HR: 153

## 2024-11-26 PROCEDURE — A9502 TC99M TETROFOSMIN: HCPCS | Performed by: PHYSICIAN ASSISTANT

## 2024-11-26 PROCEDURE — 343N000001 HC RX 343 MED OP 636: Performed by: PHYSICIAN ASSISTANT

## 2024-11-26 PROCEDURE — 250N000011 HC RX IP 250 OP 636: Performed by: PHYSICIAN ASSISTANT

## 2024-11-26 PROCEDURE — G1010 CDSM STANSON: HCPCS

## 2024-11-26 PROCEDURE — 78452 HT MUSCLE IMAGE SPECT MULT: CPT | Mod: MF

## 2024-11-26 PROCEDURE — 93016 CV STRESS TEST SUPVJ ONLY: CPT | Performed by: INTERNAL MEDICINE

## 2024-11-26 PROCEDURE — 93017 CV STRESS TEST TRACING ONLY: CPT

## 2024-11-26 RX ORDER — REGADENOSON 0.08 MG/ML
0.4 INJECTION, SOLUTION INTRAVENOUS ONCE
Status: COMPLETED | OUTPATIENT
Start: 2024-11-26 | End: 2024-11-26

## 2024-11-26 RX ADMIN — REGADENOSON 0.4 MG: 0.08 INJECTION, SOLUTION INTRAVENOUS at 10:41

## 2024-11-26 RX ADMIN — TETROFOSMIN 33 MILLICURIE: 1.38 INJECTION, POWDER, LYOPHILIZED, FOR SOLUTION INTRAVENOUS at 10:36

## 2024-11-26 RX ADMIN — TETROFOSMIN 11 MILLICURIE: 1.38 INJECTION, POWDER, LYOPHILIZED, FOR SOLUTION INTRAVENOUS at 09:20

## 2024-12-17 ENCOUNTER — TELEPHONE (OUTPATIENT)
Dept: FAMILY MEDICINE | Facility: CLINIC | Age: 69
End: 2024-12-17
Payer: MEDICARE

## 2024-12-17 NOTE — TELEPHONE ENCOUNTER
Patient Quality Outreach    Patient is due for the following:   Diabetes -  A1C and Eye Exam      Topic Date Due    Zoster (Shingles) Vaccine (1 of 2) Never done    Diptheria Tetanus Pertussis (DTAP/TDAP/TD) Vaccine (2 - Td or Tdap) 05/13/2021    Pneumococcal Vaccine (3 of 3 - PPSV23 or PCV20) 09/21/2022    Flu Vaccine (1) 09/01/2024    COVID-19 Vaccine (1 - 2024-25 season) Never done       Action(s) Taken:       Type of outreach:    Sent eSnips message.    Questions for provider review:    None           Lluvia Cullen MA

## 2024-12-17 NOTE — LETTER
January 7, 2025      Niharika Cason  [address] [address] [place] [id number]        Dear Niharika,       Your team at [facility] cares about your health. We have reviewed your chart and based on our findings; we are making the following recommendations to better manage your health.     You are in particular need of attention regarding the following:     Schedule a DIABETIC FOLLOW UP appointment for Office Visit. Patients with diabetes should see their provider regularly.  Schedule a DIABETIC EYE EXAM.  This exam is done with an optometrist, annually. You can schedule this appointment with your eye doctor.  If you need a referral, please let us know.    You are not due for your yearly physical until June of this year, but it would be recommended to schedule that as soon as you are able. Schedule online with [facility], or you can give us a call at [phone] and we would be happy to assist you with scheduling.     If you have already completed these items, please contact the clinic via phone or   [facility] so your care team can review and update your records. Thank you for   choosing [facility] for your healthcare needs. For any questions,   concerns, or to schedule an appointment please contact our clinic.    Healthy Regards,      Your [facility] Care Team

## 2024-12-19 ENCOUNTER — HOSPITAL ENCOUNTER (OUTPATIENT)
Dept: CARDIOLOGY | Facility: CLINIC | Age: 69
Discharge: HOME | End: 2024-12-19
Attending: PHYSICIAN ASSISTANT
Payer: MEDICARE

## 2024-12-19 DIAGNOSIS — I25.10 CORONARY ARTERY CALCIFICATION SEEN ON CT SCAN: ICD-10-CM

## 2024-12-19 LAB — LVEF ECHO: NORMAL

## 2024-12-19 PROCEDURE — 93306 TTE W/DOPPLER COMPLETE: CPT

## 2024-12-21 ENCOUNTER — HEALTH MAINTENANCE LETTER (OUTPATIENT)
Age: 69
End: 2024-12-21

## 2025-01-07 NOTE — TELEPHONE ENCOUNTER
Patient Quality Outreach    Patient is due for the following:   Diabetes -  A1C and Eye Exam      Topic Date Due    Zoster (Shingles) Vaccine (1 of 2) Never done    Diptheria Tetanus Pertussis (DTAP/TDAP/TD) Vaccine (2 - Td or Tdap) 05/13/2021    Pneumococcal Vaccine (3 of 3 - PCV20 or PCV21) 06/17/2021    Flu Vaccine (1) 09/01/2024    COVID-19 Vaccine (1 - 2024-25 season) Never done       Action(s) Taken:   Schedule a office visit for eye exam    Type of outreach:    Sent letter.    Questions for provider review:    None           Lluvia Cullen, VF

## 2025-02-04 DIAGNOSIS — Z85.048 HISTORY OF RECTAL CANCER: Primary | ICD-10-CM

## 2025-02-05 ENCOUNTER — TELEPHONE (OUTPATIENT)
Dept: SURGERY | Facility: CLINIC | Age: 70
End: 2025-02-05
Payer: MEDICARE

## 2025-02-05 NOTE — TELEPHONE ENCOUNTER
Left Voicemail (1st Attempt) and Sent Mychart (1st Attempt) for the patient to call back and schedule the following:    Appointment type: MRI and Labs   Provider: Imaging/Lab   Return date: March 2025  Specialty phone number: [phone]  Additional appointment(s) needed: Flex Sig//May 2025/   Additonal Notes: n/a

## 2025-02-06 ENCOUNTER — TELEPHONE (OUTPATIENT)
Dept: SURGERY | Facility: CLINIC | Age: 70
End: 2025-02-06
Payer: MEDICARE

## 2025-02-06 NOTE — TELEPHONE ENCOUNTER
Patient confirmed scheduled appointment:  Date: 03/14/2025   Time: 100 pm   Visit type: Lab  Provider: lab  Location: [facility]   Testing/imaging: MRI 3T Rectum//115 pm//[facility]   Additional notes: ordered by      Patient confirmed scheduled appointment:  Date: 05/09/25  Time: 1130 am   Visit type: Flex Sig   Provider:    Location: [facility]  Testing/imaging: n/a  Additional notes: n/a

## 2025-02-19 ENCOUNTER — TRANSFERRED RECORDS (OUTPATIENT)
Dept: HEALTH INFORMATION MANAGEMENT | Facility: CLINIC | Age: 70
End: 2025-02-19
Payer: MEDICARE

## 2025-02-19 LAB — RETINOPATHY: NEGATIVE

## 2025-03-14 ENCOUNTER — ANCILLARY PROCEDURE (OUTPATIENT)
Dept: MRI IMAGING | Facility: CLINIC | Age: 70
End: 2025-03-14
Attending: COLON & RECTAL SURGERY
Payer: MEDICARE

## 2025-03-14 DIAGNOSIS — Z85.048 HISTORY OF RECTAL CANCER: ICD-10-CM

## 2025-03-14 PROCEDURE — A9585 GADOBUTROL INJECTION: HCPCS | Performed by: RADIOLOGY

## 2025-03-14 PROCEDURE — 83036 HEMOGLOBIN GLYCOSYLATED A1C: CPT | Performed by: PHYSICIAN ASSISTANT

## 2025-03-14 PROCEDURE — 99000 SPECIMEN HANDLING OFFICE-LAB: CPT | Performed by: PATHOLOGY

## 2025-03-14 PROCEDURE — 82378 CARCINOEMBRYONIC ANTIGEN: CPT | Performed by: COLON & RECTAL SURGERY

## 2025-03-14 PROCEDURE — 72197 MRI PELVIS W/O & W/DYE: CPT | Performed by: RADIOLOGY

## 2025-03-14 RX ORDER — GADOBUTROL 604.72 MG/ML
10 INJECTION INTRAVENOUS ONCE
Status: COMPLETED | OUTPATIENT
Start: 2025-03-14 | End: 2025-03-14

## 2025-03-14 RX ADMIN — GADOBUTROL 9 ML: 604.72 INJECTION INTRAVENOUS at 15:11

## 2025-03-14 NOTE — DISCHARGE INSTRUCTIONS
MRI Contrast Discharge Instructions    The IV contrast you received today will pass out of your body in your  urine. This will happen in the next 24 hours. You will not feel this process.  Your urine will not change color.    Drink at least 4 extra glasses of water or juice today (unless your doctor  has restricted your fluids). This reduces the stress on your kidneys.  You may take your regular medicines.    If you are on dialysis: It is best to have dialysis today.    If you have a reaction: Most reactions happen right away. If you have  any new symptoms after leaving the hospital (such as hives or swelling),  call your hospital at the correct number below. Or call your family doctor.  If you have breathing distress or wheezing, call 911.    Special instructions: ***    I have read and understand the above information.    Signature:______________________________________ Date:___________    Staff:__________________________________________ Date:___________     Time:__________    [facility] Departments:    ___[place]: [phone]  ___[facility]: [phone]  ___[place]: [phone] ___[facility]: [phone]  ___[facility]: [phone]  ___[facility]: [phone]  ___[phone]  ___[facility]: [phone]  ___[phone]

## 2025-04-08 DIAGNOSIS — E10.9 TYPE 1 DIABETES MELLITUS WITHOUT COMPLICATION (H): ICD-10-CM

## 2025-04-08 RX ORDER — INSULIN GLARGINE 100 [IU]/ML
INJECTION, SOLUTION SUBCUTANEOUS
Qty: 30 ML | Refills: 0 | Status: SHIPPED | OUTPATIENT
Start: 2025-04-08

## 2025-06-25 SDOH — HEALTH STABILITY: PHYSICAL HEALTH: ON AVERAGE, HOW MANY MINUTES DO YOU ENGAGE IN EXERCISE AT THIS LEVEL?: 30 MIN

## 2025-06-25 SDOH — HEALTH STABILITY: PHYSICAL HEALTH: ON AVERAGE, HOW MANY DAYS PER WEEK DO YOU ENGAGE IN MODERATE TO STRENUOUS EXERCISE (LIKE A BRISK WALK)?: 1 DAY

## 2025-06-25 ASSESSMENT — SOCIAL DETERMINANTS OF HEALTH (SDOH): HOW OFTEN DO YOU GET TOGETHER WITH FRIENDS OR RELATIVES?: TWICE A WEEK

## 2025-06-26 ENCOUNTER — OFFICE VISIT (OUTPATIENT)
Dept: FAMILY MEDICINE | Facility: CLINIC | Age: 70
End: 2025-06-26
Payer: MEDICARE

## 2025-06-26 VITALS
HEIGHT: 63 IN | DIASTOLIC BLOOD PRESSURE: 75 MMHG | SYSTOLIC BLOOD PRESSURE: 117 MMHG | HEART RATE: 78 BPM | TEMPERATURE: 98.2 F | RESPIRATION RATE: 20 BRPM | WEIGHT: 198 LBS | BODY MASS INDEX: 35.08 KG/M2 | OXYGEN SATURATION: 97 %

## 2025-06-26 DIAGNOSIS — N83.202 BILATERAL OVARIAN CYSTS: ICD-10-CM

## 2025-06-26 DIAGNOSIS — E10.9 TYPE 1 DIABETES MELLITUS WITHOUT COMPLICATION (H): ICD-10-CM

## 2025-06-26 DIAGNOSIS — E10.69 TYPE 1 DIABETES MELLITUS WITH OTHER SPECIFIED COMPLICATION (H): ICD-10-CM

## 2025-06-26 DIAGNOSIS — F41.1 GENERALIZED ANXIETY DISORDER: ICD-10-CM

## 2025-06-26 DIAGNOSIS — C20 RECTAL CANCER (H): ICD-10-CM

## 2025-06-26 DIAGNOSIS — I10 ESSENTIAL HYPERTENSION WITH GOAL BLOOD PRESSURE LESS THAN 130/80: ICD-10-CM

## 2025-06-26 DIAGNOSIS — E78.2 MIXED HYPERLIPIDEMIA: ICD-10-CM

## 2025-06-26 DIAGNOSIS — E66.01 CLASS 2 SEVERE OBESITY DUE TO EXCESS CALORIES WITH SERIOUS COMORBIDITY AND BODY MASS INDEX (BMI) OF 35.0 TO 35.9 IN ADULT (H): ICD-10-CM

## 2025-06-26 DIAGNOSIS — E66.812 CLASS 2 SEVERE OBESITY DUE TO EXCESS CALORIES WITH SERIOUS COMORBIDITY AND BODY MASS INDEX (BMI) OF 35.0 TO 35.9 IN ADULT (H): ICD-10-CM

## 2025-06-26 DIAGNOSIS — N83.201 BILATERAL OVARIAN CYSTS: ICD-10-CM

## 2025-06-26 DIAGNOSIS — Z00.00 ENCOUNTER FOR MEDICARE ANNUAL WELLNESS EXAM: Primary | ICD-10-CM

## 2025-06-26 LAB
ANION GAP SERPL CALCULATED.3IONS-SCNC: 12 MMOL/L (ref 7–15)
BUN SERPL-MCNC: 19.4 MG/DL (ref 8–23)
CALCIUM SERPL-MCNC: 9.8 MG/DL (ref 8.8–10.4)
CHLORIDE SERPL-SCNC: 100 MMOL/L (ref 98–107)
CHOLEST SERPL-MCNC: 134 MG/DL
CREAT SERPL-MCNC: 0.77 MG/DL (ref 0.51–0.95)
CREAT UR-MCNC: 22.3 MG/DL
EGFRCR SERPLBLD CKD-EPI 2021: 84 ML/MIN/1.73M2
EST. AVERAGE GLUCOSE BLD GHB EST-MCNC: 163 MG/DL
FASTING STATUS PATIENT QL REPORTED: NO
FASTING STATUS PATIENT QL REPORTED: NO
GLUCOSE SERPL-MCNC: 149 MG/DL (ref 70–99)
HBA1C MFR BLD: 7.3 %
HCO3 SERPL-SCNC: 25 MMOL/L (ref 22–29)
HDLC SERPL-MCNC: 62 MG/DL
LDLC SERPL CALC-MCNC: 59 MG/DL
MICROALBUMIN UR-MCNC: <12 MG/L
MICROALBUMIN/CREAT UR: NORMAL MG/G{CREAT}
NONHDLC SERPL-MCNC: 72 MG/DL
POTASSIUM SERPL-SCNC: 4.5 MMOL/L (ref 3.4–5.3)
SODIUM SERPL-SCNC: 137 MMOL/L (ref 135–145)
TRIGL SERPL-MCNC: 65 MG/DL

## 2025-06-26 PROCEDURE — 3078F DIAST BP <80 MM HG: CPT | Performed by: PHYSICIAN ASSISTANT

## 2025-06-26 PROCEDURE — 36415 COLL VENOUS BLD VENIPUNCTURE: CPT | Performed by: PHYSICIAN ASSISTANT

## 2025-06-26 PROCEDURE — 3048F LDL-C <100 MG/DL: CPT | Performed by: PHYSICIAN ASSISTANT

## 2025-06-26 PROCEDURE — 80048 BASIC METABOLIC PNL TOTAL CA: CPT | Performed by: PHYSICIAN ASSISTANT

## 2025-06-26 PROCEDURE — 99214 OFFICE O/P EST MOD 30 MIN: CPT | Mod: 25 | Performed by: PHYSICIAN ASSISTANT

## 2025-06-26 PROCEDURE — 3074F SYST BP LT 130 MM HG: CPT | Performed by: PHYSICIAN ASSISTANT

## 2025-06-26 PROCEDURE — 83036 HEMOGLOBIN GLYCOSYLATED A1C: CPT | Performed by: PHYSICIAN ASSISTANT

## 2025-06-26 PROCEDURE — 3051F HG A1C>EQUAL 7.0%<8.0%: CPT | Performed by: PHYSICIAN ASSISTANT

## 2025-06-26 PROCEDURE — 82570 ASSAY OF URINE CREATININE: CPT | Performed by: PHYSICIAN ASSISTANT

## 2025-06-26 PROCEDURE — 82378 CARCINOEMBRYONIC ANTIGEN: CPT | Performed by: PHYSICIAN ASSISTANT

## 2025-06-26 PROCEDURE — G0439 PPPS, SUBSEQ VISIT: HCPCS | Performed by: PHYSICIAN ASSISTANT

## 2025-06-26 PROCEDURE — 1126F AMNT PAIN NOTED NONE PRSNT: CPT | Performed by: PHYSICIAN ASSISTANT

## 2025-06-26 PROCEDURE — 80061 LIPID PANEL: CPT | Performed by: PHYSICIAN ASSISTANT

## 2025-06-26 PROCEDURE — G2211 COMPLEX E/M VISIT ADD ON: HCPCS | Performed by: PHYSICIAN ASSISTANT

## 2025-06-26 PROCEDURE — 82043 UR ALBUMIN QUANTITATIVE: CPT | Performed by: PHYSICIAN ASSISTANT

## 2025-06-26 RX ORDER — INSULIN GLARGINE 100 [IU]/ML
INJECTION, SOLUTION SUBCUTANEOUS
Qty: 30 ML | Refills: 0 | Status: SHIPPED | OUTPATIENT
Start: 2025-06-26

## 2025-06-26 RX ORDER — PAROXETINE 30 MG/1
30 TABLET, FILM COATED ORAL EVERY MORNING
Qty: 90 TABLET | Refills: 4 | Status: SHIPPED | OUTPATIENT
Start: 2025-06-26

## 2025-06-26 RX ORDER — LISINOPRIL 40 MG/1
40 TABLET ORAL DAILY
Qty: 93 TABLET | Refills: 4 | Status: SHIPPED | OUTPATIENT
Start: 2025-06-26

## 2025-06-26 RX ORDER — ATORVASTATIN CALCIUM 40 MG/1
40 TABLET, FILM COATED ORAL DAILY
Qty: 90 TABLET | Refills: 4 | Status: SHIPPED | OUTPATIENT
Start: 2025-06-26

## 2025-06-26 RX ORDER — INSULIN LISPRO 100 [IU]/ML
INJECTION, SOLUTION INTRAVENOUS; SUBCUTANEOUS
Qty: 45 ML | Refills: 4 | Status: SHIPPED | OUTPATIENT
Start: 2025-06-26

## 2025-06-26 ASSESSMENT — PAIN SCALES - GENERAL: PAINLEVEL_OUTOF10: NO PAIN (0)

## 2025-06-26 NOTE — PATIENT INSTRUCTIONS
Patient Education   Preventive Care Advice   This is general advice given by our system to help you stay healthy. However, your care team may have specific advice just for you. Please talk to your care team about your preventive care needs.  Nutrition  Eat 5 or more servings of fruits and vegetables each day.  Try wheat bread, brown rice and whole grain pasta (instead of white bread, rice, and pasta).  Get enough calcium and vitamin D. Check the label on foods and aim for 100% of the RDA (recommended daily allowance).  Lifestyle  Exercise at least 150 minutes each week  (30 minutes a day, 5 days a week).  Do muscle strengthening activities 2 days a week. These help control your weight and prevent disease.  No smoking.  Wear sunscreen to prevent skin cancer.  Have a dental exam and cleaning every 6 months.  Yearly exams  See your health care team every year to talk about:  Any changes in your health.  Any medicines your care team has prescribed.  Preventive care, family planning, and ways to prevent chronic diseases.  Shots (vaccines)   HPV shots (up to age 26), if you've never had them before.  Hepatitis B shots (up to age 59), if you've never had them before.  COVID-19 shot: Get this shot when it's due.  Flu shot: Get a flu shot every year.  Tetanus shot: Get a tetanus shot every 10 years.  Pneumococcal, hepatitis A, and RSV shots: Ask your care team if you need these based on your risk.  Shingles shot (for age 50 and up)  General health tests  Diabetes screening:  Starting at age 35, Get screened for diabetes at least every 3 years.  If you are younger than age 35, ask your care team if you should be screened for diabetes.  Cholesterol test: At age 39, start having a cholesterol test every 5 years, or more often if advised.  Bone density scan (DEXA): At age 50, ask your care team if you should have this scan for osteoporosis (brittle bones).  Hepatitis C: Get tested at least once in your life.  STIs (sexually  transmitted infections)  Before age 24: Ask your care team if you should be screened for STIs.  After age 24: Get screened for STIs if you're at risk. You are at risk for STIs (including HIV) if:  You are sexually active with more than one person.  You don't use condoms every time.  You or a partner was diagnosed with a sexually transmitted infection.  If you are at risk for HIV, ask about PrEP medicine to prevent HIV.  Get tested for HIV at least once in your life, whether you are at risk for HIV or not.  Cancer screening tests  Cervical cancer screening: If you have a cervix, begin getting regular cervical cancer screening tests starting at age 21.  Breast cancer scan (mammogram): If you've ever had breasts, begin having regular mammograms starting at age 40. This is a scan to check for breast cancer.  Colon cancer screening: It is important to start screening for colon cancer at age 45.  Have a colonoscopy test every 10 years (or more often if you're at risk) Or, ask your provider about stool tests like a FIT test every year or Cologuard test every 3 years.  To learn more about your testing options, visit:   .  For help making a decision, visit:   https://bit.ly/ja47466.  Prostate cancer screening test: If you have a prostate, ask your care team if a prostate cancer screening test (PSA) at age 55 is right for you.  Lung cancer screening: If you are a current or former smoker ages 50 to 80, ask your care team if ongoing lung cancer screenings are right for you.  For informational purposes only. Not to replace the advice of your health care provider. Copyright   2023 [facility] [employer]. All rights reserved. Clinically reviewed by the [facility]. [employer] 071049 - REV 01/24.  Hearing Loss: Care Instructions  Overview     Hearing loss is a sudden or slow decrease in how well you hear. It can range from slight to profound. Permanent hearing loss can occur with aging. It also can  happen when you are exposed long-term to loud noise. Examples include listening to loud music, riding motorcycles, or being around other loud machines.  Hearing loss can affect your work and home life. It can make you feel lonely or depressed. You may feel that you have lost your independence. But hearing aids and other devices can help you hear better and feel connected to others.  Follow-up care is a key part of your treatment and safety. Be sure to make and go to all appointments, and call your doctor if you are having problems. It's also a good idea to know your test results and keep a list of the medicines you take.  How can you care for yourself at home?  Avoid loud noises whenever possible. This helps keep your hearing from getting worse.  Always wear hearing protection around loud noises.  Wear a hearing aid as directed.  A professional can help you pick a hearing aid that will work best for you.  You can also get hearing aids over the counter for mild to moderate hearing loss.  Have hearing tests as your doctor suggests. They can show whether your hearing has changed. Your hearing aid may need to be adjusted.  Use other devices as needed. These may include:  Telephone amplifiers and hearing aids that can connect to a television, stereo, radio, or microphone.  Devices that use lights or vibrations. These alert you to the doorbell, a ringing telephone, or a baby monitor.  Television closed-captioning. This shows the words at the bottom of the screen. Most new TVs can do this.  TTY (text telephone). This lets you type messages back and forth on the telephone instead of talking or listening. These devices are also called TDD. When messages are typed on the keyboard, they are sent over the phone line to a receiving TTY. The message is shown on a monitor.  Use text messaging, social media, and email if it is hard for you to communicate by telephone.  Try to learn a listening technique called speechreading. It is  "not lipreading. You pay attention to people's gestures, expressions, posture, and tone of voice. These clues can help you understand what a person is saying. Face the person you are talking to, and have them face you. Make sure the lighting is good. You need to see the other person's face clearly.  Think about counseling if you need help to adjust to your hearing loss.  When should you call for help?  Watch closely for changes in your health, and be sure to contact your doctor if:    You think your hearing is getting worse.     You have new symptoms, such as dizziness or nausea.   Where can you learn more?  Go to https://www.[employer]/patiented  Enter R798 in the search box to learn more about \"Hearing Loss: Care Instructions.\"  Current as of: October 27, 2024  Content Version: 14.5    2075-7126 [employer].   Care instructions adapted under license by your healthcare professional. If you have questions about a medical condition or this instruction, always ask your healthcare professional. [employer] disclaims any warranty or liability for your use of this information.    Learning About Stress  What is stress?     Stress is your body's response to a hard situation. Your body can have a physical, emotional, or mental response. Stress is a fact of life for most people, and it affects everyone differently. What causes stress for you may not be stressful for someone else.  A lot of things can cause stress. You may feel stress when you go on a job interview, take a test, or run a race. This kind of short-term stress is normal and even useful. It can help you if you need to work hard or react quickly. For example, stress can help you finish an important job on time.  Long-term stress is caused by ongoing stressful situations or events. Examples of long-term stress include long-term health problems, ongoing problems at work, or conflicts in your family. Long-term stress can harm your " health.  How does stress affect your health?  When you are stressed, your body responds as though you are in danger. It makes hormones that speed up your heart, make you breathe faster, and give you a burst of energy. This is called the fight-or-flight stress response. If the stress is over quickly, your body goes back to normal and no harm is done.  But if stress happens too often or lasts too long, it can have bad effects. Long-term stress can make you more likely to get sick, and it can make symptoms of some diseases worse. If you tense up when you are stressed, you may develop neck, shoulder, or low back pain. Stress is linked to high blood pressure and heart disease.  Stress also harms your emotional health. It can make you haddad, tense, or depressed. Your relationships may suffer, and you may not do well at work or school.  What can you do to manage stress?  You can try these things to help manage stress:   Do something active. Exercise or activity can help reduce stress. Walking is a great way to get started. Even everyday activities such as housecleaning or yard work can help.  Try yoga or waylon chi. These techniques combine exercise and meditation. You may need some training at first to learn them.  Do something you enjoy. For example, listen to music or go to a movie. Practice your hobby or do volunteer work.  Meditate. This can help you relax, because you are not worrying about what happened before or what may happen in the future.  Do guided imagery. Imagine yourself in any setting that helps you feel calm. You can use online videos, books, or a teacher to guide you.  Do breathing exercises. For example:  From a standing position, bend forward from the waist with your knees slightly bent. Let your arms dangle close to the floor.  Breathe in slowly and deeply as you return to a standing position. Roll up slowly and lift your head last.  Hold your breath for just a few seconds in the standing  "position.  Breathe out slowly and bend forward from the waist.  Let your feelings out. Talk, laugh, cry, and express anger when you need to. Talking with supportive friends or family, a counselor, or a julieta leader about your feelings is a healthy way to relieve stress. Avoid discussing your feelings with people who make you feel worse.  Write. It may help to write about things that are bothering you. This helps you find out how much stress you feel and what is causing it. When you know this, you can find better ways to cope.  What can you do to prevent stress?  You might try some of these things to help prevent stress:  Manage your time. This helps you find time to do the things you want and need to do.  Get enough sleep. Your body recovers from the stresses of the day while you are sleeping.  Get support. Your family, friends, and community can make a difference in how you experience stress.  Limit your news feed. Avoid or limit time on social media or news that may make you feel stressed.  Do something active. Exercise or activity can help reduce stress. Walking is a great way to get started.  Where can you learn more?  Go to https://www."[employer].net/patiented  Enter N032 in the search box to learn more about \"Learning About Stress.\"  Current as of: October 24, 2024  Content Version: 14.5 2024-2025 [employer].   Care instructions adapted under license by your healthcare professional. If you have questions about a medical condition or this instruction, always ask your healthcare professional. [employer] disclaims any warranty or liability for your use of this information.    Bladder Training: Care Instructions  Your Care Instructions     Bladder training is used to treat urge incontinence and stress incontinence. Urge incontinence means that the need to urinate comes on so fast that you can't get to a toilet in time. Stress incontinence means that you leak urine because of pressure on " your bladder. For example, it may happen when you laugh, cough, or lift something heavy.  Bladder training can increase how long you can wait before you have to urinate. It can also help your bladder hold more urine. And it can give you better control over the urge to urinate.  It is important to remember that bladder training takes a few weeks to a few months to make a difference. You may not see results right away, but don't give up.  Follow-up care is a key part of your treatment and safety. Be sure to make and go to all appointments, and call your doctor if you are having problems. It's also a good idea to know your test results and keep a list of the medicines you take.  How can you care for yourself at home?  Work with your doctor to come up with a bladder training program that is right for you. You may use one or more of the following methods.  Delayed urination  In the beginning, try to keep from urinating for 5 minutes after you first feel the need to go.  While you wait, take deep, slow breaths to relax. Kegel exercises can also help you delay the need to go to the bathroom.  After some practice, when you can easily wait 5 minutes to urinate, try to wait 10 minutes before you urinate.  Slowly increase the waiting period until you are able to control when you have to urinate.  Scheduled urination  Empty your bladder when you first wake up in the morning.  Schedule times throughout the day when you will urinate.  Start by going to the bathroom every hour, even if you don't need to go.  Slowly increase the time between trips to the bathroom.  When you have found a schedule that works well for you, keep doing it.  If you wake up during the night and have to urinate, do it. Apply your schedule to waking hours only.  Kegel exercises  These tighten and strengthen pelvic muscles, which can help you control the flow of urine. (If doing these exercises causes pain, stop doing them and talk with your doctor.) To do  "Kegel exercises:  Squeeze your muscles as if you were trying not to pass gas. Or squeeze your muscles as if you were stopping the flow of urine. Your belly, legs, and buttocks shouldn't move.  Hold the squeeze for 3 seconds, then relax for 5 to 10 seconds.  Start with 3 seconds, then add 1 second each week until you are able to squeeze for 10 seconds.  Repeat the exercise 10 times a session. Do 3 to 8 sessions a day.  When should you call for help?  Watch closely for changes in your health, and be sure to contact your doctor if:    Your incontinence is getting worse.     You do not get better as expected.   Where can you learn more?  Go to https://www.[employer]net/patiented  Enter V684 in the search box to learn more about \"Bladder Training: Care Instructions.\"  Current as of: April 30, 2024  Content Version: 14.5    1373-7009 [employer].   Care instructions adapted under license by your healthcare professional. If you have questions about a medical condition or this instruction, always ask your healthcare professional. [employer] disclaims any warranty or liability for your use of this information.       "

## 2025-06-26 NOTE — PROGRESS NOTES
Preventive Care Visit  [facility]  Reena Marin PA-C, Family Medicine  Jun 26, 2025        Artemio Bundy is a 69 year old, presenting for the following:  Annual Visit        6/26/2025     2:39 PM   Additional Questions   Roomed by Kelli B[place]ING[place]     Blood sugars continue to be very well controlled. She denies any concerns of hypoglycemia. Continues to use a CGM in monitoring and overall this has been working well. She has mild neuropathy in her feet - just feels like she has toe-socks on all the time. No pain. No opens ores or cracks. She does see the eye doctor yearly.     Patient presents today for follow-up of blood pressure. Numbers have been well controlled. Tolerating medications well without side effects. Monitoring salt in diet. Patient denies headaches, vision changes, chest pain, shortness of breath or paresthesias.    Anxiety continues to be well controlled on Paxil.    She was found to have bilateral ovarian cysts on previous imaging for monitoring of her rectal cancer. On MRI these have increased a bit further. Recommend follow-up ultrasound in 2-3 months.     Does see oncology yearly but would like CEA updated today.    Lots of grad parties and wedding this summer.   Advance Care Planning    Discussed advance care planning with patient; however, patient declined at this time.        6/25/2025   General Health   How would you rate your overall physical health? (!) FAIR   Feel stress (tense, anxious, or unable to sleep) To some extent   (!) STRESS CONCERN      6/25/2025   Nutrition   Diet: Diabetic    Carbohydrate counting       Multiple values from one day are sorted in reverse-chronological order         6/25/2025   Exercise   Days per week of moderate/strenous exercise 1 day   Average minutes spent exercising at this level 30 min   (!) EXERCISE CONCERN      6/25/2025   Social Factors   Frequency of gathering with friends or relatives Twice a week   Worry  food won't last until get money to buy more No   Food not last or not have enough money for food? No   Do you have housing? (Housing is defined as stable permanent housing and does not include staying outside in a car, in a tent, in an abandoned building, in an overnight shelter, or couch-surfing.) Yes   Are you worried about losing your housing? No   Lack of transportation? No   Unable to get utilities (heat,electricity)? No         6/25/2025   Fall Risk   Fallen 2 or more times in the past year? No   Trouble with walking or balance? No          6/25/2025   Activities of Daily Living- Home Safety   Needs help with the following daily activites None of the above   Safety concerns in the home None of the above         6/25/2025   Dental   Dentist two times every year? (!) NO         6/25/2025   Hearing Screening   Hearing concerns? (!) IT'S HARD TO FOLLOW A CONVERSATION IN A NOISY RESTAURANT OR CROWDED ROOM.   Would you like a referral for hearing testing? No         6/25/2025   Driving Risk Screening   Patient/family members have concerns about driving No         6/25/2025   General Alertness/Fatigue Screening   Have you been more tired than usual lately? No         6/25/2025   Urinary Incontinence Screening   Bothered by leaking urine in past 6 months Yes         Today's PHQ-2 Score:       6/25/2025     8:43 PM   PHQ-2 ( 1999 [employer])   Q1: Little interest or pleasure in doing things 0   Q2: Feeling down, depressed or hopeless 0   PHQ-2 Score 0    Q1: Little interest or pleasure in doing things Not at all   Q2: Feeling down, depressed or hopeless Not at all   PHQ-2 Score 0       Patient-reported           6/25/2025   Substance Use   Alcohol more than 3/day or more than 7/wk No   Do you have a current opioid prescription? No   How severe/bad is pain from 1 to 10? 0/10 (No Pain)   Do you use any other substances recreationally? No     Social History     Tobacco Use    Smoking status: Former     Current packs/day: 0.00      Average packs/day: 0.5 packs/day for 15.0 years (7.5 ttl pk-yrs)     Types: Cigarettes     Start date: 2003     Quit date: 2018     Years since quittin.7    Smokeless tobacco: Never   Vaping Use    Vaping status: Never Used   Substance Use Topics    Alcohol use: Yes     Alcohol/week: 7.0 standard drinks of alcohol     Types: 7 Standard drinks or equivalent per week    Drug use: No           2024   LAST FHS-7 RESULTS   1st degree relative breast or ovarian cancer No   Any relative bilateral breast cancer No   Any male have breast cancer No   Any ONE woman have BOTH breast AND ovarian cancer No   Any woman with breast cancer before 50yrs No   2 or more relatives with breast AND/OR ovarian cancer No   2 or more relatives with breast AND/OR bowel cancer No        Mammogram Screening - Mammogram every 1-2 years updated in Health Maintenance based on mutual decision making      History of abnormal Pap smear: No - age 65 or older with adequate negative prior screening test results (3 consecutive negative cytology results, 2 consecutive negative cotesting results, or 2 consecutive negative HrHPV test results within 10 years, with the most recent test occurring within the recommended screening interval for the test used)        Latest Ref Rng & Units 2018     9:07 AM 2018     8:54 AM 2013    12:00 AM   PAP / HPV   PAP (Historical)   NIL  NIL    HPV 16 DNA NEG^Negative Negative      HPV 18 DNA NEG^Negative Negative      Other HR HPV NEG^Negative Negative        ASCVD Risk   The ASCVD Risk score (Lokesh DK, et al., 2019) failed to calculate for the following reasons:    The valid total cholesterol range is 130 to 320 mg/dL        Reviewed and updated as needed this visit by Provider                  Current providers sharing in care for this patient include:  Patient Care Team:  Reena Marin PA-C as PCP - General (Family Medicine)  Lianet Palmer RN as Clinic Care Coordinator  "(Colon and Rectal Surgery)  Gifty Miranda RD as Diabetes Educator (Dietitian, Registered)  Dayron Lawton MD as Specialty Provider (Hematology & Oncology)  Yohana Morris MD as MD (Radiation Oncology)  Chicho Huynh MD as MD (Urology)  Angel Luis Asif MD as MD (OB/Gyn)  Guanakito Vargas MD as Referring Physician (OB/Gyn)  Reena Marin PA-C as Assigned PCP  Xu Evans DO as Assigned Musculoskeletal Provider  Alli Le MD as Assigned Surgical Provider    The following health maintenance items are reviewed in Epic and correct as of today:  Health Maintenance   Topic Date Due    ZOSTER VACCINE (1 of 2) Never done    RSV VACCINE (1 - Risk 60-74 years 1-dose series) Never done    DTAP/TDAP/TD VACCINE (2 - Td or Tdap) 05/13/2021    PNEUMOCOCCAL VACCINE 50+ YEARS (3 of 3 - PCV20 or PCV21) 06/17/2021    COVID-19 VACCINE (1 - 2024-25 season) Never done    BMP  06/13/2025    LIPID  06/13/2025    MICROALBUMIN  06/13/2025    DIABETIC FOOT EXAM  06/13/2025    ANNUAL REVIEW OF HM ORDERS  06/13/2025    MEDICARE ANNUAL WELLNESS VISIT  06/13/2025    INFLUENZA VACCINE (Season Ended) 09/01/2025    A1C  09/14/2025    LUNG CANCER SCREENING  11/13/2025    EYE EXAM  02/19/2026    FALL RISK ASSESSMENT  06/26/2026    MAMMO SCREENING  07/16/2026    COLORECTAL CANCER SCREENING  10/04/2026    ADVANCE CARE PLANNING  06/26/2030    DEXA  12/27/2038    HEPATITIS C SCREENING  Completed    PHQ-2 (once per calendar year)  Completed    HPV VACCINE  Aged Out    MENINGITIS VACCINE  Aged Out    PAP  Discontinued         Review of Systems  Constitutional, HEENT, cardiovascular, pulmonary, GI, , musculoskeletal, neuro, skin, endocrine and psych systems are negative, except as otherwise noted.     Objective    Exam  /75   Pulse 78   Temp 98.2  F (36.8  C) (Temporal)   Resp 20   Ht 1.6 m (5' 3\")   Wt 89.8 kg (198 lb)   LMP 10/15/2008 (Approximate)   SpO2 97%   BMI 35.07 kg/m     Estimated body " "mass index is 35.07 kg/m  as calculated from the following:    Height as of this encounter: 1.6 m (5' 3\").    Weight as of this encounter: 89.8 kg (198 lb).    Physical Exam  GENERAL: alert and no distress  EYES: Eyes grossly normal to inspection, PERRL and conjunctivae and sclerae normal  HENT: ear canals and TM's normal, nose and mouth without ulcers or lesions  NECK: no adenopathy, no asymmetry, masses, or scars  RESP: lungs clear to auscultation - no rales, rhonchi or wheezes  CV: regular rate and rhythm, normal S1 S2, no S3 or S4, no murmur, click or rub, no peripheral edema  ABDOMEN: soft, nontender, no hepatosplenomegaly, no masses and bowel sounds normal  MS: no gross musculoskeletal defects noted, no edema  SKIN: no suspicious lesions or rashes  NEURO: Normal strength and tone, mentation intact and speech normal  PSYCH: mentation appears normal, affect normal/bright  Diabetic foot exam: normal DP and PT pulses, no trophic changes or ulcerative lesions, and normal sensory exam         6/13/2024   Mini Cog   Clock Draw Score 2 Normal   3 Item Recall 0 objects recalled   Mini Cog Total Score 2          Assessment & Plan     Encounter for Medicare annual wellness exam  Vaccinations reviewed and declined at this time.    Essential hypertension with goal blood pressure less than 130/80  Stable. Continue current treatment without change.   - BASIC METABOLIC PANEL; Future  - lisinopril (ZESTRIL) 40 MG tablet; Take 1 tablet (40 mg) by mouth daily.  - BASIC METABOLIC PANEL    Type 1 diabetes mellitus with other specified complication (H)  Well controlled. Patient continues to closely monitor her blood sugars and adjust insulin as needed.   - Lipid panel reflex to direct LDL Non-fasting; Future  - Albumin Random Urine Quantitative with Creat Ratio; Future  - FOOT EXAM  - Hemoglobin A1c; Future  - Lipid panel reflex to direct LDL Non-fasting  - Albumin Random Urine Quantitative with Creat Ratio  - Hemoglobin " "A1c    Type 1 diabetes mellitus without complication (H)  - insulin glargine (LANTUS SOLOSTAR) 100 UNIT/ML pen; INJECT 23 UNITS SUBCUTANEOUSLY AT BEDTIME  - insulin lispro (HUMALOG KWIKPEN) 100 UNIT/ML (1 unit dial) KWIKPEN; Inject under the skin.  Take 8 units at breakfast, 8 units at lunch, 10-12 units at supper. Take additional sliding scale as indicated. Minimum daily dose range is 36-38 units with max of 50 units/day.    Class 2 severe obesity due to excess calories with serious comorbidity and body mass index (BMI) of 35.0 to 35.9 in adult (H)  Encouraged ongoing diet and exercise modifications as able.     Mixed hyperlipidemia  - atorvastatin (LIPITOR) 40 MG tablet; Take 1 tablet (40 mg) by mouth daily.    Generalized anxiety disorder  Stable. Continue current treatment without change.   - PARoxetine (PAXIL) 30 MG tablet; Take 1 tablet (30 mg) by mouth every morning.    Bilateral ovarian cysts  Recommend updated ultrasound in 2-3 months. Orders placed.   - US Pelvic Complete with Transvaginal; Future    Rectal cancer (H)  Continue surveillance recommended.   - CEA; Future  - CEA    Patient has been advised of split billing requirements and indicates understanding: Yes        BMI  Estimated body mass index is 35.07 kg/m  as calculated from the following:    Height as of this encounter: 1.6 m (5' 3\").    Weight as of this encounter: 89.8 kg (198 lb).   Weight management plan: Discussed healthy diet and exercise guidelines  Reviewed preventive health counseling, as reflected in patient instructions  Counseling  Appropriate preventive services were addressed with this patient via screening, questionnaire, or discussion as appropriate for fall prevention, nutrition, physical activity, Tobacco-use cessation, social engagement, weight loss and cognition.  Checklist reviewing preventive services available has been given to the patient.  Reviewed patient's diet, addressing concerns and/or questions.   She is at risk " for lack of exercise and has been provided with information to increase physical activity for the benefit of her well-being.   The patient was instructed to see the dentist every 6 months.   She is at risk for psychosocial distress and has been provided with information to reduce risk.   The patient was provided with written information regarding signs of hearing loss.   Information on urinary incontinence and treatment options given to patient.     The longitudinal plan of care for the diagnosis(es)/condition(s) as documented were addressed during this visit. Due to the added complexity in care, I will continue to support Niharika in the subsequent management and with ongoing continuity of care.    Signed Electronically by: Reena Marin PA-C

## 2025-06-27 ENCOUNTER — RESULTS FOLLOW-UP (OUTPATIENT)
Dept: FAMILY MEDICINE | Facility: CLINIC | Age: 70
End: 2025-06-27

## 2025-06-27 LAB — CEA SERPL-MCNC: 4.5 NG/ML

## 2025-06-28 PROBLEM — E66.01 CLASS 2 SEVERE OBESITY DUE TO EXCESS CALORIES WITH SERIOUS COMORBIDITY IN ADULT (H): Status: RESOLVED | Noted: 2023-11-21 | Resolved: 2025-06-28

## 2025-06-28 PROBLEM — E66.812 CLASS 2 SEVERE OBESITY DUE TO EXCESS CALORIES WITH SERIOUS COMORBIDITY IN ADULT (H): Status: RESOLVED | Noted: 2023-11-21 | Resolved: 2025-06-28

## 2025-08-26 ENCOUNTER — HOSPITAL ENCOUNTER (OUTPATIENT)
Dept: ULTRASOUND IMAGING | Facility: CLINIC | Age: 70
Discharge: HOME | End: 2025-08-26
Attending: PHYSICIAN ASSISTANT
Payer: MEDICARE

## 2025-08-26 DIAGNOSIS — N83.201 BILATERAL OVARIAN CYSTS: ICD-10-CM

## 2025-08-26 DIAGNOSIS — N83.202 BILATERAL OVARIAN CYSTS: ICD-10-CM

## 2025-08-26 PROCEDURE — 76856 US EXAM PELVIC COMPLETE: CPT

## (undated) DEVICE — KIT INTRODUCER FLUENT MICRO 5FRX10CM ECHO TIP KIT-038-04

## (undated) DEVICE — PACK CENTRAL LINE INSERTION SAN32CLFCG

## (undated) DEVICE — LINEN TOWEL PACK X5 5464

## (undated) DEVICE — GOWN XLG DISP 9545

## (undated) DEVICE — DILATOR VASCULAR 6FRX19CM 405504

## (undated) DEVICE — Device

## (undated) DEVICE — SU VICRYL 3-0 SH 27" J316H

## (undated) DEVICE — COVER ULTRASOUND PROBE W/GEL FLEXI-FEEL 6"X58" LF  25-FF658

## (undated) DEVICE — DECANTER BAG 2002S

## (undated) DEVICE — SU DERMABOND ADVANCED .7ML DNX12

## (undated) DEVICE — KNIFE HANDLE W/15 BLADE 371615

## (undated) DEVICE — SU MONOCRYL 4-0 P-3 18" UND Y494G

## (undated) RX ORDER — HEPARIN SODIUM (PORCINE) LOCK FLUSH IV SOLN 100 UNIT/ML 100 UNIT/ML
SOLUTION INTRAVENOUS
Status: DISPENSED
Start: 2019-06-12

## (undated) RX ORDER — PROPOFOL 10 MG/ML
INJECTION, EMULSION INTRAVENOUS
Status: DISPENSED
Start: 2019-06-06

## (undated) RX ORDER — TIMOLOL MALEATE 5 MG/ML
SOLUTION/ DROPS OPHTHALMIC
Status: DISPENSED
Start: 2023-12-07

## (undated) RX ORDER — LIDOCAINE HYDROCHLORIDE 20 MG/ML
INJECTION, SOLUTION EPIDURAL; INFILTRATION; INTRACAUDAL; PERINEURAL
Status: DISPENSED
Start: 2018-10-19

## (undated) RX ORDER — PROPOFOL 10 MG/ML
INJECTION, EMULSION INTRAVENOUS
Status: DISPENSED
Start: 2018-10-19

## (undated) RX ORDER — VANCOMYCIN HYDROCHLORIDE 1 G/20ML
INJECTION, POWDER, LYOPHILIZED, FOR SOLUTION INTRAVENOUS
Status: DISPENSED
Start: 2019-07-26

## (undated) RX ORDER — PROPOFOL 10 MG/ML
INJECTION, EMULSION INTRAVENOUS
Status: DISPENSED
Start: 2019-01-18

## (undated) RX ORDER — TETRACAINE HYDROCHLORIDE 5 MG/ML
SOLUTION OPHTHALMIC
Status: DISPENSED
Start: 2023-12-07

## (undated) RX ORDER — CEFAZOLIN SODIUM 2 G/50ML
SOLUTION INTRAVENOUS
Status: DISPENSED
Start: 2018-10-19